# Patient Record
Sex: FEMALE | Race: BLACK OR AFRICAN AMERICAN | NOT HISPANIC OR LATINO | Employment: OTHER | URBAN - METROPOLITAN AREA
[De-identification: names, ages, dates, MRNs, and addresses within clinical notes are randomized per-mention and may not be internally consistent; named-entity substitution may affect disease eponyms.]

---

## 2017-02-27 ENCOUNTER — GENERIC CONVERSION - ENCOUNTER (OUTPATIENT)
Dept: OTHER | Facility: OTHER | Age: 77
End: 2017-02-27

## 2017-02-27 ENCOUNTER — LAB (OUTPATIENT)
Dept: LAB | Facility: HOSPITAL | Age: 77
End: 2017-02-27
Payer: COMMERCIAL

## 2017-02-27 ENCOUNTER — TRANSCRIBE ORDERS (OUTPATIENT)
Dept: LAB | Facility: HOSPITAL | Age: 77
End: 2017-02-27

## 2017-02-27 DIAGNOSIS — I10 ESSENTIAL (PRIMARY) HYPERTENSION: ICD-10-CM

## 2017-02-27 DIAGNOSIS — E89.0 POSTPROCEDURAL HYPOTHYROIDISM: ICD-10-CM

## 2017-02-27 LAB
ALBUMIN SERPL BCP-MCNC: 3.6 G/DL (ref 3.5–5)
ALP SERPL-CCNC: 75 U/L (ref 46–116)
ALT SERPL W P-5'-P-CCNC: 23 U/L (ref 12–78)
ANION GAP SERPL CALCULATED.3IONS-SCNC: 5 MMOL/L (ref 4–13)
AST SERPL W P-5'-P-CCNC: 20 U/L (ref 5–45)
BASOPHILS # BLD AUTO: 0.02 THOUSANDS/ΜL (ref 0–0.1)
BASOPHILS NFR BLD AUTO: 0 % (ref 0–1)
BILIRUB SERPL-MCNC: 0.59 MG/DL (ref 0.2–1)
BUN SERPL-MCNC: 12 MG/DL (ref 5–25)
CALCIUM SERPL-MCNC: 9 MG/DL (ref 8.3–10.1)
CHLORIDE SERPL-SCNC: 104 MMOL/L (ref 100–108)
CHOLEST SERPL-MCNC: 186 MG/DL (ref 50–200)
CO2 SERPL-SCNC: 31 MMOL/L (ref 21–32)
CREAT SERPL-MCNC: 1.01 MG/DL (ref 0.6–1.3)
EOSINOPHIL # BLD AUTO: 0.09 THOUSAND/ΜL (ref 0–0.61)
EOSINOPHIL NFR BLD AUTO: 2 % (ref 0–6)
ERYTHROCYTE [DISTWIDTH] IN BLOOD BY AUTOMATED COUNT: 14.4 % (ref 11.6–15.1)
GFR SERPL CREATININE-BSD FRML MDRD: >60 ML/MIN/1.73SQ M
GLUCOSE SERPL-MCNC: 99 MG/DL (ref 65–140)
HCT VFR BLD AUTO: 39.1 % (ref 34.8–46.1)
HDLC SERPL-MCNC: 91 MG/DL (ref 40–60)
HGB BLD-MCNC: 13.1 G/DL (ref 11.5–15.4)
LDLC SERPL CALC-MCNC: 81 MG/DL (ref 0–100)
LYMPHOCYTES # BLD AUTO: 2.31 THOUSANDS/ΜL (ref 0.6–4.47)
LYMPHOCYTES NFR BLD AUTO: 43 % (ref 14–44)
MCH RBC QN AUTO: 32.8 PG (ref 26.8–34.3)
MCHC RBC AUTO-ENTMCNC: 33.5 G/DL (ref 31.4–37.4)
MCV RBC AUTO: 98 FL (ref 82–98)
MONOCYTES # BLD AUTO: 0.4 THOUSAND/ΜL (ref 0.17–1.22)
MONOCYTES NFR BLD AUTO: 8 % (ref 4–12)
NEUTROPHILS # BLD AUTO: 2.53 THOUSANDS/ΜL (ref 1.85–7.62)
NEUTS SEG NFR BLD AUTO: 47 % (ref 43–75)
NRBC BLD AUTO-RTO: 0 /100 WBCS
PLATELET # BLD AUTO: 238 THOUSANDS/UL (ref 149–390)
PMV BLD AUTO: 9.7 FL (ref 8.9–12.7)
POTASSIUM SERPL-SCNC: 3.7 MMOL/L (ref 3.5–5.3)
PROT SERPL-MCNC: 7.4 G/DL (ref 6.4–8.2)
RBC # BLD AUTO: 3.99 MILLION/UL (ref 3.81–5.12)
SODIUM SERPL-SCNC: 140 MMOL/L (ref 136–145)
T4 FREE SERPL-MCNC: 1.28 NG/DL (ref 0.76–1.46)
TRIGL SERPL-MCNC: 70 MG/DL
TSH SERPL DL<=0.05 MIU/L-ACNC: 4.21 UIU/ML (ref 0.36–3.74)
WBC # BLD AUTO: 5.36 THOUSAND/UL (ref 4.31–10.16)

## 2017-02-27 PROCEDURE — 84439 ASSAY OF FREE THYROXINE: CPT

## 2017-02-27 PROCEDURE — 36415 COLL VENOUS BLD VENIPUNCTURE: CPT

## 2017-02-27 PROCEDURE — 84443 ASSAY THYROID STIM HORMONE: CPT

## 2017-02-27 PROCEDURE — 80061 LIPID PANEL: CPT

## 2017-02-27 PROCEDURE — 85025 COMPLETE CBC W/AUTO DIFF WBC: CPT

## 2017-02-27 PROCEDURE — 80053 COMPREHEN METABOLIC PANEL: CPT

## 2017-02-28 ENCOUNTER — TRANSCRIBE ORDERS (OUTPATIENT)
Dept: ADMINISTRATIVE | Facility: HOSPITAL | Age: 77
End: 2017-02-28

## 2017-02-28 DIAGNOSIS — Z12.31 SCREENING MAMMOGRAM FOR HIGH-RISK PATIENT: Primary | ICD-10-CM

## 2017-03-09 ENCOUNTER — ALLSCRIPTS OFFICE VISIT (OUTPATIENT)
Dept: OTHER | Facility: OTHER | Age: 77
End: 2017-03-09

## 2017-03-30 ENCOUNTER — HOSPITAL ENCOUNTER (OUTPATIENT)
Dept: MAMMOGRAPHY | Facility: MEDICAL CENTER | Age: 77
Discharge: HOME | End: 2017-03-30
Payer: COMMERCIAL

## 2017-03-30 DIAGNOSIS — Z12.31 SCREENING MAMMOGRAM FOR HIGH-RISK PATIENT: ICD-10-CM

## 2017-03-30 PROCEDURE — G0202 SCR MAMMO BI INCL CAD: HCPCS

## 2017-04-04 ENCOUNTER — TRANSCRIBE ORDERS (OUTPATIENT)
Dept: ADMINISTRATIVE | Facility: HOSPITAL | Age: 77
End: 2017-04-04

## 2017-04-04 DIAGNOSIS — R31.1 BENIGN ESSENTIAL MICROSCOPIC HEMATURIA: Primary | ICD-10-CM

## 2017-04-06 ENCOUNTER — HOSPITAL ENCOUNTER (OUTPATIENT)
Dept: ULTRASOUND IMAGING | Facility: CLINIC | Age: 77
Discharge: HOME | End: 2017-04-06
Payer: COMMERCIAL

## 2017-04-06 DIAGNOSIS — R92.8 ABNORMAL SCREENING MAMMOGRAM: ICD-10-CM

## 2017-04-06 PROCEDURE — 76642 ULTRASOUND BREAST LIMITED: CPT

## 2017-04-06 RX ORDER — OMEPRAZOLE 20 MG/1
20 CAPSULE, DELAYED RELEASE ORAL DAILY
COMMUNITY
End: 2018-07-20 | Stop reason: SDUPTHER

## 2017-04-10 ENCOUNTER — HOSPITAL ENCOUNTER (OUTPATIENT)
Dept: ULTRASOUND IMAGING | Facility: CLINIC | Age: 77
Discharge: HOME | End: 2017-04-10
Payer: COMMERCIAL

## 2017-04-10 ENCOUNTER — HOSPITAL ENCOUNTER (OUTPATIENT)
Dept: ULTRASOUND IMAGING | Facility: CLINIC | Age: 77
Discharge: HOME | End: 2017-04-10
Admitting: RADIOLOGY
Payer: COMMERCIAL

## 2017-04-10 ENCOUNTER — HOSPITAL ENCOUNTER (OUTPATIENT)
Dept: MAMMOGRAPHY | Facility: CLINIC | Age: 77
Discharge: HOME | End: 2017-04-10
Payer: COMMERCIAL

## 2017-04-10 VITALS — HEART RATE: 68 BPM | DIASTOLIC BLOOD PRESSURE: 68 MMHG | SYSTOLIC BLOOD PRESSURE: 132 MMHG

## 2017-04-10 DIAGNOSIS — R92.8 ABNORMAL ULTRASOUND OF BREAST: ICD-10-CM

## 2017-04-10 DIAGNOSIS — R92.8 ABNORMAL MAMMOGRAM, UNSPECIFIED: ICD-10-CM

## 2017-04-10 PROCEDURE — 88305 TISSUE EXAM BY PATHOLOGIST: CPT | Performed by: RADIOLOGY

## 2017-04-10 PROCEDURE — 88341 IMHCHEM/IMCYTCHM EA ADD ANTB: CPT | Performed by: RADIOLOGY

## 2017-04-10 PROCEDURE — 88342 IMHCHEM/IMCYTCHM 1ST ANTB: CPT | Performed by: RADIOLOGY

## 2017-04-10 PROCEDURE — 19083 BX BREAST 1ST LESION US IMAG: CPT

## 2017-04-10 RX ORDER — LIDOCAINE HYDROCHLORIDE 10 MG/ML
4 INJECTION, SOLUTION INFILTRATION; PERINEURAL ONCE
Status: COMPLETED | OUTPATIENT
Start: 2017-04-10 | End: 2017-04-10

## 2017-04-10 RX ADMIN — LIDOCAINE HYDROCHLORIDE 4 ML: 10 INJECTION, SOLUTION INFILTRATION; PERINEURAL at 14:12

## 2017-04-13 ENCOUNTER — HOSPITAL ENCOUNTER (OUTPATIENT)
Dept: ULTRASOUND IMAGING | Facility: HOSPITAL | Age: 77
Discharge: HOME | End: 2017-04-13
Attending: UROLOGY
Payer: COMMERCIAL

## 2017-04-13 DIAGNOSIS — R31.1 BENIGN ESSENTIAL MICROSCOPIC HEMATURIA: ICD-10-CM

## 2017-04-13 PROCEDURE — 76770 US EXAM ABDO BACK WALL COMP: CPT

## 2017-05-02 ENCOUNTER — TRANSCRIBE ORDERS (OUTPATIENT)
Dept: ADMINISTRATIVE | Facility: HOSPITAL | Age: 77
End: 2017-05-02

## 2017-05-02 DIAGNOSIS — D24.1 BENIGN NEOPLASM OF RIGHT BREAST: Primary | ICD-10-CM

## 2017-07-17 ENCOUNTER — LAB (OUTPATIENT)
Dept: LAB | Facility: HOSPITAL | Age: 77
End: 2017-07-17
Payer: COMMERCIAL

## 2017-07-17 ENCOUNTER — TRANSCRIBE ORDERS (OUTPATIENT)
Dept: LAB | Facility: HOSPITAL | Age: 77
End: 2017-07-17

## 2017-07-17 DIAGNOSIS — E78.00 PURE HYPERCHOLESTEROLEMIA: ICD-10-CM

## 2017-07-17 DIAGNOSIS — E66.01 MORBID OBESITY, UNSPECIFIED OBESITY TYPE (HCC): ICD-10-CM

## 2017-07-17 DIAGNOSIS — I48.0 PAROXYSMAL ATRIAL FIBRILLATION (HCC): ICD-10-CM

## 2017-07-17 DIAGNOSIS — I48.0 PAROXYSMAL ATRIAL FIBRILLATION (HCC): Primary | ICD-10-CM

## 2017-07-17 LAB
ALBUMIN SERPL BCP-MCNC: 3.6 G/DL (ref 3.5–5)
ALP SERPL-CCNC: 78 U/L (ref 46–116)
ALT SERPL W P-5'-P-CCNC: 29 U/L (ref 12–78)
ANION GAP SERPL CALCULATED.3IONS-SCNC: 7 MMOL/L (ref 4–13)
AST SERPL W P-5'-P-CCNC: 25 U/L (ref 5–45)
BASOPHILS # BLD AUTO: 0.03 THOUSANDS/ΜL (ref 0–0.1)
BASOPHILS NFR BLD AUTO: 1 % (ref 0–1)
BILIRUB SERPL-MCNC: 0.56 MG/DL (ref 0.2–1)
BUN SERPL-MCNC: 17 MG/DL (ref 5–25)
CALCIUM SERPL-MCNC: 9.2 MG/DL (ref 8.3–10.1)
CHLORIDE SERPL-SCNC: 105 MMOL/L (ref 100–108)
CHOLEST SERPL-MCNC: 182 MG/DL (ref 50–200)
CO2 SERPL-SCNC: 27 MMOL/L (ref 21–32)
CREAT SERPL-MCNC: 1.15 MG/DL (ref 0.6–1.3)
EOSINOPHIL # BLD AUTO: 0.06 THOUSAND/ΜL (ref 0–0.61)
EOSINOPHIL NFR BLD AUTO: 1 % (ref 0–6)
ERYTHROCYTE [DISTWIDTH] IN BLOOD BY AUTOMATED COUNT: 14.1 % (ref 11.6–15.1)
GFR SERPL CREATININE-BSD FRML MDRD: 55.3 ML/MIN/1.73SQ M
GLUCOSE P FAST SERPL-MCNC: 91 MG/DL (ref 65–99)
HCT VFR BLD AUTO: 40.2 % (ref 34.8–46.1)
HDLC SERPL-MCNC: 80 MG/DL (ref 40–60)
HGB BLD-MCNC: 13.7 G/DL (ref 11.5–15.4)
LDLC SERPL CALC-MCNC: 88 MG/DL (ref 0–100)
LYMPHOCYTES # BLD AUTO: 2.04 THOUSANDS/ΜL (ref 0.6–4.47)
LYMPHOCYTES NFR BLD AUTO: 43 % (ref 14–44)
MCH RBC QN AUTO: 32.9 PG (ref 26.8–34.3)
MCHC RBC AUTO-ENTMCNC: 34.1 G/DL (ref 31.4–37.4)
MCV RBC AUTO: 97 FL (ref 82–98)
MONOCYTES # BLD AUTO: 0.56 THOUSAND/ΜL (ref 0.17–1.22)
MONOCYTES NFR BLD AUTO: 12 % (ref 4–12)
NEUTROPHILS # BLD AUTO: 2 THOUSANDS/ΜL (ref 1.85–7.62)
NEUTS SEG NFR BLD AUTO: 43 % (ref 43–75)
NRBC BLD AUTO-RTO: 0 /100 WBCS
PLATELET # BLD AUTO: 243 THOUSANDS/UL (ref 149–390)
PMV BLD AUTO: 9.8 FL (ref 8.9–12.7)
POTASSIUM SERPL-SCNC: 3.7 MMOL/L (ref 3.5–5.3)
PROT SERPL-MCNC: 7.7 G/DL (ref 6.4–8.2)
RBC # BLD AUTO: 4.16 MILLION/UL (ref 3.81–5.12)
SODIUM SERPL-SCNC: 139 MMOL/L (ref 136–145)
TRIGL SERPL-MCNC: 70 MG/DL
WBC # BLD AUTO: 4.7 THOUSAND/UL (ref 4.31–10.16)

## 2017-07-17 PROCEDURE — 80061 LIPID PANEL: CPT

## 2017-07-17 PROCEDURE — 85025 COMPLETE CBC W/AUTO DIFF WBC: CPT

## 2017-07-17 PROCEDURE — 36415 COLL VENOUS BLD VENIPUNCTURE: CPT

## 2017-07-17 PROCEDURE — 80053 COMPREHEN METABOLIC PANEL: CPT

## 2017-07-24 ENCOUNTER — GENERIC CONVERSION - ENCOUNTER (OUTPATIENT)
Dept: OTHER | Facility: OTHER | Age: 77
End: 2017-07-24

## 2017-08-04 ENCOUNTER — GENERIC CONVERSION - ENCOUNTER (OUTPATIENT)
Dept: OTHER | Facility: OTHER | Age: 77
End: 2017-08-04

## 2017-08-28 ENCOUNTER — LAB (OUTPATIENT)
Dept: LAB | Facility: HOSPITAL | Age: 77
End: 2017-08-28
Payer: COMMERCIAL

## 2017-08-28 ENCOUNTER — TRANSCRIBE ORDERS (OUTPATIENT)
Dept: LAB | Facility: HOSPITAL | Age: 77
End: 2017-08-28

## 2017-08-28 ENCOUNTER — GENERIC CONVERSION - ENCOUNTER (OUTPATIENT)
Dept: OTHER | Facility: OTHER | Age: 77
End: 2017-08-28

## 2017-08-28 DIAGNOSIS — I10 ESSENTIAL (PRIMARY) HYPERTENSION: ICD-10-CM

## 2017-08-28 DIAGNOSIS — E89.0 POSTPROCEDURAL HYPOTHYROIDISM: ICD-10-CM

## 2017-08-28 DIAGNOSIS — E78.5 HYPERLIPIDEMIA: ICD-10-CM

## 2017-08-28 LAB
ALBUMIN SERPL BCP-MCNC: 3.4 G/DL (ref 3.5–5)
ALP SERPL-CCNC: 76 U/L (ref 46–116)
ALT SERPL W P-5'-P-CCNC: 22 U/L (ref 12–78)
ANION GAP SERPL CALCULATED.3IONS-SCNC: 5 MMOL/L (ref 4–13)
AST SERPL W P-5'-P-CCNC: 21 U/L (ref 5–45)
BASOPHILS # BLD AUTO: 0.03 THOUSANDS/ΜL (ref 0–0.1)
BASOPHILS NFR BLD AUTO: 1 % (ref 0–1)
BILIRUB SERPL-MCNC: 0.51 MG/DL (ref 0.2–1)
BUN SERPL-MCNC: 17 MG/DL (ref 5–25)
CALCIUM SERPL-MCNC: 9.1 MG/DL (ref 8.3–10.1)
CHLORIDE SERPL-SCNC: 105 MMOL/L (ref 100–108)
CHOLEST SERPL-MCNC: 172 MG/DL (ref 50–200)
CO2 SERPL-SCNC: 29 MMOL/L (ref 21–32)
CREAT SERPL-MCNC: 1.01 MG/DL (ref 0.6–1.3)
EOSINOPHIL # BLD AUTO: 0.11 THOUSAND/ΜL (ref 0–0.61)
EOSINOPHIL NFR BLD AUTO: 2 % (ref 0–6)
ERYTHROCYTE [DISTWIDTH] IN BLOOD BY AUTOMATED COUNT: 14 % (ref 11.6–15.1)
GFR SERPL CREATININE-BSD FRML MDRD: 61 ML/MIN/1.73SQ M
GLUCOSE P FAST SERPL-MCNC: 94 MG/DL (ref 65–99)
HCT VFR BLD AUTO: 39.8 % (ref 34.8–46.1)
HDLC SERPL-MCNC: 80 MG/DL (ref 40–60)
HGB BLD-MCNC: 13.4 G/DL (ref 11.5–15.4)
LDLC SERPL CALC-MCNC: 81 MG/DL (ref 0–100)
LYMPHOCYTES # BLD AUTO: 2.22 THOUSANDS/ΜL (ref 0.6–4.47)
LYMPHOCYTES NFR BLD AUTO: 44 % (ref 14–44)
MCH RBC QN AUTO: 32.7 PG (ref 26.8–34.3)
MCHC RBC AUTO-ENTMCNC: 33.7 G/DL (ref 31.4–37.4)
MCV RBC AUTO: 97 FL (ref 82–98)
MONOCYTES # BLD AUTO: 0.32 THOUSAND/ΜL (ref 0.17–1.22)
MONOCYTES NFR BLD AUTO: 6 % (ref 4–12)
NEUTROPHILS # BLD AUTO: 2.35 THOUSANDS/ΜL (ref 1.85–7.62)
NEUTS SEG NFR BLD AUTO: 47 % (ref 43–75)
NRBC BLD AUTO-RTO: 0 /100 WBCS
PLATELET # BLD AUTO: 231 THOUSANDS/UL (ref 149–390)
PMV BLD AUTO: 9.1 FL (ref 8.9–12.7)
POTASSIUM SERPL-SCNC: 3.6 MMOL/L (ref 3.5–5.3)
PROT SERPL-MCNC: 7.5 G/DL (ref 6.4–8.2)
RBC # BLD AUTO: 4.1 MILLION/UL (ref 3.81–5.12)
SODIUM SERPL-SCNC: 139 MMOL/L (ref 136–145)
T4 SERPL-MCNC: 10.8 UG/DL (ref 4.7–13.3)
TRIGL SERPL-MCNC: 57 MG/DL
TSH SERPL DL<=0.05 MIU/L-ACNC: 3.94 UIU/ML (ref 0.36–3.74)
WBC # BLD AUTO: 5.04 THOUSAND/UL (ref 4.31–10.16)

## 2017-08-28 PROCEDURE — 84443 ASSAY THYROID STIM HORMONE: CPT

## 2017-08-28 PROCEDURE — 80061 LIPID PANEL: CPT

## 2017-08-28 PROCEDURE — 84436 ASSAY OF TOTAL THYROXINE: CPT

## 2017-08-28 PROCEDURE — 36415 COLL VENOUS BLD VENIPUNCTURE: CPT

## 2017-08-28 PROCEDURE — 85025 COMPLETE CBC W/AUTO DIFF WBC: CPT

## 2017-08-28 PROCEDURE — 80053 COMPREHEN METABOLIC PANEL: CPT

## 2017-09-07 ENCOUNTER — ALLSCRIPTS OFFICE VISIT (OUTPATIENT)
Dept: OTHER | Facility: OTHER | Age: 77
End: 2017-09-07

## 2017-09-07 ENCOUNTER — GENERIC CONVERSION - ENCOUNTER (OUTPATIENT)
Dept: OTHER | Facility: OTHER | Age: 77
End: 2017-09-07

## 2017-09-08 ENCOUNTER — GENERIC CONVERSION - ENCOUNTER (OUTPATIENT)
Dept: OTHER | Facility: OTHER | Age: 77
End: 2017-09-08

## 2017-10-26 ENCOUNTER — TRANSCRIBE ORDERS (OUTPATIENT)
Dept: MAMMOGRAPHY | Facility: CLINIC | Age: 77
End: 2017-10-26

## 2017-10-26 ENCOUNTER — HOSPITAL ENCOUNTER (OUTPATIENT)
Dept: MAMMOGRAPHY | Facility: CLINIC | Age: 77
Discharge: HOME | End: 2017-10-26
Payer: COMMERCIAL

## 2017-10-26 DIAGNOSIS — D24.1 BENIGN NEOPLASM OF RIGHT BREAST: ICD-10-CM

## 2017-10-26 DIAGNOSIS — R92.8 ABNORMAL MAMMOGRAM: Primary | ICD-10-CM

## 2017-10-26 PROCEDURE — G0206 DX MAMMO INCL CAD UNI: HCPCS

## 2017-10-26 PROCEDURE — G0279 TOMOSYNTHESIS, MAMMO: HCPCS

## 2018-01-13 VITALS
SYSTOLIC BLOOD PRESSURE: 110 MMHG | WEIGHT: 166 LBS | BODY MASS INDEX: 28.34 KG/M2 | DIASTOLIC BLOOD PRESSURE: 72 MMHG | HEIGHT: 64 IN

## 2018-01-16 NOTE — RESULT NOTES
Verified Results  (1) CBC/PLT/DIFF [id number] 07:12AM Renetta Fitzpatrick Order Number: [id number]     Test Name Result Flag Reference   WBC COUNT 5 36 Thousand/uL  4 31-10 16   RBC COUNT 3 99 Million/uL  3 81-5 12   HEMOGLOBIN 13 1 g/dL  11 5-15 4   HEMATOCRIT 39 1 %  34 8-46  1   MCV 98 fL  82-98   MCH 32 8 pg  26 8-34 3   MCHC 33 5 g/dL  31 4-37 4   RDW 14 4 %  11 6-15 1   MPV 9 7 fL  8 9-12 7   PLATELET COUNT 057 Thousands/uL  149-390   nRBC AUTOMATED 0 /100 WBCs     NEUTROPHILS RELATIVE PERCENT 47 %  43-75   LYMPHOCYTES RELATIVE PERCENT 43 %  14-44   MONOCYTES RELATIVE PERCENT 8 %  4-12   EOSINOPHILS RELATIVE PERCENT 2 %  0-6   BASOPHILS RELATIVE PERCENT 0 %  0-1   NEUTROPHILS ABSOLUTE COUNT 2 53 Thousands/? ??L  1 85-7 62   LYMPHOCYTES ABSOLUTE COUNT 2 31 Thousands/? ??L  0 60-4 47   MONOCYTES ABSOLUTE COUNT 0 40 Thousand/? ??L  0 17-1 22   EOSINOPHILS ABSOLUTE COUNT 0 09 Thousand/? ??L  0 00-0 61   BASOPHILS ABSOLUTE COUNT 0 02 Thousands/? ??L  0 00-0 10   - Patient Instructions: This bloodwork is non-fasting  Please drink two glasses of water morning of bloodwork  - Patient Instructions: This bloodwork is non-fasting  Please drink two glasses of water morning of bloodwork  (1) COMPREHENSIVE METABOLIC PANEL [id number] 76:89BM Renetta Fitzpatrick Order Number: [id number]     Test Name Result Flag Reference   GLUCOSE,RANDM 99 mg/dL     If the patient is fasting, the ADA then defines impaired fasting glucose as > 100 mg/dL and diabetes as > or equal to 123 mg/dL     SODIUM 140 mmol/L  136-145   POTASSIUM 3 7 mmol/L  3 5-5 3   CHLORIDE 104 mmol/L  100-108   CARBON DIOXIDE 31 mmol/L  21-32   ANION GAP (CALC) 5 mmol/L  4-13   BLOOD UREA NITROGEN 12 mg/dL  5-25   CREATININE 1 01 mg/dL  0 60-1 30   Standardized to IDMS reference method   CALCIUM 9 0 mg/dL  8 3-10 1   BILI, TOTAL 0 59 mg/dL  0 20-1 00   ALK PHOSPHATAS 75 U/L     ALT (SGPT) 23 U/L  12-78   AST(SGOT) 20 U/L  5-45 ALBUMIN 3 6 g/dL  3 5-5 0   TOTAL PROTEIN 7 4 g/dL  6 4-8 2   eGFR Non-African American      >60 0 ml/min/1 73sq m   - Patient Instructions: This is a fasting blood test  Water,black tea or black  coffee only after 9:00pm the night before test Drink 2 glasses of water the morning of test - Patient Instructions: This bloodwork is non-fasting  Please drink two glasses of   water morning of bloodwork  National Kidney Disease Education Program recommendations are as follows:  GFR calculation is accurate only with a steady state creatinine  Chronic Kidney disease less than 60 ml/min/1 73 sq  meters  Kidney failure less than 15 ml/min/1 73 sq  meters  (1) LIPID PANEL, FASTING [id number] 07:12AM Khushbu Mendez Order Number: [id number]     Test Name Result Flag Reference   CHOLESTEROL 186 mg/dL     HDL,DIRECT 91 mg/dL H 40-60   Specimen collection should occur prior to Metamizole administration due to the potential for falsely depressed results  LDL CHOLESTEROL CALCULATED 81 mg/dL  0-100   - Patient Instructions: This is a fasting blood test  Water,black tea or black  coffee only after 9:00pm the night before test   Drink 2 glasses of water the morning of test     - Patient Instructions: This is a fasting blood test  Water,black tea or black  coffee only after 9:00pm the night before test Drink 2 glasses of water the morning of test - Patient Instructions: This bloodwork is non-fasting  Please drink two glasses of   water morning of bloodwork  Triglyceride:         Normal              <150 mg/dl       Borderline High    150-199 mg/dl       High               200-499 mg/dl       Very High          >499 mg/dl  Cholesterol:         Desirable        <200 mg/dl      Borderline High  200-239 mg/dl      High             >239 mg/dl  HDL Cholesterol:        High    >59 mg/dL      Low     <41 mg/dL  LDL CALCULATED:    This screening LDL is a calculated result    It does not have the accuracy of the Direct Measured LDL in the monitoring of patients with hyperlipidemia and/or statin therapy  Direct Measure LDL (FSR770) must be ordered separately in these patients  TRIGLYCERIDES 70 mg/dL  <=150   Specimen collection should occur prior to N-Acetylcysteine or Metamizole administration due to the potential for falsely depressed results  (1) TSH [id number] 07:12AM Desiree Ayoubino Order Number: [id number]     Test Name Result Flag Reference   TSH 4 210 uIU/mL H 0 358-3 740   - Patient Instructions: This bloodwork is non-fasting  Please drink two glasses of water morning of bloodwork  - Patient Instructions: This is a fasting blood test  Water,black tea or black  coffee only after 9:00pm the night before test Drink 2 glasses of water the morning of test - Patient Instructions: This bloodwork is non-fasting  Please drink two glasses of   water morning of bloodwork  Patients undergoing fluorescein dye angiography may retain small amounts of fluorescein in the body for 48-72 hours post procedure  Samples containing fluorescein can produce falsely depressed TSH values  If the patient had this procedure,a specimen should be resubmitted post fluorescein clearance  The recommended reference ranges for TSH during pregnancy are as follows:  First trimester 0 1 to 2 5 uIU/mL  Second trimester  0 2 to 3 0 uIU/mL  Third trimester 0 3 to 3 0 uIU/m     (1) T4, FREE [id number] 07:12AM Desireehugh Ayoubino Order Number: [id number]     Test Name Result Flag Reference   T4,FREE 1 28 ng/dL  0 76-1 46   - Patient Instructions: This is a fasting blood test  Water,black tea or black  coffee only after 9:00pm the night before test Drink 2 glasses of water the morning of test - Patient Instructions: This bloodwork is non-fasting  Please drink two glasses of   water morning of bloodwork

## 2018-01-17 DIAGNOSIS — C18.9 MALIGNANT NEOPLASM OF COLON (HCC): ICD-10-CM

## 2018-01-17 NOTE — RESULT NOTES
Verified Results  (1) COMPREHENSIVE METABOLIC PANEL [id number] 31:77MO Ignacio Kennedy Order Number: [id number]     Test Name Result Flag Reference   SODIUM 139 mmol/L  136-145   POTASSIUM 3 6 mmol/L  3 5-5 3   CHLORIDE 105 mmol/L  100-108   CARBON DIOXIDE 29 mmol/L  21-32   ANION GAP (CALC) 5 mmol/L  4-13   BLOOD UREA NITROGEN 17 mg/dL  5-25   CREATININE 1 01 mg/dL  0 60-1 30   Standardized to IDMS reference method   CALCIUM 9 1 mg/dL  8 3-10 1   BILI, TOTAL 0 51 mg/dL  0 20-1 00   ALK PHOSPHATAS 76 U/L     ALT (SGPT) 22 U/L  12-78   Specimen collection should occur prior to Sulfasalazine and/or Sulfapyridine administration due to the potential for falsely depressed results  AST(SGOT) 21 U/L  5-45   Specimen collection should occur prior to Sulfasalazine administration due to the potential for falsely depressed results  ALBUMIN 3 4 g/dL L 3 5-5 0   TOTAL PROTEIN 7 5 g/dL  6 4-8 2   eGFR 61 ml/min/1 73sq m     National Kidney Disease Education Program recommendations are as follows:  GFR calculation is accurate only with a steady state creatinine  Chronic Kidney disease less than 60 ml/min/1 73 sq  meters  Kidney failure less than 15 ml/min/1 73 sq  meters  GLUCOSE FASTING 94 mg/dL  65-99   Specimen collection should occur prior to Sulfasalazine administration due to the potential for falsely depressed results  Specimen collection should occur prior to Sulfapyridine administration due to the potential for falsely elevated results  (1) CBC/PLT/DIFF [id number] 07:04AM Ignacio Kennedy Order Number: [id number]     Test Name Result Flag Reference   WBC COUNT 5 04 Thousand/uL  4 31-10 16   RBC COUNT 4 10 Million/uL  3 81-5 12   HEMOGLOBIN 13 4 g/dL  11 5-15 4   HEMATOCRIT 39 8 %  34 8-46  1   MCV 97 fL  82-98   MCH 32 7 pg  26 8-34 3   MCHC 33 7 g/dL  31 4-37 4   RDW 14 0 %  11 6-15 1   MPV 9 1 fL  8 9-12 7   PLATELET COUNT 512 Thousands/uL  149-390   nRBC AUTOMATED 0 /100 WBCs     NEUTROPHILS RELATIVE PERCENT 47 %  43-75   LYMPHOCYTES RELATIVE PERCENT 44 %  14-44   MONOCYTES RELATIVE PERCENT 6 %  4-12   EOSINOPHILS RELATIVE PERCENT 2 %  0-6   BASOPHILS RELATIVE PERCENT 1 %  0-1   NEUTROPHILS ABSOLUTE COUNT 2 35 Thousands/? ??L  1 85-7 62   LYMPHOCYTES ABSOLUTE COUNT 2 22 Thousands/? ??L  0 60-4 47   MONOCYTES ABSOLUTE COUNT 0 32 Thousand/? ??L  0 17-1 22   EOSINOPHILS ABSOLUTE COUNT 0 11 Thousand/? ??L  0 00-0 61   BASOPHILS ABSOLUTE COUNT 0 03 Thousands/? ??L  0 00-0 10   - Patient Instructions: This bloodwork is non-fasting  Please drink two glasses of water morning of bloodwork  (1) LIPID PANEL, FASTING [id number] 07:04AM Fco Nava    Order Number: [id number]     Test Name Result Flag Reference   CHOLESTEROL 172 mg/dL     HDL,DIRECT 80 mg/dL H 40-60   Specimen collection should occur prior to Metamizole administration due to the potential for falsley depressed results  LDL CHOLESTEROL CALCULATED 81 mg/dL  0-100   - Patient Instructions: This is a fasting blood test  Water,black tea or black  coffee only after 9:00pm the night before test   Drink 2 glasses of water the morning of test       Triglyceride:        Normal ??? ??? ??? ??? ??? ??? ??? <150 mg/dl   ??? ??? ???Borderline High ??? ??? 150-199 mg/dl   ??? ??? ? ?? High ??? ??? ??? ??? ??? ??? ??? 200-499 mg/dl   ??? ??? ? ??Very High ??? ??? ??? ??? ??? >499 mg/dl      Cholesterol:       Desirable ??? ??? ??? ??? <200 mg/dl   ??? ??? Borderline High ??? 200-239 mg/dl   ??? ??? High ??? ??? ??? ??? ??? ??? >239 mg/dl      HDL Cholesterol:       High ??? ???>59 mg/dL   ??? ??? Low ??? ??? <41 mg/dL      This screening LDL is a calculated result  It does not have the accuracy of the Direct Measured LDL in the monitoring of patients with hyperlipidemia and/or statin therapy  Direct Measure LDL (IDL807) must be ordered separately in these patients     TRIGLYCERIDES 57 mg/dL  <=150   Specimen collection should occur prior to N-Acetylcysteine or Metamizole administration due to the potential for falsely depressed results  (1) TSH [id number] 07:04AM HealOr Fermín Order Number: [id number]     Test Name Result Flag Reference   TSH 3 940 uIU/mL H 0 358-3 740   - Patient Instructions: This bloodwork is non-fasting  Please drink two glasses of water morning of bloodwork  Patients undergoing fluorescein dye angiography may retain small amounts of fluorescein in the body for 48-72 hours post procedure  Samples containing fluorescein can produce falsely depressed TSH values  If the patient had this procedure,a specimen should be resubmitted post fluorescein clearance            The recommended reference ranges for TSH during pregnancy are as follows:  First trimester 0 1 to 2 5 uIU/mL  Second trimester  0 2 to 3 0 uIU/mL  Third trimester 0 3 to 3 0 uIU/m     (1) THYROXINE 28Aug2017 07:04AM HealOr Fermín Order Number: [id number]     Test Name Result Flag Reference   T4 TOTAL 10 8 ug/dL  4 7-13 3

## 2018-01-18 ENCOUNTER — APPOINTMENT (OUTPATIENT)
Dept: LAB | Facility: HOSPITAL | Age: 78
End: 2018-01-18
Payer: COMMERCIAL

## 2018-01-18 ENCOUNTER — TRANSCRIBE ORDERS (OUTPATIENT)
Dept: LAB | Facility: HOSPITAL | Age: 78
End: 2018-01-18

## 2018-01-18 DIAGNOSIS — K21.9 GASTROESOPHAGEAL REFLUX DISEASE WITHOUT ESOPHAGITIS: ICD-10-CM

## 2018-01-18 DIAGNOSIS — I10 ESSENTIAL HYPERTENSION, BENIGN: Primary | ICD-10-CM

## 2018-01-18 DIAGNOSIS — I10 ESSENTIAL HYPERTENSION, BENIGN: ICD-10-CM

## 2018-01-18 PROCEDURE — 80053 COMPREHEN METABOLIC PANEL: CPT

## 2018-01-18 PROCEDURE — 36415 COLL VENOUS BLD VENIPUNCTURE: CPT

## 2018-01-18 PROCEDURE — 80061 LIPID PANEL: CPT

## 2018-01-18 PROCEDURE — 85025 COMPLETE CBC W/AUTO DIFF WBC: CPT

## 2018-01-18 NOTE — PROGRESS NOTES
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) (E89 0)   3  Hyperlipidemia (272 4) (E78 5)   4  Pulmonary nodules (793 19) (R91 8)   5  Thyroid cancer (193) (C73)   6  Onychomycosis of toenail (110 1) (B35 1)   7  Esophagitis (530 10) (K20 9)   8  PAF (paroxysmal atrial fibrillation) (427 31) (I48 0)    Plan  Benign essential hypertension    · (1) CBC/PLT/DIFF; Status:Active; Requested for:08Lbw6599;    · (1) COMPREHENSIVE METABOLIC PANEL; Status:Active; Requested for:61Igp0587;    · Restrict the salt in your diet by avoiding highly salted foods ; Status:Complete;   Done:  54BLO0823 12:55PM   · Call [phone] if: You become dizzy or lightheaded, especially when you stand up  after sitting for a while ; Status:Complete;   Done: 72NBE9404 12:55PM   · Call [phone] if: You develop double vision (see two of everything) ;  Status:Complete;   Done: 36EZA8569 12:55PM   · Call [phone] if: Your blood pressure is frequently higher than 140/90 ;  Status:Complete;   Done: 31XDL0697 12:55PM   · Seek Immediate Medical Attention if: You have a severe headache that will not go away ;  Status:Complete;   Done: 86IFE3273 12:55PM   · Seek Immediate Medical Attention if: Your blood pressure is greater than 250/120 for 2  consecutive readings ; Status:Complete;   Done: 28IWG7441 12:55PM  Benign essential hypertension, Esophagitis, Hyperlipidemia, Postsurgical  hypothyroidism, Pulmonary nodules    · Continue with our present treatment plan ; Status:Complete;   Done: 11NDK1797  12:54PM  Benign essential hypertension, Hyperlipidemia    · (1) LIPID PANEL, FASTING; Status:Active; Requested for:12Dda3407;    · Eat a low fat and low cholesterol diet ; Status:Complete;   Done: 86HZB4003 12:54PM   · Call 911 if:  You experience a new kind of chest pain (angina) or pressure ;  Status:Complete;   Done: 47GAD9474 12:55PM  Benign essential hypertension, Hyperlipidemia, Postsurgical hypothyroidism    · Begin or continue regular aerobic exercise  Gradually work up to at least 3 sessions of  30 minutes of exercise a week ; Status:Complete;   Done: 97UXH6797 12:54PM  Benign essential hypertension, Malignant neoplasm of colon, unspecified    · Call 911 if: You have any symptoms of a stroke ; Status:Complete;   Done: 62ZJV1747  12:55PM  Benign essential hypertension, Postsurgical hypothyroidism    · (1) THYROXINE; Status:Active; Requested for:11Kui9387;    · (1) TSH; Status:Active; Requested for:41Hml5197;   Esophagitis, Hyperlipidemia    · Call [phone] if: You have pain in the stomach area ; Status:Complete;   Done:  51ZHR2353 12:57PM   · Call [phone] if: You start vomiting ; Status:Complete;   Done: 56UEF5898 12:57PM  Hyperlipidemia    · Call [phone] if: You have muscle cramps ; Status:Complete;   Done: 10WNG5479  12:57PM  Onychomycosis of toenail    · Jublia 10 % External Solution; apply small amout to nails daily  Osteoarthritis    · DiazePAM 2 MG Oral Tablet; TAKE 1 TABLET TWICE DAILY AS NEEDED  PAF (paroxysmal atrial fibrillation)    · Call [phone] if: You are falling often ; Status:Complete;   Done: 94DXD5821  12:56PM   · Call [phone] if: You become dizzy or lightheaded, especially when you stand up  after sitting for a while ; Status:Complete;   Done: 02VCL5778 12:56PM   · Call [phone] if: You have difficulty breathing, or you are short of breath more  often ; Status:Complete;   Done: 45LVA7014 12:56PM   · Call [phone] if: You lose your vision for a short period of time ; Status:Complete;    Done: 54AON8316 12:56PM   · Call [phone] if: You see any blood in the stool ; Status:Complete;   Done:  41FPO3055 12:56PM   · Call [phone] if:  Your eyesight becomes blurry or you have difficulty seeing ;  Status:Complete;   Done: 14GGB3894 12:56PM   · Call 911 if: You are too short of breath to talk, you can speak only one or two words  between breaths, or your lips or nails look blue ; Status:Complete;   Done: 91RGG7640  12:56PM   · Call 911 if: You experience a new kind of chest pain (angina) or pressure ;  Status:Complete;   Done: 80VYT1988 12:56PM   · Call 911 if: You feel your heart is beating too fast ; Status:Complete;   Done: 92UJF0830  12:56PM   · Call 911 if: You have any symptoms of a stroke ; Status:Complete;   Done: 36KHD8042  12:56PM   · Call 911 if: You have fainted or passed out ; Status:Complete;   Done: 36IOX6719  12:56PM   · Seek Immediate Medical Attention if: The symptoms are suddenly worse ;  Status:Complete;   Done: 95VAR1634 12:56PM   · Seek Immediate Medical Attention if: You are having chest pain with exercise ;  Status:Complete;   Done: 81MWM2235 12:56PM   · Seek Immediate Medical Attention if: You feel your heart is beating very fast or skipping  beats ; Status:Complete;   Done: 37DAP9358 12:56PM   · Seek Immediate Medical Attention if: You have a severe headache that will not go away ;  Status:Complete;   Done: 83FOP0781 12:56PM   · Avoid foods and beverages that contain caffeine ; Status:Complete;   Done: 96KDY9597  12:56PM   · Limit your use of alcohol to 2 drinks or cans of beer a day ; Status:Complete;   Done:  23YKE1886 12:56PM  Postsurgical hypothyroidism    · Call [phone] if: The symptoms are not better in 7 days ; Status:Complete;   Done:  88DSP5048 12:57PM   · Call [phone] if: You begin to have tremors or your hands become shaky ;  Status:Complete;   Done: 44EZM9570 12:57PM   · Call [phone] if: You gain or lose over 10 pounds without a change in your diet ;  Status:Complete;   Done: 29DTI7826 12:57PM   · Call [phone] if: You have symptoms of anxiety ; Status:Complete;   Done:  80WLB5159 12:57PM   · Call [phone] if: You lose weight without trying to ; Status:Complete;   Done:  39HBR6067 12:57PM   · Call [phone] if:  Your loss of memory seems to be worse ; Status:Complete;    Done: 57IUC5115 12:57PM   · Call 911 if: You are having trouble staying awake ; Status:Complete;   Done: 00TZH6618  12:57PM   · Call 911 if: You experience a seizure ; Status:Complete;   Done: 32NSL9613 12:57PM   · Call 911 if: You have fainted or passed out ; Status:Complete;   Done: 33OVB0456  12:57PM   · Seek Immediate Medical Attention if: You are feeling short of breath ; Status:Complete;    Done: 02DZK0493 12:57PM   · Seek Immediate Medical Attention if: You feel your heart is beating very fast or skipping  beats ; Status:Complete;   Done: 89VOW3257 12:57PM    Discussion/Summary    Patient will see her specialists as directed Patient to continue current care and followu in 6 months with medicare wellness  Chief Complaint  FOLLOW UP LIPID,HTN  MED REFILL  REVIEW BLOOD WORK      History of Present Illness  Patient is here for checkup of her hypothryoidism, hypertension, parosymal atrial fibrillation, pulmonary nodules and her malignancy history Patient is following with Dr Vahe Eric and had PET scan in 9/2016 She will be having her mammogram shortly and will also be seeing them and discussing repeat Ct of chest as she has had a stable 11 mm nodule for a few years In the past she had a biopsy of it and it too did not reveal malignancy Her last labs were reviewed [facility] had [address] in 2016 and colonsocopy in 2014 She will have repeat of both in 2019 Patient is overall doing OK She is asking for refill of diazepam that she uses prn for her muscle spasms of neck and the jublia the dermatologist was giving her Patient is going to the gym 3 times per week Patient is due for cardiology followup and labs from them shortly   The patient is being seen for follow-up of surgically induced hypothyroidism  The patient reports no change in the condition     Interval symptoms:  denies weight gain, denies cold intolerance, denies fatigue, denies weakness, denies constipation, denies dyspnea on exertion, denies trouble concentrating, stable hair loss and stable dry skin  Associated symptoms: no myalgias, no arthralgias, no paresthesias, no depression, no leg swelling, no weight loss and no palpitations  Medications:  the patient is adherent to her medication regimen, but she denies medication side effects  Disease management:  the patient is doing well with her goals  The patient is here today for a follow-up visit  Her hyperlipidemia has been stable  Her LDL goal is <100 mg/dL and last LDL was 81 mg/dL  the patient is adherent with her medication regimen  She denies medication side effects  Her hypertension is primary, but stable  the patient is adherent with her medication regimen  She denies medication side effects  Symptoms: The patient denies any symptoms currently  no vomiting Associated symptoms include no orthopnea, no polyuria, no focal neurologic deficits, no palpitations, no syncope, no headache, no PND, no memory loss, no polydipsia and no heartburn  Lifestyle and Disease Management: Diet: She consumes a diverse and healthy diet  Weight Issues: She does not have any weight concerns  Exercise: She exercises regularly  Smoking: She does not use tobacco  Alcohol: She denies alcohol use  Drug Use: She denies drug use  Her atrial fibrillation is stable  the patient is adherent with her medication regimen  She denies medication side effects  Review of Systems    Constitutional: No fever, no chills, feels well, no tiredness, no recent weight gain or weight loss  Eyes: no eye pain and no eyesight problems  ENT: no complaints of earache, no loss of hearing, no nose bleeds, no nasal discharge, no sore throat, no hoarseness  Cardiovascular: as noted in HPI  Respiratory: as noted in HPI  Gastrointestinal: as noted in HPI  Genitourinary: no dysuria and no incontinence  Musculoskeletal: no arthralgias and no myalgias  Integumentary: no rashes and no skin lesions     Neurological: No complaints of headache, no confusion, no convulsions, no numbness, no dizziness or fainting, no tingling, no limb weakness, no difficulty walking  Psychiatric: no anxiety, no sleep disturbances and no depression  Active Problems    1  Adenoma, 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)   2  Benign essential hypertension (401 1) (I10)   3  Esophagitis (530 10) (K20 9)   4  Hyperlipidemia (272 4) (E78 5)   5  Malignant neoplasm of colon, unspecified (153 9) (C18 9)   6  Osteoarthritis (715 90) (M19 90)   7  Postsurgical hypothyroidism (244 0) (E89 0)   8  Pulmonary nodules (793 19) (R91 8)   9  Thyroid cancer (193) (C73)    Past Medical History    1  History of Allergic Reaction (995 3)   2  History of Bee Sting (E905 3)   3  History of Benign Polyps Of The Large Intestine (V12 72)   4  History of Colon Cancer (V10 05)   5  History of Glaucoma screening (V80 1) (Z13 5)   6  History of Helicobacter pylori (H  pylori) infection (041 86) (A04 8)   7  History of fatigue (V13 89) (Z87 898)   8  History of low back pain (V13 59) (Z87 39)   9  History of Medicare annual wellness visit, initial (V70 0) (Z00 00)   10  History of Medicare annual wellness visit, subsequent (V70 0) (Z00 00)   11  History of Nonspecific abnormal results of function study (794 9) (R94 8)   12  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)   13  History of Thyroid Cancer (V10 87)   14  History of Transient Tic Disorder - Recurrent (307 21)    The active problems and past medical history were reviewed and updated today  Surgical History    1  History of Arthroscopy Knee   2  History of Cardiovascular Stress Test   3  History of Complete Colonoscopy   4  History of Esophagogastroduodenoscopy With Biopsy   5  History of Knee Replacement   6  History of Knee Replacement   7  History of Parathyroid Surgery   8  History of Thyroid Surgery Total Thyroidectomy   9   History of Total Abdominal Hysterectomy    The surgical history was reviewed and updated today  Family History  Mother    1  Family history of Esophageal Cancer (V16 0)  Family History    2  Family history of Carcinoma Of The Stomach (V16 0)   3  Family history of Transient Ischemic Attack    The family history was reviewed and updated today  Social History    · Being A Social Drinker   · Caffeine Use   · Former smoker (V15 82) (V84 837)   · Denied: History of Housing Without Smoke Detectors   · [address] [place]  The social history was reviewed and is unchanged  Current Meds   1  DiazePAM 2 MG Oral Tablet; TAKE 1 TABLET TWICE DAILY AS NEEDED; Therapy: (Recorded:09Mar2017) to Recorded   2  DiltiaZEM HCl - 120 MG Oral Tablet; 1 CAPSULE DAILY; Therapy: 93IPN4492 to (Last Rx:42Oef4371) Ordered   3  Eliquis 5 MG Oral Tablet; Take 1 tablet twice daily; Therapy: 71AYL8656 to (Evaluate:19May2015); Last Rx:20Nov2014 Ordered   4  EPINEPHrine 0 3 MG/0 3ML OLIVER; USE AS DIRECTED  Requested for: 33HYC4617; Last   Rx:08Ify9813 Ordered   5  Levothyroxine Sodium 88 MCG Oral Tablet; Take 1 tablet daily; Therapy: 51VMP6553 to (Evonne Claire)  Requested for: 47QRC8347; Last   Rx:05Nov2016 Ordered   6  Omeprazole 20 MG Oral Capsule Delayed Release; TAKE 1 CAPSULE Daily; Therapy: 01DAS0192 to (Last Rx:17Jan2017)  Requested for: 95EWJ8640 Ordered   7  Simvastatin 80 MG Oral Tablet; Take 1 tablet daily; Therapy: 68GSG7469 to (Evaluate:14Jun2017)  Requested for: 85MBS4792; Last   Rx:91Tea4562 Ordered    The medication list was reviewed and updated today  Allergies    1  Ansaid TABS   2  Ibuprofen IB TABS   3  Levaquin TABS   4  Tequin TABS    5  Bee sting    Vitals  Vital Signs    Recorded: 03RXW8561 88:33CK   Systolic 095   Diastolic 62   Height 5 ft 4 in   Weight 169 lb    BMI Calculated 29 01   BSA Calculated 1 82     Physical Exam    Constitutional   General appearance: No acute distress, well appearing and well nourished      Eyes   Conjunctiva and lids: No swelling, erythema or discharge  Pupils and irises: Equal, round and reactive to light  Ears, Nose, Mouth, and Throat   External inspection of ears and nose: Normal     Otoscopic examination: Tympanic membranes translucent with normal light reflex  Canals patent without erythema  Nasal mucosa, septum, and turbinates: Normal without edema or erythema  Oropharynx: Normal with no erythema, edema, exudate or lesions  Pulmonary   Respiratory effort: No increased work of breathing or signs of respiratory distress  Auscultation of lungs: Clear to auscultation  Cardiovascular   Auscultation of heart: Normal rate and rhythm, normal S1 and S2, without murmurs  Examination of extremities for edema and/or varicosities: Normal     Carotid pulses: Normal     Abdomen   Abdomen: Non-tender, no masses  Liver and spleen: No hepatomegaly or splenomegaly  Lymphatic   Palpation of lymph nodes in neck: No lymphadenopathy  Musculoskeletal   Gait and station: Normal     Neurologic   Cranial nerves: Cranial nerves 2-12 intact      Psychiatric   Orientation to person, place, and time: Normal     Mood and affect: Normal          Future Appointments    Date/Time Provider Specialty Site   09/07/2017 08:00 AM Sofya Aparicio DO Family Medicine [facility]     Signatures   Electronically signed by : Gloria Hill DO; Mar  9 2017 12:58PM EST                       (Author)

## 2018-01-19 ENCOUNTER — GENERIC CONVERSION - ENCOUNTER (OUTPATIENT)
Dept: OTHER | Facility: OTHER | Age: 78
End: 2018-01-19

## 2018-01-22 VITALS
DIASTOLIC BLOOD PRESSURE: 62 MMHG | WEIGHT: 169 LBS | SYSTOLIC BLOOD PRESSURE: 110 MMHG | BODY MASS INDEX: 28.85 KG/M2 | HEIGHT: 64 IN

## 2018-01-23 NOTE — RESULT NOTES
Verified Results  (1) CBC/PLT/DIFF 90VZP7022 08:21AM Jasmina Left Order Number: [id number]     Test Name Result Flag Reference   WBC COUNT 4 83 Thousand/uL  4 31-10 16   RBC COUNT 4 03 Million/uL  3 81-5 12   HEMOGLOBIN 13 2 g/dL  11 5-15 4   HEMATOCRIT 38 7 %  34 8-46  1   MCV 96 fL  82-98   MCH 32 8 pg  26 8-34 3   MCHC 34 1 g/dL  31 4-37 4   RDW 13 7 %  11 6-15 1   MPV 9 1 fL  8 9-12 7   PLATELET COUNT 314 Thousands/uL  149-390   nRBC AUTOMATED 0 /100 WBCs     NEUTROPHILS RELATIVE PERCENT 46 %  43-75   LYMPHOCYTES RELATIVE PERCENT 44 %  14-44   MONOCYTES RELATIVE PERCENT 8 %  4-12   EOSINOPHILS RELATIVE PERCENT 1 %  0-6   BASOPHILS RELATIVE PERCENT 1 %  0-1   NEUTROPHILS ABSOLUTE COUNT 2 23 Thousands/? ??L  1 85-7 62   LYMPHOCYTES ABSOLUTE COUNT 2 10 Thousands/? ??L  0 60-4 47   MONOCYTES ABSOLUTE COUNT 0 39 Thousand/? ??L  0 17-1 22   EOSINOPHILS ABSOLUTE COUNT 0 07 Thousand/? ??L  0 00-0 61   BASOPHILS ABSOLUTE COUNT 0 03 Thousands/? ??L  0 00-0 10     (1) COMPREHENSIVE METABOLIC PANEL 71NPR3721 38:73SI Jasmina Left Order Number: [id number]     Test Name Result Flag Reference   SODIUM 140 mmol/L  136-145   POTASSIUM 3 3 mmol/L L 3 5-5 3   CHLORIDE 103 mmol/L  100-108   CARBON DIOXIDE 30 mmol/L  21-32   ANION GAP (CALC) 7 mmol/L  4-13   BLOOD UREA NITROGEN 13 mg/dL  5-25   CREATININE 0 97 mg/dL  0 60-1 30   Standardized to IDMS reference method   CALCIUM 9 2 mg/dL  8 3-10 1   BILI, TOTAL 0 57 mg/dL  0 20-1 00   ALK PHOSPHATAS 72 U/L     ALT (SGPT) 17 U/L  12-78   Specimen collection should occur prior to Sulfasalazine and/or Sulfapyridine administration due to the potential for falsely depressed results  AST(SGOT) 16 U/L  5-45   Specimen collection should occur prior to Sulfasalazine administration due to the potential for falsely depressed results     ALBUMIN 3 2 g/dL L 3 5-5 0   TOTAL PROTEIN 7 7 g/dL  6 4-8 2   eGFR 64 ml/min/1 [employer] Disease Education Program recommendations are as follows:  GFR calculation is accurate only with a steady state creatinine  Chronic Kidney disease less than 60 ml/min/1 73 sq  meters  Kidney failure less than 15 ml/min/1 73 sq  meters  GLUCOSE FASTING 95 mg/dL  65-99   Specimen collection should occur prior to Sulfasalazine administration due to the potential for falsely depressed results  Specimen collection should occur prior to Sulfapyridine administration due to the potential for falsely elevated results  (1) LIPID PANEL, FASTING 99XHW1595 08:21AM Taylor Burch Order Number: [id number]     Test Name Result Flag Reference   CHOLESTEROL 215 mg/dL H    Cholesterol:    Desirable <200 mg/dl    Borderline 200-239 mg/dl    High>239   HDL,DIRECT 87 mg/dL H 40-60   HDL Cholesterol:    High>59 mg/dL    Low <41 mg/dL   LDL CHOLESTEROL CALCULATED 113 mg/dL H 0-100   This screening LDL is a calculated result  It does not have the accuracy of the Direct Measured LDL in the monitoring of patients with hyperlipidemia and/or statin therapy  Direct Measure LDL (LLG735) must be ordered separately in these patients  Triglyceride:        Normal <150 mg/dl   Borderline High 150-199 mg/dl   High 200-499 mg/dl   Very High >499 mg/dl   TRIGLYCERIDES 73 mg/dL  <=150   Specimen collection should occur prior to N-Acetylcysteine or Metamizole administration due to the potential for falsely depressed results

## 2018-01-25 ENCOUNTER — LAB (OUTPATIENT)
Dept: LAB | Facility: HOSPITAL | Age: 78
End: 2018-01-25
Payer: COMMERCIAL

## 2018-01-25 ENCOUNTER — TRANSCRIBE ORDERS (OUTPATIENT)
Dept: LAB | Facility: HOSPITAL | Age: 78
End: 2018-01-25

## 2018-01-25 DIAGNOSIS — C18.9 MALIGNANT NEOPLASM OF COLON (HCC): ICD-10-CM

## 2018-01-25 LAB — HEMOCCULT STL QL IA: NEGATIVE

## 2018-01-25 PROCEDURE — G0328 FECAL BLOOD SCRN IMMUNOASSAY: HCPCS

## 2018-02-28 ENCOUNTER — TRANSCRIBE ORDERS (OUTPATIENT)
Dept: LAB | Facility: HOSPITAL | Age: 78
End: 2018-02-28

## 2018-02-28 ENCOUNTER — LAB (OUTPATIENT)
Dept: LAB | Facility: HOSPITAL | Age: 78
End: 2018-02-28
Payer: COMMERCIAL

## 2018-02-28 DIAGNOSIS — E78.00 PURE HYPERCHOLESTEROLEMIA: ICD-10-CM

## 2018-02-28 DIAGNOSIS — E78.00 PURE HYPERCHOLESTEROLEMIA: Primary | ICD-10-CM

## 2018-02-28 LAB
ALBUMIN SERPL BCP-MCNC: 3.6 G/DL (ref 3.5–5)
ALP SERPL-CCNC: 71 U/L (ref 46–116)
ALT SERPL W P-5'-P-CCNC: 20 U/L (ref 12–78)
ANION GAP SERPL CALCULATED.3IONS-SCNC: 5 MMOL/L (ref 4–13)
AST SERPL W P-5'-P-CCNC: 19 U/L (ref 5–45)
BILIRUB SERPL-MCNC: 0.45 MG/DL (ref 0.2–1)
BUN SERPL-MCNC: 14 MG/DL (ref 5–25)
CALCIUM SERPL-MCNC: 8.9 MG/DL (ref 8.3–10.1)
CHLORIDE SERPL-SCNC: 107 MMOL/L (ref 100–108)
CHOLEST SERPL-MCNC: 178 MG/DL (ref 50–200)
CO2 SERPL-SCNC: 29 MMOL/L (ref 21–32)
CREAT SERPL-MCNC: 0.9 MG/DL (ref 0.6–1.3)
GFR SERPL CREATININE-BSD FRML MDRD: 70 ML/MIN/1.73SQ M
GLUCOSE P FAST SERPL-MCNC: 96 MG/DL (ref 65–99)
HDLC SERPL-MCNC: 86 MG/DL (ref 40–60)
LDLC SERPL CALC-MCNC: 77 MG/DL (ref 0–100)
POTASSIUM SERPL-SCNC: 3.4 MMOL/L (ref 3.5–5.3)
PROT SERPL-MCNC: 7.5 G/DL (ref 6.4–8.2)
SODIUM SERPL-SCNC: 141 MMOL/L (ref 136–145)
T4 SERPL-MCNC: 13 UG/DL (ref 4.7–13.3)
TRIGL SERPL-MCNC: 73 MG/DL
TSH SERPL DL<=0.05 MIU/L-ACNC: 4.14 UIU/ML (ref 0.36–3.74)

## 2018-02-28 PROCEDURE — 36415 COLL VENOUS BLD VENIPUNCTURE: CPT

## 2018-02-28 PROCEDURE — 80061 LIPID PANEL: CPT

## 2018-02-28 PROCEDURE — 80053 COMPREHEN METABOLIC PANEL: CPT

## 2018-02-28 PROCEDURE — 84443 ASSAY THYROID STIM HORMONE: CPT

## 2018-02-28 PROCEDURE — 84436 ASSAY OF TOTAL THYROXINE: CPT

## 2018-03-13 ENCOUNTER — OFFICE VISIT (OUTPATIENT)
Dept: FAMILY MEDICINE CLINIC | Facility: CLINIC | Age: 78
End: 2018-03-13
Payer: COMMERCIAL

## 2018-03-13 VITALS
DIASTOLIC BLOOD PRESSURE: 78 MMHG | HEIGHT: 65 IN | SYSTOLIC BLOOD PRESSURE: 120 MMHG | BODY MASS INDEX: 28.32 KG/M2 | WEIGHT: 170 LBS

## 2018-03-13 DIAGNOSIS — E78.2 MIXED HYPERLIPIDEMIA: ICD-10-CM

## 2018-03-13 DIAGNOSIS — K21.9 GASTROESOPHAGEAL REFLUX DISEASE WITHOUT ESOPHAGITIS: ICD-10-CM

## 2018-03-13 DIAGNOSIS — I48.20 CHRONIC ATRIAL FIBRILLATION (HCC): ICD-10-CM

## 2018-03-13 DIAGNOSIS — I10 BENIGN ESSENTIAL HYPERTENSION: Primary | ICD-10-CM

## 2018-03-13 DIAGNOSIS — E89.0 POSTOPERATIVE HYPOTHYROIDISM: ICD-10-CM

## 2018-03-13 DIAGNOSIS — E66.09 CLASS 1 OBESITY DUE TO EXCESS CALORIES WITHOUT SERIOUS COMORBIDITY WITH BODY MASS INDEX (BMI) OF 30.0 TO 30.9 IN ADULT: ICD-10-CM

## 2018-03-13 PROBLEM — Z85.038 HISTORY OF COLON CANCER: Status: ACTIVE | Noted: 2018-02-14

## 2018-03-13 PROCEDURE — 99214 OFFICE O/P EST MOD 30 MIN: CPT | Performed by: FAMILY MEDICINE

## 2018-03-13 RX ORDER — DILTIAZEM HYDROCHLORIDE 120 MG/1
120 CAPSULE, COATED, EXTENDED RELEASE ORAL EVERY EVENING
COMMUNITY
Start: 2017-08-29 | End: 2022-07-12

## 2018-03-13 RX ORDER — ATORVASTATIN CALCIUM 80 MG/1
1 TABLET, FILM COATED ORAL DAILY
COMMUNITY
Start: 2017-09-07 | End: 2018-07-23 | Stop reason: SDUPTHER

## 2018-03-13 RX ORDER — LEVOTHYROXINE SODIUM 0.1 MG/1
100 TABLET ORAL DAILY
Qty: 90 TABLET | Refills: 0 | Status: SHIPPED | OUTPATIENT
Start: 2018-03-13 | End: 2018-06-08 | Stop reason: SDUPTHER

## 2018-03-13 NOTE — PROGRESS NOTES
Assessment/Plan:    Gastroesophageal reflux disease  Stable on current medicien EGD shows hiatal hernia other wise stable    Postoperative hypothyroidism  TSH was a bit high She is having fatigue and hair loss will increase the levothyroxine to 100 mcg and repeat albs in 3 motnhs    A-fib ([address] )  Rate is controlled Patient stress ECHO from 9/2017 was stable patient to continue with current meds and the eliquis for stroke prevention patient to followup with the cardiologist as scheduled    Benign essential hypertension  Blood pressure stable continue same meds    Obesity  Weight is stable continue with diet and exercise    Mixed hyperlipidemia  LDL is at 77 with Hdl at 86 Patient to continue current meds       Diagnoses and all orders for this visit:    Benign essential hypertension    Chronic atrial fibrillation (HCC)    Mixed hyperlipidemia    Postoperative hypothyroidism  -     levothyroxine 100 mcg tablet; Take 1 tablet (100 mcg total) by mouth daily  -     TSH, 3rd generation; Future  -     T4, free; Future    Gastroesophageal reflux disease without esophagitis    Class 1 obesity due to excess calories without serious comorbidity with body mass index (BMI) of 30 0 to 30 9 in adult    Other orders  -     atorvastatin (LIPITOR) 80 mg tablet; Take 1 tablet by mouth daily  -     diltiazem (CARDIZEM CD) 120 mg 24 hr capsule; Take 120 mg by mouth  -     Efinaconazole (JUBLIA) 10 % SOLN; Apply topically          Subjective:   Chief Complaint   Patient presents with    GERD    Hyperlipidemia          Patient ID: Krystenfior Head is a 76 y o  female      Patient is here for follow up of her atrial fibrillation, hypertension, hyperlipidemia, GERD  and her post surgical hypothyroidism patient is complaining of some fatigue and hair loss Patient fatigue seems to be about 7 AM Patient wakes at 4 takes her medications at 5 and eats breakfast about 7 During the eating breakfast she gets some fatigue Patient then will lie down for about 30 minutes Patient blood pressure is stable patient last labs were reviewed Patient TSH was a bit high Patient had her EGD and colonoscopy done Patient saw cardiology and is now is due again         The following portions of the patient's history were reviewed and updated as appropriate: allergies, current medications, past social history and problem list     Review of Systems   Constitutional: Negative for fatigue, fever and unexpected weight change  HENT: Negative for congestion, sinus pain and trouble swallowing  Eyes: Negative for discharge and visual disturbance  Respiratory: Negative for cough, chest tightness, shortness of breath and wheezing  Cardiovascular: Negative for chest pain, palpitations and leg swelling  Gastrointestinal: Negative for abdominal pain, blood in stool, constipation, diarrhea, nausea and vomiting  Genitourinary: Negative for difficulty urinating, dysuria, frequency and hematuria  Musculoskeletal: Negative for arthralgias, gait problem and joint swelling  Skin: Negative for rash and wound  Allergic/Immunologic: Negative for environmental allergies and food allergies  Neurological: Negative for dizziness, syncope, weakness, numbness and headaches  Hematological: Negative for adenopathy  Does not bruise/bleed easily  Psychiatric/Behavioral: Negative for confusion, decreased concentration and sleep disturbance  The patient is not nervous/anxious  Objective:      /78   Ht 5' 4 5" (1 638 m)   Wt 77 1 kg (170 lb)   BMI 28 73 kg/m²          Physical Exam   Constitutional: She is oriented to person, place, and time  She appears well-developed and well-nourished  HENT:   Head: Normocephalic and atraumatic  Right Ear: Hearing, tympanic membrane and external ear normal    Left Ear: Hearing, tympanic membrane and external ear normal    Eyes: Conjunctivae and EOM are normal  Pupils are equal, round, and reactive to light  Neck: Neck supple   No thyromegaly present  Cardiovascular: Normal rate and normal heart sounds  An irregularly irregular rhythm present  Pulmonary/Chest: Effort normal and breath sounds normal  She has no wheezes  She has no rales  Abdominal: Soft  Bowel sounds are normal  She exhibits no distension  There is no tenderness  Musculoskeletal: She exhibits no edema or tenderness  Lymphadenopathy:     She has no cervical adenopathy  Neurological: She is alert and oriented to person, place, and time  No cranial nerve deficit  Coordination normal    Skin: Skin is warm and dry  No rash noted  Psychiatric: She has a normal mood and affect   Her behavior is normal  Judgment and thought content normal

## 2018-03-13 NOTE — ASSESSMENT & PLAN NOTE
Rate is controlled Patient stress ECHO from 9/2017 was stable patient to continue with current meds and the eliquis for stroke prevention patient to followup with the cardiologist as scheduled

## 2018-03-13 NOTE — ASSESSMENT & PLAN NOTE
TSH was a bit high She is having fatigue and hair loss will increase the levothyroxine to 100 mcg and repeat albs in 3 motnhs

## 2018-03-13 NOTE — PATIENT INSTRUCTIONS
Hypothyroidism   WHAT YOU NEED TO KNOW:   What is hypothyroidism? Hypothyroidism is a condition that develops when the thyroid gland does not make enough thyroid hormone  Thyroid hormones help control body temperature, heart rate, growth, and weight  What causes hypothyroidism? If you have a family member with hypothyroidism, your risk is increased  Any of the following can cause hypothyroidism:  · Autoimmune disease, such as inflammation of your thyroid, or Hashimoto disease    · Surgery, radiation therapy, or medicines such as lithium, sedatives, or narcotics    · Thyroid cancer or viral infection    · Low iodine levels  What are the signs and symptoms of hypothyroidism? The signs and symptoms may develop slowly, sometimes over several years  · Exhaustion    · Sensitivity to cold    · Headaches or decreased concentration    · Muscle aches or weakness    · Constipation     · Dry, flaky skin or brittle nails    · Thinning hair    · Heavy or irregular monthly periods    · Depression or irritability  How is hypothyroidism diagnosed? Your healthcare provider will ask about your symptoms and what medicines you take  He will ask about your medical history and if anyone in your family has hypothyroidism  A blood test will show your thyroid hormone level  How is hypothyroidism treated? Thyroid hormone replacement medicine may bring your thyroid hormone level back to normal  Ask your healthcare provider for more information on other medicines you may need  Call 911 for any of the following:   · You have sudden chest pain or shortness of breath  · You have a seizure  · You feel like you are going to faint  When should I seek immediate care? · You have diarrhea, tremors, or trouble sleeping  · Your legs, ankles, or feet are swollen  When should I contact my healthcare provider? · You have a fever  · You have chills, a cough, or feel weak and achy      · You have pain and swelling in your muscles and joints  · Your skin is itchy, swollen, or you have a rash  · Your signs and symptoms return or get worse, even after treatment  · You have questions or concerns about your condition or care  CARE AGREEMENT:   You have the right to help plan your care  Learn about your health condition and how it may be treated  Discuss treatment options with your caregivers to decide what care you want to receive  You always have the right to refuse treatment  The above information is an  only  It is not intended as medical advice for individual conditions or treatments  Talk to your doctor, nurse or pharmacist before following any medical regimen to see if it is safe and effective for you  © 2017 [facility] Information is for End User's use only and may not be sold, redistributed or otherwise used for commercial purposes  All illustrations and images included in CareNotes® are the copyrighted property of A D A M , Inc  or [employer]

## 2018-03-29 ENCOUNTER — HOSPITAL ENCOUNTER (OUTPATIENT)
Dept: MAMMOGRAPHY | Facility: CLINIC | Age: 78
Discharge: HOME | End: 2018-03-29
Payer: COMMERCIAL

## 2018-03-29 DIAGNOSIS — R92.8 ABNORMAL MAMMOGRAM: ICD-10-CM

## 2018-03-29 PROCEDURE — 77066 DX MAMMO INCL CAD BI: CPT

## 2018-04-16 ENCOUNTER — TRANSCRIBE ORDERS (OUTPATIENT)
Dept: ADMINISTRATIVE | Facility: HOSPITAL | Age: 78
End: 2018-04-16

## 2018-04-16 DIAGNOSIS — IMO0002 PROPHYLACTIC ANTIBIOTIC FOR DENTAL PROCEDURE INDICATED DUE TO PRIOR JOINT REPLACEMENT: Primary | ICD-10-CM

## 2018-04-16 DIAGNOSIS — R31.1 BENIGN ESSENTIAL MICROSCOPIC HEMATURIA: Primary | ICD-10-CM

## 2018-04-16 RX ORDER — AMOXICILLIN 500 MG/1
CAPSULE ORAL
Qty: 4 CAPSULE | Refills: 2 | Status: SHIPPED | OUTPATIENT
Start: 2018-04-16 | End: 2018-04-17

## 2018-04-19 ENCOUNTER — HOSPITAL ENCOUNTER (OUTPATIENT)
Dept: ULTRASOUND IMAGING | Facility: HOSPITAL | Age: 78
Discharge: HOME | End: 2018-04-19
Attending: UROLOGY
Payer: COMMERCIAL

## 2018-04-19 DIAGNOSIS — R31.1 BENIGN ESSENTIAL MICROSCOPIC HEMATURIA: ICD-10-CM

## 2018-04-19 PROCEDURE — 76770 US EXAM ABDO BACK WALL COMP: CPT

## 2018-04-28 DIAGNOSIS — E03.9 ACQUIRED HYPOTHYROIDISM: Primary | ICD-10-CM

## 2018-04-30 RX ORDER — LEVOTHYROXINE SODIUM 88 MCG
TABLET ORAL
Qty: 90 TABLET | Refills: 1 | Status: SHIPPED | OUTPATIENT
Start: 2018-04-30 | End: 2018-09-20 | Stop reason: ALTCHOICE

## 2018-06-08 DIAGNOSIS — E89.0 POSTOPERATIVE HYPOTHYROIDISM: ICD-10-CM

## 2018-06-08 RX ORDER — LEVOTHYROXINE SODIUM 100 MCG
100 TABLET ORAL DAILY
Qty: 90 TABLET | Refills: 0 | Status: SHIPPED | OUTPATIENT
Start: 2018-06-08 | End: 2018-08-29 | Stop reason: SDUPTHER

## 2018-06-12 ENCOUNTER — APPOINTMENT (OUTPATIENT)
Dept: LAB | Facility: HOSPITAL | Age: 78
End: 2018-06-12
Payer: COMMERCIAL

## 2018-06-12 DIAGNOSIS — E89.0 POSTOPERATIVE HYPOTHYROIDISM: ICD-10-CM

## 2018-06-12 LAB
T4 FREE SERPL-MCNC: 1.34 NG/DL (ref 0.76–1.46)
TSH SERPL DL<=0.05 MIU/L-ACNC: 1.53 UIU/ML (ref 0.36–3.74)

## 2018-06-12 PROCEDURE — 84443 ASSAY THYROID STIM HORMONE: CPT

## 2018-06-12 PROCEDURE — 36415 COLL VENOUS BLD VENIPUNCTURE: CPT

## 2018-06-12 PROCEDURE — 84439 ASSAY OF FREE THYROXINE: CPT

## 2018-07-12 ENCOUNTER — ANNUAL EXAM (OUTPATIENT)
Dept: GYNECOLOGY | Facility: CLINIC | Age: 78
End: 2018-07-12
Payer: COMMERCIAL

## 2018-07-12 VITALS
DIASTOLIC BLOOD PRESSURE: 64 MMHG | SYSTOLIC BLOOD PRESSURE: 110 MMHG | BODY MASS INDEX: 28.32 KG/M2 | HEIGHT: 65 IN | RESPIRATION RATE: 16 BRPM | HEART RATE: 72 BPM | WEIGHT: 170 LBS

## 2018-07-12 DIAGNOSIS — Z12.31 ENCOUNTER FOR SCREENING MAMMOGRAM FOR MALIGNANT NEOPLASM OF BREAST: ICD-10-CM

## 2018-07-12 DIAGNOSIS — Z11.51 SCREENING FOR HUMAN PAPILLOMAVIRUS: ICD-10-CM

## 2018-07-12 DIAGNOSIS — Z01.419 ENCOUNTER FOR GYNECOLOGICAL EXAMINATION: Primary | ICD-10-CM

## 2018-07-12 DIAGNOSIS — Z87.311 HISTORY OF PATHOLOGIC FRACTURE: ICD-10-CM

## 2018-07-12 DIAGNOSIS — Z85.9 HISTORY OF CANCER: ICD-10-CM

## 2018-07-12 PROCEDURE — 87624 HPV HI-RISK TYP POOLED RSLT: CPT | Performed by: OBSTETRICS & GYNECOLOGY

## 2018-07-12 PROCEDURE — G0101 CA SCREEN;PELVIC/BREAST EXAM: HCPCS | Performed by: OBSTETRICS & GYNECOLOGY

## 2018-07-12 PROCEDURE — G0145 SCR C/V CYTO,THINLAYER,RESCR: HCPCS | Performed by: OBSTETRICS & GYNECOLOGY

## 2018-07-16 LAB — HPV RRNA GENITAL QL NAA+PROBE: NORMAL

## 2018-07-19 LAB
LAB AP GYN PRIMARY INTERPRETATION: NORMAL
Lab: NORMAL

## 2018-07-20 DIAGNOSIS — K21.9 GASTROESOPHAGEAL REFLUX DISEASE WITHOUT ESOPHAGITIS: Primary | ICD-10-CM

## 2018-07-20 RX ORDER — OMEPRAZOLE 20 MG/1
CAPSULE, DELAYED RELEASE ORAL
Qty: 90 CAPSULE | Refills: 1 | Status: SHIPPED | OUTPATIENT
Start: 2018-07-20 | End: 2018-09-20 | Stop reason: ALTCHOICE

## 2018-07-21 ENCOUNTER — TRANSCRIBE ORDERS (OUTPATIENT)
Dept: LAB | Facility: HOSPITAL | Age: 78
End: 2018-07-21

## 2018-07-21 ENCOUNTER — APPOINTMENT (OUTPATIENT)
Dept: LAB | Facility: HOSPITAL | Age: 78
End: 2018-07-21
Payer: COMMERCIAL

## 2018-07-21 DIAGNOSIS — K21.9 GASTROESOPHAGEAL REFLUX DISEASE WITHOUT ESOPHAGITIS: ICD-10-CM

## 2018-07-21 DIAGNOSIS — I10 ESSENTIAL HYPERTENSION, MALIGNANT: ICD-10-CM

## 2018-07-21 DIAGNOSIS — I10 ESSENTIAL HYPERTENSION, MALIGNANT: Primary | ICD-10-CM

## 2018-07-21 LAB
ALBUMIN SERPL BCP-MCNC: 3.5 G/DL (ref 3.5–5)
ALP SERPL-CCNC: 94 U/L (ref 46–116)
ALT SERPL W P-5'-P-CCNC: 37 U/L (ref 12–78)
ANION GAP SERPL CALCULATED.3IONS-SCNC: 6 MMOL/L (ref 4–13)
AST SERPL W P-5'-P-CCNC: 31 U/L (ref 5–45)
BILIRUB SERPL-MCNC: 0.77 MG/DL (ref 0.2–1)
BUN SERPL-MCNC: 18 MG/DL (ref 5–25)
CALCIUM SERPL-MCNC: 9.1 MG/DL (ref 8.3–10.1)
CHLORIDE SERPL-SCNC: 105 MMOL/L (ref 100–108)
CHOLEST SERPL-MCNC: 170 MG/DL (ref 50–200)
CO2 SERPL-SCNC: 27 MMOL/L (ref 21–32)
CREAT SERPL-MCNC: 1.14 MG/DL (ref 0.6–1.3)
ERYTHROCYTE [DISTWIDTH] IN BLOOD BY AUTOMATED COUNT: 13.8 % (ref 11.6–15.1)
GFR SERPL CREATININE-BSD FRML MDRD: 53 ML/MIN/1.73SQ M
GLUCOSE P FAST SERPL-MCNC: 93 MG/DL (ref 65–99)
HCT VFR BLD AUTO: 40.3 % (ref 34.8–46.1)
HDLC SERPL-MCNC: 81 MG/DL (ref 40–60)
HGB BLD-MCNC: 13 G/DL (ref 11.5–15.4)
LDLC SERPL CALC-MCNC: 75 MG/DL (ref 0–100)
MCH RBC QN AUTO: 32.1 PG (ref 26.8–34.3)
MCHC RBC AUTO-ENTMCNC: 32.3 G/DL (ref 31.4–37.4)
MCV RBC AUTO: 100 FL (ref 82–98)
NONHDLC SERPL-MCNC: 89 MG/DL
PLATELET # BLD AUTO: 226 THOUSANDS/UL (ref 149–390)
PMV BLD AUTO: 9.6 FL (ref 8.9–12.7)
POTASSIUM SERPL-SCNC: 3.9 MMOL/L (ref 3.5–5.3)
PROT SERPL-MCNC: 7.8 G/DL (ref 6.4–8.2)
RBC # BLD AUTO: 4.05 MILLION/UL (ref 3.81–5.12)
SODIUM SERPL-SCNC: 138 MMOL/L (ref 136–145)
TRIGL SERPL-MCNC: 70 MG/DL
WBC # BLD AUTO: 5.42 THOUSAND/UL (ref 4.31–10.16)

## 2018-07-21 PROCEDURE — 80061 LIPID PANEL: CPT

## 2018-07-21 PROCEDURE — 80053 COMPREHEN METABOLIC PANEL: CPT

## 2018-07-21 PROCEDURE — 85027 COMPLETE CBC AUTOMATED: CPT

## 2018-07-21 PROCEDURE — 36415 COLL VENOUS BLD VENIPUNCTURE: CPT

## 2018-07-23 DIAGNOSIS — E78.2 MIXED HYPERLIPIDEMIA: Primary | ICD-10-CM

## 2018-07-23 RX ORDER — ATORVASTATIN CALCIUM 80 MG/1
TABLET, FILM COATED ORAL
Qty: 90 TABLET | Refills: 1 | Status: SHIPPED | OUTPATIENT
Start: 2018-07-23 | End: 2019-09-08 | Stop reason: SDUPTHER

## 2018-08-03 ENCOUNTER — TELEPHONE (OUTPATIENT)
Dept: FAMILY MEDICINE CLINIC | Facility: CLINIC | Age: 78
End: 2018-08-03

## 2018-08-03 NOTE — TELEPHONE ENCOUNTER
Spoke with patient , I offered her a sooner appointment ,she said she will let me know she is going to call the obgyn first to see if she would give her the script

## 2018-08-13 ENCOUNTER — TELEPHONE (OUTPATIENT)
Dept: FAMILY MEDICINE CLINIC | Facility: CLINIC | Age: 78
End: 2018-08-13

## 2018-08-13 DIAGNOSIS — R76.8 POSITIVE ANA (ANTINUCLEAR ANTIBODY): Primary | ICD-10-CM

## 2018-08-29 DIAGNOSIS — E89.0 POSTOPERATIVE HYPOTHYROIDISM: ICD-10-CM

## 2018-08-30 RX ORDER — LEVOTHYROXINE SODIUM 100 MCG
100 TABLET ORAL DAILY
Qty: 90 TABLET | Refills: 0 | Status: SHIPPED | OUTPATIENT
Start: 2018-08-30 | End: 2018-09-20 | Stop reason: SDUPTHER

## 2018-08-31 ENCOUNTER — HOSPITAL ENCOUNTER (OUTPATIENT)
Dept: BONE DENSITY | Facility: MEDICAL CENTER | Age: 78
Discharge: HOME | End: 2018-08-31
Payer: COMMERCIAL

## 2018-08-31 DIAGNOSIS — Z87.311 HISTORY OF PATHOLOGIC FRACTURE: ICD-10-CM

## 2018-08-31 PROCEDURE — 77080 DXA BONE DENSITY AXIAL: CPT

## 2018-09-06 ENCOUNTER — TELEPHONE (OUTPATIENT)
Dept: GYNECOLOGY | Facility: CLINIC | Age: 78
End: 2018-09-06

## 2018-09-06 DIAGNOSIS — E55.9 VITAMIN D DEFICIENCY: Primary | ICD-10-CM

## 2018-09-06 NOTE — TELEPHONE ENCOUNTER
Pt called to request a copy of her dexa scan be sent to her home  Sent via [facility]

## 2018-09-19 DIAGNOSIS — E55.9 VITAMIN D DEFICIENCY: Primary | ICD-10-CM

## 2018-09-20 ENCOUNTER — OFFICE VISIT (OUTPATIENT)
Dept: FAMILY MEDICINE CLINIC | Facility: CLINIC | Age: 78
End: 2018-09-20
Payer: COMMERCIAL

## 2018-09-20 VITALS
DIASTOLIC BLOOD PRESSURE: 78 MMHG | WEIGHT: 163 LBS | BODY MASS INDEX: 27.16 KG/M2 | SYSTOLIC BLOOD PRESSURE: 120 MMHG | HEIGHT: 65 IN

## 2018-09-20 DIAGNOSIS — E89.0 POSTOPERATIVE HYPOTHYROIDISM: ICD-10-CM

## 2018-09-20 DIAGNOSIS — Z00.00 MEDICARE ANNUAL WELLNESS VISIT, SUBSEQUENT: Primary | ICD-10-CM

## 2018-09-20 DIAGNOSIS — I48.20 CHRONIC ATRIAL FIBRILLATION (HCC): ICD-10-CM

## 2018-09-20 DIAGNOSIS — I10 BENIGN ESSENTIAL HYPERTENSION: ICD-10-CM

## 2018-09-20 DIAGNOSIS — M85.89 OSTEOPENIA OF MULTIPLE SITES: ICD-10-CM

## 2018-09-20 DIAGNOSIS — E66.09 CLASS 1 OBESITY DUE TO EXCESS CALORIES WITHOUT SERIOUS COMORBIDITY WITH BODY MASS INDEX (BMI) OF 30.0 TO 30.9 IN ADULT: ICD-10-CM

## 2018-09-20 DIAGNOSIS — E78.2 MIXED HYPERLIPIDEMIA: ICD-10-CM

## 2018-09-20 DIAGNOSIS — R21 MALAR RASH: ICD-10-CM

## 2018-09-20 PROBLEM — K21.9 GASTROESOPHAGEAL REFLUX DISEASE: Status: RESOLVED | Noted: 2018-02-14 | Resolved: 2018-09-20

## 2018-09-20 PROCEDURE — 1170F FXNL STATUS ASSESSED: CPT | Performed by: FAMILY MEDICINE

## 2018-09-20 PROCEDURE — G0439 PPPS, SUBSEQ VISIT: HCPCS | Performed by: FAMILY MEDICINE

## 2018-09-20 PROCEDURE — 99214 OFFICE O/P EST MOD 30 MIN: CPT | Performed by: FAMILY MEDICINE

## 2018-09-20 PROCEDURE — 3008F BODY MASS INDEX DOCD: CPT | Performed by: FAMILY MEDICINE

## 2018-09-20 PROCEDURE — 3078F DIAST BP <80 MM HG: CPT | Performed by: FAMILY MEDICINE

## 2018-09-20 PROCEDURE — 1125F AMNT PAIN NOTED PAIN PRSNT: CPT | Performed by: FAMILY MEDICINE

## 2018-09-20 PROCEDURE — 3074F SYST BP LT 130 MM HG: CPT | Performed by: FAMILY MEDICINE

## 2018-09-20 RX ORDER — LEVOTHYROXINE SODIUM 0.1 MG/1
100 TABLET ORAL DAILY
Qty: 90 TABLET | Refills: 1 | Status: SHIPPED | OUTPATIENT
Start: 2018-09-20 | End: 2020-03-05 | Stop reason: SDUPTHER

## 2018-09-20 NOTE — ASSESSMENT & PLAN NOTE
Malar rash with + LEVY Patient rheumatology note reviewed She tried the plaquenil and did not like the side effects Patient will have repeat labs Patient other than the rash has no signs of lupus However due to the rash she is refusing any immunizations as she is concerned that "the chemicals" she has gotten from the cancer treatment and the PET scans, Ct scans are causing this rash She does not want any shots "until the rash clears up" Patient has also tried numerous creams from dermatology without help

## 2018-09-20 NOTE — ASSESSMENT & PLAN NOTE
Lipids panel was stable in July LDL 75 and HDL in 80's Patient will continue saem meds and have repeat labs in January 2019

## 2018-09-20 NOTE — ASSESSMENT & PLAN NOTE
Patient will sign for records from Dr Phyliss Severs regarding her thryoid Patient was told by another doctor that they were concerned there was a mass in the thyroid so she saw Dr Princess Callejas were stable patient to continue same thryoid medication dose and have repeat labs in January

## 2018-09-20 NOTE — PROGRESS NOTES
Assessment and Plan:    Problem List Items Addressed This Visit     Medicare annual wellness visit, subsequent - Primary     Patient was given 5 wishes paperwork Patient refuses all shots Discussed with patient risk of that Patient refuses to have them due to her facial rash Patient will continue to have follwoup with specialists             Health Maintenance Due   Topic Date Due    Depression Screening PHQ  1940    DTaP,Tdap,and Td Vaccines (1 - Tdap) 07/22/1959    Fall Risk  07/22/2003    Urinary Incontinence Screening  07/22/2003    Pneumococcal PPSV23/PCV13 65+ Years / High and Highest Risk (1 of 2 - PCV13) 07/22/2003    INFLUENZA VACCINE  09/01/2018         HPI:  Yulissa Gonzalez is a [de-identified] y o  female here for her Subsequent Wellness Visit      Patient Active Problem List   Diagnosis    Adenoma, adrenocortical    A-fib ([facility] [address] [facility]    Benign essential hypertension    Coagulopathy (HCC)    Gastroesophageal reflux disease    History of colon cancer    Obesity    Osteoarthritis    Postoperative hypothyroidism    Thyroid cancer ([facility] [address] [facility]    Pulmonary nodules    Mixed hyperlipidemia    Medicare annual wellness visit, subsequent     Past Medical History:   Diagnosis Date    Allergic reaction     Atrial fibrillation ([address] )     Benign polyp of large intestine     Cancer ([address] )     Colon, Thyroid    Colon cancer ([facility] [address] )     H/O fracture of ankle 2217    Helicobacter pylori (H  pylori) infection     Nonspecific abnormal results of function study     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[address] )     Thyroid cancer ([facility] [address] )     Tic disorder, transient, recurrent     Vaginal Pap smear 07/03/2017    WNL     Past Surgical History:   Procedure Laterality Date    ARTHROSCOPY KNEE      CARDIOVASCULAR STRESS TEST      COLONOSCOPY      done 2010 due 2015 Dr Kuo    ESOPHAGOGASTRODUODENOSCOPY      inflammation aonly    HYSTERECTOMY      JOINT REPLACEMENT      L knee    PARATHYROID GLAND SURGERY      exploration     REPLACEMENT TOTAL KNEE      TOTAL ABDOMINAL HYSTERECTOMY      TOTAL THYROIDECTOMY       Family History   Problem Relation Age of Onset    Esophageal cancer Mother     Stomach cancer Family     Stroke Family         TIA     History   Smoking Status    Former Smoker   Smokeless Tobacco    Never Used     History   Alcohol Use    Yes     Comment: social      History   Drug Use No       Current Outpatient Prescriptions   Medication Sig Dispense Refill    apixaban (ELIQUIS) 5 mg Take 5 mg by mouth 2 (two) times a day   atorvastatin (LIPITOR) 80 mg tablet TAKE 1 TABLET BY MOUTH EVERY DAY 90 tablet 1    ibuprofen (MOTRIN) 600 mg tablet Take 1 tablet (600 mg total) by mouth every 6 (six) hours as needed for mild pain  30 tablet 0    omeprazole (PriLOSEC) 20 mg delayed release capsule TAKE 1 CAPSULE DAILY 90 capsule 1    SYNTHROID 100 MCG tablet TAKE 1 TABLET (100 MCG TOTAL) BY MOUTH DAILY 90 tablet 0    diltiazem (CARDIZEM CD) 120 mg 24 hr capsule Take 120 mg by mouth      Efinaconazole (JUBLIA) 10 % SOLN Apply topically      SYNTHROID 88 MCG tablet TAKE 1 TABLET DAILY (Patient not taking: Reported on 9/20/2018) 90 tablet 1     No current facility-administered medications for this visit  Allergies   Allergen Reactions    Bee Venom     Levaquin [Levofloxacin In D5w]     Tequin [Gatifloxacin] Rash     Immunization History   Administered Date(s) Administered    Influenza 12/03/2004, 10/15/2008, 10/14/2010, 10/27/2011, 11/23/2012, 12/14/2012    Influenza Split High Dose Preservative Free IM 11/23/2012, 11/05/2013, 10/20/2014       Patient Care Team:  Nilesh Carey DO as PCP - General  Charity Latham MD as PCP - OBGYN (Gynecology)    Medicare Screening Tests and Risk Assessments:  Hong is here for her Subsequent Wellness visit  Last Medicare Wellness visit information reviewed, patient interviewed and updates made to the record today       Health Risk Assessment:  Patient rates overall health as good  Patient feels that their physical health rating is Slightly better  Eyesight was rated as Same  Hearing was rated as Same  Patient feels that their emotional and mental health rating is Same  Pain experienced by patient in the last 7 days has been None  Patient states that she has experienced no weight loss or gain in last 6 months  Emotional/Mental Health:  Patient has been feeling nervous/anxious  PHQ-9 Depression Screening:    Frequency of the following problems over the past two weeks:      1  Little interest or pleasure in doing things: 0 - not at all      2  Feeling down, depressed, or hopeless: 0 - not at all  PHQ-2 Score: 0          Broken Bones/Falls: Fall Risk Assessment:    In the past year, patient has experienced: No history of falling in past year          Bladder/Bowel:  Patient has not leaked urine accidently in the last six months  Patient reports no loss of bowel control  Immunizations:  Patient has not had a flu vaccination within the last year  Patient has not received a pneumonia shot  Patient has not received a shingles shot  Patient has not received tetanus/diphtheria shot  (Additional Comments: Patient refuses all shots)    Home Safety:  Patient does not have trouble with stairs inside or outside of their home  Patient currently reports that there are no safety hazards present in home, working smoke alarms, working carbon monoxide detectors  Preventative Screenings:   Breast cancer screening performed, 10/26/2017  colon cancer screen completed, 10/9/2014  cholesterol screen completed, glaucoma eye exam completed, 9/7/2018      Nutrition:  Current diet: Regular with servings of the following:    Medications:  Patient is currently taking over-the-counter supplements  List of OTC medications includes: vitamins   Patient is able to manage medications  Lifestyle Choices:  Patient reports no tobacco use    Patient has smoked or used tobacco in the past  Patient has stopped her tobacco use  Patient reports alcohol use  Patient drives a vehicle  Patient wears seat belt  Activities of Daily Living:  Can get out of bed by his or her self, able to dress self, able to make own meals, able to do own shopping, able to bathe self, can do own laundry/housekeeping, can manage own money, pay bills and track expenses    Previous Hospitalizations:  No hospitalization or ED visit in past 12 months        Advanced Directives:  Patient has decided on a power of   Patient has spoken to designated power of   Patient has not completed advanced directive  Preventative Screening/Counseling:      Cardiovascular:      General: Risks and Benefits Discussed and Screening Current          Diabetes:      General: Risks and Benefits Discussed and Screening Current          Colorectal Cancer:      General: Risks and Benefits Discussed and Screening Current          Breast Cancer:      General: Risks and Benefits Discussed and Screening Current          Cervical Cancer:      General: Risks and Benefits Discussed and Screening Current          Osteoporosis:      General: Risks and Benefits Discussed and Screening Current          AAA:      General: Patient Declines          Glaucoma:      General: Risks and Benefits Discussed and Screening Current          HIV:      General: Risks and Benefits Discussed and Screening Not Indicated          Hepatitis C:      General: Risks and Benefits Discussed and Patient Declines        Advanced Directives:   Patient has living will for healthcare, has durable POA for healthcare, Information on ACP and/or AD provided  5 wishes given  No end of life assessment reviewed with patient  Provider agrees with end of life decisions        Immunizations:      Influenza: Risks & Benefits Discussed, Influenza Recommended Annually and Patient Declines      Pneumococcal: Risks & Benefits Discussed and Patient Declines      Shingrix: Risks & Benefits Discussed and Patient Declines      Hepatitis B (Medium to high risk patients): Risks & Benefits Discussed and Patient Declines      Zostavax: Risks & Benefits Discussed and Patient Declines      TD: Risks & Benefits Discussed and Patient Declines      TDAP: Risks & Benefits Discussed and Patient Declines

## 2018-09-20 NOTE — ASSESSMENT & PLAN NOTE
Patient atrial fib is stable and chronic Patient to continue on eliquis and follwoup with the cardiologist as directed

## 2018-09-20 NOTE — PROGRESS NOTES
Assessment/Plan:    Medicare annual wellness visit, subsequent  Patient was given 5 wishes paperwork Patient refuses all shots Discussed with patient risk of that Patient refuses to have them due to her facial rash Patient will continue to have follwoup with specialists    Postoperative hypothyroidism  Patient will sign for records from Dr Garo Zaldivar regarding her thryoid Patient was told by another doctor that they were concerned there was a mass in the thyroid so she saw Dr Rosa Perdomo were stable patient to continue same thryoid medication dose and have repeat labs in January    A-fib [facility])  Patient atrial fib is stable and chronic Patient to continue on eliquis and follwoup with the cardiologist as directed    Mixed hyperlipidemia  Lipids panel was stable in July LDL 75 and HDL in 80's Patient will continue saem meds and have repeat labs in January 2019    Obesity  Weight is stable Patient to walk 30 minutes per day patient will continue with same diet    Osteopenia of multiple sites  Patient to continue with vitamin D as directed by Dr Sharpe Cleaves was reivewed with patient her 10 yr risk of fracture is 7% Patient will have the 1000 IU vitamin D and will have repeat labs were Dr Alen Gottlieb in 12/2018    Malar rash  Malar rash with + LEVY Patient rheumatology note reviewed She tried the plaquenil and did not like the side effects Patient will have repeat labs Patient other than the rash has no signs of lupus However due to the rash she is refusing any immunizations as she is concerned that "the chemicals" she has gotten from the cancer treatment and the PET scans, Ct scans are causing this rash She does not want any shots "until the rash clears up" Patient has also tried numerous creams from dermatology without help       Diagnoses and all orders for this visit:    Medicare annual wellness visit, subsequent    Postoperative hypothyroidism  -     levothyroxine (SYNTHROID) 100 mcg tablet;  Take 1 tablet (100 mcg total) by mouth daily  -     TSH, 3rd generation with Free T4 reflex; Future    Benign essential hypertension  -     Comprehensive metabolic panel; Future  -     Lipid panel; Future  -     CBC and differential; Future    Mixed hyperlipidemia  -     Comprehensive metabolic panel; Future  -     Lipid panel; Future    Chronic atrial fibrillation (HCC)  -     CBC and differential; Future    Class 1 obesity due to excess calories without serious comorbidity with body mass index (BMI) of 30 0 to 30 9 in adult    Osteopenia of multiple sites    Malar rash          Subjective:      Patient ID: Daniel Hoffman is a [de-identified] y o  female      Patient is here for checkup of her post surgical hypothroidism ( due to cancer) atrial fibrillation, hypertension, hyperlipidemia, obestity and also her malar rash and her osteopenia Patient was seen by Dr Lin Kussmaul as another doctor thought they felt something int he thyroid area Patient was told by Dr Radha Hreron that everything was OK Patient atrial fibrillation is stable Her visit with dr Jcalyn Lindsay and her labs from July were reviewed Her blood pressure is stable She continues on same meds Her LDL was 75 in July and she continues on the cholesterol medication Patient is tolerating her thryoid medication without isseus Patient weight is stable She is trying to walk Patient dexa is showing osteopenia and she is to take the vitmain D Patien tdexa report was reviewed with her also Patient malar rash is still an isuses She has a + LEVY and saw rheumatology They wanted repeat labs and did not think this was lupus as other the LEVY and rash no other lupus symtpomt They tried her on plaquenil and she did not like the side effects She stopped omeprazole as she thought that would help the rash It did not however with diet modification she did not need the  Medication         The following portions of the patient's history were reviewed and updated as appropriate: allergies, current medications, past social history and problem list     Review of Systems   Constitutional: Negative for activity change, appetite change, chills, fatigue and fever  HENT: Negative for congestion, ear discharge, ear pain, hearing loss, postnasal drip, sinus pressure, sore throat and trouble swallowing  Eyes: Negative for pain, discharge, redness, itching and visual disturbance  Respiratory: Negative for cough, chest tightness and shortness of breath  Cardiovascular: Negative for chest pain, palpitations and leg swelling  Gastrointestinal: Negative for abdominal pain, blood in stool, constipation, diarrhea, nausea and vomiting  Rare heartburn so she stopped the omeprazole and she was concerned that was the cuase of her rash No cahnge in rash off the med However her GERD is controlled with her diet   Endocrine: Negative for cold intolerance, heat intolerance, polydipsia, polyphagia and polyuria  Genitourinary: Negative for difficulty urinating, dysuria, menstrual problem, urgency, vaginal bleeding and vaginal discharge  Musculoskeletal: Negative for arthralgias, back pain, gait problem, myalgias, neck pain and neck stiffness  Skin: Negative for rash and wound  Allergic/Immunologic: Negative for environmental allergies and food allergies  Neurological: Negative for dizziness, tremors, seizures, weakness and headaches  Hematological: Negative for adenopathy  Does not bruise/bleed easily  Psychiatric/Behavioral: Negative for confusion and sleep disturbance  The patient is not nervous/anxious  Objective:      /78   Ht 5' 4 5" (1 638 m)   Wt 73 9 kg (163 lb)   BMI 27 55 kg/m²          Physical Exam   Constitutional: She is oriented to person, place, and time  She appears well-developed and well-nourished  HENT:   Head: Normocephalic and atraumatic     Right Ear: External ear normal    Left Ear: External ear normal    Nose: Nose normal    Mouth/Throat: Oropharynx is clear and moist    Eyes: Conjunctivae and EOM are normal  Pupils are equal, round, and reactive to light  Right eye exhibits no discharge  Left eye exhibits no discharge  Neck: Normal range of motion  Neck supple  No JVD present  No tracheal deviation present  No thyromegaly present  Cardiovascular: Normal heart sounds and intact distal pulses  Irregularly irregular heart beat   Pulmonary/Chest: Effort normal and breath sounds normal  She has no wheezes  She has no rales  Abdominal: Soft  Bowel sounds are normal  She exhibits no distension and no mass  There is no tenderness  There is no rebound  Musculoskeletal: Normal range of motion  Lymphadenopathy:     She has no cervical adenopathy  Neurological: She is alert and oriented to person, place, and time  She has normal reflexes  No cranial nerve deficit  Coordination normal    Skin: Skin is warm and dry  Rash noted  Hyperpigmented rash on face on the forehead, cheeks and chin    Psychiatric: She has a normal mood and affect  Her behavior is normal  Judgment and thought content normal    Nursing note and vitals reviewed

## 2018-09-20 NOTE — PATIENT INSTRUCTIONS
Obesity   AMBULATORY CARE:   Obesity  is when your body mass index (BMI) is greater than 30  Your healthcare provider will use your height and weight to measure your BMI  The risks of obesity include  many health problems, such as injuries or physical disability  You may need tests to check for the following:  · Diabetes     · High blood pressure or high cholesterol     · Heart disease     · Gallbladder or liver disease     · Cancer of the colon, breast, prostate, liver, or kidney     · Sleep apnea     · Arthritis or gout  Seek care immediately if:   · You have a severe headache, confusion, or difficulty speaking  · You have weakness on one side of your body  · You have chest pain, sweating, or shortness of breath  Contact your healthcare provider if:   · You have symptoms of gallbladder or liver disease, such as pain in your upper abdomen  · You have knee or hip pain and discomfort while walking  · You have symptoms of diabetes, such as intense hunger and thirst, and frequent urination  · You have symptoms of sleep apnea, such as snoring or daytime sleepiness  · You have questions or concerns about your condition or care  Treatment for obesity  focuses on helping you lose weight to improve your health  Even a small decrease in BMI can reduce the risk for many health problems  Your healthcare provider will help you set a weight-loss goal   · Lifestyle changes  are the first step in treating obesity  These include making healthy food choices and getting regular physical activity  Your healthcare provider may suggest a weight-loss program that involves coaching, education, and therapy  · Medicine  may help you lose weight when it is used with a healthy diet and physical activity  · Surgery  can help you lose weight if you are very obese and have other health problems  There are several types of weight-loss surgery  Ask your healthcare provider for more information    Be successful losing weight:   · Set small, realistic goals  An example of a small goal is to walk for 20 minutes 5 days a week  Anther goal is to lose 5% of your body weight  · Tell friends, family members, and coworkers about your goals  and ask for their support  Ask a friend to lose weight with you, or join a weight-loss support group  · Identify foods or triggers that may cause you to overeat , and find ways to avoid them  Remove tempting high-calorie foods from your home and workplace  Place a bowl of fresh fruit on your kitchen counter  If stress causes you to eat, then find other ways to cope with stress  · Keep a diary to track what you eat and drink  Also write down how many minutes of physical activity you do each day  Weigh yourself once a week and record it in your diary  Eating changes: You will need to eat 500 to 1,000 fewer calories each day than you currently eat to lose 1 to 2 pounds a week  The following changes will help you cut calories:  · Eat smaller portions  Use small plates, no larger than 9 inches in diameter  Fill your plate half full of fruits and vegetables  Measure your food using measuring cups until you know what a serving size looks like  · Eat 3 meals and 1 or 2 snacks each day  Plan your meals in advance  [place] and eat at home most of the time  Eat slowly  · Eat fruits and vegetables at every meal   They are low in calories and high in fiber, which makes you feel full  Do not add butter, margarine, or cream sauce to vegetables  Use herbs to season steamed vegetables  · Eat less fat and fewer fried foods  Eat more baked or grilled chicken and fish  These protein sources are lower in calories and fat than red meat  Limit fast food  Dress your salads with olive oil and vinegar instead of bottled dressing  · Limit the amount of sugar you eat  Do not drink sugary beverages  Limit alcohol  Activity changes:  Physical activity is good for your body in many ways   It helps you burn calories and build strong muscles  It decreases stress and depression, and improves your mood  It can also help you sleep better  Talk to your healthcare provider before you begin an exercise program   · Exercise for at least 30 minutes 5 days a week  Start slowly  Set aside time each day for physical activity that you enjoy and that is convenient for you  It is best to do both weight training and an activity that increases your heart rate, such as walking, bicycling, or swimming  · Find ways to be more active  Do yard work and housecleaning  Walk up the stairs instead of using elevators  Spend your leisure time going to events that require walking, such as outdoor festivals or fairs  This extra physical activity can help you lose weight and keep it off  Follow up with your healthcare provider as directed: You may need to meet with a dietitian  Write down your questions so you remember to ask them during your visits  © 2017 [address] [employer] Information is for End User's use only and may not be sold, redistributed or otherwise used for commercial purposes  All illustrations and images included in CareNotes® are the copyrighted property of [employer]  or Alonzo Langford  The above information is an  only  It is not intended as medical advice for individual conditions or treatments  Talk to your doctor, nurse or pharmacist before following any medical regimen to see if it is safe and effective for you  Urinary Incontinence   WHAT YOU NEED TO KNOW:   What is urinary incontinence? Urinary incontinence (UI) is when you lose control of your bladder  What causes UI? UI occurs because your bladder cannot store or empty urine properly  The following are the most common types of UI:  · Stress incontinence  is when you leak urine due to increased bladder pressure  This may happen when you cough, sneeze, or exercise       · Urge incontinence  is when you feel the need to urinate right away and leak urine accidentally  · Mixed incontinence  is when you have both stress and urge UI  What are the signs and symptoms of UI?   · You feel like your bladder does not empty completely when you urinate  · You urinate often and need to urinate immediately  · You leak urine when you sleep, or you wake up with the urge to urinate  · You leak urine when you cough, sneeze, exercise, or laugh  How is UI diagnosed? Your healthcare provider will ask how often you leak urine and whether you have stress or urge symptoms  Tell him which medicines you take, how often you urinate, and how much liquid you drink each day  You may need any of the following tests:  · Urine tests  may show infection or kidney function  · A pelvic exam  may be done to check for blockages  A pelvic exam will also show if your bladder, uterus, or other organs have moved out of place  · An x-ray, ultrasound, or CT  may show problems with parts of your urinary system  You may be given contrast liquid to help your organs show up better in the pictures  Tell the healthcare provider if you have ever had an allergic reaction to contrast liquid  Do not enter the MRI room with anything metal  Metal can cause serious injury  Tell the healthcare provider if you have any metal in or on your body  · A bladder scan  will show how much urine is left in your bladder after you urinate  You will be asked to urinate and then healthcare providers will use a small ultrasound machine to check the urine left in your bladder  · Cystometry  is used to check the function of your urinary system  Your healthcare provider checks the pressure in your bladder while filling it with fluid  Your bladder pressure may also be tested when your bladder is full and while you urinate  How is UI treated? · Medicines  can help strengthen your bladder control      · Electrical stimulation  is used to send a small amount of electrical energy to your pelvic floor muscles  This helps control your bladder function  Electrodes may be placed outside your body or in your rectum  For women, the electrodes may be placed in the vagina  · A bulking agent  may be injected into the wall of your urethra to make it thicker  This helps keep your urethra closed and decreases urine leakage  · Devices  such as a clamp, pessary, or tampon may help stop urine leaks  Ask your healthcare provider for more information about these and other devices  · Surgery  may be needed if other treatments do not work  Several types of surgery can help improve your bladder control  Ask your healthcare provider for more information about the surgery you may need  How can I manage my symptoms? · Do pelvic muscle exercises often  Your pelvic muscles help you stop urinating  Squeeze these muscles tight for 5 seconds, then relax for 5 seconds  Gradually work up to squeezing for 10 seconds  Do 3 sets of 15 repetitions a day, or as directed  This will help strengthen your pelvic muscles and improve bladder control  · A catheter  may be used to help empty your bladder  A catheter is a tiny, plastic tube that is put into your bladder to drain your urine  Your healthcare provider may tell you to use a catheter to prevent your bladder from getting too full and leaking urine  · Keep a UI record  Write down how often you leak urine and how much you leak  Make a note of what you were doing when you leaked urine  · Train your bladder  Go to the bathroom at set times, such as every 2 hours, even if you do not feel the urge to go  You can also try to hold your urine when you feel the urge to go  For example, hold your urine for 5 minutes when you feel the urge to go  As that becomes easier, hold your urine for 10 minutes  · Drink liquids as directed  Ask your healthcare provider how much liquid to drink each day and which liquids are best for you   You may need to limit the amount of liquid you drink to help control your urine leakage  Limit or do not have drinks that contain caffeine or alcohol  Do not drink any liquid right before you go to bed  · Prevent constipation  Eat a variety of high-fiber foods  Good examples are high-fiber cereals, beans, vegetables, and whole-grain breads  Prune juice may help make your bowel movement softer  Walking is the best way to trigger your intestines to have a bowel movement  · Exercise regularly and maintain a healthy weight  Ask your healthcare provider how much you should weigh and about the best exercise plan for you  Weight loss and exercise will decrease pressure on your bladder and help you control your leakage  Ask him to help you create a weight loss plan if you are overweight  When should I seek immediate care? · You have severe pain  · You are confused or cannot think clearly  When should I contact my healthcare provider? · You have a fever  · You see blood in your urine  · You have pain when you urinate  · You have new or worse pain, even after treatment  · Your mouth feels dry or you have vision changes  · Your urine is cloudy or smells bad  · You have questions or concerns about your condition or care  CARE AGREEMENT:   You have the right to help plan your care  Learn about your health condition and how it may be treated  Discuss treatment options with your caregivers to decide what care you want to receive  You always have the right to refuse treatment  The above information is an  only  It is not intended as medical advice for individual conditions or treatments  Talk to your doctor, nurse or pharmacist before following any medical regimen to see if it is safe and effective for you  © 2017 [address] [employer] Information is for End User's use only and may not be sold, redistributed or otherwise used for commercial purposes   All illustrations and images included in CareNotes® are the copyrighted property of A [employer] M , Inc  or Alonzo Langford  Cigarette Smoking and Your Health   AMBULATORY CARE:   Risks to your health if you smoke:  Nicotine and other chemicals found in tobacco damage every cell in your body  Even if you are a light smoker, you have an increased risk for cancer, heart disease, and lung disease  If you are pregnant or have diabetes, smoking increases your risk for complications  Benefits to your health if you stop smoking:   · You decrease respiratory symptoms such as coughing, wheezing, and shortness of breath  · You reduce your risk for cancers of the lung, mouth, throat, kidney, bladder, pancreas, stomach, and cervix  If you already have cancer, you increase the benefits of chemotherapy  You also reduce your risk for cancer returning or a second cancer from developing  · You reduce your risk for heart disease, blood clots, heart attack, and stroke  · You reduce your risk for lung infections, and diseases such as pneumonia, asthma, chronic bronchitis, and emphysema  · Your circulation improves  More oxygen can be delivered to your body  If you have diabetes, you lower your risk for complications, such as kidney, artery, and eye diseases  You also lower your risk for nerve damage  Nerve damage can lead to amputations, poor vision, and blindness  · You improve your body's ability to heal and to fight infections  Benefits to the health of others if you stop smoking:  Tobacco is harmful to nonsmokers who breathe in your secondhand smoke  The following are ways the health of others around you may improve when you stop smoking:  · You lower the risks for lung cancer and heart disease in nonsmoking adults  · If you are pregnant, you lower the risk for miscarriage, early delivery, low birth weight, and stillbirth  You also lower your baby's risk for SIDS, obesity, developmental delay, and neurobehavioral problems, such as ADHD  · If you have children, you lower their risk for ear infections, colds, pneumonia, bronchitis, and asthma  For more information and support to stop smoking:   · [employer]  gov  Phone: 6- 836 - 253-7476  Web Address: www smokefree  gov  Follow up with your healthcare provider as directed:  Write down your questions so you remember to ask them during your visits  © 2017 [address] [employer] Information is for End User's use only and may not be sold, redistributed or otherwise used for commercial purposes  All illustrations and images included in CareNotes® are the copyrighted property of A [employer] M , Inc  or Alonzo Langford  The above information is an  only  It is not intended as medical advice for individual conditions or treatments  Talk to your doctor, nurse or pharmacist before following any medical regimen to see if it is safe and effective for you  Fall Prevention   AMBULATORY CARE:   Fall prevention  includes ways to make your home and other areas safer  It also includes ways you can move more carefully to prevent a fall  Health conditions that cause changes in your blood pressure, vision, or muscle strength and coordination may increase your risk for falls  Medicines may also increase your risk for falls if they make you dizzy, weak, or sleepy  Call 911 or have someone else call if:   · You have fallen and are unconscious  · You have fallen and cannot move part of your body  Contact your healthcare provider if:   · You have fallen and have pain or a headache  · You have questions or concerns about your condition or care  Fall prevention tips:   · Stand or sit up slowly  This may help you keep your balance and prevent falls  · Use assistive devices as directed  Your healthcare provider may suggest that you use a cane or walker to help you keep your balance  You may need to have grab bars put in your bathroom near the toilet or in the shower      · Wear shoes that fit well and have soles that   Wear shoes both inside and outside  Use slippers with good   Do not wear shoes with high heels  · Wear a personal alarm  This is a device that allows you to call 911 if you fall and need help  Ask your healthcare provider for more information  · Stay active  Exercise can help strengthen your muscles and improve your balance  Your healthcare provider may recommend water aerobics or walking  He or she may also recommend physical therapy to improve your coordination  Never start an exercise program without talking to your healthcare provider first      · Manage your medical conditions  Keep all appointments with your healthcare providers  Visit your eye doctor as directed  Home safety tips:   · Add items to prevent falls in the bathroom  Put nonslip strips on your bath or shower floor to prevent you from slipping  Use a bath mat if you do not have carpet in the bathroom  This will prevent you from falling when you step out of the bath or shower  Use a shower seat so you do not need to stand while you shower  Sit on the toilet or a chair in your bathroom to dry yourself and put on clothing  This will prevent you from losing your balance from drying or dressing yourself while you are standing  · Keep paths clear  Remove books, shoes, and other objects from walkways and stairs  Place cords for telephones and lamps out of the way so that you do not need to walk over them  Tape them down if you cannot move them  Remove small rugs  If you cannot remove a rug, secure it with double-sided tape  This will prevent you from tripping  · Install bright lights in your home  Use night lights to help light paths to the bathroom or kitchen  Always turn on the light before you start walking  · Keep items you use often on shelves within reach  Do not use a step stool to help you reach an item  · Paint or place reflective tape on the edges of your stairs    This will help you see the stairs better  Follow up with your healthcare provider as directed:  Write down your questions so you remember to ask them during your visits  © 2017 [address] [employer] Information is for End User's use only and may not be sold, redistributed or otherwise used for commercial purposes  All illustrations and images included in CareNotes® are the copyrighted property of [employer] M , [employer]  or Alonzo Langford  The above information is an  only  It is not intended as medical advice for individual conditions or treatments  Talk to your doctor, nurse or pharmacist before following any medical regimen to see if it is safe and effective for you  Advance Directives   WHAT YOU NEED TO KNOW:   What are advance directives? Advance directives are legal documents that state your wishes and plans for medical care  These plans are made ahead of time in case you lose your ability to make decisions for yourself  Advance directives can apply to any medical decision, such as the treatments you want, and if you want to donate organs  What are the types of advance directives? There are many types of advance directives, and each state has rules about how to use them  You may choose a combination of any of the following:  · Living will: This is a written record of the treatment you want  You can also choose which treatments you do not want, which to limit, and which to stop at a certain time  This includes surgery, medicine, IV fluid, and tube feedings  · Durable power of  for healthcare [facility]): This is a written record that states who you want to make healthcare choices for you when you are unable to make them for yourself  This person, called a proxy, is usually a family member or a friend  You may choose more than 1 proxy  · Do not resuscitate (DNR) order:  A DNR order is used in case your heart stops beating or you stop breathing   It is a request not to have certain forms of treatment, such as CPR  A DNR order may be included in other types of advance directives  · Medical directive: This covers the care that you want if you are in a coma, near death, or unable to make decisions for yourself  You can list the treatments you want for each condition  Treatment may include pain medicine, surgery, blood transfusions, dialysis, IV or tube feedings, and a ventilator (breathing machine)  · Values history: This document has questions about your views, beliefs, and how you feel and think about life  This information can help others choose the care that you would choose  Why are advance directives important? An advance directive helps you control your care  Although spoken wishes may be used, it is better to have your wishes written down  Spoken wishes can be misunderstood, or not followed  Treatments may be given even if you do not want them  An advance directive may make it easier for your family to make difficult choices about your care  How do I decide what to put in my advance directives? · Make informed decisions:  Make sure you fully understand treatments or care you may receive  Think about the benefits and problems your decisions could cause for you or your family  Talk to healthcare providers if you have concerns or questions before you write down your wishes  You may also want to talk with your Jain or , or a   Check your state laws to make sure that what you put in your advance directive is legal      · Sign all forms:  Sign and date your advance directive when you have finished  You may also need 2 witnesses to sign the forms  Witnesses cannot be your doctor or his staff, your spouse, heirs or beneficiaries, people you owe money to, or your chosen proxy  Talk to your family, proxy, and healthcare providers about your advance directive  Give each person a copy, and keep one for yourself in a place you can get to easily   Do not keep it hidden or locked away  · Review and revise your plans: You can revise your advance directive at any time, as long as you are able to make decisions  Review your plan every year, and when there are changes in your life, or your health  When you make changes, let your family, proxy, and healthcare providers know  Give each a new copy  Where can I find more information? · [employer]  [address] , [address]  Phone: 1- 429 - [phone]  Phone: 8- 365 - [phone]  Web Address: http://www  aafp org  · [address] [facility])  [address] [address] [facility], [address]  Phone: 5- [phone] - [phone]  Phone: [phone]  Web Address: Kate gallego  CARE AGREEMENT:   You have the right to help plan your care  To help with this plan, you must learn about your health condition and treatment options  You must also learn about advance directives and how they are used  Work with your healthcare providers to decide what care will be used to treat you  You always have the right to refuse treatment  The above information is an  only  It is not intended as medical advice for individual conditions or treatments  Talk to your doctor, nurse or pharmacist before following any medical regimen to see if it is safe and effective for you  © 2017 [address] [employer] Information is for End User's use only and may not be sold, redistributed or otherwise used for commercial purposes  All illustrations and images included in CareNotes® are the copyrighted property of A D A M , Inc  or Alonzo Langford

## 2018-09-20 NOTE — ASSESSMENT & PLAN NOTE
Patient was given 5 wishes paperwork Patient refuses all shots Discussed with patient risk of that Patient refuses to have them due to her facial rash Patient will continue to have follwoup with specialists

## 2018-09-20 NOTE — ASSESSMENT & PLAN NOTE
Patient to continue with vitamin D as directed by Dr Will Akins was reivewed with patient her 10 yr risk of fracture is 7% Patient will have the 1000 IU vitamin D and will have repeat labs were Dr Yulia Monterroso in 12/2018

## 2018-11-28 DIAGNOSIS — E89.0 POSTOPERATIVE HYPOTHYROIDISM: ICD-10-CM

## 2018-11-28 RX ORDER — LEVOTHYROXINE SODIUM 100 MCG
100 TABLET ORAL DAILY
Qty: 90 TABLET | Refills: 0 | Status: SHIPPED | OUTPATIENT
Start: 2018-11-28 | End: 2019-03-21

## 2018-12-20 ENCOUNTER — APPOINTMENT (OUTPATIENT)
Dept: LAB | Facility: HOSPITAL | Age: 78
End: 2018-12-20
Payer: COMMERCIAL

## 2018-12-20 DIAGNOSIS — I10 BENIGN ESSENTIAL HYPERTENSION: ICD-10-CM

## 2018-12-20 DIAGNOSIS — E89.0 POSTOPERATIVE HYPOTHYROIDISM: ICD-10-CM

## 2018-12-20 DIAGNOSIS — I48.20 CHRONIC ATRIAL FIBRILLATION (HCC): ICD-10-CM

## 2018-12-20 DIAGNOSIS — E78.2 MIXED HYPERLIPIDEMIA: ICD-10-CM

## 2018-12-20 DIAGNOSIS — E55.9 VITAMIN D DEFICIENCY: ICD-10-CM

## 2018-12-20 LAB
25(OH)D3 SERPL-MCNC: 45.4 NG/ML (ref 30–100)
ALBUMIN SERPL BCP-MCNC: 3.2 G/DL (ref 3.5–5)
ALP SERPL-CCNC: 94 U/L (ref 46–116)
ALT SERPL W P-5'-P-CCNC: 29 U/L (ref 12–78)
ANION GAP SERPL CALCULATED.3IONS-SCNC: 5 MMOL/L (ref 4–13)
AST SERPL W P-5'-P-CCNC: 29 U/L (ref 5–45)
BASOPHILS # BLD AUTO: 0.04 THOUSANDS/ΜL (ref 0–0.1)
BASOPHILS NFR BLD AUTO: 1 % (ref 0–1)
BILIRUB SERPL-MCNC: 0.46 MG/DL (ref 0.2–1)
BUN SERPL-MCNC: 14 MG/DL (ref 5–25)
CALCIUM SERPL-MCNC: 8.8 MG/DL (ref 8.3–10.1)
CHLORIDE SERPL-SCNC: 103 MMOL/L (ref 100–108)
CHOLEST SERPL-MCNC: 182 MG/DL (ref 50–200)
CO2 SERPL-SCNC: 29 MMOL/L (ref 21–32)
CREAT SERPL-MCNC: 1.19 MG/DL (ref 0.6–1.3)
EOSINOPHIL # BLD AUTO: 0.07 THOUSAND/ΜL (ref 0–0.61)
EOSINOPHIL NFR BLD AUTO: 2 % (ref 0–6)
ERYTHROCYTE [DISTWIDTH] IN BLOOD BY AUTOMATED COUNT: 13.9 % (ref 11.6–15.1)
GFR SERPL CREATININE-BSD FRML MDRD: 50 ML/MIN/1.73SQ M
GLUCOSE P FAST SERPL-MCNC: 90 MG/DL (ref 65–99)
HCT VFR BLD AUTO: 40.2 % (ref 34.8–46.1)
HDLC SERPL-MCNC: 93 MG/DL (ref 40–60)
HGB BLD-MCNC: 13.2 G/DL (ref 11.5–15.4)
IMM GRANULOCYTES # BLD AUTO: 0.01 THOUSAND/UL (ref 0–0.2)
IMM GRANULOCYTES NFR BLD AUTO: 0 % (ref 0–2)
LDLC SERPL CALC-MCNC: 78 MG/DL (ref 0–100)
LYMPHOCYTES # BLD AUTO: 2.05 THOUSANDS/ΜL (ref 0.6–4.47)
LYMPHOCYTES NFR BLD AUTO: 43 % (ref 14–44)
MCH RBC QN AUTO: 33.1 PG (ref 26.8–34.3)
MCHC RBC AUTO-ENTMCNC: 32.8 G/DL (ref 31.4–37.4)
MCV RBC AUTO: 101 FL (ref 82–98)
MONOCYTES # BLD AUTO: 0.41 THOUSAND/ΜL (ref 0.17–1.22)
MONOCYTES NFR BLD AUTO: 9 % (ref 4–12)
NEUTROPHILS # BLD AUTO: 2.2 THOUSANDS/ΜL (ref 1.85–7.62)
NEUTS SEG NFR BLD AUTO: 45 % (ref 43–75)
NONHDLC SERPL-MCNC: 89 MG/DL
NRBC BLD AUTO-RTO: 0 /100 WBCS
PLATELET # BLD AUTO: 223 THOUSANDS/UL (ref 149–390)
PMV BLD AUTO: 10 FL (ref 8.9–12.7)
POTASSIUM SERPL-SCNC: 4.1 MMOL/L (ref 3.5–5.3)
PROT SERPL-MCNC: 7.6 G/DL (ref 6.4–8.2)
RBC # BLD AUTO: 3.99 MILLION/UL (ref 3.81–5.12)
SODIUM SERPL-SCNC: 137 MMOL/L (ref 136–145)
TRIGL SERPL-MCNC: 56 MG/DL
TSH SERPL DL<=0.05 MIU/L-ACNC: 3.27 UIU/ML (ref 0.36–3.74)
WBC # BLD AUTO: 4.78 THOUSAND/UL (ref 4.31–10.16)

## 2018-12-20 PROCEDURE — 80061 LIPID PANEL: CPT

## 2018-12-20 PROCEDURE — 84443 ASSAY THYROID STIM HORMONE: CPT

## 2018-12-20 PROCEDURE — 80053 COMPREHEN METABOLIC PANEL: CPT

## 2018-12-20 PROCEDURE — 36415 COLL VENOUS BLD VENIPUNCTURE: CPT

## 2018-12-20 PROCEDURE — 82306 VITAMIN D 25 HYDROXY: CPT

## 2018-12-20 PROCEDURE — 85025 COMPLETE CBC W/AUTO DIFF WBC: CPT

## 2019-01-07 ENCOUNTER — TRANSCRIBE ORDERS (OUTPATIENT)
Dept: ADMINISTRATIVE | Facility: HOSPITAL | Age: 79
End: 2019-01-07

## 2019-01-07 DIAGNOSIS — S82.892A CLOSED FRACTURE OF LEFT ANKLE, INITIAL ENCOUNTER: Primary | ICD-10-CM

## 2019-01-23 ENCOUNTER — TRANSCRIBE ORDERS (OUTPATIENT)
Dept: LAB | Facility: HOSPITAL | Age: 79
End: 2019-01-23

## 2019-01-23 ENCOUNTER — APPOINTMENT (OUTPATIENT)
Dept: LAB | Facility: HOSPITAL | Age: 79
End: 2019-01-23
Payer: COMMERCIAL

## 2019-01-23 DIAGNOSIS — E78.00 PURE HYPERCHOLESTEROLEMIA: ICD-10-CM

## 2019-01-23 DIAGNOSIS — I48.91 ATRIAL FIBRILLATION, UNSPECIFIED TYPE (HCC): ICD-10-CM

## 2019-01-23 DIAGNOSIS — K21.9 GASTROESOPHAGEAL REFLUX DISEASE WITHOUT ESOPHAGITIS: ICD-10-CM

## 2019-01-23 DIAGNOSIS — I48.91 ATRIAL FIBRILLATION, UNSPECIFIED TYPE (HCC): Primary | ICD-10-CM

## 2019-01-23 LAB
CHOLEST SERPL-MCNC: 199 MG/DL (ref 50–200)
ERYTHROCYTE [DISTWIDTH] IN BLOOD BY AUTOMATED COUNT: 13.6 % (ref 11.6–15.1)
HCT VFR BLD AUTO: 41 % (ref 34.8–46.1)
HDLC SERPL-MCNC: 102 MG/DL (ref 40–60)
HGB BLD-MCNC: 13.5 G/DL (ref 11.5–15.4)
LDLC SERPL CALC-MCNC: 87 MG/DL (ref 0–100)
MCH RBC QN AUTO: 32.7 PG (ref 26.8–34.3)
MCHC RBC AUTO-ENTMCNC: 32.9 G/DL (ref 31.4–37.4)
MCV RBC AUTO: 99 FL (ref 82–98)
NONHDLC SERPL-MCNC: 97 MG/DL
PLATELET # BLD AUTO: 214 THOUSANDS/UL (ref 149–390)
PMV BLD AUTO: 9.4 FL (ref 8.9–12.7)
RBC # BLD AUTO: 4.13 MILLION/UL (ref 3.81–5.12)
TRIGL SERPL-MCNC: 48 MG/DL
WBC # BLD AUTO: 4.25 THOUSAND/UL (ref 4.31–10.16)

## 2019-01-23 PROCEDURE — 80061 LIPID PANEL: CPT

## 2019-01-23 PROCEDURE — 85027 COMPLETE CBC AUTOMATED: CPT

## 2019-01-23 PROCEDURE — 36415 COLL VENOUS BLD VENIPUNCTURE: CPT

## 2019-01-23 RX ORDER — OMEPRAZOLE 20 MG/1
CAPSULE, DELAYED RELEASE ORAL
Qty: 90 CAPSULE | Refills: 1 | Status: SHIPPED | OUTPATIENT
Start: 2019-01-23 | End: 2020-09-03 | Stop reason: SDUPTHER

## 2019-03-21 ENCOUNTER — OFFICE VISIT (OUTPATIENT)
Dept: FAMILY MEDICINE CLINIC | Facility: CLINIC | Age: 79
End: 2019-03-21
Payer: COMMERCIAL

## 2019-03-21 VITALS
BODY MASS INDEX: 27.49 KG/M2 | HEIGHT: 65 IN | SYSTOLIC BLOOD PRESSURE: 118 MMHG | WEIGHT: 165 LBS | DIASTOLIC BLOOD PRESSURE: 68 MMHG

## 2019-03-21 DIAGNOSIS — M85.89 OSTEOPENIA OF MULTIPLE SITES: ICD-10-CM

## 2019-03-21 DIAGNOSIS — E66.3 OVERWEIGHT (BMI 25.0-29.9): ICD-10-CM

## 2019-03-21 DIAGNOSIS — E89.0 POSTOPERATIVE HYPOTHYROIDISM: ICD-10-CM

## 2019-03-21 DIAGNOSIS — I48.20 CHRONIC ATRIAL FIBRILLATION (HCC): ICD-10-CM

## 2019-03-21 DIAGNOSIS — Z12.11 SCREEN FOR COLON CANCER: ICD-10-CM

## 2019-03-21 DIAGNOSIS — I10 BENIGN ESSENTIAL HYPERTENSION: Primary | ICD-10-CM

## 2019-03-21 DIAGNOSIS — E78.2 MIXED HYPERLIPIDEMIA: ICD-10-CM

## 2019-03-21 PROCEDURE — 3074F SYST BP LT 130 MM HG: CPT | Performed by: FAMILY MEDICINE

## 2019-03-21 PROCEDURE — 1160F RVW MEDS BY RX/DR IN RCRD: CPT | Performed by: FAMILY MEDICINE

## 2019-03-21 PROCEDURE — 3078F DIAST BP <80 MM HG: CPT | Performed by: FAMILY MEDICINE

## 2019-03-21 PROCEDURE — 99214 OFFICE O/P EST MOD 30 MIN: CPT | Performed by: FAMILY MEDICINE

## 2019-03-21 RX ORDER — TRIAMCINOLONE ACETONIDE 1 MG/G
1 CREAM TOPICAL
COMMUNITY
Start: 2015-06-25 | End: 2019-07-12 | Stop reason: SDUPTHER

## 2019-03-21 NOTE — PATIENT INSTRUCTIONS

## 2019-03-21 NOTE — ASSESSMENT & PLAN NOTE
Patient will continue with same thyroid medications Patient will see me in 6 months Patient to have repeat labs end of June

## 2019-03-21 NOTE — ASSESSMENT & PLAN NOTE
dexa from 8/2018 was rievewed Patient to syed ith same vitamin D and calcium Patient to kiera walking Patient will see me in 6 months and will also have repeat dexa in 2019

## 2019-03-21 NOTE — ASSESSMENT & PLAN NOTE
LDL is stable Patient has LDL 78 and HDL of 102 Patient to continue same meds and see me in 6 months Labs due end of June 2019

## 2019-03-21 NOTE — PROGRESS NOTES
Assessment/Plan:    Postoperative hypothyroidism  Patient will continue with same thyroid medications Patient will see me in 6 months Patient to have repeat labs end of June    A-fib [facility])  Patient will continue with eliquis Patient will continue with same meds Patent to see cardiology as scheduled Patient has no complaints at this time    Benign essential hypertension  Patient blood pressures are stablePatient will continue with same medications    Osteopenia of multiple sites  dexa from 8/2018 was rievewed Patient to kier[place] ith same vitamin D and calcium Patient to kiera walking Patient will see me in 6 months and will also have repeat dexa in 2019    Mixed hyperlipidemia  LDL is stable Patient has LDL 78 and HDL of 102 Patient to continue same meds and see me in 6 months Labs due end of June 2019    Overweight (BMI 25 0-29 9)  disucssed diet and exercise Patient to kiera with current care       Diagnoses and all orders for this visit:    Benign essential hypertension  -     Comprehensive metabolic panel; Future  -     CBC and differential; Future    Chronic atrial fibrillation (HCC)    Postoperative hypothyroidism  -     TSH, 3rd generation with Free T4 reflex; Future    Overweight (BMI 25 0-29 9)  -     Comprehensive metabolic panel; Future  -     CBC and differential; Future    Osteopenia of multiple sites  -     Vitamin D 25 hydroxy; Future    Mixed hyperlipidemia  -     Comprehensive metabolic panel; Future  -     Lipid panel; Future  -     CBC and differential; Future    Screen for colon cancer  -     Occult Blood, Fecal Immunochemical; Future    Other orders  -     triamcinolone (KENALOG) 0 1 % cream; Apply 1 application topically  -     Omega-3 Fatty Acids (FISH OIL PO);  Take 2 g by mouth  -     econazole nitrate 1 % cream; APPLY TO RASH ON FEET TWICE DAILY AS NEEDED          Subjective:   Chief Complaint   Patient presents with    Hypothyroidism    Hypertension    Hyperlipidemia     no refills needed           Patient ID: Krysten Head is a [de-identified] y o  female  Patient is here for checkup of her hypertension hypothyroidism atrial fibrillation hyperlipidemia, osteopenia  and also her weight Patient is taking the eliquis She is exercising daily Patient has been taking her meds Patient dexa was reivewd and was stable Her Vitamin D also was normal Patient has no new concerns at this time Patinet blood presusre s stable Her labs from 2018 December were reviewed and stable Patient weihgt is unchanged      The following portions of the patient's history were reviewed and updated as appropriate: allergies, current medications, past medical history, past social history and problem list     Review of Systems   Constitutional: Negative for fatigue, fever and unexpected weight change  HENT: Negative for congestion, sinus pain and trouble swallowing  Eyes: Negative for discharge and visual disturbance  Respiratory: Negative for cough, chest tightness, shortness of breath and wheezing  Cardiovascular: Negative for chest pain, palpitations and leg swelling  Gastrointestinal: Negative for abdominal pain, blood in stool, constipation, diarrhea, nausea and vomiting  Genitourinary: Negative for difficulty urinating, dysuria, frequency and hematuria  Musculoskeletal: Negative for arthralgias, gait problem and joint swelling  Skin: Negative for rash and wound  Allergic/Immunologic: Negative for environmental allergies and food allergies  Neurological: Negative for dizziness, syncope, weakness, numbness and headaches  Hematological: Negative for adenopathy  Does not bruise/bleed easily  Psychiatric/Behavioral: Negative for confusion, decreased concentration and sleep disturbance  The patient is not nervous/anxious  Objective:      /68   Ht 5' 4 5" (1 638 m)   Wt 74 8 kg (165 lb)   BMI 27 88 kg/m²          Physical Exam   Constitutional: She is oriented to person, place, and time   She appears well-developed and well-nourished  HENT:   Head: Normocephalic and atraumatic  Right Ear: Hearing, tympanic membrane and external ear normal    Left Ear: Hearing, tympanic membrane and external ear normal    Eyes: Pupils are equal, round, and reactive to light  Conjunctivae and EOM are normal    Neck: Neck supple  No thyromegaly present  Cardiovascular: Normal rate and normal heart sounds  Pulmonary/Chest: Effort normal and breath sounds normal  She has no wheezes  She has no rales  Abdominal: Soft  Bowel sounds are normal  She exhibits no distension  There is no tenderness  Musculoskeletal: She exhibits no edema or tenderness  Lymphadenopathy:     She has no cervical adenopathy  Neurological: She is alert and oriented to person, place, and time  No cranial nerve deficit  Coordination normal    Skin: Skin is warm and dry  Rash noted  Malar rash is stable on face   Psychiatric: She has a normal mood and affect  Her behavior is normal  Judgment and thought content normal      BMI Counseling: Body mass index is 27 88 kg/m²  Discussed the patient's BMI with her  The BMI is above average  No BMI follow-up plan is appropriate  Patient is 70 years old and weight reduction/weight gain would further complicate their underlying illness

## 2019-03-21 NOTE — ASSESSMENT & PLAN NOTE
Patient will continue with eliquis Patient will continue with same meds Patent to see cardiology as scheduled Patient has no complaints at this time

## 2019-04-04 ENCOUNTER — HOSPITAL ENCOUNTER (OUTPATIENT)
Dept: MAMMOGRAPHY | Facility: MEDICAL CENTER | Age: 79
Discharge: HOME | End: 2019-04-04
Payer: COMMERCIAL

## 2019-04-04 ENCOUNTER — TRANSCRIBE ORDERS (OUTPATIENT)
Dept: ADMINISTRATIVE | Facility: HOSPITAL | Age: 79
End: 2019-04-04

## 2019-04-04 VITALS — HEIGHT: 65 IN | BODY MASS INDEX: 27.49 KG/M2 | WEIGHT: 165 LBS

## 2019-04-04 DIAGNOSIS — Z12.31 VISIT FOR SCREENING MAMMOGRAM: Primary | ICD-10-CM

## 2019-04-04 DIAGNOSIS — Z12.31 ENCOUNTER FOR SCREENING MAMMOGRAM FOR MALIGNANT NEOPLASM OF BREAST: ICD-10-CM

## 2019-04-04 PROCEDURE — 77063 BREAST TOMOSYNTHESIS BI: CPT

## 2019-04-04 PROCEDURE — 77067 SCR MAMMO BI INCL CAD: CPT

## 2019-04-05 ENCOUNTER — TELEPHONE (OUTPATIENT)
Dept: GYNECOLOGY | Facility: CLINIC | Age: 79
End: 2019-04-05

## 2019-04-11 ENCOUNTER — TRANSCRIBE ORDERS (OUTPATIENT)
Dept: ADMINISTRATIVE | Facility: HOSPITAL | Age: 79
End: 2019-04-11

## 2019-04-11 ENCOUNTER — HOSPITAL ENCOUNTER (OUTPATIENT)
Dept: MAMMOGRAPHY | Facility: CLINIC | Age: 79
Discharge: HOME | End: 2019-04-11
Payer: COMMERCIAL

## 2019-04-11 VITALS — HEIGHT: 64 IN | WEIGHT: 165 LBS | BODY MASS INDEX: 28.17 KG/M2

## 2019-04-11 DIAGNOSIS — R92.8 ABNORMAL MAMMOGRAM: ICD-10-CM

## 2019-04-11 DIAGNOSIS — Z09 FOLLOW-UP EXAM, 3-6 MONTHS SINCE PREVIOUS EXAM: Primary | ICD-10-CM

## 2019-04-11 PROCEDURE — 77065 DX MAMMO INCL CAD UNI: CPT

## 2019-04-12 DIAGNOSIS — R92.1 BREAST CALCIFICATIONS ON MAMMOGRAM: Primary | ICD-10-CM

## 2019-04-15 ENCOUNTER — TELEPHONE (OUTPATIENT)
Dept: GYNECOLOGY | Facility: CLINIC | Age: 79
End: 2019-04-15

## 2019-04-18 ENCOUNTER — TRANSCRIBE ORDERS (OUTPATIENT)
Dept: ADMINISTRATIVE | Facility: HOSPITAL | Age: 79
End: 2019-04-18

## 2019-04-18 DIAGNOSIS — R92.8 ABNORMAL MAMMOGRAPHY: Primary | ICD-10-CM

## 2019-04-23 ENCOUNTER — TRANSCRIBE ORDERS (OUTPATIENT)
Dept: ADMINISTRATIVE | Facility: HOSPITAL | Age: 79
End: 2019-04-23

## 2019-04-23 DIAGNOSIS — R31.1 BENIGN ESSENTIAL MICROSCOPIC HEMATURIA: Primary | ICD-10-CM

## 2019-04-29 ENCOUNTER — HOSPITAL ENCOUNTER (OUTPATIENT)
Dept: ULTRASOUND IMAGING | Facility: HOSPITAL | Age: 79
Discharge: HOME | End: 2019-04-29
Attending: UROLOGY
Payer: COMMERCIAL

## 2019-04-29 DIAGNOSIS — R31.1 BENIGN ESSENTIAL MICROSCOPIC HEMATURIA: ICD-10-CM

## 2019-04-29 PROCEDURE — 76770 US EXAM ABDO BACK WALL COMP: CPT

## 2019-05-13 ENCOUNTER — TRANSCRIBE ORDERS (OUTPATIENT)
Dept: ADMINISTRATIVE | Facility: HOSPITAL | Age: 79
End: 2019-05-13

## 2019-05-13 DIAGNOSIS — Z12.39 BREAST SCREENING, UNSPECIFIED: Primary | ICD-10-CM

## 2019-06-13 DIAGNOSIS — E89.0 POST-SURGICAL HYPOTHYROIDISM: Primary | ICD-10-CM

## 2019-06-13 DIAGNOSIS — Z96.659 STATUS POST KNEE REPLACEMENT, UNSPECIFIED LATERALITY: ICD-10-CM

## 2019-06-13 RX ORDER — LEVOTHYROXINE SODIUM 0.05 MG/1
TABLET ORAL
Qty: 180 TABLET | Refills: 1 | Status: SHIPPED | OUTPATIENT
Start: 2019-06-13 | End: 2019-08-01

## 2019-06-13 RX ORDER — AMOXICILLIN 500 MG/1
CAPSULE ORAL
Qty: 4 CAPSULE | Refills: 2 | Status: SHIPPED | OUTPATIENT
Start: 2019-06-13 | End: 2019-06-14

## 2019-06-20 ENCOUNTER — APPOINTMENT (OUTPATIENT)
Dept: LAB | Facility: HOSPITAL | Age: 79
End: 2019-06-20
Payer: COMMERCIAL

## 2019-06-20 ENCOUNTER — TRANSCRIBE ORDERS (OUTPATIENT)
Dept: LAB | Facility: HOSPITAL | Age: 79
End: 2019-06-20

## 2019-06-20 DIAGNOSIS — I10 BENIGN ESSENTIAL HYPERTENSION: ICD-10-CM

## 2019-06-20 DIAGNOSIS — E66.3 OVERWEIGHT (BMI 25.0-29.9): ICD-10-CM

## 2019-06-20 DIAGNOSIS — I10 BENIGN ESSENTIAL HYPERTENSION: Primary | ICD-10-CM

## 2019-06-20 DIAGNOSIS — M85.89 OSTEOPENIA OF MULTIPLE SITES: ICD-10-CM

## 2019-06-20 DIAGNOSIS — E78.2 MIXED HYPERLIPIDEMIA: ICD-10-CM

## 2019-06-20 DIAGNOSIS — E89.0 POSTOPERATIVE HYPOTHYROIDISM: ICD-10-CM

## 2019-06-20 LAB
25(OH)D3 SERPL-MCNC: 36.3 NG/ML (ref 30–100)
ALBUMIN SERPL BCP-MCNC: 3.4 G/DL (ref 3.5–5)
ALP SERPL-CCNC: 93 U/L (ref 46–116)
ALT SERPL W P-5'-P-CCNC: 23 U/L (ref 12–78)
ANION GAP SERPL CALCULATED.3IONS-SCNC: 3 MMOL/L (ref 4–13)
AST SERPL W P-5'-P-CCNC: 22 U/L (ref 5–45)
BASOPHILS # BLD AUTO: 0.04 THOUSANDS/ΜL (ref 0–0.1)
BASOPHILS NFR BLD AUTO: 1 % (ref 0–1)
BILIRUB SERPL-MCNC: 0.44 MG/DL (ref 0.2–1)
BUN SERPL-MCNC: 17 MG/DL (ref 5–25)
CALCIUM SERPL-MCNC: 9.2 MG/DL (ref 8.3–10.1)
CHLORIDE SERPL-SCNC: 105 MMOL/L (ref 100–108)
CHOLEST SERPL-MCNC: 190 MG/DL (ref 50–200)
CO2 SERPL-SCNC: 28 MMOL/L (ref 21–32)
CREAT SERPL-MCNC: 1.01 MG/DL (ref 0.6–1.3)
EOSINOPHIL # BLD AUTO: 0.05 THOUSAND/ΜL (ref 0–0.61)
EOSINOPHIL NFR BLD AUTO: 1 % (ref 0–6)
ERYTHROCYTE [DISTWIDTH] IN BLOOD BY AUTOMATED COUNT: 13.4 % (ref 11.6–15.1)
GFR SERPL CREATININE-BSD FRML MDRD: 61 ML/MIN/1.73SQ M
GLUCOSE P FAST SERPL-MCNC: 95 MG/DL (ref 65–99)
HCT VFR BLD AUTO: 40.4 % (ref 34.8–46.1)
HDLC SERPL-MCNC: 84 MG/DL (ref 40–60)
HGB BLD-MCNC: 13 G/DL (ref 11.5–15.4)
IMM GRANULOCYTES # BLD AUTO: 0.01 THOUSAND/UL (ref 0–0.2)
IMM GRANULOCYTES NFR BLD AUTO: 0 % (ref 0–2)
LDLC SERPL CALC-MCNC: 93 MG/DL (ref 0–100)
LYMPHOCYTES # BLD AUTO: 1.64 THOUSANDS/ΜL (ref 0.6–4.47)
LYMPHOCYTES NFR BLD AUTO: 40 % (ref 14–44)
MCH RBC QN AUTO: 31.6 PG (ref 26.8–34.3)
MCHC RBC AUTO-ENTMCNC: 32.2 G/DL (ref 31.4–37.4)
MCV RBC AUTO: 98 FL (ref 82–98)
MONOCYTES # BLD AUTO: 0.45 THOUSAND/ΜL (ref 0.17–1.22)
MONOCYTES NFR BLD AUTO: 11 % (ref 4–12)
NEUTROPHILS # BLD AUTO: 1.87 THOUSANDS/ΜL (ref 1.85–7.62)
NEUTS SEG NFR BLD AUTO: 47 % (ref 43–75)
NONHDLC SERPL-MCNC: 106 MG/DL
NRBC BLD AUTO-RTO: 0 /100 WBCS
PLATELET # BLD AUTO: 229 THOUSANDS/UL (ref 149–390)
PMV BLD AUTO: 9.7 FL (ref 8.9–12.7)
POTASSIUM SERPL-SCNC: 3.8 MMOL/L (ref 3.5–5.3)
PROT SERPL-MCNC: 7.7 G/DL (ref 6.4–8.2)
RBC # BLD AUTO: 4.12 MILLION/UL (ref 3.81–5.12)
SODIUM SERPL-SCNC: 136 MMOL/L (ref 136–145)
TRIGL SERPL-MCNC: 63 MG/DL
TSH SERPL DL<=0.05 MIU/L-ACNC: 1.38 UIU/ML (ref 0.36–3.74)
WBC # BLD AUTO: 4.06 THOUSAND/UL (ref 4.31–10.16)

## 2019-06-20 PROCEDURE — 80061 LIPID PANEL: CPT

## 2019-06-20 PROCEDURE — 85025 COMPLETE CBC W/AUTO DIFF WBC: CPT

## 2019-06-20 PROCEDURE — 84443 ASSAY THYROID STIM HORMONE: CPT

## 2019-06-20 PROCEDURE — 82306 VITAMIN D 25 HYDROXY: CPT

## 2019-06-20 PROCEDURE — 36415 COLL VENOUS BLD VENIPUNCTURE: CPT

## 2019-06-20 PROCEDURE — 80053 COMPREHEN METABOLIC PANEL: CPT

## 2019-07-12 ENCOUNTER — ANNUAL EXAM (OUTPATIENT)
Dept: GYNECOLOGY | Facility: CLINIC | Age: 79
End: 2019-07-12
Payer: COMMERCIAL

## 2019-07-12 VITALS
WEIGHT: 167 LBS | RESPIRATION RATE: 17 BRPM | TEMPERATURE: 97 F | HEART RATE: 57 BPM | BODY MASS INDEX: 27.82 KG/M2 | HEIGHT: 65 IN | SYSTOLIC BLOOD PRESSURE: 110 MMHG | DIASTOLIC BLOOD PRESSURE: 76 MMHG

## 2019-07-12 DIAGNOSIS — M85.852 OSTEOPENIA OF NECK OF LEFT FEMUR: ICD-10-CM

## 2019-07-12 DIAGNOSIS — R21 MALAR RASH: ICD-10-CM

## 2019-07-12 DIAGNOSIS — R92.8 ABNORMAL FINDING ON RADIOLOGICAL EXAMINATION OF BREAST: Primary | ICD-10-CM

## 2019-07-12 DIAGNOSIS — Z85.038 HISTORY OF COLON CANCER: ICD-10-CM

## 2019-07-12 PROCEDURE — 99213 OFFICE O/P EST LOW 20 MIN: CPT | Performed by: OBSTETRICS & GYNECOLOGY

## 2019-07-12 RX ORDER — TRIAMCINOLONE ACETONIDE 1 MG/G
CREAM TOPICAL
Qty: 28 G | Refills: 0 | Status: SHIPPED | OUTPATIENT
Start: 2019-07-12 | End: 2020-05-19

## 2019-07-12 NOTE — PROGRESS NOTES
Assessment/Plan:       Diagnoses and all orders for this visit:    Malar rash  -     triamcinolone (KENALOG) 0 1 % cream; Apply to affected area bid for up to 14 days as needed for rash          Subjective:      Patient ID: Efrain Gunter is a [de-identified] y o  female  The patient is an 23-year-old who presents to the office for a follow-up visit  She has had a hysterectomy and BSO in the past for benign disease  She recently had a mammography which showed some calcifications  She had a a diagnostic mammogram on the right side in April  Since then she has had a biopsy which was benign  She is scheduled for a follow-up right mammogram in 6 months  The patient had a DEXA scan in August 2018  She is aware that she has mild osteopenia  Her lowest T-score in the femoral neck was -1 1  She gets at least 1000 mg of calcium in her diet daily  Her most recent vitamin-D level was normal   She is aware that she does not need any therapy for this at this time  She could should continue to be active, take her calcium and vitamin D  The patient states that she continues to have some urinary frequency, although she states she is drinking a lot of water  She has nocturia x2, which is unchanged from last year  She does not feel that this is enough of a problem to treat  This patient has seen several different people for evaluation of hyper pigmentation of the malar area  She has had blood work to check for lupus  She states that according to the blood work she does not fit the criteria for SLE  She has changed her skin care routine and feels that the hyperpigmentation is decreasing  She had not been using a sunscreen in the past, but was advised to do so by her dermatologist   She occasionally uses some triamcinolone cream and would like a refill  The patient denies any vaginal bleeding or breast problems  She is not sexually active    The patient's Pap smear and HPV last year were normal       The following portions of the patient's history were reviewed and updated as appropriate: allergies, current medications, past family history, past medical history, past social history, past surgical history and problem list     Review of Systems   Constitutional: Negative  Respiratory: Negative for shortness of breath  Cardiovascular: Negative for chest pain  Gastrointestinal: Negative  Genitourinary: Negative  Musculoskeletal: Positive for gait problem  Skin: Negative  Psychiatric/Behavioral: Negative for agitation, behavioral problems and confusion  Objective:      /76 (BP Location: Right arm, Patient Position: Sitting, Cuff Size: Standard)   Pulse 57   Temp (!) 97 °F (36 1 °C) (Tympanic)   Resp 17   Ht 5' 5" (1 651 m)   Wt 75 8 kg (167 lb)   BMI 27 79 kg/m²          Physical Exam   Constitutional: She appears well-developed and well-nourished  No distress  HENT:   Head: Normocephalic  Pulmonary/Chest: Effort normal  No respiratory distress  Right breast exhibits no inverted nipple, no mass, no nipple discharge, no skin change and no tenderness  Left breast exhibits no inverted nipple, no mass, no nipple discharge, no skin change and no tenderness  Breasts are symmetrical    Abdominal: She exhibits no distension and no mass  There is no tenderness  There is no rebound and no guarding  Genitourinary: Rectum normal  Pelvic exam was performed with patient supine  No labial fusion  There is no rash, tenderness, lesion or injury on the right labia  There is no rash, tenderness, lesion or injury on the left labia  No erythema, tenderness or bleeding in the vagina  No foreign body in the vagina  No signs of injury around the vagina  No vaginal discharge found  Genitourinary Comments: Atrophic vaginal changes are present  The cervix, uterus and adnexa are surgically absent  Lymphadenopathy:        Right axillary: No pectoral and no lateral adenopathy present          Left axillary: No pectoral and no lateral adenopathy present  Skin: Skin is warm, dry and intact  She is not diaphoretic  No cyanosis  Nails show no clubbing  Hyperpigmentation of the malar area  Psychiatric: She has a normal mood and affect   Her speech is normal and behavior is normal

## 2019-07-25 ENCOUNTER — APPOINTMENT (OUTPATIENT)
Dept: LAB | Facility: HOSPITAL | Age: 79
End: 2019-07-25
Payer: COMMERCIAL

## 2019-07-25 ENCOUNTER — TRANSCRIBE ORDERS (OUTPATIENT)
Dept: LAB | Facility: HOSPITAL | Age: 79
End: 2019-07-25

## 2019-07-25 DIAGNOSIS — I48.0 PAROXYSMAL ATRIAL FIBRILLATION (HCC): ICD-10-CM

## 2019-07-25 DIAGNOSIS — E78.49 OTHER HYPERLIPIDEMIA: Primary | ICD-10-CM

## 2019-07-25 DIAGNOSIS — E78.49 OTHER HYPERLIPIDEMIA: ICD-10-CM

## 2019-07-25 LAB
ALBUMIN SERPL BCP-MCNC: 3.4 G/DL (ref 3.5–5)
ALP SERPL-CCNC: 106 U/L (ref 46–116)
ALT SERPL W P-5'-P-CCNC: 24 U/L (ref 12–78)
ANION GAP SERPL CALCULATED.3IONS-SCNC: 5 MMOL/L (ref 4–13)
AST SERPL W P-5'-P-CCNC: 25 U/L (ref 5–45)
BASOPHILS # BLD AUTO: 0.04 THOUSANDS/ΜL (ref 0–0.1)
BASOPHILS NFR BLD AUTO: 1 % (ref 0–1)
BILIRUB SERPL-MCNC: 0.53 MG/DL (ref 0.2–1)
BUN SERPL-MCNC: 15 MG/DL (ref 5–25)
CALCIUM SERPL-MCNC: 8.9 MG/DL (ref 8.3–10.1)
CHLORIDE SERPL-SCNC: 106 MMOL/L (ref 100–108)
CHOLEST SERPL-MCNC: 199 MG/DL (ref 50–200)
CO2 SERPL-SCNC: 28 MMOL/L (ref 21–32)
CREAT SERPL-MCNC: 1.09 MG/DL (ref 0.6–1.3)
EOSINOPHIL # BLD AUTO: 0.07 THOUSAND/ΜL (ref 0–0.61)
EOSINOPHIL NFR BLD AUTO: 2 % (ref 0–6)
ERYTHROCYTE [DISTWIDTH] IN BLOOD BY AUTOMATED COUNT: 14.5 % (ref 11.6–15.1)
GFR SERPL CREATININE-BSD FRML MDRD: 55 ML/MIN/1.73SQ M
GLUCOSE P FAST SERPL-MCNC: 97 MG/DL (ref 65–99)
HCT VFR BLD AUTO: 41.4 % (ref 34.8–46.1)
HDLC SERPL-MCNC: 97 MG/DL (ref 40–60)
HGB BLD-MCNC: 13.4 G/DL (ref 11.5–15.4)
IMM GRANULOCYTES # BLD AUTO: 0.01 THOUSAND/UL (ref 0–0.2)
IMM GRANULOCYTES NFR BLD AUTO: 0 % (ref 0–2)
LDLC SERPL CALC-MCNC: 87 MG/DL (ref 0–100)
LYMPHOCYTES # BLD AUTO: 2.06 THOUSANDS/ΜL (ref 0.6–4.47)
LYMPHOCYTES NFR BLD AUTO: 43 % (ref 14–44)
MCH RBC QN AUTO: 31.3 PG (ref 26.8–34.3)
MCHC RBC AUTO-ENTMCNC: 32.4 G/DL (ref 31.4–37.4)
MCV RBC AUTO: 97 FL (ref 82–98)
MONOCYTES # BLD AUTO: 0.47 THOUSAND/ΜL (ref 0.17–1.22)
MONOCYTES NFR BLD AUTO: 10 % (ref 4–12)
NEUTROPHILS # BLD AUTO: 2.16 THOUSANDS/ΜL (ref 1.85–7.62)
NEUTS SEG NFR BLD AUTO: 44 % (ref 43–75)
NONHDLC SERPL-MCNC: 102 MG/DL
NRBC BLD AUTO-RTO: 0 /100 WBCS
PLATELET # BLD AUTO: 214 THOUSANDS/UL (ref 149–390)
PMV BLD AUTO: 9.5 FL (ref 8.9–12.7)
POTASSIUM SERPL-SCNC: 3.9 MMOL/L (ref 3.5–5.3)
PROT SERPL-MCNC: 7.7 G/DL (ref 6.4–8.2)
RBC # BLD AUTO: 4.28 MILLION/UL (ref 3.81–5.12)
SODIUM SERPL-SCNC: 139 MMOL/L (ref 136–145)
TRIGL SERPL-MCNC: 76 MG/DL
WBC # BLD AUTO: 4.81 THOUSAND/UL (ref 4.31–10.16)

## 2019-07-25 PROCEDURE — 85025 COMPLETE CBC W/AUTO DIFF WBC: CPT

## 2019-07-25 PROCEDURE — 80053 COMPREHEN METABOLIC PANEL: CPT

## 2019-07-25 PROCEDURE — 80061 LIPID PANEL: CPT

## 2019-07-25 PROCEDURE — 36415 COLL VENOUS BLD VENIPUNCTURE: CPT

## 2019-08-01 ENCOUNTER — OFFICE VISIT (OUTPATIENT)
Dept: FAMILY MEDICINE CLINIC | Facility: CLINIC | Age: 79
End: 2019-08-01
Payer: COMMERCIAL

## 2019-08-01 VITALS
BODY MASS INDEX: 27.66 KG/M2 | TEMPERATURE: 97 F | SYSTOLIC BLOOD PRESSURE: 110 MMHG | HEART RATE: 72 BPM | HEIGHT: 65 IN | WEIGHT: 166 LBS | RESPIRATION RATE: 10 BRPM | DIASTOLIC BLOOD PRESSURE: 72 MMHG

## 2019-08-01 DIAGNOSIS — Z01.818 PREOP EXAMINATION: ICD-10-CM

## 2019-08-01 DIAGNOSIS — E78.2 MIXED HYPERLIPIDEMIA: ICD-10-CM

## 2019-08-01 DIAGNOSIS — H25.12 AGE-RELATED NUCLEAR CATARACT OF LEFT EYE: Primary | ICD-10-CM

## 2019-08-01 DIAGNOSIS — E89.0 POSTOPERATIVE HYPOTHYROIDISM: ICD-10-CM

## 2019-08-01 DIAGNOSIS — I10 BENIGN ESSENTIAL HYPERTENSION: ICD-10-CM

## 2019-08-01 DIAGNOSIS — E66.3 OVERWEIGHT (BMI 25.0-29.9): ICD-10-CM

## 2019-08-01 DIAGNOSIS — I48.20 CHRONIC ATRIAL FIBRILLATION (HCC): ICD-10-CM

## 2019-08-01 PROCEDURE — 1036F TOBACCO NON-USER: CPT | Performed by: FAMILY MEDICINE

## 2019-08-01 PROCEDURE — 99214 OFFICE O/P EST MOD 30 MIN: CPT | Performed by: FAMILY MEDICINE

## 2019-08-01 PROCEDURE — 1160F RVW MEDS BY RX/DR IN RCRD: CPT | Performed by: FAMILY MEDICINE

## 2019-08-01 PROCEDURE — 3074F SYST BP LT 130 MM HG: CPT | Performed by: FAMILY MEDICINE

## 2019-08-01 RX ORDER — UBIDECARENONE 75 MG
1000 CAPSULE ORAL DAILY
COMMUNITY
End: 2020-08-06 | Stop reason: ALTCHOICE

## 2019-08-01 NOTE — ASSESSMENT & PLAN NOTE
Patient to hold the eliquis 1 days prior to the procedule patient to continue current meds and follwoup with the cardiologist as schedued

## 2019-08-01 NOTE — PROGRESS NOTES
Assessment/Plan:    Postoperative hypothyroidism  Thyroid numbers are stable continue current meds repeat labs in 12/2019    Benign essential hypertension  BP is well controlled continue current meds and follwoup as scheduled    A-fib [facility])  Patient to hold the eliquis 1 days prior to the procedule patient to continue current meds and follwoup with the cardiologist as schedued    Preop examination  patietn cleared for surgery     Overweight (BMI 25 0-29  9)  Weight is stable continue current meds and followup as scheduled    Mixed hyperlipidemia  Lipids stable on meds    Age-related nuclear cataract of left eye  Dr Loretta Dodd to remove cataract as scheduled       Diagnoses and all orders for this visit:    Age-related nuclear cataract of left eye    Preop examination    Benign essential hypertension    Chronic atrial fibrillation (HCC)    Postoperative hypothyroidism    Mixed hyperlipidemia    Overweight (BMI 25 0-29  9)    Other orders  -     cyanocobalamin (VITAMIN B-12) 100 mcg tablet; Take 1,000 mcg by mouth daily          Subjective:   Chief Complaint   Patient presents with    Pre-op Exam     cataract left eye 08/26/2019 Dr Loretta Dodd           Patient ID: Marybeth Patterson is a 78 y o  female      Patient is here for preop clearance for left cataract being removed by Dr Gloria Christopher on 8/26/2019 Patint is overall doing well Patient has seen cardiologist in the last one month She had labs done that were all stable Patient is tolerating all meds with out any issues Patient has no complaints other then the decrease vision of the left eye patient is going to the gym regularly Patient has not had any anesthesia issues with past surgeries       The following portions of the patient's history were reviewed and updated as appropriate: allergies, current medications, past family history, past medical history, past social history, past surgical history and problem list     Review of Systems   Constitutional: Negative for fatigue, fever and unexpected weight change  HENT: Negative for congestion, sinus pain and trouble swallowing  Eyes: Positive for visual disturbance  Negative for discharge  Decrease vision of hte left eye   Respiratory: Negative for cough, chest tightness, shortness of breath and wheezing  Cardiovascular: Negative for chest pain, palpitations and leg swelling  Gastrointestinal: Negative for abdominal pain, blood in stool, constipation, diarrhea, nausea and vomiting  Genitourinary: Negative for difficulty urinating, dysuria, frequency and hematuria  Musculoskeletal: Negative for arthralgias, gait problem and joint swelling  Skin: Negative for rash and wound  Allergic/Immunologic: Negative for environmental allergies and food allergies  Neurological: Negative for dizziness, syncope, weakness, numbness and headaches  Hematological: Negative for adenopathy  Does not bruise/bleed easily  Psychiatric/Behavioral: Negative for confusion, decreased concentration and sleep disturbance  The patient is not nervous/anxious  Objective:      /72   Pulse 72   Temp (!) 97 °F (36 1 °C)   Resp (!) 10   Ht 5' 5" (1 651 m)   Wt 75 3 kg (166 lb)   BMI 27 62 kg/m²          Physical Exam   Constitutional: She is oriented to person, place, and time  She appears well-developed and well-nourished  HENT:   Head: Normocephalic and atraumatic  Right Ear: Hearing, tympanic membrane and external ear normal    Left Ear: Hearing, tympanic membrane and external ear normal    Eyes: Pupils are equal, round, and reactive to light  Conjunctivae and EOM are normal    Neck: Neck supple  No thyromegaly present  Cardiovascular: Normal rate and normal heart sounds  Irregularly irregular   Pulmonary/Chest: Effort normal and breath sounds normal  She has no wheezes  She has no rales  Abdominal: Soft  Bowel sounds are normal  She exhibits no distension  There is no tenderness     Musculoskeletal: She exhibits no edema or tenderness  Lymphadenopathy:     She has no cervical adenopathy  Neurological: She is alert and oriented to person, place, and time  No cranial nerve deficit  Coordination normal    Skin: Skin is warm and dry  No rash noted  Psychiatric: She has a normal mood and affect  Her behavior is normal  Judgment and thought content normal    Nursing note and vitals reviewed

## 2019-09-06 DIAGNOSIS — E89.0 POSTOPERATIVE HYPOTHYROIDISM: ICD-10-CM

## 2019-09-06 RX ORDER — LEVOTHYROXINE SODIUM 100 MCG
100 TABLET ORAL DAILY
Qty: 90 TABLET | Refills: 0 | Status: SHIPPED | OUTPATIENT
Start: 2019-09-06 | End: 2019-11-24 | Stop reason: SDUPTHER

## 2019-09-08 DIAGNOSIS — E78.2 MIXED HYPERLIPIDEMIA: ICD-10-CM

## 2019-09-09 RX ORDER — ATORVASTATIN CALCIUM 80 MG/1
TABLET, FILM COATED ORAL
Qty: 90 TABLET | Refills: 1 | Status: SHIPPED | OUTPATIENT
Start: 2019-09-09 | End: 2020-03-05 | Stop reason: SDUPTHER

## 2019-09-13 ENCOUNTER — TELEPHONE (OUTPATIENT)
Dept: FAMILY MEDICINE CLINIC | Facility: CLINIC | Age: 79
End: 2019-09-13

## 2019-10-17 ENCOUNTER — HOSPITAL ENCOUNTER (OUTPATIENT)
Dept: MAMMOGRAPHY | Facility: CLINIC | Age: 79
Discharge: HOME | End: 2019-10-17
Payer: COMMERCIAL

## 2019-10-17 VITALS — WEIGHT: 166 LBS | HEIGHT: 65 IN | BODY MASS INDEX: 27.66 KG/M2

## 2019-10-17 DIAGNOSIS — Z09 FOLLOW-UP EXAM, 3-6 MONTHS SINCE PREVIOUS EXAM: ICD-10-CM

## 2019-10-17 PROCEDURE — 77065 DX MAMMO INCL CAD UNI: CPT

## 2019-10-17 NOTE — PROGRESS NOTES
Met with patient and Dr Yeni De La Vega regarding recommendation for;      __x___ RIGHT ______LEFT      _____Ultrasound guided  ___x___Stereotactic  Breast biopsy  __x___Verbalized understanding        Blood thinners:  __x___yes _____no  (Eliquis 5 mg)    Date stopped: ____n/a_______    Biopsy teaching sheet given:  ___x____yes ______no

## 2019-10-18 DIAGNOSIS — R92.8 ABNORMAL FINDING ON RADIOLOGICAL EXAMINATION OF BREAST: Primary | ICD-10-CM

## 2019-10-24 ENCOUNTER — HOSPITAL ENCOUNTER (OUTPATIENT)
Dept: MAMMOGRAPHY | Facility: CLINIC | Age: 79
Discharge: HOME | End: 2019-10-24
Payer: COMMERCIAL

## 2019-10-24 VITALS — SYSTOLIC BLOOD PRESSURE: 130 MMHG | DIASTOLIC BLOOD PRESSURE: 76 MMHG | HEART RATE: 80 BPM

## 2019-10-24 DIAGNOSIS — R92.8 ABNORMAL MAMMOGRAM: ICD-10-CM

## 2019-10-24 PROCEDURE — 88305 TISSUE EXAM BY PATHOLOGIST: CPT | Performed by: PATHOLOGY

## 2019-10-24 PROCEDURE — 19081 BX BREAST 1ST LESION STRTCTC: CPT

## 2019-10-24 RX ORDER — LIDOCAINE HYDROCHLORIDE AND EPINEPHRINE BITARTRATE 20; .01 MG/ML; MG/ML
10 INJECTION, SOLUTION SUBCUTANEOUS ONCE
Status: COMPLETED | OUTPATIENT
Start: 2019-10-24 | End: 2019-10-24

## 2019-10-24 RX ORDER — LIDOCAINE HYDROCHLORIDE 10 MG/ML
4 INJECTION, SOLUTION INFILTRATION; PERINEURAL ONCE
Status: COMPLETED | OUTPATIENT
Start: 2019-10-24 | End: 2019-10-24

## 2019-10-24 RX ADMIN — LIDOCAINE HYDROCHLORIDE 4 ML: 10 INJECTION, SOLUTION INFILTRATION; PERINEURAL at 09:03

## 2019-10-24 RX ADMIN — LIDOCAINE HYDROCHLORIDE AND EPINEPHRINE 10 ML: 20; 10 INJECTION, SOLUTION INFILTRATION; PERINEURAL at 09:03

## 2019-10-24 NOTE — DISCHARGE INSTR - OTHER ORDERS
POST LARGE CORE BREAST BIOPSY PATIENT INFORMATION      1  Place an ice pack inside your bra over the top of the dressing every hour for 20 minutes (20 minutes on, 60 minutes off)  Do this until bedtime  2  Do not shower or bathe until the following morning  3  You may bathe your breast carefully with the steri-strips in place  Be careful    Not to loosen them  The steri-strips will fall off in 3-5 days  4  You may have mild discomfort, and you may have some bruising where the   Needle entered the skin  This should clear within 5-7 days  5  If you need medicine for discomfort, take acetaminophen products such as   Tylenol  You may also take Advil or Motrin products  6  Do not participate in strenuous activities such as-tennis, aerobics, skiing,  Weight lifting, etc  for 24 hours  Refrain from swimming/soaking for 72 hours  7  Wearing a bra for sleeping may be more comfortable for the first 24-48 hours  8  Watch for continued bleeding, pain or fever over 101; please call with any questions or concerns  [facility] call:  Vivi Lino RN at [phone]  April Beckman RN at [phone]                    *After 4 PM call the Interventional Radiology Department                    [phone] and ask to speak with the nurse on call  For procedures done at the [address] [place] [address] call:         Remy Villasenor RN at   *After 4 PM call the Interventional Radiology Department   [phone] and ask to speak with the nurse on call  For procedures done at [address] [place] [address] call: The Radiology Nurse at [phone]  *After 4 PM call your physician, or go to the Emergency Department  9          The final results of your biopsy are usually available within one week

## 2019-10-24 NOTE — PROGRESS NOTES
Procedure type:    _____ultrasound guided ___x__stereotactic    Breast:    _____Left __x___Right    Location:     Needle: 8g Mammotome Revolve    # of passes: 4 total (3 cores with calcs/specimen A & 1 core without calcs/specimen B)    Clip: Mammomark-triple twist    Performed by: Dr Raina Camacho held for 5 minutes by: Cinthia Enamorado    Steri Strips:    __x___yes _____no    Band aid:    __x___yes_____no    Tape and guaze:    __x___yes _____no    Tolerated procedure:    __x___yes _____no

## 2019-10-24 NOTE — PROGRESS NOTES
Patient arrived via:    __x___ambulatory    _____wheelchair    _____stretcher      Domestic violence screen    __x____negative______positive    Breast Implants:    _______yes __x______no

## 2019-10-25 NOTE — PROGRESS NOTES
Post procedure call completed on 10/25/19 @ 1112    Bleeding: _____yes __x___no    Pain: _____yes ___x___no    Redness/Swelling: ______yes ___x___no    Band aid removed: _x____yes _____no    Steri-Strips intact: __x___yes _____no    Pt reports "just mildly tender"

## 2019-10-29 ENCOUNTER — TELEPHONE (OUTPATIENT)
Dept: OTHER | Facility: OTHER | Age: 79
End: 2019-10-29

## 2019-10-30 ENCOUNTER — TELEPHONE (OUTPATIENT)
Dept: SURGICAL ONCOLOGY | Facility: CLINIC | Age: 79
End: 2019-10-30

## 2019-10-30 ENCOUNTER — TELEPHONE (OUTPATIENT)
Dept: MAMMOGRAPHY | Facility: CLINIC | Age: 79
End: 2019-10-30

## 2019-10-30 NOTE — TELEPHONE ENCOUNTER
New Patient Encounter    New Patient Intake Form   Patient Details: ilab  1940  [id number]    Background Information:  [address] starts by opening a telephone encounter and gathering the following information   Who is calling to schedule? If not self, relationship to patient? 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 10/2019   Is patient aware of diagnosis? Yes   Reason for visit? NP DX   Have you had any testing done? If so: when, where? Yes   Are records in [employer]? yes   Was the patient told to bring a disk? no   Scheduling Information:   Preferred [place]:  [place]     Requesting Specific Provider? ROSLYN   Are there any dates/time the patient cannot be seen? NO   Counseling Pre-Screen:  If the patient answers YES to any of the below questions, please route to the appropriate location specific counselor    Have you felt anxious or worried about cancer and the treatment you are receiving? Did Not Speak to Patient   Has your diagnosis caused physical, emotional, or financial hardship for you? Did Not Speak to Patient   Note: Do not ask the patient about transportation issues/needs  Please notate if the patient brings it up and the counselor will schedule accordingly  Miscellaneous: NA    After completing the above information, please route to Financial Counselor and the appropriate Nurse Navigator for review

## 2019-10-30 NOTE — PROGRESS NOTES
Patient Past Medical History: AFIB; Hypothyroid; HTN; High Cholesterol; GERD; Hx of benign right breast biopsy 2018; Left TKR 2009; Hx of thyroid cancer w/thyroidectomy 2007; Hx of colon cancer w/resection 1997; Hysterectomy          Allergies:  Levaquin - Skin reaction/blotches;   Tequin - Skin reaction        Past Breast History:     Previous Biopsy:   Yes__x____ No________      Breast: Right_x___ Left______       Malignant______ Benign___x____      Treatments if applicable        Present Breast History:     Right____x______    Left_____________     Density_________    Calcifications_____x_______   Palpable______________      Patient notified of results on:  10/30/19    Date of Appointment with Surgeon  Mary Haynes on 11/20/19 @ 10 am

## 2019-11-01 ENCOUNTER — TELEPHONE (OUTPATIENT)
Dept: GYNECOLOGY | Facility: CLINIC | Age: 79
End: 2019-11-01

## 2019-11-01 NOTE — TELEPHONE ENCOUNTER
----- Message from Ashley Mendoza MD sent at 10/30/2019  2:35 PM EDT -----  Patient should f/u with a breast surgeon because of a "precancerous" finding on biopsy

## 2019-11-01 NOTE — TELEPHONE ENCOUNTER
----- Message from Greg Mcghee MD sent at 10/30/2019  2:35 PM EDT -----  Patient should f/u with a breast surgeon because of a "precancerous" finding on biopsy      Pt has been notified that she needs to see a breast surgeon her appt has already been scheduled with maranda kan

## 2019-11-20 ENCOUNTER — CONSULT (OUTPATIENT)
Dept: SURGICAL ONCOLOGY | Facility: CLINIC | Age: 79
End: 2019-11-20
Payer: COMMERCIAL

## 2019-11-20 VITALS
WEIGHT: 167 LBS | HEIGHT: 65 IN | HEART RATE: 75 BPM | SYSTOLIC BLOOD PRESSURE: 110 MMHG | RESPIRATION RATE: 16 BRPM | DIASTOLIC BLOOD PRESSURE: 80 MMHG | BODY MASS INDEX: 27.82 KG/M2 | TEMPERATURE: 97.3 F

## 2019-11-20 DIAGNOSIS — N60.91 ATYPICAL DUCTAL HYPERPLASIA OF RIGHT BREAST: Primary | ICD-10-CM

## 2019-11-20 DIAGNOSIS — R92.2 DENSE BREAST TISSUE: ICD-10-CM

## 2019-11-20 PROBLEM — R92.30 DENSE BREAST TISSUE: Status: ACTIVE | Noted: 2019-11-20

## 2019-11-20 PROCEDURE — 99204 OFFICE O/P NEW MOD 45 MIN: CPT | Performed by: SURGERY

## 2019-11-20 RX ORDER — CEFAZOLIN SODIUM 1 G/50ML
1000 SOLUTION INTRAVENOUS ONCE
Status: CANCELLED | OUTPATIENT
Start: 2020-05-22 | End: 2019-11-20

## 2019-11-20 RX ORDER — TRAMADOL HYDROCHLORIDE 50 MG/1
50 TABLET ORAL EVERY 8 HOURS PRN
Qty: 9 TABLET | Refills: 0 | Status: SHIPPED | OUTPATIENT
Start: 2019-11-20 | End: 2020-06-16 | Stop reason: ALTCHOICE

## 2019-11-20 NOTE — PROGRESS NOTES
Breast Consultation-Surgical Oncology     [address] [facility] [address]  [phone] [id number]    Name:  Franchesca Hayward  YOB: 1940  MRN:  [id number]    Assessment/Plan   Diagnoses and all orders for this visit:    Atypical ductal hyperplasia of right breast  -     traMADol (ULTRAM) 50 mg tablet; Take 1 tablet (50 mg total) by mouth every 8 (eight) hours as needed for moderate pain    Other orders  -     ceFAZolin (ANCEF) IVPB (premix) 1,000 mg          HPI: Franchesca Hayward is a 78y o  year old female who presents with right breast atypical ductal hyperplasia  Pt had abnormal breast imaging leading to a right breast biopy which revealed ADH  Pt shares biopsy site is intact  Surgical treatment to date consisted of not applicable  Oncology History:     No history exists  Pertinent reproductive history:  Age at menarche:  13  OB History        2    Para   2    Term   2            AB        Living   2       SAB        TAB        Ectopic        Multiple        Live Births               Obstetric Comments   Menarche : 13  Age at first child birth : 14               Age at first live birth:  14  Age at menopause:  Unknown  Hysterectomy/Oophrectomy:  Yes  Hormone replacement therapy:  Yes  Birth control pills:  Yes    Problem List:   Patient Active Problem List   Diagnosis    Adenoma, adrenocortical    A-fib ([address] )    Benign essential hypertension    Coagulopathy ([address] )    History of colon cancer    Overweight (BMI 25 0-29  9)    Osteoarthritis    Postoperative hypothyroidism    [facility]    Pulmonary nodules    Mixed hyperlipidemia    Medicare annual wellness visit, subsequent    Osteopenia of multiple sites    Malar rash    Age-related nuclear cataract of left eye    Preop examination     Past Medical History:   Diagnosis Date    Allergic reaction     Atrial fibrillation (HCC)     Benign polyp of large intestine     Cancer ([facility] )     Colon, Thyroid    Colon cancer [facility]) 44    36 yo    H/O fracture of ankle 4555    Helicobacter pylori (H  pylori) infection     Indigestion     Nonspecific abnormal results of function study     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[facility] )     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     Thyroid cancer ([facility] ) 2009    Thyroid cancer ([facility] )     Tic disorder, transient, recurrent     Vaginal Pap smear 07/03/2017    WNL    Wears glasses      Past Surgical History:   Procedure Laterality Date    ARTHROSCOPY KNEE      BREAST BIOPSY Right 04/10/2017    US guided  benign    BREAST BIOPSY Right 10/24/2019    ADH    CARDIOVASCULAR STRESS TEST      COLONOSCOPY      done 2010 due 2015 Dr Kuo    ESOPHAGOGASTRODUODENOSCOPY      inflammation aonly    HEMICOLECTOMY Right 1977    HYSTERECTOMY      JOINT REPLACEMENT      L knee    MAMMO STEREOTACTIC BREAST BIOPSY RIGHT (ALL INC) Right 10/24/2019    PARATHYROID GLAND SURGERY      exploration     REPLACEMENT TOTAL KNEE      TOTAL ABDOMINAL HYSTERECTOMY  1983    TOTAL THYROIDECTOMY      US GUIDED BREAST BIOPSY RIGHT COMPLETE Right 4/10/2017     Family History   Problem Relation Age of Onset    Esophageal cancer Mother         [de-identified]    Hypertension Mother     Stroke Family         TIA    Breast cancer Paternal Aunt 48    No Known Problems Father     COPD Sister     COPD Sister     No Known Problems Maternal Grandmother     No Known Problems Maternal Grandfather     No Known Problems Paternal Grandmother     No Known Problems Paternal Grandfather     No Known Problems Maternal Aunt     No Known Problems Maternal Aunt     No Known Problems Maternal Aunt     No Known Problems Maternal Aunt     No Known Problems Maternal Aunt      Social History     Socioeconomic History    Marital status: Single     Spouse name: Not on file    Number of children: Not on file    Years of education: Not on file    Highest education level: Not on file Occupational History    Not on file   Social Needs    Financial resource strain: Not on file    Food insecurity:     Worry: Not on file     Inability: Not on file    Transportation needs:     Medical: Not on file     Non-medical: Not on file   Tobacco Use    Smoking status: Former Smoker    Smokeless tobacco: Never Used   Substance and Sexual Activity    Alcohol use: Yes     Frequency: Monthly or less     Comment: social    Drug use: No    Sexual activity: Not Currently   Lifestyle    Physical activity:     Days per week: Not on file     Minutes per session: Not on file    Stress: Not on file   Relationships    Social connections:     Talks on phone: Not on file     Gets together: Not on file     Attends [employer] service: Not on file     Active member of club or organization: Not on file     Attends meetings of clubs or organizations: Not on file     Relationship status: Not on file    Intimate partner violence:     Fear of current or ex partner: Not on file     Emotionally abused: Not on file     Physically abused: Not on file     Forced sexual activity: Not on file   Other Topics Concern    Not on file   Social History Narrative    Caffeine use- 1-3, 8oz coffees per day    Denied history of housing without smoke detectors    Always uses a seatbelt     Current Outpatient Medications   Medication Sig Dispense Refill    apixaban (ELIQUIS) 5 mg Take 5 mg by mouth 2 (two) times a day   atorvastatin (LIPITOR) 80 mg tablet TAKE 1 TABLET BY MOUTH EVERY DAY 90 tablet 1    cyanocobalamin (VITAMIN B-12) 100 mcg tablet Take 1,000 mcg by mouth daily      diltiazem (CARDIZEM CD) 120 mg 24 hr capsule Take 120 mg by mouth      econazole nitrate 1 % cream APPLY TO RASH ON FEET TWICE DAILY AS NEEDED  2    ibuprofen (MOTRIN) 600 mg tablet Take 1 tablet (600 mg total) by mouth every 6 (six) hours as needed for mild pain   30 tablet 0    levothyroxine (SYNTHROID) 100 mcg tablet Take 1 tablet (100 mcg total) by mouth daily 90 tablet 1    Omega-3 Fatty Acids (FISH OIL PO) Take 2 g by mouth      omeprazole (PriLOSEC) 20 mg delayed release capsule TAKE 1 CAPSULE DAILY 90 capsule 1    SYNTHROID 100 MCG tablet TAKE 1 TABLET (100 MCG TOTAL) BY MOUTH DAILY 90 tablet 0    triamcinolone (KENALOG) 0 1 % cream Apply to affected area bid for up to 14 days as needed for rash 28 g 0     No current facility-administered medications for this visit  Allergies   Allergen Reactions    Bee Venom      Tongue swelling    Levaquin [Levofloxacin In D5w]     Tequin [Gatifloxacin] Rash         The following portions of the patient's history were reviewed and updated as appropriate: allergies, current medications, past family history, past medical history, past social history, past surgical history and problem list     Review of Systems:  Review of Systems   Constitutional: Negative  Negative for appetite change and fever  Eyes: Negative  Respiratory: Negative for shortness of breath  Cardiovascular: Negative  Gastrointestinal: Negative  Endocrine: Negative  Genitourinary: Positive for frequency  Musculoskeletal: Negative  Negative for arthralgias and myalgias  Skin: Negative  Allergic/Immunologic: Negative  Neurological: Negative  Hematological: Negative  Negative for adenopathy  Does not bruise/bleed easily  Psychiatric/Behavioral: Negative  Physical Exam:  Physical Exam   Constitutional: She is oriented to person, place, and time  She appears well-developed and well-nourished  HENT:   Head: Normocephalic and atraumatic  Cardiovascular: Normal rate  Pulmonary/Chest: Breath sounds normal  Right breast exhibits skin change (Well healing biopsy site 1200 hours axis of the right breast)  Right breast exhibits no inverted nipple, no mass, no nipple discharge and no tenderness  Left breast exhibits no inverted nipple, no mass, no nipple discharge, no skin change and no tenderness  Abdominal: Soft  Lymphadenopathy:        Right axillary: No pectoral and no lateral adenopathy present  Left axillary: No pectoral and no lateral adenopathy present  Right: No supraclavicular adenopathy present  Left: No supraclavicular adenopathy present  Neurological: She is alert and oriented to person, place, and time  Psychiatric: She has a normal mood and affect  Laboratory:  10/24/2019 stereotactic biopsy of the right breast    Pathology revealed: atypical ductal hyperplasia      Imagin19  3D bilateral screening mammogram B0 (3) right breast calcifications  19  Right dx mammogram  B3 (3) likely benign calcifications for which a six month follow-up was recommended  10/17/19  Right dx mammogram  B4 (3) indeterminate calcifications in the right breast  10/24/19  Right breast biopsy        Discussion/Summary:  57-year-old female here today secondary to atypical duct hyperplasia of the right breast   She had an abnormal screening mammogram last April  A six month follow-up was recommended secondary to calcifications  These were sampled in revealed atypical duct hyperplasia  The calcifications and 3 5 cm in the breast   I am recommending bracketed localization to remove all of the calcifications  I reviewed these images patient  She does understand and agrees to proceed in this fashion  Consent was signed today in the office  She will be scheduled for surgery in the near term

## 2019-11-21 ENCOUNTER — TELEPHONE (OUTPATIENT)
Dept: SURGICAL ONCOLOGY | Facility: HOSPITAL | Age: 79
End: 2019-11-21

## 2019-11-21 ENCOUNTER — HOSPITAL ENCOUNTER (OUTPATIENT)
Facility: HOSPITAL | Age: 79
Setting detail: OUTPATIENT SURGERY
End: 2019-11-21
Attending: SURGERY | Admitting: SURGERY
Payer: COMMERCIAL

## 2019-11-21 NOTE — TELEPHONE ENCOUNTER
CALLED AND SPOKE WITH PATIENT IN REGARDS TO UPCOMING SURGERY DATE 12/13/19  PATIENT AWARE OF LOCATION AND HOSPITAL WILL CALL DAY BEFORE WITH TIME TO ARRIVE  PATIENT GIVEN POST OP APPOINTMENT DATE/TIME WITH DR Gamal Lyman IN [place] OFFICE ON 12/30/19 2:45PM  SHE IS AWARE SHE WILL BE GETTING A CALL FROM [facility] PAT DEPT  TO SCHEDULE HER PAT APPOINTMENT  SHE STATED SHE UNDERSTOOD AND IS AWARE SHE MAY CALL IF SHE HAS ANY QUESTIONS

## 2019-11-24 DIAGNOSIS — E89.0 POSTOPERATIVE HYPOTHYROIDISM: ICD-10-CM

## 2019-11-25 RX ORDER — LEVOTHYROXINE SODIUM 0.1 MG/1
TABLET ORAL
Qty: 90 TABLET | Refills: 0 | Status: SHIPPED | OUTPATIENT
Start: 2019-11-25 | End: 2020-01-15

## 2019-11-26 ENCOUNTER — TELEPHONE (OUTPATIENT)
Dept: SURGICAL ONCOLOGY | Facility: CLINIC | Age: 79
End: 2019-11-26

## 2019-11-26 NOTE — TELEPHONE ENCOUNTER
Received clearance for Ronnell from Dr Soledad Garibay  Pt is to stop 48 hours prior to surgery  Attempted calling pt , no answer, left a detailed message for pt and requested she call office to confirm she received information

## 2019-12-02 ENCOUNTER — HOSPITAL ENCOUNTER (OUTPATIENT)
Dept: RADIOLOGY | Facility: HOSPITAL | Age: 79
Discharge: HOME | End: 2019-12-02
Attending: SURGERY
Payer: COMMERCIAL

## 2019-12-02 ENCOUNTER — APPOINTMENT (OUTPATIENT)
Dept: PREADMISSION TESTING | Facility: HOSPITAL | Age: 79
End: 2019-12-02
Payer: COMMERCIAL

## 2019-12-02 ENCOUNTER — APPOINTMENT (OUTPATIENT)
Dept: LAB | Facility: HOSPITAL | Age: 79
End: 2019-12-02
Attending: SURGERY
Payer: COMMERCIAL

## 2019-12-02 ENCOUNTER — HOSPITAL ENCOUNTER (OUTPATIENT)
Dept: NON INVASIVE DIAGNOSTICS | Facility: HOSPITAL | Age: 79
Discharge: HOME | End: 2019-12-02
Attending: SURGERY
Payer: COMMERCIAL

## 2019-12-02 VITALS
HEIGHT: 65 IN | SYSTOLIC BLOOD PRESSURE: 138 MMHG | TEMPERATURE: 96.5 F | RESPIRATION RATE: 16 BRPM | HEART RATE: 121 BPM | WEIGHT: 167 LBS | OXYGEN SATURATION: 96 % | BODY MASS INDEX: 27.82 KG/M2 | DIASTOLIC BLOOD PRESSURE: 80 MMHG

## 2019-12-02 DIAGNOSIS — N60.91 ATYPICAL DUCTAL HYPERPLASIA OF RIGHT BREAST: ICD-10-CM

## 2019-12-02 DIAGNOSIS — R91.8 LUNG MASS: Primary | ICD-10-CM

## 2019-12-02 LAB
ALBUMIN SERPL BCP-MCNC: 3.6 G/DL (ref 3.5–5)
ALP SERPL-CCNC: 117 U/L (ref 46–116)
ALT SERPL W P-5'-P-CCNC: 30 U/L (ref 12–78)
ANION GAP SERPL CALCULATED.3IONS-SCNC: 7 MMOL/L (ref 4–13)
APTT PPP: 34 SECONDS (ref 23–37)
AST SERPL W P-5'-P-CCNC: 33 U/L (ref 5–45)
ATRIAL RATE: 249 BPM
BACTERIA UR QL AUTO: ABNORMAL /HPF
BASOPHILS # BLD AUTO: 0.03 THOUSANDS/ΜL (ref 0–0.1)
BASOPHILS NFR BLD AUTO: 1 % (ref 0–1)
BILIRUB SERPL-MCNC: 0.62 MG/DL (ref 0.2–1)
BILIRUB UR QL STRIP: NEGATIVE
BUN SERPL-MCNC: 18 MG/DL (ref 5–25)
CALCIUM SERPL-MCNC: 9.4 MG/DL (ref 8.3–10.1)
CHLORIDE SERPL-SCNC: 104 MMOL/L (ref 100–108)
CLARITY UR: CLEAR
CO2 SERPL-SCNC: 30 MMOL/L (ref 21–32)
COLOR UR: ABNORMAL
CREAT SERPL-MCNC: 1.19 MG/DL (ref 0.6–1.3)
EOSINOPHIL # BLD AUTO: 0.02 THOUSAND/ΜL (ref 0–0.61)
EOSINOPHIL NFR BLD AUTO: 0 % (ref 0–6)
ERYTHROCYTE [DISTWIDTH] IN BLOOD BY AUTOMATED COUNT: 13.7 % (ref 11.6–15.1)
GFR SERPL CREATININE-BSD FRML MDRD: 49 ML/MIN/1.73SQ M
GLUCOSE P FAST SERPL-MCNC: 96 MG/DL (ref 65–99)
GLUCOSE UR STRIP-MCNC: NEGATIVE MG/DL
HCT VFR BLD AUTO: 43.8 % (ref 34.8–46.1)
HGB BLD-MCNC: 14.3 G/DL (ref 11.5–15.4)
HGB UR QL STRIP.AUTO: ABNORMAL
IMM GRANULOCYTES # BLD AUTO: 0.02 THOUSAND/UL (ref 0–0.2)
IMM GRANULOCYTES NFR BLD AUTO: 0 % (ref 0–2)
INR PPP: 1.12 (ref 0.84–1.19)
KETONES UR STRIP-MCNC: NEGATIVE MG/DL
LEUKOCYTE ESTERASE UR QL STRIP: NEGATIVE
LYMPHOCYTES # BLD AUTO: 1.79 THOUSANDS/ΜL (ref 0.6–4.47)
LYMPHOCYTES NFR BLD AUTO: 35 % (ref 14–44)
MCH RBC QN AUTO: 33.3 PG (ref 26.8–34.3)
MCHC RBC AUTO-ENTMCNC: 32.6 G/DL (ref 31.4–37.4)
MCV RBC AUTO: 102 FL (ref 82–98)
MONOCYTES # BLD AUTO: 0.43 THOUSAND/ΜL (ref 0.17–1.22)
MONOCYTES NFR BLD AUTO: 8 % (ref 4–12)
NEUTROPHILS # BLD AUTO: 2.89 THOUSANDS/ΜL (ref 1.85–7.62)
NEUTS SEG NFR BLD AUTO: 56 % (ref 43–75)
NITRITE UR QL STRIP: NEGATIVE
NON-SQ EPI CELLS URNS QL MICRO: ABNORMAL /HPF
NRBC BLD AUTO-RTO: 0 /100 WBCS
P AXIS: 231 DEGREES
PH UR STRIP.AUTO: 5.5 [PH]
PLATELET # BLD AUTO: 234 THOUSANDS/UL (ref 149–390)
PMV BLD AUTO: 9.2 FL (ref 8.9–12.7)
POTASSIUM SERPL-SCNC: 3.9 MMOL/L (ref 3.5–5.3)
PROT SERPL-MCNC: 8.2 G/DL (ref 6.4–8.2)
PROT UR STRIP-MCNC: NEGATIVE MG/DL
PROTHROMBIN TIME: 14.6 SECONDS (ref 11.6–14.5)
QRS AXIS: -22 DEGREES
QRSD INTERVAL: 116 MS
QT INTERVAL: 300 MS
QTC INTERVAL: 367 MS
RBC # BLD AUTO: 4.3 MILLION/UL (ref 3.81–5.12)
RBC #/AREA URNS AUTO: ABNORMAL /HPF
SODIUM SERPL-SCNC: 141 MMOL/L (ref 136–145)
SP GR UR STRIP.AUTO: 1.02 (ref 1–1.03)
T WAVE AXIS: -59 DEGREES
UROBILINOGEN UR QL STRIP.AUTO: 0.2 E.U./DL
VENTRICULAR RATE: 90 BPM
WBC # BLD AUTO: 5.18 THOUSAND/UL (ref 4.31–10.16)
WBC #/AREA URNS AUTO: ABNORMAL /HPF

## 2019-12-02 PROCEDURE — 93005 ELECTROCARDIOGRAM TRACING: CPT

## 2019-12-02 PROCEDURE — 80053 COMPREHEN METABOLIC PANEL: CPT

## 2019-12-02 PROCEDURE — 36415 COLL VENOUS BLD VENIPUNCTURE: CPT

## 2019-12-02 PROCEDURE — 93010 ELECTROCARDIOGRAM REPORT: CPT | Performed by: INTERNAL MEDICINE

## 2019-12-02 PROCEDURE — 85610 PROTHROMBIN TIME: CPT

## 2019-12-02 PROCEDURE — 85025 COMPLETE CBC W/AUTO DIFF WBC: CPT

## 2019-12-02 PROCEDURE — 71046 X-RAY EXAM CHEST 2 VIEWS: CPT

## 2019-12-02 PROCEDURE — 85730 THROMBOPLASTIN TIME PARTIAL: CPT

## 2019-12-02 PROCEDURE — 81001 URINALYSIS AUTO W/SCOPE: CPT | Performed by: SURGERY

## 2019-12-02 RX ORDER — SODIUM CHLORIDE 9 MG/ML
125 INJECTION, SOLUTION INTRAVENOUS CONTINUOUS
Status: CANCELLED | OUTPATIENT
Start: 2019-12-02

## 2019-12-02 NOTE — PRE-PROCEDURE INSTRUCTIONS
Pre-Surgery Instructions:   Medication Instructions    apixaban (ELIQUIS) 5 mg Patient was instructed by Physician and understands   atorvastatin (LIPITOR) 80 mg tablet Patient was instructed by Physician and understands   diltiazem (CARDIZEM CD) 120 mg 24 hr capsule Patient was instructed by Physician and understands   econazole nitrate 1 % cream Patient was instructed by Physician and understands   ibuprofen (MOTRIN) 600 mg tablet Patient was instructed by Physician and understands   levothyroxine 100 mcg tablet Patient was instructed by Physician and understands   omeprazole (PriLOSEC) 20 mg delayed release capsule Patient was instructed by Physician and understands  Please call cell [phone]  Patient seen by Dr Ashley Sparrow  instructed to take*levothyroxine,omeprazole and diltiazem*with a sip of water the morning of surgery  Patient given/ instructed on use of chlorhexidine soap per hospital protocol    Patient instructed to stop all ASA, NSAIDS, vitamins and herbal supplements one week prior to surgery or per Dr Sheila York   Pt reports instructed to hold eliquis 48hrs before surgery

## 2019-12-09 ENCOUNTER — HOSPITAL ENCOUNTER (OUTPATIENT)
Dept: CT IMAGING | Facility: HOSPITAL | Age: 79
Discharge: HOME | End: 2019-12-09
Payer: COMMERCIAL

## 2019-12-09 DIAGNOSIS — R91.8 LUNG MASS: ICD-10-CM

## 2019-12-09 PROCEDURE — 71250 CT THORAX DX C-: CPT

## 2019-12-12 DIAGNOSIS — R91.8 LUNG MASS: Primary | ICD-10-CM

## 2019-12-12 RX ORDER — MEPERIDINE HYDROCHLORIDE 50 MG/ML
12.5 INJECTION INTRAMUSCULAR; INTRAVENOUS; SUBCUTANEOUS ONCE AS NEEDED
Status: CANCELLED | OUTPATIENT
Start: 2019-12-12

## 2019-12-12 RX ORDER — ONDANSETRON 2 MG/ML
4 INJECTION INTRAMUSCULAR; INTRAVENOUS ONCE AS NEEDED
Status: CANCELLED | OUTPATIENT
Start: 2019-12-12

## 2019-12-12 RX ORDER — FENTANYL CITRATE/PF 50 MCG/ML
50 SYRINGE (ML) INJECTION
Status: CANCELLED | OUTPATIENT
Start: 2019-12-12

## 2019-12-12 RX ORDER — HYDROMORPHONE HCL/PF 1 MG/ML
0.5 SYRINGE (ML) INJECTION
Status: CANCELLED | OUTPATIENT
Start: 2019-12-12

## 2019-12-18 ENCOUNTER — OFFICE VISIT (OUTPATIENT)
Dept: CARDIAC SURGERY | Facility: CLINIC | Age: 79
End: 2019-12-18
Payer: COMMERCIAL

## 2019-12-18 ENCOUNTER — DOCUMENTATION (OUTPATIENT)
Dept: CARDIAC SURGERY | Facility: CLINIC | Age: 79
End: 2019-12-18

## 2019-12-18 VITALS
TEMPERATURE: 97.1 F | SYSTOLIC BLOOD PRESSURE: 136 MMHG | HEART RATE: 110 BPM | BODY MASS INDEX: 27.66 KG/M2 | DIASTOLIC BLOOD PRESSURE: 63 MMHG | WEIGHT: 166 LBS | HEIGHT: 65 IN | OXYGEN SATURATION: 97 %

## 2019-12-18 DIAGNOSIS — R91.8 LUNG MASS: ICD-10-CM

## 2019-12-18 PROCEDURE — 99205 OFFICE O/P NEW HI 60 MIN: CPT | Performed by: THORACIC SURGERY (CARDIOTHORACIC VASCULAR SURGERY)

## 2019-12-18 NOTE — LETTER
December 18, 2019     Grady Zheng MD  [address] [facility]  [address] [facility],[address]    Patient: Lexis Sanders   YOB: 1940   Date of Visit: 12/18/2019       Dear Dr Xiang Lewis: Thank you for referring Lexis Sanders to me for evaluation  Below are my notes for this consultation  If you have questions, please do not hesitate to call me  I look forward to following your patient along with you  Sincerely,        Catarina Amezcua MD        CC: DO Catarina Jiménez MD  12/18/2019  9:39 AM  Sign at close encounter  Thoracic Consult  Assessment/Plan:   23-year-old female [address] with history of thyroid cancer, colon cancer, appendiceal cancer, right breast mass, atrial fibrillation on Eliquis, hypertension, hyperlipidemia and a distant 20 pack year smoking history who we are seeing for a 2 1 x 2 0 cm right upper lobe lung mass concerning for primary lung cancer  I am highly concerned that this represents a lung cancer given its imaging characteristics  Moreover this is changed significantly over the past 4-5 years  She has a distant minor smoking history but a significant personal history of cancer as well as a family history of esophageal and breast cancer  Based on all the above I think we have to assume this is lung cancer until proven otherwise  I would like to work this up with a PET-CT to look for distant disease and more accurately determine staging  I would also like her to obtain pulmonary function testing  Will see her back in my office on January 8 following these 2 tests  If this is concerning then will discuss biopsy verses direct to surgery as early as Friday January 17th  Diagnoses and all orders for this visit:    Lung mass  -     Ambulatory referral to Thoracic Surgery  -     Complete PFT with post bronchodilator; Future  -     NM PET CT skull base to mid thigh;  Future            Thoracic History     Problem: 2 1 x 2 0 cm spiculated RUL mass    Procedure:     Pathology:      Subjective:    Patient ID: Kavitha Kim is a 78 y o  female  CIARA Pitts is an 55-year-old female with history of thyroid cancer, colon cancer, appendiceal cancer, right breast atypical ductal hyperplasia, atrial fibrillation on Eliquis, hypertension, malar rash but negative for lupus, distant 20 pack year smoking history, and hyperlipidemia who we are seeing in consultation from Dr Corrina Reed for a 2 1 cm spiculated right upper lobe mass concerning for primary lung cancer  The patient had been receiving routine CT and PET-CT imaging for the past few years  She had a CT in 2014 that showed an increasing right upper lobe ground-glass opacity  PET-CT in 2015 showed no significant uptake  She had a biopsy of that ground-glass area that was nondiagnostic at that time  This continued to be occasionally followed  She has since been found to have a right breast mass prompting further imaging  Recent CT of the chest showed an increased size and density to this right upper lobe nodule prompting thoracic surgical consultation  Overall Hong denies any major complaints at this time  She does note some mild shortness of breath with extensive yard work and carrying heavy bricks  She has no problems with stairs  No shortness of breath at rest   She denies any cough or hemoptysis  No fevers or chills  No significant weight changes    Otherwise no complaints at this time    The following portions of the patient's history were reviewed and updated as appropriate: allergies, current medications, past family history, past medical history, past social history, past surgical history and problem list     Past Medical History:   Diagnosis Date    Allergic reaction     Anesthesia     "cheap date"    Arthritis     Atrial fibrillation ([facility] [address]     Benign polyp of large intestine     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    Colon cancer [facility]) 1977    41 yo    Colon polyp     Full dentures     partial lower    GERD (gastroesophageal reflux disease)     H/O fracture of ankle 8107    Helicobacter pylori (H  pylori) infection     History of left knee replacement     History of neck problems     "C3-C7 and had epidural injections years ago"    History of vertigo     one time years ago    Hyperlipidemia     Hypertension     Indigestion     Lung mass     Nonspecific abnormal results of function study     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[facility] [address] [facility]     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[facility]     Prediabetes     Refusal of blood transfusions as patient is Islam     Thyroid cancer ([facility] ) 2009    Tic disorder, transient, recurrent     not recent    Vaginal Pap smear 07/03/2017    WNL    Walks frequently     Walks frequently       Past Surgical History:   Procedure Laterality Date    APPENDECTOMY      ARTHROSCOPY KNEE      BREAST BIOPSY Right 04/10/2017    US guided  benign    BREAST BIOPSY Right 10/24/2019    ADH    CARDIOVASCULAR STRESS TEST      CATARACT EXTRACTION Bilateral     COLON SURGERY      COLONOSCOPY      done 2010 due 2015 Dr Kuo    DILATION AND CURETTAGE OF UTERUS      ESOPHAGOGASTRODUODENOSCOPY      inflammation aonly    HEMICOLECTOMY Right 1977    HYSTERECTOMY      JOINT REPLACEMENT      L knee    MAMMO STEREOTACTIC BREAST BIOPSY RIGHT (ALL INC) Right 10/24/2019    PARATHYROID GLAND SURGERY      exploration     REPLACEMENT TOTAL KNEE      TOTAL ABDOMINAL HYSTERECTOMY  1983    TOTAL THYROIDECTOMY      US GUIDED BREAST BIOPSY RIGHT COMPLETE Right 4/10/2017      Family History   Problem Relation Age of Onset    Esophageal cancer Mother         [de-identified]    Hypertension Mother     Stroke Family         TIA    Breast cancer Paternal Aunt 80    No Known Problems Father     COPD Sister     COPD Sister     No Known Problems Maternal Grandmother     No Known Problems Maternal Grandfather     No Known Problems Paternal Grandmother     No Known Problems Paternal Grandfather     No Known Problems Maternal Aunt     No Known Problems Maternal Aunt     No Known Problems Maternal Aunt     No Known Problems Maternal Aunt     No Known Problems Maternal Aunt       Social History     Socioeconomic History    Marital status: Single     Spouse name: Not on file    Number of children: Not on file    Years of education: Not on file    Highest education level: Not on file   Occupational History    Not on file   Social Needs    Financial resource strain: Not on file    Food insecurity:     Worry: Not on file     Inability: Not on file    Transportation needs:     Medical: Not on file     Non-medical: Not on file   Tobacco Use    Smoking status: Former Smoker     Packs/day: 1 50     Years: 20 00     Pack years: 30 00     Types: Cigarettes     Last attempt to quit: 1967     Years since quittin 9    Smokeless tobacco: Never Used   Substance and Sexual Activity    Alcohol use:  Yes     Alcohol/week: 1 0 standard drinks     Types: 1 Cans of beer per week     Frequency: 4 or more times a week     Drinks per session: 1 or 2     Binge frequency: Never     Comment: 1 can at meal time    Drug use: No    Sexual activity: Not on file   Lifestyle    Physical activity:     Days per week: Not on file     Minutes per session: Not on file    Stress: Not on file   Relationships    Social connections:     Talks on phone: Not on file     Gets together: Not on file     Attends Taoism service: Not on file     Active member of club or organization: Not on file     Attends meetings of clubs or organizations: Not on file     Relationship status: Not on file    Intimate partner violence:     Fear of current or ex partner: Not on file     Emotionally abused: Not on file     Physically abused: Not on file     Forced sexual activity: Not on file   Other Topics Concern    Not on file   Social History Narrative    Caffeine use- 1-3, 8oz coffees per day    Denied history of housing without smoke detectors    Always uses a seatbelt      Review of Systems   Constitutional: Negative for activity change, appetite change, chills, diaphoresis, fatigue, fever and unexpected weight change  HENT: Negative  Eyes: Negative  Respiratory: Negative for apnea, cough, chest tightness, shortness of breath, wheezing and stridor  Cardiovascular: Negative for chest pain, palpitations and leg swelling  Gastrointestinal: Negative for abdominal distention, abdominal pain, blood in stool, constipation, diarrhea, nausea and vomiting  Endocrine: Negative  Genitourinary: Negative  Musculoskeletal: Negative  Skin: Positive for rash  Allergic/Immunologic: Negative  Neurological: Negative  Hematological: Negative  Psychiatric/Behavioral: Negative  Objective:   Physical Exam   Constitutional: She is oriented to person, place, and time  She appears well-developed and well-nourished  No distress  Appears well  Appears younger than stated age in no acute distress  HENT:   Head: Normocephalic and atraumatic  Mouth/Throat: Oropharynx is clear and moist  No oropharyngeal exudate  Malar rash in place  Eyes: Pupils are equal, round, and reactive to light  Conjunctivae and EOM are normal  No scleral icterus  Neck: Normal range of motion  Neck supple  No JVD present  No tracheal deviation present  No thyromegaly present  No appreciable cervical adenopathy  Well-healed transverse cervical scar from thyroidectomy  Cardiovascular: Normal rate, normal heart sounds and intact distal pulses  Exam reveals no gallop and no friction rub  No murmur heard  Underlying AFib  Pulmonary/Chest: Effort normal and breath sounds normal  No respiratory distress  She has no wheezes  She has no rales  She exhibits no tenderness  Normal work of breathing on room air  Abdominal: Soft  Bowel sounds are normal  She exhibits no distension and no mass  There is no tenderness   There is no rebound and no guarding  Musculoskeletal: Normal range of motion  She exhibits no edema, tenderness or deformity  Neurological: She is alert and oriented to person, place, and time  Skin: Skin is warm and dry  No rash noted  She is not diaphoretic  No erythema  No pallor  Psychiatric: She has a normal mood and affect  Her behavior is normal  Judgment and thought content normal    Nursing note and vitals reviewed  /63 (BP Location: Left arm, Patient Position: Sitting, Cuff Size: Large)   Pulse (!) 110   Temp (!) 97 1 °F (36 2 °C)   Ht 5' 4 5" (1 638 m)   Wt 75 3 kg (166 lb)   SpO2 97%   BMI 28 05 kg/m²      Xr Chest Pa & Lateral    Result Date: 12/2/2019  Impression 1 9 x 1 1 cm right upper lobe nodule  Follow-up with a chest CT is recommended to evaluate for lung cancer  I personally discussed this study with Alyx Daley on 12/2/2019 at 1:22 PM  This study was marked in Epic for follow-up  Workstation performed: [id number]      Ct Chest Wo Contrast    Addendum Date: 12/12/2019 Addendum   ADDENDUM: I personally discussed this study with Alyx Daley on 12/12/2019 at 1:52 PM      Result Date: 12/12/2019  Narrative CT CHEST WITHOUT IV CONTRAST INDICATION:   R91 8: Other nonspecific abnormal finding of lung field  Follow-up lung nodule on chest radiograph  History of colon cancer and thyroid cancer  COMPARISON:  Chest radiograph from 12/2/2019  Chest CT from 3/1/2016  TECHNIQUE: CT examination of the chest was performed without intravenous contrast   Axial, sagittal, and coronal 2D reformatted images were created from the source data and submitted for interpretation  Radiation dose length product (DLP) for this visit:  180 mGy-cm   This examination, like all CT scans performed in the [facility], was performed utilizing techniques to minimize radiation dose exposure, including the use of iterative reconstruction and automated exposure control   FINDINGS: LUNGS:  Abnormality on chest radiograph corresponds with a 2 1 x 2 0 cm spiculated nodule in the posterior segment of the right upper lobe with pleural retraction  Mild emphysema  No acute pulmonary disease  No significant filling defects in the trachea and bronchi  PLEURA:  Unremarkable  HEART/GREAT VESSELS:  Unremarkable for patient's age  MEDIASTINUM AND SELENA:  Unremarkable  CHEST WALL AND LOWER NECK:   Resection of the right lobe of the thyroid gland  VISUALIZED STRUCTURES IN THE UPPER ABDOMEN:  Fat-containing right Bochdalek hernia  Hepatic cyst in the right lobe  OSSEOUS STRUCTURES:  Extensive osteopenia in the spine  No change in lucency in the inferior tip of the left scapula  Moderate degenerative disease in the spine  Impression 2 1 x 2 0 cm spiculated right upper lobe nodule compatible with lung cancer  Workstation performed: [id number]     I personally reviewed CT imaging and went over these in the room with the patient  We looked at her previous CTs of the chest back to 2015  4-5 years ago she had a small right upper lobe ground-glass opacity  Her most recent CT of the chest from 12/9/2019 shows a 2 1 x 2 0 cm spiculated right upper lobe mass with pleural retractions  This is grown in size and density over the past 4 years  This is highly concerning for primary lung cancer  No significant mediastinal or hilar adenopathy      Jo Ann Zhang MD  Thoracic Surgeon    (Available by [facility] Text)

## 2019-12-18 NOTE — PROGRESS NOTES
Met with Hong during her visit with Dr Eulalia Terrazas  I explained my role to her  Questions answered  Support provided along with my business card  She understands the need for further testing

## 2019-12-18 NOTE — PROGRESS NOTES
Thoracic Consult  Assessment/Plan:   78-year-old female Jehovah's witness with history of thyroid cancer, colon cancer, appendiceal cancer, right breast mass, atrial fibrillation on Eliquis, hypertension, hyperlipidemia and a distant 20 pack year smoking history who we are seeing for a 2 1 x 2 0 cm right upper lobe lung mass concerning for primary lung cancer  I am highly concerned that this represents a lung cancer given its imaging characteristics  Moreover this is changed significantly over the past 4-5 years  She has a distant minor smoking history but a significant personal history of cancer as well as a family history of esophageal and breast cancer  Based on all the above I think we have to assume this is lung cancer until proven otherwise  I would like to work this up with a PET-CT to look for distant disease and more accurately determine staging  I would also like her to obtain pulmonary function testing  Will see her back in my office on January 8 following these 2 tests  If this is concerning then will discuss biopsy verses direct to surgery as early as Friday January 17th  Diagnoses and all orders for this visit:    Lung mass  -     Ambulatory referral to Thoracic Surgery  -     Complete PFT with post bronchodilator; Future  -     NM PET CT skull base to mid thigh; Future            Thoracic History     Problem: 2 1 x 2 0 cm spiculated RUL mass    Procedure:     Pathology:      Subjective:    Patient ID: Kal Calabrese is a 78 y o  female  CIARA Pitts is an 78-year-old female with history of thyroid cancer, colon cancer, appendiceal cancer, right breast atypical ductal hyperplasia, atrial fibrillation on Eliquis, hypertension, malar rash but negative for lupus, distant 20 pack year smoking history, and hyperlipidemia who we are seeing in consultation from Dr Lola Quinonez for a 2 1 cm spiculated right upper lobe mass concerning for primary lung cancer    The patient had been receiving routine CT and PET-CT imaging for the past few years  She had a CT in 2014 that showed an increasing right upper lobe ground-glass opacity  PET-CT in 2015 showed no significant uptake  She had a biopsy of that ground-glass area that was nondiagnostic at that time  This continued to be occasionally followed  She has since been found to have a right breast mass prompting further imaging  Recent CT of the chest showed an increased size and density to this right upper lobe nodule prompting thoracic surgical consultation  Overall [place] denies any major complaints at this time  She does note some mild shortness of breath with extensive yard work and carrying heavy bricks  She has no problems with stairs  No shortness of breath at rest   She denies any cough or hemoptysis  No fevers or chills  No significant weight changes    Otherwise no complaints at this time    The following portions of the patient's history were reviewed and updated as appropriate: allergies, current medications, past family history, past medical history, past social history, past surgical history and problem list     Past Medical History:   Diagnosis Date    Allergic reaction     Anesthesia     "cheap date"    Arthritis     Atrial fibrillation ([facility] )     Benign polyp of large intestine     Cancer of appendix ([facility] ) 1977    Colon cancer ([facility] ) 44    36 yo    Colon polyp     Full dentures     partial lower    GERD (gastroesophageal reflux disease)     H/O fracture of ankle 7939    Helicobacter pylori (H  pylori) infection     History of left knee replacement     History of neck problems     "C3-C7 and had epidural injections years ago"    History of vertigo     one time years ago    Hyperlipidemia     Hypertension     Indigestion     Lung mass     Nonspecific abnormal results of function study     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[facility] )     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[place] [address] )     Prediabetes     Refusal of blood transfusions as patient is Bahai     Thyroid cancer ([facility] ) 2009    Tic disorder, transient, recurrent     not recent    Vaginal Pap smear 07/03/2017    WNL    Walks frequently     Walks frequently       Past Surgical History:   Procedure Laterality Date    APPENDECTOMY      ARTHROSCOPY KNEE      BREAST BIOPSY Right 04/10/2017    US guided  benign    BREAST BIOPSY Right 10/24/2019    ADH    CARDIOVASCULAR STRESS TEST      CATARACT EXTRACTION Bilateral     COLON SURGERY      COLONOSCOPY      done 2010 due 2015 Dr Kuo    DILATION AND CURETTAGE OF UTERUS      ESOPHAGOGASTRODUODENOSCOPY      inflammation aonly    HEMICOLECTOMY Right 1977    HYSTERECTOMY      JOINT REPLACEMENT      L knee    MAMMO STEREOTACTIC BREAST BIOPSY RIGHT (ALL INC) Right 10/24/2019    PARATHYROID GLAND SURGERY      exploration     REPLACEMENT TOTAL KNEE      TOTAL ABDOMINAL HYSTERECTOMY  1983    TOTAL THYROIDECTOMY      US GUIDED BREAST BIOPSY RIGHT COMPLETE Right 4/10/2017      Family History   Problem Relation Age of Onset    Esophageal cancer Mother         [de-identified]    Hypertension Mother     Stroke Family         TIA    Breast cancer Paternal Aunt 80    No Known Problems Father     COPD Sister     COPD Sister     No Known Problems Maternal Grandmother     No Known Problems Maternal Grandfather     No Known Problems Paternal Grandmother     No Known Problems Paternal Grandfather     No Known Problems Maternal Aunt     No Known Problems Maternal Aunt     No Known Problems Maternal Aunt     No Known Problems Maternal Aunt     No Known Problems Maternal Aunt       Social History     Socioeconomic History    Marital status: Single     Spouse name: Not on file    Number of children: Not on file    Years of education: Not on file    Highest education level: Not on file   Occupational History    Not on file   Social Needs    Financial resource strain: Not on file    Food insecurity:     Worry: Not on file     Inability: Not on file    Transportation needs:     Medical: Not on file     Non-medical: Not on file   Tobacco Use    Smoking status: Former Smoker     Packs/day: 1 50     Years: 20 00     Pack years: 30 00     Types: Cigarettes     Last attempt to quit: 1967     Years since quittin 9    Smokeless tobacco: Never Used   Substance and Sexual Activity    Alcohol use: Yes     Alcohol/week: 1 0 standard drinks     Types: 1 Cans of beer per week     Frequency: 4 or more times a week     Drinks per session: 1 or 2     Binge frequency: Never     Comment: 1 can at meal time    Drug use: No    Sexual activity: Not on file   Lifestyle    Physical activity:     Days per week: Not on file     Minutes per session: Not on file    Stress: Not on file   Relationships    Social connections:     Talks on phone: Not on file     Gets together: Not on file     Attends [employer] service: Not on file     Active member of club or organization: Not on file     Attends meetings of clubs or organizations: Not on file     Relationship status: Not on file    Intimate partner violence:     Fear of current or ex partner: Not on file     Emotionally abused: Not on file     Physically abused: Not on file     Forced sexual activity: Not on file   Other Topics Concern    Not on file   Social History Narrative    Caffeine use- 1-3, 8oz coffees per day    Denied history of housing without smoke detectors    Always uses a seatbelt      Review of Systems   Constitutional: Negative for activity change, appetite change, chills, diaphoresis, fatigue, fever and unexpected weight change  HENT: Negative  Eyes: Negative  Respiratory: Negative for apnea, cough, chest tightness, shortness of breath, wheezing and stridor  Cardiovascular: Negative for chest pain, palpitations and leg swelling  Gastrointestinal: Negative for abdominal distention, abdominal pain, blood in stool, constipation, diarrhea, nausea and vomiting  Endocrine: Negative  Genitourinary: Negative  Musculoskeletal: Negative  Skin: Positive for rash  Allergic/Immunologic: Negative  Neurological: Negative  Hematological: Negative  Psychiatric/Behavioral: Negative  Objective:   Physical Exam   Constitutional: She is oriented to person, place, and time  She appears well-developed and well-nourished  No distress  Appears well  Appears younger than stated age in no acute distress  HENT:   Head: Normocephalic and atraumatic  Mouth/Throat: Oropharynx is clear and moist  No oropharyngeal exudate  Malar rash in place  Eyes: Pupils are equal, round, and reactive to light  Conjunctivae and EOM are normal  No scleral icterus  Neck: Normal range of motion  Neck supple  No JVD present  No tracheal deviation present  No thyromegaly present  No appreciable cervical adenopathy  Well-healed transverse cervical scar from thyroidectomy  Cardiovascular: Normal rate, normal heart sounds and intact distal pulses  Exam reveals no gallop and no friction rub  No murmur heard  Underlying AFib  Pulmonary/Chest: Effort normal and breath sounds normal  No respiratory distress  She has no wheezes  She has no rales  She exhibits no tenderness  Normal work of breathing on room air  Abdominal: Soft  Bowel sounds are normal  She exhibits no distension and no mass  There is no tenderness  There is no rebound and no guarding  Musculoskeletal: Normal range of motion  She exhibits no edema, tenderness or deformity  Neurological: She is alert and oriented to person, place, and time  Skin: Skin is warm and dry  No rash noted  She is not diaphoretic  No erythema  No pallor  Psychiatric: She has a normal mood and affect  Her behavior is normal  Judgment and thought content normal    Nursing note and vitals reviewed    /63 (BP Location: Left arm, Patient Position: Sitting, Cuff Size: Large)   Pulse (!) 110   Temp (!) 97 1 °F (36 2 °C)   Ht 5' 4 5" (1 638 m) Wt 75 3 kg (166 lb)   SpO2 97%   BMI 28 05 kg/m²     Xr Chest Pa & Lateral    Result Date: 12/2/2019  Impression 1 9 x 1 1 cm right upper lobe nodule  Follow-up with a chest CT is recommended to evaluate for lung cancer  I personally discussed this study with Sam Keys on 12/2/2019 at 1:22 PM  This study was marked in Epic for follow-up  Workstation performed: [id number]      Ct Chest Wo Contrast    Addendum Date: 12/12/2019 Addendum   ADDENDUM: I personally discussed this study with Sam Keys on 12/12/2019 at 1:52 PM      Result Date: 12/12/2019  Narrative CT CHEST WITHOUT IV CONTRAST INDICATION:   R91 8: Other nonspecific abnormal finding of lung field  Follow-up lung nodule on chest radiograph  History of colon cancer and thyroid cancer  COMPARISON:  Chest radiograph from 12/2/2019  Chest CT from 3/1/2016  TECHNIQUE: CT examination of the chest was performed without intravenous contrast   Axial, sagittal, and coronal 2D reformatted images were created from the source data and submitted for interpretation  Radiation dose length product (DLP) for this visit:  180 mGy-cm   This examination, like all CT scans performed in the [facility], was performed utilizing techniques to minimize radiation dose exposure, including the use of iterative reconstruction and automated exposure control  FINDINGS: LUNGS:  Abnormality on chest radiograph corresponds with a 2 1 x 2 0 cm spiculated nodule in the posterior segment of the right upper lobe with pleural retraction  Mild emphysema  No acute pulmonary disease  No significant filling defects in the trachea and bronchi  PLEURA:  Unremarkable  HEART/GREAT VESSELS:  Unremarkable for patient's age  MEDIASTINUM AND SELENA:  Unremarkable  CHEST WALL AND LOWER NECK:   Resection of the right lobe of the thyroid gland  VISUALIZED STRUCTURES IN THE UPPER ABDOMEN:  Fat-containing right Bochdalek hernia  Hepatic cyst in the right lobe   OSSEOUS STRUCTURES: Extensive osteopenia in the spine  No change in lucency in the inferior tip of the left scapula  Moderate degenerative disease in the spine  Impression 2 1 x 2 0 cm spiculated right upper lobe nodule compatible with lung cancer  Workstation performed: [id number]     I personally reviewed CT imaging and went over these in the room with the patient  We looked at her previous CTs of the chest back to 2015  4-5 years ago she had a small right upper lobe ground-glass opacity  Her most recent CT of the chest from 12/9/2019 shows a 2 1 x 2 0 cm spiculated right upper lobe mass with pleural retractions  This is grown in size and density over the past 4 years  This is highly concerning for primary lung cancer  No significant mediastinal or hilar adenopathy      Rene Abdi MD  Thoracic Surgeon    (Available by [facility] Text)

## 2019-12-20 ENCOUNTER — HOSPITAL ENCOUNTER (OUTPATIENT)
Dept: RADIOLOGY | Age: 79
Discharge: HOME | End: 2019-12-20
Payer: COMMERCIAL

## 2019-12-20 DIAGNOSIS — D38.1 NEOPLASM OF UNCERTAIN BEHAVIOR OF RIGHT UPPER LOBE OF LUNG: ICD-10-CM

## 2019-12-20 LAB — GLUCOSE SERPL-MCNC: 100 MG/DL (ref 65–140)

## 2019-12-20 PROCEDURE — 82948 REAGENT STRIP/BLOOD GLUCOSE: CPT

## 2019-12-20 PROCEDURE — A9552 F18 FDG: HCPCS

## 2019-12-20 PROCEDURE — 78815 PET IMAGE W/CT SKULL-THIGH: CPT

## 2019-12-26 ENCOUNTER — HOSPITAL ENCOUNTER (OUTPATIENT)
Dept: PULMONOLOGY | Facility: HOSPITAL | Age: 79
Discharge: HOME | End: 2019-12-26
Attending: THORACIC SURGERY (CARDIOTHORACIC VASCULAR SURGERY)
Payer: COMMERCIAL

## 2019-12-26 DIAGNOSIS — R91.8 LUNG MASS: ICD-10-CM

## 2019-12-26 PROCEDURE — 94729 DIFFUSING CAPACITY: CPT | Performed by: INTERNAL MEDICINE

## 2019-12-26 PROCEDURE — 94760 N-INVAS EAR/PLS OXIMETRY 1: CPT

## 2019-12-26 PROCEDURE — 94060 EVALUATION OF WHEEZING: CPT

## 2019-12-26 PROCEDURE — 94726 PLETHYSMOGRAPHY LUNG VOLUMES: CPT | Performed by: INTERNAL MEDICINE

## 2019-12-26 PROCEDURE — 94060 EVALUATION OF WHEEZING: CPT | Performed by: INTERNAL MEDICINE

## 2019-12-26 PROCEDURE — 94729 DIFFUSING CAPACITY: CPT

## 2019-12-26 PROCEDURE — 94726 PLETHYSMOGRAPHY LUNG VOLUMES: CPT

## 2019-12-26 RX ORDER — ALBUTEROL SULFATE 2.5 MG/3ML
2.5 SOLUTION RESPIRATORY (INHALATION) ONCE
Status: DISCONTINUED | OUTPATIENT
Start: 2019-12-26 | End: 2019-12-30 | Stop reason: HOSPADM

## 2020-01-06 ENCOUNTER — DOCUMENTATION (OUTPATIENT)
Dept: CARDIAC SURGERY | Facility: CLINIC | Age: 80
End: 2020-01-06

## 2020-01-06 NOTE — PROGRESS NOTES
Patient was discussed at today's Thoracic Oncology Multidisciplinary Tumor Conference  Her most recent imaging and PFT's were reviewed  FDG avid RUL nodule on PET/CT, no FDG avid adenopathy shown  PFT's are optimal for surgery  Plan right upper lobe wedge resection, possible completion lobectomy if frozen section positive

## 2020-01-08 ENCOUNTER — OFFICE VISIT (OUTPATIENT)
Dept: CARDIAC SURGERY | Facility: CLINIC | Age: 80
End: 2020-01-08
Payer: COMMERCIAL

## 2020-01-08 ENCOUNTER — DOCUMENTATION (OUTPATIENT)
Dept: CARDIAC SURGERY | Facility: CLINIC | Age: 80
End: 2020-01-08

## 2020-01-08 VITALS
SYSTOLIC BLOOD PRESSURE: 150 MMHG | OXYGEN SATURATION: 98 % | DIASTOLIC BLOOD PRESSURE: 68 MMHG | WEIGHT: 166 LBS | BODY MASS INDEX: 27.66 KG/M2 | HEIGHT: 65 IN | HEART RATE: 59 BPM | TEMPERATURE: 96.6 F

## 2020-01-08 DIAGNOSIS — R91.8 LUNG MASS: Primary | ICD-10-CM

## 2020-01-08 PROCEDURE — 99215 OFFICE O/P EST HI 40 MIN: CPT | Performed by: PHYSICIAN ASSISTANT

## 2020-01-08 RX ORDER — HEPARIN SODIUM 5000 [USP'U]/ML
5000 INJECTION, SOLUTION INTRAVENOUS; SUBCUTANEOUS
Status: CANCELLED | OUTPATIENT
Start: 2020-01-09 | End: 2020-01-10

## 2020-01-08 RX ORDER — GABAPENTIN 300 MG/1
600 CAPSULE ORAL ONCE
Status: CANCELLED | OUTPATIENT
Start: 2020-01-08 | End: 2020-01-08

## 2020-01-08 RX ORDER — ACETAMINOPHEN 325 MG/1
975 TABLET ORAL ONCE
Status: CANCELLED | OUTPATIENT
Start: 2020-01-08 | End: 2020-01-08

## 2020-01-08 RX ORDER — CEFAZOLIN SODIUM 1 G/50ML
1000 SOLUTION INTRAVENOUS ONCE
Status: CANCELLED | OUTPATIENT
Start: 2020-01-08 | End: 2020-01-08

## 2020-01-08 NOTE — H&P (VIEW-ONLY)
Thoracic Follow-Up  Assessment/Plan:    Lung mass  We had a discussion with Ms  Josias Holloway regarding her recent testing  She has a PET avid right upper lobe lung mass that is concerning for a lung primary with some low level hilar activity  There was no evidence of distant metastases  There is some left thyroid activity, which will need further testing, especially given her thyroid cancer history  She has a clinical stage II lung cancer and therefore Dr Chuck Vaz is recommending a robotic assisted right upper lobe wedge resection and possible completion lobectomy  There is a possibility she may need adjuvant chemotherapy, secondary to the hilar lymph node activity  The patient is in agreement with the plan and we will schedule this for 1/17/20  She will hold her Eliquis 2 days prior to the surgery, with last dose being Tuesday night, the 14th  We will also send her to our geriatric pre operative clinic  Diagnoses and all orders for this visit:    Lung mass  -     Type and screen; Future  -     Cancel: Basic metabolic panel; Future  -     Cancel: APTT; Future  -     Cancel: CBC and Platelet; Future  -     Cancel: Protime-INR; Future  -     Case request operating room: BRONCHOSCOPY FLEXIBLE, RESECTION,LUNG WEDGE WITH ROBOTICS, LOBECTOMY LUNG W/ ROBOTICS; Standing  -     Case request operating room: BRONCHOSCOPY FLEXIBLE, RESECTION,LUNG WEDGE WITH ROBOTICS, LOBECTOMY LUNG W/ ROBOTICS    Other orders  -     Coenzyme Q10 (CO Q 10 PO); Take 200 mg by mouth  -     Multiple Vitamins-Calcium (ESSENTIAL ONE DAILY MULTIVIT PO); Take by mouth  -     Diet NPO; Sips with meds; Standing  -     Nursing communcation Please give pre-op Carbohydrate drink to patient 2-4 hours prior to surgery; Standing  -     Void on call to OR; Standing  -     Insert peripheral IV;  Standing  -     Place sequential compression device; Standing  -     heparin (porcine) subcutaneous injection 5,000 Units  -     acetaminophen (TYLENOL) tablet 975 mg  -     gabapentin (NEURONTIN) capsule 600 mg  -     ceFAZolin (ANCEF) IVPB (premix) 1,000 mg          Thoracic History       Diagnosis: Spiculated right upper lobe lung mass     Patient ID: Zane Ibrahim is a 78 y o  female  HPI    Hong is an 33-year-old female with history of thyroid cancer, colon cancer, appendiceal cancer, right breast atypical ductal hyperplasia, atrial fibrillation on Eliquis, hypertension, malar rash but negative for lupus, distant 20 pack year smoking history, and hyperlipidemia who we are seeing in consultation from Dr Matthew Luna for a 2 1 cm spiculated right upper lobe mass concerning for primary lung cancer  Per Dr Osman Miles previous HPI  A CT scan from 2014 revealed a right upper lobe ggo, which did not have significant uptake and biopsy was negative  Dr Florentino Coelho recommended a PET scan and PFT's  She returns today with PET from 12/20/19, revealing a SUV of 3 with the 2 3 x 2 cm right upper lobe lesion and some right hilar activity, SUV of 3  There was also some left thyroid activity with a SUV of 10 6 and mid transverse colon uptake, maybe physiologic  PFT's from 12/26/19 revealed a FVC of 2 35L, 107%, FEV1 of 1 70L, 102%, and DLCO of 75%       Past Medical History:   Diagnosis Date    Allergic reaction     Anesthesia     "cheap date"    Arthritis     Atrial fibrillation (HCC)     Benign polyp of large intestine     Cancer of appendix ([facility] [address] ) 1977    Colon cancer ([facility] [address] ) 44    36 yo    Colon polyp     Full dentures     partial lower    GERD (gastroesophageal reflux disease)     H/O fracture of ankle 3061    Helicobacter pylori (H  pylori) infection     History of left knee replacement     History of neck problems     "C3-C7 and had epidural injections years ago"    History of vertigo     one time years ago    Hyperlipidemia     Hypertension     Indigestion     Lung mass     Nonspecific abnormal results of function study     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[facility] [address] [facility]     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[facility] [address] [facility]     Prediabetes     Refusal of blood transfusions as patient is Druze     Thyroid cancer ([facility] [address] ) 2009    Tic disorder, transient, recurrent     not recent    Vaginal Pap smear 07/03/2017    WNL    Walks frequently     Walks frequently      Past Surgical History:   Procedure Laterality Date    APPENDECTOMY      ARTHROSCOPY KNEE      BREAST BIOPSY Right 04/10/2017    US guided  benign    BREAST BIOPSY Right 10/24/2019    [facility]    CARDIOVASCULAR STRESS TEST      CATARACT EXTRACTION Bilateral     COLON SURGERY      COLONOSCOPY      done 2010 due 2015 Dr Kuo    DILATION AND CURETTAGE OF UTERUS      ESOPHAGOGASTRODUODENOSCOPY      inflammation aonly    HEMICOLECTOMY Right 1977    HYSTERECTOMY      JOINT REPLACEMENT      L knee    MAMMO STEREOTACTIC BREAST BIOPSY RIGHT (ALL INC) Right 10/24/2019    PARATHYROID GLAND SURGERY      exploration     REPLACEMENT TOTAL KNEE      TOTAL ABDOMINAL HYSTERECTOMY  1983    TOTAL THYROIDECTOMY      US GUIDED BREAST BIOPSY RIGHT COMPLETE Right 4/10/2017     Family History   Problem Relation Age of Onset    Esophageal cancer Mother         [de-identified]    Hypertension Mother     Stroke Family         TIA    Breast cancer Paternal Aunt 80    No Known Problems Father     COPD Sister     COPD Sister     No Known Problems Maternal Grandmother     No Known Problems Maternal Grandfather     No Known Problems Paternal Grandmother     No Known Problems Paternal Grandfather     No Known Problems Maternal Aunt     No Known Problems Maternal Aunt     No Known Problems Maternal Aunt     No Known Problems Maternal Aunt     No Known Problems Maternal Aunt      Social History     Allergies   Allergen Reactions    Bee Venom      Tongue swelling    Levaquin [Levofloxacin In D5w]     Tequin [Gatifloxacin] Rash     Current Outpatient Medications on File Prior to Visit   Medication Sig Dispense Refill    apixaban (ELIQUIS) 5 mg Take 5 mg by mouth 2 (two) times a day   atorvastatin (LIPITOR) 80 mg tablet TAKE 1 TABLET BY MOUTH EVERY DAY 90 tablet 1    Coenzyme Q10 (CO Q 10 PO) Take 200 mg by mouth      cyanocobalamin (VITAMIN B-12) 100 mcg tablet Take 1,000 mcg by mouth daily      diltiazem (CARDIZEM CD) 120 mg 24 hr capsule Take 120 mg by mouth      econazole nitrate 1 % cream APPLY TO RASH ON FEET TWICE DAILY AS NEEDED  2    ibuprofen (MOTRIN) 600 mg tablet Take 1 tablet (600 mg total) by mouth every 6 (six) hours as needed for mild pain  30 tablet 0    levothyroxine (SYNTHROID) 100 mcg tablet Take 1 tablet (100 mcg total) by mouth daily 90 tablet 1    Multiple Vitamins-Calcium (ESSENTIAL ONE DAILY MULTIVIT PO) Take by mouth      Omega-3 Fatty Acids (FISH OIL PO) Take 2 g by mouth      omeprazole (PriLOSEC) 20 mg delayed release capsule TAKE 1 CAPSULE DAILY 90 capsule 1    triamcinolone (KENALOG) 0 1 % cream Apply to affected area bid for up to 14 days as needed for rash 28 g 0    levothyroxine 100 mcg tablet TAKE 1 TABLET (100 MCG TOTAL) BY MOUTH DAILY (Patient not taking: Reported on 1/8/2020) 90 tablet 0    traMADol (ULTRAM) 50 mg tablet Take 1 tablet (50 mg total) by mouth every 8 (eight) hours as needed for moderate pain (Patient not taking: Reported on 1/8/2020) 9 tablet 0     No current facility-administered medications on file prior to visit  Review of Systems   Constitutional: Negative for chills, fever and unexpected weight change  HENT: Negative for congestion, trouble swallowing and voice change  Respiratory: Negative for cough, chest tightness and shortness of breath  Cardiovascular: Negative for chest pain and palpitations  Gastrointestinal: Negative for nausea and vomiting  Musculoskeletal: Negative for back pain and gait problem  Skin: Positive for rash  Neurological: Negative for dizziness, syncope and weakness     Psychiatric/Behavioral: Negative for agitation and behavioral problems  All other systems reviewed and are negative  Objective:   Physical Exam   Constitutional: She is oriented to person, place, and time  She appears well-developed and well-nourished  HENT:   Head: Normocephalic and atraumatic  Eyes: Pupils are equal, round, and reactive to light  EOM are normal    Neck: Normal range of motion  Neck supple  No tracheal deviation present  Cardiovascular: Normal rate and regular rhythm  No murmur heard  Irregularly irregular    Pulmonary/Chest: Effort normal and breath sounds normal  No respiratory distress  She has no wheezes  Abdominal: Soft  Bowel sounds are normal  She exhibits no distension  There is no tenderness  Musculoskeletal: Normal range of motion  She exhibits no edema  Lymphadenopathy:     She has no cervical adenopathy  Neurological: She is alert and oriented to person, place, and time  No cranial nerve deficit  Skin: Skin is warm and dry  She is not diaphoretic  No erythema  Malar rash    Psychiatric: She has a normal mood and affect  Thought content normal         /68 (BP Location: Left arm, Patient Position: Sitting, Cuff Size: Large)   Pulse 59   Temp (!) 96 6 °F (35 9 °C)   Ht 5' 4 5" (1 638 m)   Wt 75 3 kg (166 lb)   SpO2 98%   BMI 28 05 kg/m²     Ct Chest Wo Contrast    Addendum Date: 12/12/2019 Addendum   ADDENDUM: I personally discussed this study with Clarissa Michel on 12/12/2019 at 1:52 PM      Result Date: 12/12/2019  Narrative CT CHEST WITHOUT IV CONTRAST INDICATION:   R91 8: Other nonspecific abnormal finding of lung field  Follow-up lung nodule on chest radiograph  History of colon cancer and thyroid cancer  COMPARISON:  Chest radiograph from 12/2/2019  Chest CT from 3/1/2016  TECHNIQUE: CT examination of the chest was performed without intravenous contrast   Axial, sagittal, and coronal 2D reformatted images were created from the source data and submitted for interpretation   Radiation dose length product (DLP) for this visit:  180 mGy-cm   This examination, like all CT scans performed in the [facility], was performed utilizing techniques to minimize radiation dose exposure, including the use of iterative reconstruction and automated exposure control  FINDINGS: LUNGS:  Abnormality on chest radiograph corresponds with a 2 1 x 2 0 cm spiculated nodule in the posterior segment of the right upper lobe with pleural retraction  Mild emphysema  No acute pulmonary disease  No significant filling defects in the trachea and bronchi  PLEURA:  Unremarkable  HEART/GREAT VESSELS:  Unremarkable for patient's age  MEDIASTINUM AND SELENA:  Unremarkable  CHEST WALL AND LOWER NECK:   Resection of the right lobe of the thyroid gland  VISUALIZED STRUCTURES IN THE UPPER ABDOMEN:  Fat-containing right Bochdalek hernia  Hepatic cyst in the right lobe  OSSEOUS STRUCTURES:  Extensive osteopenia in the spine  No change in lucency in the inferior tip of the left scapula  Moderate degenerative disease in the spine  Impression 2 1 x 2 0 cm spiculated right upper lobe nodule compatible with lung cancer  Workstation performed: [id number]       Nm Pet Ct Skull Base To Mid Thigh    Result Date: 12/20/2019  Narrative PET/CT SCAN INDICATION:  D38 1: Neoplasm of uncertain behavior of trachea, bronchus and lung   , abnormal CT, right upper lung nodule, history of thyroid cancer, colon cancer, status post right thyroidectomy, hemicolectomy MODIFIER: PI COMPARISON: CT chest 12/9/2019 and priors, including PET CT 9/9/2016 CELL TYPE:  None TECHNIQUE:   8 2 mCi F-18-FDG administered IV  Multiplanar attenuation corrected and non attenuation corrected PET images were acquired 60 minutes post injection  Contiguous, low dose, axial CT sections were obtained from the skull base through the femurs   Intravenous contrast material was not utilized    This examination, like all CT scans performed in the [facility], was performed utilizing techniques to minimize radiation dose exposure, including the use of iterative reconstruction and automated exposure control  Fasting serum glucose: 100 mg/dl FINDINGS: VISUALIZED BRAIN:   No acute abnormalities are seen  HEAD/NECK:   Persistent intense left thyroid activity, SUV 10 6, prior study 9 3  No FDG avid cervical adenopathy is seen  CT images: Unremarkable  CHEST:   2 3 x 2 cm right upper lung lesion demonstrates increased FDG activity, SUV 3, most compatible with malignancy  On the prior PET/CT, this measured 1 5 x 1 4 cm, SUV 1 5  Right hilar activity appears similar in configuration, SUV 3, prior SUV 2 6  This may be physiologic  No hypermetabolic mediastinal or left hilar adenopathy  CT images: Stable  ABDOMEN: Somewhat focal FDG activity in the mid transverse colon, SUV 4 9, may be physiologic  Corresponding CT images are unremarkable  No additional FDG avid soft tissue lesions are seen  CT images: Colon diverticula  PELVIS: No FDG avid soft tissue lesions are seen  CT images: Unremarkable  OSSEOUS STRUCTURES: No FDG avid lesions are seen  CT images: No significant findings  Impression 1   2 3 x 2 cm right upper lung lesion demonstrates increased FDG activity, SUV 3, most compatible with malignancy  No hypermetabolic hilar or mediastinal adenopathy  2   No hypermetabolic metastases in the neck, abdomen, pelvis  3   Somewhat focal FDG activity in the mid transverse colon may be physiologic  Corresponding CT images are unremarkable  Attention on follow-up recommended to exclude the possibility of occult neoplasm  4   Persistent intense left thyroid activity may be correlated clinically for etiologies such as thyroiditis  The study was marked in EPIC for significant notification   Workstation performed: [id number]

## 2020-01-08 NOTE — PROGRESS NOTES
Thoracic Follow-Up  Assessment/Plan:    Lung mass  We had a discussion with Ms  Ramiro Barbour regarding her recent testing  She has a PET avid right upper lobe lung mass that is concerning for a lung primary with some low level hilar activity  There was no evidence of distant metastases  There is some left thyroid activity, which will need further testing, especially given her thyroid cancer history  She has a clinical stage II lung cancer and therefore Dr Minna Senior is recommending a robotic assisted right upper lobe wedge resection and possible completion lobectomy  There is a possibility she may need adjuvant chemotherapy, secondary to the hilar lymph node activity  The patient is in agreement with the plan and we will schedule this for 1/17/20  She will hold her Eliquis 2 days prior to the surgery, with last dose being Tuesday night, the 14th  We will also send her to our geriatric pre operative clinic  Diagnoses and all orders for this visit:    Lung mass  -     Type and screen; Future  -     Cancel: Basic metabolic panel; Future  -     Cancel: APTT; Future  -     Cancel: CBC and Platelet; Future  -     Cancel: Protime-INR; Future  -     Case request operating room: BRONCHOSCOPY FLEXIBLE, RESECTION,LUNG WEDGE WITH ROBOTICS, LOBECTOMY LUNG W/ ROBOTICS; Standing  -     Case request operating room: BRONCHOSCOPY FLEXIBLE, RESECTION,LUNG WEDGE WITH ROBOTICS, LOBECTOMY LUNG W/ ROBOTICS    Other orders  -     Coenzyme Q10 (CO Q 10 PO); Take 200 mg by mouth  -     Multiple Vitamins-Calcium (ESSENTIAL ONE DAILY MULTIVIT PO); Take by mouth  -     Diet NPO; Sips with meds; Standing  -     Nursing communcation Please give pre-op Carbohydrate drink to patient 2-4 hours prior to surgery; Standing  -     Void on call to OR; Standing  -     Insert peripheral IV;  Standing  -     Place sequential compression device; Standing  -     heparin (porcine) subcutaneous injection 5,000 Units  -     acetaminophen (TYLENOL) tablet 975 mg  -     gabapentin (NEURONTIN) capsule 600 mg  -     ceFAZolin (ANCEF) IVPB (premix) 1,000 mg          Thoracic History       Diagnosis: Spiculated right upper lobe lung mass     Patient ID: Eros Rausch is a 78 y o  female  HPI    Hong is an 49-year-old female with history of thyroid cancer, colon cancer, appendiceal cancer, right breast atypical ductal hyperplasia, atrial fibrillation on Eliquis, hypertension, malar rash but negative for lupus, distant 20 pack year smoking history, and hyperlipidemia who we are seeing in consultation from Dr Nena Reyes for a 2 1 cm spiculated right upper lobe mass concerning for primary lung cancer  Per Dr Fuentes Thomson previous HPI  A CT scan from 2014 revealed a right upper lobe ggo, which did not have significant uptake and biopsy was negative  Dr Eulalia Terrazas recommended a PET scan and PFT's  She returns today with PET from 12/20/19, revealing a SUV of 3 with the 2 3 x 2 cm right upper lobe lesion and some right hilar activity, SUV of 3  There was also some left thyroid activity with a SUV of 10 6 and mid transverse colon uptake, maybe physiologic  PFT's from 12/26/19 revealed a FVC of 2 35L, 107%, FEV1 of 1 70L, 102%, and DLCO of 75%       Past Medical History:   Diagnosis Date    Allergic reaction     Anesthesia     "cheap date"    Arthritis     Atrial fibrillation (HCC)     Benign polyp of large intestine     Cancer of appendix ([facility] [address] ) 1977    Colon cancer ([facility] [address] ) 46    34 yo    Colon polyp     Full dentures     partial lower    GERD (gastroesophageal reflux disease)     H/O fracture of ankle 4840    Helicobacter pylori (H  pylori) infection     History of left knee replacement     History of neck problems     "C3-C7 and had epidural injections years ago"    History of vertigo     one time years ago    Hyperlipidemia     Hypertension     Indigestion     Lung mass     Nonspecific abnormal results of function study     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[facility] [address] )     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[facility] [address] [facility]     Prediabetes     Refusal of blood transfusions as patient is [employer]     Thyroid cancer ([facility] [address] ) 2009    Tic disorder, transient, recurrent     not recent    Vaginal Pap smear 07/03/2017    WNL    Walks frequently     Walks frequently      Past Surgical History:   Procedure Laterality Date    APPENDECTOMY      ARTHROSCOPY KNEE      BREAST BIOPSY Right 04/10/2017    US guided  benign    BREAST BIOPSY Right 10/24/2019    [facility]    CARDIOVASCULAR STRESS TEST      CATARACT EXTRACTION Bilateral     COLON SURGERY      COLONOSCOPY      done 2010 due 2015 Dr Kuo    DILATION AND CURETTAGE OF UTERUS      ESOPHAGOGASTRODUODENOSCOPY      inflammation aonly    HEMICOLECTOMY Right 1977    HYSTERECTOMY      JOINT REPLACEMENT      L knee    MAMMO STEREOTACTIC BREAST BIOPSY RIGHT (ALL INC) Right 10/24/2019    PARATHYROID GLAND SURGERY      exploration     REPLACEMENT TOTAL KNEE      TOTAL ABDOMINAL HYSTERECTOMY  1983    TOTAL THYROIDECTOMY      US GUIDED BREAST BIOPSY RIGHT COMPLETE Right 4/10/2017     Family History   Problem Relation Age of Onset    Esophageal cancer Mother         [de-identified]    Hypertension Mother     Stroke Family         TIA    Breast cancer Paternal Aunt 80    No Known Problems Father     COPD Sister     COPD Sister     No Known Problems Maternal Grandmother     No Known Problems Maternal Grandfather     No Known Problems Paternal Grandmother     No Known Problems Paternal Grandfather     No Known Problems Maternal Aunt     No Known Problems Maternal Aunt     No Known Problems Maternal Aunt     No Known Problems Maternal Aunt     No Known Problems Maternal Aunt      Social History     Allergies   Allergen Reactions    Bee Venom      Tongue swelling    Levaquin [Levofloxacin In D5w]     Tequin [Gatifloxacin] Rash     Current Outpatient Medications on File Prior to Visit   Medication Sig Dispense Refill    apixaban (ELIQUIS) 5 mg Take 5 mg by mouth 2 (two) times a day   atorvastatin (LIPITOR) 80 mg tablet TAKE 1 TABLET BY MOUTH EVERY DAY 90 tablet 1    Coenzyme Q10 (CO Q 10 PO) Take 200 mg by mouth      cyanocobalamin (VITAMIN B-12) 100 mcg tablet Take 1,000 mcg by mouth daily      diltiazem (CARDIZEM CD) 120 mg 24 hr capsule Take 120 mg by mouth      econazole nitrate 1 % cream APPLY TO RASH ON FEET TWICE DAILY AS NEEDED  2    ibuprofen (MOTRIN) 600 mg tablet Take 1 tablet (600 mg total) by mouth every 6 (six) hours as needed for mild pain  30 tablet 0    levothyroxine (SYNTHROID) 100 mcg tablet Take 1 tablet (100 mcg total) by mouth daily 90 tablet 1    Multiple Vitamins-Calcium (ESSENTIAL ONE DAILY MULTIVIT PO) Take by mouth      Omega-3 Fatty Acids (FISH OIL PO) Take 2 g by mouth      omeprazole (PriLOSEC) 20 mg delayed release capsule TAKE 1 CAPSULE DAILY 90 capsule 1    triamcinolone (KENALOG) 0 1 % cream Apply to affected area bid for up to 14 days as needed for rash 28 g 0    levothyroxine 100 mcg tablet TAKE 1 TABLET (100 MCG TOTAL) BY MOUTH DAILY (Patient not taking: Reported on 1/8/2020) 90 tablet 0    traMADol (ULTRAM) 50 mg tablet Take 1 tablet (50 mg total) by mouth every 8 (eight) hours as needed for moderate pain (Patient not taking: Reported on 1/8/2020) 9 tablet 0     No current facility-administered medications on file prior to visit  Review of Systems   Constitutional: Negative for chills, fever and unexpected weight change  HENT: Negative for congestion, trouble swallowing and voice change  Respiratory: Negative for cough, chest tightness and shortness of breath  Cardiovascular: Negative for chest pain and palpitations  Gastrointestinal: Negative for nausea and vomiting  Musculoskeletal: Negative for back pain and gait problem  Skin: Positive for rash  Neurological: Negative for dizziness, syncope and weakness     Psychiatric/Behavioral: Negative for agitation and behavioral problems  All other systems reviewed and are negative  Objective:   Physical Exam   Constitutional: She is oriented to person, place, and time  She appears well-developed and well-nourished  HENT:   Head: Normocephalic and atraumatic  Eyes: Pupils are equal, round, and reactive to light  EOM are normal    Neck: Normal range of motion  Neck supple  No tracheal deviation present  Cardiovascular: Normal rate and regular rhythm  No murmur heard  Irregularly irregular    Pulmonary/Chest: Effort normal and breath sounds normal  No respiratory distress  She has no wheezes  Abdominal: Soft  Bowel sounds are normal  She exhibits no distension  There is no tenderness  Musculoskeletal: Normal range of motion  She exhibits no edema  Lymphadenopathy:     She has no cervical adenopathy  Neurological: She is alert and oriented to person, place, and time  No cranial nerve deficit  Skin: Skin is warm and dry  She is not diaphoretic  No erythema  Malar rash    Psychiatric: She has a normal mood and affect  Thought content normal         /68 (BP Location: Left arm, Patient Position: Sitting, Cuff Size: Large)   Pulse 59   Temp (!) 96 6 °F (35 9 °C)   Ht 5' 4 5" (1 638 m)   Wt 75 3 kg (166 lb)   SpO2 98%   BMI 28 05 kg/m²     Ct Chest Wo Contrast    Addendum Date: 12/12/2019 Addendum   ADDENDUM: I personally discussed this study with Whit Minaya on 12/12/2019 at 1:52 PM      Result Date: 12/12/2019  Narrative CT CHEST WITHOUT IV CONTRAST INDICATION:   R91 8: Other nonspecific abnormal finding of lung field  Follow-up lung nodule on chest radiograph  History of colon cancer and thyroid cancer  COMPARISON:  Chest radiograph from 12/2/2019  Chest CT from 3/1/2016  TECHNIQUE: CT examination of the chest was performed without intravenous contrast   Axial, sagittal, and coronal 2D reformatted images were created from the source data and submitted for interpretation   Radiation dose length product (DLP) for this visit:  180 mGy-cm   This examination, like all CT scans performed in the [facility], was performed utilizing techniques to minimize radiation dose exposure, including the use of iterative reconstruction and automated exposure control  FINDINGS: LUNGS:  Abnormality on chest radiograph corresponds with a 2 1 x 2 0 cm spiculated nodule in the posterior segment of the right upper lobe with pleural retraction  Mild emphysema  No acute pulmonary disease  No significant filling defects in the trachea and bronchi  PLEURA:  Unremarkable  HEART/GREAT VESSELS:  Unremarkable for patient's age  MEDIASTINUM AND SELENA:  Unremarkable  CHEST WALL AND LOWER NECK:   Resection of the right lobe of the thyroid gland  VISUALIZED STRUCTURES IN THE UPPER ABDOMEN:  Fat-containing right Bochdalek hernia  Hepatic cyst in the right lobe  OSSEOUS STRUCTURES:  Extensive osteopenia in the spine  No change in lucency in the inferior tip of the left scapula  Moderate degenerative disease in the spine  Impression 2 1 x 2 0 cm spiculated right upper lobe nodule compatible with lung cancer  Workstation performed: [id number]       Nm Pet Ct Skull Base To Mid Thigh    Result Date: 12/20/2019  Narrative PET/CT SCAN INDICATION:  D38 1: Neoplasm of uncertain behavior of trachea, bronchus and lung   , abnormal CT, right upper lung nodule, history of thyroid cancer, colon cancer, status post right thyroidectomy, hemicolectomy MODIFIER: PI COMPARISON: CT chest 12/9/2019 and priors, including PET CT 9/9/2016 CELL TYPE:  None TECHNIQUE:   8 2 mCi F-18-FDG administered IV  Multiplanar attenuation corrected and non attenuation corrected PET images were acquired 60 minutes post injection  Contiguous, low dose, axial CT sections were obtained from the skull base through the femurs   Intravenous contrast material was not utilized    This examination, like all CT scans performed in the [facility], was performed utilizing techniques to minimize radiation dose exposure, including the use of iterative reconstruction and automated exposure control  Fasting serum glucose: 100 mg/dl FINDINGS: VISUALIZED BRAIN:   No acute abnormalities are seen  HEAD/NECK:   Persistent intense left thyroid activity, SUV 10 6, prior study 9 3  No FDG avid cervical adenopathy is seen  CT images: Unremarkable  CHEST:   2 3 x 2 cm right upper lung lesion demonstrates increased FDG activity, SUV 3, most compatible with malignancy  On the prior PET/CT, this measured 1 5 x 1 4 cm, SUV 1 5  Right hilar activity appears similar in configuration, SUV 3, prior SUV 2 6  This may be physiologic  No hypermetabolic mediastinal or left hilar adenopathy  CT images: Stable  ABDOMEN: Somewhat focal FDG activity in the mid transverse colon, SUV 4 9, may be physiologic  Corresponding CT images are unremarkable  No additional FDG avid soft tissue lesions are seen  CT images: Colon diverticula  PELVIS: No FDG avid soft tissue lesions are seen  CT images: Unremarkable  OSSEOUS STRUCTURES: No FDG avid lesions are seen  CT images: No significant findings  Impression 1   2 3 x 2 cm right upper lung lesion demonstrates increased FDG activity, SUV 3, most compatible with malignancy  No hypermetabolic hilar or mediastinal adenopathy  2   No hypermetabolic metastases in the neck, abdomen, pelvis  3   Somewhat focal FDG activity in the mid transverse colon may be physiologic  Corresponding CT images are unremarkable  Attention on follow-up recommended to exclude the possibility of occult neoplasm  4   Persistent intense left thyroid activity may be correlated clinically for etiologies such as thyroiditis  The study was marked in EPIC for significant notification   Workstation performed: [id number]

## 2020-01-08 NOTE — LETTER
January 8, 2020     Flora Canales DO  [address] [facility]  [address] [place] [address]    Patient: Cindy Leyva   YOB: 1940   Date of Visit: 1/8/2020       Dear Dr Lew Silverio:    Thank you for referring Cindy Leyva to me for evaluation  Below are my notes for this consultation  If you have questions, please do not hesitate to call me  I look forward to following your patient along with you  Sincerely,        Rene Abdi MD        CC: No Recipients  Yandel [place], [place]  1/8/2020  3:55 PM  Attested  Thoracic Follow-Up  Assessment/Plan:    Lung mass  We had a discussion with Ms Phil Arenas regarding her recent testing  She has a PET avid right upper lobe lung mass that is concerning for a lung primary with some low level hilar activity  There was no evidence of distant metastases  There is some left thyroid activity, which will need further testing, especially given her thyroid cancer history  She has a clinical stage II lung cancer and therefore Dr Noe Bruno is recommending a robotic assisted right upper lobe wedge resection and possible completion lobectomy  There is a possibility she may need adjuvant chemotherapy, secondary to the hilar lymph node activity  The patient is in agreement with the plan and we will schedule this for 1/17/20  She will hold her Eliquis 2 days prior to the surgery, with last dose being Tuesday night, the 14th  We will also send her to our geriatric pre operative clinic  Diagnoses and all orders for this visit:    Lung mass  -     Type and screen; Future  -     Cancel: Basic metabolic panel; Future  -     Cancel: APTT; Future  -     Cancel: CBC and Platelet; Future  -     Cancel: Protime-INR;  Future  -     Case request operating room: BRONCHOSCOPY FLEXIBLE, [address], LOBECTOMY LUNG W/ ROBOTICS; Standing  -     Case request operating room: BRONCHOSCOPY FLEXIBLE, RESECTION,LUNG WEDGE WITH ROBOTICS, LOBECTOMY LUNG W/ ROBOTICS    Other orders  -     Coenzyme Q10 (CO Q 10 PO); Take 200 mg by mouth  -     Multiple Vitamins-Calcium (ESSENTIAL ONE DAILY MULTIVIT PO); Take by mouth  -     Diet NPO; Sips with meds; Standing  -     Nursing communcation Please give pre-op Carbohydrate drink to patient 2-4 hours prior to surgery; Standing  -     Void on call to OR; Standing  -     Insert peripheral IV; Standing  -     Place sequential compression device; Standing  -     heparin (porcine) subcutaneous injection 5,000 Units  -     acetaminophen (TYLENOL) tablet 975 mg  -     gabapentin (NEURONTIN) capsule 600 mg  -     ceFAZolin (ANCEF) IVPB (premix) 1,000 mg          Thoracic History       Diagnosis: Spiculated right upper lobe lung mass     Patient ID: Sadie Reaves is a 78 y o  female  HPI    Hong is an 78-year-old female with history of thyroid cancer, colon cancer, appendiceal cancer, right breast atypical ductal hyperplasia, atrial fibrillation on Eliquis, hypertension, malar rash but negative for lupus, distant 20 pack year smoking history, and hyperlipidemia who we are seeing in consultation from Dr Robin Barrios for a 2 1 cm spiculated right upper lobe mass concerning for primary lung cancer  Per Dr Samm Hill previous HPI  A CT scan from 2014 revealed a right upper lobe ggo, which did not have significant uptake and biopsy was negative  Dr Francesca Mendes recommended a PET scan and PFT's  She returns today with PET from 12/20/19, revealing a SUV of 3 with the 2 3 x 2 cm right upper lobe lesion and some right hilar activity, SUV of 3  There was also some left thyroid activity with a SUV of 10 6 and mid transverse colon uptake, maybe physiologic  PFT's from 12/26/19 revealed a FVC of 2 35L, 107%, FEV1 of 1 70L, 102%, and DLCO of 75%       Past Medical History:   Diagnosis Date    Allergic reaction     Anesthesia     "cheap date"    Arthritis     Atrial fibrillation (HCC)     Benign polyp of large intestine     Cancer of appendix ([facility] [address] ) 1977    Colon cancer [facility]) 44    36 yo    Colon polyp     Full dentures     partial lower    GERD (gastroesophageal reflux disease)     H/O fracture of ankle 9812    Helicobacter pylori (H  pylori) infection     History of left knee replacement     History of neck problems     "C3-C7 and had epidural injections years ago"    History of vertigo     one time years ago    Hyperlipidemia     Hypertension     Indigestion     Lung mass     Nonspecific abnormal results of function study     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[facility] )     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[place] [address] )     Prediabetes     Refusal of blood transfusions as patient is Religious     Thyroid cancer ([place] [address] ) 2009    Tic disorder, transient, recurrent     not recent    Vaginal Pap smear 07/03/2017    WNL    Walks frequently     Walks frequently      Past Surgical History:   Procedure Laterality Date    APPENDECTOMY      ARTHROSCOPY KNEE      BREAST BIOPSY Right 04/10/2017    US guided  benign    BREAST BIOPSY Right 10/24/2019    ADH    CARDIOVASCULAR STRESS TEST      CATARACT EXTRACTION Bilateral     COLON SURGERY      COLONOSCOPY      done 2010 due 2015 Dr Kuo    DILATION AND CURETTAGE OF UTERUS      ESOPHAGOGASTRODUODENOSCOPY      inflammation aonly    HEMICOLECTOMY Right 1977    HYSTERECTOMY      JOINT REPLACEMENT      L knee    MAMMO STEREOTACTIC BREAST BIOPSY RIGHT (ALL INC) Right 10/24/2019    PARATHYROID GLAND SURGERY      exploration     REPLACEMENT TOTAL KNEE      TOTAL ABDOMINAL HYSTERECTOMY  1983    TOTAL THYROIDECTOMY      US GUIDED BREAST BIOPSY RIGHT COMPLETE Right 4/10/2017     Family History   Problem Relation Age of Onset    Esophageal cancer Mother         [de-identified]    Hypertension Mother     Stroke Family         TIA    Breast cancer Paternal Aunt 80    No Known Problems Father     COPD Sister     COPD Sister     No Known Problems Maternal Grandmother     No Known Problems Maternal Grandfather     No Known Problems Paternal Grandmother     No Known Problems Paternal Grandfather     No Known Problems Maternal Aunt     No Known Problems Maternal Aunt     No Known Problems Maternal Aunt     No Known Problems Maternal Aunt     No Known Problems Maternal Aunt      Social History     Allergies   Allergen Reactions    Bee Venom      Tongue swelling    Levaquin [Levofloxacin In D5w]     Tequin [Gatifloxacin] Rash     Current Outpatient Medications on File Prior to Visit   Medication Sig Dispense Refill    apixaban (ELIQUIS) 5 mg Take 5 mg by mouth 2 (two) times a day   atorvastatin (LIPITOR) 80 mg tablet TAKE 1 TABLET BY MOUTH EVERY DAY 90 tablet 1    Coenzyme Q10 (CO Q 10 PO) Take 200 mg by mouth      cyanocobalamin (VITAMIN B-12) 100 mcg tablet Take 1,000 mcg by mouth daily      diltiazem (CARDIZEM CD) 120 mg 24 hr capsule Take 120 mg by mouth      econazole nitrate 1 % cream APPLY TO RASH ON FEET TWICE DAILY AS NEEDED  2    ibuprofen (MOTRIN) 600 mg tablet Take 1 tablet (600 mg total) by mouth every 6 (six) hours as needed for mild pain  30 tablet 0    levothyroxine (SYNTHROID) 100 mcg tablet Take 1 tablet (100 mcg total) by mouth daily 90 tablet 1    Multiple Vitamins-Calcium (ESSENTIAL ONE DAILY MULTIVIT PO) Take by mouth      Omega-3 Fatty Acids (FISH OIL PO) Take 2 g by mouth      omeprazole (PriLOSEC) 20 mg delayed release capsule TAKE 1 CAPSULE DAILY 90 capsule 1    triamcinolone (KENALOG) 0 1 % cream Apply to affected area bid for up to 14 days as needed for rash 28 g 0    levothyroxine 100 mcg tablet TAKE 1 TABLET (100 MCG TOTAL) BY MOUTH DAILY (Patient not taking: Reported on 1/8/2020) 90 tablet 0    traMADol (ULTRAM) 50 mg tablet Take 1 tablet (50 mg total) by mouth every 8 (eight) hours as needed for moderate pain (Patient not taking: Reported on 1/8/2020) 9 tablet 0     No current facility-administered medications on file prior to visit            Review of Systems   Constitutional: Negative for chills, fever and unexpected weight change  HENT: Negative for congestion, trouble swallowing and voice change  Respiratory: Negative for cough, chest tightness and shortness of breath  Cardiovascular: Negative for chest pain and palpitations  Gastrointestinal: Negative for nausea and vomiting  Musculoskeletal: Negative for back pain and gait problem  Skin: Positive for rash  Neurological: Negative for dizziness, syncope and weakness  Psychiatric/Behavioral: Negative for agitation and behavioral problems  All other systems reviewed and are negative  Objective:   Physical Exam   Constitutional: She is oriented to person, place, and time  She appears well-developed and well-nourished  HENT:   Head: Normocephalic and atraumatic  Eyes: Pupils are equal, round, and reactive to light  EOM are normal    Neck: Normal range of motion  Neck supple  No tracheal deviation present  Cardiovascular: Normal rate and regular rhythm  No murmur heard  Irregularly irregular    Pulmonary/Chest: Effort normal and breath sounds normal  No respiratory distress  She has no wheezes  Abdominal: Soft  Bowel sounds are normal  She exhibits no distension  There is no tenderness  Musculoskeletal: Normal range of motion  She exhibits no edema  Lymphadenopathy:     She has no cervical adenopathy  Neurological: She is alert and oriented to person, place, and time  No cranial nerve deficit  Skin: Skin is warm and dry  She is not diaphoretic  No erythema  Malar rash    Psychiatric: She has a normal mood and affect   Thought content normal         /68 (BP Location: Left arm, Patient Position: Sitting, Cuff Size: Large)   Pulse 59   Temp (!) 96 6 °F (35 9 °C)   Ht 5' 4 5" (1 638 m)   Wt 75 3 kg (166 lb)   SpO2 98%   BMI 28 05 kg/m²      Ct Chest Wo Contrast    Addendum Date: 12/12/2019 Addendum   ADDENDUM: I personally discussed this study with Gracie Eduardo on 12/12/2019 at 1:52 PM      Result Date: 12/12/2019  Narrative CT CHEST WITHOUT IV CONTRAST INDICATION:   R91 8: Other nonspecific abnormal finding of lung field  Follow-up lung nodule on chest radiograph  History of colon cancer and thyroid cancer  COMPARISON:  Chest radiograph from 12/2/2019  Chest CT from 3/1/2016  TECHNIQUE: CT examination of the chest was performed without intravenous contrast   Axial, sagittal, and coronal 2D reformatted images were created from the source data and submitted for interpretation  Radiation dose length product (DLP) for this visit:  180 mGy-cm   This examination, like all CT scans performed in the [facility], was performed utilizing techniques to minimize radiation dose exposure, including the use of iterative reconstruction and automated exposure control  FINDINGS: LUNGS:  Abnormality on chest radiograph corresponds with a 2 1 x 2 0 cm spiculated nodule in the posterior segment of the right upper lobe with pleural retraction  Mild emphysema  No acute pulmonary disease  No significant filling defects in the trachea and bronchi  PLEURA:  Unremarkable  HEART/GREAT VESSELS:  Unremarkable for patient's age  MEDIASTINUM AND SELENA:  Unremarkable  CHEST WALL AND LOWER NECK:   Resection of the right lobe of the thyroid gland  VISUALIZED STRUCTURES IN THE UPPER ABDOMEN:  Fat-containing right Bochdalek hernia  Hepatic cyst in the right lobe  OSSEOUS STRUCTURES:  Extensive osteopenia in the spine  No change in lucency in the inferior tip of the left scapula  Moderate degenerative disease in the spine  Impression 2 1 x 2 0 cm spiculated right upper lobe nodule compatible with lung cancer    Workstation performed: [id number]       Nm Pet Ct Skull Base To Mid Thigh    Result Date: 12/20/2019  Narrative PET/CT SCAN INDICATION:  D38 1: Neoplasm of uncertain behavior of trachea, bronchus and lung   , abnormal CT, right upper lung nodule, history of thyroid cancer, colon cancer, status post right thyroidectomy, hemicolectomy MODIFIER: PI COMPARISON: CT chest 12/9/2019 and priors, including PET CT 9/9/2016 CELL TYPE:  None TECHNIQUE:   8 2 mCi F-18-FDG administered IV  Multiplanar attenuation corrected and non attenuation corrected PET images were acquired 60 minutes post injection  Contiguous, low dose, axial CT sections were obtained from the skull base through the femurs   Intravenous contrast material was not utilized  This examination, like all CT scans performed in the [facility], was performed utilizing techniques to minimize radiation dose exposure, including the use of iterative reconstruction and automated exposure control  Fasting serum glucose: 100 mg/dl FINDINGS: VISUALIZED BRAIN:   No acute abnormalities are seen  HEAD/NECK:   Persistent intense left thyroid activity, SUV 10 6, prior study 9 3  No FDG avid cervical adenopathy is seen  CT images: Unremarkable  CHEST:   2 3 x 2 cm right upper lung lesion demonstrates increased FDG activity, SUV 3, most compatible with malignancy  On the prior PET/CT, this measured 1 5 x 1 4 cm, SUV 1 5  Right hilar activity appears similar in configuration, SUV 3, prior SUV 2 6  This may be physiologic  No hypermetabolic mediastinal or left hilar adenopathy  CT images: Stable  ABDOMEN: Somewhat focal FDG activity in the mid transverse colon, SUV 4 9, may be physiologic  Corresponding CT images are unremarkable  No additional FDG avid soft tissue lesions are seen  CT images: Colon diverticula  PELVIS: No FDG avid soft tissue lesions are seen  CT images: Unremarkable  OSSEOUS STRUCTURES: No FDG avid lesions are seen  CT images: No significant findings  Impression 1   2 3 x 2 cm right upper lung lesion demonstrates increased FDG activity, SUV 3, most compatible with malignancy  No hypermetabolic hilar or mediastinal adenopathy  2   No hypermetabolic metastases in the neck, abdomen, pelvis   3   Somewhat focal FDG activity in the mid transverse colon may be physiologic  Corresponding CT images are unremarkable  Attention on follow-up recommended to exclude the possibility of occult neoplasm  4   Persistent intense left thyroid activity may be correlated clinically for etiologies such as thyroiditis  The study was marked in EPIC for significant notification  Workstation performed: [id number]        Attestation signed by Abby Warner MD at 1/8/2020  5:02 PM:  Thoracic surgery attending note    Patient seen and examined this afternoon with PA and lung cancer nurse navigator  She comes back to our office after PET-CT and PFTs  She has no major change his in her history since last being seen  I personally reviewed her PFTs with her  Her FEV1 is 1 67 L, 100% predicted  DLCO 75% predicted    We then reviewed her PET-CT imaging in PACs  This again shows the 2 3 x 2 cm right upper lobe lesion  There is increased FDG activity with an SUV of 3 consider concerning for malignancy  There is some mild right hilar activity with an SUV of 3  No corresponding adenopathy  Finally she had some left thyroid uptake with an SUV of 10 6  Of note she did have a previous PET-CT  in 2016  That PET-CT 3+ years ago showed similar left thyroid activity and right hilar activity  I suspect that her right hilar activity is physiologic  Assessment - 56-year-old female [employer] with history of thyroid cancer, colon cancer, appendiceal cancer, right breast mass, atrial fibrillation on Eliquis and a distant 20 pack year smoking history who has a 2 1 x 2 0 cm PET avid right upper lobe lung mass concerning for primary lung cancer    Plan  I had a lengthy discussion with Jagruti Atwood about her right upper lobe lung mass  This is PET avid with significant uptake at the mass  There is no enlarged mediastinal or hilar adenopathy    There is mild PET uptake at the right upper lobe 10 R region but this does not correspond with a mass and has not changed in 2-3 years time from previous PET  Likely physiologic  At worst she has possible stage II lung cancer  We discussed management options starting with IR biopsy  Given the concerning imaging characteristics and PET positivity I am more suspicious that this is a primary lung cancer and ultimately needs treated  We both agreed that the biopsy is not necessary at this time  In that regard I am recommending a diagnostic robotic right upper lobe wedge resection with possible completion lobectomy and mediastinal lymph node dissection  She understands that if the 10R lymph node is positive that would make her stage II disease and would require adjuvant chemotherapy  We discussed risks, benefits alternatives of the surgery  She understands surgical risks include bleeding, infection, prolonged air leak, atrial fibrillation need for prolonged chest tube  She understands that our transfusion rates are extremely low, less than 1%  With that being said and her history of a Restoration I think this is reasonable to proceed with surgical intervention without needing blood  The patient is in agreement  Will proceed with surgery scheduled for Friday January 17  She will hold her Eliquis 2 days prior to surgery    Consent was signed in our office today    Stacy Momin MD

## 2020-01-08 NOTE — PROGRESS NOTES
I met with Shayy Caballero at her appointment with Dr Billy Race  She's familiar with my role from her prior visit  Support provided, questions answered

## 2020-01-09 NOTE — PROGRESS NOTES
Please phone this patient She may be required to see me for pre op clearance She should ask the surgeon if that is needed and schedule if needed

## 2020-01-14 ENCOUNTER — LAB (OUTPATIENT)
Dept: LAB | Facility: HOSPITAL | Age: 80
DRG: 165 | End: 2020-01-14
Payer: COMMERCIAL

## 2020-01-14 DIAGNOSIS — R91.8 LUNG MASS: ICD-10-CM

## 2020-01-14 LAB
ABO GROUP BLD: NORMAL
BLD GP AB SCN SERPL QL: NEGATIVE
RH BLD: NEGATIVE
SPECIMEN EXPIRATION DATE: NORMAL

## 2020-01-14 PROCEDURE — 36415 COLL VENOUS BLD VENIPUNCTURE: CPT

## 2020-01-14 PROCEDURE — 86901 BLOOD TYPING SEROLOGIC RH(D): CPT

## 2020-01-14 PROCEDURE — 86900 BLOOD TYPING SEROLOGIC ABO: CPT

## 2020-01-14 PROCEDURE — 86850 RBC ANTIBODY SCREEN: CPT

## 2020-01-15 ENCOUNTER — ANESTHESIA EVENT (OUTPATIENT)
Dept: PERIOP | Facility: HOSPITAL | Age: 80
DRG: 165 | End: 2020-01-15
Payer: COMMERCIAL

## 2020-01-15 NOTE — PRE-PROCEDURE INSTRUCTIONS
Pre-Surgery Instructions:   Medication Instructions    apixaban (ELIQUIS) 5 mg Patient was instructed by Physician and understands   atorvastatin (LIPITOR) 80 mg tablet Instructed patient per Anesthesia Guidelines   Coenzyme Q10 (CO Q 10 PO) Instructed patient per Anesthesia Guidelines   cyanocobalamin (VITAMIN B-12) 100 mcg tablet Instructed patient per Anesthesia Guidelines   diltiazem (CARDIZEM CD) 120 mg 24 hr capsule Instructed patient per Anesthesia Guidelines   econazole nitrate 1 % cream Instructed patient per Anesthesia Guidelines   levothyroxine (SYNTHROID) 100 mcg tablet Instructed patient per Anesthesia Guidelines   Multiple Vitamins-Calcium (ESSENTIAL ONE DAILY MULTIVIT PO) Instructed patient per Anesthesia Guidelines   Omega-3 Fatty Acids (FISH OIL PO) Instructed patient per Anesthesia Guidelines   omeprazole (PriLOSEC) 20 mg delayed release capsule Instructed patient per Anesthesia Guidelines   triamcinolone (KENALOG) 0 1 % cream Instructed patient per Anesthesia Guidelines

## 2020-01-15 NOTE — PRE-PROCEDURE INSTRUCTIONS
Pre-Surgery Instructions:   Medication Instructions    apixaban (ELIQUIS) 5 mg Patient was instructed by Physician and understands   atorvastatin (LIPITOR) 80 mg tablet Instructed patient per Anesthesia Guidelines   Coenzyme Q10 (CO Q 10 PO) Instructed patient per Anesthesia Guidelines   cyanocobalamin (VITAMIN B-12) 100 mcg tablet Instructed patient per Anesthesia Guidelines   diltiazem (CARDIZEM CD) 120 mg 24 hr capsule Instructed patient per Anesthesia Guidelines   econazole nitrate 1 % cream Instructed patient per Anesthesia Guidelines   levothyroxine (SYNTHROID) 100 mcg tablet Instructed patient per Anesthesia Guidelines   Multiple Vitamins-Calcium (ESSENTIAL ONE DAILY MULTIVIT PO) Instructed patient per Anesthesia Guidelines   Omega-3 Fatty Acids (FISH OIL PO) Instructed patient per Anesthesia Guidelines   omeprazole (PriLOSEC) 20 mg delayed release capsule Instructed patient per Anesthesia Guidelines   triamcinolone (KENALOG) 0 1 % cream Instructed patient per Anesthesia Guidelines  Pt verbalized understanding of shower instructions  Pt instructed to stop supplements and NSAIDS prior to surgery  Pt to take RX meds on day of surgery with 1-2 sips of water   Per pt last dose of eliquis was 1/14

## 2020-01-15 NOTE — PRE-PROCEDURE INSTRUCTIONS
Pre-Surgery Instructions:   Medication Instructions    apixaban (ELIQUIS) 5 mg Patient was instructed by Physician and understands   atorvastatin (LIPITOR) 80 mg tablet Instructed patient per Anesthesia Guidelines   Coenzyme Q10 (CO Q 10 PO) Instructed patient per Anesthesia Guidelines   cyanocobalamin (VITAMIN B-12) 100 mcg tablet Instructed patient per Anesthesia Guidelines   diltiazem (CARDIZEM CD) 120 mg 24 hr capsule Instructed patient per Anesthesia Guidelines   econazole nitrate 1 % cream Instructed patient per Anesthesia Guidelines   levothyroxine (SYNTHROID) 100 mcg tablet Instructed patient per Anesthesia Guidelines   Multiple Vitamins-Calcium (ESSENTIAL ONE DAILY MULTIVIT PO) Instructed patient per Anesthesia Guidelines   Omega-3 Fatty Acids (FISH OIL PO) Instructed patient per Anesthesia Guidelines   omeprazole (PriLOSEC) 20 mg delayed release capsule Instructed patient per Anesthesia Guidelines   triamcinolone (KENALOG) 0 1 % cream Instructed patient per Anesthesia Guidelines  Pt verbalized understanding of shower instructions  Pt instructed to stop supplements and NSAIDS prior to surgery  Pt to take RX meds on day of surgery with 1-2 sips of water

## 2020-01-17 ENCOUNTER — APPOINTMENT (INPATIENT)
Dept: RADIOLOGY | Facility: HOSPITAL | Age: 80
DRG: 165 | End: 2020-01-17
Payer: COMMERCIAL

## 2020-01-17 ENCOUNTER — HOSPITAL ENCOUNTER (INPATIENT)
Facility: HOSPITAL | Age: 80
LOS: 3 days | Discharge: HOME | DRG: 165 | End: 2020-01-20
Attending: THORACIC SURGERY (CARDIOTHORACIC VASCULAR SURGERY) | Admitting: THORACIC SURGERY (CARDIOTHORACIC VASCULAR SURGERY)
Payer: COMMERCIAL

## 2020-01-17 ENCOUNTER — ANESTHESIA (OUTPATIENT)
Dept: PERIOP | Facility: HOSPITAL | Age: 80
DRG: 165 | End: 2020-01-17
Payer: COMMERCIAL

## 2020-01-17 DIAGNOSIS — N28.9 RENAL INSUFFICIENCY: Primary | ICD-10-CM

## 2020-01-17 DIAGNOSIS — R91.8 LUNG MASS: ICD-10-CM

## 2020-01-17 LAB
ABO GROUP BLD: NORMAL
RH BLD: NEGATIVE

## 2020-01-17 PROCEDURE — 0BTC4ZZ RESECTION OF RIGHT UPPER LUNG LOBE, PERCUTANEOUS ENDOSCOPIC APPROACH: ICD-10-PCS | Performed by: THORACIC SURGERY (CARDIOTHORACIC VASCULAR SURGERY)

## 2020-01-17 PROCEDURE — 88331 PATH CONSLTJ SURG 1 BLK 1SPC: CPT | Performed by: PATHOLOGY

## 2020-01-17 PROCEDURE — 30233N1 TRANSFUSION OF NONAUTOLOGOUS RED BLOOD CELLS INTO PERIPHERAL VEIN, PERCUTANEOUS APPROACH: ICD-10-PCS | Performed by: THORACIC SURGERY (CARDIOTHORACIC VASCULAR SURGERY)

## 2020-01-17 PROCEDURE — 88307 TISSUE EXAM BY PATHOLOGIST: CPT | Performed by: PATHOLOGY

## 2020-01-17 PROCEDURE — 86900 BLOOD TYPING SEROLOGIC ABO: CPT | Performed by: THORACIC SURGERY (CARDIOTHORACIC VASCULAR SURGERY)

## 2020-01-17 PROCEDURE — 88309 TISSUE EXAM BY PATHOLOGIST: CPT | Performed by: PATHOLOGY

## 2020-01-17 PROCEDURE — 32668 THORACOSCOPY W/W RESECT DIAG: CPT | Performed by: THORACIC SURGERY (CARDIOTHORACIC VASCULAR SURGERY)

## 2020-01-17 PROCEDURE — 88305 TISSUE EXAM BY PATHOLOGIST: CPT | Performed by: PATHOLOGY

## 2020-01-17 PROCEDURE — 8E0W4CZ ROBOTIC ASSISTED PROCEDURE OF TRUNK REGION, PERCUTANEOUS ENDOSCOPIC APPROACH: ICD-10-PCS | Performed by: THORACIC SURGERY (CARDIOTHORACIC VASCULAR SURGERY)

## 2020-01-17 PROCEDURE — 86901 BLOOD TYPING SEROLOGIC RH(D): CPT | Performed by: THORACIC SURGERY (CARDIOTHORACIC VASCULAR SURGERY)

## 2020-01-17 PROCEDURE — 88341 IMHCHEM/IMCYTCHM EA ADD ANTB: CPT | Performed by: PATHOLOGY

## 2020-01-17 PROCEDURE — 0BBC8ZX EXCISION OF RIGHT UPPER LUNG LOBE, VIA NATURAL OR ARTIFICIAL OPENING ENDOSCOPIC, DIAGNOSTIC: ICD-10-PCS | Performed by: THORACIC SURGERY (CARDIOTHORACIC VASCULAR SURGERY)

## 2020-01-17 PROCEDURE — 88342 IMHCHEM/IMCYTCHM 1ST ANTB: CPT | Performed by: PATHOLOGY

## 2020-01-17 PROCEDURE — 32674 THORACOSCOPY LYMPH NODE EXC: CPT | Performed by: THORACIC SURGERY (CARDIOTHORACIC VASCULAR SURGERY)

## 2020-01-17 PROCEDURE — 07B74ZX EXCISION OF THORAX LYMPHATIC, PERCUTANEOUS ENDOSCOPIC APPROACH, DIAGNOSTIC: ICD-10-PCS | Performed by: THORACIC SURGERY (CARDIOTHORACIC VASCULAR SURGERY)

## 2020-01-17 PROCEDURE — C9290 INJ, BUPIVACAINE LIPOSOME: HCPCS | Performed by: THORACIC SURGERY (CARDIOTHORACIC VASCULAR SURGERY)

## 2020-01-17 PROCEDURE — 32663 THORACOSCOPY W/LOBECTOMY: CPT | Performed by: THORACIC SURGERY (CARDIOTHORACIC VASCULAR SURGERY)

## 2020-01-17 PROCEDURE — 71045 X-RAY EXAM CHEST 1 VIEW: CPT

## 2020-01-17 RX ORDER — ONDANSETRON 2 MG/ML
INJECTION INTRAMUSCULAR; INTRAVENOUS AS NEEDED
Status: DISCONTINUED | OUTPATIENT
Start: 2020-01-17 | End: 2020-01-17 | Stop reason: SURG

## 2020-01-17 RX ORDER — METOPROLOL TARTRATE 5 MG/5ML
5 INJECTION INTRAVENOUS EVERY 6 HOURS PRN
Status: DISCONTINUED | OUTPATIENT
Start: 2020-01-17 | End: 2020-01-20 | Stop reason: HOSPADM

## 2020-01-17 RX ORDER — DEXAMETHASONE SODIUM PHOSPHATE 10 MG/ML
INJECTION, SOLUTION INTRAMUSCULAR; INTRAVENOUS AS NEEDED
Status: DISCONTINUED | OUTPATIENT
Start: 2020-01-17 | End: 2020-01-17 | Stop reason: SURG

## 2020-01-17 RX ORDER — NEOSTIGMINE METHYLSULFATE 1 MG/ML
INJECTION INTRAVENOUS AS NEEDED
Status: DISCONTINUED | OUTPATIENT
Start: 2020-01-17 | End: 2020-01-17 | Stop reason: SURG

## 2020-01-17 RX ORDER — POLYETHYLENE GLYCOL 3350 17 G/17G
17 POWDER, FOR SOLUTION ORAL DAILY PRN
Status: DISCONTINUED | OUTPATIENT
Start: 2020-01-17 | End: 2020-01-20 | Stop reason: HOSPADM

## 2020-01-17 RX ORDER — ONDANSETRON 2 MG/ML
4 INJECTION INTRAMUSCULAR; INTRAVENOUS ONCE AS NEEDED
Status: DISCONTINUED | OUTPATIENT
Start: 2020-01-17 | End: 2020-01-17 | Stop reason: HOSPADM

## 2020-01-17 RX ORDER — HEPARIN SODIUM 5000 [USP'U]/ML
5000 INJECTION, SOLUTION INTRAVENOUS; SUBCUTANEOUS EVERY 8 HOURS SCHEDULED
Status: DISCONTINUED | OUTPATIENT
Start: 2020-01-17 | End: 2020-01-20 | Stop reason: HOSPADM

## 2020-01-17 RX ORDER — OXYCODONE HYDROCHLORIDE 5 MG/1
5 TABLET ORAL EVERY 4 HOURS PRN
Status: DISCONTINUED | OUTPATIENT
Start: 2020-01-17 | End: 2020-01-20 | Stop reason: HOSPADM

## 2020-01-17 RX ORDER — HEPARIN SODIUM 5000 [USP'U]/ML
5000 INJECTION, SOLUTION INTRAVENOUS; SUBCUTANEOUS
Status: COMPLETED | OUTPATIENT
Start: 2020-01-17 | End: 2020-01-17

## 2020-01-17 RX ORDER — LEVOTHYROXINE SODIUM 0.05 MG/1
100 TABLET ORAL DAILY
Status: DISCONTINUED | OUTPATIENT
Start: 2020-01-18 | End: 2020-01-20 | Stop reason: HOSPADM

## 2020-01-17 RX ORDER — ATORVASTATIN CALCIUM 80 MG/1
80 TABLET, FILM COATED ORAL DAILY
Status: DISCONTINUED | OUTPATIENT
Start: 2020-01-18 | End: 2020-01-20 | Stop reason: HOSPADM

## 2020-01-17 RX ORDER — MAGNESIUM HYDROXIDE 1200 MG/15ML
LIQUID ORAL AS NEEDED
Status: DISCONTINUED | OUTPATIENT
Start: 2020-01-17 | End: 2020-01-17 | Stop reason: HOSPADM

## 2020-01-17 RX ORDER — DOCUSATE SODIUM 100 MG/1
100 CAPSULE, LIQUID FILLED ORAL 2 TIMES DAILY
Status: DISCONTINUED | OUTPATIENT
Start: 2020-01-17 | End: 2020-01-20 | Stop reason: HOSPADM

## 2020-01-17 RX ORDER — SODIUM CHLORIDE 9 MG/ML
INJECTION, SOLUTION INTRAVENOUS CONTINUOUS PRN
Status: DISCONTINUED | OUTPATIENT
Start: 2020-01-17 | End: 2020-01-17 | Stop reason: SURG

## 2020-01-17 RX ORDER — GABAPENTIN 300 MG/1
300 CAPSULE ORAL 3 TIMES DAILY
Status: DISCONTINUED | OUTPATIENT
Start: 2020-01-17 | End: 2020-01-20 | Stop reason: HOSPADM

## 2020-01-17 RX ORDER — GLYCOPYRROLATE 0.2 MG/ML
INJECTION INTRAMUSCULAR; INTRAVENOUS AS NEEDED
Status: DISCONTINUED | OUTPATIENT
Start: 2020-01-17 | End: 2020-01-17 | Stop reason: SURG

## 2020-01-17 RX ORDER — GABAPENTIN 300 MG/1
600 CAPSULE ORAL ONCE
Status: COMPLETED | OUTPATIENT
Start: 2020-01-17 | End: 2020-01-17

## 2020-01-17 RX ORDER — SENNOSIDES 8.6 MG
1 TABLET ORAL DAILY
Status: DISCONTINUED | OUTPATIENT
Start: 2020-01-18 | End: 2020-01-20 | Stop reason: HOSPADM

## 2020-01-17 RX ORDER — ACETAMINOPHEN 325 MG/1
975 TABLET ORAL ONCE
Status: COMPLETED | OUTPATIENT
Start: 2020-01-17 | End: 2020-01-17

## 2020-01-17 RX ORDER — SODIUM CHLORIDE, SODIUM LACTATE, POTASSIUM CHLORIDE, CALCIUM CHLORIDE 600; 310; 30; 20 MG/100ML; MG/100ML; MG/100ML; MG/100ML
INJECTION, SOLUTION INTRAVENOUS CONTINUOUS PRN
Status: DISCONTINUED | OUTPATIENT
Start: 2020-01-17 | End: 2020-01-17 | Stop reason: SURG

## 2020-01-17 RX ORDER — LIDOCAINE HYDROCHLORIDE 10 MG/ML
INJECTION, SOLUTION EPIDURAL; INFILTRATION; INTRACAUDAL; PERINEURAL AS NEEDED
Status: DISCONTINUED | OUTPATIENT
Start: 2020-01-17 | End: 2020-01-17 | Stop reason: SURG

## 2020-01-17 RX ORDER — ONDANSETRON 2 MG/ML
4 INJECTION INTRAMUSCULAR; INTRAVENOUS EVERY 6 HOURS PRN
Status: DISCONTINUED | OUTPATIENT
Start: 2020-01-17 | End: 2020-01-20 | Stop reason: HOSPADM

## 2020-01-17 RX ORDER — KETAMINE HCL IN NACL, ISO-OSM 100MG/10ML
SYRINGE (ML) INJECTION AS NEEDED
Status: DISCONTINUED | OUTPATIENT
Start: 2020-01-17 | End: 2020-01-17 | Stop reason: SURG

## 2020-01-17 RX ORDER — PANTOPRAZOLE SODIUM 20 MG/1
20 TABLET, DELAYED RELEASE ORAL
Status: DISCONTINUED | OUTPATIENT
Start: 2020-01-18 | End: 2020-01-20 | Stop reason: HOSPADM

## 2020-01-17 RX ORDER — HYDROMORPHONE HCL/PF 1 MG/ML
SYRINGE (ML) INJECTION AS NEEDED
Status: DISCONTINUED | OUTPATIENT
Start: 2020-01-17 | End: 2020-01-17 | Stop reason: SURG

## 2020-01-17 RX ORDER — SODIUM CHLORIDE, SODIUM LACTATE, POTASSIUM CHLORIDE, CALCIUM CHLORIDE 600; 310; 30; 20 MG/100ML; MG/100ML; MG/100ML; MG/100ML
50 INJECTION, SOLUTION INTRAVENOUS CONTINUOUS
Status: DISCONTINUED | OUTPATIENT
Start: 2020-01-17 | End: 2020-01-17

## 2020-01-17 RX ORDER — ROCURONIUM BROMIDE 10 MG/ML
INJECTION, SOLUTION INTRAVENOUS AS NEEDED
Status: DISCONTINUED | OUTPATIENT
Start: 2020-01-17 | End: 2020-01-17 | Stop reason: SURG

## 2020-01-17 RX ORDER — HYDROMORPHONE HCL/PF 1 MG/ML
0.2 SYRINGE (ML) INJECTION
Status: DISCONTINUED | OUTPATIENT
Start: 2020-01-17 | End: 2020-01-20 | Stop reason: HOSPADM

## 2020-01-17 RX ORDER — CEFAZOLIN SODIUM 2 G/50ML
SOLUTION INTRAVENOUS AS NEEDED
Status: DISCONTINUED | OUTPATIENT
Start: 2020-01-17 | End: 2020-01-17 | Stop reason: SURG

## 2020-01-17 RX ORDER — ACETAMINOPHEN 325 MG/1
975 TABLET ORAL EVERY 6 HOURS
Status: DISCONTINUED | OUTPATIENT
Start: 2020-01-17 | End: 2020-01-20 | Stop reason: HOSPADM

## 2020-01-17 RX ORDER — LIDOCAINE HYDROCHLORIDE 10 MG/ML
0.5 INJECTION, SOLUTION EPIDURAL; INFILTRATION; INTRACAUDAL; PERINEURAL ONCE AS NEEDED
Status: COMPLETED | OUTPATIENT
Start: 2020-01-17 | End: 2020-01-17

## 2020-01-17 RX ORDER — EPHEDRINE SULFATE 50 MG/ML
INJECTION INTRAVENOUS AS NEEDED
Status: DISCONTINUED | OUTPATIENT
Start: 2020-01-17 | End: 2020-01-17 | Stop reason: SURG

## 2020-01-17 RX ORDER — HYDROMORPHONE HCL/PF 1 MG/ML
0.2 SYRINGE (ML) INJECTION
Status: DISCONTINUED | OUTPATIENT
Start: 2020-01-17 | End: 2020-01-17 | Stop reason: HOSPADM

## 2020-01-17 RX ORDER — PROPOFOL 10 MG/ML
INJECTION, EMULSION INTRAVENOUS AS NEEDED
Status: DISCONTINUED | OUTPATIENT
Start: 2020-01-17 | End: 2020-01-17 | Stop reason: SURG

## 2020-01-17 RX ORDER — SODIUM CHLORIDE 9 MG/ML
INJECTION INTRAVENOUS AS NEEDED
Status: DISCONTINUED | OUTPATIENT
Start: 2020-01-17 | End: 2020-01-17 | Stop reason: HOSPADM

## 2020-01-17 RX ORDER — BUPIVACAINE HYDROCHLORIDE 2.5 MG/ML
INJECTION, SOLUTION EPIDURAL; INFILTRATION; INTRACAUDAL AS NEEDED
Status: DISCONTINUED | OUTPATIENT
Start: 2020-01-17 | End: 2020-01-17 | Stop reason: HOSPADM

## 2020-01-17 RX ORDER — SODIUM CHLORIDE 9 MG/ML
60 INJECTION, SOLUTION INTRAVENOUS CONTINUOUS
Status: DISCONTINUED | OUTPATIENT
Start: 2020-01-17 | End: 2020-01-18

## 2020-01-17 RX ORDER — DILTIAZEM HYDROCHLORIDE 120 MG/1
120 CAPSULE, COATED, EXTENDED RELEASE ORAL DAILY
Status: DISCONTINUED | OUTPATIENT
Start: 2020-01-18 | End: 2020-01-20 | Stop reason: HOSPADM

## 2020-01-17 RX ORDER — METOPROLOL TARTRATE 5 MG/5ML
INJECTION INTRAVENOUS AS NEEDED
Status: DISCONTINUED | OUTPATIENT
Start: 2020-01-17 | End: 2020-01-17 | Stop reason: SURG

## 2020-01-17 RX ORDER — CEFAZOLIN SODIUM 1 G/50ML
1000 SOLUTION INTRAVENOUS ONCE
Status: DISCONTINUED | OUTPATIENT
Start: 2020-01-17 | End: 2020-01-17 | Stop reason: HOSPADM

## 2020-01-17 RX ORDER — FENTANYL CITRATE 50 UG/ML
INJECTION, SOLUTION INTRAMUSCULAR; INTRAVENOUS AS NEEDED
Status: DISCONTINUED | OUTPATIENT
Start: 2020-01-17 | End: 2020-01-17 | Stop reason: SURG

## 2020-01-17 RX ORDER — FENTANYL CITRATE/PF 50 MCG/ML
25 SYRINGE (ML) INJECTION
Status: DISCONTINUED | OUTPATIENT
Start: 2020-01-17 | End: 2020-01-17 | Stop reason: HOSPADM

## 2020-01-17 RX ADMIN — CEFAZOLIN SODIUM 2000 MG: 2 SOLUTION INTRAVENOUS at 12:20

## 2020-01-17 RX ADMIN — GLYCOPYRROLATE 0.6 MG: 0.2 INJECTION, SOLUTION INTRAMUSCULAR; INTRAVENOUS at 15:33

## 2020-01-17 RX ADMIN — NOREPINEPHRINE BITARTRATE 1 MCG/MIN: 1 INJECTION INTRAVENOUS at 12:47

## 2020-01-17 RX ADMIN — HEPARIN SODIUM 5000 UNITS: 5000 INJECTION INTRAVENOUS; SUBCUTANEOUS at 21:58

## 2020-01-17 RX ADMIN — DEXAMETHASONE SODIUM PHOSPHATE 10 MG: 10 INJECTION, SOLUTION INTRAMUSCULAR; INTRAVENOUS at 12:04

## 2020-01-17 RX ADMIN — ACETAMINOPHEN 975 MG: 325 TABLET ORAL at 20:51

## 2020-01-17 RX ADMIN — LIDOCAINE HYDROCHLORIDE 50 MG: 10 INJECTION, SOLUTION EPIDURAL; INFILTRATION; INTRACAUDAL; PERINEURAL at 11:53

## 2020-01-17 RX ADMIN — EPHEDRINE SULFATE 10 MG: 50 INJECTION, SOLUTION INTRAVENOUS at 12:52

## 2020-01-17 RX ADMIN — HYDROMORPHONE HYDROCHLORIDE 0.25 MG: 1 INJECTION, SOLUTION INTRAMUSCULAR; INTRAVENOUS; SUBCUTANEOUS at 15:15

## 2020-01-17 RX ADMIN — ACETAMINOPHEN 975 MG: 325 TABLET ORAL at 10:34

## 2020-01-17 RX ADMIN — LIDOCAINE HYDROCHLORIDE 0.5 ML: 10 INJECTION, SOLUTION EPIDURAL; INFILTRATION; INTRACAUDAL; PERINEURAL at 11:28

## 2020-01-17 RX ADMIN — HYDROMORPHONE HYDROCHLORIDE 0.5 MG: 1 INJECTION, SOLUTION INTRAMUSCULAR; INTRAVENOUS; SUBCUTANEOUS at 13:23

## 2020-01-17 RX ADMIN — CEFAZOLIN SODIUM 2000 MG: 2 SOLUTION INTRAVENOUS at 16:10

## 2020-01-17 RX ADMIN — FENTANYL CITRATE 100 MCG: 50 INJECTION, SOLUTION INTRAMUSCULAR; INTRAVENOUS at 11:53

## 2020-01-17 RX ADMIN — HEPARIN SODIUM 5000 UNITS: 5000 INJECTION INTRAVENOUS; SUBCUTANEOUS at 11:12

## 2020-01-17 RX ADMIN — ROCURONIUM BROMIDE 50 MG: 50 INJECTION, SOLUTION INTRAVENOUS at 11:54

## 2020-01-17 RX ADMIN — PHENYLEPHRINE HYDROCHLORIDE 100 MCG: 10 INJECTION INTRAVENOUS at 11:55

## 2020-01-17 RX ADMIN — ACETAMINOPHEN 975 MG: 325 TABLET ORAL at 23:15

## 2020-01-17 RX ADMIN — NEOSTIGMINE METHYLSULFATE 3 MG: 1 INJECTION INTRAVENOUS at 15:33

## 2020-01-17 RX ADMIN — ROCURONIUM BROMIDE 20 MG: 50 INJECTION, SOLUTION INTRAVENOUS at 14:25

## 2020-01-17 RX ADMIN — SODIUM CHLORIDE, SODIUM LACTATE, POTASSIUM CHLORIDE, AND CALCIUM CHLORIDE: .6; .31; .03; .02 INJECTION, SOLUTION INTRAVENOUS at 14:03

## 2020-01-17 RX ADMIN — SODIUM CHLORIDE 60 ML/HR: 0.9 INJECTION, SOLUTION INTRAVENOUS at 17:40

## 2020-01-17 RX ADMIN — GABAPENTIN 300 MG: 300 CAPSULE ORAL at 20:51

## 2020-01-17 RX ADMIN — SODIUM CHLORIDE, SODIUM LACTATE, POTASSIUM CHLORIDE, AND CALCIUM CHLORIDE: .6; .31; .03; .02 INJECTION, SOLUTION INTRAVENOUS at 14:40

## 2020-01-17 RX ADMIN — ONDANSETRON 4 MG: 2 INJECTION INTRAMUSCULAR; INTRAVENOUS at 15:41

## 2020-01-17 RX ADMIN — Medication 10 MG: at 14:43

## 2020-01-17 RX ADMIN — SODIUM CHLORIDE, SODIUM LACTATE, POTASSIUM CHLORIDE, AND CALCIUM CHLORIDE 50 ML/HR: .6; .31; .03; .02 INJECTION, SOLUTION INTRAVENOUS at 11:26

## 2020-01-17 RX ADMIN — PROPOFOL 120 MG: 10 INJECTION, EMULSION INTRAVENOUS at 11:54

## 2020-01-17 RX ADMIN — FENTANYL CITRATE 100 MCG: 50 INJECTION, SOLUTION INTRAMUSCULAR; INTRAVENOUS at 12:30

## 2020-01-17 RX ADMIN — Medication 20 MG: at 12:47

## 2020-01-17 RX ADMIN — ROCURONIUM BROMIDE 20 MG: 50 INJECTION, SOLUTION INTRAVENOUS at 13:04

## 2020-01-17 RX ADMIN — METOPROLOL TARTRATE 2.5 MG: 5 INJECTION, SOLUTION INTRAVENOUS at 12:30

## 2020-01-17 RX ADMIN — Medication 10 MG: at 13:43

## 2020-01-17 RX ADMIN — GABAPENTIN 600 MG: 300 CAPSULE ORAL at 10:34

## 2020-01-17 RX ADMIN — SODIUM CHLORIDE: 0.9 INJECTION, SOLUTION INTRAVENOUS at 12:02

## 2020-01-17 RX ADMIN — ROCURONIUM BROMIDE 20 MG: 50 INJECTION, SOLUTION INTRAVENOUS at 13:44

## 2020-01-17 NOTE — OP NOTE
OPERATIVE REPORT  PATIENT NAME: Shirin Witt    :  1940  MRN: [id number]  Pt Location: [facility] OR ROOM 14    SURGERY DATE: 2020    Surgeon(s) and Role:     * Bolivar Lombard, MD - Primary     * Tish Michel MD - Assisting    Preop Diagnosis:  Lung mass [R91 8]    Post-Op Diagnosis Codes:     * Lung mass [R91 8]    Procedure(s) (LRB):  FLEXIBLE BRONCHOSCOPY (N/A)  ROBOTIC ASSISTED WEDGE RESECTION (Right)  ROBOTIC ASSISTED LUNG LOBECTOMY (Right)   1  Robotic right upper lobe diagnostic wedge resection followed by completion lobectomy  2  mediastinal lymph node dissection  3  Bronchoscopy    Specimen(s):  ID Type Source Tests Collected by Time Destination   1 : WEDGE RESECTION Tissue Lung, Right Upper Lobe TISSUE EXAM Bolivar Lombard, MD 2020 1252    2 : LEVEL 8 Tissue Lymph Node TISSUE EXAM Bolivar Lombard, MD 2020 1308    3 : LEVEL 7 Tissue Lymph Node TISSUE EXAM Bolivar Lombard, MD 2020 1314    4 : LEVEL 2R Tissue Lymph Node TISSUE EXAM Bolivar Lombard, MD 2020 1320    5 : LEVEL 4R Tissue Lymph Node TISSUE EXAM Bolivar Lombard, MD 2020 1322    6 : LEVEL 4R #2 Tissue Lymph Node TISSUE EXAM Bolivar Lombard, MD 2020 1324    7 : LEVEL 10 ON PULMONARY ARTERY Tissue Lymph Node TISSUE EXAM Bolivar Lombard, MD 2020 1340    8 : Level 11 - On Bronchus Tissue Lymph Node TISSUE EXAM Bolivar Lombard, MD 2020 1504    9 : Level 11 - On Bronchus #2 Tissue Lymph Node TISSUE Niki Severin, MD 2020 1506    10 : Right Upper Lobe - Completion Lobectomy Tissue Lung, Right Upper Lobe TISSUE EXAM Bolivar Lombard, MD 2020 1514        Estimated Blood Loss:   75 mL    Drains:  Chest Tube Right Pleural 28 Fr  (Active)   Number of days: 0       Urethral Catheter Non-latex 16 Fr   (Active)   Number of days: 0       Anesthesia Type:   General    Operative Indications:  Lung mass [R808]  78-year-old female Faith with history of thyroid cancer, colon cancer, appendiceal cancer, right breast mass, atrial fibrillation on Eliquis and a distant 20 pack year smoking history who has a 2 1 x 2 0 cm PET avid right upper lobe lung mass concerning for primary lung cancer    Operative Findings:  Right upper lobe wedge resection sent for frozen pathology  Showed conclusive evidence of malignancy  Performed completion lobectomy  Complications:   None    Procedure and Technique:  After obtaining informed consent from the patient, they were transferred to the operating room and placed supine on the operating table  Bilateral SCDs were placed and turned on prior to induction of general anesthesia  General anesthesia was then induced and a double-lumen endotracheal tube was placed without incident  Positioning of the double-lumen tube was verified with a pediatric bronchoscope  The patient was then positioned in a left lateral decubitus position with the table fully flexed  The left arm was secured to an armboard and the right arm was positioned safely in a arm benites  Rolled blankets were used for support anterior and posterior and pillows were placed between her legs  All bony prominences were padded and checked  Positioning of double-lumen endotracheal tube was verified at this time      Next a formal time-out was called verifying patient , date of birth, procedure, consent, antibiotics, beta-blocker as indicated, anticipated specimens with handling, SCD use and other special considerations       A bronchoscopy was performed through the double-lumen endotracheal tube  There were no endobronchial masses or obstructions identified  All secretions were suctioned out and positioning of the double lumen tube was again verified  There was no suspicion or identified risk for TB or other air board infectious disease  This bronchoscopy was being performed for diagnostic purposes only       The right chest was then prepped and draped in the standard sterile fashion  Anesthesia clamped and placed suction to the right lung at this time to help desufflate  Bony landmarks were identified and planned incisions were mapped out  An 8 mm incision was made in the posterior axillary line 7th interspace for the camera port  The camera was inserted verifying that we are in the thoracic cavity and insufflation was initiated to 8 mm of mercury  A thorough thoracoscopy was carried out at this time  There were no significant adhesions  Next an intercostal nerve block was performed using 60 mL of a mixture 1/2 percent Marcaine, normal saline and liposomal bupivacaine (Exparel)  Rib spaces 3 through 10 were identified and using a percutaneous approach a block was placed into each of these ribs spaces with approximately 5mL of this mixture  The remainder of the robotic ports were placed at this time  An 8 mm incision was made as far posterior as possible in the 8th interspace in the posterior port was placed under direct visualization  An 12 mm incision was made have was made prison in between the camera and arm 1 port and this port was placed under direct visualization  Anteriorly a 12 mm incision was made as anterior and inferior as possible along the costal margin with the insertion point just above the diaphragm  The robotic stapling port was placed through this under direct visualization  Finally a 15 mm incision was made and a 15 mm assistant port was made prison between the camera and arm 1 and as inferior as possible staying above the diaphragm  The robot was docked at this time  A caudier forceps was inserted into arm 2, [place] bipolar grasper into arm 4 and tip up fenestrated grasper into arm 1  I un-scrubbed at this time and went to the console      We began by examining the right upper lobe  We could clearly see an abnormal section of the posterior segment in the fissure  This was non adherent to the right lower lobe    We performed a generous wedge resection of the right upper lobe with multiple robotic staple firings of blue and green load stapler  This was removed in a thoracoscopic bag and sent for frozen pathology  While awaiting frozen pathology we continued her mediastinal lymph node dissection  Eventually frozen pathology came back with conclusive evidence of non-small cell lung cancer  For our lymph node dissection, we began by taking down the inferior pulmonary ligament with bipolar cautery  This dissection was continued towards the hilum to the level of the inferior pulmonary vein  We looked for any possible level 8 or level 9 lymph nodes  There was 1 small possible level 8 lymph node that was removed and sent for pathology  There are no other visible lymph nodes in this region  We continued the posterior pleural dissection up towards the level of the azygos vein  We dissected the right upper and lower lobes off of the right mainstem bronchus, bronchus intermedius and right upper lobe bronchus reflecting the lung anteriorly  Next we dissected out the subcarinal level 7 lymph node packet  This was removed completely and sent for permanent pathology  We continued to mobilize as much of the upper and lower lobe is possible from the posterior aspect  We cleared off the apical hilar attachments      The lung was then reflected downward exposing the level 4R and 2R genevieve space  We entered into this space with bipolar cautery  Two large genevieve packets were identified, removed and sent for permanent pathology  We then went to the fissure  The fissure was incomplete and needed to be carefully and meticulously  with bipolar cautery  I eventually dissected down onto a large level 10 lymph node on the PA and followed this back posteriorly  This eventually met up with our posterior dissection    A vessel loop was passed through this space and the major fissure between the upper and lower lobes was stapled with a 45 mm blue load stapler  Division of the right upper lobe and lower lobe revealed the underlying pulmonary artery  A large level 10 lymph node on the PA was removed from this area and sent for permanent pathology  We then dissected out the right upper lobe posterior ascending arterial branch  This was stapled with a 30 mm white load stapler  We continued our dissection on the pulmonary artery moving anteriorly  The minor fissure between the right upper lobe and right middle lobe was incomplete  Due to this we turned to the anterior hilum     The superior pulmonary vein was identified and dissected free  I dissected out individual branches and identified the right middle lobe and right upper lobe branches of the superior pulmonary vein  The right upper lobe pulmonary vein was encircled with a vessel loop and stapled with a 45 mm white load stapler  Division of these veins revealed the truncus arteriosus behind this  A large apical right level 10 lymph node was dissected off of the truncus arteriosus  This was removed completely and sent for permanent pathology  I then dissected circumferentially around the truncus arteriosus  A vessel loop was passed around here and this was stapled with a 45 mm white load stapler      We then completed the minor fissure with multiple robotic 45 mm blue load staple firings  The right upper lobe bronchus was all that remained at this time  We dissected out a large level 11 lymph node on the bronchus and sent this for permanent pathology  We then clamped the right upper lobe bronchus with a robotic 45 mm green load stapler  Anesthesia and inflated the lung with good aeration of the right middle lobe and right lower lobe  We then stapled the upper lobe bronchus dividing our specimen      The right upper lobe completion lobectomy specimen was placed in a 15 mm thoracoscopic anchor bag and removed through the assistant port  This was sent for permanent pathology      The right chest was thoroughly explored and aspirated dry  There was no appreciable bleeding seen  All robotic ports were removed under direct visualization and inspected for bleeding  Once satisfied with hemostasis, a 29 [place] straight chest tube was placed through the camera port and the left upper lobe was then insufflated by anesthesia  This came up without incident and the camera was removed at this time      The 28 Malagasy chest tube was secured to the chest wall with an 0 Prolene  An 0 Prolene U-stitch was also placed for help closing this incision following chest tube removal  The assistant port and specimen removal site was closed with running 0 Vicryl in the deep layers, 3-0 Vicryl in the soft tissue and 4 0 Monocryl for skin  The remainder of the ports were closed with interrupted 3-0 Vicryl and 4 Monocryl  Surgical glue was applied to all incisions  The chest tube was placed to a [place] Pleural drainage device to water seal   The initial airleak output was 20 mL per minute       The patient extubated in the operating room and was [facility]    There were transferred to the PACU in stable condition       I was present for the entire procedure    Patient Disposition:  PACU     SIGNATURE: Narayan Márquez MD  DATE: January 17, 2020  TIME: 3:51 PM

## 2020-01-17 NOTE — ANESTHESIA POSTPROCEDURE EVALUATION
Post-Op Assessment Note    CV Status:  Stable  Pain Score: 0    Pain management: adequate     Mental Status:  Awake and lethargic   Hydration Status:  Euvolemic   PONV Controlled:  Controlled   Airway Patency:  Patent   Post Op Vitals Reviewed: Yes      Staff: CRNA           BP   123/84   Temp  97 3   Pulse  102   Resp   18   SpO2   100% on 5L simple mask

## 2020-01-17 NOTE — INTERVAL H&P NOTE
H&P reviewed  After examining the patient I find no changes in the patients condition since the H&P had been written  We did go over her wishes again for blood products  She is a [employer] and does not want to receive any whole blood products, even in a life-threatening situation  She is OK with receiving albumin  We discussed that blood transfusion is extremely rare in these cases but is theoretically possible  S he reiterated that if it were a life or death situation she still would not want to receive blood products

## 2020-01-17 NOTE — ANESTHESIA PREPROCEDURE EVALUATION
Review of Systems/Medical History  Patient summary reviewed  Chart reviewed  No history of anesthetic complications     Cardiovascular  EKG reviewed, Exercise tolerance (METS): >4,  Hyperlipidemia, Hypertension controlled, Dysrhythmias , atrial fibrillation and atrial flutter,    Pulmonary  Smoker ex-smoker  ,   Comment: Lung mass     GI/Hepatic  Negative GI/hepatic ROS   GERD well controlled,        Negative  ROS        Endo/Other  History of thyroid disease , hypothyroidism,      GYN  Negative gynecology ROS          Hematology    Coagulation disorder (Last eliquis 1/14/20) currently taking oral anticoagulants,   Comment: [employer]- will not rec' blood products Musculoskeletal    Arthritis     Neurology  Negative neurology ROS      Psychology   Negative psychology ROS              Physical Exam    Airway    Mallampati score: II  TM Distance: <3 FB  Neck ROM: full     Dental       Cardiovascular  Rhythm: regular, Rate: normal,     Pulmonary  Breath sounds clear to auscultation,     Other Findings  12/2/2019 EKG  Atrial flutter with variable A-V block  Low voltage QRS  Incomplete right bundle branch block    TTE 8/2017  LVEF 60%, normal RV    Stress echo 9/2017- negative for ischemia, METs 7      Anesthesia Plan  ASA Score- 3     Anesthesia Type- general with ASA Monitors  Additional Monitors: arterial line  Airway Plan: ETT  Comment: LIV  Plan Factors-Patient not instructed to abstain from smoking on day of procedure  Patient did not smoke on day of surgery  Induction- intravenous  Postoperative Plan- Plan for postoperative opioid use  Planned trial extubation    Informed Consent- Anesthetic plan and risks discussed with patient  I personally reviewed this patient with the CRNA  Discussed and agreed on the Anesthesia Plan with the CRNA  Demetrius Delgado

## 2020-01-18 ENCOUNTER — APPOINTMENT (INPATIENT)
Dept: RADIOLOGY | Facility: HOSPITAL | Age: 80
DRG: 165 | End: 2020-01-18
Payer: COMMERCIAL

## 2020-01-18 LAB
ANION GAP SERPL CALCULATED.3IONS-SCNC: 5 MMOL/L (ref 4–13)
BUN SERPL-MCNC: 11 MG/DL (ref 5–25)
CALCIUM SERPL-MCNC: 7.2 MG/DL (ref 8.3–10.1)
CHLORIDE SERPL-SCNC: 115 MMOL/L (ref 100–108)
CO2 SERPL-SCNC: 24 MMOL/L (ref 21–32)
CREAT SERPL-MCNC: 0.86 MG/DL (ref 0.6–1.3)
ERYTHROCYTE [DISTWIDTH] IN BLOOD BY AUTOMATED COUNT: 14.1 % (ref 11.6–15.1)
GFR SERPL CREATININE-BSD FRML MDRD: 73 ML/MIN/1.73SQ M
GLUCOSE SERPL-MCNC: 112 MG/DL (ref 65–140)
HCT VFR BLD AUTO: 31.2 % (ref 34.8–46.1)
HGB BLD-MCNC: 10.2 G/DL (ref 11.5–15.4)
MCH RBC QN AUTO: 33.3 PG (ref 26.8–34.3)
MCHC RBC AUTO-ENTMCNC: 32.7 G/DL (ref 31.4–37.4)
MCV RBC AUTO: 102 FL (ref 82–98)
PLATELET # BLD AUTO: 158 THOUSANDS/UL (ref 149–390)
PMV BLD AUTO: 9.5 FL (ref 8.9–12.7)
POTASSIUM SERPL-SCNC: 3.7 MMOL/L (ref 3.5–5.3)
RBC # BLD AUTO: 3.06 MILLION/UL (ref 3.81–5.12)
SODIUM SERPL-SCNC: 144 MMOL/L (ref 136–145)
WBC # BLD AUTO: 8.4 THOUSAND/UL (ref 4.31–10.16)

## 2020-01-18 PROCEDURE — 99222 1ST HOSP IP/OBS MODERATE 55: CPT | Performed by: INTERNAL MEDICINE

## 2020-01-18 PROCEDURE — 85027 COMPLETE CBC AUTOMATED: CPT | Performed by: STUDENT IN AN ORGANIZED HEALTH CARE EDUCATION/TRAINING PROGRAM

## 2020-01-18 PROCEDURE — 80048 BASIC METABOLIC PNL TOTAL CA: CPT | Performed by: STUDENT IN AN ORGANIZED HEALTH CARE EDUCATION/TRAINING PROGRAM

## 2020-01-18 PROCEDURE — 71046 X-RAY EXAM CHEST 2 VIEWS: CPT

## 2020-01-18 PROCEDURE — 99024 POSTOP FOLLOW-UP VISIT: CPT | Performed by: THORACIC SURGERY (CARDIOTHORACIC VASCULAR SURGERY)

## 2020-01-18 PROCEDURE — 97163 PT EVAL HIGH COMPLEX 45 MIN: CPT

## 2020-01-18 PROCEDURE — 99024 POSTOP FOLLOW-UP VISIT: CPT | Performed by: PHYSICIAN ASSISTANT

## 2020-01-18 RX ORDER — POTASSIUM CHLORIDE 20 MEQ/1
40 TABLET, EXTENDED RELEASE ORAL ONCE
Status: COMPLETED | OUTPATIENT
Start: 2020-01-18 | End: 2020-01-18

## 2020-01-18 RX ORDER — POTASSIUM CHLORIDE 14.9 MG/ML
20 INJECTION INTRAVENOUS ONCE
Status: DISCONTINUED | OUTPATIENT
Start: 2020-01-18 | End: 2020-01-18

## 2020-01-18 RX ADMIN — HEPARIN SODIUM 5000 UNITS: 5000 INJECTION INTRAVENOUS; SUBCUTANEOUS at 06:11

## 2020-01-18 RX ADMIN — ATORVASTATIN CALCIUM 80 MG: 80 TABLET, FILM COATED ORAL at 08:34

## 2020-01-18 RX ADMIN — DOCUSATE SODIUM 100 MG: 100 CAPSULE, LIQUID FILLED ORAL at 08:34

## 2020-01-18 RX ADMIN — GABAPENTIN 300 MG: 300 CAPSULE ORAL at 08:34

## 2020-01-18 RX ADMIN — OXYCODONE HYDROCHLORIDE 5 MG: 5 TABLET ORAL at 19:59

## 2020-01-18 RX ADMIN — DOCUSATE SODIUM 100 MG: 100 CAPSULE, LIQUID FILLED ORAL at 18:07

## 2020-01-18 RX ADMIN — DILTIAZEM HYDROCHLORIDE 120 MG: 120 CAPSULE, COATED, EXTENDED RELEASE ORAL at 08:34

## 2020-01-18 RX ADMIN — LEVOTHYROXINE SODIUM 100 MCG: 100 TABLET ORAL at 06:12

## 2020-01-18 RX ADMIN — ACETAMINOPHEN 975 MG: 325 TABLET ORAL at 11:56

## 2020-01-18 RX ADMIN — SENNOSIDES 8.6 MG: 8.6 TABLET, FILM COATED ORAL at 08:34

## 2020-01-18 RX ADMIN — GABAPENTIN 300 MG: 300 CAPSULE ORAL at 18:07

## 2020-01-18 RX ADMIN — ACETAMINOPHEN 975 MG: 325 TABLET ORAL at 06:11

## 2020-01-18 RX ADMIN — POTASSIUM CHLORIDE 40 MEQ: 1500 TABLET, EXTENDED RELEASE ORAL at 11:55

## 2020-01-18 RX ADMIN — PANTOPRAZOLE SODIUM 20 MG: 20 TABLET, DELAYED RELEASE ORAL at 06:12

## 2020-01-18 RX ADMIN — HEPARIN SODIUM 5000 UNITS: 5000 INJECTION INTRAVENOUS; SUBCUTANEOUS at 22:05

## 2020-01-18 RX ADMIN — HEPARIN SODIUM 5000 UNITS: 5000 INJECTION INTRAVENOUS; SUBCUTANEOUS at 14:00

## 2020-01-18 RX ADMIN — GABAPENTIN 300 MG: 300 CAPSULE ORAL at 20:00

## 2020-01-18 RX ADMIN — ACETAMINOPHEN 975 MG: 325 TABLET ORAL at 18:07

## 2020-01-18 NOTE — RESTORATIVE TECHNICIAN NOTE
Restorative Specialist Mobility Note       Activity: Ambulate in room, Ambulate in cuellar, [address] privileges, Chair, Dangle, Stand at bedside(Educated/encouraged pt to ambulate with assistance 3-4 x's/day   Pt callbell, phone/tray within reach )     Assistive Device: Other (Comment)(HHA x1-2, Assisted PT [place] )       Patricia Puckett BS, Restorative Technician, [employer]

## 2020-01-18 NOTE — ASSESSMENT & PLAN NOTE
1/17 robotic-assisted thoracoscopic RUL wedge resection and completion lobectomy, mediastinal LN dissection    Plan:  Regular  D/c IVF  D/c [place]  D/c smith  Labs this am only  R CT -8  Prn pain control  IS/OOB/ambualte  DVT pptx

## 2020-01-18 NOTE — PLAN OF CARE
Problem: Prexisting or High Potential for Compromised Skin Integrity  Goal: Skin integrity is maintained or improved  Description  INTERVENTIONS:  - Identify patients at risk for skin breakdown  - Assess and monitor skin integrity  - Assess and monitor nutrition and hydration status  - Monitor labs   - Assess for incontinence   - Turn and reposition patient  - Assist with mobility/ambulation  - Relieve pressure over bony prominences  - Avoid friction and shearing  - Provide appropriate hygiene as needed including keeping skin clean and dry  - Evaluate need for skin moisturizer/barrier cream  - Collaborate with interdisciplinary team   - Patient/family teaching  - Consider wound care consult   Outcome: Progressing     Problem: PAIN - ADULT  Goal: Verbalizes/displays adequate comfort level or baseline comfort level  Description  Interventions:  - Encourage patient to monitor pain and request assistance  - Assess pain using appropriate pain scale  - Administer analgesics based on type and severity of pain and evaluate response  - Implement non-pharmacological measures as appropriate and evaluate response  - Consider cultural and social influences on pain and pain management  - Notify physician/advanced practitioner if interventions unsuccessful or patient reports new pain  Outcome: Progressing     Problem: INFECTION - ADULT  Goal: Absence or prevention of progression during hospitalization  Description  INTERVENTIONS:  - Assess and monitor for signs and symptoms of infection  - Monitor lab/diagnostic results  - Monitor all insertion sites, i e  indwelling lines, tubes, and drains  - Monitor endotracheal if appropriate and nasal secretions for changes in amount and color  - [place] appropriate cooling/warming therapies per order  - Administer medications as ordered  - Instruct and encourage patient and family to use good hand hygiene technique  - Identify and instruct in appropriate isolation precautions for identified infection/condition  Outcome: Progressing  Goal: Absence of fever/infection during neutropenic period  Description  INTERVENTIONS:  - Monitor WBC    Outcome: Progressing     Problem: SAFETY ADULT  Goal: Patient will remain free of falls  Description  INTERVENTIONS:  - Assess patient frequently for physical needs  -  Identify cognitive and physical deficits and behaviors that affect risk of falls    -  [place] fall precautions as indicated by assessment   - Educate patient/family on patient safety including physical limitations  - Instruct patient to call for assistance with activity based on assessment  - Modify environment to reduce risk of injury  - Consider OT/PT consult to assist with strengthening/mobility  Outcome: Progressing  Goal: Maintain or return to baseline ADL function  Description  INTERVENTIONS:  -  Assess patient's ability to carry out ADLs; assess patient's baseline for ADL function and identify physical deficits which impact ability to perform ADLs (bathing, care of mouth/teeth, toileting, grooming, dressing, etc )  - Assess/evaluate cause of self-care deficits   - Assess range of motion  - Assess patient's mobility; develop plan if impaired  - Assess patient's need for assistive devices and provide as appropriate  - Encourage maximum independence but intervene and supervise when necessary  - Involve family in performance of ADLs  - Assess for home care needs following discharge   - Consider OT consult to assist with ADL evaluation and planning for discharge  - Provide patient education as appropriate  Outcome: Progressing  Goal: Maintain or return mobility status to optimal level  Description  INTERVENTIONS:  - Assess patient's baseline mobility status (ambulation, transfers, stairs, etc )    - Identify cognitive and physical deficits and behaviors that affect mobility  - Identify mobility aids required to assist with transfers and/or ambulation (gait belt, sit-to-stand, lift, walker, cane, etc )  - [place] fall precautions as indicated by assessment  - Record patient progress and toleration of activity level on Mobility SBAR; progress patient to next Phase/Stage  - Instruct patient to call for assistance with activity based on assessment  - Consider rehabilitation consult to assist with strengthening/weightbearing, etc   Outcome: Progressing     Problem: DISCHARGE PLANNING  Goal: Discharge to home or other facility with appropriate resources  Description  INTERVENTIONS:  - Identify barriers to discharge w/patient and caregiver  - Arrange for needed discharge resources and transportation as appropriate  - Identify discharge learning needs (meds, wound care, etc )  - Arrange for interpretive services to assist at discharge as needed  - Refer to Case Management Department for coordinating discharge planning if the patient needs post-hospital services based on physician/advanced practitioner order or complex needs related to functional status, cognitive ability, or social support system  Outcome: Progressing     Problem: Knowledge Deficit  Goal: Patient/family/caregiver demonstrates understanding of disease process, treatment plan, medications, and discharge instructions  Description  Complete learning assessment and assess knowledge base    Interventions:  - Provide teaching at level of understanding  - Provide teaching via preferred learning methods  Outcome: Progressing     Problem: NEUROSENSORY - ADULT  Goal: Achieves stable or improved neurological status  Description  INTERVENTIONS  - Monitor and report changes in neurological status  - Monitor vital signs such as temperature, blood pressure, glucose, and any other labs ordered   - Initiate measures to prevent increased intracranial pressure  - Monitor for seizure activity and implement precautions if appropriate      Outcome: Progressing  Goal: Achieves maximal functionality and self care  Description  INTERVENTIONS  - Monitor swallowing and airway patency with patient fatigue and changes in neurological status  - Encourage and assist patient to increase activity and self care     - Encourage visually impaired, hearing impaired and aphasic patients to use assistive/communication devices  Outcome: Progressing     Problem: CARDIOVASCULAR - ADULT  Goal: Maintains optimal cardiac output and hemodynamic stability  Description  INTERVENTIONS:  - Monitor I/O, vital signs and rhythm  - Monitor for S/S and trends of decreased cardiac output  - Administer and titrate ordered vasoactive medications to optimize hemodynamic stability  - Assess quality of pulses, skin color and temperature  - Assess for signs of decreased coronary artery perfusion  - Instruct patient to report change in severity of symptoms  Outcome: Progressing  Goal: Absence of cardiac dysrhythmias or at baseline rhythm  Description  INTERVENTIONS:  - Continuous cardiac monitoring, vital signs, obtain 12 lead EKG if ordered  - Administer antiarrhythmic and heart rate control medications as ordered  - Monitor electrolytes and administer replacement therapy as ordered  Outcome: Progressing     Problem: RESPIRATORY - ADULT  Goal: Achieves optimal ventilation and oxygenation  Description  INTERVENTIONS:  - Assess for changes in respiratory status  - Assess for changes in mentation and behavior  - Position to facilitate oxygenation and minimize respiratory effort  - Oxygen administered by appropriate delivery if ordered  - Initiate smoking cessation education as indicated  - Encourage broncho-pulmonary hygiene including cough, deep breathe, Incentive Spirometry  - Assess the need for suctioning and aspirate as needed  - Assess and instruct to report SOB or any respiratory difficulty  - Respiratory Therapy support as indicated  Outcome: Progressing     Problem: GASTROINTESTINAL - ADULT  Goal: Minimal or absence of nausea and/or vomiting  Description  INTERVENTIONS:  - Administer IV fluids if ordered to ensure adequate hydration  - Maintain NPO status until nausea and vomiting are resolved  - Nasogastric tube if ordered  - Administer ordered antiemetic medications as needed  - Provide nonpharmacologic comfort measures as appropriate  - Advance diet as tolerated, if ordered  - Consider nutrition services referral to assist patient with adequate nutrition and appropriate food choices  Outcome: Progressing  Goal: Maintains or returns to baseline bowel function  Description  INTERVENTIONS:  - Assess bowel function  - Encourage oral fluids to ensure adequate hydration  - Administer IV fluids if ordered to ensure adequate hydration  - Administer ordered medications as needed  - Encourage mobilization and activity  - Consider nutritional services referral to assist patient with adequate nutrition and appropriate food choices  Outcome: Progressing  Goal: Maintains adequate nutritional intake  Description  INTERVENTIONS:  - Monitor percentage of each meal consumed  - Identify factors contributing to decreased intake, treat as appropriate  - Assist with meals as needed  - Monitor I&O, weight, and lab values if indicated  - Obtain nutrition services referral as needed  Outcome: Progressing     Problem: GENITOURINARY - ADULT  Goal: Maintains or returns to baseline urinary function  Description  INTERVENTIONS:  - Assess urinary function  - Encourage oral fluids to ensure adequate hydration if ordered  - Administer IV fluids as ordered to ensure adequate hydration  - Administer ordered medications as needed  - Offer frequent toileting  - Follow urinary retention protocol if ordered  Outcome: Progressing  Goal: Absence of urinary retention  Description  INTERVENTIONS:  - Assess patients ability to void and empty bladder  - Monitor I/O  - Bladder scan as needed  - Discuss with physician/AP medications to alleviate retention as needed  - Discuss catheterization for long term situations as appropriate  Outcome: Progressing  Goal: Urinary catheter remains patent  Description  INTERVENTIONS:  - Assess patency of urinary catheter  - If patient has a chronic smith, consider changing catheter if non-functioning  - Follow guidelines for intermittent irrigation of non-functioning urinary catheter  Outcome: Progressing     Problem: METABOLIC, FLUID AND ELECTROLYTES - ADULT  Goal: Electrolytes maintained within normal limits  Description  INTERVENTIONS:  - Monitor labs and assess patient for signs and symptoms of electrolyte imbalances  - Administer electrolyte replacement as ordered  - Monitor response to electrolyte replacements, including repeat lab results as appropriate  - Instruct patient on fluid and nutrition as appropriate  Outcome: Progressing  Goal: Fluid balance maintained  Description  INTERVENTIONS:  - Monitor labs   - Monitor I/O and WT  - Instruct patient on fluid and nutrition as appropriate  - Assess for signs & symptoms of volume excess or deficit  Outcome: Progressing  Goal: Glucose maintained within target range  Description  INTERVENTIONS:  - Monitor Blood Glucose as ordered  - Assess for signs and symptoms of hyperglycemia and hypoglycemia  - Administer ordered medications to maintain glucose within target range  - Assess nutritional intake and initiate nutrition service referral as needed  Outcome: Progressing     Problem: SKIN/TISSUE INTEGRITY - ADULT  Goal: Skin integrity remains intact  Description  INTERVENTIONS  - Identify patients at risk for skin breakdown  - Assess and monitor skin integrity  - Assess and monitor nutrition and hydration status  - Monitor labs (i e  albumin)  - Assess for incontinence   - Turn and reposition patient  - Assist with mobility/ambulation  - Relieve pressure over bony prominences  - Avoid friction and shearing  - Provide appropriate hygiene as needed including keeping skin clean and dry  - Evaluate need for skin moisturizer/barrier cream  - Collaborate with interdisciplinary team (i e  Nutrition, Rehabilitation, etc )   - Patient/family teaching  Outcome: Progressing  Goal: Incision(s), wounds(s) or drain site(s) healing without S/S of infection  Description  INTERVENTIONS  - Assess and document risk factors for skin impairment   - Assess and document dressing, incision, wound bed, drain sites and surrounding tissue  - Consider nutrition services referral as needed  - Oral mucous membranes remain intact  - Provide patient/ family education  Outcome: Progressing  Goal: Oral mucous membranes remain intact  Description  INTERVENTIONS  - Assess oral mucosa and hygiene practices  - Implement preventative oral hygiene regimen  - Implement oral medicated treatments as ordered  - Initiate Nutrition services referral as needed  Outcome: Progressing     Problem: HEMATOLOGIC - ADULT  Goal: Maintains hematologic stability  Description  INTERVENTIONS  - Assess for signs and symptoms of bleeding or hemorrhage  - Monitor labs  - Administer supportive blood products/factors as ordered and appropriate  Outcome: Progressing     Problem: MUSCULOSKELETAL - ADULT  Goal: Maintain or return mobility to safest level of function  Description  INTERVENTIONS:  - Assess patient's ability to carry out ADLs; assess patient's baseline for ADL function and identify physical deficits which impact ability to perform ADLs (bathing, care of mouth/teeth, toileting, grooming, dressing, etc )  - Assess/evaluate cause of self-care deficits   - Assess range of motion  - Assess patient's mobility  - Assess patient's need for assistive devices and provide as appropriate  - Encourage maximum independence but intervene and supervise when necessary  - Involve family in performance of ADLs  - Assess for home care needs following discharge   - Consider OT consult to assist with ADL evaluation and planning for discharge  - Provide patient education as appropriate  Outcome: Progressing

## 2020-01-18 NOTE — PROGRESS NOTES
01/18/20    Procedure: Chest tube removal    Right chest tube removed in routine fashion without incident  The patient tolerated the procedure well  A dry, sterile dressing was placed  Will check a pa/lat chest x-ray        Elva Gonzales PA-C

## 2020-01-18 NOTE — CONSULTS
Consultation - Nephrology   Lexis Sanders 78 y o  female MRN: [id number]  Unit/Bed#: [facility] 407-01 Encounter: [id number]      Assessment/Plan:  1  Chronic kidney disease, stage III, baseline creatinine appears to be between 0 9 and 1 2 with a GFR of approximately 49-60, current creatinine stable at 0 86  2  Lung mass, status post robotic assisted right upper lobe wedge resection/mediastinal lymph node dissection  3  History of thyroid, colon as well as appendiceal cancer  4  History of atrial fibrillation previously on Eliquis  5  Hypertension, blood pressure appears stable    Plan:  · Overall renal function remains quite stable  · Patient tolerating diet, IV fluids discontinue  · Blood pressure and volume status stable  · No changes in current regimen    History of Present Illness   Physician Requesting Consult: Catarina Amezcua MD  Reason for Consult / Principal Problem:  CKD  HPI: Lexis Sanders is a 78y o  year old female who presents with elective admission for right upper lobe wedge resection  Patient is a 78-year-old female with a past medical history significant for lung mass, electively admitted for right upper lobe wedge resection  Operative course blood pressure stable, urine output 275 cc, estimated blood loss 75 cc    Currently patient is awake alert  Pain seems well controlled  Denies any worsening shortness of breath  Appetite seems to be stable without episodes of nausea vomiting diarrhea  Urine output adequate at 1 2 L    History obtained from chart review and the patient    Review of Systems    Pertinent findings of a 10 point review of systems noted above otherwise all others negative    Historical Information   Patient Active Problem List   Diagnosis    Adenoma, adrenocortical    [facility]-fib [facility]    Benign essential hypertension    Coagulopathy ([facility] )    History of colon cancer    Overweight (BMI 25 0-29  9)    Osteoarthritis    Postoperative hypothyroidism    Thyroid cancer ([facility] )    Pulmonary nodules    Mixed hyperlipidemia    Medicare annual wellness visit, subsequent    Osteopenia of multiple sites    Malar rash    Age-related nuclear cataract of left eye    Preop examination    Atypical ductal hyperplasia of right breast    Dense breast tissue    Lung mass     Past Medical History:   Diagnosis Date    Allergic reaction     Anesthesia     "cheap date"    Arthritis     Atrial fibrillation (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[facility] )     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     Prediabetes     Refusal of blood transfusions as patient is Yarsani     Thyroid cancer ([place] [address] ) 2009    Tic disorder, transient, recurrent     not recent    Vaginal Pap smear 07/03/2017    WNL    Walks frequently     Walks frequently      Past Surgical History:   Procedure Laterality Date    APPENDECTOMY      ARTHROSCOPY KNEE      BREAST BIOPSY Right 04/10/2017    US guided  benign    BREAST BIOPSY Right 10/24/2019    ADH    CARDIOVASCULAR STRESS TEST      CATARACT EXTRACTION Bilateral     COLON SURGERY      COLONOSCOPY      done 2010 due 2015 Dr Kuo    DILATION AND CURETTAGE OF UTERUS      ESOPHAGOGASTRODUODENOSCOPY      inflammation aonly    HEMICOLECTOMY Right 1977    HYSTERECTOMY      JOINT REPLACEMENT      L knee    MAMMO STEREOTACTIC BREAST BIOPSY RIGHT (ALL INC) Right 10/24/2019    PARATHYROID GLAND SURGERY      exploration  REPLACEMENT TOTAL KNEE      TOTAL ABDOMINAL HYSTERECTOMY  1983    TOTAL THYROIDECTOMY      US GUIDED BREAST BIOPSY RIGHT COMPLETE Right 4/10/2017     Social History   Social History     Substance and Sexual Activity   Alcohol Use Yes    Alcohol/week: 5 0 standard drinks    Types: 5 Cans of beer per week    Frequency: 4 or more times a week    Drinks per session: 1 or 2    Binge frequency: Never     Social History     Substance and Sexual Activity   Drug Use No     Social History     Tobacco Use   Smoking Status Former Smoker    Packs/day: 1 50    Years: 20 00    Pack years: 30 00    Types: Cigarettes    Last attempt to quit: 1967    Years since quittin 0   Smokeless Tobacco Never Used     Family History   Problem Relation Age of Onset    Esophageal cancer Mother         [de-identified]    Hypertension Mother     Stroke Family         TIA    Breast cancer Paternal Aunt 80    No Known Problems Father     COPD Sister     COPD Sister     No Known Problems Maternal Grandmother     No Known Problems Maternal Grandfather     No Known Problems Paternal Grandmother     No Known Problems Paternal Grandfather     No Known Problems Maternal Aunt     No Known Problems Maternal Aunt     No Known Problems Maternal Aunt     No Known Problems Maternal Aunt     No Known Problems Maternal Aunt        Meds/Allergies   current meds:   Current Facility-Administered Medications   Medication Dose Route Frequency    acetaminophen (TYLENOL) tablet 975 mg  975 mg Oral Q6H    atorvastatin (LIPITOR) tablet 80 mg  80 mg Oral Daily    diltiazem (CARDIZEM CD) 24 hr capsule 120 mg  120 mg Oral Daily    docusate sodium (COLACE) capsule 100 mg  100 mg Oral BID    gabapentin (NEURONTIN) capsule 300 mg  300 mg Oral TID    heparin (porcine) subcutaneous injection 5,000 Units  5,000 Units Subcutaneous Q8H [address]    HYDROmorphone (DILAUDID) injection 0 2 mg  0 2 mg Intravenous Q3H PRN    levothyroxine tablet 100 mcg  100 mcg Oral Daily    metoprolol (LOPRESSOR) injection 5 mg  5 mg Intravenous Q6H PRN    ondansetron (ZOFRAN) injection 4 mg  4 mg Intravenous Q6H PRN    oxyCODONE (ROXICODONE) IR tablet 5 mg  5 mg Oral Q4H PRN    pantoprazole (PROTONIX) EC tablet 20 mg  20 mg Oral Early Morning    polyethylene glycol (MIRALAX) packet 17 g  17 g Oral Daily PRN    potassium chloride 20 mEq IVPB (premix)  20 mEq Intravenous Once    senna (SENOKOT) tablet 8 6 mg  1 tablet Oral Daily       Allergies   Allergen Reactions    Bee Venom      Tongue swelling    Levaquin [Levofloxacin In D5w]     Tequin [Gatifloxacin] Rash         Objective   /72 (BP Location: Right arm)   Pulse 94   Temp 98 2 °F (36 8 °C) (Oral)   Resp 18   Ht 5' 4 5" (1 638 m)   Wt 78 9 kg (173 lb 15 1 oz)   SpO2 95%   BMI 29 40 kg/m²     Intake/Output Summary (Last 24 hours) at 1/18/2020 0835  Last data filed at 1/18/2020 9901  Gross per 24 hour   Intake 3345 ml   Output 1425 ml   Net 1920 ml       Current Weight: Weight - Scale: 78 9 kg (173 lb 15 1 oz)    Physical Exam   Constitutional: She is oriented to person, place, and time  No distress  HENT:   Head: Normocephalic  Eyes: No scleral icterus  Neck: Neck supple  Cardiovascular: Normal rate and regular rhythm  Pulmonary/Chest: Effort normal  She has decreased breath sounds in the right lower field  Abdominal: Soft  She exhibits no distension  Musculoskeletal: She exhibits no edema  Neurological: She is alert and oriented to person, place, and time  Skin: Skin is warm and dry  No rash noted  Psychiatric: She has a normal mood and affect           Lab Results:    Results from last 7 days   Lab Units 01/18/20  0457   WBC Thousand/uL 8 40   HEMOGLOBIN g/dL 10 2*   HEMATOCRIT % 31 2*   PLATELETS Thousands/uL 158     Results from last 7 days   Lab Units 01/18/20  0457   POTASSIUM mmol/L 3 7   CHLORIDE mmol/L 115*   CO2 mmol/L 24   BUN mg/dL 11   CREATININE mg/dL 0 86   CALCIUM mg/dL 7 2*

## 2020-01-18 NOTE — PLAN OF CARE
Problem: PHYSICAL THERAPY ADULT  Goal: Performs mobility at highest level of function for planned discharge setting  See evaluation for individualized goals  Description  Treatment/Interventions: Functional transfer training, LE strengthening/ROM, Elevations, Endurance training, Therapeutic exercise, Patient/family training, Equipment eval/education, Bed mobility, Gait training          See flowsheet documentation for full assessment, interventions and recommendations  Note:   Prognosis: Good  Problem List: Decreased strength, Decreased endurance, Impaired balance, Decreased mobility, Pain  Assessment: Pt seen for high complexity physical therapy evaluation  Pt is an 79 y/o female w/ history/comorbidites of thyroid CA, colon CA, a-fib, HTN, HLD who is now admitted w/ known lung mass for elective flex bronch, robotic assisted R wedge resection and R lobectomy, MLND  Due to acute medical issues, need for acute surgery, need for stepdown monitoring, pain, note unstable clinical picture  PT consulted to assess post op mobility, d/c needs  Pt presents w/ decreased functional mob, standing balance, endurancem B LE strength, barriers at home  will benefit from skilled PT to correct for the above problems  Anticipate d/c directly home w/ no therapy needs, but will follow while here for needs  Recommendation: Home independently     PT - OK to Discharge: No    See flowsheet documentation for full assessment

## 2020-01-18 NOTE — PHYSICAL THERAPY NOTE
Physical Therapy Evaluation    Patient's Name: Merlyn Romo    Admitting Diagnosis  Lung mass [R91 8]    Problem List  Patient Active Problem List   Diagnosis    Adenoma, adrenocortical    [facility]    Benign essential hypertension    Coagulopathy ([facility]    History of colon cancer    Overweight (BMI 25 0-29  9)    Osteoarthritis    Postoperative hypothyroidism    Thyroid cancer ([facility]    Pulmonary nodules    Mixed hyperlipidemia    Medicare annual wellness visit, subsequent    Osteopenia of multiple sites    Malar rash    Age-related nuclear cataract of left eye    Preop examination    Atypical ductal hyperplasia of right breast    Dense breast tissue    Lung mass       Past Medical History  Past Medical History:   Diagnosis Date    Allergic reaction     Anesthesia     "cheap date"    Arthritis     [facility]     Benign polyp of large intestine     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    Colon cancer ([facility] ) 44    36 yo    Colon polyp     Full dentures     partial lower    GERD (gastroesophageal reflux disease)     H/O fracture of ankle 5177    Helicobacter pylori (H  pylori) infection     History of left knee replacement     History of neck problems     "C3-C7 and had epidural injections years ago"    History of vertigo     one time years ago    Hyperlipidemia     Hypertension     Indigestion     Lung mass     Nonspecific abnormal results of function study     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[facility]     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[facility]     Prediabetes     Refusal of blood transfusions as patient is Restorationism     Thyroid cancer ([facility] ) 2009    Tic disorder, transient, recurrent     not recent    Vaginal Pap smear 07/03/2017    WNL    Walks frequently     Walks frequently        Past Surgical History  Past Surgical History:   Procedure Laterality Date    APPENDECTOMY      ARTHROSCOPY KNEE      BREAST BIOPSY Right 04/10/2017    US guided  benign    BREAST BIOPSY Right 10/24/2019    ADH    CARDIOVASCULAR STRESS TEST      CATARACT EXTRACTION Bilateral     COLON SURGERY      COLONOSCOPY      done 2010 due 2015 Dr Kuo    DILATION AND CURETTAGE OF UTERUS      ESOPHAGOGASTRODUODENOSCOPY      inflammation aonly    HEMICOLECTOMY Right 1977    HYSTERECTOMY      JOINT REPLACEMENT      L knee    MAMMO STEREOTACTIC BREAST BIOPSY RIGHT (ALL INC) Right 10/24/2019    PARATHYROID GLAND SURGERY      exploration     REPLACEMENT TOTAL KNEE      TOTAL ABDOMINAL HYSTERECTOMY  1983    TOTAL THYROIDECTOMY      US GUIDED BREAST BIOPSY RIGHT COMPLETE Right 4/10/2017        01/18/20 0825   Note Type   Note type Eval only   Pain Assessment   Pain Assessment 0-10   Pain Score 3   Pain Type Acute pain;Surgical pain   Pain Location Back;Rib cage   Patient's Stated Pain Goal No pain   Hospital Pain Intervention(s) Ambulation/increased activity   Response to Interventions unchanged   Home Living   Type of Home House   Additional Comments Resides alone in 2 story home  Has local family and friends that can assist if needed    ambulates w/ out device, drives   Prior Function   Level of [place] Independent with ADLs and functional mobility   Falls in the last 6 months 0   Restrictions/Precautions   Weight Bearing Precautions Per Order No   Other Precautions Fall Risk;Pain;Multiple lines;Telemetry   General   Family/Caregiver Present No   Cognition   Overall Cognitive Status WFL   Arousal/Participation Responsive   Orientation Level Oriented X4   Memory Unable to assess   Following Commands Follows one step commands without difficulty   RLE Assessment   RLE Assessment   (strength grossly 4-/5)   LLE Assessment   LLE Assessment   (strength grossly 4-/5)   Coordination   Movements are Fluid and Coordinated 0   Transfers   Sit to Stand 4  Minimal assistance   Additional items Assist x 1   Stand to Sit 4  Minimal assistance   Additional items Assist x 1   Ambulation/Elevation   Gait pattern   (slow, mild ataxia, antalgic, wide ESDRAS)   Gait Assistance   (CGA)   Additional items Assist x 1  (w/ 2nd for line assist)   Assistive Device None  (doubt she will need device)   Distance 180'   Balance   Static Sitting Normal   Dynamic Sitting Good   Static Standing Fair   Dynamic Standing Fair -   Ambulatory Fair -   Endurance Deficit   Endurance Deficit No   Activity Tolerance   Activity Tolerance Patient limited by pain; Patient limited by fatigue   Nurse Made Aware yes   Assessment   Prognosis Good   Problem List Decreased strength;Decreased endurance; Impaired balance;Decreased mobility;Pain   Assessment Pt seen for high complexity physical therapy evaluation  Pt is an 79 y/o female w/ history/comorbidites of thyroid CA, colon CA, a-fib, HTN, HLD who is now admitted w/ known lung mass for elective flex bronch, robotic assisted R wedge resection and R lobectomy, MLND  Due to acute medical issues, need for acute surgery, need for stepdown monitoring, pain, note unstable clinical picture  PT consulted to assess post op mobility, d/c needs  Pt presents w/ decreased functional mob, standing balance, endurancem B LE strength, barriers at home  will benefit from skilled PT to correct for the above problems  Anticipate d/c directly home w/ no therapy needs, but will follow while here for needs  Goals   Patient Goals to go home   STG Expiration Date 02/01/20   Short Term Goal #1 1-2 wks: bed mob and transfers w/ indep, standing balance to good/normal dynamically, ambulate 200-300 ft w/ indep, increase B LE strength by 1/2 -1 grade, ambulate 1 flight of stairs w/indep   PT Treatment Day 0   Plan   Treatment/Interventions Functional transfer training;LE strengthening/ROM; Elevations; Endurance training; Therapeutic exercise;Patient/family training;Equipment eval/education; Bed mobility;Gait training   PT Frequency   (3-5x/wk)   Recommendation   Recommendation Home independently   PT - OK to Discharge No Modified Zenaida Scale   Modified [place] Scale 4   Barthel Index   Feeding 10   Bathing 0   Grooming Score 5   Dressing Score 5   Bladder Score 0   Bowels Score 10   Toilet Use Score 5   Transfers (Bed/Chair) Score 10   Mobility (Level Surface) Score 10   Stairs Score 0   Barthel Index Score 55           Eusebio Baeza PT, DPT, CSRS

## 2020-01-18 NOTE — PROGRESS NOTES
Progress Note - Maribel Dupree 1940, 78 y o  female MRN: [id number]    Unit/Bed#: [facility] 407-01 Encounter: [id number]    Primary Care Provider: Filiberto Nieves DO   Date and time admitted to hospital: 1/17/2020  7:50 AM        * Lung mass  Assessment & Plan  1/17 robotic-assisted thoracoscopic RUL wedge resection and completion lobectomy, mediastinal LN dissection    Plan:  Regular  D/c IVF  D/c smith  Labs this am only  R CT -8  Prn pain control  Nephrology consult  Blood management consult as patient is a Taoist  IS/OOB/ambualte  DVT ppx  Med/surg      R CT (-8, -AL, 100ml SS)      Subjective/Objective   Chief Complaint:     Subjective: RISHABH  Pain well controlled, doing well  2L NC  Objective:     Blood pressure 115/67, pulse (!) 112, temperature (!) 97 4 °F (36 3 °C), temperature source Oral, resp  rate 18, height 5' 4 5" (1 638 m), weight 75 3 kg (166 lb), SpO2 99 %  ,Body mass index is 28 05 kg/m²  Intake/Output Summary (Last 24 hours) at 1/18/2020 0015  Last data filed at 1/17/2020 2201  Gross per 24 hour   Intake 2776 ml   Output 900 ml   Net 1876 ml       Invasive Devices     Peripheral Intravenous Line            Peripheral IV 01/17/20 Left Arm less than 1 day    Peripheral IV 01/17/20 Left Forearm less than 1 day    Peripheral IV 01/17/20 Right Forearm less than 1 day          Drain            Chest Tube Right Pleural 28 Fr  less than 1 day    Urethral Catheter Non-latex 16 Fr  less than 1 day                Physical Exam: NAD  Atraumatic/normocephalic  AAOX3  Normal mood and affect  Normal respiratory effort  Soft, non tender, ND  Skin warm/dry  Cap refil <2 sec  R CT in place    Lab, Imaging and other studies:I have personally reviewed pertinent lab results    , CBC: No results found for: WBC, HGB, HCT, MCV, PLT, ADJUSTEDWBC, MCH, MCHC, RDW, MPV, NRBC, CMP: No results found for: SODIUM, K, CL, CO2, ANIONGAP, BUN, CREATININE, GLUCOSE, CALCIUM, AST, ALT, ALKPHOS, PROT, BILITOT, EGFR  VTE Pharmacologic Prophylaxis: Heparin  VTE Mechanical Prophylaxis: sequential compression device

## 2020-01-18 NOTE — UTILIZATION REVIEW
Initial Clinical Review    Elective  surgical procedure  Age/Sex: 78 y o  female  Surgery Date: 1/17/2020  Procedure:   FLEXIBLE BRONCHOSCOPY (N/A)  ROBOTIC ASSISTED WEDGE RESECTION (Right)  ROBOTIC ASSISTED LUNG LOBECTOMY (Right)   1  Robotic right upper lobe diagnostic wedge resection followed by completion lobectomy  2  mediastinal lymph node dissection  3  Bronchoscopy    Anesthesia: General    Operative Findings:   Right upper lobe wedge resection sent for frozen pathology  Showed conclusive evidence of malignancy  Performed completion lobectomy  POD#1 Progress Note:  Pain controlled, On O2 @ 2 L nc  Reg diet, IVF, d/c smith, labs this am  Continue analgesics prn, IS/OOB/ambulate, b/l veonodynes  Chest tube removed, pt tolerated well  Dressing C/D/I  Check CXR   Nephrology consulted      Admission Orders: Date/Time/Statement: Inpatient Admission Orders (From admission, onward)     Ordered        01/17/20 1324  Inpatient Admission  Once                   Orders Placed This Encounter   Procedures    Inpatient Admission     Standing Status:   Standing     Number of Occurrences:   1     Order Specific Question:   Admitting Physician     Answer:   Juan Daniel Bush [[id number]]     Order Specific Question:   Level of Care     Answer:   Level 1 Stepdown [13]     Order Specific Question:   Estimated length of stay     Answer:   Inpatient Only Surgery     Vital Signs: /75 (BP Location: Right arm)   Pulse (!) 115   Temp 98 2 °F (36 8 °C) (Oral)   Resp 18   Ht 5' 4 5" (1 638 m)   Wt 78 9 kg (173 lb 15 1 oz)   SpO2 99%   BMI 29 40 kg/m²   Diet: regular  Mobility: ambulate 3x/day  DVT Prophylaxis: b/l venodynes     Scheduled Medications:  Medications:  acetaminophen 975 mg Oral Q6H   atorvastatin 80 mg Oral Daily   diltiazem 120 mg Oral Daily   docusate sodium 100 mg Oral BID   gabapentin 300 mg Oral TID   heparin (porcine) 5,000 Units Subcutaneous Q8H [facility]   levothyroxine 100 mcg Oral Daily   pantoprazole 20 mg Oral Early Morning   senna 1 tablet Oral Daily     Continuous IV Infusions: none     PRN Meds:  HYDROmorphone 0 2 mg Intravenous Q3H PRN   metoprolol 5 mg Intravenous Q6H PRN   ondansetron 4 mg Intravenous Q6H PRN   oxyCODONE 5 mg Oral Q4H PRN   polyethylene glycol 17 g Oral Daily PRN         Network Utilization Review Department  [email or web address]  ATTENTION: Please call with any questions or concerns to [phone] and carefully listen to the prompts so that you are directed to the right person  All voicemails are confidential   Marie Keane all requests for admission clinical reviews, approved or denied determinations and any other requests to dedicated fax number below belonging to the campus where the patient is receiving treatment   List of dedicated fax numbers for the Facilities:  [address] DENIALS (Administrative/Medical Necessity) [phone]   [address] (Maternity/NICU/Pediatrics) [phone]   Miri Núñez [phone]     [address]  [phone]   [address] [place] [address] [phone]   [address] [place] [address]  [phone]   1205 [facility] [address] [place] [address] [phone]   Toshia Pedersen [phone]   2201 [facility]  2401 [facility] 1000 [facility] [phone]

## 2020-01-19 LAB
ANION GAP SERPL CALCULATED.3IONS-SCNC: 4 MMOL/L (ref 4–13)
BUN SERPL-MCNC: 17 MG/DL (ref 5–25)
CALCIUM SERPL-MCNC: 8.6 MG/DL (ref 8.3–10.1)
CHLORIDE SERPL-SCNC: 110 MMOL/L (ref 100–108)
CO2 SERPL-SCNC: 26 MMOL/L (ref 21–32)
CREAT SERPL-MCNC: 0.96 MG/DL (ref 0.6–1.3)
GFR SERPL CREATININE-BSD FRML MDRD: 64 ML/MIN/1.73SQ M
GLUCOSE SERPL-MCNC: 113 MG/DL (ref 65–140)
POTASSIUM SERPL-SCNC: 4.1 MMOL/L (ref 3.5–5.3)
SODIUM SERPL-SCNC: 140 MMOL/L (ref 136–145)

## 2020-01-19 PROCEDURE — 99232 SBSQ HOSP IP/OBS MODERATE 35: CPT | Performed by: INTERNAL MEDICINE

## 2020-01-19 PROCEDURE — 80048 BASIC METABOLIC PNL TOTAL CA: CPT | Performed by: PHYSICIAN ASSISTANT

## 2020-01-19 PROCEDURE — 99024 POSTOP FOLLOW-UP VISIT: CPT | Performed by: THORACIC SURGERY (CARDIOTHORACIC VASCULAR SURGERY)

## 2020-01-19 RX ADMIN — ACETAMINOPHEN 975 MG: 325 TABLET ORAL at 06:20

## 2020-01-19 RX ADMIN — HEPARIN SODIUM 5000 UNITS: 5000 INJECTION INTRAVENOUS; SUBCUTANEOUS at 05:43

## 2020-01-19 RX ADMIN — DOCUSATE SODIUM 100 MG: 100 CAPSULE, LIQUID FILLED ORAL at 17:21

## 2020-01-19 RX ADMIN — ACETAMINOPHEN 975 MG: 325 TABLET ORAL at 17:21

## 2020-01-19 RX ADMIN — HEPARIN SODIUM 5000 UNITS: 5000 INJECTION INTRAVENOUS; SUBCUTANEOUS at 21:36

## 2020-01-19 RX ADMIN — GABAPENTIN 300 MG: 300 CAPSULE ORAL at 08:09

## 2020-01-19 RX ADMIN — LEVOTHYROXINE SODIUM 100 MCG: 100 TABLET ORAL at 05:43

## 2020-01-19 RX ADMIN — PANTOPRAZOLE SODIUM 20 MG: 20 TABLET, DELAYED RELEASE ORAL at 06:20

## 2020-01-19 RX ADMIN — HEPARIN SODIUM 5000 UNITS: 5000 INJECTION INTRAVENOUS; SUBCUTANEOUS at 13:50

## 2020-01-19 RX ADMIN — GABAPENTIN 300 MG: 300 CAPSULE ORAL at 17:21

## 2020-01-19 RX ADMIN — GABAPENTIN 300 MG: 300 CAPSULE ORAL at 21:36

## 2020-01-19 RX ADMIN — ATORVASTATIN CALCIUM 80 MG: 80 TABLET, FILM COATED ORAL at 08:09

## 2020-01-19 RX ADMIN — DOCUSATE SODIUM 100 MG: 100 CAPSULE, LIQUID FILLED ORAL at 08:09

## 2020-01-19 RX ADMIN — DILTIAZEM HYDROCHLORIDE 120 MG: 120 CAPSULE, COATED, EXTENDED RELEASE ORAL at 08:09

## 2020-01-19 RX ADMIN — ACETAMINOPHEN 975 MG: 325 TABLET ORAL at 00:08

## 2020-01-19 NOTE — RESPIRATORY THERAPY NOTE
01/19/20 1034   Respiratory Assessment   Assessment Type Assess only   General Appearance Awake; Alert   Resp Comments pt using IS well, out in chair, no distress, will d/c ACP at this time

## 2020-01-19 NOTE — ASSESSMENT & PLAN NOTE
Patient is a 78 y o  female who presented with RUL mass/adenocarcinoma, now POD 2 from robotic-assisted thoracoscopic RUL wedge resection and completion lobectomy, mediastinal LN dissection progressing well  CT removed yesterday w/o incident  Follow up official post pull cxr read, pt may be discharged today      Plan:  Regular  Prn pain control  IS/OOB/ambualte  DVT pptx  PT- rec home independently

## 2020-01-19 NOTE — PROGRESS NOTES
Progress Note - Lexis Lung 1940, 78 y o  female MRN: [id number]    Unit/Bed#: [facility] 407-01 Encounter: [id number]    Primary Care Provider: Charlie Garner DO   Date and time admitted to hospital: 1/17/2020  7:50 AM        * Lung mass  Assessment & Plan  Patient is a 78 y o  female who presented with RUL mass/adenocarcinoma, now POD 2 from robotic-assisted thoracoscopic RUL wedge resection and completion lobectomy, mediastinal LN dissection progressing well  CT removed yesterday w/o incident  Follow up official post pull cxr read, pt may be discharged today  On RA, pain at incisions controlled with pain meds, IS pulling about 1000  Plan:  Regular  Prn pain control  IS/OOB/ambualte  DVT pptx  PT- rec home independently                Subjective/Objective     Subjective: No acute events overnight  Pain around incisions  Otherwise doing okay  Objective:     Blood pressure 126/76, pulse 96, temperature 97 8 °F (36 6 °C), temperature source Oral, resp  rate 18, height 5' 4 5" (1 638 m), weight 79 4 kg (175 lb 0 7 oz), SpO2 97 %  ,Body mass index is 29 58 kg/m²  Intake/Output Summary (Last 24 hours) at 1/19/2020 0720  Last data filed at 1/19/2020 0549  Gross per 24 hour   Intake 810 ml   Output 2150 ml   Net -1340 ml       Invasive Devices     Peripheral Intravenous Line            Peripheral IV 01/17/20 Right Forearm 1 day                Physical Exam:  Gen:  Well-developed, well-nourished female in NAD  CV: RRR,   Lungs: Normal respiratory effort, Incisions clean dry and intact   Abd: soft, nontender, nondistended,   Neuro:  AxO x3          Lab, Imaging and other studies:  CBC:   No results found for: WBC, HGB, HCT, MCV, PLT, ADJUSTEDWBC, MCH, MCHC, RDW, MPV, NRBC, CMP:   No results found for: SODIUM, K, CL, CO2, ANIONGAP, BUN, CREATININE, GLUCOSE, CALCIUM, AST, ALT, ALKPHOS, PROT, BILITOT, EGFR  VTE Pharmacologic Prophylaxis: Heparin  VTE Mechanical Prophylaxis: sequential compression device

## 2020-01-19 NOTE — PROGRESS NOTES
NEPHROLOGY PROGRESS NOTE   Lacey Campuzano 78 y o  female MRN: [id number]  Unit/Bed#: [facility] 407-01 Encounter: [id number]  Reason for Consult: CKD    Assessment/Plan:  1  Chronic kidney disease, stage III, baseline creatinine appears to be between 0 9 and 1 2 with a GFR of approximately 49-60, current creatinine stable at 0 96  2  Lung mass, status post robotic assisted right upper lobe wedge resection/mediastinal lymph node dissection  3  History of thyroid, colon as well as appendiceal cancer  4  History of atrial fibrillation previously on Eliquis  5  Hypertension, blood pressure appears stable    PLAN:  · Overall renal function remains stable  · Blood pressure volume status appears stable  · Urine output adequate  · Will sign off at this time, please call for questions or concerns    SUBJECTIVE:  Seen examined  Patient comfortable  No reports of chest pain or pressure  Complains of some soreness of her throat  No shortness of breath at rest   Pain is well controlled  Appetite is stable  Review of Systems    OBJECTIVE:  Current Weight: Weight - Scale: 79 4 kg (175 lb 0 7 oz)  Vitals:    01/18/20 2300 01/19/20 0300 01/19/20 0600 01/19/20 0700   BP: 113/64 126/76  117/70   BP Location: Right arm Right arm  Right arm   Pulse: 98 96  105   Resp: 18 18  20   Temp: 98 2 °F (36 8 °C) 97 8 °F (36 6 °C)  98 4 °F (36 9 °C)   TempSrc: Oral Oral  Oral   SpO2: 92% 97%  95%   Weight:   79 4 kg (175 lb 0 7 oz)    Height:           Intake/Output Summary (Last 24 hours) at 1/19/2020 0837  Last data filed at 1/19/2020 0549  Gross per 24 hour   Intake 725 ml   Output 2150 ml   Net -1425 ml       Physical Exam   Constitutional: She is oriented to person, place, and time  No distress  HENT:   Head: Normocephalic  Neck: Neck supple  Cardiovascular: Normal rate and regular rhythm  Pulmonary/Chest: Effort normal  She has decreased breath sounds in the right lower field  Abdominal: Soft  She exhibits no distension  Musculoskeletal: She exhibits no edema  Neurological: She is alert and oriented to person, place, and time  Skin: Skin is warm and dry  No rash noted         Medications:    Current Facility-Administered Medications:     acetaminophen (TYLENOL) tablet 975 mg, 975 mg, Oral, Q6H, Jennifer De Leon MD, 975 mg at 01/19/20 0620    atorvastatin (LIPITOR) tablet 80 mg, 80 mg, Oral, Daily, Jennifer De Leon MD, 80 mg at 01/19/20 0809    diltiazem (CARDIZEM CD) 24 hr capsule 120 mg, 120 mg, Oral, Daily, Jennifer De Leon MD, 120 mg at 01/19/20 0809    docusate sodium (COLACE) capsule 100 mg, 100 mg, Oral, BID, Jennifer De Leon MD, 100 mg at 01/19/20 0809    gabapentin (NEURONTIN) capsule 300 mg, 300 mg, Oral, TID, Jennifer De Leon MD, 300 mg at 01/19/20 0809    heparin (porcine) subcutaneous injection 5,000 Units, 5,000 Units, Subcutaneous, Q8H [address], 5,000 Units at 01/19/20 0543 **AND** Platelet count, , , Once, Jennifer De Leon MD    HYDROmorphone (DILAUDID) injection 0 2 mg, 0 2 mg, Intravenous, Q3H PRN, Jennifer De Leon MD    levothyroxine tablet 100 mcg, 100 mcg, Oral, Daily, Jennifer De Leon MD, 100 mcg at 01/19/20 0543    metoprolol (LOPRESSOR) injection 5 mg, 5 mg, Intravenous, Q6H PRN, Jennifer De Leon MD    ondansetron [facility]) injection 4 mg, 4 mg, Intravenous, Q6H PRN, Jennifer De Leon MD    oxyCODONE (ROXICODONE) IR tablet 5 mg, 5 mg, Oral, Q4H PRN, Jennifer De Leon MD, 5 mg at 01/18/20 1959    pantoprazole (PROTONIX) EC tablet 20 mg, 20 mg, Oral, Early Morning, Jennifer De Leon MD, 20 mg at 01/19/20 0620    polyethylene glycol (MIRALAX) packet 17 g, 17 g, Oral, Daily PRN, Jennifer De Leon MD    [facility]) tablet 8 6 mg, 1 tablet, Oral, Daily, Jennifer De Leon MD, 8 6 mg at 01/18/20 0834    Laboratory Results:  Results from last 7 days   Lab Units 01/19/20  0458 01/18/20  0457   WBC Thousand/uL  --  8 40   HEMOGLOBIN g/dL  --  10 2*   HEMATOCRIT %  --  31 2*   PLATELETS Thousands/uL  --  158   POTASSIUM mmol/L 4 1 3 7   CHLORIDE mmol/L 110* 115*   CO2 mmol/L 26 24   BUN mg/dL 17 11 CREATININE mg/dL 0 96 0 86   CALCIUM mg/dL 8 6 7 2*

## 2020-01-19 NOTE — RESTORATIVE TECHNICIAN NOTE
Restorative Specialist Mobility Note       Activity: Ambulate in cuellar, Ambulate in room, Bathroom privileges, Chair, Dangle, Stand at bedside(Educated/encouraged pt to ambulate with assistance 3-4 x's/day   Pt callbell, phone/tray within reach )     Assistive Device: None          Mickeal Minium BS, Restorative Technician, [employer]

## 2020-01-20 VITALS
HEIGHT: 65 IN | RESPIRATION RATE: 20 BRPM | SYSTOLIC BLOOD PRESSURE: 135 MMHG | TEMPERATURE: 97.6 F | DIASTOLIC BLOOD PRESSURE: 71 MMHG | BODY MASS INDEX: 35.67 KG/M2 | OXYGEN SATURATION: 95 % | HEART RATE: 100 BPM | WEIGHT: 214.07 LBS

## 2020-01-20 PROCEDURE — 97116 GAIT TRAINING THERAPY: CPT

## 2020-01-20 PROCEDURE — 99024 POSTOP FOLLOW-UP VISIT: CPT | Performed by: THORACIC SURGERY (CARDIOTHORACIC VASCULAR SURGERY)

## 2020-01-20 PROCEDURE — 97166 OT EVAL MOD COMPLEX 45 MIN: CPT

## 2020-01-20 RX ORDER — ACETAMINOPHEN 325 MG/1
650 TABLET ORAL EVERY 6 HOURS
Qty: 56 TABLET | Refills: 0 | Status: SHIPPED | OUTPATIENT
Start: 2020-01-20 | End: 2020-01-27

## 2020-01-20 RX ORDER — GABAPENTIN 300 MG/1
300 CAPSULE ORAL 3 TIMES DAILY
Qty: 63 CAPSULE | Refills: 0 | Status: SHIPPED | OUTPATIENT
Start: 2020-01-20 | End: 2020-05-19

## 2020-01-20 RX ORDER — DOCUSATE SODIUM 100 MG/1
100 CAPSULE, LIQUID FILLED ORAL 2 TIMES DAILY
Qty: 10 CAPSULE | Refills: 0 | Status: SHIPPED | OUTPATIENT
Start: 2020-01-20 | End: 2020-05-19

## 2020-01-20 RX ADMIN — GABAPENTIN 300 MG: 300 CAPSULE ORAL at 08:22

## 2020-01-20 RX ADMIN — SENNOSIDES 8.6 MG: 8.6 TABLET, FILM COATED ORAL at 08:22

## 2020-01-20 RX ADMIN — DILTIAZEM HYDROCHLORIDE 120 MG: 120 CAPSULE, COATED, EXTENDED RELEASE ORAL at 08:22

## 2020-01-20 RX ADMIN — ACETAMINOPHEN 975 MG: 325 TABLET ORAL at 05:36

## 2020-01-20 RX ADMIN — DOCUSATE SODIUM 100 MG: 100 CAPSULE, LIQUID FILLED ORAL at 08:22

## 2020-01-20 RX ADMIN — HEPARIN SODIUM 5000 UNITS: 5000 INJECTION INTRAVENOUS; SUBCUTANEOUS at 05:36

## 2020-01-20 RX ADMIN — PANTOPRAZOLE SODIUM 20 MG: 20 TABLET, DELAYED RELEASE ORAL at 08:22

## 2020-01-20 RX ADMIN — ATORVASTATIN CALCIUM 80 MG: 80 TABLET, FILM COATED ORAL at 08:22

## 2020-01-20 RX ADMIN — LEVOTHYROXINE SODIUM 100 MCG: 100 TABLET ORAL at 05:36

## 2020-01-20 NOTE — ASSESSMENT & PLAN NOTE
Patient is a 78 y o  female who presented with RUL mass/adenocarcinoma, now s/p 1/17 from robotic-assisted thoracoscopic RUL wedge resection and completion lobectomy, mediastinal LN dissection progressing well  CT removed yesterday w/o incident      Plan:  Regular  Prn pain control  IS/OOB/ambualte  DVT pptx  PT- rec home independently  Discharge today

## 2020-01-20 NOTE — OCCUPATIONAL THERAPY NOTE
OccupationalTherapy Progress Note     Patient Name: Yimi Dobbs  Today's Date: 1/20/2020  Problem List  Principal Problem:    Lung mass          01/20/20 0910   Note Type   Note type Eval only   Restrictions/Precautions   Weight Bearing Precautions Per Order No   Other Precautions Fall Risk   Pain Assessment   Pain Assessment No/denies pain   Pain Score No Pain   Home Living   Type of Home House   Home Layout Two level   Bathroom Shower/Tub Tub/shower unit   Bathroom Toilet Raised   Bathroom Equipment Grab bars in shower; Shower chair   Prior Function   Level of [place] Independent with ADLs and functional mobility   Lives With Alone   Receives Help From Family   ADL Assistance Independent   IADLs Independent   Falls in the last 6 months 0   Vocational Retired   Lifestyle   Autonomy pta pt reports she is very independent for ADLs/IADLs, still drives   Reciprocal Relationships supportive friends   Service to Others retired   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 [employer]   Psychosocial   Psychosocial (WDL) WDL   Subjective   Subjective Oh I am very independent and active in my community"   ADL   Where Assessed Standing at sink   [address] 7  Independent    [address] 7  Independent   LB Dressing Assistance 7  [address]  6  Modified independent   [address] bar use   Transfers   Sit to Stand 6  Modified independent   Stand to Sit 6  Modified independent   Functional Mobility   Functional Mobility 6  Modified independent   Balance   Static Sitting Normal   Dynamic Sitting Good   Static Standing Good   Dynamic Standing Good   Ambulatory Good   Activity Tolerance   Activity Tolerance Patient tolerated treatment well   Nurse Made Aware okay to see per PAULY Martin   RUGINGER Assessment   RUE Assessment WFL   LUE Assessment LUE Assessment WFL   Hand Function   Gross Motor Coordination Functional   Fine Motor Coordination Functional   Cognition   Overall Cognitive Status WFL   Arousal/Participation Cooperative   Attention Within functional limits   Orientation Level Oriented X4   Memory Within functional limits   Following Commands Follows all commands and directions without difficulty   Assessment   Limitation Decreased high-level ADLs; Decreased endurance   Prognosis Good   Assessment Pt is a 78 y o  YO  female admitted to B on 1/17/2020 w/ lung mass s/p elective flex bronch, robotic assisted R wedge resection and R lobectomy and MLND on 1/17/20  Comorbidities include a h/o thryoid CA, colon CA, a-fib, HTN, and HLD   Pt with active OT orders and ambulate  orders   Pt resides in a house alone with local friends/family  Pt was I w/  ADLS and IADLS, (+) drove, & required no use of DME PTA  Currently pt is independent for ADls and Mod I for functional mobility  Pt is limited at this time 2*: endurance, activity tolerance and unsupportive home environment  The following Occupational Performance Areas to address include: functional mobility, community mobility, household maintenance and social participation  Based on the aforementioned OT evaluation, functional performance deficits, and assessments, pt has been identified as a moderate complexity evaluation  From OT standpoint, anticipate d/c pt return home independently   Recommend continued participation in ADLs and functional mobility w/ staff  No further acute OT needs, d/c OT      Goals   Patient Goals go home today   Recommendation   OT Discharge Recommendation Home independent   OT - OK to Discharge Yes   Modified [place] Scale   Modified Zenaida Scale 2     Jovanni Coelho MS, OTR/L

## 2020-01-20 NOTE — SOCIAL WORK
Pt  Seen briefly by case management  Independent, home today, no HHC needs  IMM signed  Ready to discharge home

## 2020-01-20 NOTE — PHYSICAL THERAPY NOTE
Physical Therapy Progress Note     01/20/20 0850   Pain Assessment   Pain Assessment No/denies pain   Pain Score No Pain   Subjective   Subjective The pt  states that she is doing well, and she is eager to return home  Transfers   Sit to Stand 7  Independent   Stand to Sit 7  Independent   Ambulation/Elevation   Gait pattern WNL   Gait Assistance 7  Independent   Assistive Device None   Distance 360 feet  Stair Management Assistance 7  Independent   Stair Management Technique One rail R;Foreward;Reciprocal   Number of Stairs 7   Balance   Static Sitting Normal   Dynamic Sitting Normal   Static Standing Normal   Dynamic Standing Normal   Ambulatory Normal   Activity Tolerance   Activity Tolerance Patient tolerated treatment well   Nurse Made Aware Yes  Assessment   Prognosis Excellent   Assessment The pt  has returned to full independence on level and stairs  She has no further skilled physical therapy needs  Barriers to Discharge None   Goals   Patient Goals To go home today  STG Expiration Date 02/01/20   PT Treatment Day 1   Plan   Treatment/Interventions Functional transfer training;LE strengthening/ROM; Elevations; Therapeutic exercise; Endurance training;Patient/family training;Bed mobility;Gait training   Progress Discontinue PT   Recommendation   Recommendation Home independently   PT - OK to Discharge Yes     Sarah Addison, PTA

## 2020-01-20 NOTE — PROGRESS NOTES
Progress Note - [place] 1940, 78 y o  female MRN: [id number]    Unit/Bed#: [facility] 407-01 Encounter: [id number]    Primary Care Provider: Maryann Esparza DO   Date and time admitted to hospital: 1/17/2020  7:50 AM        * Lung mass  Assessment & Plan  Patient is a 78 y o  female who presented with RUL mass/adenocarcinoma, now s/p 1/17 from robotic-assisted thoracoscopic RUL wedge resection and completion lobectomy, mediastinal LN dissection progressing well  CT removed yesterday w/o incident  Plan:  Regular  Prn pain control  IS/OOB/ambualte  Will discuss timing to restart eliquis   DVT pptx  PT- rec home independently  Discharge today          Subjective/Objective   Chief Complaint:     Subjective: RISHABH  Feels well  Ambulated 5x yesterday  Pain well controlled  Objective:     Blood pressure 147/68, pulse 91, temperature 99 4 °F (37 4 °C), temperature source Oral, resp  rate 19, height 5' 4 5" (1 638 m), weight 79 4 kg (175 lb 0 7 oz), SpO2 96 %  ,Body mass index is 29 58 kg/m²  Intake/Output Summary (Last 24 hours) at 1/20/2020 0446  Last data filed at 1/20/2020 0300  Gross per 24 hour   Intake 1350 ml   Output 2050 ml   Net -700 ml       Invasive Devices     Peripheral Intravenous Line            Peripheral IV 01/17/20 Right Forearm 2 days                Physical Exam: NAD  Atraumatic/normocephalic  AAOX3  Normal mood and affect  Normal respiratory effort, on room air  Skin warm/dry  Cap refil <2 sec      Lab, Imaging and other studies:  I have personally reviewed pertinent lab results    , CBC: No results found for: WBC, HGB, HCT, MCV, PLT, ADJUSTEDWBC, MCH, MCHC, RDW, MPV, NRBC, CMP:   Lab Results   Component Value Date    SODIUM 140 01/19/2020    K 4 1 01/19/2020     (H) 01/19/2020    CO2 26 01/19/2020    BUN 17 01/19/2020    CREATININE 0 96 01/19/2020    CALCIUM 8 6 01/19/2020    EGFR 64 01/19/2020     VTE Pharmacologic Prophylaxis: Heparin  VTE Mechanical Prophylaxis: sequential compression device

## 2020-01-20 NOTE — DISCHARGE SUMMARY
Discharge Summary - Thoracic Surgery   [facility] 78 y o  female MRN: [id number]  Unit/Bed#: [facility] 407-01 Encounter: [id number]    Admission Date: 1/17/2020     Discharge Date:  01/20/20     Admitting Diagnosis: Lung mass [R91 8]    Discharge Diagnosis: Principal Problem:    Lung mass  Resolved Problems:    * No resolved hospital problems  *      Attending and Service:    Vahe Milner MD; Thoracic Surgery    Consulting Physician(s):    Adam Hall DO; Nephrology    Procedures Performed:   · ROBOTIC RIGHT UPPER LOBE DIAGNOSTIC WEDGE RESECTION FOLLOWED BY COMPLETION LOBECTOMY  · MEDIASTINAL LYMPH NODE DISSECTION  · FLEXIBLE BRONCHOSCOPY    Imaging:  Procedure: X-ray Chest Pa & Lateral  Result Date: 1/19/2020  Impression: Right upper lobectomy  Right chest tube removal with no pneumothorax  Mild left base atelectasis  Procedure: X-ray Chest 1 View  Result Date: 1/18/2020  Impression: Partial right pneumonectomy changes  Right thoracostomy tube in place tip at the pulmonary apex  No discernible pneumothorax  No radiographic evidence of acute intrathoracic process on this examination which is somewhat limited by low lung volumes  Hospital Course:   HPI, per Vahe Milner MD: "We had a discussion with Ms Shelly Foster regarding her recent testing  She has a PET avid right upper lobe lung mass that is concerning for a lung primary with some low level hilar activity  There was no evidence of distant metastases  There is some left thyroid activity, which will need further testing, especially given her thyroid cancer history  She has a clinical stage II lung cancer and therefore Dr Josey Tinoco is recommending a robotic assisted right upper lobe wedge resection and possible completion lobectomy  There is a possibility she may need adjuvant chemotherapy, secondary to the hilar lymph node activity  The patient is in agreement with the plan and we will schedule this for 1/17/20   She will hold her Eliquis 2 days prior to the surgery, with last dose being Tuesday night, the 14th  We will also send her to our geriatric pre operative clinic "    The patient was taken to the operating room on 01/17/20 for performance of robotic RUL diagnostic wedge resection followed by completion lobectomy, mediastinal LN dissection, flexible bronchoscopy  Intraoperative findings included:   · Right upper lobe wedge resection sent for frozen pathology  Showed conclusive evidence of malignancy  Performed completion lobectomy  The remainder of the patient's hospital course was uncomplicated  Her chest tube was removed on POD#1  Post-pull CXR showed no pneumothorax  Nephrology was consulted to aid in the management of the patient's CKD stage 3; however, her overall renal function remained stable throughout her hospital stay  She was also evaluated by PT while inpatient, who recommended that the patient be discharged home independently  Patient was discharged on HD#3/POD#3  On the day of discharge, the patient was voiding spontaneously, ambulating at baseline, and pain was well controlled  She understood all instructions for discharge  She was also given the names and numbers of the providers as well as instructions for follow up appointments  She will follow up with Dr Genet Prince in 2 weeks  Condition at Discharge: stable     Discharge instructions/Information to patient and family:   See after visit summary for information provided to patient and family  Provisions for Follow-Up Care:  See after visit summary for information related to follow-up care and any pertinent home health orders  Disposition: See After Visit Summary for discharge disposition information  Planned Readmission: No    Discharge Statement   I spent 30 minutes discharging the patient  This time was spent on the day of discharge  I had direct contact with the patient on the day of discharge   Additional documentation is required if more than 30 minutes were spent on discharge  Discharge Medications:  See after visit summary for reconciled discharge medications provided to patient and family        Aliza Cohn MD  1/20/2020  9:14 AM

## 2020-01-20 NOTE — PLAN OF CARE
Problem: PHYSICAL THERAPY ADULT  Goal: Performs mobility at highest level of function for planned discharge setting  See evaluation for individualized goals  Description  Treatment/Interventions: Functional transfer training, LE strengthening/ROM, Elevations, Endurance training, Therapeutic exercise, Patient/family training, Equipment eval/education, Bed mobility, Gait training          See flowsheet documentation for full assessment, interventions and recommendations  Outcome: Completed  Note:   Prognosis: Excellent  Problem List: Decreased strength, Decreased endurance, Impaired balance, Decreased mobility, Pain  Assessment: The pt  has returned to full independence on level and stairs  She has no further skilled physical therapy needs  Barriers to Discharge: None     Recommendation: Home independently     PT - OK to Discharge: Yes    See flowsheet documentation for full assessment

## 2020-01-20 NOTE — DISCHARGE INSTRUCTIONS
LUNG LOBECTOMY     WHAT YOU NEED TO KNOW:   Lung lobectomy is surgery to remove one or more lobes from your lungs  DISCHARGE INSTRUCTIONS:   Medicines:   · Medicines  can help decrease pain or prevent or treat a bacterial infection  Ask your healthcare provider how to take prescription pain medicine safely  · Take your medicine as directed  Contact your healthcare provider if you think your medicine is not helping or if you have side effects  Tell him if you are allergic to any medicine  Keep a list of the medicines, vitamins, and herbs you take  Include the amounts, and when and why you take them  Bring the list or the pill bottles to follow-up visits  Carry your medicine list with you in case of an emergency  Follow up with your surgeon or specialist as directed:  Write down your questions so you remember to ask them during your visits  Bathing with stitches: Follow your healthcare provider's instructions on when you can bathe  Gently wash the part of your body that has the stitches  Do not rub on the stitches to dry your skin  Pat the area gently with a towel  When the area is dry, put on a clean, new bandage as directed  Oxygen: You may need extra oxygen to help you breathe easier  It may be given through a plastic mask over your mouth and nose  It may be given through a nasal cannula, or prongs, instead of a mask  A nasal cannula is a pair of short, thin tubes that rest just inside your nose  Tell your healthcare provider if your nose gets dry or if you get redness or sores on your skin  Never smoke or let anyone else smoke in the same room while your oxygen is on  Doing so may cause a fire  Self-care:   · Deep breathing and coughing  will decrease your risk for a lung infection  Take a deep breath and hold it for as long as you can  Let the air out and then cough strongly  Deep breaths help open your airway  You may be given an incentive spirometer to help you take deep breaths   Put the plastic piece in your mouth and take a slow, deep breath  Then let the air out and cough  Repeat these steps 10 times every hour  · A chest tube  may be left in place when you go home  You will be given instructions on how to care for it  Ask your healthcare provider for more information about chest tubes  · Do not smoke  If you smoke, it is never too late to quit  Smoking can slow your healing after surgery  Ask your healthcare provider for information if you need help quitting  Contact your surgeon or specialist if:   · You have nausea and are vomiting  · You cough up yellow, green, or bloody mucus  · You have a fever or chills  · You have trouble having a bowel movement, or you have diarrhea or blood in your bowel movement  · Your skin is itchy, swollen, or has a rash  · You have questions or concerns about your condition or care  Seek care immediately or call 911 if:   · You cough up more than a teaspoon of blood  · You feel dizzy or faint and pass out  · You feel your heart beating in an irregular pattern  · Blood soaks through your bandage  · Your incision is swollen, red, or has pus coming from it  · Your stitches or staples come apart  · You feel lightheaded, short of breath, and have chest pain  · Your arm or leg feels warm, tender, and painful  It may look swollen and red  © 2017 [address] Information is for End User's use only and may not be sold, redistributed or otherwise used for commercial purposes  All illustrations and images included in CareNotes® are the copyrighted property of [employer]  or [employer]  The above information is an  only  It is not intended as medical advice for individual conditions or treatments  Talk to your doctor, nurse or pharmacist before following any medical regimen to see if it is safe and effective for you

## 2020-01-21 ENCOUNTER — TRANSITIONAL CARE MANAGEMENT (OUTPATIENT)
Dept: FAMILY MEDICINE CLINIC | Facility: CLINIC | Age: 80
End: 2020-01-21

## 2020-01-22 ENCOUNTER — DOCUMENTATION (OUTPATIENT)
Dept: CARDIAC SURGERY | Facility: CLINIC | Age: 80
End: 2020-01-22

## 2020-01-22 ENCOUNTER — TELEPHONE (OUTPATIENT)
Dept: CARDIAC SURGERY | Facility: CLINIC | Age: 80
End: 2020-01-22

## 2020-01-22 NOTE — PROGRESS NOTES
1  Constipation: (Yes: Colace 100 mg BID, Senokot 2 tabs QHS or Senokot S 2 tabs qhs   No: Dulcolax/Miralax/suppository/enema)  Denies any  2    Pain Management: (Oxycodone 5-10 mg prn, Tylenol 650 mg q6 hrs and +/- Advil 600 mg TID for 7 days  Neurontin 300 mg TID for 21 days)  Taking Gabapentin as directed  3   Fever: (Chills? Call for temp >100 4 )  Denies any fever or chills  4   Cough: (Dry, productive?)  Occasional cough, productive at times for white mucus occasional blood tinged  5   Shortness of breath: (At rest, with activity?)  Denies      6  Appetite:   Good      7  Incisions: (Warmth, redness, drainage? May shower, no baths  No creams, lotions, or ointments to incisions)  Patient denies any redness, warmth or drainage  8   Ambulation/Incentive Spirometry: (Any activity? Use IS every 15 min) Yes  Reviewed post-op appointment date and time  Patient knows to get x-ray 1-3 days prior to visit  Advised patient to call for fever >100 4, chills, increased SOB or cough

## 2020-01-22 NOTE — TELEPHONE ENCOUNTER
Pt called in this morning stating that Dr Morgan Butler called her with concerns of how her voice sounds  Pt would like to speak to Dr Morgan Butler  I let pt know that Dr Morgan Butler is currently in a room with a pt and I will give him the message  Pt also confused about when to take the gabapentin  Message sent to Dr Morgan Butler

## 2020-01-22 NOTE — TELEPHONE ENCOUNTER
I spoke to Hong earlier today  Overall she is doing very well  No pain  No shortness of breath  Her voice is almost completely normal   No concerns from my standpoint  Pathology still pending    Follow-up in a few weeks

## 2020-01-28 ENCOUNTER — TELEPHONE (OUTPATIENT)
Dept: CARDIAC SURGERY | Facility: CLINIC | Age: 80
End: 2020-01-28

## 2020-01-28 NOTE — TELEPHONE ENCOUNTER
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

## 2020-02-03 ENCOUNTER — APPOINTMENT (OUTPATIENT)
Dept: LAB | Facility: HOSPITAL | Age: 80
End: 2020-02-03
Payer: COMMERCIAL

## 2020-02-03 ENCOUNTER — TRANSCRIBE ORDERS (OUTPATIENT)
Dept: LAB | Facility: HOSPITAL | Age: 80
End: 2020-02-03

## 2020-02-03 ENCOUNTER — HOSPITAL ENCOUNTER (OUTPATIENT)
Dept: RADIOLOGY | Facility: HOSPITAL | Age: 80
Discharge: HOME | End: 2020-02-03
Payer: COMMERCIAL

## 2020-02-03 DIAGNOSIS — E78.00 PURE HYPERCHOLESTEROLEMIA: Primary | ICD-10-CM

## 2020-02-03 DIAGNOSIS — E78.00 PURE HYPERCHOLESTEROLEMIA: ICD-10-CM

## 2020-02-03 DIAGNOSIS — R91.8 LUNG MASS: ICD-10-CM

## 2020-02-03 LAB
CHOLEST SERPL-MCNC: 196 MG/DL (ref 50–200)
HDLC SERPL-MCNC: 75 MG/DL
LDLC SERPL CALC-MCNC: 107 MG/DL (ref 0–100)
NONHDLC SERPL-MCNC: 121 MG/DL
TRIGL SERPL-MCNC: 68 MG/DL

## 2020-02-03 PROCEDURE — 80061 LIPID PANEL: CPT

## 2020-02-03 PROCEDURE — 36415 COLL VENOUS BLD VENIPUNCTURE: CPT

## 2020-02-03 PROCEDURE — 71046 X-RAY EXAM CHEST 2 VIEWS: CPT

## 2020-02-05 ENCOUNTER — OFFICE VISIT (OUTPATIENT)
Dept: CARDIAC SURGERY | Facility: CLINIC | Age: 80
End: 2020-02-05

## 2020-02-05 VITALS
TEMPERATURE: 95.6 F | HEIGHT: 65 IN | DIASTOLIC BLOOD PRESSURE: 60 MMHG | WEIGHT: 165 LBS | BODY MASS INDEX: 27.49 KG/M2 | OXYGEN SATURATION: 98 % | SYSTOLIC BLOOD PRESSURE: 129 MMHG | HEART RATE: 63 BPM

## 2020-02-05 DIAGNOSIS — C34.11 PRIMARY ADENOCARCINOMA OF UPPER LOBE OF RIGHT LUNG (HCC): ICD-10-CM

## 2020-02-05 DIAGNOSIS — C34.91 PRIMARY LUNG ADENOCARCINOMA, RIGHT (HCC): Primary | ICD-10-CM

## 2020-02-05 PROCEDURE — 1111F DSCHRG MED/CURRENT MED MERGE: CPT | Performed by: THORACIC SURGERY (CARDIOTHORACIC VASCULAR SURGERY)

## 2020-02-05 PROCEDURE — 3078F DIAST BP <80 MM HG: CPT | Performed by: THORACIC SURGERY (CARDIOTHORACIC VASCULAR SURGERY)

## 2020-02-05 PROCEDURE — 99024 POSTOP FOLLOW-UP VISIT: CPT | Performed by: THORACIC SURGERY (CARDIOTHORACIC VASCULAR SURGERY)

## 2020-02-05 PROCEDURE — 3008F BODY MASS INDEX DOCD: CPT | Performed by: THORACIC SURGERY (CARDIOTHORACIC VASCULAR SURGERY)

## 2020-02-05 PROCEDURE — 3074F SYST BP LT 130 MM HG: CPT | Performed by: THORACIC SURGERY (CARDIOTHORACIC VASCULAR SURGERY)

## 2020-02-05 PROCEDURE — 1160F RVW MEDS BY RX/DR IN RCRD: CPT | Performed by: THORACIC SURGERY (CARDIOTHORACIC VASCULAR SURGERY)

## 2020-02-05 NOTE — PROGRESS NOTES
Thoracic Follow-Up  Assessment/Plan:  68-year-old female with history of stage IB lung cancer status post 01/17/2020 robotic right upper lobectomy and mediastinal lymph node dissection doing very well  Overall Niger has done excellent from her surgery  She has no pain and no shortness of breath this time  Her postoperative x-ray looks excellent  We reviewed her pathology and she understands this is a stage IB lung cancer  She requires no further treatment with that  At this point will see her back in 1 month  If she is not having issues at that time will follow-up in 6 months from surgery with her for surveillance CT of the chest   She will call us with any questions or issues otherwise      No problem-specific Assessment & Plan notes found for this encounter  There are no diagnoses linked to this encounter  Thoracic History      Diagnosis: 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  Procedure: right robotic diagnostic upper lobe wedge resection, completion lobectomy, mediastinal lymph node dissection 1/17/20  Pathology: Right upper lobe tumor consistent with invasive moderately differentiated adenocarcinoma, measuring 1 6 cm, with visceral pleura invasion  Lymph node levels 2R, 4R, 8R, 10R, 11R and 7 all negative for malignancy  8th Edition AJCC tumor Stage IB (pT2a, N0, M0, G2)          Patient ID: Talisha Cha is a 78 y o  female  HPI   Niger comes back to our office after 01/17/2020 right upper lobe wedge resection, completion upper lobectomy for Stage IB lung adenocarcinoma  Her post-operative course was uncomplicated and her chest tube was removed on POD#1  Nephrology was consulted for CKD management; however, her renal function remained stable  She was discharged on 1/20/20 to home  She returns today for her 1st post operative visit, with a CXR from 2/3/20 that reveals stable surgical changes on the right, without evidence of pneumothorax or pleural effusion  Overall she has no pain  She is no longer taking any narcotic pain medications is just on Tylenol and gabapentin  She denies any shortness of breath  Denies any fevers or chills  No cough  She has some mild thin mucus  Mild irritation at her suture site but otherwise no complaints    The following portions of the patient's history were reviewed and updated as appropriate: allergies, current medications, past family history, past medical history, past social history, past surgical history and problem list     Review of Systems   Constitutional: Negative for activity change, appetite change, chills, diaphoresis, fatigue, fever and unexpected weight change  HENT: Negative  Eyes: Negative  Respiratory: Negative for apnea, cough, chest tightness, shortness of breath, wheezing and stridor  Cardiovascular: Negative for chest pain, palpitations and leg swelling  Gastrointestinal: Negative for abdominal distention, abdominal pain, blood in stool, constipation, diarrhea, nausea and vomiting  Endocrine: Negative  Genitourinary: Negative  Musculoskeletal: Negative  Skin: Negative  Allergic/Immunologic: Negative  Neurological: Negative  Hematological: Negative  Psychiatric/Behavioral: Negative  Objective:   Physical Exam   Constitutional: She is oriented to person, place, and time  She appears well-developed and well-nourished  No distress  HENT:   Head: Normocephalic and atraumatic  Mouth/Throat: Oropharynx is clear and moist  No oropharyngeal exudate  Eyes: Pupils are equal, round, and reactive to light  Conjunctivae and EOM are normal  No scleral icterus  Neck: Normal range of motion  Neck supple  No JVD present  No tracheal deviation present  No thyromegaly present  Cardiovascular: Normal rate, regular rhythm, normal heart sounds and intact distal pulses  Exam reveals no gallop and no friction rub  No murmur heard    Pulmonary/Chest: Effort normal and breath sounds normal  No respiratory distress  She has no wheezes  She has no rales  She exhibits no tenderness  Right-sided robotic incisions x5 are well healed  Suture removed in the office  No erythema or drainage   Abdominal: Soft  Bowel sounds are normal  She exhibits no distension and no mass  There is no tenderness  There is no rebound and no guarding  Musculoskeletal: Normal range of motion  She exhibits no edema, tenderness or deformity  Neurological: She is alert and oriented to person, place, and time  Skin: Skin is warm and dry  No rash noted  She is not diaphoretic  No erythema  No pallor  Psychiatric: She has a normal mood and affect  Her behavior is normal  Judgment and thought content normal    Nursing note and vitals reviewed  There were no vitals taken for this visit  Xr Chest Pa & Lateral    Result Date: 2/3/2020  Impression No acute cardiopulmonary disease  Surgical changes in the right hemithorax right upper lobectomy for adenocarcinoma  Workstation performed: [id number]     Xr Chest Pa & Lateral    Result Date: 1/19/2020  Impression Right upper lobectomy  Right chest tube removal with no pneumothorax  Mild left base atelectasis  Workstation performed: [id number]     Xr Chest Pa & Lateral    Result Date: 12/2/2019  Impression 1 9 x 1 1 cm right upper lobe nodule  Follow-up with a chest CT is recommended to evaluate for lung cancer  I personally discussed this study with Ramon Fernando on 12/2/2019 at 1:22 PM  This study was marked in Epic for follow-up  Workstation performed: [id number]      Ct Chest Wo Contrast    Addendum Date: 12/12/2019 Addendum   ADDENDUM: I personally discussed this study with Ramon Fernando on 12/12/2019 at 1:52 PM      Result Date: 12/12/2019  Narrative CT CHEST WITHOUT IV CONTRAST INDICATION:   R91 8: Other nonspecific abnormal finding of lung field  Follow-up lung nodule on chest radiograph  History of colon cancer and thyroid cancer   COMPARISON:  Chest radiograph from 12/2/2019  Chest CT from 3/1/2016  TECHNIQUE: CT examination of the chest was performed without intravenous contrast   Axial, sagittal, and coronal 2D reformatted images were created from the source data and submitted for interpretation  Radiation dose length product (DLP) for this visit:  180 mGy-cm   This examination, like all CT scans performed in the [facility], was performed utilizing techniques to minimize radiation dose exposure, including the use of iterative reconstruction and automated exposure control  FINDINGS: LUNGS:  Abnormality on chest radiograph corresponds with a 2 1 x 2 0 cm spiculated nodule in the posterior segment of the right upper lobe with pleural retraction  Mild emphysema  No acute pulmonary disease  No significant filling defects in the trachea and bronchi  PLEURA:  Unremarkable  HEART/GREAT VESSELS:  Unremarkable for patient's age  MEDIASTINUM AND SELENA:  Unremarkable  CHEST WALL AND LOWER NECK:   Resection of the right lobe of the thyroid gland  VISUALIZED STRUCTURES IN THE UPPER ABDOMEN:  Fat-containing right Bochdalek hernia  Hepatic cyst in the right lobe  OSSEOUS STRUCTURES:  Extensive osteopenia in the spine  No change in lucency in the inferior tip of the left scapula  Moderate degenerative disease in the spine  Impression 2 1 x 2 0 cm spiculated right upper lobe nodule compatible with lung cancer  Workstation performed: [id number]       Nm Pet Ct Skull Base To Mid Thigh    Result Date: 12/20/2019  Narrative PET/CT SCAN INDICATION:  D38 1: Neoplasm of uncertain behavior of trachea, bronchus and lung   , abnormal CT, right upper lung nodule, history of thyroid cancer, colon cancer, status post right thyroidectomy, hemicolectomy MODIFIER: PI COMPARISON: CT chest 12/9/2019 and priors, including PET CT 9/9/2016 CELL TYPE:  None TECHNIQUE:   8 2 mCi F-18-FDG administered IV   Multiplanar attenuation corrected and non attenuation corrected PET images were acquired 60 minutes post injection  Contiguous, low dose, axial CT sections were obtained from the skull base through the femurs   Intravenous contrast material was not utilized  This examination, like all CT scans performed in the [facility], was performed utilizing techniques to minimize radiation dose exposure, including the use of iterative reconstruction and automated exposure control  Fasting serum glucose: 100 mg/dl FINDINGS: VISUALIZED BRAIN:   No acute abnormalities are seen  HEAD/NECK:   Persistent intense left thyroid activity, SUV 10 6, prior study 9 3  No FDG avid cervical adenopathy is seen  CT images: Unremarkable  CHEST:   2 3 x 2 cm right upper lung lesion demonstrates increased FDG activity, SUV 3, most compatible with malignancy  On the prior PET/CT, this measured 1 5 x 1 4 cm, SUV 1 5  Right hilar activity appears similar in configuration, SUV 3, prior SUV 2 6  This may be physiologic  No hypermetabolic mediastinal or left hilar adenopathy  CT images: Stable  ABDOMEN: Somewhat focal FDG activity in the mid transverse colon, SUV 4 9, may be physiologic  Corresponding CT images are unremarkable  No additional FDG avid soft tissue lesions are seen  CT images: Colon diverticula  PELVIS: No FDG avid soft tissue lesions are seen  CT images: Unremarkable  OSSEOUS STRUCTURES: No FDG avid lesions are seen  CT images: No significant findings  Impression 1   2 3 x 2 cm right upper lung lesion demonstrates increased FDG activity, SUV 3, most compatible with malignancy  No hypermetabolic hilar or mediastinal adenopathy  2   No hypermetabolic metastases in the neck, abdomen, pelvis  3   Somewhat focal FDG activity in the mid transverse colon may be physiologic  Corresponding CT images are unremarkable  Attention on follow-up recommended to exclude the possibility of occult neoplasm   4   Persistent intense left thyroid activity may be correlated clinically for etiologies such as thyroiditis  The study was marked in EPIC for significant notification  Workstation performed: [id number]     I personally reviewed her chest x-ray from this week with her in PACs  No significant pneumothorax or effusion    Personally reviewed her pathology with her  This is a 1 6 cm right upper lobe adenocarcinoma  There was no evidence of lymph node involvement    Overall she understands this was a stage IB lung cancer

## 2020-02-05 NOTE — LETTER
February 5, 2020     [place] DO Pat  [address]    Patient: Harry Haywood   YOB: 1940   Date of Visit: 2/5/2020       Dear Dr Yee Headings:    Thank you for referring Harry Haywood to me for evaluation  Below are my notes for this consultation  If you have questions, please do not hesitate to call me  I look forward to following your patient along with you  Sincerely,        Heron Singer MD        CC: No Recipients  Heron Singer MD  2/5/2020  5:14 PM  Sign at close encounter  Thoracic Follow-Up  Assessment/Plan:  22-year-old female with history of stage IB lung cancer status post 01/17/2020 robotic right upper lobectomy and mediastinal lymph node dissection doing very well  Overall Niger has done excellent from her surgery  She has no pain and no shortness of breath this time  Her postoperative x-ray looks excellent  We reviewed her pathology and she understands this is a stage IB lung cancer  She requires no further treatment with that  At this point will see her back in 1 month  If she is not having issues at that time will follow-up in 6 months from surgery with her for surveillance CT of the chest   She will call us with any questions or issues otherwise      No problem-specific Assessment & Plan notes found for this encounter  There are no diagnoses linked to this encounter  Thoracic History      Diagnosis: 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  Procedure: right robotic diagnostic upper lobe wedge resection, completion lobectomy, mediastinal lymph node dissection 1/17/20  Pathology: Right upper lobe tumor consistent with invasive moderately differentiated adenocarcinoma, measuring 1 6 cm, with visceral pleura invasion  Lymph node levels 2R, 4R, 8R, 10R, 11R and 7 all negative for malignancy  8th Edition AJCC tumor Stage IB (pT2a, N0, M0, G2)          Patient ID: Harry Haywood is a 78 y o  female  CIARA Pitts comes back to our office after 01/17/2020 right upper lobe wedge resection, completion upper lobectomy for Stage IB lung adenocarcinoma  Her post-operative course was uncomplicated and her chest tube was removed on POD#1  Nephrology was consulted for CKD management; however, her renal function remained stable  She was discharged on 1/20/20 to home  She returns today for her 1st post operative visit, with a CXR from 2/3/20 that reveals stable surgical changes on the right, without evidence of pneumothorax or pleural effusion  Overall she has no pain  She is no longer taking any narcotic pain medications is just on Tylenol and gabapentin  She denies any shortness of breath  Denies any fevers or chills  No cough  She has some mild thin mucus  Mild irritation at her suture site but otherwise no complaints    The following portions of the patient's history were reviewed and updated as appropriate: allergies, current medications, past family history, past medical history, past social history, past surgical history and problem list     Review of Systems   Constitutional: Negative for activity change, appetite change, chills, diaphoresis, fatigue, fever and unexpected weight change  HENT: Negative  Eyes: Negative  Respiratory: Negative for apnea, cough, chest tightness, shortness of breath, wheezing and stridor  Cardiovascular: Negative for chest pain, palpitations and leg swelling  Gastrointestinal: Negative for abdominal distention, abdominal pain, blood in stool, constipation, diarrhea, nausea and vomiting  Endocrine: Negative  Genitourinary: Negative  Musculoskeletal: Negative  Skin: Negative  Allergic/Immunologic: Negative  Neurological: Negative  Hematological: Negative  Psychiatric/Behavioral: Negative  Objective:   Physical Exam   Constitutional: She is oriented to person, place, and time   She appears well-developed and well-nourished  No distress  HENT:   Head: Normocephalic and atraumatic  Mouth/Throat: Oropharynx is clear and moist  No oropharyngeal exudate  Eyes: Pupils are equal, round, and reactive to light  Conjunctivae and EOM are normal  No scleral icterus  Neck: Normal range of motion  Neck supple  No JVD present  No tracheal deviation present  No thyromegaly present  Cardiovascular: Normal rate, regular rhythm, normal heart sounds and intact distal pulses  Exam reveals no gallop and no friction rub  No murmur heard  Pulmonary/Chest: Effort normal and breath sounds normal  No respiratory distress  She has no wheezes  She has no rales  She exhibits no tenderness  Right-sided robotic incisions x5 are well healed  Suture removed in the office  No erythema or drainage   Abdominal: Soft  Bowel sounds are normal  She exhibits no distension and no mass  There is no tenderness  There is no rebound and no guarding  Musculoskeletal: Normal range of motion  She exhibits no edema, tenderness or deformity  Neurological: She is alert and oriented to person, place, and time  Skin: Skin is warm and dry  No rash noted  She is not diaphoretic  No erythema  No pallor  Psychiatric: She has a normal mood and affect  Her behavior is normal  Judgment and thought content normal    Nursing note and vitals reviewed  There were no vitals taken for this visit  Xr Chest Pa & Lateral    Result Date: 2/3/2020  Impression No acute cardiopulmonary disease  Surgical changes in the right hemithorax right upper lobectomy for adenocarcinoma  Workstation performed: [id number]     Xr Chest Pa & Lateral    Result Date: 1/19/2020  Impression Right upper lobectomy  Right chest tube removal with no pneumothorax  Mild left base atelectasis  Workstation performed: [id number]     Xr Chest Pa & Lateral    Result Date: 12/2/2019  Impression 1 9 x 1 1 cm right upper lobe nodule    Follow-up with a chest CT is recommended to evaluate for lung cancer  I personally discussed this study with Vidya Hinojosa on 12/2/2019 at 1:22 PM  This study was marked in Epic for follow-up  Workstation performed: [id number]      Ct Chest Wo Contrast    Addendum Date: 12/12/2019 Addendum   ADDENDUM: I personally discussed this study with Vidya Hinojosa on 12/12/2019 at 1:52 PM      Result Date: 12/12/2019  Narrative CT CHEST WITHOUT IV CONTRAST INDICATION:   R91 8: Other nonspecific abnormal finding of lung field  Follow-up lung nodule on chest radiograph  History of colon cancer and thyroid cancer  COMPARISON:  Chest radiograph from 12/2/2019  Chest CT from 3/1/2016  TECHNIQUE: CT examination of the chest was performed without intravenous contrast   Axial, sagittal, and coronal 2D reformatted images were created from the source data and submitted for interpretation  Radiation dose length product (DLP) for this visit:  180 mGy-cm   This examination, like all CT scans performed in the [facility], was performed utilizing techniques to minimize radiation dose exposure, including the use of iterative reconstruction and automated exposure control  FINDINGS: LUNGS:  Abnormality on chest radiograph corresponds with a 2 1 x 2 0 cm spiculated nodule in the posterior segment of the right upper lobe with pleural retraction  Mild emphysema  No acute pulmonary disease  No significant filling defects in the trachea and bronchi  PLEURA:  Unremarkable  HEART/GREAT VESSELS:  Unremarkable for patient's age  MEDIASTINUM AND SELENA:  Unremarkable  CHEST WALL AND LOWER NECK:   Resection of the right lobe of the thyroid gland  VISUALIZED STRUCTURES IN THE UPPER ABDOMEN:  Fat-containing right Bochdalek hernia  Hepatic cyst in the right lobe  OSSEOUS STRUCTURES:  Extensive osteopenia in the spine  No change in lucency in the inferior tip of the left scapula  Moderate degenerative disease in the spine       Impression 2 1 x 2 0 cm spiculated right upper lobe nodule compatible with lung cancer  Workstation performed: [id number]       Nm Pet Ct Skull Base To Mid Thigh    Result Date: 12/20/2019  Narrative PET/CT SCAN INDICATION:  D38 1: Neoplasm of uncertain behavior of trachea, bronchus and lung   , abnormal CT, right upper lung nodule, history of thyroid cancer, colon cancer, status post right thyroidectomy, hemicolectomy MODIFIER: PI COMPARISON: CT chest 12/9/2019 and priors, including PET CT 9/9/2016 CELL TYPE:  None TECHNIQUE:   8 2 mCi F-18-FDG administered IV  Multiplanar attenuation corrected and non attenuation corrected PET images were acquired 60 minutes post injection  Contiguous, low dose, axial CT sections were obtained from the skull base through the femurs   Intravenous contrast material was not utilized  This examination, like all CT scans performed in the [facility], was performed utilizing techniques to minimize radiation dose exposure, including the use of iterative reconstruction and automated exposure control  Fasting serum glucose: 100 mg/dl FINDINGS: VISUALIZED BRAIN:   No acute abnormalities are seen  HEAD/NECK:   Persistent intense left thyroid activity, SUV 10 6, prior study 9 3  No FDG avid cervical adenopathy is seen  CT images: Unremarkable  CHEST:   2 3 x 2 cm right upper lung lesion demonstrates increased FDG activity, SUV 3, most compatible with malignancy  On the prior PET/CT, this measured 1 5 x 1 4 cm, SUV 1 5  Right hilar activity appears similar in configuration, SUV 3, prior SUV 2 6  This may be physiologic  No hypermetabolic mediastinal or left hilar adenopathy  CT images: Stable  ABDOMEN: Somewhat focal FDG activity in the mid transverse colon, SUV 4 9, may be physiologic  Corresponding CT images are unremarkable  No additional FDG avid soft tissue lesions are seen  CT images: Colon diverticula  PELVIS: No FDG avid soft tissue lesions are seen  CT images: Unremarkable   OSSEOUS STRUCTURES: No FDG avid lesions are seen  CT images: No significant findings  Impression 1   2 3 x 2 cm right upper lung lesion demonstrates increased FDG activity, SUV 3, most compatible with malignancy  No hypermetabolic hilar or mediastinal adenopathy  2   No hypermetabolic metastases in the neck, abdomen, pelvis  3   Somewhat focal FDG activity in the mid transverse colon may be physiologic  Corresponding CT images are unremarkable  Attention on follow-up recommended to exclude the possibility of occult neoplasm  4   Persistent intense left thyroid activity may be correlated clinically for etiologies such as thyroiditis  The study was marked in EPIC for significant notification  Workstation performed: [id number]     I personally reviewed her chest x-ray from this week with her in PACs  No significant pneumothorax or effusion    Personally reviewed her pathology with her  This is a 1 6 cm right upper lobe adenocarcinoma  There was no evidence of lymph node involvement    Overall she understands this was a stage IB lung cancer

## 2020-02-06 ENCOUNTER — HOSPITAL ENCOUNTER (OUTPATIENT)
Dept: ULTRASOUND IMAGING | Facility: HOSPITAL | Age: 80
Discharge: HOME | End: 2020-02-06
Attending: THORACIC SURGERY (CARDIOTHORACIC VASCULAR SURGERY)
Payer: COMMERCIAL

## 2020-02-06 ENCOUNTER — TELEPHONE (OUTPATIENT)
Dept: HEMATOLOGY ONCOLOGY | Facility: CLINIC | Age: 80
End: 2020-02-06

## 2020-02-06 DIAGNOSIS — E07.89 THYROID MASS OF UNCLEAR ETIOLOGY: ICD-10-CM

## 2020-02-06 DIAGNOSIS — E07.89 THYROID MASS OF UNCLEAR ETIOLOGY: Primary | ICD-10-CM

## 2020-02-06 PROCEDURE — 76536 US EXAM OF HEAD AND NECK: CPT

## 2020-02-06 NOTE — TELEPHONE ENCOUNTER
New Patient Encounter    New Patient Intake Form   Patient Details: Cindy Leyva  1940  [id number]    Background Information:  [address] starts by opening a telephone encounter and gathering the following information   Who is calling to schedule? If not self, relationship to patient? self   Referring Provider Rene Abdi MD   What is the diagnosis? Thyroid mass of unclear etiology   Is this diagnosis confirmed Yes   Is there a confirmed diagnosis from a biopsy/tissue reviewed by pathology? Yes   Is there any prior history of Cancer? Yes   If yes, please explain Thyroid   When was the diagnosis? 2005   Is patient aware of diagnosis? Yes   Reason for visit? NP DX   Have you had any testing done? If so: when, where? Yes   Are records in [facility]? yes   Was the patient told to bring a disk? no   Scheduling Information:   Preferred [place]:  [facility]     Requesting Specific Provider? Any   Are there any dates/time the patient cannot be seen? Any      Miscellaneous:   After completing the above information, please route to Financial Counselor and the appropriate Nurse Navigator for review

## 2020-02-07 ENCOUNTER — DOCUMENTATION (OUTPATIENT)
Dept: INFUSION CENTER | Facility: CLINIC | Age: 80
End: 2020-02-07

## 2020-02-07 ENCOUNTER — CONSULT (OUTPATIENT)
Dept: SURGICAL ONCOLOGY | Facility: CLINIC | Age: 80
End: 2020-02-07
Payer: COMMERCIAL

## 2020-02-07 VITALS
RESPIRATION RATE: 16 BRPM | WEIGHT: 166 LBS | HEART RATE: 100 BPM | HEIGHT: 65 IN | TEMPERATURE: 96.9 F | DIASTOLIC BLOOD PRESSURE: 70 MMHG | SYSTOLIC BLOOD PRESSURE: 130 MMHG | BODY MASS INDEX: 27.66 KG/M2

## 2020-02-07 DIAGNOSIS — E07.89 THYROID MASS OF UNCLEAR ETIOLOGY: Primary | ICD-10-CM

## 2020-02-07 PROCEDURE — 99203 OFFICE O/P NEW LOW 30 MIN: CPT | Performed by: SURGERY

## 2020-02-07 NOTE — SOCIAL WORK
MSW reviewed pt's DT, completed in [facility], which she self scored as a 0 and answered "no" to all listed problem areas  Due to pt's low self assessment, no MSW interventions are warranted at this time  Should pt's needs change, a cancer counselor will remain available to her  No concerns at this time

## 2020-02-07 NOTE — PROGRESS NOTES
Surgical Oncology Follow Up       [address] [facility] SURGICAL ONCOLOGY QUIN Henriquezgeorgina Mendoza  [place] [address]    Juliette Mayorga  1940  [id number]  [facility] SURGICAL ONCOLOGY [facility] [address]  [address] [address]    Chief Complaint   Patient presents with    Consult     Thyroid mass  Assessment/Plan:    No problem-specific Assessment & Plan notes found for this encounter  Diagnoses and all orders for this visit:    Thyroid mass of unclear etiology  -     Ambulatory referral to Surgical Oncology  -     US guided thyroid biopsy with [facility]; Future        Advance Care Planning/Advance Directives:  Discussed disease status, cancer treatment plans and/or cancer treatment goals with the patient  No history exists  History of Present Illness:   61-year-old female with history of stage IB lung cancer status post 01/17/2020 robotic right upper lobectomy and mediastinal lymph node dissection, found on recent PET-CT to have a PET avid thyroid lesion on the left side  She does have a history of thyroid cancer on the right side status post right hemithyroidectomy in 2005  She did not receive radioactive iodine at that time  No personal history of radiation exposure to the head neck area  No family history of thyroid cancer  Review of Systems   Constitutional: Negative  HENT: Negative  Negative for trouble swallowing and voice change  Eyes: Negative  Respiratory: Negative  Cardiovascular: Negative  Gastrointestinal: Negative  Endocrine: Negative  Genitourinary: Negative  Musculoskeletal: Negative  Skin: Negative  Allergic/Immunologic: Negative  Neurological: Negative  Hematological: Negative  Psychiatric/Behavioral: Negative            Patient Active Problem List   Diagnosis    Adenoma, adrenocortical    A-fib ([address] )    Benign essential hypertension    Coagulopathy ([place] [address] )    History of colon cancer    Overweight (BMI 25 0-29  9)    Osteoarthritis    Postoperative hypothyroidism    Thyroid cancer ([place] [address] )    Pulmonary nodules    Mixed hyperlipidemia    Medicare annual wellness visit, subsequent    Osteopenia of multiple sites    Malar rash    Age-related nuclear cataract of left eye    Preop examination    Atypical ductal hyperplasia of right breast    Dense breast tissue    Primary adenocarcinoma of upper lobe of right lung (HCC)    Thyroid mass of unclear etiology     Past Medical History:   Diagnosis Date    Allergic reaction     Anesthesia     "cheap date"    Arthritis     Atrial fibrillation ([place] [address] )     Benign polyp of large intestine     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    Colon cancer ([place] [address] ) 46    34 yo    Colon polyp     Full dentures     partial lower    GERD (gastroesophageal reflux disease)     H/O fracture of ankle 7082    Helicobacter pylori (H  pylori) infection     History of left knee replacement     History of neck problems     "C3-C7 and had epidural injections years ago"    History of vertigo     one time years ago    Hyperlipidemia     Hypertension     Indigestion     Lung mass     Nonspecific abnormal results of function study     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[place] [address] )     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[place] [address] )     Prediabetes     Refusal of blood transfusions as patient is Taoism     Thyroid cancer ([place] [address] ) 2009    Tic disorder, transient, recurrent     not recent    Vaginal Pap smear 07/03/2017    WNL    Walks frequently     Walks frequently      Past Surgical History:   Procedure Laterality Date    APPENDECTOMY      ARTHROSCOPY KNEE      BREAST BIOPSY Right 04/10/2017    US guided  benign    BREAST BIOPSY Right 10/24/2019    ADH    CARDIOVASCULAR STRESS TEST      CATARACT EXTRACTION Bilateral     COLON SURGERY      COLONOSCOPY      done 2010 due 2015 Dr Kuo    DILATION AND CURETTAGE OF UTERUS      ESOPHAGOGASTRODUODENOSCOPY      inflammation aonly    HEMICOLECTOMY Right 1977    HYSTERECTOMY      JOINT REPLACEMENT      L knee    MAMMO STEREOTACTIC BREAST BIOPSY RIGHT (ALL INC) Right 10/24/2019    PARATHYROID GLAND SURGERY      exploration     AZ BRONCHOSCOPY,DIAGNOSTIC N/A 1/17/2020    Procedure: Jocy Ducking;  Surgeon: Bolivar Lombard, MD;  [facility]: [facility];  Service: Thoracic    AZ THORACOSCOPY SURG LOBECTOMY Right 1/17/2020    Procedure: ROBOTIC ASSISTED COMPLETE LUNG LOBECTOMY;  Surgeon: Bolivar Lombard, MD;  [facility];  Service: Thoracic    AZ THORACOSCOPY Willian Riggers WEDGE RESEXN INITIAL UNILAT Right 1/17/2020    Procedure: ROBOTIC ASSISTED UPPER-LOBE WEDGE RESECTION, MEDIASTINAL LYMPH NODE DISECTION;  Surgeon: Bolivar Lombard, MD;  [facility];  Service: Thoracic    REPLACEMENT TOTAL KNEE      TOTAL ABDOMINAL HYSTERECTOMY  1983    TOTAL THYROIDECTOMY      US GUIDED BREAST BIOPSY RIGHT COMPLETE Right 4/10/2017     Family History   Problem Relation Age of Onset    Esophageal cancer Mother         [de-identified]    Hypertension Mother     Stroke Family         TIA    Breast cancer Paternal Aunt 80    No Known Problems Father     COPD Sister     COPD Sister     No Known Problems Maternal Grandmother     No Known Problems Maternal Grandfather     No Known Problems Paternal Grandmother     No Known Problems Paternal Grandfather     No Known Problems Maternal Aunt     No Known Problems Maternal Aunt     No Known Problems Maternal Aunt     No Known Problems Maternal Aunt     No Known Problems Maternal Aunt      Social History     Socioeconomic History    Marital status: Single     Spouse name: Not on file    Number of children: Not on file    Years of education: Not on file    Highest education level: Not on file   Occupational History    Not on file   Social Needs    Financial resource strain: Not on file    Food insecurity:     Worry: Not on file Inability: Not on file    Transportation needs:     Medical: Not on file     Non-medical: Not on file   Tobacco Use    Smoking status: Former Smoker     Packs/day: 1 50     Years: 20 00     Pack years: 30 00     Types: Cigarettes     Last attempt to quit: 1967     Years since quittin 1    Smokeless tobacco: Never Used   Substance and Sexual Activity    Alcohol use:  Yes     Alcohol/week: 5 0 standard drinks     Types: 5 Cans of beer per week     Frequency: 4 or more times a week     Drinks per session: 1 or 2     Binge frequency: Never    Drug use: No    Sexual activity: Not on file   Lifestyle    Physical activity:     Days per week: Not on file     Minutes per session: Not on file    Stress: Not on file   Relationships    Social connections:     Talks on phone: Not on file     Gets together: Not on file     Attends Sikhism service: Not on file     Active member of club or organization: Not on file     Attends meetings of clubs or organizations: Not on file     Relationship status: Not on file    Intimate partner violence:     Fear of current or ex partner: Not on file     Emotionally abused: Not on file     Physically abused: Not on file     Forced sexual activity: Not on file   Other Topics Concern    Not on file   Social History Narrative    Caffeine use- 1-3, 8oz coffees per day    Denied history of housing without smoke detectors    Always uses a seatbelt       Current Outpatient Medications:     apixaban (ELIQUIS) 5 mg, Take 5 mg by mouth 2 (two) times a day , Disp: , Rfl:     atorvastatin (LIPITOR) 80 mg tablet, TAKE 1 TABLET BY MOUTH EVERY DAY, Disp: 90 tablet, Rfl: 1    Coenzyme Q10 (CO Q 10 PO), Take 200 mg by mouth, Disp: , Rfl:     cyanocobalamin (VITAMIN B-12) 100 mcg tablet, Take 1,000 mcg by mouth daily, Disp: , Rfl:     diltiazem (CARDIZEM CD) 120 mg 24 hr capsule, Take 120 mg by mouth daily , Disp: , Rfl:     docusate sodium (COLACE) 100 mg capsule, Take 1 capsule (100 mg total) by mouth 2 (two) times a day, Disp: 10 capsule, Rfl: 0    econazole nitrate 1 % cream, APPLY TO RASH ON FEET TWICE DAILY AS NEEDED, Disp: , Rfl: 2    gabapentin (NEURONTIN) 300 mg capsule, Take 1 capsule (300 mg total) by mouth 3 (three) times a day, Disp: 63 capsule, Rfl: 0    levothyroxine (SYNTHROID) 100 mcg tablet, Take 1 tablet (100 mcg total) by mouth daily, Disp: 90 tablet, Rfl: 1    Multiple Vitamins-Calcium (ESSENTIAL ONE DAILY MULTIVIT PO), Take by mouth daily , Disp: , Rfl:     Omega-3 Fatty Acids (FISH OIL PO), Take 2 g by mouth, Disp: , Rfl:     omeprazole (PriLOSEC) 20 mg delayed release capsule, TAKE 1 CAPSULE DAILY (Patient taking differently: as needed ), Disp: 90 capsule, Rfl: 1    traMADol (ULTRAM) 50 mg tablet, Take 1 tablet (50 mg total) by mouth every 8 (eight) hours as needed for moderate pain, Disp: 9 tablet, Rfl: 0    triamcinolone (KENALOG) 0 1 % cream, Apply to affected area bid for up to 14 days as needed for rash, Disp: 28 g, Rfl: 0  Allergies   Allergen Reactions    Bee Venom      Tongue swelling    Levaquin [Levofloxacin In D5w]     Tequin [Gatifloxacin] Rash     Vitals:    02/07/20 0851   BP: 130/70   Pulse: 100   Resp: 16   Temp: (!) 96 9 °F (36 1 °C)       Physical Exam   Constitutional: She is oriented to person, place, and time  She appears well-developed and well-nourished  HENT:   Head: Normocephalic and atraumatic  Right Ear: External ear normal    Left Ear: External ear normal    Eyes: Pupils are equal, round, and reactive to light  EOM are normal    Neck: Normal range of motion  Neck supple  No JVD present  No tracheal deviation present  No thyromegaly present  Cardiovascular: Normal rate, regular rhythm, normal heart sounds and intact distal pulses  Pulmonary/Chest: Effort normal and breath sounds normal    Abdominal: Soft  Bowel sounds are normal    Musculoskeletal: Normal range of motion  Lymphadenopathy:     She has no cervical adenopathy  Neurological: She is alert and oriented to person, place, and time  Skin: Skin is warm and dry  Psychiatric: She has a normal mood and affect  Her behavior is normal  Judgment and thought content normal        Pathology:  none    Labs:  CBC, Coags, BMP, Mg, Phos   Lab Results   Component Value Date    KCB6MMCNOEFP 1 380 06/20/2019    E5TLOHP 13 0 02/28/2018       Imaging  Xr Chest Pa & Lateral    Result Date: 2/3/2020  Narrative: CHEST INDICATION:   R91 8: Other nonspecific abnormal finding of lung field  Right upper lobectomy for adenocarcinoma on 1/17/2020  COMPARISON:  Chest radiograph from 1/20/2020  Preoperative PET CT from 12/20/2019  EXAM PERFORMED/VIEWS:  XR CHEST PA & LATERAL  DUAL ENERGY SUBTRACTION TECHNIQUE FINDINGS: Cardiomediastinal silhouette appears unremarkable  Surgical changes in the right hemithorax  No acute pulmonary disease  No pneumothorax or pleural effusion  Osseous structures appear within normal limits for patient age  Impression: No acute cardiopulmonary disease  Surgical changes in the right hemithorax right upper lobectomy for adenocarcinoma  Workstation performed: [id number]     Xr Chest Pa & Lateral    Result Date: 1/19/2020  Narrative: CHEST INDICATION:   chest tube removal   Right upper lobectomy  COMPARISON:  Chest radiograph from 1/17/2020 and PET/CT from 12/20/2019  EXAM PERFORMED/VIEWS:  XR CHEST PA & LATERAL  DUAL ENERGY SUBTRACTION TECHNIQUE FINDINGS: Cardiomediastinal silhouette appears unremarkable  Right chest tube removal with no pneumothorax  Mild atelectasis in the left base  No pleural effusion  Osseous structures appear within normal limits for patient age  Impression: Right upper lobectomy  Right chest tube removal with no pneumothorax  Mild left base atelectasis  Workstation performed: [id number]     X-ray Chest 1 View    Result Date: 1/18/2020  Narrative: CHEST INDICATION:   s/p right upper lobectomy   COMPARISON:  Two-view chest 12/2/2019 EXAM PERFORMED/VIEWS:  XR CHEST 1 VIEW FINDINGS:  Patient rotated on the frontal view  Normal cardiac silhouette  Aortic calcification is present  Right thoracostomy tube in place tip at the pulmonary apex  No discernible pneumothorax  Left costophrenic angle obscured due to rotation  No large pleural effusion  Evaluation is somewhat limited by low lung volumes and crowding of bronchovascular markings  No definitive airspace consolidation or pulmonary edema  Right hilar postsurgical changes  Mild thoracic spondylosis  Impression: Partial right pneumonectomy changes  Right thoracostomy tube in place tip at the pulmonary apex  No discernible pneumothorax  No radiographic evidence of acute intrathoracic process on this examination which is somewhat limited by low lung volumes  Workstation performed: [id number]     PET/CT SCAN     INDICATION:  D38 1: Neoplasm of uncertain behavior of trachea, bronchus and lung   , abnormal CT, right upper lung nodule, history of thyroid cancer, colon cancer, status post right thyroidectomy, hemicolectomy     MODIFIER: PI     COMPARISON: CT chest 12/9/2019 and priors, including PET CT 9/9/2016     CELL TYPE:  None     TECHNIQUE:   8 2 mCi F-18-FDG administered IV  Multiplanar attenuation corrected and non attenuation corrected PET images were acquired 60 minutes post injection  Contiguous, low dose, axial CT sections were obtained from the skull base through the   femurs   Intravenous contrast material was not utilized    This examination, like all CT scans performed in the [facility], was performed utilizing techniques to minimize radiation dose exposure, including the use of iterative   reconstruction and automated exposure control       Fasting serum glucose: 100 mg/dl     FINDINGS:      VISUALIZED BRAIN:     No acute abnormalities are seen      HEAD/NECK:     Persistent intense left thyroid activity, SUV 10 6, prior study 9 3      No FDG avid cervical adenopathy is seen  CT images: Unremarkable      CHEST:     2 3 x 2 cm right upper lung lesion demonstrates increased FDG activity, SUV 3, most compatible with malignancy  On the prior PET/CT, this measured 1 5 x 1 4 cm, SUV 1 5      Right hilar activity appears similar in configuration, SUV 3, prior SUV 2 6  This may be physiologic  No hypermetabolic mediastinal or left hilar adenopathy      CT images: Stable      ABDOMEN:  Somewhat focal FDG activity in the mid transverse colon, SUV 4 9, may be physiologic  Corresponding CT images are unremarkable      No additional FDG avid soft tissue lesions are seen  CT images: Colon diverticula      PELVIS:   No FDG avid soft tissue lesions are seen  CT images: Unremarkable      OSSEOUS STRUCTURES:  No FDG avid lesions are seen  CT images: No significant findings      IMPRESSION:  1   2 3 x 2 cm right upper lung lesion demonstrates increased FDG activity, SUV 3, most compatible with malignancy  No hypermetabolic hilar or mediastinal adenopathy  2   No hypermetabolic metastases in the neck, abdomen, pelvis  3   Somewhat focal FDG activity in the mid transverse colon may be physiologic  Corresponding CT images are unremarkable  Attention on follow-up recommended to exclude the possibility of occult neoplasm  4   Persistent intense left thyroid activity may be correlated clinically for etiologies such as thyroiditis      The study was marked in EPIC for significant notification         Workstation performed: [id number]     I reviewed the above laboratory and imaging data  Discussion/Summary:  78year-old woman, prior history of thyroid cancer on the right now with PET avid lesion on the left  Ultrasound done yesterday  Will refer for ultrasound-guided biopsy with Afirma testing of left-sided thyroid nodule  Follow-up here once results available for review  Further management will depend on results of biopsy

## 2020-02-07 NOTE — LETTER
February 7, 2020     Narayan Márquez MD  [address] [facility] [address]  [address] [place] [address]    Patient: Terra Winn   YOB: 1940   Date of Visit: 2/7/2020       Dear Dr Nick Ca: Thank you for referring Terra Winn to me for evaluation  Below are my notes for this consultation  If you have questions, please do not hesitate to call me  I look forward to following your patient along with you  Sincerely,        Ede Elizabeth MD        CC: DO Cady Gil MD Cordella Finders, MD  2/7/2020  9:21 AM  Sign at close encounter               Surgical Oncology Follow Up       [address] [address]    Terra Winn  1940  [id number]  [facility]  [facility]  [address] [id number]    Chief Complaint   Patient presents with    Consult     Thyroid mass  Assessment/Plan:    No problem-specific Assessment & Plan notes found for this encounter  Diagnoses and all orders for this visit:    Thyroid mass of unclear etiology  -     Ambulatory referral to Surgical Oncology  -     [place] [facility]        Advance Care Planning/Advance Directives:  Discussed disease status, cancer treatment plans and/or cancer treatment goals with the patient  No history exists  History of Present Illness:   78-year-old female with history of stage IB lung cancer status post 01/17/2020 robotic right upper lobectomy and mediastinal lymph node dissection, found on recent PET-CT to have a PET avid thyroid lesion on the left side  She does have a history of thyroid cancer on the right side status post right hemithyroidectomy in 2005  She did not receive radioactive iodine at that time  No personal history of radiation exposure to the head neck area  No family history of thyroid cancer        Review of Systems   Constitutional: Negative  HENT: Negative  Negative for trouble swallowing and voice change  Eyes: Negative  Respiratory: Negative  Cardiovascular: Negative  Gastrointestinal: Negative  Endocrine: Negative  Genitourinary: Negative  Musculoskeletal: Negative  Skin: Negative  Allergic/Immunologic: Negative  Neurological: Negative  Hematological: Negative  Psychiatric/Behavioral: Negative  Patient Active Problem List   Diagnosis    Adenoma, adrenocortical    A-fib ([place] [address] )    Benign essential hypertension    Coagulopathy (HCC)    History of colon cancer    Overweight (BMI 25 0-29  9)    Osteoarthritis    Postoperative hypothyroidism    Thyroid cancer ([place] [address] )    Pulmonary nodules    Mixed hyperlipidemia    Medicare annual wellness visit, subsequent    Osteopenia of multiple sites    Malar rash    Age-related nuclear cataract of left eye    Preop examination    Atypical ductal hyperplasia of right breast    Dense breast tissue    Primary adenocarcinoma of upper lobe of right lung ([facility] )    Thyroid mass of unclear etiology     Past Medical History:   Diagnosis Date    Allergic reaction     Anesthesia     "cheap date"    Arthritis     Atrial fibrillation ([place] [address] )     Benign polyp of large intestine     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    Colon cancer ([place] [address] ) 46    36 yo    Colon polyp     Full dentures     partial lower    GERD (gastroesophageal reflux disease)     H/O fracture of ankle 9433    Helicobacter pylori (H  pylori) infection     History of left knee replacement     History of neck problems     "C3-C7 and had epidural injections years ago"    History of vertigo     one time years ago    Hyperlipidemia     Hypertension     Indigestion     Lung mass     Nonspecific abnormal results of function study     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[place] [address] )     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[place] [address] )     Prediabetes     Refusal of blood transfusions as patient is Catholic     Thyroid cancer ([facility] ) 2009    Tic disorder, transient, recurrent     not recent    Vaginal Pap smear 07/03/2017    WNL    Walks frequently     Walks frequently      Past Surgical History:   Procedure Laterality Date    APPENDECTOMY      ARTHROSCOPY KNEE      BREAST BIOPSY Right 04/10/2017    US guided  benign    BREAST BIOPSY Right 10/24/2019    ADH    CARDIOVASCULAR STRESS TEST      CATARACT EXTRACTION Bilateral     COLON SURGERY      COLONOSCOPY      done 2010 due 2015 Dr Kuo    DILATION AND CURETTAGE OF UTERUS      ESOPHAGOGASTRODUODENOSCOPY      inflammation aonly    HEMICOLECTOMY Right 1977    HYSTERECTOMY      JOINT REPLACEMENT      L knee    MAMMO STEREOTACTIC BREAST BIOPSY RIGHT (ALL INC) Right 10/24/2019    PARATHYROID GLAND SURGERY      exploration     FL BRONCHOSCOPY,DIAGNOSTIC N/A 1/17/2020    Procedure: Jocy Vargas;  Surgeon: Bolivar Lombard, MD;  [facility]: [facility];  Service: Thoracic    FL THORACOSCOPY SURG LOBECTOMY Right 1/17/2020    Procedure: ROBOTIC ASSISTED COMPLETE LUNG LOBECTOMY;  Surgeon: Bolivar Lombard, MD;  [facility];  Service: Thoracic    FL THORACOSCOPY W/THERA WEDGE RESEXN INITIAL UNILAT Right 1/17/2020    Procedure: ROBOTIC ASSISTED UPPER-LOBE WEDGE RESECTION, MEDIASTINAL LYMPH NODE DISECTION;  Surgeon: Bolivar Lombard, MD;  [facility];  Service: Thoracic    REPLACEMENT TOTAL KNEE      TOTAL ABDOMINAL HYSTERECTOMY  1983    TOTAL THYROIDECTOMY      US GUIDED BREAST BIOPSY RIGHT COMPLETE Right 4/10/2017     Family History   Problem Relation Age of Onset    Esophageal cancer Mother         [de-identified]    Hypertension Mother     Stroke Family         TIA    Breast cancer Paternal Aunt 80    No Known Problems Father     COPD Sister    [place] COPD Sister     No Known Problems Maternal Grandmother     No Known Problems Maternal Grandfather     No Known Problems Paternal Grandmother     No Known Problems Paternal Grandfather     No Known Problems Maternal Aunt     No Known Problems Maternal Aunt     No Known Problems Maternal Aunt     No Known Problems Maternal Aunt     No Known Problems Maternal Aunt      Social History     Socioeconomic History    Marital status: Single     Spouse name: Not on file    Number of children: Not on file    Years of education: Not on file    Highest education level: Not on file   Occupational History    Not on file   Social Needs    Financial resource strain: Not on file    Food insecurity:     Worry: Not on file     Inability: Not on file    Transportation needs:     Medical: Not on file     Non-medical: Not on file   Tobacco Use    Smoking status: Former Smoker     Packs/day: 1 50     Years: 20 00     Pack years: 30 00     Types: Cigarettes     Last attempt to quit:      Years since quittin 1    Smokeless tobacco: Never Used   Substance and Sexual Activity    Alcohol use:  Yes     Alcohol/week: 5 0 standard drinks     Types: 5 Cans of beer per week     Frequency: 4 or more times a week     Drinks per session: 1 or 2     Binge frequency: Never    Drug use: No    Sexual activity: Not on file   Lifestyle    Physical activity:     Days per week: Not on file     Minutes per session: Not on file    Stress: Not on file   Relationships    Social connections:     Talks on phone: Not on file     Gets together: Not on file     Attends [employer] service: Not on file     Active member of club or organization: Not on file     Attends meetings of clubs or organizations: Not on file     Relationship status: Not on file    Intimate partner violence:     Fear of current or ex partner: Not on file     Emotionally abused: Not on file     Physically abused: Not on file     Forced sexual activity: Not on file   Other Topics Concern    Not on file   Social History Narrative    Caffeine use- 1-3, 8oz coffees per day    Denied history of housing without smoke detectors Always uses a seatbelt       Current Outpatient Medications:     apixaban (ELIQUIS) 5 mg, Take 5 mg by mouth 2 (two) times a day , Disp: , Rfl:     atorvastatin (LIPITOR) 80 mg tablet, TAKE 1 TABLET BY MOUTH EVERY DAY, Disp: 90 tablet, Rfl: 1    Coenzyme Q10 (CO Q 10 PO), Take 200 mg by mouth, Disp: , Rfl:     cyanocobalamin (VITAMIN B-12) 100 mcg tablet, Take 1,000 mcg by mouth daily, Disp: , Rfl:     diltiazem (CARDIZEM CD) 120 mg 24 hr capsule, Take 120 mg by mouth daily , Disp: , Rfl:     docusate sodium (COLACE) 100 mg capsule, Take 1 capsule (100 mg total) by mouth 2 (two) times a day, Disp: 10 capsule, Rfl: 0    econazole nitrate 1 % cream, APPLY TO RASH ON FEET TWICE DAILY AS NEEDED, Disp: , Rfl: 2    gabapentin (NEURONTIN) 300 mg capsule, Take 1 capsule (300 mg total) by mouth 3 (three) times a day, Disp: 63 capsule, Rfl: 0    levothyroxine (SYNTHROID) 100 mcg tablet, Take 1 tablet (100 mcg total) by mouth daily, Disp: 90 tablet, Rfl: 1    Multiple Vitamins-Calcium (ESSENTIAL ONE DAILY MULTIVIT PO), Take by mouth daily , Disp: , Rfl:     Omega-3 Fatty Acids (FISH OIL PO), Take 2 g by mouth, Disp: , Rfl:     omeprazole (PriLOSEC) 20 mg delayed release capsule, TAKE 1 CAPSULE DAILY (Patient taking differently: as needed ), Disp: 90 capsule, Rfl: 1    traMADol (ULTRAM) 50 mg tablet, Take 1 tablet (50 mg total) by mouth every 8 (eight) hours as needed for moderate pain, Disp: 9 tablet, Rfl: 0    triamcinolone (KENALOG) 0 1 % cream, Apply to affected area bid for up to 14 days as needed for rash, Disp: 28 g, Rfl: 0  Allergies   Allergen Reactions    Bee Venom      Tongue swelling    Levaquin [Levofloxacin In D5w]     Tequin [Gatifloxacin] Rash     Vitals:    02/07/20 0851   BP: 130/70   Pulse: 100   Resp: 16   Temp: (!) 96 9 °F (36 1 °C)       Physical Exam   Constitutional: She is oriented to person, place, and time  She appears well-developed and well-nourished     HENT:   Head: Normocephalic and atraumatic  Right Ear: External ear normal    Left Ear: External ear normal    Eyes: Pupils are equal, round, and reactive to light  EOM are normal    Neck: Normal range of motion  Neck supple  No JVD present  No tracheal deviation present  No thyromegaly present  Cardiovascular: Normal rate, regular rhythm, normal heart sounds and intact distal pulses  Pulmonary/Chest: Effort normal and breath sounds normal    Abdominal: Soft  Bowel sounds are normal    Musculoskeletal: Normal range of motion  Lymphadenopathy:     She has no cervical adenopathy  Neurological: She is alert and oriented to person, place, and time  Skin: Skin is warm and dry  Psychiatric: She has a normal mood and affect  Her behavior is normal  Judgment and thought content normal        Pathology:  none    Labs:  CBC, Coags, BMP, Mg, Phos   Lab Results   Component Value Date    MEX0YHIGRDFY 1 380 06/20/2019    C7MSCXW 13 0 02/28/2018       Imaging  Xr Chest Pa & Lateral    Result Date: 2/3/2020  Narrative: CHEST INDICATION:   R91 8: Other nonspecific abnormal finding of lung field  Right upper lobectomy for adenocarcinoma on 1/17/2020  COMPARISON:  Chest radiograph from 1/20/2020  Preoperative PET CT from 12/20/2019  EXAM PERFORMED/VIEWS:  XR CHEST PA & LATERAL  DUAL ENERGY SUBTRACTION TECHNIQUE FINDINGS: Cardiomediastinal silhouette appears unremarkable  Surgical changes in the right hemithorax  No acute pulmonary disease  No pneumothorax or pleural effusion  Osseous structures appear within normal limits for patient age  Impression: No acute cardiopulmonary disease  Surgical changes in the right hemithorax right upper lobectomy for adenocarcinoma  Workstation performed: [id number]     Xr Chest Pa & Lateral    Result Date: 1/19/2020  Narrative: CHEST INDICATION:   chest tube removal   Right upper lobectomy  COMPARISON:  Chest radiograph from 1/17/2020 and PET/CT from 12/20/2019   EXAM PERFORMED/VIEWS:  XR CHEST PA & LATERAL  DUAL ENERGY SUBTRACTION TECHNIQUE FINDINGS: Cardiomediastinal silhouette appears unremarkable  Right chest tube removal with no pneumothorax  Mild atelectasis in the left base  No pleural effusion  Osseous structures appear within normal limits for patient age  Impression: Right upper lobectomy  Right chest tube removal with no pneumothorax  Mild left base atelectasis  Workstation performed: [id number]     X-ray Chest 1 View    Result Date: 1/18/2020  Narrative: CHEST INDICATION:   s/p right upper lobectomy  COMPARISON:  Two-view chest 12/2/2019 EXAM PERFORMED/VIEWS:  XR CHEST 1 VIEW FINDINGS:  Patient rotated on the frontal view  Normal cardiac silhouette  Aortic calcification is present  Right thoracostomy tube in place tip at the pulmonary apex  No discernible pneumothorax  Left costophrenic angle obscured due to rotation  No large pleural effusion  Evaluation is somewhat limited by low lung volumes and crowding of bronchovascular markings  No definitive airspace consolidation or pulmonary edema  Right hilar postsurgical changes  Mild thoracic spondylosis  Impression: Partial right pneumonectomy changes  Right thoracostomy tube in place tip at the pulmonary apex  No discernible pneumothorax  No radiographic evidence of acute intrathoracic process on this examination which is somewhat limited by low lung volumes  Workstation performed: [id number]     PET/CT SCAN     INDICATION:  D38 1: Neoplasm of uncertain behavior of trachea, bronchus and lung   , abnormal CT, right upper lung nodule, history of thyroid cancer, colon cancer, status post right thyroidectomy, hemicolectomy     MODIFIER: PI     COMPARISON: CT chest 12/9/2019 and priors, including PET CT 9/9/2016     CELL TYPE:  None     TECHNIQUE:   8 2 mCi F-18-FDG administered IV  Multiplanar attenuation corrected and non attenuation corrected PET images were acquired 60 minutes post injection   Contiguous, low dose, axial CT sections were obtained from the skull base through the   femurs   Intravenous contrast material was not utilized  This examination, like all CT scans performed in the [facility], was performed utilizing techniques to minimize radiation dose exposure, including the use of iterative   reconstruction and automated exposure control       Fasting serum glucose: 100 mg/dl     FINDINGS:      VISUALIZED BRAIN:     No acute abnormalities are seen      HEAD/NECK:     Persistent intense left thyroid activity, SUV 10 6, prior study 9 3      No FDG avid cervical adenopathy is seen  CT images: Unremarkable      CHEST:     2 3 x 2 cm right upper lung lesion demonstrates increased FDG activity, SUV 3, most compatible with malignancy  On the prior PET/CT, this measured 1 5 x 1 4 cm, SUV 1 5      Right hilar activity appears similar in configuration, SUV 3, prior SUV 2 6  This may be physiologic  No hypermetabolic mediastinal or left hilar adenopathy      CT images: Stable      ABDOMEN:  Somewhat focal FDG activity in the mid transverse colon, SUV 4 9, may be physiologic  Corresponding CT images are unremarkable      No additional FDG avid soft tissue lesions are seen  CT images: Colon diverticula      PELVIS:   No FDG avid soft tissue lesions are seen  CT images: Unremarkable      OSSEOUS STRUCTURES:  No FDG avid lesions are seen  CT images: No significant findings      IMPRESSION:  1   2 3 x 2 cm right upper lung lesion demonstrates increased FDG activity, SUV 3, most compatible with malignancy  No hypermetabolic hilar or mediastinal adenopathy  2   No hypermetabolic metastases in the neck, abdomen, pelvis  3   Somewhat focal FDG activity in the mid transverse colon may be physiologic  Corresponding CT images are unremarkable  Attention on follow-up recommended to exclude the possibility of occult neoplasm    4   Persistent intense left thyroid activity may be correlated clinically for etiologies such as thyroiditis      The study was marked in EPIC for significant notification         Workstation performed: [id number]     I reviewed the above laboratory and imaging data  Discussion/Summary:  78year-old woman, prior history of thyroid cancer on the right now with PET avid lesion on the left  Ultrasound done yesterday  Will refer for ultrasound-guided biopsy with Afirma testing of left-sided thyroid nodule  Follow-up here once results available for review  Further management will depend on results of biopsy

## 2020-02-14 ENCOUNTER — HOSPITAL ENCOUNTER (OUTPATIENT)
Dept: ULTRASOUND IMAGING | Facility: HOSPITAL | Age: 80
Discharge: HOME | End: 2020-02-14
Attending: SURGERY
Payer: COMMERCIAL

## 2020-02-14 DIAGNOSIS — E07.89 THYROID MASS OF UNCLEAR ETIOLOGY: ICD-10-CM

## 2020-02-14 PROCEDURE — 88173 CYTOPATH EVAL FNA REPORT: CPT | Performed by: PATHOLOGY

## 2020-02-14 PROCEDURE — 10005 FNA BX W/US GDN 1ST LES: CPT

## 2020-02-14 RX ORDER — LIDOCAINE WITH 8.4% SOD BICARB 0.9%(10ML)
5 SYRINGE (ML) INJECTION ONCE
Status: COMPLETED | OUTPATIENT
Start: 2020-02-14 | End: 2020-02-14

## 2020-02-14 RX ADMIN — Medication 5 ML: at 09:45

## 2020-03-02 ENCOUNTER — TELEPHONE (OUTPATIENT)
Dept: CARDIAC SURGERY | Facility: CLINIC | Age: 80
End: 2020-03-02

## 2020-03-02 PROBLEM — E06.3 HASHIMOTO'S THYROIDITIS: Status: ACTIVE | Noted: 2020-03-02

## 2020-03-02 NOTE — TELEPHONE ENCOUNTER
Pt called to cx her post op appt on 3/4/20  Pt stated that Dr Laura Rivera told her she did not need to f/u if she felt better closer to her appt  Pt stated that she feels well at this point in time and does not feel the need to keep appt  Pt will f/u in 6 months w/ CTC

## 2020-03-03 ENCOUNTER — DOCUMENTATION (OUTPATIENT)
Dept: GYNECOLOGY | Facility: CLINIC | Age: 80
End: 2020-03-03

## 2020-03-04 ENCOUNTER — OFFICE VISIT (OUTPATIENT)
Dept: SURGICAL ONCOLOGY | Facility: CLINIC | Age: 80
End: 2020-03-04
Payer: COMMERCIAL

## 2020-03-04 VITALS
RESPIRATION RATE: 16 BRPM | DIASTOLIC BLOOD PRESSURE: 84 MMHG | BODY MASS INDEX: 27.16 KG/M2 | TEMPERATURE: 98.4 F | WEIGHT: 163 LBS | HEIGHT: 65 IN | HEART RATE: 50 BPM | SYSTOLIC BLOOD PRESSURE: 130 MMHG

## 2020-03-04 DIAGNOSIS — E06.3 HASHIMOTO'S THYROIDITIS: Primary | ICD-10-CM

## 2020-03-04 PROCEDURE — 3079F DIAST BP 80-89 MM HG: CPT | Performed by: SURGERY

## 2020-03-04 PROCEDURE — 1160F RVW MEDS BY RX/DR IN RCRD: CPT | Performed by: SURGERY

## 2020-03-04 PROCEDURE — 99213 OFFICE O/P EST LOW 20 MIN: CPT | Performed by: SURGERY

## 2020-03-04 PROCEDURE — 3008F BODY MASS INDEX DOCD: CPT | Performed by: SURGERY

## 2020-03-04 PROCEDURE — 3075F SYST BP GE 130 - 139MM HG: CPT | Performed by: SURGERY

## 2020-03-04 PROCEDURE — 1111F DSCHRG MED/CURRENT MED MERGE: CPT | Performed by: SURGERY

## 2020-03-04 PROCEDURE — 1036F TOBACCO NON-USER: CPT | Performed by: SURGERY

## 2020-03-04 NOTE — LETTER
March 4, 2020     Aurea Severance,   [address] [facility]  [address] [place] [address]    Patient: Ro Keith   YOB: 1940   Date of Visit: 3/4/2020       Dear Dr Steve Keith:    Thank you for referring Ro Keith to me for evaluation  Below are my notes for this consultation  If you have questions, please do not hesitate to call me  I look forward to following your patient along with you  Sincerely,        Alexsander Gee MD        CC: MD Sam Frias MD Arta Clara, MD Wilkins Dillon, MD  3/4/2020 11:07 AM  Sign at close encounter     Surgical Oncology Follow Up       [address] [address]    Ro Keith  1940  [id number]  [address] [facility] SURGICAL ONCOLOGY [facility]  [phone]    Chief Complaint   Patient presents with    Follow-up     F/U after thyroid biopsy  Assessment/Plan:    No problem-specific Assessment & Plan notes found for this encounter  Diagnoses and all orders for this visit:    Hashimoto's thyroiditis  -     US thyroid; Future        Advance Care Planning/Advance Directives:  Discussed disease status, cancer treatment plans and/or cancer treatment goals with the patient  No history exists  History of Present Illness:  Patient is here status post thyroid biopsy of new thyroid nodule  She has a history of thyroid cancer   -Interval History:  No problems since the biopsy  Review of Systems:  Review of Systems   HENT: Negative for sore throat, trouble swallowing and voice change  All other systems reviewed and are negative  Patient Active Problem List   Diagnosis    Adenoma, adrenocortical    A-fib ([address] )    Benign essential hypertension    Coagulopathy (HCC)    History of colon cancer    Overweight (BMI 25 0-29  9)    Osteoarthritis    Postoperative hypothyroidism    Thyroid cancer ([place] [address] )    Pulmonary nodules    Mixed hyperlipidemia    Medicare annual wellness visit, subsequent    Osteopenia of multiple sites    Malar rash    Age-related nuclear cataract of left eye    Preop examination    Atypical ductal hyperplasia of right breast    Dense breast tissue    Primary adenocarcinoma of upper lobe of right lung (HCC)    Thyroid mass of unclear etiology    Hashimoto's thyroiditis     Past Medical History:   Diagnosis Date    Allergic reaction     Anesthesia     "cheap date"    Arthritis     Atrial fibrillation ([place] [address] )     Benign polyp of large intestine     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    Colon cancer ([place] [address] ) 46    36 yo    Colon polyp     Full dentures     partial lower    GERD (gastroesophageal reflux disease)     H/O fracture of ankle 9920    Helicobacter pylori (H  pylori) infection     History of left knee replacement     History of neck problems     "C3-C7 and had epidural injections years ago"    History of vertigo     one time years ago    Hyperlipidemia     Hypertension     Indigestion     Lung mass     Nonspecific abnormal results of function study     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[place] [address] )     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[place] [address] )     Prediabetes     Refusal of blood transfusions as patient is Hoahaoism     Thyroid cancer ([place] [address] ) 2009    Tic disorder, transient, recurrent     not recent    Vaginal Pap smear 07/03/2017    WNL    Walks frequently     Walks frequently      Past Surgical History:   Procedure Laterality Date    APPENDECTOMY      ARTHROSCOPY KNEE      BREAST BIOPSY Right 04/10/2017    US guided  benign    BREAST BIOPSY Right 10/24/2019    ADH    CARDIOVASCULAR STRESS TEST      CATARACT EXTRACTION Bilateral     COLON SURGERY      COLONOSCOPY      done 2010 due 2015 Dr Kuo    DILATION AND CURETTAGE OF UTERUS      ESOPHAGOGASTRODUODENOSCOPY      inflammation aonly    HEMICOLECTOMY Right 1977    HYSTERECTOMY      JOINT REPLACEMENT      L knee    MAMMO STEREOTACTIC BREAST BIOPSY RIGHT (ALL INC) Right 10/24/2019    PARATHYROID GLAND SURGERY      exploration     DE BRONCHOSCOPY,DIAGNOSTIC N/A 1/17/2020    Procedure: FLEXIBLE BRONCHOSCOPY;  Surgeon: Becky Lewis MD;  [facility];  Service: Thoracic    DE THORACOSCOPY SURG LOBECTOMY Right 1/17/2020    Procedure: ROBOTIC ASSISTED COMPLETE LUNG LOBECTOMY;  Surgeon: Becky Lewis MD;  [facility];  Service: Thoracic    DE THORACOSCOPY Katt Molt WEDGE RESEXN INITIAL UNILAT Right 1/17/2020    Procedure: ROBOTIC ASSISTED UPPER-LOBE WEDGE RESECTION, MEDIASTINAL LYMPH NODE DISECTION;  Surgeon: Becky Lewis MD;  [facility];  Service: Thoracic    REPLACEMENT TOTAL KNEE      TOTAL ABDOMINAL HYSTERECTOMY  1983    TOTAL THYROIDECTOMY      US GUIDED BREAST BIOPSY RIGHT COMPLETE Right 4/10/2017    US GUIDED THYROID BIOPSY  2/14/2020     Family History   Problem Relation Age of Onset    Esophageal cancer Mother         [de-identified]    Hypertension Mother     Stroke Family         TIA    Breast cancer Paternal Aunt 80    No Known Problems Father     COPD Sister     COPD Sister     No Known Problems Maternal Grandmother     No Known Problems Maternal Grandfather     No Known Problems Paternal Grandmother     No Known Problems Paternal Grandfather     No Known Problems Maternal Aunt     No Known Problems Maternal Aunt     No Known Problems Maternal Aunt     No Known Problems Maternal Aunt     No Known Problems Maternal Aunt      Social History     Socioeconomic History    Marital status: Single     Spouse name: Not on file    Number of children: Not on file    Years of education: Not on file    Highest education level: Not on file   Occupational History    Not on file   Social Needs    Financial resource strain: Not on file    Food insecurity:     Worry: Not on file     Inability: Not on file   Connexient needs: Medical: Not on file     Non-medical: Not on file   Tobacco Use    Smoking status: Former Smoker     Packs/day: 1 50     Years: 20 00     Pack years: 30 00     Types: Cigarettes     Last attempt to quit: 1967     Years since quittin 2    Smokeless tobacco: Never Used   Substance and Sexual Activity    Alcohol use:  Yes     Alcohol/week: 5 0 standard drinks     Types: 5 Cans of beer per week     Frequency: 4 or more times a week     Drinks per session: 1 or 2     Binge frequency: Never    Drug use: No    Sexual activity: Not on file   Lifestyle    Physical activity:     Days per week: Not on file     Minutes per session: Not on file    Stress: Not on file   Relationships    Social connections:     Talks on phone: Not on file     Gets together: Not on file     Attends [employer] service: Not on file     Active member of club or organization: Not on file     Attends meetings of clubs or organizations: Not on file     Relationship status: Not on file    Intimate partner violence:     Fear of current or ex partner: Not on file     Emotionally abused: Not on file     Physically abused: Not on file     Forced sexual activity: Not on file   Other Topics Concern    Not on file   Social History Narrative    Caffeine use- 1-3, 8oz coffees per day    Denied history of housing without smoke detectors    Always uses a seatbelt       Current Outpatient Medications:     apixaban (ELIQUIS) 5 mg, Take 5 mg by mouth 2 (two) times a day , Disp: , Rfl:     atorvastatin (LIPITOR) 80 mg tablet, TAKE 1 TABLET BY MOUTH EVERY DAY, Disp: 90 tablet, Rfl: 1    Coenzyme Q10 (CO Q 10 PO), Take 200 mg by mouth, Disp: , Rfl:     cyanocobalamin (VITAMIN B-12) 100 mcg tablet, Take 1,000 mcg by mouth daily, Disp: , Rfl:     diltiazem (CARDIZEM CD) 120 mg 24 hr capsule, Take 120 mg by mouth daily , Disp: , Rfl:     econazole nitrate 1 % cream, APPLY TO RASH ON FEET TWICE DAILY AS NEEDED, Disp: , Rfl: 2    levothyroxine (SYNTHROID) 100 mcg tablet, Take 1 tablet (100 mcg total) by mouth daily, Disp: 90 tablet, Rfl: 1    Multiple Vitamins-Calcium (ESSENTIAL ONE DAILY MULTIVIT PO), Take by mouth daily , Disp: , Rfl:     Omega-3 Fatty Acids (FISH OIL PO), Take 2 g by mouth, Disp: , Rfl:     omeprazole (PriLOSEC) 20 mg delayed release capsule, TAKE 1 CAPSULE DAILY (Patient taking differently: as needed ), Disp: 90 capsule, Rfl: 1    triamcinolone (KENALOG) 0 1 % cream, Apply to affected area bid for up to 14 days as needed for rash, Disp: 28 g, Rfl: 0    docusate sodium (COLACE) 100 mg capsule, Take 1 capsule (100 mg total) by mouth 2 (two) times a day (Patient not taking: Reported on 3/4/2020), Disp: 10 capsule, Rfl: 0    gabapentin (NEURONTIN) 300 mg capsule, Take 1 capsule (300 mg total) by mouth 3 (three) times a day (Patient not taking: Reported on 3/4/2020), Disp: 63 capsule, Rfl: 0    traMADol (ULTRAM) 50 mg tablet, Take 1 tablet (50 mg total) by mouth every 8 (eight) hours as needed for moderate pain (Patient not taking: Reported on 3/4/2020), Disp: 9 tablet, Rfl: 0  Allergies   Allergen Reactions    Bee Venom      Tongue swelling    Levaquin [Levofloxacin In D5w]     Tequin [Gatifloxacin] Rash     Vitals:    03/04/20 1027   BP: 130/84   Pulse: (!) 50   Resp: 16   Temp: 98 4 °F (36 9 °C)       Physical Exam   Neck: No thyromegaly present  Lymphadenopathy:     She has no cervical adenopathy           Results:  Labs:  Case Report   Non-gynecologic Cytology                          Case: [id number]                                   Authorizing Provider: Renato Wilson MD        Collected:           02/14/2020 0930               Ordering Location:     [facility]        Received:            02/14/2020 [address] [place] [address] Ultrasound                                                          Pathologist:           Amarjit Richardson MD                                                                Specimens:   A) - Thyroid, Left, mid/lower pole                                                                   B) - Thyroid, Left, mid/lower pole                                                         Final Diagnosis   A - B  Thyroid, Left, mid/lower pole:(Thin prep and smear preparations)  Benign ([place] Category II) - See note  Findings consistent with Hashimoto's thyroiditis     Satisfactory for evaluation         Note:   - The patient's clinically reported history of Hashimoto's thyroiditis is noted  - As reported in the [facility] for Reporting Thyroid Cytopathology*, the diagnostic category of "benign/negative for malignancy" carries a 0-3% risk of malignancy being found in subsequent resections (in the few studies of patients with benign FNA results that were followed long-term, a false negative rate of 0-3% was reported), with the usual management being clinical follow-up supplemented by ultrasonography (US) as indicated  Repeat FNA may be indicated for nodules showing significant growth or developing US abnormalities  Ultimately, clinical/imaging correlation for this patient is needed in arriving at the actual management plan      *The [place] System for Reporting Thyroid Cytopathology, Lurdes Rivera Hair Dong Hercules ), 2018 (2nd ed )                  Electronically signed by Amarjit Richardson MD on 2/17/2020 at  8:20 AM         Imaging  Us Thyroid    Result Date: 2/8/2020  Narrative: THYROID ULTRASOUND INDICATION:    E07 89: Other specified disorders of thyroid  COMPARISON:  Ultrasound dated October 8, 2014 TECHNIQUE:   Ultrasound of the thyroid was performed with a high frequency linear transducer in transverse and sagittal planes including volumetric imaging sweeps as well as traditional still imaging technique  FINDINGS:  Thyroid parenchyma is diffusely heterogeneous in echotexture  Right lobe: Surgically absent  Left lobe:  3 9 x 2 3 x 2 1 cm    Thyroid volume measures 9 2 mL Isthmus: Surgically absent  Nodule #1  Image 21  LEFT midgland nodule measuring 0 5 x 0 4 x 0 5 cm  Not seen on prior COMPOSITION:  2 points, solid or almost completely solid   ECHOGENICITY:  1 point, hyperechoic or isoechoic  SHAPE:  0 points, wider-than-tall  MARGIN: 0 points, smooth  ECHOGENIC FOCI:  0 points, none or large comet-tail artifacts  TI-RADS Classification: TR 3 (3 points), Mildly suspicious  FNA if >2 5 cm  Follow if >1 5 cm  Impression: Status post right thyroidectomy  Heterogeneous echotexture of the thyroid No nodule meets current ACR criteria for requiring biopsy or followup ultrasounds  Reference: ACR Thyroid Imaging, Reporting and Data System (TI-RADS): White Paper of the [employer]  J AM Mary Radiol 3559;67:972-491  (additional recommendations based on American Thyroid Association 2015 guidelines ) Workstation performed: [id number]     Us Guided Thyroid Biopsy With Afirma    Result Date: 2/14/2020  Narrative: ULTRASOUND-GUIDED THYROID BIOPSY HISTORY: 78year-old female with history of prior thyroid carcinoma status post right hemithyroidectomy in 2005  Recent history of stage IB lung carcinoma status post right upper lobectomy and mediastinal lymph node dissection on 1/17/2020  Newly identified left thyroid nodule noted on exam  COMPARISON: Thyroid ultrasound on 2/6/2020  FINDINGS: Prior ultrasound images were reviewed  On prior thyroid ultrasound from 2/6/2020, there was a left mid gland thyroid nodule measuring 0 5 x 0 4 x 0 5 cm (TI-RADS 3) that has been requested to undergo FNA with [facility]  On today's limited thyroid ultrasound, the left mid gland thyroid nodule measured 0 7 x 0 5 x 0 7 cm  The procedure was explained to the patient, including risks of hemorrhage, infection and local injury  Informed consent was freely obtained  The patient verbalized expressed understanding of the above risks and wished to proceed with the procedure   Final standard "time-out" procedure performed  PROCEDURE: The neck was prepped and draped in normal sterile fashion  Under real-time ultrasound guidance and local anesthesia 4 passes with a 25 gauge needle were made through the left mid gland thyroid nodule  Cytopathology was present and deemed the specimens adequate for evaluation  2 specimens were sent to cytology and 2 specimens were held for aihuishou testing  The patient tolerated the procedure well  Postprocedure instructions were provided for the patient  I asked the patient to call us with any questions, concerns, or acute problems  The patient expressed understanding of the above  Impression: Status post successful ultrasound-guided thyroid biopsy with aihuishou testing of the left mid gland thyroid nodule  I reviewed the above findings and procedure with Dr Renate Frost  PERFORMED, DICTATED AND SIGNED BY: Yesy Urbina PA-C Workstation performed: [id number]     I reviewed the above laboratory and imaging data  Discussion/Summary:  Incidentally found thyroid nodule, history of thyroid cancer 15 years ago  Biopsy results are benign  We will therefore plan on thyroid ultrasound in 1 year for surveillance  Copy of biopsy report given to patient for her records  All questions answered

## 2020-03-04 NOTE — PROGRESS NOTES
Surgical Oncology Follow Up       [address] [facility] [address] [address]    Merlyn Romo  1940  [id number]  [address] [facility] [place]  [place] [address]    Chief Complaint   Patient presents with    Follow-up     F/U after thyroid biopsy  Assessment/Plan:    No problem-specific Assessment & Plan notes found for this encounter  Diagnoses and all orders for this visit:    Hashimoto's thyroiditis  -     US thyroid; Future        Advance Care Planning/Advance Directives:  Discussed disease status, cancer treatment plans and/or cancer treatment goals with the patient  No history exists  History of Present Illness:  Patient is here status post thyroid biopsy of new thyroid nodule  She has a history of thyroid cancer   -Interval History:  No problems since the biopsy  Review of Systems:  Review of Systems   HENT: Negative for sore throat, trouble swallowing and voice change  All other systems reviewed and are negative  Patient Active Problem List   Diagnosis    Adenoma, adrenocortical    A-fib ([address] )    Benign essential hypertension    Coagulopathy (HCC)    History of colon cancer    Overweight (BMI 25 0-29  9)    Osteoarthritis    Postoperative hypothyroidism    Thyroid cancer ([address] )    Pulmonary nodules    Mixed hyperlipidemia    Medicare annual wellness visit, subsequent    Osteopenia of multiple sites    Malar rash    Age-related nuclear cataract of left eye    Preop examination    Atypical ductal hyperplasia of right breast    Dense breast tissue    Primary adenocarcinoma of upper lobe of right lung ([address] )    Thyroid mass of unclear etiology    Hashimoto's thyroiditis     Past Medical History:   Diagnosis Date    Allergic reaction     Anesthesia     "cheap date"    Arthritis     Atrial fibrillation (HCC)     Benign polyp of large intestine     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 )     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[place] [address] )     Prediabetes     Refusal of blood transfusions as patient is [employer]od[employer]     Thyroid cancer ([place] [address] ) 2009    Tic disorder, transient, recurrent     not recent    Vaginal Pap smear 07/03/2017    WNL    Walks frequently     Walks frequently      Past Surgical History:   Procedure Laterality Date    APPENDECTOMY      ARTHROSCOPY KNEE      BREAST BIOPSY Right 04/10/2017    US guided  benign    BREAST BIOPSY Right 10/24/2019    ADH    CARDIOVASCULAR STRESS TEST      CATARACT EXTRACTION Bilateral     COLON SURGERY      COLONOSCOPY      done 2010 due 2015 Dr Kuo    DILATION AND CURETTAGE OF UTERUS      ESOPHAGOGASTRODUODENOSCOPY      inflammation aonly    HEMICOLECTOMY Right 1977    HYSTERECTOMY      JOINT REPLACEMENT      L knee    MAMMO STEREOTACTIC BREAST BIOPSY RIGHT (ALL INC) Right 10/24/2019    PARATHYROID GLAND SURGERY      exploration     IA BRONCHOSCOPY,DIAGNOSTIC N/A 1/17/2020    Procedure: FLEXIBLE BRONCHOSCOPY;  Surgeon: Heron Singer MD;  [facility];  Service: Thoracic    IA THORACOSCOPY SURG LOBECTOMY Right 1/17/2020    Procedure: ROBOTIC ASSISTED COMPLETE LUNG LOBECTOMY;  Surgeon: Heron Singer MD;  [facility];  Service: Thoracic    IA THORACOSCOPY W/THERA WEDGE RESEXN INITIAL UNILAT Right 1/17/2020    Procedure: ROBOTIC ASSISTED UPPER-LOBE WEDGE RESECTION, MEDIASTINAL LYMPH NODE DISECTION;  Surgeon: Ramandeep Granados MD;  [facility];  Service: Thoracic    REPLACEMENT TOTAL KNEE      TOTAL ABDOMINAL HYSTERECTOMY      TOTAL THYROIDECTOMY      US GUIDED BREAST BIOPSY RIGHT COMPLETE Right 4/10/2017    US GUIDED THYROID BIOPSY  2020     Family History   Problem Relation Age of Onset    Esophageal cancer Mother         [de-identified]    Hypertension Mother     Stroke Family         TIA    Breast cancer Paternal Aunt 80    No Known Problems Father     COPD Sister     COPD Sister     No Known Problems Maternal Grandmother     No Known Problems Maternal Grandfather     No Known Problems Paternal Grandmother     No Known Problems Paternal Grandfather     No Known Problems Maternal Aunt     No Known Problems Maternal Aunt     No Known Problems Maternal Aunt     No Known Problems Maternal Aunt     No Known Problems Maternal Aunt      Social History     Socioeconomic History    Marital status: Single     Spouse name: Not on file    Number of children: Not on file    Years of education: Not on file    Highest education level: Not on file   Occupational History    Not on file   Social Needs    Financial resource strain: Not on file    Food insecurity:     Worry: Not on file     Inability: Not on file    Transportation needs:     Medical: Not on file     Non-medical: Not on file   Tobacco Use    Smoking status: Former Smoker     Packs/day: 1 50     Years: 20 00     Pack years: 30 00     Types: Cigarettes     Last attempt to quit: 1967     Years since quittin 2    Smokeless tobacco: Never Used   Substance and Sexual Activity    Alcohol use:  Yes     Alcohol/week: 5 0 standard drinks     Types: 5 Cans of beer per week     Frequency: 4 or more times a week     Drinks per session: 1 or 2     Binge frequency: Never    Drug use: No    Sexual activity: Not on file   Lifestyle    Physical activity:     Days per week: Not on file     Minutes per session: Not on file    Stress: Not on file   Relationships    Social connections:     Talks on phone: Not on file     Gets together: Not on file     Attends [employer] service: Not on file     Active member of club or organization: Not on file     Attends meetings of clubs or organizations: Not on file     Relationship status: Not on file    Intimate partner violence:     Fear of current or ex partner: Not on file     Emotionally abused: Not on file     Physically abused: Not on file     Forced sexual activity: Not on file   Other Topics Concern    Not on file   Social History Narrative    Caffeine use- 1-3, 8oz coffees per day    Denied history of housing without smoke detectors    Always uses a seatbelt       Current Outpatient Medications:     apixaban (ELIQUIS) 5 mg, Take 5 mg by mouth 2 (two) times a day , Disp: , Rfl:     atorvastatin (LIPITOR) 80 mg tablet, TAKE 1 TABLET BY MOUTH EVERY DAY, Disp: 90 tablet, Rfl: 1    Coenzyme Q10 (CO Q 10 PO), Take 200 mg by mouth, Disp: , Rfl:     cyanocobalamin (VITAMIN B-12) 100 mcg tablet, Take 1,000 mcg by mouth daily, Disp: , Rfl:     diltiazem (CARDIZEM CD) 120 mg 24 hr capsule, Take 120 mg by mouth daily , Disp: , Rfl:     econazole nitrate 1 % cream, APPLY TO RASH ON FEET TWICE DAILY AS NEEDED, Disp: , Rfl: 2    levothyroxine (SYNTHROID) 100 mcg tablet, Take 1 tablet (100 mcg total) by mouth daily, Disp: 90 tablet, Rfl: 1    Multiple Vitamins-Calcium (ESSENTIAL ONE DAILY MULTIVIT PO), Take by mouth daily , Disp: , Rfl:     Omega-3 Fatty Acids (FISH OIL PO), Take 2 g by mouth, Disp: , Rfl:     omeprazole (PriLOSEC) 20 mg delayed release capsule, TAKE 1 CAPSULE DAILY (Patient taking differently: as needed ), Disp: 90 capsule, Rfl: 1    triamcinolone (KENALOG) 0 1 % cream, Apply to affected area bid for up to 14 days as needed for rash, Disp: 28 g, Rfl: 0    docusate sodium (COLACE) 100 mg capsule, Take 1 capsule (100 mg total) by mouth 2 (two) times a day (Patient not taking: Reported on 3/4/2020), Disp: 10 capsule, Rfl: 0    gabapentin (NEURONTIN) 300 mg capsule, Take 1 capsule (300 mg total) by mouth 3 (three) times a day (Patient not taking: Reported on 3/4/2020), Disp: 63 capsule, Rfl: 0    traMADol (ULTRAM) 50 mg tablet, Take 1 tablet (50 mg total) by mouth every 8 (eight) hours as needed for moderate pain (Patient not taking: Reported on 3/4/2020), Disp: 9 tablet, Rfl: 0  Allergies   Allergen Reactions    Bee Venom      Tongue swelling    Levaquin [Levofloxacin In D5w]     Tequin [Gatifloxacin] Rash     Vitals:    03/04/20 1027   BP: 130/84   Pulse: (!) 50   Resp: 16   Temp: 98 4 °F (36 9 °C)       Physical Exam   Neck: No thyromegaly present  Lymphadenopathy:     She has no cervical adenopathy  Results:  Labs:  Case Report   Non-gynecologic Cytology                          Case: [id number]                                   Authorizing Provider: Alexandre Montesinos MD        Collected:           02/14/2020 0930               Ordering Location:     [facility]        Received:            02/14/2020 [address] [place] [address]                                                          Pathologist:           Raina Hernandez MD                                                                Specimens:   A) - Thyroid, Left, mid/lower pole                                                                   B) - Thyroid, Left, mid/lower pole                                                         Final Diagnosis   A - B  Thyroid, Left, mid/lower pole:(Thin prep and smear preparations)  Benign ([place] Category II) - See note  Findings consistent with Hashimoto's thyroiditis     Satisfactory for evaluation         Note:   - The patient's clinically reported history of Hashimoto's thyroiditis is noted     - As reported in the [address] [place] [address] for Reporting Thyroid Cytopathology*, the diagnostic category of "benign/negative for malignancy" carries a 0-3% risk of malignancy being found in subsequent resections (in the few studies of patients with benign FNA results that were followed long-term, a false negative rate of 0-3% was reported), with the usual management being clinical follow-up supplemented by ultrasonography (US) as indicated  Repeat FNA may be indicated for nodules showing significant growth or developing US abnormalities  Ultimately, clinical/imaging correlation for this patient is needed in arriving at the actual management plan      *The [place] System for Reporting Thyroid Cytopathology, Lurdes Rivera ), 2018 (2nd ed )                  Electronically signed by Amarjit Richardson MD on 2/17/2020 at  8:20 AM         Imaging  Us Thyroid    Result Date: 2/8/2020  Narrative: THYROID ULTRASOUND INDICATION:    E07 89: Other specified disorders of thyroid  COMPARISON:  Ultrasound dated October 8, 2014 TECHNIQUE:   Ultrasound of the thyroid was performed with a high frequency linear transducer in transverse and sagittal planes including volumetric imaging sweeps as well as traditional still imaging technique  FINDINGS:  Thyroid parenchyma is diffusely heterogeneous in echotexture  Right lobe: Surgically absent  Left lobe:  3 9 x 2 3 x 2 1 cm  Thyroid volume measures 9 2 mL Isthmus: Surgically absent  Nodule #1  Image 21  LEFT midgland nodule measuring 0 5 x 0 4 x 0 5 cm  Not seen on prior COMPOSITION:  2 points, solid or almost completely solid   ECHOGENICITY:  1 point, hyperechoic or isoechoic  SHAPE:  0 points, wider-than-tall  MARGIN: 0 points, smooth  ECHOGENIC FOCI:  0 points, none or large comet-tail artifacts  TI-RADS Classification: TR 3 (3 points), Mildly suspicious  FNA if >2 5 cm  Follow if >1 5 cm  Impression: Status post right thyroidectomy    Heterogeneous echotexture of the thyroid No nodule meets current ACR criteria for requiring biopsy or followup ultrasounds  Reference: ACR Thyroid Imaging, Reporting and Data System (TI-RADS): White Paper of the [employer]  J AM Mary Radiol 0146;61:342-383  (additional recommendations based on American Thyroid Association 2015 guidelines ) Workstation performed: [id number]     Us Guided Thyroid Biopsy With Afirma    Result Date: 2/14/2020  Narrative: ULTRASOUND-GUIDED THYROID BIOPSY HISTORY: 78year-old female with history of prior thyroid carcinoma status post right hemithyroidectomy in 2005  Recent history of stage IB lung carcinoma status post right upper lobectomy and mediastinal lymph node dissection on 1/17/2020  Newly identified left thyroid nodule noted on exam  COMPARISON: Thyroid ultrasound on 2/6/2020  FINDINGS: Prior ultrasound images were reviewed  On prior thyroid ultrasound from 2/6/2020, there was a left mid gland thyroid nodule measuring 0 5 x 0 4 x 0 5 cm (TI-RADS 3) that has been requested to undergo FNA with [facility]est[facility] testing  On today's limited thyroid ultrasound, the left mid gland thyroid nodule measured 0 7 x 0 5 x 0 7 cm  The procedure was explained to the patient, including risks of hemorrhage, infection and local injury  Informed consent was freely obtained  The patient verbalized expressed understanding of the above risks and wished to proceed with the procedure  Final standard "time-out" procedure performed  PROCEDURE: The neck was prepped and draped in normal sterile fashion  Under real-time ultrasound guidance and local anesthesia 4 passes with a 25 gauge needle were made through the left mid gland thyroid nodule  Cytopathology was present and deemed the specimens adequate for evaluation  2 specimens were sent to cytology and 2 specimens were held for Winestyr testing  The patient tolerated the procedure well  Postprocedure instructions were provided for the patient  I asked the patient to call us with any questions, concerns, or acute problems   The patient expressed understanding of the above  Impression: Status post successful ultrasound-guided thyroid biopsy with [facility] testing of the left mid gland thyroid nodule  I reviewed the above findings and procedure with Dr Gina Dickson  PERFORMED, DICTATED AND SIGNED BY: Elmo Lovett PA-C Workstation performed: [id number]     I reviewed the above laboratory and imaging data  Discussion/Summary:  Incidentally found thyroid nodule, history of thyroid cancer 15 years ago  Biopsy results are benign  We will therefore plan on thyroid ultrasound in 1 year for surveillance  Copy of biopsy report given to patient for her records  All questions answered

## 2020-03-05 DIAGNOSIS — E89.0 POSTOPERATIVE HYPOTHYROIDISM: ICD-10-CM

## 2020-03-05 DIAGNOSIS — E78.2 MIXED HYPERLIPIDEMIA: ICD-10-CM

## 2020-03-05 RX ORDER — ATORVASTATIN CALCIUM 80 MG/1
80 TABLET, FILM COATED ORAL DAILY
Qty: 90 TABLET | Refills: 1 | Status: SHIPPED | OUTPATIENT
Start: 2020-03-05 | End: 2021-07-01

## 2020-03-05 RX ORDER — LEVOTHYROXINE SODIUM 0.1 MG/1
100 TABLET ORAL DAILY
Qty: 90 TABLET | Refills: 1 | Status: SHIPPED | OUTPATIENT
Start: 2020-03-05 | End: 2020-08-10

## 2020-03-10 ENCOUNTER — OFFICE VISIT (OUTPATIENT)
Dept: SURGICAL ONCOLOGY | Facility: CLINIC | Age: 80
End: 2020-03-10
Payer: COMMERCIAL

## 2020-03-10 VITALS
RESPIRATION RATE: 18 BRPM | SYSTOLIC BLOOD PRESSURE: 120 MMHG | HEART RATE: 121 BPM | TEMPERATURE: 98.1 F | WEIGHT: 163 LBS | DIASTOLIC BLOOD PRESSURE: 70 MMHG | HEIGHT: 65 IN | BODY MASS INDEX: 27.16 KG/M2

## 2020-03-10 DIAGNOSIS — R92.1 BREAST CALCIFICATION SEEN ON MAMMOGRAM: ICD-10-CM

## 2020-03-10 DIAGNOSIS — N60.91 ATYPICAL DUCTAL HYPERPLASIA OF RIGHT BREAST: Primary | ICD-10-CM

## 2020-03-10 PROCEDURE — 99215 OFFICE O/P EST HI 40 MIN: CPT | Performed by: SURGERY

## 2020-03-10 PROCEDURE — 3074F SYST BP LT 130 MM HG: CPT | Performed by: SURGERY

## 2020-03-10 PROCEDURE — 3008F BODY MASS INDEX DOCD: CPT | Performed by: SURGERY

## 2020-03-10 PROCEDURE — 1160F RVW MEDS BY RX/DR IN RCRD: CPT | Performed by: SURGERY

## 2020-03-10 PROCEDURE — 1036F TOBACCO NON-USER: CPT | Performed by: SURGERY

## 2020-03-10 PROCEDURE — 1111F DSCHRG MED/CURRENT MED MERGE: CPT | Performed by: SURGERY

## 2020-03-10 PROCEDURE — 3078F DIAST BP <80 MM HG: CPT | Performed by: SURGERY

## 2020-03-10 NOTE — PROGRESS NOTES
Surgical Oncology Follow Up       [address] [facility] SURGICAL ONCOLOGY [facility] [address]    Ro   1940  [id number]  [facility] SURGICAL ONCOLOGY [place]  [place] [address]    Chief Complaint   Patient presents with    Follow-up       Assessment/Plan   Diagnoses and all orders for this visit:    Atypical ductal hyperplasia of right breast    Breast calcification seen on mammogram  -     Mammo diagnostic bilateral w cad; Future        Advance Care Planning/Advance Directives:  Did not discuss  with the patient  Oncology History:     No history exists  History of Present Illness: Follow-up visit to discuss surgery for her atypical duct hyperplasia  -Interval History:  Had her lung carcinoma resected and also had a thyroid biopsy    Review of Systems:  Review of Systems   Constitutional: Negative  Negative for appetite change and fever  Eyes: Negative  Respiratory: Positive for cough  Negative for shortness of breath  Cardiovascular: Negative  Gastrointestinal: Negative  Endocrine: Negative  Genitourinary: Negative  Musculoskeletal: Negative  Negative for arthralgias and myalgias  Skin: Negative  Allergic/Immunologic: Negative  Neurological: Negative  Hematological: Negative  Negative for adenopathy  Does not bruise/bleed easily  Psychiatric/Behavioral: Negative  Patient Active Problem List   Diagnosis    Adenoma, adrenocortical    A-fib ([address] )    Benign essential hypertension    Coagulopathy (HCC)    History of colon cancer    Overweight (BMI 25 0-29  9)    Osteoarthritis    Postoperative hypothyroidism    Thyroid cancer ([address] )    Pulmonary nodules    Mixed hyperlipidemia    Medicare annual wellness visit, subsequent    Osteopenia of multiple sites    Malar rash    Age-related nuclear cataract of left eye    Preop examination  Atypical ductal hyperplasia of right breast    Dense breast tissue    Primary adenocarcinoma of upper lobe of right lung (HCC)    Thyroid mass of unclear etiology    Hashimoto's thyroiditis    Breast calcification seen on mammogram     Past Medical History:   Diagnosis Date    Allergic reaction     Anesthesia     "cheap date"    Arthritis     [facility]rial [facility]     Benign polyp of large intestine     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    Colon cancer ([facility] ) 44    34 yo    Colon polyp     Full dentures     partial lower    GERD (gastroesophageal reflux disease)     H/O fracture of ankle 6530    Helicobacter pylori (H  pylori) infection     History of left knee replacement     History of neck problems     "C3-C7 and had epidural injections years ago"    History of vertigo     one time years ago    Hyperlipidemia     Hypertension     Indigestion     Lung mass     Nonspecific abnormal results of function study     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[facility]     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[facility] )     Prediabetes     Refusal of blood transfusions as patient is Yarsani     Thyroid cancer ([facility] ) 2009    Tic disorder, transient, recurrent     not recent    Vaginal Pap smear 07/03/2017    WNL    Walks frequently     Walks frequently      Past Surgical History:   Procedure Laterality Date    APPENDECTOMY      ARTHROSCOPY KNEE      BREAST BIOPSY Right 04/10/2017    US guided  benign    BREAST BIOPSY Right 10/24/2019    ADH    CARDIOVASCULAR STRESS TEST      CATARACT EXTRACTION Bilateral     COLON SURGERY      COLONOSCOPY      done 2010 due 2015 Dr Kuo    DILATION AND CURETTAGE OF UTERUS      ESOPHAGOGASTRODUODENOSCOPY      inflammation aonly    HEMICOLECTOMY Right 1977    HYSTERECTOMY      JOINT REPLACEMENT      L knee    MAMMO STEREOTACTIC BREAST BIOPSY RIGHT (ALL INC) Right 10/24/2019    PARATHYROID GLAND SURGERY      exploration     ND BRONCHOSCOPY,DIAGNOSTIC N/A 1/17/2020 Procedure: FLEXIBLE BRONCHOSCOPY;  Surgeon: Lynette Gutierrez MD;  [facility];  Service: Thoracic    DC THORACOSCOPY SURG LOBECTOMY Right 1/17/2020    Procedure: ROBOTIC ASSISTED COMPLETE LUNG LOBECTOMY;  Surgeon: Lynette Gutierrez MD;  [facility];  Service: Thoracic    DC THORACOSCOPY W/THERA WEDGE RESEXN INITIAL UNILAT Right 1/17/2020    Procedure: ROBOTIC ASSISTED UPPER-LOBE WEDGE RESECTION, MEDIASTINAL LYMPH NODE DISECTION;  Surgeon: Lynette Gutierrez MD;  [facility];  Service: Thoracic    REPLACEMENT TOTAL KNEE      TOTAL ABDOMINAL HYSTERECTOMY  1983    TOTAL THYROIDECTOMY      US GUIDED BREAST BIOPSY RIGHT COMPLETE Right 4/10/2017    US GUIDED THYROID BIOPSY  2/14/2020     Family History   Problem Relation Age of Onset    Esophageal cancer Mother         [de-identified]    Hypertension Mother     Stroke Family         TIA    Breast cancer Paternal Aunt 80    No Known Problems Father     COPD Sister     COPD Sister     No Known Problems Maternal Grandmother     No Known Problems Maternal Grandfather     No Known Problems Paternal Grandmother     No Known Problems Paternal Grandfather     No Known Problems Maternal Aunt     No Known Problems Maternal Aunt     No Known Problems Maternal Aunt     No Known Problems Maternal Aunt     No Known Problems Maternal Aunt      Social History     Socioeconomic History    Marital status: Single     Spouse name: Not on file    Number of children: Not on file    Years of education: Not on file    Highest education level: Not on file   Occupational History    Not on file   Social Needs    Financial resource strain: Not on file    Food insecurity:     Worry: Not on file     Inability: Not on file    Transportation needs:     Medical: Not on file     Non-medical: Not on file   Tobacco Use    Smoking status: Former Smoker     Packs/day: 1 50     Years: 20 00     Pack years: 30 00     Types: Cigarettes     Last attempt to quit: 1967     Years since quittin 2    Smokeless tobacco: Never Used   Substance and Sexual Activity    Alcohol use:  Yes     Alcohol/week: 5 0 standard drinks     Types: 5 Cans of beer per week     Frequency: 4 or more times a week     Drinks per session: 1 or 2     Binge frequency: Never    Drug use: No    Sexual activity: Not on file   Lifestyle    Physical activity:     Days per week: Not on file     Minutes per session: Not on file    Stress: Not on file   Relationships    Social connections:     Talks on phone: Not on file     Gets together: Not on file     Attends Islam service: Not on file     Active member of club or organization: Not on file     Attends meetings of clubs or organizations: Not on file     Relationship status: Not on file    Intimate partner violence:     Fear of current or ex partner: Not on file     Emotionally abused: Not on file     Physically abused: Not on file     Forced sexual activity: Not on file   Other Topics Concern    Not on file   Social History Narrative    Caffeine use- 1-3, 8oz coffees per day    Denied history of housing without smoke detectors    Always uses a seatbelt       Current Outpatient Medications:     apixaban (ELIQUIS) 5 mg, Take 5 mg by mouth 2 (two) times a day , Disp: , Rfl:     atorvastatin (LIPITOR) 80 mg tablet, Take 1 tablet (80 mg total) by mouth daily, Disp: 90 tablet, Rfl: 1    Coenzyme Q10 (CO Q 10 PO), Take 200 mg by mouth, Disp: , Rfl:     cyanocobalamin (VITAMIN B-12) 100 mcg tablet, Take 1,000 mcg by mouth daily, Disp: , Rfl:     diltiazem (CARDIZEM CD) 120 mg 24 hr capsule, Take 120 mg by mouth daily , Disp: , Rfl:     econazole nitrate 1 % cream, , Disp: , Rfl: 2    levothyroxine (Synthroid) 100 mcg tablet, Take 1 tablet (100 mcg total) by mouth daily, Disp: 90 tablet, Rfl: 1    Multiple Vitamins-Calcium (ESSENTIAL ONE DAILY MULTIVIT PO), Take by mouth daily , Disp: , Rfl:     Omega-3 Fatty Acids (FISH OIL PO), Take 2 g by mouth, Disp: , Rfl:     omeprazole (PriLOSEC) 20 mg delayed release capsule, TAKE 1 CAPSULE DAILY (Patient taking differently: as needed ), Disp: 90 capsule, Rfl: 1    docusate sodium (COLACE) 100 mg capsule, Take 1 capsule (100 mg total) by mouth 2 (two) times a day (Patient not taking: Reported on 3/4/2020), Disp: 10 capsule, Rfl: 0    gabapentin (NEURONTIN) 300 mg capsule, Take 1 capsule (300 mg total) by mouth 3 (three) times a day (Patient not taking: Reported on 3/4/2020), Disp: 63 capsule, Rfl: 0    traMADol (ULTRAM) 50 mg tablet, Take 1 tablet (50 mg total) by mouth every 8 (eight) hours as needed for moderate pain (Patient not taking: Reported on 3/4/2020), Disp: 9 tablet, Rfl: 0    triamcinolone (KENALOG) 0 1 % cream, Apply to affected area bid for up to 14 days as needed for rash (Patient not taking: Reported on 3/10/2020), Disp: 28 g, Rfl: 0  Allergies   Allergen Reactions    Bee Venom      Tongue swelling    Levaquin [Levofloxacin In D5w]     Tequin [Gatifloxacin] Rash       The following portions of the patient's history were reviewed and updated as appropriate: allergies, current medications, past family history, past medical history, past social history, past surgical history and problem list         Vitals:    03/10/20 1107   BP: 120/70   Pulse: (!) 121   Resp: 18   Temp: 98 1 °F (36 7 °C)       Physical Exam   Constitutional: She is oriented to person, place, and time  She appears well-developed and well-nourished  HENT:   Head: Normocephalic and atraumatic  Cardiovascular: Normal heart sounds  Pulmonary/Chest: Breath sounds normal  Right breast exhibits no inverted nipple, no mass, no nipple discharge, no skin change and no tenderness  Left breast exhibits no inverted nipple, no mass, no nipple discharge, no skin change and no tenderness  Lymphadenopathy:        Right axillary: No pectoral and no lateral adenopathy present          Left axillary: No pectoral and no lateral adenopathy present  Right: No supraclavicular adenopathy present  Left: No supraclavicular adenopathy present  Neurological: She is alert and oriented to person, place, and time  Psychiatric: She has a normal mood and affect  Results:  Labs: Thyroid biopsy was consistent with Hashimoto's  620/19 TSH is normal    Imaging  12/02/2019 chest x-ray showed a right upper lobe nodule  12/09/2019 CT scan of the chest showed a spiculated mass and she was then referred to thoracic surgery at this point  12/20/2019 PET scan showed the right upper lobe lung nodule as well as activity in the left thyroid  02/06/2020 ultrasound of the thyroid showed no mass    I reviewed the above laboratory and imaging data  Discussion/Summary:  49-year-old female who initially saw me for right breast calcifications  Her needle biopsy did reveal atypical duct hyperplasia  She was counseled on surgical excision of this area  On her preoperative chest x-ray revealed a right upper lobe mass  This was confirmed by CT scan  She was sent to thoracic surgery and was diagnosed with a lung carcinoma which was resected  She had a stage IB cancer of the lung  On her PET scan there was an abnormal activity in the thyroid which led to a thyroid ultrasound as noted  A subsequent biopsy was performed which was benign  This revealed Hashimoto's  Her last thyroid level was normal   There are no concerns on examination today  I discussed the atypical duct hyperplasia with her  As it has been almost a year since her last bilateral mammogram, I am recommending a bilateral diagnostic mammogram   If there is any progression in the area of concern, she may need an additional biopsy or an additional approach  If calcifications are stable, then we could again plan for a bracketed lumpectomy  As stated the contralateral breast has not been evaluated for almost a year as well    I will make arrangements for a bilateral diagnostic mammogram for the beginning of April when she is due  Further recommendations will be made at that time

## 2020-03-26 ENCOUNTER — HOSPITAL ENCOUNTER (OUTPATIENT)
Dept: MAMMOGRAPHY | Facility: CLINIC | Age: 80
Discharge: HOME | End: 2020-03-26
Payer: COMMERCIAL

## 2020-03-26 DIAGNOSIS — R92.1 BREAST CALCIFICATION SEEN ON MAMMOGRAM: ICD-10-CM

## 2020-03-26 PROCEDURE — 77066 DX MAMMO INCL CAD BI: CPT

## 2020-05-05 ENCOUNTER — OFFICE VISIT (OUTPATIENT)
Dept: SURGICAL ONCOLOGY | Facility: CLINIC | Age: 80
End: 2020-05-05
Payer: COMMERCIAL

## 2020-05-05 VITALS
HEART RATE: 122 BPM | BODY MASS INDEX: 27.16 KG/M2 | WEIGHT: 163 LBS | TEMPERATURE: 98.2 F | SYSTOLIC BLOOD PRESSURE: 120 MMHG | RESPIRATION RATE: 18 BRPM | HEIGHT: 65 IN | DIASTOLIC BLOOD PRESSURE: 80 MMHG

## 2020-05-05 DIAGNOSIS — R92.1 BREAST CALCIFICATION SEEN ON MAMMOGRAM: ICD-10-CM

## 2020-05-05 DIAGNOSIS — N60.91 ATYPICAL DUCTAL HYPERPLASIA OF RIGHT BREAST: Primary | ICD-10-CM

## 2020-05-05 PROCEDURE — 3074F SYST BP LT 130 MM HG: CPT | Performed by: SURGERY

## 2020-05-05 PROCEDURE — 3008F BODY MASS INDEX DOCD: CPT | Performed by: SURGERY

## 2020-05-05 PROCEDURE — 99215 OFFICE O/P EST HI 40 MIN: CPT | Performed by: SURGERY

## 2020-05-05 PROCEDURE — 1160F RVW MEDS BY RX/DR IN RCRD: CPT | Performed by: SURGERY

## 2020-05-05 PROCEDURE — 1036F TOBACCO NON-USER: CPT | Performed by: SURGERY

## 2020-05-05 PROCEDURE — 3079F DIAST BP 80-89 MM HG: CPT | Performed by: SURGERY

## 2020-05-05 RX ORDER — CEFAZOLIN SODIUM 1 G/50ML
1000 SOLUTION INTRAVENOUS ONCE
Status: CANCELLED | OUTPATIENT
Start: 2020-05-22 | End: 2020-05-05

## 2020-05-06 ENCOUNTER — TELEPHONE (OUTPATIENT)
Dept: SURGICAL ONCOLOGY | Facility: CLINIC | Age: 80
End: 2020-05-06

## 2020-05-12 ENCOUNTER — HOSPITAL ENCOUNTER (OUTPATIENT)
Dept: NON INVASIVE DIAGNOSTICS | Facility: HOSPITAL | Age: 80
Discharge: HOME | End: 2020-05-12
Attending: SURGERY
Payer: COMMERCIAL

## 2020-05-12 ENCOUNTER — LAB (OUTPATIENT)
Dept: LAB | Facility: HOSPITAL | Age: 80
End: 2020-05-12
Attending: SURGERY
Payer: COMMERCIAL

## 2020-05-12 DIAGNOSIS — N60.91 ATYPICAL DUCTAL HYPERPLASIA OF RIGHT BREAST: ICD-10-CM

## 2020-05-12 LAB
ALBUMIN SERPL BCP-MCNC: 3.4 G/DL (ref 3.5–5)
ALP SERPL-CCNC: 95 U/L (ref 46–116)
ALT SERPL W P-5'-P-CCNC: 30 U/L (ref 12–78)
ANION GAP SERPL CALCULATED.3IONS-SCNC: 5 MMOL/L (ref 4–13)
APTT PPP: 31 SECONDS (ref 23–37)
AST SERPL W P-5'-P-CCNC: 25 U/L (ref 5–45)
ATRIAL RATE: 237 BPM
BACTERIA UR QL AUTO: ABNORMAL /HPF
BASOPHILS # BLD AUTO: 0.04 THOUSANDS/ΜL (ref 0–0.1)
BASOPHILS NFR BLD AUTO: 1 % (ref 0–1)
BILIRUB SERPL-MCNC: 0.61 MG/DL (ref 0.2–1)
BILIRUB UR QL STRIP: NEGATIVE
BUN SERPL-MCNC: 13 MG/DL (ref 5–25)
CALCIUM SERPL-MCNC: 9.2 MG/DL (ref 8.3–10.1)
CHLORIDE SERPL-SCNC: 104 MMOL/L (ref 100–108)
CLARITY UR: CLEAR
CO2 SERPL-SCNC: 31 MMOL/L (ref 21–32)
COLOR UR: YELLOW
CREAT SERPL-MCNC: 1.2 MG/DL (ref 0.6–1.3)
EOSINOPHIL # BLD AUTO: 0.06 THOUSAND/ΜL (ref 0–0.61)
EOSINOPHIL NFR BLD AUTO: 1 % (ref 0–6)
ERYTHROCYTE [DISTWIDTH] IN BLOOD BY AUTOMATED COUNT: 13.7 % (ref 11.6–15.1)
GFR SERPL CREATININE-BSD FRML MDRD: 49 ML/MIN/1.73SQ M
GLUCOSE P FAST SERPL-MCNC: 96 MG/DL (ref 65–99)
GLUCOSE UR STRIP-MCNC: NEGATIVE MG/DL
HCT VFR BLD AUTO: 41.3 % (ref 34.8–46.1)
HGB BLD-MCNC: 13.4 G/DL (ref 11.5–15.4)
HGB UR QL STRIP.AUTO: ABNORMAL
IMM GRANULOCYTES # BLD AUTO: 0.01 THOUSAND/UL (ref 0–0.2)
IMM GRANULOCYTES NFR BLD AUTO: 0 % (ref 0–2)
INR PPP: 1.14 (ref 0.84–1.19)
KETONES UR STRIP-MCNC: NEGATIVE MG/DL
LEUKOCYTE ESTERASE UR QL STRIP: NEGATIVE
LYMPHOCYTES # BLD AUTO: 2.03 THOUSANDS/ΜL (ref 0.6–4.47)
LYMPHOCYTES NFR BLD AUTO: 47 % (ref 14–44)
MCH RBC QN AUTO: 32.9 PG (ref 26.8–34.3)
MCHC RBC AUTO-ENTMCNC: 32.4 G/DL (ref 31.4–37.4)
MCV RBC AUTO: 102 FL (ref 82–98)
MONOCYTES # BLD AUTO: 0.45 THOUSAND/ΜL (ref 0.17–1.22)
MONOCYTES NFR BLD AUTO: 10 % (ref 4–12)
NEUTROPHILS # BLD AUTO: 1.83 THOUSANDS/ΜL (ref 1.85–7.62)
NEUTS SEG NFR BLD AUTO: 41 % (ref 43–75)
NITRITE UR QL STRIP: NEGATIVE
NON-SQ EPI CELLS URNS QL MICRO: ABNORMAL /HPF
NRBC BLD AUTO-RTO: 0 /100 WBCS
P AXIS: 247 DEGREES
PH UR STRIP.AUTO: 5.5 [PH]
PLATELET # BLD AUTO: 224 THOUSANDS/UL (ref 149–390)
PMV BLD AUTO: 9.1 FL (ref 8.9–12.7)
POTASSIUM SERPL-SCNC: 3.9 MMOL/L (ref 3.5–5.3)
PROT SERPL-MCNC: 7.8 G/DL (ref 6.4–8.2)
PROT UR STRIP-MCNC: NEGATIVE MG/DL
PROTHROMBIN TIME: 14.8 SECONDS (ref 11.6–14.5)
QRS AXIS: -23 DEGREES
QRSD INTERVAL: 98 MS
QT INTERVAL: 434 MS
QTC INTERVAL: 497 MS
RBC # BLD AUTO: 4.07 MILLION/UL (ref 3.81–5.12)
RBC #/AREA URNS AUTO: ABNORMAL /HPF
SODIUM SERPL-SCNC: 140 MMOL/L (ref 136–145)
SP GR UR STRIP.AUTO: <=1.005 (ref 1–1.03)
T WAVE AXIS: 24 DEGREES
UROBILINOGEN UR QL STRIP.AUTO: 0.2 E.U./DL
VENTRICULAR RATE: 79 BPM
WBC # BLD AUTO: 4.42 THOUSAND/UL (ref 4.31–10.16)
WBC #/AREA URNS AUTO: ABNORMAL /HPF

## 2020-05-12 PROCEDURE — 93010 ELECTROCARDIOGRAM REPORT: CPT | Performed by: INTERNAL MEDICINE

## 2020-05-12 PROCEDURE — 85025 COMPLETE CBC W/AUTO DIFF WBC: CPT

## 2020-05-12 PROCEDURE — 36415 COLL VENOUS BLD VENIPUNCTURE: CPT

## 2020-05-12 PROCEDURE — 85730 THROMBOPLASTIN TIME PARTIAL: CPT

## 2020-05-12 PROCEDURE — 81001 URINALYSIS AUTO W/SCOPE: CPT | Performed by: SURGERY

## 2020-05-12 PROCEDURE — 85610 PROTHROMBIN TIME: CPT

## 2020-05-12 PROCEDURE — 93005 ELECTROCARDIOGRAM TRACING: CPT

## 2020-05-12 PROCEDURE — 80053 COMPREHEN METABOLIC PANEL: CPT

## 2020-05-15 DIAGNOSIS — N60.91 ATYPICAL DUCTAL HYPERPLASIA OF RIGHT BREAST: ICD-10-CM

## 2020-05-15 PROCEDURE — U0003 INFECTIOUS AGENT DETECTION BY NUCLEIC ACID (DNA OR RNA); SEVERE ACUTE RESPIRATORY SYNDROME CORONAVIRUS 2 (SARS-COV-2) (CORONAVIRUS DISEASE [COVID-19]), AMPLIFIED PROBE TECHNIQUE, MAKING USE OF HIGH THROUGHPUT TECHNOLOGIES AS DESCRIBED BY CMS-2020-01-R: HCPCS

## 2020-05-17 LAB — SARS-COV-2 RNA RESP QL NAA+PROBE: NEGATIVE

## 2020-05-21 ENCOUNTER — ANESTHESIA EVENT (OUTPATIENT)
Dept: PERIOP | Facility: HOSPITAL | Age: 80
End: 2020-05-21
Payer: COMMERCIAL

## 2020-05-22 ENCOUNTER — HOSPITAL ENCOUNTER (OUTPATIENT)
Dept: MAMMOGRAPHY | Facility: HOSPITAL | Age: 80
Setting detail: OUTPATIENT SURGERY
Discharge: HOME | End: 2020-05-22
Payer: COMMERCIAL

## 2020-05-22 ENCOUNTER — HOSPITAL ENCOUNTER (OUTPATIENT)
Dept: MAMMOGRAPHY | Facility: HOSPITAL | Age: 80
Discharge: HOME | End: 2020-05-22
Attending: SURGERY
Payer: COMMERCIAL

## 2020-05-22 ENCOUNTER — ANESTHESIA (OUTPATIENT)
Dept: PERIOP | Facility: HOSPITAL | Age: 80
End: 2020-05-22
Payer: COMMERCIAL

## 2020-05-22 ENCOUNTER — HOSPITAL ENCOUNTER (OUTPATIENT)
Facility: HOSPITAL | Age: 80
Setting detail: OUTPATIENT SURGERY
Discharge: HOME | End: 2020-05-22
Attending: SURGERY | Admitting: SURGERY
Payer: COMMERCIAL

## 2020-05-22 VITALS
TEMPERATURE: 97.7 F | RESPIRATION RATE: 17 BRPM | BODY MASS INDEX: 27.83 KG/M2 | HEIGHT: 64 IN | HEART RATE: 91 BPM | DIASTOLIC BLOOD PRESSURE: 91 MMHG | SYSTOLIC BLOOD PRESSURE: 156 MMHG | OXYGEN SATURATION: 96 % | WEIGHT: 163 LBS

## 2020-05-22 DIAGNOSIS — N60.91 ATYPICAL DUCTAL HYPERPLASIA OF RIGHT BREAST: ICD-10-CM

## 2020-05-22 PROCEDURE — 88367 INSITU HYBRIDIZATION AUTO: CPT | Performed by: PATHOLOGY

## 2020-05-22 PROCEDURE — 88307 TISSUE EXAM BY PATHOLOGIST: CPT | Performed by: PATHOLOGY

## 2020-05-22 PROCEDURE — 88342 IMHCHEM/IMCYTCHM 1ST ANTB: CPT | Performed by: PATHOLOGY

## 2020-05-22 PROCEDURE — 88341 IMHCHEM/IMCYTCHM EA ADD ANTB: CPT | Performed by: PATHOLOGY

## 2020-05-22 PROCEDURE — 88361 TUMOR IMMUNOHISTOCHEM/COMPUT: CPT | Performed by: PATHOLOGY

## 2020-05-22 PROCEDURE — 19282 PERQ DEVICE BREAST EA IMAG: CPT

## 2020-05-22 PROCEDURE — 19281 PERQ DEVICE BREAST 1ST IMAG: CPT

## 2020-05-22 PROCEDURE — 19125 EXCISION BREAST LESION: CPT | Performed by: SURGERY

## 2020-05-22 RX ORDER — MAGNESIUM HYDROXIDE 1200 MG/15ML
LIQUID ORAL AS NEEDED
Status: DISCONTINUED | OUTPATIENT
Start: 2020-05-22 | End: 2020-05-22 | Stop reason: HOSPADM

## 2020-05-22 RX ORDER — FENTANYL CITRATE 50 UG/ML
INJECTION, SOLUTION INTRAMUSCULAR; INTRAVENOUS AS NEEDED
Status: DISCONTINUED | OUTPATIENT
Start: 2020-05-22 | End: 2020-05-22 | Stop reason: SURG

## 2020-05-22 RX ORDER — CEFAZOLIN SODIUM 1 G/50ML
1000 SOLUTION INTRAVENOUS ONCE
Status: DISCONTINUED | OUTPATIENT
Start: 2020-05-22 | End: 2020-05-22 | Stop reason: HOSPADM

## 2020-05-22 RX ORDER — SODIUM CHLORIDE 9 MG/ML
125 INJECTION, SOLUTION INTRAVENOUS CONTINUOUS
Status: DISCONTINUED | OUTPATIENT
Start: 2020-05-22 | End: 2020-05-22 | Stop reason: HOSPADM

## 2020-05-22 RX ORDER — DEXAMETHASONE SODIUM PHOSPHATE 4 MG/ML
INJECTION, SOLUTION INTRA-ARTICULAR; INTRALESIONAL; INTRAMUSCULAR; INTRAVENOUS; SOFT TISSUE AS NEEDED
Status: DISCONTINUED | OUTPATIENT
Start: 2020-05-22 | End: 2020-05-22 | Stop reason: SURG

## 2020-05-22 RX ORDER — CEFAZOLIN SODIUM 1 G/50ML
1000 SOLUTION INTRAVENOUS ONCE
Status: COMPLETED | OUTPATIENT
Start: 2020-05-22 | End: 2020-05-22

## 2020-05-22 RX ORDER — MIDAZOLAM HYDROCHLORIDE 2 MG/2ML
INJECTION, SOLUTION INTRAMUSCULAR; INTRAVENOUS AS NEEDED
Status: DISCONTINUED | OUTPATIENT
Start: 2020-05-22 | End: 2020-05-22

## 2020-05-22 RX ORDER — LIDOCAINE HYDROCHLORIDE 20 MG/ML
INJECTION, SOLUTION INFILTRATION; PERINEURAL AS NEEDED
Status: DISCONTINUED | OUTPATIENT
Start: 2020-05-22 | End: 2020-05-22 | Stop reason: SURG

## 2020-05-22 RX ORDER — BUPIVACAINE HYDROCHLORIDE 5 MG/ML
INJECTION, SOLUTION PERINEURAL AS NEEDED
Status: DISCONTINUED | OUTPATIENT
Start: 2020-05-22 | End: 2020-05-22 | Stop reason: HOSPADM

## 2020-05-22 RX ORDER — ACETAMINOPHEN 325 MG/1
650 TABLET ORAL EVERY 6 HOURS PRN
Status: DISCONTINUED | OUTPATIENT
Start: 2020-05-22 | End: 2020-05-22 | Stop reason: HOSPADM

## 2020-05-22 RX ORDER — ONDANSETRON 2 MG/ML
4 INJECTION INTRAMUSCULAR; INTRAVENOUS ONCE AS NEEDED
Status: DISCONTINUED | OUTPATIENT
Start: 2020-05-22 | End: 2020-05-22 | Stop reason: HOSPADM

## 2020-05-22 RX ORDER — TRAMADOL HYDROCHLORIDE 50 MG/1
50 TABLET ORAL EVERY 6 HOURS PRN
Status: DISCONTINUED | OUTPATIENT
Start: 2020-05-22 | End: 2020-05-22 | Stop reason: HOSPADM

## 2020-05-22 RX ORDER — FENTANYL CITRATE/PF 50 MCG/ML
25 SYRINGE (ML) INJECTION
Status: DISCONTINUED | OUTPATIENT
Start: 2020-05-22 | End: 2020-05-22 | Stop reason: HOSPADM

## 2020-05-22 RX ORDER — LIDOCAINE WITH 8.4% SOD BICARB 0.9%(10ML)
10 SYRINGE (ML) INJECTION ONCE
Status: COMPLETED | OUTPATIENT
Start: 2020-05-22 | End: 2020-05-22

## 2020-05-22 RX ORDER — ONDANSETRON 2 MG/ML
INJECTION INTRAMUSCULAR; INTRAVENOUS AS NEEDED
Status: DISCONTINUED | OUTPATIENT
Start: 2020-05-22 | End: 2020-05-22 | Stop reason: SURG

## 2020-05-22 RX ORDER — HYDROMORPHONE HCL/PF 1 MG/ML
0.5 SYRINGE (ML) INJECTION
Status: DISCONTINUED | OUTPATIENT
Start: 2020-05-22 | End: 2020-05-22 | Stop reason: HOSPADM

## 2020-05-22 RX ORDER — PROPOFOL 10 MG/ML
INJECTION, EMULSION INTRAVENOUS AS NEEDED
Status: DISCONTINUED | OUTPATIENT
Start: 2020-05-22 | End: 2020-05-22 | Stop reason: SURG

## 2020-05-22 RX ADMIN — FENTANYL CITRATE 25 MCG: 50 INJECTION, SOLUTION INTRAMUSCULAR; INTRAVENOUS at 13:21

## 2020-05-22 RX ADMIN — ONDANSETRON 4 MG: 2 INJECTION INTRAMUSCULAR; INTRAVENOUS at 13:35

## 2020-05-22 RX ADMIN — LIDOCAINE HYDROCHLORIDE 2 ML: 20 INJECTION, SOLUTION INFILTRATION; PERINEURAL at 12:57

## 2020-05-22 RX ADMIN — PROPOFOL 120 MG: 10 INJECTION, EMULSION INTRAVENOUS at 12:57

## 2020-05-22 RX ADMIN — FENTANYL CITRATE 25 MCG: 50 INJECTION, SOLUTION INTRAMUSCULAR; INTRAVENOUS at 13:14

## 2020-05-22 RX ADMIN — SODIUM CHLORIDE 125 ML/HR: 0.9 INJECTION, SOLUTION INTRAVENOUS at 08:59

## 2020-05-22 RX ADMIN — FENTANYL CITRATE 25 MCG: 50 INJECTION, SOLUTION INTRAMUSCULAR; INTRAVENOUS at 13:03

## 2020-05-22 RX ADMIN — FENTANYL CITRATE 25 MCG: 50 INJECTION, SOLUTION INTRAMUSCULAR; INTRAVENOUS at 13:00

## 2020-05-22 RX ADMIN — Medication 10 ML: at 10:35

## 2020-05-22 RX ADMIN — TRAMADOL HYDROCHLORIDE 50 MG: 50 TABLET, FILM COATED ORAL at 15:35

## 2020-05-22 RX ADMIN — CEFAZOLIN SODIUM 1000 MG: 1 SOLUTION INTRAVENOUS at 12:46

## 2020-05-22 RX ADMIN — DEXAMETHASONE SODIUM PHOSPHATE 4 MG: 4 INJECTION, SOLUTION INTRAMUSCULAR; INTRAVENOUS at 13:10

## 2020-06-01 ENCOUNTER — TELEPHONE (OUTPATIENT)
Dept: SURGICAL ONCOLOGY | Facility: CLINIC | Age: 80
End: 2020-06-01

## 2020-06-09 ENCOUNTER — OFFICE VISIT (OUTPATIENT)
Dept: SURGICAL ONCOLOGY | Facility: CLINIC | Age: 80
End: 2020-06-09

## 2020-06-09 VITALS
HEIGHT: 64 IN | RESPIRATION RATE: 18 BRPM | HEART RATE: 86 BPM | TEMPERATURE: 97.5 F | BODY MASS INDEX: 27.83 KG/M2 | WEIGHT: 163 LBS | DIASTOLIC BLOOD PRESSURE: 80 MMHG | SYSTOLIC BLOOD PRESSURE: 120 MMHG

## 2020-06-09 DIAGNOSIS — D05.11 DUCTAL CARCINOMA IN SITU (DCIS) OF RIGHT BREAST: Primary | ICD-10-CM

## 2020-06-09 PROBLEM — R92.1 BREAST CALCIFICATION SEEN ON MAMMOGRAM: Status: RESOLVED | Noted: 2020-03-10 | Resolved: 2020-06-09

## 2020-06-09 PROCEDURE — 3079F DIAST BP 80-89 MM HG: CPT | Performed by: SURGERY

## 2020-06-09 PROCEDURE — 1160F RVW MEDS BY RX/DR IN RCRD: CPT | Performed by: SURGERY

## 2020-06-09 PROCEDURE — 3008F BODY MASS INDEX DOCD: CPT | Performed by: SURGERY

## 2020-06-09 PROCEDURE — 99024 POSTOP FOLLOW-UP VISIT: CPT | Performed by: SURGERY

## 2020-06-09 PROCEDURE — 3074F SYST BP LT 130 MM HG: CPT | Performed by: SURGERY

## 2020-06-16 ENCOUNTER — OFFICE VISIT (OUTPATIENT)
Dept: SURGICAL ONCOLOGY | Facility: CLINIC | Age: 80
End: 2020-06-16

## 2020-06-16 ENCOUNTER — RADIATION ONCOLOGY CONSULT (OUTPATIENT)
Dept: RADIATION ONCOLOGY | Facility: CLINIC | Age: 80
End: 2020-06-16
Attending: RADIOLOGY
Payer: COMMERCIAL

## 2020-06-16 VITALS
TEMPERATURE: 98 F | WEIGHT: 163 LBS | BODY MASS INDEX: 27.83 KG/M2 | DIASTOLIC BLOOD PRESSURE: 80 MMHG | HEART RATE: 102 BPM | HEIGHT: 64 IN | RESPIRATION RATE: 18 BRPM | SYSTOLIC BLOOD PRESSURE: 120 MMHG

## 2020-06-16 VITALS
RESPIRATION RATE: 18 BRPM | DIASTOLIC BLOOD PRESSURE: 60 MMHG | SYSTOLIC BLOOD PRESSURE: 116 MMHG | WEIGHT: 163 LBS | HEIGHT: 64 IN | HEART RATE: 75 BPM | OXYGEN SATURATION: 95 % | TEMPERATURE: 98.7 F | BODY MASS INDEX: 27.83 KG/M2

## 2020-06-16 DIAGNOSIS — N64.89 HEMATOMA OF RIGHT BREAST: ICD-10-CM

## 2020-06-16 DIAGNOSIS — D05.11 DUCTAL CARCINOMA IN SITU (DCIS) OF RIGHT BREAST: Primary | ICD-10-CM

## 2020-06-16 DIAGNOSIS — D05.11 DUCTAL CARCINOMA IN SITU (DCIS) OF RIGHT BREAST: ICD-10-CM

## 2020-06-16 PROCEDURE — 99211 OFF/OP EST MAY X REQ PHY/QHP: CPT | Performed by: RADIOLOGY

## 2020-06-16 PROCEDURE — 3074F SYST BP LT 130 MM HG: CPT | Performed by: SURGERY

## 2020-06-16 PROCEDURE — 3008F BODY MASS INDEX DOCD: CPT | Performed by: SURGERY

## 2020-06-16 PROCEDURE — 1160F RVW MEDS BY RX/DR IN RCRD: CPT | Performed by: SURGERY

## 2020-06-16 PROCEDURE — 99024 POSTOP FOLLOW-UP VISIT: CPT | Performed by: SURGERY

## 2020-06-16 PROCEDURE — 3079F DIAST BP 80-89 MM HG: CPT | Performed by: SURGERY

## 2020-06-23 ENCOUNTER — TELEPHONE (OUTPATIENT)
Dept: SURGICAL ONCOLOGY | Facility: CLINIC | Age: 80
End: 2020-06-23

## 2020-06-24 ENCOUNTER — OFFICE VISIT (OUTPATIENT)
Dept: SURGICAL ONCOLOGY | Facility: CLINIC | Age: 80
End: 2020-06-24

## 2020-06-24 ENCOUNTER — PATIENT OUTREACH (OUTPATIENT)
Dept: SURGICAL ONCOLOGY | Facility: CLINIC | Age: 80
End: 2020-06-24

## 2020-06-24 VITALS
DIASTOLIC BLOOD PRESSURE: 70 MMHG | SYSTOLIC BLOOD PRESSURE: 118 MMHG | TEMPERATURE: 98.8 F | BODY MASS INDEX: 27.83 KG/M2 | HEIGHT: 64 IN | HEART RATE: 126 BPM | RESPIRATION RATE: 18 BRPM | WEIGHT: 163 LBS

## 2020-06-24 DIAGNOSIS — D05.11 DUCTAL CARCINOMA IN SITU (DCIS) OF RIGHT BREAST: Primary | ICD-10-CM

## 2020-06-24 DIAGNOSIS — N64.89 HEMATOMA OF RIGHT BREAST: ICD-10-CM

## 2020-06-24 PROCEDURE — 3008F BODY MASS INDEX DOCD: CPT | Performed by: SURGERY

## 2020-06-24 PROCEDURE — 3074F SYST BP LT 130 MM HG: CPT | Performed by: SURGERY

## 2020-06-24 PROCEDURE — 3078F DIAST BP <80 MM HG: CPT | Performed by: SURGERY

## 2020-06-24 PROCEDURE — 1160F RVW MEDS BY RX/DR IN RCRD: CPT | Performed by: SURGERY

## 2020-06-24 PROCEDURE — 99024 POSTOP FOLLOW-UP VISIT: CPT | Performed by: SURGERY

## 2020-07-06 ENCOUNTER — TELEPHONE (OUTPATIENT)
Dept: SURGICAL ONCOLOGY | Facility: CLINIC | Age: 80
End: 2020-07-06

## 2020-07-06 NOTE — TELEPHONE ENCOUNTER
Called patient and she does not have any symptoms of fever, cough, or shortness of breath, chills, repeated shaking with chills, muscle pain,headache, sore throat, or new loss of taste/smell  Patient has not been tested for Covid-19  Patient has not been in contact with someone that has been confirmed having Covid-19  Patient was reminded to wear required  face mask when entering the facility  Patient was informed only 1 visitor allowed at time of appt who will also be asked the same screening questions and to wear a mask without a vent or filter

## 2020-07-07 ENCOUNTER — OFFICE VISIT (OUTPATIENT)
Dept: GYNECOLOGY | Facility: CLINIC | Age: 80
End: 2020-07-07
Payer: COMMERCIAL

## 2020-07-07 VITALS
SYSTOLIC BLOOD PRESSURE: 118 MMHG | BODY MASS INDEX: 27.83 KG/M2 | DIASTOLIC BLOOD PRESSURE: 70 MMHG | WEIGHT: 163 LBS | HEIGHT: 64 IN | HEART RATE: 111 BPM

## 2020-07-07 DIAGNOSIS — N95.2 ATROPHIC VAGINITIS: ICD-10-CM

## 2020-07-07 DIAGNOSIS — D05.11 DUCTAL CARCINOMA IN SITU (DCIS) OF RIGHT BREAST: ICD-10-CM

## 2020-07-07 DIAGNOSIS — F52.22 FEMALE SEXUAL AROUSAL DISORDER: ICD-10-CM

## 2020-07-07 DIAGNOSIS — Z13.820 SCREENING FOR OSTEOPOROSIS: ICD-10-CM

## 2020-07-07 DIAGNOSIS — Z78.0 MENOPAUSE: ICD-10-CM

## 2020-07-07 DIAGNOSIS — M85.852 OSTEOPENIA OF NECK OF LEFT FEMUR: Primary | ICD-10-CM

## 2020-07-07 PROCEDURE — 3078F DIAST BP <80 MM HG: CPT | Performed by: OBSTETRICS & GYNECOLOGY

## 2020-07-07 PROCEDURE — 1036F TOBACCO NON-USER: CPT | Performed by: OBSTETRICS & GYNECOLOGY

## 2020-07-07 PROCEDURE — 3074F SYST BP LT 130 MM HG: CPT | Performed by: OBSTETRICS & GYNECOLOGY

## 2020-07-07 PROCEDURE — 99212 OFFICE O/P EST SF 10 MIN: CPT | Performed by: OBSTETRICS & GYNECOLOGY

## 2020-07-07 PROCEDURE — 3008F BODY MASS INDEX DOCD: CPT | Performed by: OBSTETRICS & GYNECOLOGY

## 2020-07-07 NOTE — PROGRESS NOTES
Assessment/Plan:         Diagnoses and all orders for this visit:    Osteopenia of neck of left femur  -     DXA bone density spine hip and pelvis; Future    Screening for osteoporosis  -     DXA bone density spine hip and pelvis; Future    Menopause  -     DXA bone density spine hip and pelvis; Future    Atrophic vaginitis; vagisol    Sexual arousal:  On exam today see no attributable cause for the sexual arousal she experienced  Ductal carcinoma in situ (DCIS) of right breast        Subjective:      Patient ID: Alicia Balderas is a 78 y o  female  HPI  patient presents to the office discussed hormonal issues  She states that 2 evenings ago she experienced an intense sexual arousal   She states she has had these on and off over the past several years however this was very intense  She denies any vaginal irritation, burning, discharge  She is not sexually active  Patient has a fairly extensive oncologic history  She has right lung cancer diagnosed in January of 2020  She also has DCIS of the right breast diagnosed in May of 2020 she has a history of thyroid cancer diagnosed in 2005 and is status post partial thyroidectomy  She also has a history of colon cancer and appendiceal cancer diagnosed in 1977  She is status post right hemicolectomy with appendectomy  She recently had a lumpectomy by Dr Renetta Alex for the DCIS of the right breast   She is also status post thoracotomy the robotic assistance for the right lung cancer  Patient had a prior total abdominal hysterectomy in 1987 for fibroid uterus      The following portions of the patient's history were reviewed and updated as appropriate:   She  has a past medical history of Anesthesia, Arthritis, Atrial fibrillation ([address] ), Benign polyp of large intestine, 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), Colon cancer ([address] ) (1977), Colon polyp, Full dentures, GERD (gastroesophageal reflux disease), Glaucoma suspect, History of neck problems, Hyperlipidemia, Hypertension, Lung [facility] ([facility] ), 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[facility] ), Prediabetes, Refusal of blood transfusions as patient is Caodaism, Thyroid cancer ([facility] ) (2009), Tic disorder, transient, recurrent, and Walks frequently  She   Patient Active Problem List    Diagnosis Date Noted    Hematoma of right breast 06/16/2020    Hashimoto's thyroiditis 03/02/2020    Thyroid mass of unclear etiology 02/07/2020    Primary adenocarcinoma of upper lobe of right lung ([facility] ) 12/02/2019    Ductal carcinoma in situ (DCIS) of right breast 11/20/2019    Dense breast tissue 11/20/2019    Age-related nuclear cataract of left eye 08/01/2019    Preop examination 08/01/2019    Medicare annual wellness visit, subsequent 09/20/2018    Osteopenia of multiple sites 09/20/2018    Malar rash 09/20/2018    Mixed hyperlipidemia 03/13/2018    History of colon cancer 02/14/2018    Postoperative hypothyroidism 09/01/2016    Benign essential hypertension 02/25/2016    A-fib ([place] [address] ) 03/19/2015    Coagulopathy ([facility] ) 03/19/2015    Overweight (BMI 25 0-29 9) 03/19/2015    Pulmonary nodules 02/19/2015    Adenoma, adrenocortical 06/05/2014    Thyroid cancer ([facility] ) 04/26/2012    Osteoarthritis 04/20/2012     She  has a past surgical history that includes Joint replacement; ARTHROSCOPY KNEE; Cardiovascular stress test; Colonoscopy; Esophagogastroduodenoscopy; Replacement total knee; Parathyroid gland surgery; Total thyroidectomy; US guided breast biopsy right complete (Right, 4/10/2017); Hysterectomy; Total abdominal hysterectomy (1983); Mammo stereotactic breast biopsy right (all inc) (Right, 10/24/2019); Breast biopsy (Right, 04/10/2017); Breast biopsy (Right, 10/24/2019); Hemicolectomy (Right, 1977); Colon surgery;  Appendectomy; Dilation and curettage of uterus; pr bronchoscopy,diagnostic (N/A, 1/17/2020); pr thoracoscopy w/thera wedge resexn initial unilat (Right, 1/17/2020); pr thoracoscopy surg lobectomy (Right, 1/17/2020); US guided thyroid biopsy (2/14/2020); Cataract extraction (Bilateral); Mammo needle localization right (all inc) (Right, 5/22/2020); Breast lumpectomy w/ needle localization (Right, 5/22/2020); Mammo needle localization right (all inc) each add (Right, 5/22/2020); Lung surgery; and Thyroid surgery  Her family history includes Breast cancer (age of onset: 80) in her paternal aunt; COPD in her sister and sister; Esophageal cancer in her mother; Hypertension in her mother; No Known Problems in her father, maternal aunt, maternal aunt, maternal aunt, maternal aunt, maternal aunt, maternal grandfather, maternal grandmother, paternal grandfather, and paternal grandmother; Stroke in her family  She  reports that she quit smoking about 53 years ago  Her smoking use included cigarettes  She has a 30 00 pack-year smoking history  She has never used smokeless tobacco  She reports that she drinks about 5 0 standard drinks of alcohol per week  She reports that she does not use drugs    Current Outpatient Medications   Medication Sig Dispense Refill    Acetaminophen (TYLENOL PO) Take 1 tablet by mouth as needed      apixaban (ELIQUIS) 5 mg Take 5 mg by mouth 2 (two) times a day To stop 2 days prior to surgery per md      atorvastatin (LIPITOR) 80 mg tablet Take 1 tablet (80 mg total) by mouth daily 90 tablet 1    Coenzyme Q10 (CO Q 10 PO) Take 200 mg by mouth      cyanocobalamin (VITAMIN B-12) 100 mcg tablet Take 1,000 mcg by mouth daily      diltiazem (CARDIZEM CD) 120 mg 24 hr capsule Take 120 mg by mouth every evening       FIBER PO Take 1 capsule by mouth daily as needed      levothyroxine (Synthroid) 100 mcg tablet Take 1 tablet (100 mcg total) by mouth daily 90 tablet 1    Multiple Vitamins-Calcium (ESSENTIAL ONE DAILY MULTIVIT PO) Take by mouth daily       Omega-3 Fatty Acids (FISH OIL PO) Take 2 g by mouth daily       omeprazole (PriLOSEC) 20 mg delayed release capsule TAKE 1 CAPSULE DAILY (Patient taking differently: as needed ) 90 capsule 1     No current facility-administered medications for this visit  Current Outpatient Medications on File Prior to Visit   Medication Sig    Acetaminophen (TYLENOL PO) Take 1 tablet by mouth as needed    apixaban (ELIQUIS) 5 mg Take 5 mg by mouth 2 (two) times a day To stop 2 days prior to surgery per md    atorvastatin (LIPITOR) 80 mg tablet Take 1 tablet (80 mg total) by mouth daily    Coenzyme Q10 (CO Q 10 PO) Take 200 mg by mouth    cyanocobalamin (VITAMIN B-12) 100 mcg tablet Take 1,000 mcg by mouth daily    diltiazem (CARDIZEM CD) 120 mg 24 hr capsule Take 120 mg by mouth every evening     FIBER PO Take 1 capsule by mouth daily as needed    levothyroxine (Synthroid) 100 mcg tablet Take 1 tablet (100 mcg total) by mouth daily    Multiple Vitamins-Calcium (ESSENTIAL ONE DAILY MULTIVIT PO) Take by mouth daily     Omega-3 Fatty Acids (FISH OIL PO) Take 2 g by mouth daily     omeprazole (PriLOSEC) 20 mg delayed release capsule TAKE 1 CAPSULE DAILY (Patient taking differently: as needed )     No current facility-administered medications on file prior to visit  She is allergic to bee venom; levaquin [levofloxacin in d5w]; and tequin [gatifloxacin]       Review of Systems   Gastrointestinal: Negative  Genitourinary:        See HPI         Objective:      /70   Pulse (!) 111   Ht 5' 4" (1 626 m)   Wt 73 9 kg (163 lb)   BMI 27 98 kg/m²          Physical Exam   Abdominal: Soft  Bowel sounds are normal  She exhibits no distension and no mass  There is no tenderness  There is no rebound and no guarding  No hernia  Genitourinary:   Genitourinary Comments: Uterus and cervix surgically absent  No palpable adnexal masses or tenderness  Vagina with atrophic changes  Urethral orifice normal   Bartholin's and Octa's glands normal   No cystocele or rectocele present    External genitalia normal

## 2020-07-08 ENCOUNTER — TELEPHONE (OUTPATIENT)
Dept: HEMATOLOGY ONCOLOGY | Facility: CLINIC | Age: 80
End: 2020-07-08

## 2020-07-08 ENCOUNTER — OFFICE VISIT (OUTPATIENT)
Dept: SURGICAL ONCOLOGY | Facility: CLINIC | Age: 80
End: 2020-07-08

## 2020-07-08 VITALS
HEIGHT: 64 IN | BODY MASS INDEX: 27.83 KG/M2 | HEART RATE: 80 BPM | RESPIRATION RATE: 18 BRPM | DIASTOLIC BLOOD PRESSURE: 80 MMHG | SYSTOLIC BLOOD PRESSURE: 118 MMHG | TEMPERATURE: 98.3 F | WEIGHT: 163 LBS

## 2020-07-08 DIAGNOSIS — D05.11 DUCTAL CARCINOMA IN SITU (DCIS) OF RIGHT BREAST: Primary | ICD-10-CM

## 2020-07-08 DIAGNOSIS — N64.89 HEMATOMA OF RIGHT BREAST: ICD-10-CM

## 2020-07-08 PROCEDURE — 3074F SYST BP LT 130 MM HG: CPT | Performed by: SURGERY

## 2020-07-08 PROCEDURE — 1160F RVW MEDS BY RX/DR IN RCRD: CPT | Performed by: SURGERY

## 2020-07-08 PROCEDURE — 99024 POSTOP FOLLOW-UP VISIT: CPT | Performed by: SURGERY

## 2020-07-08 PROCEDURE — 3008F BODY MASS INDEX DOCD: CPT | Performed by: SURGERY

## 2020-07-08 PROCEDURE — 3079F DIAST BP 80-89 MM HG: CPT | Performed by: SURGERY

## 2020-07-08 RX ORDER — HYDROCORTISONE CREAM 1% 10 MG/G
CREAM TOPICAL
COMMUNITY
End: 2022-06-15

## 2020-07-08 NOTE — TELEPHONE ENCOUNTER
Dr Slim Arrington referring pt to Dr Chikis Perez re: breast ca    Pt scheduled for 8/6/2020 at 10am

## 2020-07-08 NOTE — PROGRESS NOTES
78 y o  female is here today s/p right lumpectomy for DCIS  She reports decreased size of the hematoma  She states that she has not needed to use any Tylenol for discomfort         Physical Exam   Constitutional: She appears well-developed and well-nourished  Pulmonary/Chest: Right breast exhibits skin change (Well-healed incision with no signs of infection, there is resolving ecchymosis with palpable hematoma)  Attempted to aspirate her hematoma again today in the office with very little fluid obtained, 6 cc  Neurological: She is alert  Psychiatric: She has a normal mood and affect  Diagnoses and all orders for this visit:    Ductal carcinoma in situ (DCIS) of right breast    Hematoma of right breast        Assessment/Plan:  57-year-old female status post right breast lumpectomy for ductal carcinoma in situ  She developed a postop hematoma  This is slowly resolving  I am not able to do much of an aspiration at all given the thick nature of it  There are no signs of infection  She has her follow-up appointment with Radiation Oncology in two weeks  I advised her to keep that appointment  I will plan to see her again in three months or sooner should the need as arise

## 2020-07-21 ENCOUNTER — APPOINTMENT (OUTPATIENT)
Dept: RADIATION ONCOLOGY | Facility: CLINIC | Age: 80
End: 2020-07-21
Attending: RADIOLOGY
Payer: COMMERCIAL

## 2020-07-21 ENCOUNTER — DOCUMENTATION (OUTPATIENT)
Dept: INFUSION CENTER | Facility: CLINIC | Age: 80
End: 2020-07-21

## 2020-07-21 PROCEDURE — 77290 THER RAD SIMULAJ FIELD CPLX: CPT | Performed by: RADIOLOGY

## 2020-07-21 PROCEDURE — 77332 RADIATION TREATMENT AID(S): CPT | Performed by: RADIOLOGY

## 2020-07-21 NOTE — SOCIAL WORK
LSW  Received DT and Problem list via email  PT self scored 0/10 an did not list any concerns  Due to low self score LSW intervention is not warranted at this time

## 2020-07-22 ENCOUNTER — TELEPHONE (OUTPATIENT)
Dept: CARDIAC SURGERY | Facility: CLINIC | Age: 80
End: 2020-07-22

## 2020-07-23 PROCEDURE — 77295 3-D RADIOTHERAPY PLAN: CPT | Performed by: RADIOLOGY

## 2020-07-23 PROCEDURE — 77334 RADIATION TREATMENT AID(S): CPT | Performed by: RADIOLOGY

## 2020-07-23 PROCEDURE — 77300 RADIATION THERAPY DOSE PLAN: CPT | Performed by: RADIOLOGY

## 2020-07-27 ENCOUNTER — HOSPITAL ENCOUNTER (OUTPATIENT)
Dept: RADIOLOGY | Facility: HOSPITAL | Age: 80
Discharge: HOME | End: 2020-07-27
Attending: THORACIC SURGERY (CARDIOTHORACIC VASCULAR SURGERY)
Payer: COMMERCIAL

## 2020-07-27 ENCOUNTER — TRANSCRIBE ORDERS (OUTPATIENT)
Dept: RADIOLOGY | Facility: HOSPITAL | Age: 80
End: 2020-07-27

## 2020-07-27 DIAGNOSIS — C34.91 PRIMARY LUNG ADENOCARCINOMA, RIGHT (HCC): ICD-10-CM

## 2020-07-27 PROCEDURE — 71250 CT THORAX DX C-: CPT

## 2020-07-28 ENCOUNTER — APPOINTMENT (OUTPATIENT)
Dept: LAB | Facility: HOSPITAL | Age: 80
End: 2020-07-28
Payer: COMMERCIAL

## 2020-07-28 ENCOUNTER — TRANSCRIBE ORDERS (OUTPATIENT)
Dept: LAB | Facility: HOSPITAL | Age: 80
End: 2020-07-28

## 2020-07-28 DIAGNOSIS — E78.00 PURE HYPERCHOLESTEROLEMIA: Primary | ICD-10-CM

## 2020-07-28 DIAGNOSIS — E78.00 PURE HYPERCHOLESTEROLEMIA: ICD-10-CM

## 2020-07-28 DIAGNOSIS — I10 ESSENTIAL HYPERTENSION, MALIGNANT: ICD-10-CM

## 2020-07-28 LAB
ANION GAP SERPL CALCULATED.3IONS-SCNC: 6 MMOL/L (ref 4–13)
BUN SERPL-MCNC: 14 MG/DL (ref 5–25)
CALCIUM SERPL-MCNC: 8.9 MG/DL (ref 8.3–10.1)
CHLORIDE SERPL-SCNC: 106 MMOL/L (ref 100–108)
CHOLEST SERPL-MCNC: 210 MG/DL (ref 50–200)
CO2 SERPL-SCNC: 27 MMOL/L (ref 21–32)
CREAT SERPL-MCNC: 1.02 MG/DL (ref 0.6–1.3)
ERYTHROCYTE [DISTWIDTH] IN BLOOD BY AUTOMATED COUNT: 14 % (ref 11.6–15.1)
GFR SERPL CREATININE-BSD FRML MDRD: 59 ML/MIN/1.73SQ M
GLUCOSE P FAST SERPL-MCNC: 97 MG/DL (ref 65–99)
HCT VFR BLD AUTO: 39.5 % (ref 34.8–46.1)
HDLC SERPL-MCNC: 91 MG/DL
HGB BLD-MCNC: 13.1 G/DL (ref 11.5–15.4)
LDLC SERPL CALC-MCNC: 104 MG/DL (ref 0–100)
MCH RBC QN AUTO: 32.8 PG (ref 26.8–34.3)
MCHC RBC AUTO-ENTMCNC: 33.2 G/DL (ref 31.4–37.4)
MCV RBC AUTO: 99 FL (ref 82–98)
NONHDLC SERPL-MCNC: 119 MG/DL
PLATELET # BLD AUTO: 228 THOUSANDS/UL (ref 149–390)
PMV BLD AUTO: 9.4 FL (ref 8.9–12.7)
POTASSIUM SERPL-SCNC: 3.7 MMOL/L (ref 3.5–5.3)
RBC # BLD AUTO: 3.99 MILLION/UL (ref 3.81–5.12)
SODIUM SERPL-SCNC: 139 MMOL/L (ref 136–145)
TRIGL SERPL-MCNC: 73 MG/DL
WBC # BLD AUTO: 4.75 THOUSAND/UL (ref 4.31–10.16)

## 2020-07-28 PROCEDURE — 36415 COLL VENOUS BLD VENIPUNCTURE: CPT

## 2020-07-28 PROCEDURE — 80061 LIPID PANEL: CPT

## 2020-07-28 PROCEDURE — 85027 COMPLETE CBC AUTOMATED: CPT

## 2020-07-28 PROCEDURE — 80048 BASIC METABOLIC PNL TOTAL CA: CPT

## 2020-07-29 ENCOUNTER — APPOINTMENT (OUTPATIENT)
Dept: RADIATION ONCOLOGY | Facility: CLINIC | Age: 80
End: 2020-07-29
Attending: RADIOLOGY
Payer: COMMERCIAL

## 2020-07-29 ENCOUNTER — OFFICE VISIT (OUTPATIENT)
Dept: CARDIAC SURGERY | Facility: CLINIC | Age: 80
End: 2020-07-29
Payer: COMMERCIAL

## 2020-07-29 VITALS
BODY MASS INDEX: 28 KG/M2 | DIASTOLIC BLOOD PRESSURE: 60 MMHG | SYSTOLIC BLOOD PRESSURE: 117 MMHG | WEIGHT: 164 LBS | OXYGEN SATURATION: 95 % | TEMPERATURE: 97.3 F | HEART RATE: 57 BPM | HEIGHT: 64 IN

## 2020-07-29 DIAGNOSIS — Z85.118 HISTORY OF LUNG CANCER: Primary | ICD-10-CM

## 2020-07-29 PROBLEM — C34.11 PRIMARY ADENOCARCINOMA OF UPPER LOBE OF RIGHT LUNG (HCC): Status: RESOLVED | Noted: 2019-12-02 | Resolved: 2020-07-29

## 2020-07-29 PROCEDURE — 3074F SYST BP LT 130 MM HG: CPT | Performed by: PHYSICIAN ASSISTANT

## 2020-07-29 PROCEDURE — 3078F DIAST BP <80 MM HG: CPT | Performed by: PHYSICIAN ASSISTANT

## 2020-07-29 PROCEDURE — 99213 OFFICE O/P EST LOW 20 MIN: CPT | Performed by: PHYSICIAN ASSISTANT

## 2020-07-29 PROCEDURE — 1160F RVW MEDS BY RX/DR IN RCRD: CPT | Performed by: PHYSICIAN ASSISTANT

## 2020-07-29 PROCEDURE — 77280 THER RAD SIMULAJ FIELD SMPL: CPT | Performed by: RADIOLOGY

## 2020-07-29 PROCEDURE — 1036F TOBACCO NON-USER: CPT | Performed by: PHYSICIAN ASSISTANT

## 2020-07-29 PROCEDURE — 3008F BODY MASS INDEX DOCD: CPT | Performed by: PHYSICIAN ASSISTANT

## 2020-07-29 NOTE — PROGRESS NOTES
Thoracic Follow-Up  Assessment/Plan:    History of lung cancer  Ms Annemarie Eric presents six months out from right upper lobectomy for Stage IB adenocarcinoma of the lung  She has some shortness of breath on exertion which dissipates quickly with rest  This doesn't seem to be limiting her activities  Her recent chest CT demonstrates no evidence of recurrent or metastatic lung cancer  She will continue with surveillance in our office and return in 6 months with a new chest CT  She is in agreement  Diagnoses and all orders for this visit:    History of lung cancer  -     CT chest wo contrast; Future          Thoracic History    Diagnosis: 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  Procedure: right robotic diagnostic upper lobe wedge resection, completion lobectomy, mediastinal lymph node dissection 1/17/20  Pathology: Right upper lobe tumor consistent with invasive moderately differentiated adenocarcinoma, measuring 1 6 cm, with visceral pleura invasion  Lymph node levels 2R, 4R, 8R, 10R, 11R and 7 all negative for malignancy  8th Edition AJCC tumor Stage IB (pT2a, N0, M0, G2)      Subjective:    Patient ID: Andrea Kelly is a 78 y o  female  HPI   Ms Annemarie Eric is an 78year old female with a history of Stage IB adenocarcinoma of the right upper lobe status post robotic lobectomy 1/17/20  She presents today for her first lung cancer surveillance visit  She underwent a chest CT 7/27/20 and this reveals no new pulmonary nodules, enlarged mediastinal or hilar lymph nodes  There is a 4 6x7 1cm hypodense right breast mass-like collection consistent with post operative seroma/hematoma  She was seen by Dr Matthieu Denson 7/8/20 who performed a right breast lumpectomy and has been monitoring this hematoma  She is undergoing radiation     On discussion, she reports shortness of breath on exertion which recovers quickly with rest  She did not notice shortness of breath after surgery however she is more active now so she notices it more, especially when she is gardening  She does not have a cough, fevers, chills, chest pain or weight loss  She still has some numbness around her incisions  The following portions of the patient's history were reviewed and updated as appropriate: allergies, current medications, past family history, past medical history, past social history, past surgical history and problem list     Review of Systems   Constitutional: Negative for activity change, appetite change, chills, diaphoresis, fatigue, fever and unexpected weight change  Respiratory: Positive for shortness of breath  Negative for cough, chest tightness and wheezing  Cardiovascular: Negative for chest pain, palpitations and leg swelling  Skin: Negative for color change, rash and wound  Allergic/Immunologic: Positive for environmental allergies  Neurological: Positive for numbness (incisional)  Hematological: Negative for adenopathy  Does not bruise/bleed easily  All other systems reviewed and are negative  Objective:   Physical Exam   Constitutional: She is oriented to person, place, and time  She appears well-developed and well-nourished  No distress  HENT:   Head: Normocephalic and atraumatic  Eyes: Conjunctivae are normal  No scleral icterus  Neck: Neck supple  No tracheal deviation present  Cardiovascular: Normal rate, regular rhythm and normal heart sounds  Pulmonary/Chest: Effort normal and breath sounds normal  No respiratory distress  Well healed right robotic incisions, right breast lumpectomy scar with ecchymosis  Abdominal: Soft  She exhibits no distension  Lymphadenopathy:     She has no cervical adenopathy  Neurological: She is alert and oriented to person, place, and time  Skin: Skin is warm and dry  Psychiatric: She has a normal mood and affect   Her behavior is normal  Judgment and thought content normal    /60 (BP Location: Left arm, Patient Position: Sitting, Cuff Size: Large)   Pulse 57   Temp (!) 97 3 °F (36 3 °C)   Ht 5' 4" (1 626 m)   Wt 74 4 kg (164 lb)   SpO2 95%   BMI 28 15 kg/m²     Xr Chest Pa & Lateral    Result Date: 2/3/2020  Impression No acute cardiopulmonary disease  Surgical changes in the right hemithorax right upper lobectomy for adenocarcinoma  Workstation performed: [id number]     Xr Chest Pa & Lateral    Result Date: 1/19/2020  Impression Right upper lobectomy  Right chest tube removal with no pneumothorax  Mild left base atelectasis  Workstation performed: [id number]     Xr Chest Pa & Lateral    Result Date: 12/2/2019  Impression 1 9 x 1 1 cm right upper lobe nodule  Follow-up with a chest CT is recommended to evaluate for lung cancer  I personally discussed this study with Jennifer Moore on 12/2/2019 at 1:22 PM  This study was marked in Epic for follow-up  Workstation performed: [id number]      Ct Chest Wo Contrast    Result Date: 7/28/2020  Narrative CT CHEST WITHOUT IV CONTRAST INDICATION:   C34 91: Malignant neoplasm of unspecified part of right bronchus or lung  Postop follow-up  Patient had a right breast lumpectomy on May 22, 2024 [facility]  COMPARISON:  PET scan from 12/20/2019 and chest CT from 12/9/2019 TECHNIQUE: CT examination of the chest was performed without intravenous contrast   Axial, sagittal, and coronal 2D reformatted images were created from the source data and submitted for interpretation  Radiation dose length product (DLP) for this visit:  185 7 mGy-cm   This examination, like all CT scans performed in the [facility], was performed utilizing techniques to minimize radiation dose exposure, including the use of iterative reconstruction and automated exposure control  FINDINGS: LUNGS:  There has been right upper lobectomy  There are no lung nodules or suspicious lung findings at this time  Airways are clear  PLEURA:  Unremarkable  HEART/GREAT VESSELS:  Unremarkable for patient's age   MEDIASTINUM AND SELENA: Unremarkable  CHEST WALL AND LOWER NECK:   There is a large somewhat hyperdense masslike collection within the medial aspect of the right breast which is new compared with the prior study  It measures 4 6 x 7 1 cm and is most likely a very large postoperative hematoma/seroma  Internal attenuation is 50 Hounsfield units  The margins are relatively smooth and circumscribed  Included portion of the left breast is grossly unremarkable  VISUALIZED STRUCTURES IN THE UPPER ABDOMEN:  There is a low-density round benign-appearing lesion in the right hepatic lobe on image 54 series 2 which measures 8-9 mm and is most compatible with a cyst   It was present on prior CT and is unchanged  OSSEOUS STRUCTURES:  No acute fracture or destructive osseous lesion  Impression Interval right upper lobectomy without evidence of any significant lung disease at this time  Large masslike collection within the right breast presumably representing postoperative hematoma/seroma  This could be assessed with ultrasound if deemed clinically appropriate  Workstation performed: [id number]     Ct Chest Wo Contrast    Addendum Date: 12/12/2019 Addendum   ADDENDUM: I personally discussed this study with Dee Fermin on 12/12/2019 at 1:52 PM      Result Date: 12/12/2019  Narrative CT CHEST WITHOUT IV CONTRAST INDICATION:   R91 8: Other nonspecific abnormal finding of lung field  Follow-up lung nodule on chest radiograph  History of colon cancer and thyroid cancer  COMPARISON:  Chest radiograph from 12/2/2019  Chest CT from 3/1/2016  TECHNIQUE: CT examination of the chest was performed without intravenous contrast   Axial, sagittal, and coronal 2D reformatted images were created from the source data and submitted for interpretation  Radiation dose length product (DLP) for this visit:  180 mGy-cm     This examination, like all CT scans performed in the [facility], was performed utilizing techniques to minimize radiation dose exposure, including the use of iterative reconstruction and automated exposure control  FINDINGS: LUNGS:  Abnormality on chest radiograph corresponds with a 2 1 x 2 0 cm spiculated nodule in the posterior segment of the right upper lobe with pleural retraction  Mild emphysema  No acute pulmonary disease  No significant filling defects in the trachea and bronchi  PLEURA:  Unremarkable  HEART/GREAT VESSELS:  Unremarkable for patient's age  MEDIASTINUM AND SELENA:  Unremarkable  CHEST WALL AND LOWER NECK:   Resection of the right lobe of the thyroid gland  VISUALIZED STRUCTURES IN THE UPPER ABDOMEN:  Fat-containing right Bochdalek hernia  Hepatic cyst in the right lobe  OSSEOUS STRUCTURES:  Extensive osteopenia in the spine  No change in lucency in the inferior tip of the left scapula  Moderate degenerative disease in the spine  Impression 2 1 x 2 0 cm spiculated right upper lobe nodule compatible with lung cancer  Workstation performed: [id number]     Nm Pet Ct Skull Base To Mid Thigh    Result Date: 12/20/2019  Narrative PET/CT SCAN INDICATION:  D38 1: Neoplasm of uncertain behavior of trachea, bronchus and lung   , abnormal CT, right upper lung nodule, history of thyroid cancer, colon cancer, status post right thyroidectomy, hemicolectomy MODIFIER: PI COMPARISON: CT chest 12/9/2019 and priors, including PET CT 9/9/2016 CELL TYPE:  None TECHNIQUE:   8 2 mCi F-18-FDG administered IV  Multiplanar attenuation corrected and non attenuation corrected PET images were acquired 60 minutes post injection  Contiguous, low dose, axial CT sections were obtained from the skull base through the femurs   Intravenous contrast material was not utilized  This examination, like all CT scans performed in the [facility], was performed utilizing techniques to minimize radiation dose exposure, including the use of iterative reconstruction and automated exposure control   Fasting serum glucose: 100 mg/dl FINDINGS: VISUALIZED BRAIN:   No acute abnormalities are seen  HEAD/NECK:   Persistent intense left thyroid activity, SUV 10 6, prior study 9 3  No FDG avid cervical adenopathy is seen  CT images: Unremarkable  CHEST:   2 3 x 2 cm right upper lung lesion demonstrates increased FDG activity, SUV 3, most compatible with malignancy  On the prior PET/CT, this measured 1 5 x 1 4 cm, SUV 1 5  Right hilar activity appears similar in configuration, SUV 3, prior SUV 2 6  This may be physiologic  No hypermetabolic mediastinal or left hilar adenopathy  CT images: Stable  ABDOMEN: Somewhat focal FDG activity in the mid transverse colon, SUV 4 9, may be physiologic  Corresponding CT images are unremarkable  No additional FDG avid soft tissue lesions are seen  CT images: Colon diverticula  PELVIS: No FDG avid soft tissue lesions are seen  CT images: Unremarkable  OSSEOUS STRUCTURES: No FDG avid lesions are seen  CT images: No significant findings  Impression 1   2 3 x 2 cm right upper lung lesion demonstrates increased FDG activity, SUV 3, most compatible with malignancy  No hypermetabolic hilar or mediastinal adenopathy  2   No hypermetabolic metastases in the neck, abdomen, pelvis  3   Somewhat focal FDG activity in the mid transverse colon may be physiologic  Corresponding CT images are unremarkable  Attention on follow-up recommended to exclude the possibility of occult neoplasm  4   Persistent intense left thyroid activity may be correlated clinically for etiologies such as thyroiditis  The study was marked in EPIC for significant notification   Workstation performed: [id number]

## 2020-07-29 NOTE — ASSESSMENT & PLAN NOTE
Ms Nisreen Mujica presents six months out from right upper lobectomy for Stage IB adenocarcinoma of the lung  She has some shortness of breath on exertion which dissipates quickly with rest  This doesn't seem to be limiting her activities  Her recent chest CT demonstrates no evidence of recurrent or metastatic lung cancer  She will continue with surveillance in our office and return in 6 months with a new chest CT  She is in agreement

## 2020-07-30 ENCOUNTER — APPOINTMENT (OUTPATIENT)
Dept: RADIATION ONCOLOGY | Facility: CLINIC | Age: 80
End: 2020-07-30
Attending: RADIOLOGY
Payer: COMMERCIAL

## 2020-07-30 PROCEDURE — 77412 RADIATION TX DELIVERY LVL 3: CPT | Performed by: RADIOLOGY

## 2020-07-30 PROCEDURE — 77331 SPECIAL RADIATION DOSIMETRY: CPT | Performed by: RADIOLOGY

## 2020-07-31 ENCOUNTER — APPOINTMENT (OUTPATIENT)
Dept: RADIATION ONCOLOGY | Facility: CLINIC | Age: 80
End: 2020-07-31
Attending: RADIOLOGY
Payer: COMMERCIAL

## 2020-07-31 DIAGNOSIS — D05.11 DUCTAL CARCINOMA IN SITU (DCIS) OF RIGHT BREAST: Primary | ICD-10-CM

## 2020-07-31 PROCEDURE — 77412 RADIATION TX DELIVERY LVL 3: CPT | Performed by: RADIOLOGY

## 2020-08-03 ENCOUNTER — APPOINTMENT (OUTPATIENT)
Dept: RADIATION ONCOLOGY | Facility: CLINIC | Age: 80
End: 2020-08-03
Attending: RADIOLOGY
Payer: COMMERCIAL

## 2020-08-03 ENCOUNTER — TELEPHONE (OUTPATIENT)
Dept: HEMATOLOGY ONCOLOGY | Facility: CLINIC | Age: 80
End: 2020-08-03

## 2020-08-03 PROCEDURE — 77412 RADIATION TX DELIVERY LVL 3: CPT | Performed by: RADIOLOGY

## 2020-08-04 ENCOUNTER — APPOINTMENT (OUTPATIENT)
Dept: RADIATION ONCOLOGY | Facility: CLINIC | Age: 80
End: 2020-08-04
Attending: RADIOLOGY
Payer: COMMERCIAL

## 2020-08-04 PROCEDURE — 77412 RADIATION TX DELIVERY LVL 3: CPT | Performed by: RADIOLOGY

## 2020-08-04 NOTE — TELEPHONE ENCOUNTER
Patient called to answer GlideIID 19 screening questions which were "Negative"   Patient is scheduled 10:00 am for her consult and 10:30 for her radiation, and fears she will be late to her radiation appt downstairs

## 2020-08-05 ENCOUNTER — APPOINTMENT (OUTPATIENT)
Dept: RADIATION ONCOLOGY | Facility: CLINIC | Age: 80
End: 2020-08-05
Attending: RADIOLOGY
Payer: COMMERCIAL

## 2020-08-05 ENCOUNTER — APPOINTMENT (OUTPATIENT)
Dept: RADIATION ONCOLOGY | Facility: CLINIC | Age: 80
End: 2020-08-05
Payer: COMMERCIAL

## 2020-08-05 PROCEDURE — 77412 RADIATION TX DELIVERY LVL 3: CPT | Performed by: RADIOLOGY

## 2020-08-05 PROCEDURE — 77336 RADIATION PHYSICS CONSULT: CPT | Performed by: RADIOLOGY

## 2020-08-06 ENCOUNTER — APPOINTMENT (OUTPATIENT)
Dept: RADIATION ONCOLOGY | Facility: CLINIC | Age: 80
End: 2020-08-06
Attending: RADIOLOGY
Payer: COMMERCIAL

## 2020-08-06 ENCOUNTER — APPOINTMENT (OUTPATIENT)
Dept: RADIATION ONCOLOGY | Facility: CLINIC | Age: 80
End: 2020-08-06
Payer: COMMERCIAL

## 2020-08-06 ENCOUNTER — CONSULT (OUTPATIENT)
Dept: HEMATOLOGY ONCOLOGY | Facility: CLINIC | Age: 80
End: 2020-08-06
Payer: COMMERCIAL

## 2020-08-06 VITALS
OXYGEN SATURATION: 95 % | TEMPERATURE: 97.4 F | DIASTOLIC BLOOD PRESSURE: 78 MMHG | SYSTOLIC BLOOD PRESSURE: 118 MMHG | HEIGHT: 64 IN | HEART RATE: 49 BPM | WEIGHT: 162 LBS | RESPIRATION RATE: 18 BRPM | BODY MASS INDEX: 27.66 KG/M2

## 2020-08-06 DIAGNOSIS — D05.11 DUCTAL CARCINOMA IN SITU (DCIS) OF RIGHT BREAST: ICD-10-CM

## 2020-08-06 DIAGNOSIS — Z85.118 HISTORY OF LUNG CANCER: Primary | ICD-10-CM

## 2020-08-06 PROCEDURE — 1160F RVW MEDS BY RX/DR IN RCRD: CPT | Performed by: INTERNAL MEDICINE

## 2020-08-06 PROCEDURE — 3074F SYST BP LT 130 MM HG: CPT | Performed by: INTERNAL MEDICINE

## 2020-08-06 PROCEDURE — 3078F DIAST BP <80 MM HG: CPT | Performed by: INTERNAL MEDICINE

## 2020-08-06 PROCEDURE — 77412 RADIATION TX DELIVERY LVL 3: CPT | Performed by: RADIOLOGY

## 2020-08-06 PROCEDURE — 77417 THER RADIOLOGY PORT IMAGE(S): CPT | Performed by: RADIOLOGY

## 2020-08-06 PROCEDURE — 99204 OFFICE O/P NEW MOD 45 MIN: CPT | Performed by: INTERNAL MEDICINE

## 2020-08-06 NOTE — LETTER
August 6, 2020     Henrique Feliz DO  [address]    Patient: Catalina Miller   YOB: 1940   Date of Visit: 8/6/2020       Dear Dr Jeremy Chambers:    Thank you for referring Catalina Miller to me for evaluation  Below are my notes for this consultation  If you have questions, please do not hesitate to call me  I look forward to following your patient along with you  Sincerely,        Gricelda Huston MD        CC: MD Gricelda Stone MD  8/6/2020 10:32 AM  Sign when Signing Visit  Hematology / Oncology Outpatient Consult Note    Catalina Miller 78 y o  female [id number]         Date:  8/6/2020    Assessment / Plan:  A 68-year-old postmenopausal woman with 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, grade 1, % positive, CA 60% positive, HER2 negative disease  She underwent lumpectomy resulting in HOA  She is currently on radiation therapy  She has multiple history of cancer including colon cancer, thyroid cancer as well as recent stage IB adenocarcinoma of the lung  She presents today to discuss adjuvant treatment options  We had extensive discussion regarding the diagnosis, non invasive nature of disease, non life-threatening disease, good prognosis and treatment options  Based on her age, non invasive disease, due to the lack of survival advantage with hormonal therapy, I recommended her not to take adjuvant hormonal therapy  She is in agreement with my recommendation  She is going to be followed by Dr Ayesha Allen  Subjective:     HPI:  A 68-year-old postmenopausal woman who has mammography abnormality in the last 2 years for which she was on intensive screening  She recently had radiographic change further  Therefore, she underwent biopsy which showed DCIS  She subsequently underwent right lumpectomy in May 22, 2020 by Dr Ayesha Allen which showed 4 7 cm of ductal carcinoma in situ, grade 1  This was % positive, SD 60% positive, HER2 negative disease  She has history of stage 1B adenocarcinoma of the lung diagnosed in January 2020 for which she underwent which resection  Tumor measured 1 6 cm   11 lymph nodes were all negative for metastatic disease  She also has history of colon cancer back in 1977 which was resected followed by no adjuvant therapy  She has thyroid cancer in 2004 for which she underwent thyroidectomy with no adjuvant radioactive iodine treatment  She feels well  She has no breast related symptomatology  She is currently on adjuvant radiation therapy  She has no significant skin toxicity  She has very limited smoking history  She quit smoking neck in 1967  She has no respiratory symptoms  She denied any pain  Her weight is stable  She has normal performance status  Interval History:          Objective:     Primary Diagnosis:    1  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, grade 1, % positive, SD 60% positive, HER2 negative disease  Diagnosed in May 2020     2  Adenocarcinoma of the lung, stage IB (pT2 a, pN0, M0) grade 2  Diagnosed in January 2020     3  History of thyroid cancer in 2004  4  History of colon cancer, diagnosed in 1977  Cancer Staging:  Cancer Staging  Ductal carcinoma in situ (DCIS) of right breast  Staging form: Breast, AJCC 8th Edition  - Clinical: cT0, cN0, cM0 - Signed by Eleni Almanza MD on 6/9/2020  Laterality: Right  - Pathologic: Stage 0 (pTis (DCIS), pN0, cM0, ER+, SD+, HER2-) - Signed by Eleni Almanza MD on 6/16/2020  Laterality: Right  Method of lymph node assessment: Clinical  Nuclear grade: G1        Previous Hematologic/ Oncologic Treatment:         Current Hematologic/ Oncologic Treatment:      However they shin  Disease Status:     HOA status post lumpectomy      Test Results:    Pathology:    4 7 cm of ductal carcinoma in situ, grade 1  % positive, SD 60% positive, HER2 negative disease  Radiology:    CT scan of chest in July 2020 showed no evidence of metastatic disease  Laboratory:    See below  Physical Exam:      General Appearance:    Alert, oriented        Eyes:    PERRL   Ears:    Normal external ear canals, both ears   Nose:   Nares normal, septum midline   Throat:   Mucosa moist  Pharynx without injection  Neck:   Supple       Lungs:     Clear to auscultation bilaterally   Chest Wall:    No tenderness or deformity    Heart:    Regular rate and rhythm       Abdomen:     Soft, non-tender, bowel sounds +, no organomegaly           Extremities:   Extremities no cyanosis or edema       Skin:   no rash or icterus  Lymph nodes:   Cervical, supraclavicular, and axillary nodes normal   Neurologic:   CNII-XII intact, normal strength, sensation and reflexes     Throughout          Breast exam:   Lumpectomy scar at 12:00 p m  Position in her right breast with left breast exam is negative for mass  ROS: Review of Systems   All other systems reviewed and are negative  Imaging: Ct Chest Wo Contrast    Result Date: 7/28/2020  Narrative: CT CHEST WITHOUT IV CONTRAST INDICATION:   C34 91: Malignant neoplasm of unspecified part of right bronchus or lung  Postop follow-up  Patient had a right breast lumpectomy on May 22, 2024 DCIS  COMPARISON:  PET scan from 12/20/2019 and chest CT from 12/9/2019 TECHNIQUE: CT examination of the chest was performed without intravenous contrast   Axial, sagittal, and coronal 2D reformatted images were created from the source data and submitted for interpretation  Radiation dose length product (DLP) for this visit:  185 7 mGy-cm   This examination, like all CT scans performed in the [facility], was performed utilizing techniques to minimize radiation dose exposure, including the use of iterative reconstruction and automated exposure control  FINDINGS: LUNGS:  There has been right upper lobectomy    There are no lung nodules or suspicious lung findings at this time  Airways are clear  PLEURA:  Unremarkable  HEART/GREAT VESSELS:  Unremarkable for patient's age  MEDIASTINUM AND SELENA:  Unremarkable  CHEST WALL AND LOWER NECK:   There is a large somewhat hyperdense masslike collection within the medial aspect of the right breast which is new compared with the prior study  It measures 4 6 x 7 1 cm and is most likely a very large postoperative hematoma/seroma  Internal attenuation is 50 Hounsfield units  The margins are relatively smooth and circumscribed  Included portion of the left breast is grossly unremarkable  VISUALIZED STRUCTURES IN THE UPPER ABDOMEN:  There is a low-density round benign-appearing lesion in the right hepatic lobe on image 54 series 2 which measures 8-9 mm and is most compatible with a cyst   It was present on prior CT and is unchanged  OSSEOUS STRUCTURES:  No acute fracture or destructive osseous lesion  Impression: Interval right upper lobectomy without evidence of any significant lung disease at this time  Large masslike collection within the right breast presumably representing postoperative hematoma/seroma  This could be assessed with ultrasound if deemed clinically appropriate   Workstation performed: [id number]         Labs:   Lab Results   Component Value Date    WBC 4 75 07/28/2020    HGB 13 1 07/28/2020    HCT 39 5 07/28/2020    MCV 99 (H) 07/28/2020     07/28/2020     Lab Results   Component Value Date     08/11/2015    K 3 7 07/28/2020     07/28/2020    CO2 27 07/28/2020    ANIONGAP 5 08/11/2015    BUN 14 07/28/2020    CREATININE 1 02 07/28/2020    GLUCOSE 113 07/06/2016    GLUF 97 07/28/2020    CALCIUM 8 9 07/28/2020    AST 25 05/12/2020    ALT 30 05/12/2020    ALKPHOS 95 05/12/2020    PROT 7 2 08/11/2015    BILITOT 0 55 08/11/2015    EGFR 59 07/28/2020         Lab Results   Component Value Date    CEA 1 4 09/08/2014       Vital Sign:    Body surface area is 1 79 meters squared  Wt Readings from Last 3 Encounters:   08/06/20 73 5 kg (162 lb)   07/29/20 74 4 kg (164 lb)   07/08/20 73 9 kg (163 lb)        Temp Readings from Last 3 Encounters:   08/06/20 (!) 97 4 °F (36 3 °C) (Tympanic Core)   07/29/20 (!) 97 3 °F (36 3 °C)   07/08/20 98 3 °F (36 8 °C) (Tympanic)        BP Readings from Last 3 Encounters:   08/06/20 118/78   07/29/20 117/60   07/08/20 118/80         Pulse Readings from Last 3 Encounters:   08/06/20 (!) 49   07/29/20 57   07/08/20 80     @LASTSAO2(3)@    Active Problems:   Patient Active Problem List   Diagnosis    Adenoma, adrenocortical    A-fib ([address] )    Benign essential hypertension    Coagulopathy (HCC)    History of colon cancer    Overweight (BMI 25 0-29  9)    Osteoarthritis    Postoperative hypothyroidism    Thyroid cancer ([address] )    Mixed hyperlipidemia    Medicare annual wellness visit, subsequent    Osteopenia of multiple sites    Malar rash    Age-related nuclear cataract of left eye    Preop examination    Ductal carcinoma in situ (DCIS) of right breast    Dense breast tissue    Thyroid mass of unclear etiology    Hashimoto's thyroiditis    Hematoma of right breast    History of lung cancer       Past Medical History:   Past Medical History:   Diagnosis Date    Anesthesia     "cheap date"    Arthritis     Atrial fibrillation ([address] )     Benign polyp of large intestine     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     Thyroid cancer ([facility] [facility] ) 2009    Tic disorder, transient, recurrent     not recent    Walks frequently        Surgical History:   Past Surgical History:   Procedure Laterality Date    APPENDECTOMY      ARTHROSCOPY KNEE      BREAST BIOPSY Right 04/10/2017    US guided  benign    BREAST BIOPSY Right 10/24/2019    ADH    BREAST LUMPECTOMY W/ NEEDLE LOCALIZATION Right 5/22/2020    Procedure: EXCISIONAL BIOPSY;  1000 NEEDLE LOC;  Surgeon: Vesna Barry MD;  Location: [facility];  Service: Surgical Oncology    CARDIOVASCULAR STRESS TEST      CATARACT EXTRACTION Bilateral     COLON SURGERY      COLONOSCOPY      done 2010 due 2015 Dr Kuo    DILATION AND CURETTAGE OF UTERUS      ESOPHAGOGASTRODUODENOSCOPY      inflammation aonly    HEMICOLECTOMY Right 1977    HYSTERECTOMY      JOINT REPLACEMENT      L knee    LUNG SURGERY      MAMMO NEEDLE LOCALIZATION RIGHT (ALL INC) Right 5/22/2020    MAMMO NEEDLE LOCALIZATION RIGHT (ALL INC) EACH ADD Right 5/22/2020    MAMMO STEREOTACTIC BREAST BIOPSY RIGHT (ALL INC) Right 10/24/2019    PARATHYROID GLAND SURGERY      exploration     DE BRONCHOSCOPY,DIAGNOSTIC N/A 1/17/2020    Procedure: FLEXIBLE BRONCHOSCOPY;  Surgeon: Claire Myers MD;  [facility];  Service: Thoracic    DE THORACOSCOPY SURG LOBECTOMY Right 1/17/2020    Procedure: ROBOTIC ASSISTED COMPLETE LUNG LOBECTOMY;  Surgeon: Claire Myers MD;  [facility];  Service: Thoracic    DE THORACOSCOPY W/THERA WEDGE RESEXN INITIAL UNILAT Right 1/17/2020    Procedure: ROBOTIC ASSISTED UPPER-LOBE WEDGE RESECTION, MEDIASTINAL LYMPH NODE DISECTION;  Surgeon: Claire Myers MD;  [facility];  Service: Thoracic    REPLACEMENT TOTAL KNEE      THYROID SURGERY      TOTAL ABDOMINAL HYSTERECTOMY  1983    TOTAL THYROIDECTOMY      US GUIDED BREAST BIOPSY RIGHT COMPLETE Right 4/10/2017    US GUIDED THYROID BIOPSY  2/14/2020       Family History:    Family History Problem Relation Age of Onset    Esophageal cancer Mother         [de-identified]    Hypertension Mother     Stroke Family         TIA    Breast cancer Paternal Aunt 80    No Known Problems Father     COPD Sister     COPD Sister     No Known Problems Maternal Grandmother     No Known Problems Maternal Grandfather     No Known Problems Paternal Grandmother     No Known Problems Paternal Grandfather     No Known Problems Maternal Aunt     No Known Problems Maternal Aunt     No Known Problems Maternal Aunt     No Known Problems Maternal Aunt     No Known Problems Maternal Aunt        Cancer-related family history includes Breast cancer (age of onset: 80) in her paternal aunt; Esophageal cancer in her mother  Social History:   Social History     Socioeconomic History    Marital status: Single     Spouse name: Not on file    Number of children: Not on file    Years of education: Not on file    Highest education level: Not on file   Occupational History    Not on file   Social Needs    Financial resource strain: Not on file    Food insecurity     Worry: Not on file     Inability: Not on file    Transportation needs     Medical: Not on file     Non-medical: Not on file   Tobacco Use    Smoking status: Former Smoker     Packs/day: 1 50     Years: 20 00     Pack years: 30 00     Types: Cigarettes     Last attempt to quit: 1967     Years since quittin 6    Smokeless tobacco: Never Used   Substance and Sexual Activity    Alcohol use:  Yes     Alcohol/week: 5 0 standard drinks     Types: 5 Cans of beer per week     Frequency: 4 or more times a week     Drinks per session: 1 or 2     Binge frequency: Never     Comment: occasional beer with food    Drug use: No    Sexual activity: Not on file   Lifestyle    Physical activity     Days per week: Not on file     Minutes per session: Not on file    Stress: Not on file   Relationships    Social connections     Talks on phone: Not on file     Gets together: Not on file     Attends Rastafarian service: Not on file     Active member of club or organization: Not on file     Attends meetings of clubs or organizations: Not on file     Relationship status: Not on file    Intimate partner violence     Fear of current or ex partner: Not on file     Emotionally abused: Not on file     Physically abused: Not on file     Forced sexual activity: Not on file   Other Topics Concern    Not on file   Social History Narrative    Caffeine use- 1-3, 8oz coffees per day    Denied history of housing without smoke detectors    Always uses a seatbelt       Current Medications:   Current Outpatient Medications   Medication Sig Dispense Refill    apixaban (ELIQUIS) 5 mg Take 5 mg by mouth 2 (two) times a day To stop 2 days prior to surgery per md      atorvastatin (LIPITOR) 80 mg tablet Take 1 tablet (80 mg total) by mouth daily 90 tablet 1    Coenzyme Q10 (CO Q 10 PO) Take 200 mg by mouth      FIBER PO Take 1 capsule by mouth daily as needed      Hydrocortisone Acetate (Vagisil) 1 % CREA Apply topically      levothyroxine (Synthroid) 100 mcg tablet Take 1 tablet (100 mcg total) by mouth daily 90 tablet 1    Multiple Vitamins-Calcium (ESSENTIAL ONE DAILY MULTIVIT PO) Take by mouth daily       Omega-3 Fatty Acids (FISH OIL PO) Take 2 g by mouth daily       omeprazole (PriLOSEC) 20 mg delayed release capsule TAKE 1 CAPSULE DAILY (Patient taking differently: as needed ) 90 capsule 1    diltiazem (CARDIZEM CD) 120 mg 24 hr capsule Take 120 mg by mouth every evening        No current facility-administered medications for this visit  Allergies:    Allergies   Allergen Reactions    Bee Venom      Tongue swelling    Levaquin [Levofloxacin In D5w] Other (See Comments)     Dark spots under eyes and wrists    Tequin [Gatifloxacin] Rash

## 2020-08-06 NOTE — PROGRESS NOTES
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  She underwent lumpectomy resulting in HOA  She is currently on radiation therapy  She has multiple history of cancer including colon cancer, thyroid cancer as well as recent stage IB adenocarcinoma of the lung  She presents today to discuss adjuvant treatment options  We had extensive discussion regarding the diagnosis, non invasive nature of disease, non life-threatening disease, good prognosis and treatment options  Based on her age, non invasive disease, due to the lack of survival advantage with hormonal therapy, I recommended her not to take adjuvant hormonal therapy  She is in agreement with my recommendation  She is going to be followed by Dr Yokasta Lynch  Subjective:     HPI:  A 78-year-old postmenopausal woman who has mammography abnormality in the last 2 years for which she was on intensive screening  She recently had radiographic change further  Therefore, she underwent biopsy which showed DCIS  She subsequently underwent right lumpectomy in May 22, 2020 by Dr Yokasta Lynch which showed 4 7 cm of ductal carcinoma in situ, grade 1  This was % positive, HI 60% positive, HER2 negative disease  She has history of stage 1B adenocarcinoma of the lung diagnosed in January 2020 for which she underwent which resection  Tumor measured 1 6 cm   11 lymph nodes were all negative for metastatic disease  She also has history of colon cancer back in 1977 which was resected followed by no adjuvant therapy  She has thyroid cancer in 2004 for which she underwent thyroidectomy with no adjuvant radioactive iodine treatment  She feels well  She has no breast related symptomatology    She is currently on adjuvant radiation therapy  She has no significant skin toxicity  She has very limited smoking history  She quit smoking neck in 1967  She has no respiratory symptoms  She denied any pain  Her weight is stable  She has normal performance status  Interval History:          Objective:     Primary Diagnosis:    1  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, grade 1, % positive, ID 60% positive, HER2 negative disease  Diagnosed in May 2020     2  Adenocarcinoma of the lung, stage IB (pT2 a, pN0, M0) grade 2  Diagnosed in January 2020     3  History of thyroid cancer in 2004  4  History of colon cancer, diagnosed in 1977  Cancer Staging:  Cancer Staging  Ductal carcinoma in situ (DCIS) of right breast  Staging form: Breast, AJCC 8th Edition  - Clinical: cT0, cN0, cM0 - Signed by Vesna Barry MD on 6/9/2020  Laterality: Right  - Pathologic: Stage 0 (pTis (DCIS), pN0, cM0, ER+, ID+, HER2-) - Signed by Vesna Barry MD on 6/16/2020  Laterality: Right  Method of lymph node assessment: Clinical  Nuclear grade: G1        Previous Hematologic/ Oncologic Treatment:         Current Hematologic/ Oncologic Treatment:      However they shin  Disease Status:     HOA status post lumpectomy  Test Results:    Pathology:    4 7 cm of ductal carcinoma in situ, grade 1  % positive, ID 60% positive, HER2 negative disease  Radiology:    CT scan of chest in July 2020 showed no evidence of metastatic disease  Laboratory:    See below  Physical Exam:      General Appearance:    Alert, oriented        Eyes:    PERRL   Ears:    Normal external ear canals, both ears   Nose:   Nares normal, septum midline   Throat:   Mucosa moist  Pharynx without injection      Neck:   Supple       Lungs:     Clear to auscultation bilaterally   Chest Wall:    No tenderness or deformity    Heart:    Regular rate and rhythm       Abdomen:     Soft, non-tender, bowel sounds +, no organomegaly Extremities:   Extremities no cyanosis or edema       Skin:   no rash or icterus  Lymph nodes:   Cervical, supraclavicular, and axillary nodes normal   Neurologic:   CNII-XII intact, normal strength, sensation and reflexes     Throughout          Breast exam:   Lumpectomy scar at 12:00 p m  Position in her right breast with left breast exam is negative for mass  ROS: Review of Systems   All other systems reviewed and are negative  Imaging: Ct Chest Wo Contrast    Result Date: 7/28/2020  Narrative: CT CHEST WITHOUT IV CONTRAST INDICATION:   C34 91: Malignant neoplasm of unspecified part of right bronchus or lung  Postop follow-up  Patient had a right breast lumpectomy on May 22, 2024 DCIS  COMPARISON:  PET scan from 12/20/2019 and chest CT from 12/9/2019 TECHNIQUE: CT examination of the chest was performed without intravenous contrast   Axial, sagittal, and coronal 2D reformatted images were created from the source data and submitted for interpretation  Radiation dose length product (DLP) for this visit:  185 7 mGy-cm   This examination, like all CT scans performed in the [facility], was performed utilizing techniques to minimize radiation dose exposure, including the use of iterative reconstruction and automated exposure control  FINDINGS: LUNGS:  There has been right upper lobectomy  There are no lung nodules or suspicious lung findings at this time  Airways are clear  PLEURA:  Unremarkable  HEART/GREAT VESSELS:  Unremarkable for patient's age  MEDIASTINUM AND SELENA:  Unremarkable  CHEST WALL AND LOWER NECK:   There is a large somewhat hyperdense masslike collection within the medial aspect of the right breast which is new compared with the prior study  It measures 4 6 x 7 1 cm and is most likely a very large postoperative hematoma/seroma  Internal attenuation is 50 Hounsfield units  The margins are relatively smooth and circumscribed    Included portion of the left breast is grossly unremarkable  VISUALIZED STRUCTURES IN THE UPPER ABDOMEN:  There is a low-density round benign-appearing lesion in the right hepatic lobe on image 54 series 2 which measures 8-9 mm and is most compatible with a cyst   It was present on prior CT and is unchanged  OSSEOUS STRUCTURES:  No acute fracture or destructive osseous lesion  Impression: Interval right upper lobectomy without evidence of any significant lung disease at this time  Large masslike collection within the right breast presumably representing postoperative hematoma/seroma  This could be assessed with ultrasound if deemed clinically appropriate  Workstation performed: [id number]         Labs:   Lab Results   Component Value Date    WBC 4 75 07/28/2020    HGB 13 1 07/28/2020    HCT 39 5 07/28/2020    MCV 99 (H) 07/28/2020     07/28/2020     Lab Results   Component Value Date     08/11/2015    K 3 7 07/28/2020     07/28/2020    CO2 27 07/28/2020    ANIONGAP 5 08/11/2015    BUN 14 07/28/2020    CREATININE 1 02 07/28/2020    GLUCOSE 113 07/06/2016    GLUF 97 07/28/2020    CALCIUM 8 9 07/28/2020    AST 25 05/12/2020    ALT 30 05/12/2020    ALKPHOS 95 05/12/2020    PROT 7 2 08/11/2015    BILITOT 0 55 08/11/2015    EGFR 59 07/28/2020         Lab Results   Component Value Date    CEA 1 4 09/08/2014       Vital Sign:    Body surface area is 1 79 meters squared      Wt Readings from Last 3 Encounters:   08/06/20 73 5 kg (162 lb)   07/29/20 74 4 kg (164 lb)   07/08/20 73 9 kg (163 lb)        Temp Readings from Last 3 Encounters:   08/06/20 (!) 97 4 °F (36 3 °C) (Tympanic Core)   07/29/20 (!) 97 3 °F (36 3 °C)   07/08/20 98 3 °F (36 8 °C) (Tympanic)        BP Readings from Last 3 Encounters:   08/06/20 118/78   07/29/20 117/60   07/08/20 118/80         Pulse Readings from Last 3 Encounters:   08/06/20 (!) 49   07/29/20 57   07/08/20 80     @LASTSAO2(3)@    Active Problems:   Patient Active Problem List   Diagnosis    Adenoma, adrenocortical    A-fib (HCC)    Benign essential hypertension    Coagulopathy (HCC)    History of colon cancer    Overweight (BMI 25 0-29  9)    Osteoarthritis    Postoperative hypothyroidism    Thyroid [facility] ([facility] [address]    Mixed hyperlipidemia    Medicare annual wellness visit, subsequent    Osteopenia of multiple sites    Malar rash    Age-related nuclear cataract of left eye    Preop examination    Ductal carcinoma in situ (DCIS) of right breast    Dense breast tissue    Thyroid mass of unclear etiology    Hashimoto's thyroiditis    Hematoma of right breast    History of lung cancer       Past Medical History:   Past Medical History:   Diagnosis Date    Anesthesia     "cheap date"    Arthritis     Atrial fibrillation ([facility] [address] [facility]     Benign polyp of large intestine     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    Colon cancer ([facility] [address] [facility] 46    36 yo    Colon polyp     Full dentures     full upper/ partial lower    GERD (gastroesophageal reflux disease)     Glaucoma suspect     History of neck problems     "C3-C7 and had epidural injections years ago"    Hyperlipidemia     Hypertension     Lung cancer ([facility] [address] [facility]     jan 2020- had surgery    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[facility] [address] [facility]     Prediabetes     Primary adenocarcinoma of upper lobe of right lung ([facility] [address] ) 12/2/2019    Pulmonary nodules 2/19/2015    Refusal of blood transfusions as patient is Anglican     Thyroid cancer ([facility] [address] [facility] 2009    Tic disorder, transient, recurrent     not recent    Walks frequently        Surgical History:   Past Surgical History:   Procedure Laterality Date    APPENDECTOMY      ARTHROSCOPY KNEE      BREAST BIOPSY Right 04/10/2017    US guided  benign    BREAST BIOPSY Right 10/24/2019    ADH    BREAST LUMPECTOMY W/ NEEDLE LOCALIZATION Right 5/22/2020    Procedure: EXCISIONAL BIOPSY;  1000 NEEDLE LOC;  Surgeon: Concha Goldberg MD;  Location: [facility];  Service: Surgical Oncology    CARDIOVASCULAR STRESS TEST      CATARACT EXTRACTION Bilateral     COLON SURGERY      COLONOSCOPY      done 2010 due 2015 Dr Kuo    DILATION AND CURETTAGE OF UTERUS      ESOPHAGOGASTRODUODENOSCOPY      inflammation aonly    HEMICOLECTOMY Right 1977    HYSTERECTOMY      JOINT REPLACEMENT      L knee    LUNG SURGERY      MAMMO NEEDLE LOCALIZATION RIGHT (ALL INC) Right 5/22/2020    MAMMO NEEDLE LOCALIZATION RIGHT (ALL INC) EACH ADD Right 5/22/2020    MAMMO STEREOTACTIC BREAST BIOPSY RIGHT (ALL INC) Right 10/24/2019    PARATHYROID GLAND SURGERY      exploration     MI BRONCHOSCOPY,DIAGNOSTIC N/A 1/17/2020    Procedure: FLEXIBLE BRONCHOSCOPY;  Surgeon: Endy Champagne MD;  [facility];  Service: Thoracic    MI THORACOSCOPY SURG LOBECTOMY Right 1/17/2020    Procedure: ROBOTIC ASSISTED COMPLETE LUNG LOBECTOMY;  Surgeon: Endy Champagne MD;  [facility];  Service: Thoracic    MI THORACOSCOPY W/THERA WEDGE RESEXN INITIAL UNILAT Right 1/17/2020    Procedure: ROBOTIC ASSISTED UPPER-LOBE WEDGE RESECTION, MEDIASTINAL LYMPH NODE DISECTION;  Surgeon: Endy Champagne MD;  [facility];  Service: Thoracic    REPLACEMENT TOTAL KNEE      THYROID SURGERY      TOTAL ABDOMINAL HYSTERECTOMY  1983    TOTAL THYROIDECTOMY      US GUIDED BREAST BIOPSY RIGHT COMPLETE Right 4/10/2017    US GUIDED THYROID BIOPSY  2/14/2020       Family History:    Family History   Problem Relation Age of Onset    Esophageal cancer Mother         [de-identified]    Hypertension Mother     Stroke Family         TIA    Breast cancer Paternal Aunt 80    No Known Problems Father     COPD Sister     COPD Sister     No Known Problems Maternal Grandmother     No Known Problems Maternal Grandfather     No Known Problems Paternal Grandmother     No Known Problems Paternal Grandfather     No Known Problems Maternal Aunt     No Known Problems Maternal Aunt     No Known Problems Maternal Aunt     No Known Problems Maternal Aunt     No Known Problems Maternal Aunt        Cancer-related family history includes Breast cancer (age of onset: 80) in her paternal aunt; Esophageal cancer in her mother  Social History:   Social History     Socioeconomic History    Marital status: Single     Spouse name: Not on file    Number of children: Not on file    Years of education: Not on file    Highest education level: Not on file   Occupational History    Not on file   Social Needs    Financial resource strain: Not on file    Food insecurity     Worry: Not on file     Inability: Not on file    Transportation needs     Medical: Not on file     Non-medical: Not on file   Tobacco Use    Smoking status: Former Smoker     Packs/day: 1 50     Years: 20 00     Pack years: 30 00     Types: Cigarettes     Last attempt to quit:      Years since quittin 6    Smokeless tobacco: Never Used   Substance and Sexual Activity    Alcohol use:  Yes     Alcohol/week: 5 0 standard drinks     Types: 5 Cans of beer per week     Frequency: 4 or more times a week     Drinks per session: 1 or 2     Binge frequency: Never     Comment: occasional beer with food    Drug use: No    Sexual activity: Not on file   Lifestyle    Physical activity     Days per week: Not on file     Minutes per session: Not on file    Stress: Not on file   Relationships    Social connections     Talks on phone: Not on file     Gets together: Not on file     Attends Zoroastrianism service: Not on file     Active member of club or organization: Not on file     Attends meetings of clubs or organizations: Not on file     Relationship status: Not on file    Intimate partner violence     Fear of current or ex partner: Not on file     Emotionally abused: Not on file     Physically abused: Not on file     Forced sexual activity: Not on file   Other Topics Concern    Not on file   Social History Narrative    Caffeine use- 1-3, 8oz coffees per day    Denied history of housing without smoke detectors    Always uses a seatbelt       Current Medications:   Current Outpatient Medications   Medication Sig Dispense Refill    apixaban (ELIQUIS) 5 mg Take 5 mg by mouth 2 (two) times a day To stop 2 days prior to surgery per md      atorvastatin (LIPITOR) 80 mg tablet Take 1 tablet (80 mg total) by mouth daily 90 tablet 1    Coenzyme Q10 (CO Q 10 PO) Take 200 mg by mouth      FIBER PO Take 1 capsule by mouth daily as needed      Hydrocortisone Acetate (Vagisil) 1 % CREA Apply topically      levothyroxine (Synthroid) 100 mcg tablet Take 1 tablet (100 mcg total) by mouth daily 90 tablet 1    Multiple Vitamins-Calcium (ESSENTIAL ONE DAILY MULTIVIT PO) Take by mouth daily       Omega-3 Fatty Acids (FISH OIL PO) Take 2 g by mouth daily       omeprazole (PriLOSEC) 20 mg delayed release capsule TAKE 1 CAPSULE DAILY (Patient taking differently: as needed ) 90 capsule 1    diltiazem (CARDIZEM CD) 120 mg 24 hr capsule Take 120 mg by mouth every evening        No current facility-administered medications for this visit  Allergies:    Allergies   Allergen Reactions    Bee Venom      Tongue swelling    Levaquin [Levofloxacin In D5w] Other (See Comments)     Dark spots under eyes and wrists    Tequin [Gatifloxacin] Rash

## 2020-08-07 ENCOUNTER — APPOINTMENT (OUTPATIENT)
Dept: RADIATION ONCOLOGY | Facility: CLINIC | Age: 80
End: 2020-08-07
Attending: RADIOLOGY
Payer: COMMERCIAL

## 2020-08-07 PROCEDURE — 77412 RADIATION TX DELIVERY LVL 3: CPT | Performed by: RADIOLOGY

## 2020-08-10 ENCOUNTER — APPOINTMENT (OUTPATIENT)
Dept: RADIATION ONCOLOGY | Facility: CLINIC | Age: 80
End: 2020-08-10
Attending: RADIOLOGY
Payer: COMMERCIAL

## 2020-08-10 DIAGNOSIS — E89.0 POSTOPERATIVE HYPOTHYROIDISM: ICD-10-CM

## 2020-08-10 PROCEDURE — 77412 RADIATION TX DELIVERY LVL 3: CPT | Performed by: RADIOLOGY

## 2020-08-10 RX ORDER — LEVOTHYROXINE SODIUM 0.1 MG/1
TABLET ORAL
Qty: 90 TABLET | Refills: 0 | Status: SHIPPED | OUTPATIENT
Start: 2020-08-10 | End: 2020-12-02

## 2020-08-11 ENCOUNTER — APPOINTMENT (OUTPATIENT)
Dept: RADIATION ONCOLOGY | Facility: CLINIC | Age: 80
End: 2020-08-11
Attending: RADIOLOGY
Payer: COMMERCIAL

## 2020-08-11 PROCEDURE — 77412 RADIATION TX DELIVERY LVL 3: CPT | Performed by: RADIOLOGY

## 2020-08-12 ENCOUNTER — APPOINTMENT (OUTPATIENT)
Dept: RADIATION ONCOLOGY | Facility: CLINIC | Age: 80
End: 2020-08-12
Attending: RADIOLOGY
Payer: COMMERCIAL

## 2020-08-12 PROCEDURE — 77412 RADIATION TX DELIVERY LVL 3: CPT | Performed by: RADIOLOGY

## 2020-08-12 PROCEDURE — 77336 RADIATION PHYSICS CONSULT: CPT | Performed by: RADIOLOGY

## 2020-08-13 ENCOUNTER — APPOINTMENT (OUTPATIENT)
Dept: RADIATION ONCOLOGY | Facility: CLINIC | Age: 80
End: 2020-08-13
Attending: RADIOLOGY
Payer: COMMERCIAL

## 2020-08-13 PROCEDURE — 77412 RADIATION TX DELIVERY LVL 3: CPT | Performed by: RADIOLOGY

## 2020-08-13 PROCEDURE — 77417 THER RADIOLOGY PORT IMAGE(S): CPT | Performed by: RADIOLOGY

## 2020-08-14 ENCOUNTER — APPOINTMENT (OUTPATIENT)
Dept: RADIATION ONCOLOGY | Facility: CLINIC | Age: 80
End: 2020-08-14
Attending: RADIOLOGY
Payer: COMMERCIAL

## 2020-08-14 PROCEDURE — 77412 RADIATION TX DELIVERY LVL 3: CPT | Performed by: RADIOLOGY

## 2020-08-14 PROCEDURE — 77290 THER RAD SIMULAJ FIELD CPLX: CPT | Performed by: RADIOLOGY

## 2020-08-14 PROCEDURE — 77332 RADIATION TREATMENT AID(S): CPT | Performed by: RADIOLOGY

## 2020-08-17 ENCOUNTER — APPOINTMENT (OUTPATIENT)
Dept: RADIATION ONCOLOGY | Facility: CLINIC | Age: 80
End: 2020-08-17
Attending: RADIOLOGY
Payer: COMMERCIAL

## 2020-08-17 PROCEDURE — 77412 RADIATION TX DELIVERY LVL 3: CPT | Performed by: RADIOLOGY

## 2020-08-18 ENCOUNTER — APPOINTMENT (OUTPATIENT)
Dept: RADIATION ONCOLOGY | Facility: CLINIC | Age: 80
End: 2020-08-18
Attending: RADIOLOGY
Payer: COMMERCIAL

## 2020-08-18 PROCEDURE — 77300 RADIATION THERAPY DOSE PLAN: CPT | Performed by: RADIOLOGY

## 2020-08-18 PROCEDURE — 77295 3-D RADIOTHERAPY PLAN: CPT | Performed by: RADIOLOGY

## 2020-08-18 PROCEDURE — 77334 RADIATION TREATMENT AID(S): CPT | Performed by: RADIOLOGY

## 2020-08-18 PROCEDURE — 77412 RADIATION TX DELIVERY LVL 3: CPT | Performed by: RADIOLOGY

## 2020-08-19 ENCOUNTER — APPOINTMENT (OUTPATIENT)
Dept: RADIATION ONCOLOGY | Facility: CLINIC | Age: 80
End: 2020-08-19
Attending: RADIOLOGY
Payer: COMMERCIAL

## 2020-08-19 PROCEDURE — 77336 RADIATION PHYSICS CONSULT: CPT | Performed by: RADIOLOGY

## 2020-08-19 PROCEDURE — 77412 RADIATION TX DELIVERY LVL 3: CPT | Performed by: RADIOLOGY

## 2020-08-20 ENCOUNTER — APPOINTMENT (OUTPATIENT)
Dept: RADIATION ONCOLOGY | Facility: CLINIC | Age: 80
End: 2020-08-20
Attending: RADIOLOGY
Payer: COMMERCIAL

## 2020-08-20 PROCEDURE — 77412 RADIATION TX DELIVERY LVL 3: CPT | Performed by: RADIOLOGY

## 2020-08-21 ENCOUNTER — APPOINTMENT (OUTPATIENT)
Dept: RADIATION ONCOLOGY | Facility: CLINIC | Age: 80
End: 2020-08-21
Payer: COMMERCIAL

## 2020-08-21 ENCOUNTER — APPOINTMENT (OUTPATIENT)
Dept: RADIATION ONCOLOGY | Facility: CLINIC | Age: 80
End: 2020-08-21
Attending: RADIOLOGY
Payer: COMMERCIAL

## 2020-08-24 ENCOUNTER — APPOINTMENT (OUTPATIENT)
Dept: RADIATION ONCOLOGY | Facility: CLINIC | Age: 80
End: 2020-08-24
Attending: RADIOLOGY
Payer: COMMERCIAL

## 2020-08-24 PROCEDURE — 77331 SPECIAL RADIATION DOSIMETRY: CPT | Performed by: RADIOLOGY

## 2020-08-24 PROCEDURE — 77412 RADIATION TX DELIVERY LVL 3: CPT | Performed by: RADIOLOGY

## 2020-08-24 PROCEDURE — 77280 THER RAD SIMULAJ FIELD SMPL: CPT | Performed by: RADIOLOGY

## 2020-08-25 ENCOUNTER — APPOINTMENT (OUTPATIENT)
Dept: RADIATION ONCOLOGY | Facility: CLINIC | Age: 80
End: 2020-08-25
Attending: RADIOLOGY
Payer: COMMERCIAL

## 2020-08-25 PROCEDURE — 77387 GUIDANCE FOR RADJ TX DLVR: CPT | Performed by: RADIOLOGY

## 2020-08-25 PROCEDURE — 77412 RADIATION TX DELIVERY LVL 3: CPT | Performed by: RADIOLOGY

## 2020-08-26 ENCOUNTER — APPOINTMENT (OUTPATIENT)
Dept: RADIATION ONCOLOGY | Facility: CLINIC | Age: 80
End: 2020-08-26
Attending: RADIOLOGY
Payer: COMMERCIAL

## 2020-08-26 PROCEDURE — 77387 GUIDANCE FOR RADJ TX DLVR: CPT | Performed by: RADIOLOGY

## 2020-08-26 PROCEDURE — 77412 RADIATION TX DELIVERY LVL 3: CPT | Performed by: RADIOLOGY

## 2020-08-27 ENCOUNTER — APPOINTMENT (OUTPATIENT)
Dept: RADIATION ONCOLOGY | Facility: CLINIC | Age: 80
End: 2020-08-27
Attending: RADIOLOGY
Payer: COMMERCIAL

## 2020-08-27 PROCEDURE — 77387 GUIDANCE FOR RADJ TX DLVR: CPT | Performed by: RADIOLOGY

## 2020-08-27 PROCEDURE — 77412 RADIATION TX DELIVERY LVL 3: CPT | Performed by: RADIOLOGY

## 2020-08-27 PROCEDURE — 77336 RADIATION PHYSICS CONSULT: CPT | Performed by: RADIOLOGY

## 2020-08-28 ENCOUNTER — APPOINTMENT (OUTPATIENT)
Dept: RADIATION ONCOLOGY | Facility: CLINIC | Age: 80
End: 2020-08-28
Attending: RADIOLOGY
Payer: COMMERCIAL

## 2020-08-28 PROCEDURE — 77412 RADIATION TX DELIVERY LVL 3: CPT | Performed by: RADIOLOGY

## 2020-08-28 PROCEDURE — 77387 GUIDANCE FOR RADJ TX DLVR: CPT | Performed by: RADIOLOGY

## 2020-09-03 ENCOUNTER — OFFICE VISIT (OUTPATIENT)
Dept: FAMILY MEDICINE CLINIC | Facility: CLINIC | Age: 80
End: 2020-09-03
Payer: COMMERCIAL

## 2020-09-03 ENCOUNTER — HOSPITAL ENCOUNTER (OUTPATIENT)
Dept: BONE DENSITY | Facility: MEDICAL CENTER | Age: 80
Discharge: HOME | End: 2020-09-03
Payer: COMMERCIAL

## 2020-09-03 VITALS
WEIGHT: 164 LBS | DIASTOLIC BLOOD PRESSURE: 80 MMHG | HEIGHT: 64 IN | SYSTOLIC BLOOD PRESSURE: 124 MMHG | TEMPERATURE: 97.2 F | BODY MASS INDEX: 28 KG/M2

## 2020-09-03 DIAGNOSIS — D68.9 COAGULOPATHY (HCC): ICD-10-CM

## 2020-09-03 DIAGNOSIS — I48.21 PERMANENT ATRIAL FIBRILLATION (HCC): Primary | ICD-10-CM

## 2020-09-03 DIAGNOSIS — K21.9 GASTROESOPHAGEAL REFLUX DISEASE WITHOUT ESOPHAGITIS: ICD-10-CM

## 2020-09-03 DIAGNOSIS — M85.852 OSTEOPENIA OF NECK OF LEFT FEMUR: ICD-10-CM

## 2020-09-03 DIAGNOSIS — Z13.820 SCREENING FOR OSTEOPOROSIS: ICD-10-CM

## 2020-09-03 DIAGNOSIS — E78.2 MIXED HYPERLIPIDEMIA: ICD-10-CM

## 2020-09-03 DIAGNOSIS — E66.3 OVERWEIGHT: ICD-10-CM

## 2020-09-03 DIAGNOSIS — E89.0 POSTOPERATIVE HYPOTHYROIDISM: ICD-10-CM

## 2020-09-03 DIAGNOSIS — Z78.0 MENOPAUSE: ICD-10-CM

## 2020-09-03 DIAGNOSIS — I10 BENIGN ESSENTIAL HYPERTENSION: ICD-10-CM

## 2020-09-03 DIAGNOSIS — D05.11 DUCTAL CARCINOMA IN SITU (DCIS) OF RIGHT BREAST: ICD-10-CM

## 2020-09-03 PROBLEM — Z01.818 PREOP EXAMINATION: Status: RESOLVED | Noted: 2019-08-01 | Resolved: 2020-09-03

## 2020-09-03 PROBLEM — R92.2 DENSE BREAST TISSUE: Status: RESOLVED | Noted: 2019-11-20 | Resolved: 2020-09-03

## 2020-09-03 PROBLEM — R21 MALAR RASH: Status: RESOLVED | Noted: 2018-09-20 | Resolved: 2020-09-03

## 2020-09-03 PROBLEM — N64.89 HEMATOMA OF RIGHT BREAST: Status: RESOLVED | Noted: 2020-06-16 | Resolved: 2020-09-03

## 2020-09-03 PROBLEM — R92.30 DENSE BREAST TISSUE: Status: RESOLVED | Noted: 2019-11-20 | Resolved: 2020-09-03

## 2020-09-03 PROBLEM — H25.12 AGE-RELATED NUCLEAR CATARACT OF LEFT EYE: Status: RESOLVED | Noted: 2019-08-01 | Resolved: 2020-09-03

## 2020-09-03 PROBLEM — E07.89 THYROID MASS OF UNCLEAR ETIOLOGY: Status: RESOLVED | Noted: 2020-02-07 | Resolved: 2020-09-03

## 2020-09-03 PROCEDURE — 1101F PT FALLS ASSESS-DOCD LE1/YR: CPT | Performed by: FAMILY MEDICINE

## 2020-09-03 PROCEDURE — 77080 DXA BONE DENSITY AXIAL: CPT

## 2020-09-03 PROCEDURE — 3079F DIAST BP 80-89 MM HG: CPT | Performed by: FAMILY MEDICINE

## 2020-09-03 PROCEDURE — 3288F FALL RISK ASSESSMENT DOCD: CPT | Performed by: FAMILY MEDICINE

## 2020-09-03 PROCEDURE — 99214 OFFICE O/P EST MOD 30 MIN: CPT | Performed by: FAMILY MEDICINE

## 2020-09-03 PROCEDURE — 3074F SYST BP LT 130 MM HG: CPT | Performed by: FAMILY MEDICINE

## 2020-09-03 RX ORDER — OMEPRAZOLE 20 MG/1
20 CAPSULE, DELAYED RELEASE ORAL AS NEEDED
Qty: 90 CAPSULE | Refills: 0 | Status: SHIPPED | OUTPATIENT
Start: 2020-09-03 | End: 2020-12-02

## 2020-09-03 NOTE — PROGRESS NOTES
Assessment/Plan:    A-fib ([facility] [address] )  Rate is controlled Patent is on diltiazem  and is anticoagulated Patient last visit with Dr Rae Chaudhary was reveiwed today also    Benign essential hypertension  BP is stable Patient is taking her medication as directed patient BP is stable Patient has no concerns at his time Labs from July 2020 were reveiwed    Coagulopathy ([facility] [address] )  Continue eliquis    Overweight  Weight is stable Patient to continue curretn care    Postoperative hypothyroidism  Continue current thyroid dose Patient to followup in 6 months    Mixed hyperlipidemia  LDL of 104 with HDL in 90's Patient will continue current meds and followup in 6 months    Ductal carcinoma in situ (DCIS) of right breast  Lumpectomy with radiation completed  Patient to followup with Dr Alberto Paul and Dr Yulissa Servin as scheduled       Diagnoses and all orders for this visit:    Permanent atrial fibrillation ([facility] [address] )    Benign essential hypertension    Overweight    Mixed hyperlipidemia    Postoperative hypothyroidism    Ductal carcinoma in situ (DCIS) of right breast    Gastroesophageal reflux disease without esophagitis  -     omeprazole (PriLOSEC) 20 mg delayed release capsule; Take 1 capsule (20 mg total) by mouth as needed (gerd)    Coagulopathy (HCC)          Subjective:   Chief Complaint   Patient presents with    Hypothyroidism    Hypertension    Hyperlipidemia          Patient ID: Dav Cool is a 78 y o  female      Patient is here for followup of atrial fib hypertension hyperlipidemia post op hypothyroidism due to thyroid cancer, right breast ductal cancer and GERD patietmayela  no new issues at this time Patient is exercising at home She had lumpectomy due to ductal breast cancer Patient has completed radiation patient does not requre chemotherapy Patient is tolerating all medications as directed Patient last labs and note from cardiology were reveiwed Patient has no changes in her day to day activity Patient has occasional GERD and needs the omeprazole       The following portions of the patient's history were reviewed and updated as appropriate: allergies, current medications, past family history, past medical history, past social history, past surgical history and problem list     Review of Systems   Constitutional: Negative for fatigue, fever and unexpected weight change  HENT: Negative for congestion, sinus pain and trouble swallowing  Eyes: Negative for discharge and visual disturbance  Respiratory: Negative for cough, chest tightness, shortness of breath and wheezing  Cardiovascular: Negative for chest pain, palpitations and leg swelling  Gastrointestinal: Negative for abdominal pain, blood in stool, constipation, diarrhea, nausea and vomiting  Genitourinary: Negative for difficulty urinating, dysuria, frequency and hematuria  Musculoskeletal: Negative for arthralgias, gait problem and joint swelling  Skin: Negative for rash and wound  Allergic/Immunologic: Negative for environmental allergies and food allergies  Neurological: Negative for dizziness, syncope, weakness, numbness and headaches  Hematological: Negative for adenopathy  Does not bruise/bleed easily  Psychiatric/Behavioral: Negative for confusion, decreased concentration and sleep disturbance  The patient is not nervous/anxious  Objective:      /80   Temp (!) 97 2 °F (36 2 °C)   Ht 5' 4" (1 626 m)   Wt 74 4 kg (164 lb)   BMI 28 15 kg/m²          Physical Exam  Vitals signs and nursing note reviewed  Constitutional:       Appearance: Normal appearance  She is well-developed  HENT:      Head: Normocephalic and atraumatic  Right Ear: Hearing, tympanic membrane and external ear normal       Left Ear: Hearing, tympanic membrane and external ear normal       Nose: Nose normal    Eyes:      General: No scleral icterus  Right eye: No discharge  Left eye: No discharge  Extraocular Movements: Extraocular movements intact  Conjunctiva/sclera: Conjunctivae normal       Pupils: Pupils are equal, round, and reactive to light  Neck:      Musculoskeletal: Neck supple  Thyroid: No thyromegaly  Cardiovascular:      Rate and Rhythm: Normal rate and regular rhythm  Pulses: Normal pulses  Heart sounds: Normal heart sounds  Pulmonary:      Effort: Pulmonary effort is normal       Breath sounds: Normal breath sounds  No wheezing or rales  Abdominal:      General: Bowel sounds are normal  There is no distension  Palpations: Abdomen is soft  Tenderness: There is no abdominal tenderness  Musculoskeletal:         General: No tenderness  Lymphadenopathy:      Cervical: No cervical adenopathy  Skin:     General: Skin is warm and dry  Findings: No rash  Neurological:      General: No focal deficit present  Mental Status: She is alert and oriented to person, place, and time  Cranial Nerves: No cranial nerve deficit  Coordination: Coordination normal    Psychiatric:         Behavior: Behavior normal          Thought Content: Thought content normal          Judgment: Judgment normal        BMI Counseling: Body mass index is 28 15 kg/m²  The BMI is above normal  Nutrition recommendations include reducing portion sizes, decreasing overall calorie intake, 3-5 servings of fruits/vegetables daily, moderation in carbohydrate intake and increasing intake of lean protein  Exercise recommendations include exercising 3-5 times per week

## 2020-09-03 NOTE — ASSESSMENT & PLAN NOTE
BP is stable Patient is taking her medication as directed patient BP is stable Patient has no concerns at his time Labs from July 2020 were reveiwed

## 2020-09-03 NOTE — ASSESSMENT & PLAN NOTE
Rate is controlled Patent is on diltiazem  and is anticoagulated Patient last visit with Dr Saskia Mckeon was reveiwed today also

## 2020-09-03 NOTE — ASSESSMENT & PLAN NOTE
Lumpectomy with radiation completed  Patient to followup with Dr Altagracia Zuniga and Dr Benji Pack as scheduled

## 2020-09-03 NOTE — PATIENT INSTRUCTIONS

## 2020-09-09 ENCOUNTER — TELEPHONE (OUTPATIENT)
Dept: GYNECOLOGY | Facility: CLINIC | Age: 80
End: 2020-09-09

## 2020-09-09 NOTE — TELEPHONE ENCOUNTER
Dr Mackenzie Larios would like to refer patient to Dr Reina Arroyo to address her hormonal issues  LM with patient to contact their office

## 2020-09-10 ENCOUNTER — TELEPHONE (OUTPATIENT)
Dept: FAMILY MEDICINE CLINIC | Facility: CLINIC | Age: 80
End: 2020-09-10

## 2020-09-10 DIAGNOSIS — Z96.659 STATUS POST KNEE REPLACEMENT, UNSPECIFIED LATERALITY: Primary | ICD-10-CM

## 2020-09-10 RX ORDER — AMOXICILLIN 500 MG/1
CAPSULE ORAL
Qty: 4 CAPSULE | Refills: 1 | Status: SHIPPED | OUTPATIENT
Start: 2020-09-10 | End: 2020-09-10

## 2020-10-13 ENCOUNTER — TELEPHONE (OUTPATIENT)
Dept: SURGICAL ONCOLOGY | Facility: CLINIC | Age: 80
End: 2020-10-13

## 2020-10-13 ENCOUNTER — TELEMEDICINE (OUTPATIENT)
Dept: RADIATION ONCOLOGY | Facility: CLINIC | Age: 80
End: 2020-10-13
Attending: RADIOLOGY

## 2020-10-13 DIAGNOSIS — D05.11 DUCTAL CARCINOMA IN SITU (DCIS) OF RIGHT BREAST: Primary | ICD-10-CM

## 2020-10-15 ENCOUNTER — OFFICE VISIT (OUTPATIENT)
Dept: SURGICAL ONCOLOGY | Facility: CLINIC | Age: 80
End: 2020-10-15
Payer: COMMERCIAL

## 2020-10-15 VITALS
DIASTOLIC BLOOD PRESSURE: 78 MMHG | HEART RATE: 70 BPM | HEIGHT: 64 IN | SYSTOLIC BLOOD PRESSURE: 122 MMHG | BODY MASS INDEX: 27.66 KG/M2 | WEIGHT: 162 LBS | TEMPERATURE: 97.2 F

## 2020-10-15 DIAGNOSIS — Z92.3 S/P RADIOTHERAPY: ICD-10-CM

## 2020-10-15 DIAGNOSIS — D05.11 DUCTAL CARCINOMA IN SITU (DCIS) OF RIGHT BREAST: Primary | ICD-10-CM

## 2020-10-15 PROCEDURE — 1036F TOBACCO NON-USER: CPT | Performed by: SURGERY

## 2020-10-15 PROCEDURE — 99213 OFFICE O/P EST LOW 20 MIN: CPT | Performed by: SURGERY

## 2020-10-15 PROCEDURE — 1160F RVW MEDS BY RX/DR IN RCRD: CPT | Performed by: SURGERY

## 2020-11-06 ENCOUNTER — TELEPHONE (OUTPATIENT)
Dept: CARDIAC SURGERY | Facility: CLINIC | Age: 80
End: 2020-11-06

## 2020-11-09 ENCOUNTER — HOSPITAL ENCOUNTER (OUTPATIENT)
Dept: CT IMAGING | Facility: HOSPITAL | Age: 80
Discharge: HOME | End: 2020-11-09
Payer: COMMERCIAL

## 2020-11-09 ENCOUNTER — TELEPHONE (OUTPATIENT)
Dept: CARDIAC SURGERY | Facility: CLINIC | Age: 80
End: 2020-11-09

## 2020-11-09 DIAGNOSIS — Z85.118 HISTORY OF LUNG CANCER: ICD-10-CM

## 2020-11-09 PROCEDURE — 71250 CT THORAX DX C-: CPT

## 2020-11-11 ENCOUNTER — OFFICE VISIT (OUTPATIENT)
Dept: CARDIAC SURGERY | Facility: CLINIC | Age: 80
End: 2020-11-11
Payer: COMMERCIAL

## 2020-11-11 VITALS
SYSTOLIC BLOOD PRESSURE: 122 MMHG | WEIGHT: 162 LBS | HEART RATE: 48 BPM | DIASTOLIC BLOOD PRESSURE: 78 MMHG | TEMPERATURE: 97.6 F | HEIGHT: 64 IN | BODY MASS INDEX: 27.66 KG/M2 | OXYGEN SATURATION: 95 %

## 2020-11-11 DIAGNOSIS — Z85.118 HISTORY OF LUNG CANCER: Primary | ICD-10-CM

## 2020-11-11 PROCEDURE — 3074F SYST BP LT 130 MM HG: CPT | Performed by: PHYSICIAN ASSISTANT

## 2020-11-11 PROCEDURE — 99213 OFFICE O/P EST LOW 20 MIN: CPT | Performed by: PHYSICIAN ASSISTANT

## 2020-11-11 PROCEDURE — 3078F DIAST BP <80 MM HG: CPT | Performed by: PHYSICIAN ASSISTANT

## 2020-11-11 PROCEDURE — 1036F TOBACCO NON-USER: CPT | Performed by: PHYSICIAN ASSISTANT

## 2020-11-11 PROCEDURE — 1160F RVW MEDS BY RX/DR IN RCRD: CPT | Performed by: PHYSICIAN ASSISTANT

## 2020-12-02 DIAGNOSIS — K21.9 GASTROESOPHAGEAL REFLUX DISEASE WITHOUT ESOPHAGITIS: ICD-10-CM

## 2020-12-02 DIAGNOSIS — E89.0 POSTOPERATIVE HYPOTHYROIDISM: ICD-10-CM

## 2020-12-02 RX ORDER — LEVOTHYROXINE SODIUM 0.1 MG/1
TABLET ORAL
Qty: 90 TABLET | Refills: 0 | Status: SHIPPED | OUTPATIENT
Start: 2020-12-02 | End: 2021-03-10 | Stop reason: SDUPTHER

## 2020-12-02 RX ORDER — OMEPRAZOLE 20 MG/1
20 CAPSULE, DELAYED RELEASE ORAL AS NEEDED
Qty: 90 CAPSULE | Refills: 0 | Status: SHIPPED | OUTPATIENT
Start: 2020-12-02 | End: 2021-03-15

## 2020-12-28 ENCOUNTER — OFFICE VISIT (OUTPATIENT)
Dept: SURGICAL ONCOLOGY | Facility: CLINIC | Age: 80
End: 2020-12-28
Payer: COMMERCIAL

## 2020-12-28 VITALS
BODY MASS INDEX: 27.83 KG/M2 | SYSTOLIC BLOOD PRESSURE: 118 MMHG | WEIGHT: 163 LBS | HEART RATE: 78 BPM | HEIGHT: 64 IN | DIASTOLIC BLOOD PRESSURE: 80 MMHG | TEMPERATURE: 97.5 F

## 2020-12-28 DIAGNOSIS — D05.11 DUCTAL CARCINOMA IN SITU (DCIS) OF RIGHT BREAST: Primary | ICD-10-CM

## 2020-12-28 PROCEDURE — 1036F TOBACCO NON-USER: CPT | Performed by: SURGERY

## 2020-12-28 PROCEDURE — 3079F DIAST BP 80-89 MM HG: CPT | Performed by: SURGERY

## 2020-12-28 PROCEDURE — 3074F SYST BP LT 130 MM HG: CPT | Performed by: SURGERY

## 2020-12-28 PROCEDURE — 99213 OFFICE O/P EST LOW 20 MIN: CPT | Performed by: SURGERY

## 2020-12-28 PROCEDURE — 1160F RVW MEDS BY RX/DR IN RCRD: CPT | Performed by: SURGERY

## 2021-01-23 ENCOUNTER — TELEPHONE (OUTPATIENT)
Dept: OTHER | Facility: OTHER | Age: 81
End: 2021-01-23

## 2021-02-06 ENCOUNTER — IMMUNIZATIONS (OUTPATIENT)
Dept: FAMILY MEDICINE CLINIC | Facility: HOSPITAL | Age: 81
End: 2021-02-06

## 2021-02-06 DIAGNOSIS — Z23 ENCOUNTER FOR IMMUNIZATION: Primary | ICD-10-CM

## 2021-02-06 PROCEDURE — 0001A SARS-COV-2 / COVID-19 MRNA VACCINE (PFIZER-BIONTECH) 30 MCG: CPT

## 2021-02-06 PROCEDURE — 91300 SARS-COV-2 / COVID-19 MRNA VACCINE (PFIZER-BIONTECH) 30 MCG: CPT

## 2021-02-08 ENCOUNTER — TRANSCRIBE ORDERS (OUTPATIENT)
Dept: LAB | Facility: HOSPITAL | Age: 81
End: 2021-02-08

## 2021-02-08 ENCOUNTER — LAB (OUTPATIENT)
Dept: LAB | Facility: HOSPITAL | Age: 81
End: 2021-02-08
Payer: COMMERCIAL

## 2021-02-08 DIAGNOSIS — E78.00 PURE HYPERCHOLESTEROLEMIA: ICD-10-CM

## 2021-02-08 DIAGNOSIS — E78.00 PURE HYPERCHOLESTEROLEMIA: Primary | ICD-10-CM

## 2021-02-08 DIAGNOSIS — D05.11 DUCTAL CARCINOMA IN SITU (DCIS) OF RIGHT BREAST: ICD-10-CM

## 2021-02-08 LAB
BASOPHILS # BLD AUTO: 0.02 THOUSANDS/ΜL (ref 0–0.1)
BASOPHILS NFR BLD AUTO: 0 % (ref 0–1)
CHOLEST SERPL-MCNC: 207 MG/DL (ref 50–200)
EOSINOPHIL # BLD AUTO: 0.08 THOUSAND/ΜL (ref 0–0.61)
EOSINOPHIL NFR BLD AUTO: 2 % (ref 0–6)
ERYTHROCYTE [DISTWIDTH] IN BLOOD BY AUTOMATED COUNT: 13.5 % (ref 11.6–15.1)
HCT VFR BLD AUTO: 41.2 % (ref 34.8–46.1)
HDLC SERPL-MCNC: 102 MG/DL
HGB BLD-MCNC: 13.6 G/DL (ref 11.5–15.4)
IMM GRANULOCYTES # BLD AUTO: 0.01 THOUSAND/UL (ref 0–0.2)
IMM GRANULOCYTES NFR BLD AUTO: 0 % (ref 0–2)
LDLC SERPL CALC-MCNC: 93 MG/DL (ref 0–100)
LYMPHOCYTES # BLD AUTO: 1.44 THOUSANDS/ΜL (ref 0.6–4.47)
LYMPHOCYTES NFR BLD AUTO: 32 % (ref 14–44)
MCH RBC QN AUTO: 32.9 PG (ref 26.8–34.3)
MCHC RBC AUTO-ENTMCNC: 33 G/DL (ref 31.4–37.4)
MCV RBC AUTO: 100 FL (ref 82–98)
MONOCYTES # BLD AUTO: 0.54 THOUSAND/ΜL (ref 0.17–1.22)
MONOCYTES NFR BLD AUTO: 12 % (ref 4–12)
NEUTROPHILS # BLD AUTO: 2.4 THOUSANDS/ΜL (ref 1.85–7.62)
NEUTS SEG NFR BLD AUTO: 54 % (ref 43–75)
NONHDLC SERPL-MCNC: 105 MG/DL
NRBC BLD AUTO-RTO: 0 /100 WBCS
PLATELET # BLD AUTO: 236 THOUSANDS/UL (ref 149–390)
PMV BLD AUTO: 10.2 FL (ref 8.9–12.7)
RBC # BLD AUTO: 4.13 MILLION/UL (ref 3.81–5.12)
TRIGL SERPL-MCNC: 62 MG/DL
WBC # BLD AUTO: 4.49 THOUSAND/UL (ref 4.31–10.16)

## 2021-02-08 PROCEDURE — 36415 COLL VENOUS BLD VENIPUNCTURE: CPT

## 2021-02-08 PROCEDURE — 80061 LIPID PANEL: CPT

## 2021-02-08 PROCEDURE — 85025 COMPLETE CBC W/AUTO DIFF WBC: CPT

## 2021-02-25 ENCOUNTER — IMMUNIZATIONS (OUTPATIENT)
Dept: FAMILY MEDICINE CLINIC | Facility: HOSPITAL | Age: 81
End: 2021-02-25

## 2021-02-25 DIAGNOSIS — Z23 ENCOUNTER FOR IMMUNIZATION: Primary | ICD-10-CM

## 2021-02-25 PROCEDURE — 0002A SARS-COV-2 / COVID-19 MRNA VACCINE (PFIZER-BIONTECH) 30 MCG: CPT

## 2021-02-25 PROCEDURE — 91300 SARS-COV-2 / COVID-19 MRNA VACCINE (PFIZER-BIONTECH) 30 MCG: CPT

## 2021-03-01 ENCOUNTER — TRANSCRIBE ORDERS (OUTPATIENT)
Dept: ADMINISTRATIVE | Facility: HOSPITAL | Age: 81
End: 2021-03-01

## 2021-03-01 DIAGNOSIS — R31.1 BENIGN ESSENTIAL MICROSCOPIC HEMATURIA: Primary | ICD-10-CM

## 2021-03-04 ENCOUNTER — HOSPITAL ENCOUNTER (OUTPATIENT)
Dept: ULTRASOUND IMAGING | Facility: HOSPITAL | Age: 81
Discharge: HOME | End: 2021-03-04
Attending: UROLOGY
Payer: COMMERCIAL

## 2021-03-04 ENCOUNTER — HOSPITAL ENCOUNTER (OUTPATIENT)
Dept: ULTRASOUND IMAGING | Facility: HOSPITAL | Age: 81
Discharge: HOME | End: 2021-03-04
Attending: SURGERY
Payer: COMMERCIAL

## 2021-03-04 DIAGNOSIS — R31.1 BENIGN ESSENTIAL MICROSCOPIC HEMATURIA: ICD-10-CM

## 2021-03-04 DIAGNOSIS — E06.3 HASHIMOTO'S THYROIDITIS: ICD-10-CM

## 2021-03-04 PROCEDURE — 76536 US EXAM OF HEAD AND NECK: CPT

## 2021-03-04 PROCEDURE — 76770 US EXAM ABDO BACK WALL COMP: CPT

## 2021-03-10 DIAGNOSIS — E89.0 POSTOPERATIVE HYPOTHYROIDISM: ICD-10-CM

## 2021-03-10 RX ORDER — LEVOTHYROXINE SODIUM 0.1 MG/1
100 TABLET ORAL DAILY
Qty: 90 TABLET | Refills: 1 | Status: SHIPPED | OUTPATIENT
Start: 2021-03-10 | End: 2021-07-01

## 2021-03-12 ENCOUNTER — OFFICE VISIT (OUTPATIENT)
Dept: SURGICAL ONCOLOGY | Facility: CLINIC | Age: 81
End: 2021-03-12
Payer: COMMERCIAL

## 2021-03-12 VITALS
TEMPERATURE: 97 F | DIASTOLIC BLOOD PRESSURE: 70 MMHG | HEART RATE: 103 BPM | RESPIRATION RATE: 16 BRPM | HEIGHT: 64 IN | BODY MASS INDEX: 28.51 KG/M2 | SYSTOLIC BLOOD PRESSURE: 120 MMHG | WEIGHT: 167 LBS

## 2021-03-12 DIAGNOSIS — E04.1 THYROID NODULE: Primary | ICD-10-CM

## 2021-03-12 PROCEDURE — 99213 OFFICE O/P EST LOW 20 MIN: CPT | Performed by: NURSE PRACTITIONER

## 2021-03-12 NOTE — PROGRESS NOTES
Surgical Oncology Follow Up       [address] [facility] [place] [facility] GEORGETTEDalzellBRIAN Henriquezgeorgina  [facility] [address]    Gabriella Beaulieu  1940  [id number]      Chief Complaint   Patient presents with    Follow-up     Pt is here for thyroid check        Assessment/Plan:  1  Thyroid nodule  - 1 yr f/u visit with Dr Giulia Stacy  - US thyroid; Future      Discussion/Summary:   Patient is an 78-year-old female with a history of thyroid cancer approximately 16 years ago  She was incidentally found to have a left-sided thyroid nodule  She underwent an FNA of the mid to lower pole nodule which was [place] 2  She had an ultrasound of her thyroid on March 4, 2021 which again revealed a left mid gland nodule measuring 0 5 x 0 5 x 0 4 cm  There were no new nodules  She offers no new complaints today and there are no concerns on today's exam   She prefers to continue annual ultrasounds  We will therefore see her back in 1 year following an ultrasound  She was instructed to call with any new concerns prior to that time  All of her questions were answered today  History of Present Illness:     -Interval History:  Patient presents today for follow-up visit for a left-sided thyroid nodule  She had ultrasound of her thyroid which revealed a stable left mid gland nodule measuring 0 5 x 0 5 x 0 4 cm  There were no new nodules  She denies difficulty swallowing or voice changes  Reports no family history of thyroid cancer  She had not had any radiation therapy until last year when she received radiation for breast cancer  Review of Systems:  Review of Systems   Constitutional: Negative for activity change, appetite change, chills, fatigue, fever and unexpected weight change  HENT: Negative for trouble swallowing and voice change  Respiratory: Negative for cough and shortness of breath  Cardiovascular: Negative for chest pain  Skin: Negative for color change and rash  Hematological: Negative for adenopathy  Psychiatric/Behavioral: Negative for agitation  All other systems reviewed and are negative        Patient Active Problem List   Diagnosis    [facility]    Benign essential hypertension    Coagulopathy (HCC)    History of colon cancer    Overweight    Osteoarthritis    Postoperative hypothyroidism    Mixed hyperlipidemia    Medicare annual wellness visit, subsequent    Osteopenia of multiple sites    Ductal carcinoma in situ (DCIS) of right breast    Hashimoto's thyroiditis    History of lung cancer    S/P radiotherapy    Thyroid nodule     Past Medical History:   Diagnosis Date    Anesthesia     "cheap date"    Arthritis     [facility]rial [facility]     Benign polyp of large intestine     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    Colon cancer ([facility] ) 44    36 yo    Colon polyp     Full dentures     full upper/ partial lower    GERD (gastroesophageal reflux disease)     Glaucoma suspect     History of neck problems     "C3-C7 and had epidural injections years ago"    History of right breast cancer     Hyperlipidemia     Hypertension     Lung cancer ([facility] )     jan 2020- had surgery    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[facility]     Prediabetes     Primary adenocarcinoma of upper lobe of right lung ([facility] ) 12/2/2019    Pulmonary nodules 2/19/2015    Refusal of blood transfusions as patient is [employer]     Thyroid cancer ([facility] ) 2009    Tic disorder, transient, recurrent     not recent    Walks frequently      Past Surgical History:   Procedure Laterality Date    APPENDECTOMY      ARTHROSCOPY KNEE      BREAST BIOPSY Right 04/10/2017    US guided  benign    BREAST BIOPSY Right 10/24/2019    ADH    BREAST LUMPECTOMY W/ NEEDLE LOCALIZATION Right 5/22/2020    Procedure: EXCISIONAL BIOPSY;  1000 NEEDLE LOC;  Surgeon: Patti Morris MD;  Location: [facility];  Service: Surgical Oncology    CARDIOVASCULAR STRESS TEST      CATARACT EXTRACTION Bilateral     COLON SURGERY      COLONOSCOPY      done 2010 due 2015 Dr Kuo    DILATION AND CURETTAGE OF UTERUS      ESOPHAGOGASTRODUODENOSCOPY      inflammation aonly    HEMICOLECTOMY Right 1977    HYSTERECTOMY      JOINT REPLACEMENT      L knee    LUNG SURGERY      MAMMO NEEDLE LOCALIZATION RIGHT (ALL INC) Right 5/22/2020    MAMMO NEEDLE LOCALIZATION RIGHT (ALL INC) EACH ADD Right 5/22/2020    MAMMO STEREOTACTIC BREAST BIOPSY RIGHT (ALL INC) Right 10/24/2019    PARATHYROID GLAND SURGERY      exploration     MS BRONCHOSCOPY,DIAGNOSTIC N/A 1/17/2020    Procedure: FLEXIBLE BRONCHOSCOPY;  Surgeon: Dav Ross MD;  [facility];  Service: Thoracic    MS THORACOSCOPY SURG LOBECTOMY Right 1/17/2020    Procedure: ROBOTIC ASSISTED COMPLETE LUNG LOBECTOMY;  Surgeon: Dav Ross MD;  [facility];  Service: Thoracic    MS THORACOSCOPY W/THERA WEDGE RESEXN INITIAL UNILAT Right 1/17/2020    Procedure: ROBOTIC ASSISTED UPPER-LOBE WEDGE RESECTION, MEDIASTINAL LYMPH NODE DISECTION;  Surgeon: Dav Ross MD;  [facility];  Service: Thoracic    REPLACEMENT TOTAL KNEE      THYROID SURGERY      TOTAL ABDOMINAL HYSTERECTOMY  1983    TOTAL THYROIDECTOMY      US GUIDED BREAST BIOPSY RIGHT COMPLETE Right 4/10/2017    US GUIDED THYROID BIOPSY  2/14/2020     Family History   Problem Relation Age of Onset    Esophageal cancer Mother         [de-identified]    Hypertension Mother     Stroke Family         TIA    Breast cancer Paternal Aunt 80    No Known Problems Father     COPD Sister     COPD Sister     No Known Problems Maternal Grandmother     No Known Problems Maternal Grandfather     No Known Problems Paternal Grandmother     No Known Problems Paternal Grandfather     No Known Problems Maternal Aunt     No Known Problems Maternal Aunt     No Known Problems Maternal Aunt     No Known Problems Maternal Aunt     No Known Problems Maternal Aunt      Social History Socioeconomic History    Marital status: Single     Spouse name: Not on file    Number of children: Not on file    Years of education: Not on file    Highest education level: Not on file   Occupational History    Not on file   Social Needs    Financial resource strain: Not on file    Food insecurity     Worry: Not on file     Inability: Not on file    Transportation needs     Medical: Not on file     Non-medical: Not on file   Tobacco Use    Smoking status: Former Smoker     Packs/day: 1 50     Years: 20 00     Pack years: 30 00     Types: Cigarettes     Quit date: 5     Years since quittin 2    Smokeless tobacco: Never Used   Substance and Sexual Activity    Alcohol use:  Yes     Alcohol/week: 5 0 standard drinks     Types: 5 Cans of beer per week     Frequency: 4 or more times a week     Drinks per session: 1 or 2     Binge frequency: Never     Comment: occasional beer with food    Drug use: No    Sexual activity: Not on file   Lifestyle    Physical activity     Days per week: Not on file     Minutes per session: Not on file    Stress: Not on file   Relationships    Social connections     Talks on phone: Not on file     Gets together: Not on file     Attends Church service: Not on file     Active member of club or organization: Not on file     Attends meetings of clubs or organizations: Not on file     Relationship status: Not on file    Intimate partner violence     Fear of current or ex partner: Not on file     Emotionally abused: Not on file     Physically abused: Not on file     Forced sexual activity: Not on file   Other Topics Concern    Not on file   Social History Narrative    Caffeine use- 1-3, 8oz coffees per day    Denied history of housing without smoke detectors    Always uses a seatbelt       Current Outpatient Medications:     apixaban (ELIQUIS) 5 mg, Take 5 mg by mouth 2 (two) times a day To stop 2 days prior to surgery per md, Disp: , Rfl:     atorvastatin (LIPITOR) 80 mg tablet, Take 1 tablet (80 mg total) by mouth daily, Disp: 90 tablet, Rfl: 1    Coenzyme Q10 (CO Q 10 PO), Take 200 mg by mouth, Disp: , Rfl:     FIBER PO, Take 1 capsule by mouth daily as needed, Disp: , Rfl:     Hydrocortisone Acetate (Vagisil) 1 % CREA, Apply topically, Disp: , Rfl:     levothyroxine 100 mcg tablet, Take 1 tablet (100 mcg total) by mouth daily, Disp: 90 tablet, Rfl: 1    Multiple Vitamins-Calcium (ESSENTIAL ONE DAILY MULTIVIT PO), Take by mouth daily , Disp: , Rfl:     mupirocin (BACTROBAN) 2 % ointment, APPLY TO SORE AREA ON BOTTOM TWICE DAILY UNTIL HEALED, Disp: , Rfl:     Omega-3 Fatty Acids (FISH OIL PO), Take 2 g by mouth daily , Disp: , Rfl:     omeprazole (PriLOSEC) 20 mg delayed release capsule, TAKE 1 CAPSULE (20 MG TOTAL) BY MOUTH AS NEEDED (GERD), Disp: 90 capsule, Rfl: 0    diltiazem (CARDIZEM CD) 120 mg 24 hr capsule, Take 120 mg by mouth every evening , Disp: , Rfl:   Allergies   Allergen Reactions    Bee Venom      Tongue swelling    Flurbiprofen Hives    Ibuprofen Shortness Of Breath    Levaquin [Levofloxacin In D5w] Other (See Comments)     Dark spots under eyes and wrists    Tequin [Gatifloxacin] Rash     Vitals:    03/12/21 1343   BP: 120/70   Pulse: 103   Resp: 16   Temp: (!) 97 °F (36 1 °C)       Physical Exam  Constitutional:       Appearance: Normal appearance  HENT:      Head: Normocephalic and atraumatic  Neck:      Thyroid: No thyroid mass  Comments: No palpable thyroid nodules  Anterior neck surgical scar  Pulmonary:      Effort: Pulmonary effort is normal    Lymphadenopathy:      Cervical: No cervical adenopathy  Upper Body:      Right upper body: No supraclavicular or axillary adenopathy  Left upper body: No supraclavicular or axillary adenopathy  Neurological:      General: No focal deficit present  Mental Status: She is alert and oriented to person, place, and time     Psychiatric:         Mood and Affect: Mood normal            Results:    Imaging  Us Thyroid    Result Date: 3/9/2021  Narrative: THYROID ULTRASOUND INDICATION:    E06 3: Autoimmune thyroiditis  ,  Right thyroidectomy, benign left thyroid nodule biopsy 2/14/2020 COMPARISON:  2/14/2020 TECHNIQUE:   Ultrasound of the thyroid was performed with a high frequency linear transducer in transverse and sagittal planes including volumetric imaging sweeps as well as traditional still imaging technique  FINDINGS:  Left thyroid parenchyma is diffusely heterogeneous in echotexture  Increased vascularity  Right lobe:  Status post right thyroidectomy  Right thyroidectomy bed appears unremarkable  Left lobe:  4 4 x 2 1 x 2 1 cm  Nodule #1  Image 26  LEFT midgland nodule measuring 0 5 x 0 5 x 0 4 cm  Given differences in measuring technique, no significant change from prior  COMPOSITION:  2 points, solid or almost completely solid   ECHOGENICITY:  1 point, hyperechoic or isoechoic  SHAPE:  0 points, wider-than-tall  MARGIN: 0 points, ill-defined  ECHOGENIC FOCI:  0 points, none or large comet-tail artifacts  TI-RADS Classification: TR 3 (3 points), FNA if >2 5 cm  Follow if >1 5 cm  This nodule has had a previous benign biopsy  As it is stable, no further sampling recommended at this time  Further surveillance at 2 year intervals could be considered to confirm continued stability for a period of greater than 5 years  No new nodules  Impression: 1  Heterogeneous left thyroid with unchanged subcentimeter hyperechoic nodule  No new nodules  No nodule meets current ACR criteria for requiring biopsy or followup ultrasounds  Reference: ACR Thyroid Imaging, Reporting and Data System (TI-RADS): White Paper of the [employer]   J AM Mary Radiol 2623;43:245-093  (additional recommendations based on American Thyroid Association 2015 guidelines ) Workstation performed: [id number]     Us Kidney And Bladder    Result Date: 3/9/2021  Narrative: RENAL ULTRASOUND INDICATION:   R31 1: Benign essential microscopic hematuria  COMPARISON: 4/29/2019 TECHNIQUE:   Ultrasound of the retroperitoneum was performed with a curvilinear transducer utilizing volumetric sweeps and still imaging techniques  FINDINGS: KIDNEYS: Symmetric and normal size  Right kidney:  9 5 x 4 7 x 4 2 cm  Left kidney:  9 2 x 5 x 4 9 cm  Right kidney Normal echogenicity and contour  No suspicious masses detected  No hydronephrosis  No shadowing calculi  No perinephric fluid collections  Left kidney Normal echogenicity and contour  No suspicious masses detected  No hydronephrosis  No shadowing calculi  No perinephric fluid collections  URETERS: Nonvisualized  BLADDER: Normally distended  No focal thickening or mass lesions  Bilateral ureteral jets detected  Impression: 1  Unremarkable exam  Workstation performed: [id number]       I reviewed the above imaging data  Advance Care Planning/Advance Directives:  Discussed disease status and treatment goals with the patient

## 2021-03-12 NOTE — PATIENT INSTRUCTIONS
Thyroid Nodules   WHAT YOU NEED TO KNOW:   What are thyroid nodules? Thyroid nodules are growths on your thyroid gland  Your thyroid makes hormones that help control your body temperature, heart rate, and growth  The hormones also control how fast your body uses food for energy  Some nodules are lumps of tissue, and others are filled with fluid  What increases my risk for thyroid nodules? A lack of iodine in the foods you eat is the most common cause of thyroid nodules  The following may increase your risk:  · Autoimmune thyroid disorders, such as Hashimoto disease    · Medical conditions, such as cancer, a thyroid infection, thyroid goiter, or a thyroid cyst    · A family history  of thyroid nodules or thyroid cancer    · Pregnancy  that causes your body to create more hormones    · Past radiation  treatment to your head or neck    What are the signs and symptoms of thyroid nodules? A small nodule may have no signs or symptoms  As your nodule grows, you may be able to see a lump on your neck  A large nodule may press on your airway or neck veins and cause the following:  · A cough or choking and hoarse voice    · Flushed face and swollen neck or neck veins    · Noisy, high-pitched breathing    · Pain when you swallow or trouble swallowing    · Trouble breathing when you lie down    How are thyroid nodules diagnosed? · Blood tests  are done to check the level of thyroid hormones in your body  A blood test may also show if you have an autoimmune disease that caused your nodules  · An ultrasound  uses sound waves to show pictures of your thyroid on a screen  · A fine-needle biopsy  is done to get a tissue sample from your thyroid gland to be tested  How are thyroid nodules treated? · Thyroid medicine  is given to bring your thyroid hormone levels back to a normal range  · Radioactive iodine  is given to damage cells in your thyroid gland and decrease the size of your nodules       · Laser ablation is done to make your nodules smaller  Ask for more information about laser ablation  · Surgery  may be done to remove all or part of your thyroid gland  Surgery is done if your nodules are cancerous  Ask for more information about thyroid surgery  What can I do to manage my thyroid nodules? · Eat iodine-rich foods  Examples include fish, seaweed, dairy products, eggs, beans, and lean meat  Iodized salt also contains iodine  You may need to use iodized table salt when you cook and season your food  Iodine may be added to bread or to your drinking water  Ask for a list of foods that contain iodine, and ask how much iodine you need each day  · Go to follow-up appointments  Write down your questions so you remember to ask them during your visits  When should I contact my healthcare provider? · You have a new cough that does not improve  · You begin choking or have new or increased trouble swallowing  · Your voice becomes hoarse  · You are losing weight without trying  · You have questions or concerns about your condition or care  When should I seek immediate care or call 911? · You have sudden chest pain or trouble breathing  · Your symptoms worsen, even after you take your medicines  CARE AGREEMENT:   You have the right to help plan your care  Learn about your health condition and how it may be treated  Discuss treatment options with your healthcare providers to decide what care you want to receive  You always have the right to refuse treatment  The above information is an  only  It is not intended as medical advice for individual conditions or treatments  Talk to your doctor, nurse or pharmacist before following any medical regimen to see if it is safe and effective for you  © Copyright [address] Information is for End User's use only and may not be sold, redistributed or otherwise used for commercial purposes   All illustrations and images included in [facility] are the copyrighted property of A D A M , Inc  or [facility] Leonela Huddleston

## 2021-03-14 DIAGNOSIS — K21.9 GASTROESOPHAGEAL REFLUX DISEASE WITHOUT ESOPHAGITIS: ICD-10-CM

## 2021-03-15 RX ORDER — OMEPRAZOLE 20 MG/1
20 CAPSULE, DELAYED RELEASE ORAL AS NEEDED
Qty: 90 CAPSULE | Refills: 0 | Status: SHIPPED | OUTPATIENT
Start: 2021-03-15

## 2021-03-19 ENCOUNTER — OFFICE VISIT (OUTPATIENT)
Dept: FAMILY MEDICINE CLINIC | Facility: CLINIC | Age: 81
End: 2021-03-19
Payer: COMMERCIAL

## 2021-03-19 VITALS
SYSTOLIC BLOOD PRESSURE: 120 MMHG | WEIGHT: 167 LBS | BODY MASS INDEX: 27.82 KG/M2 | DIASTOLIC BLOOD PRESSURE: 82 MMHG | HEIGHT: 65 IN | TEMPERATURE: 95.6 F

## 2021-03-19 DIAGNOSIS — Z00.00 MEDICARE ANNUAL WELLNESS VISIT, SUBSEQUENT: Primary | ICD-10-CM

## 2021-03-19 DIAGNOSIS — E66.3 OVERWEIGHT: ICD-10-CM

## 2021-03-19 DIAGNOSIS — E78.2 MIXED HYPERLIPIDEMIA: ICD-10-CM

## 2021-03-19 DIAGNOSIS — D05.11 DUCTAL CARCINOMA IN SITU (DCIS) OF RIGHT BREAST: ICD-10-CM

## 2021-03-19 DIAGNOSIS — E89.0 POSTOPERATIVE HYPOTHYROIDISM: ICD-10-CM

## 2021-03-19 DIAGNOSIS — M85.89 OSTEOPENIA OF MULTIPLE SITES: ICD-10-CM

## 2021-03-19 DIAGNOSIS — I48.21 PERMANENT ATRIAL FIBRILLATION (HCC): ICD-10-CM

## 2021-03-19 DIAGNOSIS — E06.3 HASHIMOTO'S THYROIDITIS: ICD-10-CM

## 2021-03-19 DIAGNOSIS — D68.9 COAGULOPATHY (HCC): ICD-10-CM

## 2021-03-19 DIAGNOSIS — I10 BENIGN ESSENTIAL HYPERTENSION: ICD-10-CM

## 2021-03-19 DIAGNOSIS — Z85.118 HISTORY OF LUNG CANCER: ICD-10-CM

## 2021-03-19 DIAGNOSIS — Z85.038 HISTORY OF COLON CANCER: ICD-10-CM

## 2021-03-19 PROBLEM — E66.01 MORBID OBESITY, UNSPECIFIED OBESITY TYPE (HCC): Status: ACTIVE | Noted: 2021-03-19

## 2021-03-19 PROBLEM — E66.01 MORBID OBESITY, UNSPECIFIED OBESITY TYPE (HCC): Status: RESOLVED | Noted: 2021-03-19 | Resolved: 2021-03-19

## 2021-03-19 PROCEDURE — 3725F SCREEN DEPRESSION PERFORMED: CPT | Performed by: FAMILY MEDICINE

## 2021-03-19 PROCEDURE — 1125F AMNT PAIN NOTED PAIN PRSNT: CPT | Performed by: FAMILY MEDICINE

## 2021-03-19 PROCEDURE — 3288F FALL RISK ASSESSMENT DOCD: CPT | Performed by: FAMILY MEDICINE

## 2021-03-19 PROCEDURE — 3074F SYST BP LT 130 MM HG: CPT | Performed by: FAMILY MEDICINE

## 2021-03-19 PROCEDURE — 99214 OFFICE O/P EST MOD 30 MIN: CPT | Performed by: FAMILY MEDICINE

## 2021-03-19 PROCEDURE — 1036F TOBACCO NON-USER: CPT | Performed by: FAMILY MEDICINE

## 2021-03-19 PROCEDURE — 1160F RVW MEDS BY RX/DR IN RCRD: CPT | Performed by: FAMILY MEDICINE

## 2021-03-19 PROCEDURE — G0439 PPPS, SUBSEQ VISIT: HCPCS | Performed by: FAMILY MEDICINE

## 2021-03-19 PROCEDURE — 1170F FXNL STATUS ASSESSED: CPT | Performed by: FAMILY MEDICINE

## 2021-03-19 PROCEDURE — 3079F DIAST BP 80-89 MM HG: CPT | Performed by: FAMILY MEDICINE

## 2021-03-19 NOTE — PATIENT INSTRUCTIONS
Medicare Preventive Visit Patient Instructions  Thank you for completing your Welcome to Medicare Visit or Medicare Annual Wellness Visit today  Your next wellness visit will be due in one year (3/20/2022)  The screening/preventive services that you may require over the next 5-10 years are detailed below  Some tests may not apply to you based off risk factors and/or age  Screening tests ordered at today's visit but not completed yet may show as past due  Also, please note that scanned in results may not display below  Preventive Screenings:  Service Recommendations Previous Testing/Comments   Colorectal Cancer Screening  * Colonoscopy    * Fecal Occult Blood Test (FOBT)/Fecal Immunochemical Test (FIT)  * Fecal DNA/Cologuard Test  * Flexible Sigmoidoscopy Age: 54-65 years old   Colonoscopy: every 10 years (may be performed more frequently if at higher risk)  OR  FOBT/FIT: every 1 year  OR  Cologuard: every 3 years  OR  Sigmoidoscopy: every 5 years  Screening may be recommended earlier than age 48 if at higher risk for colorectal cancer  Also, an individualized decision between you and your healthcare provider will decide whether screening between the ages of 74-80 would be appropriate  Colonoscopy: Not on file  FOBT/FIT: Not on file  Cologuard: Not on file  Sigmoidoscopy: Not on file    History Colorectal Cancer     Breast Cancer Screening Age: 34 years old  Frequency: every 1-2 years  Not required if history of left and right mastectomy Mammogram: 03/26/2020    Screening Current   Cervical Cancer Screening Between the ages of 21-29, pap smear recommended once every 3 years  Between the ages of 33-67, can perform pap smear with HPV co-testing every 5 years     Recommendations may differ for women with a history of total hysterectomy, cervical cancer, or abnormal pap smears in past  Pap Smear: 07/12/2018    Screening Not Indicated   Hepatitis C Screening Once for adults born between 1945 and 1965  More frequently in patients at high risk for Hepatitis C Hep C Antibody: Not on file        Diabetes Screening 1-2 times per year if you're at risk for diabetes or have pre-diabetes Fasting glucose: 97 mg/dL   A1C: No results in last 5 years    Screening Current   Cholesterol Screening Once every 5 years if you don't have a lipid disorder  May order more often based on risk factors  Lipid panel: 02/08/2021    Screening Not Indicated  History Lipid Disorder     Other Preventive Screenings Covered by Medicare:  1  Abdominal Aortic Aneurysm (AAA) Screening: covered once if your at risk  You're considered to be at risk if you have a family history of AAA  2  Lung Cancer Screening: covers low dose CT scan once per year if you meet all of the following conditions: (1) Age 50-69; (2) No signs or symptoms of lung cancer; (3) Current smoker or have quit smoking within the last 15 years; (4) You have a tobacco smoking history of at least 30 pack years (packs per day multiplied by number of years you smoked); (5) You get a written order from a healthcare provider  3  Glaucoma Screening: covered annually if you're considered high risk: (1) You have diabetes OR (2) Family history of glaucoma OR (3)  aged 48 and older OR (3)  American aged 72 and older  3  Osteoporosis Screening: covered every 2 years if you meet one of the following conditions: (1) You're estrogen deficient and at risk for osteoporosis based off medical history and other findings; (2) Have a vertebral abnormality; (3) On glucocorticoid therapy for more than 3 months; (4) Have primary hyperparathyroidism; (5) On osteoporosis medications and need to assess response to drug therapy  · Last bone density test (DXA Scan): 09/16/2020   5  HIV Screening: covered annually if you're between the age of 15-65  Also covered annually if you are younger than 13 and older than 72 with risk factors for HIV infection   For pregnant patients, it is covered up to 3 times per pregnancy  Immunizations:  Immunization Recommendations   Influenza Vaccine Annual influenza vaccination during flu season is recommended for all persons aged >= 6 months who do not have contraindications   Pneumococcal Vaccine (Prevnar and Pneumovax)  * Prevnar = PCV13  * Pneumovax = PPSV23   Adults 25-60 years old: 1-3 doses may be recommended based on certain risk factors  Adults 72 years old: Prevnar (PCV13) vaccine recommended followed by Pneumovax (PPSV23) vaccine  If already received PPSV23 since turning 65, then PCV13 recommended at least one year after PPSV23 dose  Hepatitis B Vaccine 3 dose series if at intermediate or high risk (ex: diabetes, end stage renal disease, liver disease)   Tetanus (Td) Vaccine - COST NOT COVERED BY MEDICARE PART B Following completion of primary series, a booster dose should be given every 10 years to maintain immunity against tetanus  Td may also be given as tetanus wound prophylaxis  Tdap Vaccine - COST NOT COVERED BY MEDICARE PART B Recommended at least once for all adults  For pregnant patients, recommended with each pregnancy  Shingles Vaccine (Shingrix) - COST NOT COVERED BY MEDICARE PART B  2 shot series recommended in those aged 48 and above     Health Maintenance Due:  There are no preventive care reminders to display for this patient  Immunizations Due:      Topic Date Due    Influenza Vaccine (1) 09/01/2020     Advance Directives   What are advance directives? Advance directives are legal documents that state your wishes and plans for medical care  These plans are made ahead of time in case you lose your ability to make decisions for yourself  Advance directives can apply to any medical decision, such as the treatments you want, and if you want to donate organs  What are the types of advance directives? There are many types of advance directives, and each state has rules about how to use them   You may choose a combination of any of the following:  · Living will: This is a written record of the treatment you want  You can also choose which treatments you do not want, which to limit, and which to stop at a certain time  This includes surgery, medicine, IV fluid, and tube feedings  · Durable power of  for healthcare [facility]): This is a written record that states who you want to make healthcare choices for you when you are unable to make them for yourself  This person, called a proxy, is usually a family member or a friend  You may choose more than 1 proxy  · Do not resuscitate (DNR) order:  A DNR order is used in case your heart stops beating or you stop breathing  It is a request not to have certain forms of treatment, such as CPR  A DNR order may be included in other types of advance directives  · Medical directive: This covers the care that you want if you are in a coma, near death, or unable to make decisions for yourself  You can list the treatments you want for each condition  Treatment may include pain medicine, surgery, blood transfusions, dialysis, IV or tube feedings, and a ventilator (breathing machine)  · Values history: This document has questions about your views, beliefs, and how you feel and think about life  This information can help others choose the care that you would choose  Why are advance directives important? An advance directive helps you control your care  Although spoken wishes may be used, it is better to have your wishes written down  Spoken wishes can be misunderstood, or not followed  Treatments may be given even if you do not want them  An advance directive may make it easier for your family to make difficult choices about your care  Weight Management   Why it is important to manage your weight:  Being overweight increases your risk of health conditions such as heart disease, high blood pressure, type 2 diabetes, and certain types of cancer   It can also increase your risk for osteoarthritis, sleep apnea, and other respiratory problems  Aim for a slow, steady weight loss  Even a small amount of weight loss can lower your risk of health problems  How to lose weight safely:  A safe and healthy way to lose weight is to eat fewer calories and get regular exercise  You can lose up about 1 pound a week by decreasing the number of calories you eat by 500 calories each day  Healthy meal plan for weight management:  A healthy meal plan includes a variety of foods, contains fewer calories, and helps you stay healthy  A healthy meal plan includes the following:  · Eat whole-grain foods more often  A healthy meal plan should contain fiber  Fiber is the part of grains, fruits, and vegetables that is not broken down by your body  Whole-grain foods are healthy and provide extra fiber in your diet  Some examples of whole-grain foods are whole-wheat breads and pastas, oatmeal, brown rice, and bulgur  · Eat a variety of vegetables every day  Include dark, leafy greens such as spinach, kale, judy greens, and mustard greens  Eat yellow and orange vegetables such as carrots, sweet potatoes, and winter squash  · Eat a variety of fruits every day  Choose fresh or canned fruit (canned in its own juice or light syrup) instead of juice  Fruit juice has very little or no fiber  · Eat low-fat dairy foods  Drink fat-free (skim) milk or 1% milk  Eat fat-free yogurt and low-fat cottage cheese  Try low-fat cheeses such as mozzarella and other reduced-fat cheeses  · Choose meat and other protein foods that are low in fat  Choose beans or other legumes such as split peas or lentils  Choose fish, skinless poultry (chicken or turkey), or lean cuts of red meat (beef or pork)  Before you cook meat or poultry, cut off any visible fat  · Use less fat and oil  Try baking foods instead of frying them  Add less fat, such as margarine, sour cream, regular salad dressing and mayonnaise to foods  Eat fewer high-fat foods   Some examples of high-fat foods include french fries, doughnuts, ice cream, and cakes  · Eat fewer sweets  Limit foods and drinks that are high in sugar  This includes candy, cookies, regular soda, and sweetened drinks  Exercise:  Exercise at least 30 minutes per day on most days of the week  Some examples of exercise include walking, biking, dancing, and swimming  You can also fit in more physical activity by taking the stairs instead of the elevator or parking farther away from stores  Ask your healthcare provider about the best exercise plan for you  © Copyright [employer] 2018 Information is for End User's use only and may not be sold, redistributed or otherwise used for commercial purposes  All illustrations and images included in CareNotes® are the copyrighted property of [employer]  or [facility] Preventive Visit Patient Instructions  Thank you for completing your Welcome to Medicare Visit or Medicare Annual Wellness Visit today  Your next wellness visit will be due in one year (3/20/2022)  The screening/preventive services that you may require over the next 5-10 years are detailed below  Some tests may not apply to you based off risk factors and/or age  Screening tests ordered at today's visit but not completed yet may show as past due  Also, please note that scanned in results may not display below  Preventive Screenings:  Service Recommendations Previous Testing/Comments   Colorectal Cancer Screening  * Colonoscopy    * Fecal Occult Blood Test (FOBT)/Fecal Immunochemical Test (FIT)  * Fecal DNA/Cologuard Test  * Flexible Sigmoidoscopy Age: 54-65 years old   Colonoscopy: every 10 years (may be performed more frequently if at higher risk)  OR  FOBT/FIT: every 1 year  OR  Cologuard: every 3 years  OR  Sigmoidoscopy: every 5 years  Screening may be recommended earlier than age 48 if at higher risk for colorectal cancer   Also, an individualized decision between you and your healthcare provider will decide whether screening between the ages of 74-80 would be appropriate  Colonoscopy: Not on file  FOBT/FIT: Not on file  Cologuard: Not on file  Sigmoidoscopy: Not on file    History Colorectal Cancer     Breast Cancer Screening Age: 34 years old  Frequency: every 1-2 years  Not required if history of left and right mastectomy Mammogram: 03/26/2020    Screening Current   Cervical Cancer Screening Between the ages of 21-29, pap smear recommended once every 3 years  Between the ages of 33-67, can perform pap smear with HPV co-testing every 5 years  Recommendations may differ for women with a history of total hysterectomy, cervical cancer, or abnormal pap smears in past  Pap Smear: 07/12/2018    Screening Not Indicated   Hepatitis C Screening Once for adults born between 1945 and 1965  More frequently in patients at high risk for Hepatitis C Hep C Antibody: Not on file        Diabetes Screening 1-2 times per year if you're at risk for diabetes or have pre-diabetes Fasting glucose: 97 mg/dL   A1C: No results in last 5 years    Screening Current   Cholesterol Screening Once every 5 years if you don't have a lipid disorder  May order more often based on risk factors  Lipid panel: 02/08/2021    Screening Not Indicated  History Lipid Disorder     Other Preventive Screenings Covered by Medicare:  6  Abdominal Aortic Aneurysm (AAA) Screening: covered once if your at risk  You're considered to be at risk if you have a family history of AAA  7  Lung Cancer Screening: covers low dose CT scan once per year if you meet all of the following conditions: (1) Age 50-69; (2) No signs or symptoms of lung cancer; (3) Current smoker or have quit smoking within the last 15 years; (4) You have a tobacco smoking history of at least 30 pack years (packs per day multiplied by number of years you smoked); (5) You get a written order from a healthcare provider    8  Glaucoma Screening: covered annually if you're considered high risk: (1) You have diabetes OR (2) Family history of glaucoma OR (3)  aged 48 and older OR (4)  American aged 72 and older  5  Osteoporosis Screening: covered every 2 years if you meet one of the following conditions: (1) You're estrogen deficient and at risk for osteoporosis based off medical history and other findings; (2) Have a vertebral abnormality; (3) On glucocorticoid therapy for more than 3 months; (4) Have primary hyperparathyroidism; (5) On osteoporosis medications and need to assess response to drug therapy  · Last bone density test (DXA Scan): 09/16/2020  10  HIV Screening: covered annually if you're between the age of 12-76  Also covered annually if you are younger than 13 and older than 72 with risk factors for HIV infection  For pregnant patients, it is covered up to 3 times per pregnancy  Immunizations:  Immunization Recommendations   Influenza Vaccine Annual influenza vaccination during flu season is recommended for all persons aged >= 6 months who do not have contraindications   Pneumococcal Vaccine (Prevnar and Pneumovax)  * Prevnar = PCV13  * Pneumovax = PPSV23   Adults 25-60 years old: 1-3 doses may be recommended based on certain risk factors  Adults 72 years old: Prevnar (PCV13) vaccine recommended followed by Pneumovax (PPSV23) vaccine  If already received PPSV23 since turning 65, then PCV13 recommended at least one year after PPSV23 dose  Hepatitis B Vaccine 3 dose series if at intermediate or high risk (ex: diabetes, end stage renal disease, liver disease)   Tetanus (Td) Vaccine - COST NOT COVERED BY MEDICARE PART B Following completion of primary series, a booster dose should be given every 10 years to maintain immunity against tetanus  Td may also be given as tetanus wound prophylaxis  Tdap Vaccine - COST NOT COVERED BY MEDICARE PART B Recommended at least once for all adults  For pregnant patients, recommended with each pregnancy     Shingles Vaccine (Shingrix) - COST NOT COVERED BY MEDICARE PART B  2 shot series recommended in those aged 48 and above     Health Maintenance Due:  There are no preventive care reminders to display for this patient  Immunizations Due:  There are no preventive care reminders to display for this patient  Advance Directives   What are advance directives? Advance directives are legal documents that state your wishes and plans for medical care  These plans are made ahead of time in case you lose your ability to make decisions for yourself  Advance directives can apply to any medical decision, such as the treatments you want, and if you want to donate organs  What are the types of advance directives? There are many types of advance directives, and each state has rules about how to use them  You may choose a combination of any of the following:  · Living will: This is a written record of the treatment you want  You can also choose which treatments you do not want, which to limit, and which to stop at a certain time  This includes surgery, medicine, IV fluid, and tube feedings  · Durable power of  for healthcare [facility]): This is a written record that states who you want to make healthcare choices for you when you are unable to make them for yourself  This person, called a proxy, is usually a family member or a friend  You may choose more than 1 proxy  · Do not resuscitate (DNR) order:  A DNR order is used in case your heart stops beating or you stop breathing  It is a request not to have certain forms of treatment, such as CPR  A DNR order may be included in other types of advance directives  · Medical directive: This covers the care that you want if you are in a coma, near death, or unable to make decisions for yourself  You can list the treatments you want for each condition   Treatment may include pain medicine, surgery, blood transfusions, dialysis, IV or tube feedings, and a ventilator (breathing machine)  · Values history: This document has questions about your views, beliefs, and how you feel and think about life  This information can help others choose the care that you would choose  Why are advance directives important? An advance directive helps you control your care  Although spoken wishes may be used, it is better to have your wishes written down  Spoken wishes can be misunderstood, or not followed  Treatments may be given even if you do not want them  An advance directive may make it easier for your family to make difficult choices about your care  Weight Management   Why it is important to manage your weight:  Being overweight increases your risk of health conditions such as heart disease, high blood pressure, type 2 diabetes, and certain types of cancer  It can also increase your risk for osteoarthritis, sleep apnea, and other respiratory problems  Aim for a slow, steady weight loss  Even a small amount of weight loss can lower your risk of health problems  How to lose weight safely:  A safe and healthy way to lose weight is to eat fewer calories and get regular exercise  You can lose up about 1 pound a week by decreasing the number of calories you eat by 500 calories each day  Healthy meal plan for weight management:  A healthy meal plan includes a variety of foods, contains fewer calories, and helps you stay healthy  A healthy meal plan includes the following:  · Eat whole-grain foods more often  A healthy meal plan should contain fiber  Fiber is the part of grains, fruits, and vegetables that is not broken down by your body  Whole-grain foods are healthy and provide extra fiber in your diet  Some examples of whole-grain foods are whole-wheat breads and pastas, oatmeal, brown rice, and bulgur  · Eat a variety of vegetables every day  Include dark, leafy greens such as spinach, kale, judy greens, and mustard greens   Eat yellow and orange vegetables such as carrots, sweet potatoes, and winter squash  · Eat a variety of fruits every day  Choose fresh or canned fruit (canned in its own juice or light syrup) instead of juice  Fruit juice has very little or no fiber  · Eat low-fat dairy foods  Drink fat-free (skim) milk or 1% milk  Eat fat-free yogurt and low-fat cottage cheese  Try low-fat cheeses such as mozzarella and other reduced-fat cheeses  · Choose meat and other protein foods that are low in fat  Choose beans or other legumes such as split peas or lentils  Choose fish, skinless poultry (chicken or turkey), or lean cuts of red meat (beef or pork)  Before you cook meat or poultry, cut off any visible fat  · Use less fat and oil  Try baking foods instead of frying them  Add less fat, such as margarine, sour cream, regular salad dressing and mayonnaise to foods  Eat fewer high-fat foods  Some examples of high-fat foods include french fries, doughnuts, ice cream, and cakes  · Eat fewer sweets  Limit foods and drinks that are high in sugar  This includes candy, cookies, regular soda, and sweetened drinks  Exercise:  Exercise at least 30 minutes per day on most days of the week  Some examples of exercise include walking, biking, dancing, and swimming  You can also fit in more physical activity by taking the stairs instead of the elevator or parking farther away from stores  Ask your healthcare provider about the best exercise plan for you  © Copyright [employer] 2018 Information is for End User's use only and may not be sold, redistributed or otherwise used for commercial purposes   All illustrations and images included in CareNotes® are the copyrighted property of A [employer] A M [employer]  or [address] [place] [address] [employer]

## 2021-03-19 NOTE — PROGRESS NOTES
Assessment/Plan:    Medicare annual wellness visit, subsequent  Patient refuses pneumonia shots Patient is up to date with screenings Patient has lviign will done and in chart    A-fib [facility])  Continue current care and followup with Dr Jackson Caal patient to conitnue eliquis    Benign essential hypertension  Blood rpessure is controlled on medication Patient to kiera current meds    Coagulopathy ([address] )  Continue eliquis for stroke prophylaxis    Ductal carcinoma in situ (DCIS) of right breast  Patient to see Dr Madalyn Franco and have follouwp mammogram I reviewed medical oncology note and patient was not recommended for adjuvant hormone therapy for the breast cancer    Hashimoto's thyroiditis  Thyroid US was reviewed Patient to have labs done in August I will see her in 6 months    History of colon cancer  Continue surveillance    History of lung cancer  Continue current care and followup per cardiothoracic     Mixed hyperlipidemia  Lipids from 2/21/2021 were at goal continue current meds and followup in 6 months    Overweight  Dicussed diet and exercise with patietn  Followup in 6 months    Postoperative hypothyroidism  Check labs continue medication    Osteopenia of multiple sites  Continue calcium and vitamin D Last dexa was last year repeat next year        Diagnoses and all orders for this visit:    Medicare annual wellness visit, subsequent    Benign essential hypertension  -     Comprehensive metabolic panel; Future  -     Lipid panel; Future    Permanent atrial fibrillation (HCC)    Hashimoto's thyroiditis  -     TSH, 3rd generation with Free T4 reflex; Future    Osteopenia of multiple sites    Overweight    Postoperative hypothyroidism  -     TSH, 3rd generation with Free T4 reflex; Future    Mixed hyperlipidemia  -     Comprehensive metabolic panel; Future  -     Lipid panel;  Future    History of colon cancer    History of lung cancer    Coagulopathy ([facility] [place] [address] )    Ductal carcinoma in situ (DCIS) of right breast Subjective:      Patient ID: Ro Keith is a 78 y o  female  Patient is here for follow up of her atrial fibrillation hypertension hyperlipidemia post operative hypothyroidism with thyroid nodule history of lung breast and colon cancer as well as her weight Patient had lung biopsy done proven for cancer and had surgery Patient had breast cancer surgery Dr Jose Tovar and has follow up mammogram scheduled She was not recommended for neoadjuvant hormone therapy just the radiation Patient blood pressure is stable Patient last lipids and thyroid numbers were also good Patient weight is stable      The following portions of the patient's history were reviewed and updated as appropriate: allergies, current medications, past family history, past medical history, past social history, past surgical history and problem list     Review of Systems   Constitutional: Negative for fatigue, fever and unexpected weight change  HENT: Negative for congestion, sinus pain and trouble swallowing  Eyes: Negative for discharge and visual disturbance  Respiratory: Negative for cough, chest tightness, shortness of breath and wheezing  Cardiovascular: Negative for chest pain, palpitations and leg swelling  Gastrointestinal: Negative for abdominal pain, blood in stool, constipation, diarrhea, nausea and vomiting  Genitourinary: Negative for difficulty urinating, dysuria, frequency and hematuria  Musculoskeletal: Negative for arthralgias, gait problem and joint swelling  Skin: Negative for rash and wound  Allergic/Immunologic: Negative for environmental allergies and food allergies  Neurological: Negative for dizziness, syncope, weakness, numbness and headaches  Hematological: Negative for adenopathy  Does not bruise/bleed easily  Psychiatric/Behavioral: Negative for confusion, decreased concentration and sleep disturbance  The patient is not nervous/anxious            Objective:      /82   Temp (!) 95 6 °F (35 3 °C)   Ht 5' 4 75" (1 645 m)   Wt 75 8 kg (167 lb)   BMI 28 01 kg/m²          Physical Exam  Vitals signs and nursing note reviewed  Constitutional:       Appearance: Normal appearance  She is well-developed  HENT:      Head: Normocephalic and atraumatic  Right Ear: Hearing, tympanic membrane and external ear normal       Left Ear: Hearing, tympanic membrane and external ear normal    Eyes:      Extraocular Movements: Extraocular movements intact  Conjunctiva/sclera: Conjunctivae normal       Pupils: Pupils are equal, round, and reactive to light  Neck:      Musculoskeletal: Neck supple  Thyroid: No thyromegaly  Cardiovascular:      Rate and Rhythm: Normal rate and regular rhythm  Pulses: Normal pulses  Heart sounds: Normal heart sounds  Pulmonary:      Effort: Pulmonary effort is normal       Breath sounds: Normal breath sounds  No wheezing or rales  Abdominal:      General: Abdomen is flat  Bowel sounds are normal  There is no distension  Palpations: Abdomen is soft  Tenderness: There is no abdominal tenderness  Musculoskeletal:         General: No tenderness  Lymphadenopathy:      Cervical: No cervical adenopathy  Skin:     General: Skin is warm and dry  Findings: No rash  Neurological:      General: No focal deficit present  Mental Status: She is alert and oriented to person, place, and time  Cranial Nerves: No cranial nerve deficit  Coordination: Coordination normal    Psychiatric:         Mood and Affect: Mood normal          Behavior: Behavior normal          Thought Content:  Thought content normal          Judgment: Judgment normal

## 2021-03-19 NOTE — ASSESSMENT & PLAN NOTE
Patient to see Dr Allison Mcknight and have follouwp mammogram I reviewed medical oncology note and patient was not recommended for adjuvant hormone therapy for the breast cancer

## 2021-03-19 NOTE — ASSESSMENT & PLAN NOTE
Patient refuses pneumonia shots Patient is up to date with screenings Patient has lviign will done and in chart

## 2021-03-19 NOTE — PROGRESS NOTES
Assessment and Plan:     Problem List Items Addressed This Visit        Other    Medicare annual wellness visit, subsequent - Primary     Patient refuses pneumonia shots Patient is up to date with screenings Patient has lviign will done and in chart                Preventive health issues were discussed with patient, and age appropriate screening tests were ordered as noted in patient's After Visit Summary  Personalized health advice and appropriate referrals for health education or preventive services given if needed, as noted in patient's After Visit Summary       History of Present Illness:     Patient presents for Medicare Annual Wellness visit    Patient Care Team:  Martine Ferguson DO as PCP - General  Edilson Pedroza MD (Inactive) as PCP - OBGYN (Gynecology)  Martine Ferguson DO as PCP - [address] [place] [address] (RTE)  Zan Griffith MD as Surgeon (Surgical Oncology)  Dav Ross MD as Surgeon (Thoracic Surgery)  [place] Medina MD (General Surgery)  Teo Stephens MD (Radiation Oncology)  Saskia Stark RN as Registered Nurse (Oncology)  Valentino Dupont, MD as Surgeon (Thoracic Surgery)     Problem List:     Patient Active Problem List   Diagnosis    A-fib [facility])    Benign essential hypertension    Coagulopathy [facility] [address] )    History of colon cancer    Overweight    Osteoarthritis    Postoperative hypothyroidism    Mixed hyperlipidemia    Medicare annual wellness visit, subsequent    Osteopenia of multiple sites    Ductal carcinoma in situ (DCIS) of right breast    Hashimoto's thyroiditis    History of lung cancer    S/P radiotherapy    Thyroid nodule      Past Medical and Surgical History:     Past Medical History:   Diagnosis Date    Anesthesia     "cheap date"    Arthritis     Atrial fibrillation ([facility] [address] )     Benign polyp of large intestine     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    Colon cancer [facility]) 1977    43 yo    Colon polyp     Full dentures     full upper/ partial lower    GERD (gastroesophageal reflux disease)     Glaucoma suspect     History of neck problems     "C3-C7 and had epidural injections years ago"    History of right breast cancer     Hyperlipidemia     Hypertension     Lung cancer ([facility] [address] )     jan 2020- had surgery    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[facility] [address] )     Prediabetes     Primary adenocarcinoma of upper lobe of right lung ([facility] [address] ) 12/2/2019    Pulmonary nodules 2/19/2015    Refusal of blood transfusions as patient is Sikhism     Thyroid cancer ([facility] [address] ) 2009    Tic disorder, transient, recurrent     not recent    Walks frequently      Past Surgical History:   Procedure Laterality Date    APPENDECTOMY      ARTHROSCOPY KNEE      BREAST BIOPSY Right 04/10/2017    US guided  benign    BREAST BIOPSY Right 10/24/2019    ADH    BREAST LUMPECTOMY W/ NEEDLE LOCALIZATION Right 5/22/2020    Procedure: EXCISIONAL BIOPSY;  1000 NEEDLE LOC;  Surgeon: Arleth Toney MD;  Location: [facility];  Service: Surgical Oncology    CARDIOVASCULAR STRESS TEST      CATARACT EXTRACTION Bilateral     COLON SURGERY      COLONOSCOPY      done 2010 due 2015 Dr Kuo    DILATION AND CURETTAGE OF UTERUS      ESOPHAGOGASTRODUODENOSCOPY      inflammation aonly    HEMICOLECTOMY Right 1977    HYSTERECTOMY      JOINT REPLACEMENT      L knee    LUNG SURGERY      MAMMO NEEDLE LOCALIZATION RIGHT (ALL INC) Right 5/22/2020    MAMMO NEEDLE LOCALIZATION RIGHT (ALL INC) EACH ADD Right 5/22/2020    MAMMO STEREOTACTIC BREAST BIOPSY RIGHT (ALL INC) Right 10/24/2019    PARATHYROID GLAND SURGERY      exploration     AK BRONCHOSCOPY,DIAGNOSTIC N/A 1/17/2020    Procedure: Jovany Malloy;  Surgeon: Isabela Addison MD;  [facility];  Service: Thoracic    AK THORACOSCOPY SURG LOBECTOMY Right 1/17/2020    Procedure: ROBOTIC ASSISTED COMPLETE LUNG LOBECTOMY;  Surgeon: Isabela Addison MD;  [facility];  Service: Thoracic    AK THORACOSCOPY W/THERA WEDGE RESEXN INITIAL UNILAT Right 2020    Procedure: ROBOTIC ASSISTED UPPER-LOBE WEDGE RESECTION, MEDIASTINAL LYMPH NODE DISECTION;  Surgeon: Jaziel Byrd MD;  [facility];  Service: Thoracic    REPLACEMENT TOTAL KNEE      THYROID SURGERY      TOTAL ABDOMINAL HYSTERECTOMY  1983    TOTAL THYROIDECTOMY      US GUIDED BREAST BIOPSY RIGHT COMPLETE Right 4/10/2017    US GUIDED THYROID BIOPSY  2020      Family History:     Family History   Problem Relation Age of Onset    Esophageal cancer Mother         [de-identified]    Hypertension Mother     Stroke Family         TIA    Breast cancer Paternal Aunt 80    No Known Problems Father     COPD Sister     COPD Sister     No Known Problems Maternal Grandmother     No Known Problems Maternal Grandfather     No Known Problems Paternal Grandmother     No Known Problems Paternal Grandfather     No Known Problems Maternal Aunt     No Known Problems Maternal Aunt     No Known Problems Maternal Aunt     No Known Problems Maternal Aunt     No Known Problems Maternal Aunt       Social History:     E-Cigarette/Vaping    E-Cigarette Use Never User      E-Cigarette/Vaping Substances    Nicotine No     THC No     CBD No     Flavoring No     Other No     Unknown No      Social History     Socioeconomic History    Marital status: Single     Spouse name: None    Number of children: None    Years of education: None    Highest education level: None   Occupational History    None   Social Needs    Financial resource strain: None    Food insecurity     Worry: None     Inability: None    Transportation needs     Medical: None     Non-medical: None   Tobacco Use    Smoking status: Former Smoker     Packs/day: 1 50     Years: 20 00     Pack years: 30 00     Types: Cigarettes     Quit date: 1967     Years since quittin 2    Smokeless tobacco: Never Used   Substance and Sexual Activity    Alcohol use:  Yes     Alcohol/week: 5 0 standard drinks     Types: 5 Cans of beer per week     Frequency: 4 or more times a week     Drinks per session: 1 or 2     Binge frequency: Never     Comment: occasional beer with food    Drug use: No    Sexual activity: None   Lifestyle    Physical activity     Days per week: None     Minutes per session: None    Stress: None   Relationships    Social connections     Talks on phone: None     Gets together: None     Attends Orthodoxy service: None     Active member of club or organization: None     Attends meetings of clubs or organizations: None     Relationship status: None    Intimate partner violence     Fear of current or ex partner: None     Emotionally abused: None     Physically abused: None     Forced sexual activity: None   Other Topics Concern    None   Social History Narrative    Caffeine use- 1-3, 8oz coffees per day    Denied history of housing without smoke detectors    Always uses a seatbelt      Medications and Allergies:     Current Outpatient Medications   Medication Sig Dispense Refill    apixaban (ELIQUIS) 5 mg Take 5 mg by mouth 2 (two) times a day To stop 2 days prior to surgery per md      atorvastatin (LIPITOR) 80 mg tablet Take 1 tablet (80 mg total) by mouth daily 90 tablet 1    Coenzyme Q10 (CO Q 10 PO) Take 200 mg by mouth      FIBER PO Take 1 capsule by mouth daily as needed      Hydrocortisone Acetate (Vagisil) 1 % CREA Apply topically      levothyroxine 100 mcg tablet Take 1 tablet (100 mcg total) by mouth daily 90 tablet 1    Multiple Vitamins-Calcium (ESSENTIAL ONE DAILY MULTIVIT PO) Take by mouth daily       mupirocin (BACTROBAN) 2 % ointment APPLY TO SORE AREA ON BOTTOM TWICE DAILY UNTIL HEALED      Omega-3 Fatty Acids (FISH OIL PO) Take 2 g by mouth daily       omeprazole (PriLOSEC) 20 mg delayed release capsule TAKE 1 CAPSULE (20 MG TOTAL) BY MOUTH AS NEEDED (GERD) 90 capsule 0    diltiazem (CARDIZEM CD) 120 mg 24 hr capsule Take 120 mg by mouth every evening        No current facility-administered medications for this visit  Allergies   Allergen Reactions    Bee Venom      Tongue swelling    Flurbiprofen Hives    Ibuprofen Shortness Of Breath    Levaquin [Levofloxacin In D5w] Other (See Comments)     Dark spots under eyes and wrists    Tequin [Gatifloxacin] Rash      Immunizations:     Immunization History   Administered Date(s) Administered    INFLUENZA 12/03/2004, 10/15/2008, 10/14/2010, 10/27/2011, 11/23/2012, 12/14/2012    Influenza Split High Dose Preservative Free IM 11/23/2012, 11/05/2013, 10/20/2014    SARS-CoV-2 / COVID-19 mRNA IM ([employer]) 02/06/2021, 02/25/2021      Health Maintenance: There are no preventive care reminders to display for this patient  There are no preventive care reminders to display for this patient  Medicare Health Risk Assessment:     /82   Temp (!) 95 6 °F (35 3 °C)   Ht 5' 4 75" (1 645 m)   Wt 75 8 kg (167 lb)   BMI 28 01 kg/m²      [place] is here for her Subsequent Wellness visit  Health Risk Assessment:   Patient rates overall health as good  Patient feels that their physical health rating is same  Patient is satisfied with their life  Eyesight was rated as same  Hearing was rated as same  Patient feels that their emotional and mental health rating is same  Patients states they are never, rarely angry  Patient states they are often unusually tired/fatigued  Pain experienced in the last 7 days has been none  Patient states that she has experienced no weight loss or gain in last 6 months  Depression Screening:   PHQ-2 Score: 0      Fall Risk Screening: In the past year, patient has experienced: no history of falling in past year      Urinary Incontinence Screening:   Patient has not leaked urine accidently in the last six months  Home Safety:  Patient has trouble with stairs inside or outside of their home  Patient has working smoke alarms and has working carbon monoxide detector  Home safety hazards include: none  Nutrition:   Current diet is Regular and Limited junk food  Medications:   Patient is not currently taking any over-the-counter supplements  Patient is able to manage medications  Activities of Daily Living (ADLs)/Instrumental Activities of Daily Living (IADLs):   Walk and transfer into and out of bed and chair?: Yes  Dress and groom yourself?: Yes    Bathe or shower yourself?: Yes    Feed yourself? Yes  Do your laundry/housekeeping?: Yes  Manage your money, pay your bills and track your expenses?: Yes  Make your own meals?: Yes    Do your own shopping?: Yes    Previous Hospitalizations:   Any hospitalizations or ED visits within the last 12 months?: Yes    How many hospitalizations have you had in the last year?: 1-2    Hospitalization Comments: Cancer surgtery    Advance Care Planning:   Living will: No    Durable POA for healthcare: Yes    Advanced directive: Yes    Advanced directive counseling given: Yes    Five wishes given: Yes    End of Life Decisions reviewed with patient: Yes      Cognitive Screening:   Provider or family/friend/caregiver concerned regarding cognition?: No    PREVENTIVE SCREENINGS      Cardiovascular Screening:    General: Screening Not Indicated and History Lipid Disorder      Diabetes Screening:     General: Screening Current      Colorectal Cancer Screening:     General: History Colorectal Cancer      Breast Cancer Screening:     General: Screening Current      Cervical Cancer Screening:    General: Screening Not Indicated      Abdominal Aortic Aneurysm (AAA) Screening:        General: Screening Not Indicated      Lung Cancer Screening:     General: Screening Not Indicated and History Lung Cancer      Hepatitis C Screening:    General: Screening Not Indicated    Screening, Brief Intervention, and Referral to Treatment (SBIRT)    Screening  Typical number of drinks in a day: 1  Typical number of drinks in a week: 5  Interpretation: Low risk drinking behavior      Single Item Drug Screening:  How often have you used an illegal drug (including marijuana) or a prescription medication for non-medical reasons in the past year? never    Single Item Drug Screen Score: 0  Interpretation: Negative screen for possible drug use disorder      Екатерина Paez, DO

## 2021-03-29 ENCOUNTER — HOSPITAL ENCOUNTER (OUTPATIENT)
Dept: MAMMOGRAPHY | Facility: CLINIC | Age: 81
Discharge: HOME | End: 2021-03-29
Payer: COMMERCIAL

## 2021-03-29 VITALS — BODY MASS INDEX: 27.82 KG/M2 | HEIGHT: 65 IN | WEIGHT: 167 LBS

## 2021-03-29 DIAGNOSIS — D05.11 DUCTAL CARCINOMA IN SITU (DCIS) OF RIGHT BREAST: ICD-10-CM

## 2021-03-29 PROCEDURE — 77066 DX MAMMO INCL CAD BI: CPT

## 2021-03-29 PROCEDURE — G0279 TOMOSYNTHESIS, MAMMO: HCPCS

## 2021-04-06 ENCOUNTER — RADIATION ONCOLOGY FOLLOW-UP (OUTPATIENT)
Dept: RADIATION ONCOLOGY | Facility: CLINIC | Age: 81
End: 2021-04-06
Attending: RADIOLOGY
Payer: COMMERCIAL

## 2021-04-06 VITALS
DIASTOLIC BLOOD PRESSURE: 64 MMHG | HEIGHT: 64 IN | SYSTOLIC BLOOD PRESSURE: 98 MMHG | BODY MASS INDEX: 28.51 KG/M2 | RESPIRATION RATE: 16 BRPM | OXYGEN SATURATION: 97 % | HEART RATE: 94 BPM | TEMPERATURE: 97.4 F | WEIGHT: 167 LBS

## 2021-04-06 DIAGNOSIS — D05.11 DUCTAL CARCINOMA IN SITU (DCIS) OF RIGHT BREAST: Primary | ICD-10-CM

## 2021-04-06 NOTE — PROGRESS NOTES
Olena Gutierrez 1940 is a 78 y o  female       Follow up visit   78year old female returns for follow-up for right breast DCIS  She completed adjuvant radiation to the right breast on 8/28/20  Last telemedicine visit on 10/13/20  She also has a history of Stage IB RUL adenocarcinoma lung cancer, s/p wedge resection in Jan 2020        10/15/20 Surg Onc, Dr Marcelino Schultz  Pt has healing skin changes from RT  She has some discomfort along the chest wall  Advised to use warm compresses to the area  HOA based on exam   Schedule mammogram for March 2021 and return for f/u in 6 months  11/9/20 CT chest wo contrast (ordered by thoracic surgery)  Nothing to indicate recurrent or metastatic disease  Improving right breast seroma  11/11/20 Thoracic surgery follow-up  CT scan on 11/9/20 showed normal post-op changes in right chest  No new lung masses or lesions  No lymphadenopathy  Resolving right breast seroma  Discussed potentially starting gabapentin for right-sided chest wall pain secondary to nerve issues  She had s/e to this medication previously and does not want to go back on it  Recommend tylenol or icing right chest after activities  Continue surveillance with CT chest in June 2021 12/28/20 Dr Marcelino Schultz follow-up for pain in right sided lateral chest wall region  Discussed role of neuropathic pain medications  She is not interested in this  Advised her to do massage to scar and warm compresses as needed  3/12/21 Surg [facility]/Dr Yasmeen Medrano  Thyroid nodule follow-up  (thyroid cancer 16 yrs ago)  Ultrasound of her thyroid on March 4, 2021 again revealed a left mid gland nodule measuring 0 5 x 0 5 x 0 4 cm  No new nodules  No concerns on exam  Pt prefers to continue annual ultrasounds  3/29/21 Mammo diagnostic bilateral w 3d & cad  Right breast:  New post treatment changes  There is an oval mass in the lumpectomy site which is consistent with the clinically suspected hematoma/seroma    Patient states this has been decreasing in size since her surgery  No ultrasound performed today  If clinically indicated, the patient can return for an ultrasound  Left breast:  No mammographic evidence of malignancy  ASSESSMENT/BI-RADS CATEGORY:  Left: 1 - Negative  Right: 2 - Benign  Overall: 2 - Benign      4/14/21 Dr Josefa Hickman  6/7/21 CT chest  6/9/21 Thoracic surgery  7/8/21 GYN        Oncology History   Ductal carcinoma in situ (DCIS) of right breast   11/20/2019 Initial Diagnosis    Ductal carcinoma in situ (DCIS) of right breast     5/22/2020 Surgery    A  Breast, right, lumpectomy:  -  Ductal carcinoma in situ, low nuclear grade       B  Breast, right new inferior margin, excision:  -  Ductal carcinoma in situ, low nuclear grade involving intraductal papilloma      C  Breast, right new lateral margin, excision:  -  Ductal carcinoma in situ, low nuclear grade      D  Breast, right new posterior margin, excision:  -  Benign breast parenchyma, negative for atypia or malignancy  6/9/2020 -  Cancer Staged    Staging form: Breast, AJCC 8th Edition  - Clinical: cT0, cN0, cM0 - Signed by Kobe Hernandez MD on 6/9/2020  Laterality: Right       6/9/2020 -  Cancer Staged    Staging form: Breast, AJCC 8th Edition  - Pathologic: Stage 0 (pTis (DCIS), pN0, cM0, ER+, AR+, HER2-) - Signed by Kobe Hernandez MD on 6/16/2020  Laterality: Right  Method of lymph node assessment: Clinical  Nuclear grade: G1       7/30/2020 - 8/28/2020 Radiation    Plan ID Energy Fractions Dose per Fraction (cGy) Dose Correction (cGy) Total Dose Delivered (cGy) Elapsed Days   R Breast 6X 16 / 16 266 0 4,256 21   R Brst Boost 6X 5 / 5 200 0 1,000 4        Primary adenocarcinoma of upper lobe of right lung ([address] ) (Resolved)   12/2/2019 Initial Diagnosis    Primary adenocarcinoma of upper lobe of right lung ([address] )     1/17/2020 Surgery    Lung, right upper lobe, wedge resection:  -  Invasive moderately differentiated adenocarcinoma    Stage IB Clinical Trial: no    Health Maintenance   Topic Date Due    BMI: Followup Plan  09/03/2021    Influenza Vaccine (1) 06/30/2021 (Originally 9/1/2020)    Pneumococcal Vaccine: 65+ Years (1 of 1 - PPSV23) 09/03/2021 (Originally 7/22/2003)    DTaP,Tdap,and Td Vaccines (1 - Tdap) 09/03/2021 (Originally 7/22/1961)    Fall Risk  03/19/2022    Depression Screening PHQ  03/19/2022    Medicare Annual Wellness Visit (AWV)  03/19/2022    BMI: Adult  03/29/2022    COVID-19 Vaccine  Completed    HIB Vaccine  Aged Out    Hepatitis B Vaccine  Aged Out    IPV Vaccine  Aged Out    Hepatitis A Vaccine  Aged Out    Meningococcal ACWY Vaccine  Aged Out    HPV Vaccine  Aged Out       Patient Active Problem List   Diagnosis    A-fib ([address] )    Benign essential hypertension    Coagulopathy [facility] [address] [facility]    History of colon cancer    Overweight    Osteoarthritis    Postoperative hypothyroidism    Mixed hyperlipidemia    Medicare annual wellness visit, subsequent    Osteopenia of multiple sites    Ductal carcinoma in situ (DCIS) of right breast    Hashimoto's thyroiditis    History of lung cancer    S/P radiotherapy    Thyroid nodule     Past Medical History:   Diagnosis Date    Anesthesia     "cheap date"    Arthritis     Atrial fibrillation ([address] )     Benign polyp of large intestine     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    Colon cancer ([address] ) 46    36 yo    Colon polyp     Full dentures     full upper/ partial lower    GERD (gastroesophageal reflux disease)     Glaucoma suspect     History of neck problems     "C3-C7 and had epidural injections years ago"    History of right breast cancer     Hyperlipidemia     Hypertension     Lung cancer ([address] )     jan 2020- had surgery    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[address] )     Prediabetes     Primary adenocarcinoma of upper lobe of right lung ([address] ) 12/2/2019    Pulmonary nodules 2/19/2015    Refusal of blood transfusions as patient is [place]     Thyroid cancer [facility] 2009    Tic disorder, transient, recurrent     not recent    Walks frequently      Past Surgical History:   Procedure Laterality Date    APPENDECTOMY      ARTHROSCOPY KNEE      BREAST BIOPSY Right 04/10/2017    US guided - intraductal papillomas    BREAST BIOPSY Right 10/24/2019    ADH    BREAST LUMPECTOMY W/ NEEDLE LOCALIZATION Right 5/22/2020    Procedure: EXCISIONAL BIOPSY;  1000 NEEDLE LOC;  Surgeon: Linsey Mariee MD;  Location: [facility];  Service: Surgical Oncology    CARDIOVASCULAR STRESS TEST      CATARACT EXTRACTION Bilateral     COLON SURGERY      COLONOSCOPY      done 2010 due 2015 Dr Kuo    DILATION AND CURETTAGE OF UTERUS      ESOPHAGOGASTRODUODENOSCOPY      inflammation aonly    HEMICOLECTOMY Right 1977    HYSTERECTOMY      JOINT REPLACEMENT      L knee    LUNG SURGERY      MAMMO NEEDLE LOCALIZATION RIGHT (ALL INC) Right 5/22/2020    MAMMO NEEDLE LOCALIZATION RIGHT (ALL INC) EACH ADD Right 5/22/2020    MAMMO STEREOTACTIC BREAST BIOPSY RIGHT (ALL INC) Right 10/24/2019    PARATHYROID GLAND SURGERY      exploration     OR BRONCHOSCOPY,DIAGNOSTIC N/A 1/17/2020    Procedure: FLEXIBLE BRONCHOSCOPY;  Surgeon: Lydia Moore MD;  [facility];  Service: Thoracic    OR THORACOSCOPY SURG LOBECTOMY Right 1/17/2020    Procedure: ROBOTIC ASSISTED COMPLETE LUNG LOBECTOMY;  Surgeon: Lydia Moore MD;  [facility];  Service: Thoracic    OR THORACOSCOPY W/THERA WEDGE RESEXN INITIAL UNILAT Right 1/17/2020    Procedure: ROBOTIC ASSISTED UPPER-LOBE WEDGE RESECTION, MEDIASTINAL LYMPH NODE DISECTION;  Surgeon: Lydia Moore MD;  [facility];  Service: Thoracic    REPLACEMENT TOTAL KNEE      THYROID SURGERY      TOTAL ABDOMINAL HYSTERECTOMY  1983    TOTAL THYROIDECTOMY      US GUIDED BREAST BIOPSY RIGHT COMPLETE Right 4/10/2017    US GUIDED THYROID BIOPSY  2/14/2020     Family History   Problem Relation Age of Onset    Esophageal cancer Mother [de-identified]    Hypertension Mother     Stroke Family         TIA    Breast cancer Paternal Aunt 80    No Known Problems Father     COPD Sister     COPD Sister     No Known Problems Maternal Grandmother     No Known Problems Maternal Grandfather     No Known Problems Paternal Grandmother     No Known Problems Paternal Grandfather     No Known Problems Maternal Aunt     No Known Problems Maternal Aunt     No Known Problems Maternal Aunt     No Known Problems Maternal Aunt     No Known Problems Maternal Aunt      Social History     Socioeconomic History    Marital status: Single     Spouse name: Not on file    Number of children: Not on file    Years of education: Not on file    Highest education level: Not on file   Occupational History    Not on file   Social Needs    Financial resource strain: Not on file    Food insecurity     Worry: Not on file     Inability: Not on file    Transportation needs     Medical: Not on file     Non-medical: Not on file   Tobacco Use    Smoking status: Former Smoker     Packs/day: 1 50     Years: 20 00     Pack years: 30 00     Types: Cigarettes     Quit date: 5     Years since quittin 2    Smokeless tobacco: Never Used   Substance and Sexual Activity    Alcohol use:  Yes     Alcohol/week: 5 0 standard drinks     Types: 5 Cans of beer per week     Frequency: 4 or more times a week     Drinks per session: 1 or 2     Binge frequency: Never     Comment: occasional beer with food    Drug use: No    Sexual activity: Not on file   Lifestyle    Physical activity     Days per week: Not on file     Minutes per session: Not on file    Stress: Not on file   Relationships    Social connections     Talks on phone: Not on file     Gets together: Not on file     Attends Rastafarian service: Not on file     Active member of club or organization: Not on file     Attends meetings of clubs or organizations: Not on file     Relationship status: Not on file    Intimate partner violence     Fear of current or ex partner: Not on file     Emotionally abused: Not on file     Physically abused: Not on file     Forced sexual activity: Not on file   Other Topics Concern    Not on file   Social History Narrative    Caffeine use- 1-3, 8oz coffees per day    Denied history of housing without smoke detectors    Always uses a seatbelt       Current Outpatient Medications:     apixaban (ELIQUIS) 5 mg, Take 5 mg by mouth 2 (two) times a day To stop 2 days prior to surgery per md, Disp: , Rfl:     atorvastatin (LIPITOR) 80 mg tablet, Take 1 tablet (80 mg total) by mouth daily, Disp: 90 tablet, Rfl: 1    Coenzyme Q10 (CO Q 10 PO), Take 200 mg by mouth, Disp: , Rfl:     FIBER PO, Take 1 capsule by mouth daily as needed, Disp: , Rfl:     Hydrocortisone Acetate (Vagisil) 1 % CREA, Apply topically, Disp: , Rfl:     levothyroxine 100 mcg tablet, Take 1 tablet (100 mcg total) by mouth daily, Disp: 90 tablet, Rfl: 1    Multiple Vitamins-Calcium (ESSENTIAL ONE DAILY MULTIVIT PO), Take by mouth daily , Disp: , Rfl:     mupirocin (BACTROBAN) 2 % ointment, APPLY TO SORE AREA ON BOTTOM TWICE DAILY UNTIL HEALED, Disp: , Rfl:     Omega-3 Fatty Acids (FISH OIL PO), Take 2 g by mouth daily , Disp: , Rfl:     omeprazole (PriLOSEC) 20 mg delayed release capsule, TAKE 1 CAPSULE (20 MG TOTAL) BY MOUTH AS NEEDED (GERD), Disp: 90 capsule, Rfl: 0    diltiazem (CARDIZEM CD) 120 mg 24 hr capsule, Take 120 mg by mouth every evening , Disp: , Rfl:   Allergies   Allergen Reactions    Bee Venom      Tongue swelling    Flurbiprofen Hives    Ibuprofen Shortness Of Breath    Levaquin [Levofloxacin In D5w] Other (See Comments)     Dark spots under eyes and wrists    Tequin [Gatifloxacin] Rash       Review of Systems:  Review of Systems   Constitutional: Positive for fatigue (improving)  HENT: Negative  Eyes: Negative  Respiratory: Negative  Cardiovascular: Negative      Gastrointestinal: Positive for abdominal pain (RUQ tender and feels tender, since surgery on lung)  Endocrine: Negative  Genitourinary: Negative  Musculoskeletal: Negative  Skin:        Skin looks normal  And seroma is getting smaller  She does massage the area of the seroma daily  Allergic/Immunologic: Negative  Neurological: Negative  Hematological: Negative  Psychiatric/Behavioral: Negative  Vitals:    04/06/21 0759   BP: 98/64   Pulse: 94   Resp: 16   Temp: (!) 97 4 °F (36 3 °C)   SpO2: 97%   Weight: 75 8 kg (167 lb)   Height: 5' 4" (1 626 m)               Imaging:Mammo Diagnostic Bilateral W 3d & Cad    Result Date: 3/29/2021  Narrative: DIAGNOSIS: Ductal carcinoma in situ (DCIS) of right breast TECHNIQUE: Digital diagnostic mammography was performed  Computer Aided Detection (CAD) analyzed all applicable images  Ultrasound of the bilateral breast(s) was performed  COMPARISONS: Prior breast imaging dated: 05/22/2020, 03/26/2020, 10/17/2019, 04/11/2019, 04/04/2019, 03/29/2018, 10/26/2017, 04/10/2017, 03/30/2017, 03/17/2016, 03/11/2015, and 02/27/2014, 02/21/2013, 02/14/2014, 02/10/2011, 02/04/2010  RELEVANT HISTORY: Family Breast Cancer History: History of breast cancer in Paternal [de-identified]  Family Medical History: Family medical history includes breast cancer in paternal aunt  Personal History: No known relevant hormone history  Surgical history includes breast biopsy, lumpectomy, and hysterectomy  Medical history includes colon cancer  RISK ASSESSMENT: Tyrer-Cuzick risk assessment reporting was suppressed due to the patient's history and/or demographic factors  TISSUE DENSITY: The breasts are heterogeneously dense, which may obscure small masses  INDICATION: Alejandro Rea is a 78 y o  female presenting for yr  Patient had high risk lesion in the right breast and then was diagnosed with lung cancer  When she had lumpectomy for the high risk lesion in the right breast the pathology was DCIS    She has also had right breast radiation therapy  FINDINGS: RIGHT 3) POST-SURGICAL FINDING:  There are new surgical changes consistent with lumpectomy at 12 o'clock  There is an oval mass in the lumpectomy site, consistent with a hematoma/seroma  This is marked with a triangle marker  The patient and Dr Geo Hill note both state this is decreasing in size  There is skin thickening consistent with radiation therapy  There are no suspicious grouped microcalcifications, areas of unexplained architectural distortion or suspicious masses  LEFT There are no suspicious masses, grouped microcalcifications or areas of architectural distortion  The skin and nipple areolar complex are unremarkable  Two small oval masses demonstrate long-term stability, consistent with benign findings  Impression: Right breast:  New post treatment changes  There is an oval mass in the lumpectomy site which is consistent with the clinically suspected hematoma/seroma  Patient states this has been decreasing in size since her surgery  No ultrasound performed today  If clinically indicated, the patient can return for an ultrasound  Left breast:  No mammographic evidence of malignancy  ASSESSMENT/BI-RADS CATEGORY: Left: 1 - Negative Right: 2 - Benign Overall: 2 - Benign RECOMMENDATION:      - Diagnostic mammogram in 1 year for both breasts   Workstation ID: [id number]

## 2021-04-06 NOTE — PROGRESS NOTES
Follow-up - Radiation Oncology   [facility] 1940 78 y o  female [id number]      History of Present Illness   Cancer Staging  Ductal carcinoma in situ (DCIS) of right breast  Staging form: Breast, AJCC 8th Edition  - Clinical: cT0, cN0, cM0 - Signed by Abad Sigala MD on 6/9/2020  Laterality: Right  - Pathologic: Stage 0 (pTis (DCIS), pN0, cM0, ER+, [place]+, HER2-) - Signed by Abad Sigala MD on 6/16/2020  Method of lymph node assessment: Clinical  Nuclear grade: G1  Laterality: Right          Interval History:    78year old female returns for follow-up for right breast DCIS  She completed adjuvant radiation to the right breast on 8/28/20  Last telemedicine visit on 10/13/20  She also has a history of Stage IB RUL adenocarcinoma lung cancer, s/p wedge resection in Jan 2020          10/15/20 Surg Onc, Dr Yokasta Lynch  Pt has healing skin changes from RT  She has some discomfort along the chest wall  Advised to use warm compresses to the area  HOA based on exam   Schedule mammogram for March 2021 and return for f/u in 6 months      11/9/20 CT chest wo contrast (ordered by thoracic surgery)  Nothing to indicate recurrent or metastatic disease    Improving right breast seroma      11/11/20 Thoracic surgery follow-up  CT scan on 11/9/20 showed normal post-op changes in right chest  No new lung masses or lesions  No lymphadenopathy  Resolving right breast seroma  Discussed potentially starting gabapentin for right-sided chest wall pain secondary to nerve issues  She had s/e to this medication previously and does not want to go back on it  Recommend tylenol or icing right chest after activities  Continue surveillance with CT chest in June 2021 12/28/20 Dr Yokasta Lynch follow-up for pain in right sided lateral chest wall region  Discussed role of neuropathic pain medications  She is not interested in this   Advised her to do massage to scar and warm compresses as needed      3/12/21 Surg NEYDA Levy/  [facility]  Thyroid nodule follow-up  (thyroid cancer 16 yrs ago)  Ultrasound of her thyroid on March 4, 2021 again revealed a left mid gland nodule measuring 0 5 x 0 5 x 0 4 cm  No new nodules  No concerns on exam  Pt prefers to continue annual ultrasounds      3/29/21 Mammo diagnostic bilateral w 3d & cad  Right breast:  New post treatment changes   There is an oval mass in the lumpectomy site which is consistent with the clinically suspected hematoma/seroma   Patient states this has been decreasing in size since her surgery   No ultrasound performed today   If clinically indicated, the patient can return for an ultrasound      Left breast:  No mammographic evidence of malignancy       ASSESSMENT/BI-RADS CATEGORY:  Left: 1 - Negative  Right: 2 - Benign  Overall: 2 - Benign        4/14/21 Dr Kusum Koo  6/7/21 CT chest  6/9/21 Thoracic surgery  7/8/21 GYN       Historical Information   Oncology History   Ductal carcinoma in situ (DCIS) of right breast   11/20/2019 Initial Diagnosis    Ductal carcinoma in situ (DCIS) of right breast     5/22/2020 Surgery    A  Breast, right, lumpectomy:  -  Ductal carcinoma in situ, low nuclear grade       B  Breast, right new inferior margin, excision:  -  Ductal carcinoma in situ, low nuclear grade involving intraductal papilloma      C  Breast, right new lateral margin, excision:  -  Ductal carcinoma in situ, low nuclear grade      D  Breast, right new posterior margin, excision:  -  Benign breast parenchyma, negative for atypia or malignancy       6/9/2020 -  Cancer Staged    Staging form: Breast, AJCC 8th Edition  - Clinical: cT0, cN0, cM0 - Signed by Leonie Ramirez MD on 6/9/2020  Laterality: Right       6/9/2020 -  Cancer Staged    Staging form: Breast, AJCC 8th Edition  - Pathologic: Stage 0 (pTis (DCIS), pN0, cM0, ER+, [place]+, HER2-) - Signed by Leonie Ramirez MD on 6/16/2020  Laterality: Right  Method of lymph node assessment: Clinical  Nuclear grade: G1       7/30/2020 - 8/28/2020 Radiation    Plan ID Energy Fractions Dose per Fraction (cGy) Dose Correction (cGy) Total Dose Delivered (cGy) Elapsed Days   R Breast 6X 16 / 16 266 0 4,256 21   R Brst Boost 6X 5 / 5 200 0 1,000 4        Primary adenocarcinoma of upper lobe of right lung ([facility] [address] ) (Resolved)   12/2/2019 Initial Diagnosis    Primary adenocarcinoma of upper lobe of right lung ([facility] [address] )     1/17/2020 Surgery    Lung, right upper lobe, wedge resection:  -  Invasive moderately differentiated adenocarcinoma    Stage IB         Past Medical History:   Diagnosis Date    Anesthesia     "cheap date"    Arthritis     Atrial fibrillation ([facility] [address] )     Benign polyp of large intestine     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    Colon cancer [facility]) 44    34 yo    Colon polyp     Full dentures     full upper/ partial lower    GERD (gastroesophageal reflux disease)     Glaucoma suspect     History of neck problems     "C3-C7 and had epidural injections years ago"    History of right breast cancer     Hyperlipidemia     Hypertension     Lung cancer ([facility] [address] )     jan 2020- had surgery    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[facility] [address] )     Prediabetes     Primary adenocarcinoma of upper lobe of right lung ([facility] [address] ) 12/2/2019    Pulmonary nodules 2/19/2015    Refusal of blood transfusions as patient is Mosque     Thyroid cancer ([facility] [address] ) 2009    Tic disorder, transient, recurrent     not recent    Walks frequently      Past Surgical History:   Procedure Laterality Date    APPENDECTOMY      ARTHROSCOPY KNEE      BREAST BIOPSY Right 04/10/2017    US guided - intraductal papillomas    BREAST BIOPSY Right 10/24/2019    ADH    BREAST LUMPECTOMY W/ NEEDLE LOCALIZATION Right 5/22/2020    Procedure: EXCISIONAL BIOPSY;  1000 NEEDLE LOC;  Surgeon: Iveth Soriano MD;  Location: [facility];  Service: Surgical Oncology    CARDIOVASCULAR STRESS TEST      CATARACT EXTRACTION Bilateral     COLON SURGERY      COLONOSCOPY      done 2010 due 2015 [address] AND CURETTAGE OF UTERUS      ESOPHAGOGASTRODUODENOSCOPY      inflammation aonly    HEMICOLECTOMY Right     HYSTERECTOMY      JOINT REPLACEMENT      L knee    LUNG SURGERY      MAMMO NEEDLE LOCALIZATION RIGHT (ALL INC) Right 2020    MAMMO NEEDLE LOCALIZATION RIGHT (ALL INC) EACH ADD Right 2020    MAMMO STEREOTACTIC BREAST BIOPSY RIGHT (ALL INC) Right 10/24/2019    PARATHYROID GLAND SURGERY      exploration     ND BRONCHOSCOPY,DIAGNOSTIC N/A 2020    Procedure: FLEXIBLE BRONCHOSCOPY;  Surgeon: Sergio Torres MD;  [facility]: [facility];  Service: Thoracic    ND THORACOSCOPY SURG LOBECTOMY Right 2020    Procedure: ROBOTIC ASSISTED COMPLETE LUNG LOBECTOMY;  Surgeon: Sergio Torres MD;  [facility];  Service: Thoracic    ND THORACOSCOPY W/THERA WEDGE RESEXN INITIAL UNILAT Right 2020    Procedure: ROBOTIC ASSISTED UPPER-LOBE WEDGE RESECTION, MEDIASTINAL LYMPH NODE DISECTION;  Surgeon: Sergio Torres MD;  [facility];  Service: Thoracic    REPLACEMENT TOTAL KNEE      THYROID SURGERY      TOTAL ABDOMINAL HYSTERECTOMY  1983    TOTAL THYROIDECTOMY      US GUIDED BREAST BIOPSY RIGHT COMPLETE Right 4/10/2017    US GUIDED THYROID BIOPSY  2020       Social History   Social History     Substance and Sexual Activity   Alcohol Use Yes    Alcohol/week: 5 0 standard drinks    Types: 5 Cans of beer per week    Frequency: 4 or more times a week    Drinks per session: 1 or 2    Binge frequency: Never    Comment: occasional beer with food     Social History     Substance and Sexual Activity   Drug Use No     Social History     Tobacco Use   Smoking Status Former Smoker    Packs/day: 1 50    Years: 20 00    Pack years: 30 00    Types: Cigarettes    Quit date: 5    Years since quittin 2   Smokeless Tobacco Never Used         Meds/Allergies     Current Outpatient Medications:     apixaban (ELIQUIS) 5 mg, Take 5 mg by mouth 2 (two) times a day To stop 2 days prior to surgery per md, Disp: , Rfl:     atorvastatin (LIPITOR) 80 mg tablet, Take 1 tablet (80 mg total) by mouth daily, Disp: 90 tablet, Rfl: 1    Coenzyme Q10 (CO Q 10 PO), Take 200 mg by mouth, Disp: , Rfl:     FIBER PO, Take 1 capsule by mouth daily as needed, Disp: , Rfl:     Hydrocortisone Acetate (Vagisil) 1 % CREA, Apply topically, Disp: , Rfl:     levothyroxine 100 mcg tablet, Take 1 tablet (100 mcg total) by mouth daily, Disp: 90 tablet, Rfl: 1    Multiple Vitamins-Calcium (ESSENTIAL ONE DAILY MULTIVIT PO), Take by mouth daily , Disp: , Rfl:     mupirocin (BACTROBAN) 2 % ointment, APPLY TO SORE AREA ON BOTTOM TWICE DAILY UNTIL HEALED, Disp: , Rfl:     Omega-3 Fatty Acids (FISH OIL PO), Take 2 g by mouth daily , Disp: , Rfl:     omeprazole (PriLOSEC) 20 mg delayed release capsule, TAKE 1 CAPSULE (20 MG TOTAL) BY MOUTH AS NEEDED (GERD), Disp: 90 capsule, Rfl: 0    diltiazem (CARDIZEM CD) 120 mg 24 hr capsule, Take 120 mg by mouth every evening , Disp: , Rfl:   Allergies   Allergen Reactions    Bee Venom      Tongue swelling    Flurbiprofen Hives    Ibuprofen Shortness Of Breath    Levaquin [Levofloxacin In D5w] Other (See Comments)     Dark spots under eyes and wrists    Tequin [Gatifloxacin] Rash         Review of Systems   Constitutional: Positive for fatigue (improving)  HENT: Negative  Eyes: Negative  Respiratory: Negative  Cardiovascular: Negative  Gastrointestinal: Positive for abdominal pain (RUQ tender and feels tender, since surgery on lung)  Endocrine: Negative  Genitourinary: Negative  Musculoskeletal: Negative  Skin:        Skin looks normal  And seroma is getting smaller  She does massage the area of the seroma daily  Allergic/Immunologic: Negative  Neurological: Negative  Hematological: Negative  Psychiatric/Behavioral: Negative           OBJECTIVE:   BP 98/64   Pulse 94   Temp (!) 97 4 °F (36 3 °C)   Resp 16   Ht 5' 4" (1 626 m)   Wt 75 8 kg (167 lb)   SpO2 97%   BMI 28 67 kg/m²   Pain Assessment:  0  ECOG/Zubrod/WHO: 1 - Symptomatic but completely ambulatory    Physical Exam     She is conversing appropriately  Her breathing is unlabored  There is no supraclavicular axillary adenopathy palpable  The skin in the radiated field is in good condition  She continues to have a induration at the primary site however this is smaller in size  No breast or arm edema  Abdomen soft nontender  She is ambulating independently  RESULTS    Lab Results: No results found for this or any previous visit (from the past 672 hour(s))  Imaging Studies:Mammo Diagnostic Bilateral W 3d & Cad    Result Date: 3/29/2021  Narrative: DIAGNOSIS: Ductal carcinoma in situ (DCIS) of right breast TECHNIQUE: Digital diagnostic mammography was performed  Computer Aided Detection (CAD) analyzed all applicable images  Ultrasound of the bilateral breast(s) was performed  COMPARISONS: Prior breast imaging dated: 05/22/2020, 03/26/2020, 10/17/2019, 04/11/2019, 04/04/2019, 03/29/2018, 10/26/2017, 04/10/2017, 03/30/2017, 03/17/2016, 03/11/2015, and 02/27/2014, 02/21/2013, 02/14/2014, 02/10/2011, 02/04/2010  RELEVANT HISTORY: Family Breast Cancer History: History of breast cancer in Paternal [de-identified]  Family Medical History: Family medical history includes breast cancer in paternal aunt  Personal History: No known relevant hormone history  Surgical history includes breast biopsy, lumpectomy, and hysterectomy  Medical history includes colon cancer  RISK ASSESSMENT: Tyrer-Cuzick risk assessment reporting was suppressed due to the patient's history and/or demographic factors  TISSUE DENSITY: The breasts are heterogeneously dense, which may obscure small masses  INDICATION: Shruthi Bryant is a 78 y o  female presenting for yr  Patient had high risk lesion in the right breast and then was diagnosed with lung cancer    When she had lumpectomy for the high risk lesion in the right breast the pathology was DCIS  She has also had right breast radiation therapy  FINDINGS: RIGHT 3) POST-SURGICAL FINDING:  There are new surgical changes consistent with lumpectomy at 12 o'clock  There is an oval mass in the lumpectomy site, consistent with a hematoma/seroma  This is marked with a triangle marker  The patient and Dr Neha Navarro note both state this is decreasing in size  There is skin thickening consistent with radiation therapy  There are no suspicious grouped microcalcifications, areas of unexplained architectural distortion or suspicious masses  LEFT There are no suspicious masses, grouped microcalcifications or areas of architectural distortion  The skin and nipple areolar complex are unremarkable  Two small oval masses demonstrate long-term stability, consistent with benign findings  Impression: Right breast:  New post treatment changes  There is an oval mass in the lumpectomy site which is consistent with the clinically suspected hematoma/seroma  Patient states this has been decreasing in size since her surgery  No ultrasound performed today  If clinically indicated, the patient can return for an ultrasound  Left breast:  No mammographic evidence of malignancy  ASSESSMENT/BI-RADS CATEGORY: Left: 1 - Negative Right: 2 - Benign Overall: 2 - Benign RECOMMENDATION:      - Diagnostic mammogram in 1 year for both breasts  Workstation ID: [id number]           Assessment/Plan:  No orders of the defined types were placed in this encounter  Rajendra Aiken is a 78y o  year old female   Who is 8 months status post adjuvant right breast irradiation for DCIS  She has no clinical evidence of recurrence  She underwent mammogram last week which was stable  She does have a seroma / hematoma which continues to decrease in size  She will follow-up in 1 year        Lupe Amaro MD  4/6/2021,8:26 AM    Portions of the record may have been created with voice recognition software   Occasional wrong word or "sound a like" substitutions may have occurred due to the inherent limitations of voice recognition software   Read the chart carefully and recognize, using context, where substitutions have occurred

## 2021-04-14 ENCOUNTER — OFFICE VISIT (OUTPATIENT)
Dept: SURGICAL ONCOLOGY | Facility: CLINIC | Age: 81
End: 2021-04-14
Payer: COMMERCIAL

## 2021-04-14 VITALS
HEIGHT: 65 IN | HEART RATE: 62 BPM | TEMPERATURE: 97.1 F | SYSTOLIC BLOOD PRESSURE: 132 MMHG | DIASTOLIC BLOOD PRESSURE: 82 MMHG | WEIGHT: 167 LBS | BODY MASS INDEX: 27.82 KG/M2

## 2021-04-14 DIAGNOSIS — Z86.000 PERSONAL HISTORY OF IN-SITU NEOPLASM OF BREAST: ICD-10-CM

## 2021-04-14 DIAGNOSIS — Z85.3 ENCOUNTER FOR FOLLOW-UP SURVEILLANCE OF BREAST CANCER: Primary | ICD-10-CM

## 2021-04-14 DIAGNOSIS — R92.2 DENSE BREAST TISSUE: ICD-10-CM

## 2021-04-14 DIAGNOSIS — Z08 ENCOUNTER FOR FOLLOW-UP SURVEILLANCE OF BREAST CANCER: Primary | ICD-10-CM

## 2021-04-14 PROBLEM — Z92.3 S/P RADIOTHERAPY: Status: RESOLVED | Noted: 2020-10-15 | Resolved: 2021-04-14

## 2021-04-14 PROCEDURE — 99213 OFFICE O/P EST LOW 20 MIN: CPT | Performed by: SURGERY

## 2021-04-14 PROCEDURE — 3079F DIAST BP 80-89 MM HG: CPT | Performed by: SURGERY

## 2021-04-14 PROCEDURE — 3075F SYST BP GE 130 - 139MM HG: CPT | Performed by: SURGERY

## 2021-04-14 PROCEDURE — 1036F TOBACCO NON-USER: CPT | Performed by: SURGERY

## 2021-04-14 PROCEDURE — 1160F RVW MEDS BY RX/DR IN RCRD: CPT | Performed by: SURGERY

## 2021-04-14 NOTE — PROGRESS NOTES
Surgical Oncology Follow Up       [address] [facility] SURGICAL ONCOLOGY [facility] [address]    Alicia Balderas  1940  [id number]  [facility] SURGICAL ONCOLOGY [place]  [place] [id number]    Chief Complaint   Patient presents with    Follow-up       Assessment/Plan   Diagnoses and all orders for this visit:    Encounter for follow-up surveillance of breast cancer    Dense breast tissue    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        Advance Care Planning/Advance Directives:  Discussed disease status, cancer treatment plans and/or cancer treatment goals with the patient  Oncology History:    Oncology History   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   11/20/2019 Initial Diagnosis    Ductal carcinoma in situ (DCIS) of right breast     5/22/2020 Surgery    A  Breast, right, lumpectomy:  -  Ductal carcinoma in situ, low nuclear grade       B  Breast, right new inferior margin, excision:  -  Ductal carcinoma in situ, low nuclear grade involving intraductal papilloma      C  Breast, right new lateral margin, excision:  -  Ductal carcinoma in situ, low nuclear grade      D  Breast, right new posterior margin, excision:  -  Benign breast parenchyma, negative for atypia or malignancy       6/9/2020 -  Cancer Staged    Staging form: Breast, AJCC 8th Edition  - Clinical: cT0, cN0, cM0 - Signed by Yoli Gillis MD on 6/9/2020  Laterality: Right       6/9/2020 -  Cancer Staged    Staging form: Breast, AJCC 8th Edition  - Pathologic: Stage 0 (pTis (DCIS), pN0, cM0, ER+, FL+, HER2-) - Signed by Yoli Gillis MD on 6/16/2020  Laterality: Right  Method of lymph node assessment: Clinical  Nuclear grade: G1       7/30/2020 - 8/28/2020 Radiation    Plan ID Energy Fractions Dose per Fraction (cGy) Dose Correction (cGy) Total Dose Delivered (cGy) Elapsed Days   R Breast 6X 16 / 16 266 0 4,256 21   R Brst Boost 6X 5 / 5 200 0 1,000 4        Primary adenocarcinoma of upper lobe of right lung ([facility] ) (Resolved)   12/2/2019 Initial Diagnosis    Primary adenocarcinoma of upper lobe of right lung ([facility] )     1/17/2020 Surgery    Lung, right upper lobe, wedge resection:  -  Invasive moderately differentiated adenocarcinoma  Stage IB         History of Present Illness:   Breast cancer follow-up, continues to have discomfort along the lateral right breast and upper abdominal wall on the right side  -Interval History: recent mammogram    Review of Systems:  Review of Systems   Constitutional: Negative  Negative for appetite change and fever  Eyes: Negative  Respiratory: Negative for shortness of breath  Cardiovascular: Negative  Gastrointestinal: Negative  Endocrine: Negative  Genitourinary: Negative  Musculoskeletal: Negative  Negative for arthralgias and myalgias  Skin: Negative  Allergic/Immunologic: Negative  Neurological: Negative  Hematological: Negative  Negative for adenopathy  Does not bruise/bleed easily  Psychiatric/Behavioral: Negative          Patient Active Problem List   Diagnosis    A-fib ([place] [address] )    Benign essential hypertension    Coagulopathy (HCC)    History of colon cancer    Overweight    Osteoarthritis    Postoperative hypothyroidism    Mixed hyperlipidemia    Medicare annual wellness visit, subsequent    Osteopenia of multiple sites    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    Dense breast tissue    Hashimoto's thyroiditis    History of lung cancer    Thyroid nodule    Encounter for follow-up surveillance of breast cancer     Past Medical History:   Diagnosis Date    Anesthesia     "cheap date"    Arthritis     Atrial fibrillation ([facility] )     Benign polyp of large intestine     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 )     jan 2020- had surgery    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[facility] )     Prediabetes     Primary adenocarcinoma of upper lobe of right lung ([facility] ) 12/2/2019    Pulmonary nodules 2/19/2015    Refusal of blood transfusions as patient is Cong[employer]     Tic disorder, transient, recurrent     not recent    Walks frequently      Past Surgical History:   Procedure Laterality Date    APPENDECTOMY      ARTHROSCOPY KNEE      BREAST BIOPSY Right 04/10/2017    US guided - intraductal papillomas    BREAST BIOPSY Right 10/24/2019    ADH    BREAST LUMPECTOMY W/ NEEDLE LOCALIZATION Right 5/22/2020    Procedure: EXCISIONAL BIOPSY;  1000 NEEDLE LOC;  Surgeon: Ildefonso Machuca MD;  Location: [facility];  Service: Surgical Oncology    CARDIOVASCULAR STRESS TEST      CATARACT EXTRACTION Bilateral     COLON SURGERY      COLONOSCOPY      done 2010 due 2015 Dr Kuo    DILATION AND CURETTAGE OF UTERUS      ESOPHAGOGASTRODUODENOSCOPY      inflammation aonly    HEMICOLECTOMY Right 1977    HYSTERECTOMY      JOINT REPLACEMENT      L knee    LUNG SURGERY      MAMMO NEEDLE LOCALIZATION RIGHT (ALL INC) Right 5/22/2020    MAMMO NEEDLE LOCALIZATION RIGHT (ALL INC) EACH ADD Right 5/22/2020    MAMMO STEREOTACTIC BREAST BIOPSY RIGHT (ALL INC) Right 10/24/2019    PARATHYROID GLAND SURGERY      exploration     CA BRONCHOSCOPY,DIAGNOSTIC N/A 1/17/2020    Procedure: Latonya Jenkins;  Surgeon: Kerry Nascimento MD;  [facility];  Service: Thoracic    CA THORACOSCOPY SURG LOBECTOMY Right 1/17/2020    Procedure: ROBOTIC ASSISTED COMPLETE LUNG LOBECTOMY;  Surgeon: Kerry Nascimento MD;  [facility];  Service: Thoracic    CA THORACOSCOPY W/THERA WEDGE RESEXN INITIAL UNILAT Right 1/17/2020    Procedure: ROBOTIC ASSISTED UPPER-LOBE WEDGE RESECTION, MEDIASTINAL LYMPH NODE DISECTION;  Surgeon: Lydia Moore MD;  [facility];  Service: Thoracic    REPLACEMENT TOTAL KNEE      THYROID SURGERY      TOTAL ABDOMINAL HYSTERECTOMY      TOTAL THYROIDECTOMY      US GUIDED BREAST BIOPSY RIGHT COMPLETE Right 4/10/2017    US GUIDED THYROID BIOPSY  2020     Family History   Problem Relation Age of Onset    Esophageal cancer Mother         [de-identified]    Hypertension Mother     Stroke Family         TIA    Breast cancer Paternal Aunt 80    No Known Problems Father     COPD Sister     COPD Sister     No Known Problems Maternal Grandmother     No Known Problems Maternal Grandfather     No Known Problems Paternal Grandmother     No Known Problems Paternal Grandfather     No Known Problems Maternal Aunt     No Known Problems Maternal Aunt     No Known Problems Maternal Aunt     No Known Problems Maternal Aunt     No Known Problems Maternal Aunt      Social History     Socioeconomic History    Marital status: Single     Spouse name: Not on file    Number of children: Not on file    Years of education: Not on file    Highest education level: Not on file   Occupational History    Not on file   Social Needs    Financial resource strain: Not on file    Food insecurity     Worry: Not on file     Inability: Not on file   [employer] needs     Medical: Not on file     Non-medical: Not on file   Tobacco Use    Smoking status: Former Smoker     Packs/day: 1 50     Years: 20 00     Pack years: 30 00     Types: Cigarettes     Quit date: 5     Years since quittin 3    Smokeless tobacco: Never Used   Substance and Sexual Activity    Alcohol use:  Yes     Alcohol/week: 5 0 standard drinks     Types: 5 Cans of beer per week     Frequency: 4 or more times a week     Drinks per session: 1 or 2     Binge frequency: Never     Comment: occasional beer with food    Drug use: No    Sexual activity: Not on file   Lifestyle    Physical activity Days per week: Not on file     Minutes per session: Not on file    Stress: Not on file   Relationships    Social connections     Talks on phone: Not on file     Gets together: Not on file     Attends Oriental orthodox service: Not on file     Active member of club or organization: Not on file     Attends meetings of clubs or organizations: Not on file     Relationship status: Not on file    Intimate partner violence     Fear of current or ex partner: Not on file     Emotionally abused: Not on file     Physically abused: Not on file     Forced sexual activity: Not on file   Other Topics Concern    Not on file   Social History Narrative    Caffeine use- 1-3, 8oz coffees per day    Denied history of housing without smoke detectors    Always uses a seatbelt       Current Outpatient Medications:     apixaban (ELIQUIS) 5 mg, Take 5 mg by mouth 2 (two) times a day To stop 2 days prior to surgery per md, Disp: , Rfl:     atorvastatin (LIPITOR) 80 mg tablet, Take 1 tablet (80 mg total) by mouth daily, Disp: 90 tablet, Rfl: 1    Coenzyme Q10 (CO Q 10 PO), Take 200 mg by mouth, Disp: , Rfl:     FIBER PO, Take 1 capsule by mouth daily as needed, Disp: , Rfl:     Hydrocortisone Acetate (Vagisil) 1 % CREA, Apply topically, Disp: , Rfl:     levothyroxine 100 mcg tablet, Take 1 tablet (100 mcg total) by mouth daily, Disp: 90 tablet, Rfl: 1    Multiple Vitamins-Calcium (ESSENTIAL ONE DAILY MULTIVIT PO), Take by mouth daily , Disp: , Rfl:     mupirocin (BACTROBAN) 2 % ointment, APPLY TO SORE AREA ON BOTTOM TWICE DAILY UNTIL HEALED, Disp: , Rfl:     Omega-3 Fatty Acids (FISH OIL PO), Take 2 g by mouth daily , Disp: , Rfl:     omeprazole (PriLOSEC) 20 mg delayed release capsule, TAKE 1 CAPSULE (20 MG TOTAL) BY MOUTH AS NEEDED (GERD), Disp: 90 capsule, Rfl: 0    diltiazem (CARDIZEM CD) 120 mg 24 hr capsule, Take 120 mg by mouth every evening , Disp: , Rfl:   Allergies   Allergen Reactions    Bee Venom      Tongue swelling  Flurbiprofen Hives    Ibuprofen Shortness Of Breath    Levaquin [Levofloxacin In D5w] Other (See Comments)     Dark spots under eyes and wrists    Tequin [Gatifloxacin] Rash       The following portions of the patient's history were reviewed and updated as appropriate: allergies, current medications, past family history, past medical history, past social history, past surgical history and problem list         Vitals:    04/14/21 0952   BP: 132/82   Pulse: 62   Temp: (!) 97 1 °F (36 2 °C)       Physical Exam  Constitutional:       General: She is not in acute distress  Appearance: She is well-developed  HENT:      Head: Normocephalic and atraumatic  Cardiovascular:      Heart sounds: Normal heart sounds  Pulmonary:      Breath sounds: Normal breath sounds  Chest:      Breasts:         Right: Skin change (  LumpectomyScar with residual hematoma /seroma) and tenderness ( farLateral and upper abdominal wall from her thoracotomy) present  No inverted nipple, mass or nipple discharge  Left: No inverted nipple, mass, nipple discharge, skin change or tenderness  Abdominal:      Palpations: Abdomen is soft  Lymphadenopathy:      Upper Body:      Right upper body: No supraclavicular, axillary or pectoral adenopathy  Left upper body: No supraclavicular, axillary or pectoral adenopathy  Neurological:      Mental Status: She is alert and oriented to person, place, and time  Psychiatric:         Mood and Affect: Mood normal            Results:  Labs:      Imaging   03/29/2021 bilateral 3D diagnostic mammogram was benign BI-RADS two with a density of three, there are postsurgical changes with the seroma present    I reviewed the above imaging data  Discussion/Summary: 78-year-old female status post right breast conservation for ductal carcinoma in situ  She did have radiation therapy  Her postoperative course was complicated by a hematoma    She continues to have residual hematoma in the lumpectomy bed  The pain she is having is likely secondary to her thoracic surgery for the lung cancer  I advised her to do massage to the breast as well as the upper chest wall scar tissue in use warm compresses as needed  There is however no evidence of disease based on examination today or recent mammogram   I will therefore see her again in six months or sooner should the need arise

## 2021-05-13 ENCOUNTER — TRANSCRIBE ORDERS (OUTPATIENT)
Dept: ADMINISTRATIVE | Facility: HOSPITAL | Age: 81
End: 2021-05-13

## 2021-05-13 DIAGNOSIS — Z79.01 CHRONIC ANTICOAGULATION: ICD-10-CM

## 2021-05-13 DIAGNOSIS — K21.9 GASTROESOPHAGEAL REFLUX DISEASE WITHOUT ESOPHAGITIS: ICD-10-CM

## 2021-05-13 DIAGNOSIS — E66.01 MORBID OBESITY (HCC): ICD-10-CM

## 2021-05-13 DIAGNOSIS — I10 ESSENTIAL HYPERTENSION: Primary | ICD-10-CM

## 2021-05-13 DIAGNOSIS — E78.00 PURE HYPERCHOLESTEROLEMIA: ICD-10-CM

## 2021-05-13 DIAGNOSIS — I48.91 ATRIAL FIBRILLATION, UNSPECIFIED TYPE (HCC): ICD-10-CM

## 2021-06-07 ENCOUNTER — HOSPITAL ENCOUNTER (OUTPATIENT)
Dept: RADIOLOGY | Facility: HOSPITAL | Age: 81
Discharge: HOME | End: 2021-06-07
Payer: COMMERCIAL

## 2021-06-07 DIAGNOSIS — Z85.118 HISTORY OF LUNG CANCER: ICD-10-CM

## 2021-06-07 PROCEDURE — G1004 CDSM NDSC: HCPCS

## 2021-06-07 PROCEDURE — 71250 CT THORAX DX C-: CPT

## 2021-06-09 ENCOUNTER — OFFICE VISIT (OUTPATIENT)
Dept: CARDIAC SURGERY | Facility: CLINIC | Age: 81
End: 2021-06-09
Payer: COMMERCIAL

## 2021-06-09 VITALS
BODY MASS INDEX: 29.77 KG/M2 | HEART RATE: 71 BPM | OXYGEN SATURATION: 97 % | SYSTOLIC BLOOD PRESSURE: 126 MMHG | DIASTOLIC BLOOD PRESSURE: 66 MMHG | RESPIRATION RATE: 16 BRPM | WEIGHT: 174.38 LBS | TEMPERATURE: 96.8 F | HEIGHT: 64 IN

## 2021-06-09 DIAGNOSIS — Z85.118 HISTORY OF LUNG CANCER: Primary | ICD-10-CM

## 2021-06-09 PROCEDURE — 1036F TOBACCO NON-USER: CPT | Performed by: THORACIC SURGERY (CARDIOTHORACIC VASCULAR SURGERY)

## 2021-06-09 PROCEDURE — 1160F RVW MEDS BY RX/DR IN RCRD: CPT | Performed by: THORACIC SURGERY (CARDIOTHORACIC VASCULAR SURGERY)

## 2021-06-09 PROCEDURE — 3078F DIAST BP <80 MM HG: CPT | Performed by: THORACIC SURGERY (CARDIOTHORACIC VASCULAR SURGERY)

## 2021-06-09 PROCEDURE — 3074F SYST BP LT 130 MM HG: CPT | Performed by: THORACIC SURGERY (CARDIOTHORACIC VASCULAR SURGERY)

## 2021-06-09 PROCEDURE — 99214 OFFICE O/P EST MOD 30 MIN: CPT | Performed by: THORACIC SURGERY (CARDIOTHORACIC VASCULAR SURGERY)

## 2021-06-09 NOTE — LETTER
June 9, 2021     Megan Ahuja, [address]    Patient: Elodia Greco   YOB: 1940   Date of Visit: 6/9/2021       Dear Dr Ponce Re:      I saw Hong in the office today  She is doing well 1 5 years out from robotic right upper lobectomy for stage I B right upper lobe adenocarcinoma  She has no evidence of recurrent or metastatic disease  We will continue to follow this with serial CT imaging  I will see her back in December after repeat CT scan  Please reach out with any questions or concerns  Thank you       Sincerely,        Leandro Sheikh MD        CC: No Recipients  Leandro Sheikh MD  6/9/2021  9:01 AM  Incomplete  Thoracic Follow-Up  Assessment/Plan:     24-year-old female status post 01/17/2020 robotic right upper lobectomy for a stage I B right upper lobe adenocarcinoma doing well with no evidence of recurrent disease  She is doing well 1 5 years out from her robotic right upper lobectomy  Her surveillance CT shows no evidence of recurrent or metastatic disease  She has no major complaints and minor numbness at her incisions  This is not unexpected given her surgery  This should slightly get better with time but may persist   Overall this does not bother her limit her daily life  Would recommend continued surveillance imaging with a repeat noncontrast CT in 6 months  We will order this now and see her back afterwards  She will call us with any other questions or concerns      Leandro Sheikh MD      Diagnoses and all orders for this visit:    History of lung cancer  -     CT chest wo contrast; Future          Thoracic History   Problem: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  Procedure: right robotic diagnostic upper lobe wedge resection, completion lobectomy, mediastinal lymph node dissection 1/17/20  Pathology: Right upper lobe tumor consistent with invasive moderately differentiated adenocarcinoma, measuring 1 6 cm, with visceral pleura invasion  Lymph node levels 2R, 4R, 8R, 10R, 11R and 7 all negative for malignancy  8th Edition AJCC tumor Stage IB (pT2a, N0, M0, G2)        Subjective:    Patient ID: Jose Gunter is a 78 y o  female  HPI   Hong comes back to our office 1 5 years out from her robotic right upper lobectomy  Overall she is doing well  She denies any major complaints  No pain  No significant shortness of breath  She does have some minor numbness and occasional tingling near her incisions  She is not taking any medications for pain and her breathing does not significantly limit her activities  The following portions of the patient's history were reviewed and updated as appropriate: allergies, current medications, past family history, past medical history, past social history, past surgical history and problem list     Review of Systems   Constitutional: Negative for activity change, appetite change, chills, diaphoresis, fatigue, fever and unexpected weight change  HENT: Negative  Eyes: Negative  Respiratory: Negative for apnea, cough, chest tightness, shortness of breath, wheezing and stridor  Cardiovascular: Negative for chest pain, palpitations and leg swelling  Gastrointestinal: Negative for abdominal distention, abdominal pain, blood in stool, constipation, diarrhea, nausea and vomiting  Endocrine: Negative  Genitourinary: Negative  Musculoskeletal: Negative  Skin: Negative  Allergic/Immunologic: Negative  Neurological: Positive for numbness  Hematological: Negative  Psychiatric/Behavioral: Negative  Objective:   Physical Exam  Vitals signs and nursing note reviewed  Constitutional:       General: She is not in acute distress  Appearance: Normal appearance  She is well-developed and normal weight  She is not diaphoretic  Comments: Appears well in no acute distress     HENT:      Head: Normocephalic and atraumatic  Mouth/Throat:      Pharynx: No oropharyngeal exudate  Eyes:      General: No scleral icterus  Conjunctiva/sclera: Conjunctivae normal       Pupils: Pupils are equal, round, and reactive to light  Neck:      Musculoskeletal: Normal range of motion and neck supple  Thyroid: No thyromegaly  Vascular: No JVD  Trachea: No tracheal deviation  Cardiovascular:      Rate and Rhythm: Normal rate and regular rhythm  Heart sounds: Normal heart sounds  No murmur  No friction rub  No gallop  Pulmonary:      Effort: Pulmonary effort is normal  No respiratory distress  Breath sounds: Normal breath sounds  No wheezing or rales  Comments: Lungs clear to auscultation bilaterally  Robotic incisions well healed  No erythema  Chest:      Chest wall: No tenderness  Abdominal:      General: Bowel sounds are normal  There is no distension  Palpations: Abdomen is soft  There is no mass  Tenderness: There is no abdominal tenderness  There is no guarding or rebound  Musculoskeletal: Normal range of motion  General: No tenderness or deformity  Skin:     General: Skin is warm and dry  Coloration: Skin is not pale  Findings: No erythema or rash  Neurological:      General: No focal deficit present  Mental Status: She is alert and oriented to person, place, and time  Psychiatric:         Mood and Affect: Mood normal          Behavior: Behavior normal          Thought Content: Thought content normal          Judgment: Judgment normal      /66   Pulse 71   Temp (!) 96 8 °F (36 °C) (Temporal)   Resp 16   Ht 5' 4" (1 626 m)   Wt 79 1 kg (174 lb 6 1 oz)   SpO2 97%   BMI 29 93 kg/m²     No Chest XR results available for this patient  Ct Chest Wo Contrast    Result Date: 6/8/2021  Narrative CT CHEST WITHOUT IV CONTRAST INDICATION:   Z85 118: Personal history of other malignant neoplasm of bronchus and lung    Right upper lobectomy for adenocarcinoma on 1/17/2020  Completed radiation for right breast cancer on 8/28/2020  COMPARISON:  Chest CT from 11/9/2020, 7/27/2020, 12/9/2019, and 2/12/2015  TECHNIQUE: Chest CT without intravenous contrast   Axial, sagittal, coronal 2D reformats and coronal MIPS from source data  Radiation dose length product (DLP):  208 87 mGy-cm   Radiation dose exposure minimized using iterative reconstruction and automated exposure control  FINDINGS: LUNGS: 5 mm juxtapleural nodule in the lateral right lower lobe (3/79), stable since November 2020, slightly increased from July 2020, adjacent to a scar  Right upper lobectomy  No acute pulmonary disease  AIRWAYS: No significant filling defects  PLEURA:  Unremarkable  HEART/GREAT VESSELS:  Mild cardiomegaly  Mild coronary artery calcification indicating atherosclerotic heart disease  MEDIASTINUM AND SELENA:  Unremarkable  CHEST WALL AND LOWER NECK: Resection of the right lobe of the thyroid gland  Improving right breast seroma from 5 0 x 3 4 cm to 3 7 x 2 6 cm  UPPER ABDOMEN:  Benign fat-containing right Bochdalek hernia  OSSEOUS STRUCTURES: Mild degenerative disease in the spine  Impression No definite tumor recurrence  5 mm right lower lobe nodule, stable since November 2020, slightly increased from July 2020  This may be benign and part of an adjacent scar, but attention will be directed to this lesion on follow-up  Workstation performed: [id number]     Ct Chest Wo Contrast    Result Date: 11/10/2020  Narrative CT CHEST WITHOUT IV CONTRAST INDICATION:   Z85 118: Personal history of other malignant neoplasm of bronchus and lung  Right breast cancer, lumpectomy in May 2020, completed radiation on 8/20/2020  Right upper lobectomy for adenocarcinoma on 1/17/2020  COMPARISON:  Chest CT from 1/27/2020 and 12/9/2019  PET CT from 12/20/2019   TECHNIQUE: CT examination of the chest was performed without intravenous contrast   Axial, sagittal, and coronal 2D reformatted images were created from the source data and submitted for interpretation  Radiation dose length product (DLP) for this visit:  166 mGy-cm   This examination, like all CT scans performed in the [facility], was performed utilizing techniques to minimize radiation dose exposure, including the use of iterative reconstruction and automated exposure control  FINDINGS: LUNGS:  Nothing to indicate recurrent tumor or metastatic disease  No acute disease  Mild bilateral atelectasis/scar  Right upper lobectomy  No significant filling defects in the trachea and bronchi  PLEURA:  Unremarkable  HEART/GREAT VESSELS:  Mild coronary artery calcification indicating atherosclerotic heart disease  Cyst in the right hepatic lobe (2/54), unchanged since 2015  MEDIASTINUM AND SELENA:  Unremarkable  CHEST WALL AND LOWER NECK:   Decrease in postoperative right breast seroma from 7 1 x 4 6 cm to 5 0 x 3 4 cm  Resection of the right lobe of the thyroid gland  THE UPPER ABDOMEN:  Unremarkable  OSSEOUS STRUCTURES:  Mild degenerative disease in the spine  No change in benign sclerosis in the lateral right 5th rib since 2015  Impression Nothing to indicate recurrent or metastatic disease  Improving right breast seroma  Workstation performed: [id number]     Ct Chest Wo Contrast    Result Date: 7/28/2020  Narrative CT CHEST WITHOUT IV CONTRAST INDICATION:   C34 91: Malignant neoplasm of unspecified part of right bronchus or lung  Postop follow-up  Patient had a right breast lumpectomy on May 22, 2024 DCIS  COMPARISON:  PET scan from 12/20/2019 and chest CT from 12/9/2019 TECHNIQUE: CT examination of the chest was performed without intravenous contrast   Axial, sagittal, and coronal 2D reformatted images were created from the source data and submitted for interpretation  Radiation dose length product (DLP) for this visit:  185 7 mGy-cm     This examination, like all CT scans performed in the [facility], was performed utilizing techniques to minimize radiation dose exposure, including the use of iterative reconstruction and automated exposure control  FINDINGS: LUNGS:  There has been right upper lobectomy  There are no lung nodules or suspicious lung findings at this time  Airways are clear  PLEURA:  Unremarkable  HEART/GREAT VESSELS:  Unremarkable for patient's age  MEDIASTINUM AND SELENA:  Unremarkable  CHEST WALL AND LOWER NECK:   There is a large somewhat hyperdense masslike collection within the medial aspect of the right breast which is new compared with the prior study  It measures 4 6 x 7 1 cm and is most likely a very large postoperative hematoma/seroma  Internal attenuation is 50 Hounsfield units  The margins are relatively smooth and circumscribed  Included portion of the left breast is grossly unremarkable  VISUALIZED STRUCTURES IN THE UPPER ABDOMEN:  There is a low-density round benign-appearing lesion in the right hepatic lobe on image 54 series 2 which measures 8-9 mm and is most compatible with a cyst   It was present on prior CT and is unchanged  OSSEOUS STRUCTURES:  No acute fracture or destructive osseous lesion  Impression Interval right upper lobectomy without evidence of any significant lung disease at this time  Large masslike collection within the right breast presumably representing postoperative hematoma/seroma  This could be assessed with ultrasound if deemed clinically appropriate  Workstation performed: [id number]       I personally reviewed her CT scan in PACs  There is no suspicious masses  No concerning adenopathy  There is small 5 mm nodule that is been stable  No other concerning findings      Maribel Munguia MD  Thoracic Surgeon    (Available by [facility] Text)

## 2021-06-09 NOTE — PROGRESS NOTES
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 doing well with no evidence of recurrent disease  She is doing well 1 5 years out from her robotic right upper lobectomy  Her surveillance CT shows no evidence of recurrent or metastatic disease  She has no major complaints and minor numbness at her incisions  This is not unexpected given her surgery  This should slightly get better with time but may persist   Overall this does not bother her limit her daily life  Would recommend continued surveillance imaging with a repeat noncontrast CT in 6 months  We will order this now and see her back afterwards  She will call us with any other questions or concerns  John Naik MD      Diagnoses and all orders for this visit:    History of lung cancer  -     CT chest wo contrast; Future          Thoracic History   Problem: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  Procedure: right robotic diagnostic upper lobe wedge resection, completion lobectomy, mediastinal lymph node dissection 1/17/20  Pathology: Right upper lobe tumor consistent with invasive moderately differentiated adenocarcinoma, measuring 1 6 cm, with visceral pleura invasion  Lymph node levels 2R, 4R, 8R, 10R, 11R and 7 all negative for malignancy  8th Edition AJCC tumor Stage IB (pT2a, N0, M0, G2)        Subjective:    Patient ID: Genoveva Taylor is a 78 y o  female  HPI   [place] comes back to our office 1 5 years out from her robotic right upper lobectomy  Overall she is doing well  She denies any major complaints  No pain  No significant shortness of breath  She does have some minor numbness and occasional tingling near her incisions  She is not taking any medications for pain and her breathing does not significantly limit her activities        The following portions of the patient's history were reviewed and updated as appropriate: allergies, current medications, past family history, past medical history, past social history, past surgical history and problem list     Review of Systems   Constitutional: Negative for activity change, appetite change, chills, diaphoresis, fatigue, fever and unexpected weight change  HENT: Negative  Eyes: Negative  Respiratory: Negative for apnea, cough, chest tightness, shortness of breath, wheezing and stridor  Cardiovascular: Negative for chest pain, palpitations and leg swelling  Gastrointestinal: Negative for abdominal distention, abdominal pain, blood in stool, constipation, diarrhea, nausea and vomiting  Endocrine: Negative  Genitourinary: Negative  Musculoskeletal: Negative  Skin: Negative  Allergic/Immunologic: Negative  Neurological: Positive for numbness  Hematological: Negative  Psychiatric/Behavioral: Negative  Objective:   Physical Exam  Vitals signs and nursing note reviewed  Constitutional:       General: She is not in acute distress  Appearance: Normal appearance  She is well-developed and normal weight  She is not diaphoretic  Comments: Appears well in no acute distress  HENT:      Head: Normocephalic and atraumatic  Mouth/Throat:      Pharynx: No oropharyngeal exudate  Eyes:      General: No scleral icterus  Conjunctiva/sclera: Conjunctivae normal       Pupils: Pupils are equal, round, and reactive to light  Neck:      Musculoskeletal: Normal range of motion and neck supple  Thyroid: No thyromegaly  Vascular: No JVD  Trachea: No tracheal deviation  Cardiovascular:      Rate and Rhythm: Normal rate and regular rhythm  Heart sounds: Normal heart sounds  No murmur  No friction rub  No gallop  Pulmonary:      Effort: Pulmonary effort is normal  No respiratory distress  Breath sounds: Normal breath sounds  No wheezing or rales        Comments: Lungs clear to auscultation bilaterally  Robotic incisions well healed  No erythema  Chest:      Chest wall: No tenderness  Abdominal:      General: Bowel sounds are normal  There is no distension  Palpations: Abdomen is soft  There is no mass  Tenderness: There is no abdominal tenderness  There is no guarding or rebound  Musculoskeletal: Normal range of motion  General: No tenderness or deformity  Skin:     General: Skin is warm and dry  Coloration: Skin is not pale  Findings: No erythema or rash  Neurological:      General: No focal deficit present  Mental Status: She is alert and oriented to person, place, and time  Psychiatric:         Mood and Affect: Mood normal          Behavior: Behavior normal          Thought Content: Thought content normal          Judgment: Judgment normal      /66   Pulse 71   Temp (!) 96 8 °F (36 °C) (Temporal)   Resp 16   Ht 5' 4" (1 626 m)   Wt 79 1 kg (174 lb 6 1 oz)   SpO2 97%   BMI 29 93 kg/m²     No Chest XR results available for this patient  Ct Chest Wo Contrast    Result Date: 6/8/2021  Narrative CT CHEST WITHOUT IV CONTRAST INDICATION:   Z85 118: Personal history of other malignant neoplasm of bronchus and lung  Right upper lobectomy for adenocarcinoma on 1/17/2020  Completed radiation for right breast cancer on 8/28/2020  COMPARISON:  Chest CT from 11/9/2020, 7/27/2020, 12/9/2019, and 2/12/2015  TECHNIQUE: Chest CT without intravenous contrast   Axial, sagittal, coronal 2D reformats and coronal MIPS from source data  Radiation dose length product (DLP):  208 87 mGy-cm   Radiation dose exposure minimized using iterative reconstruction and automated exposure control  FINDINGS: LUNGS: 5 mm juxtapleural nodule in the lateral right lower lobe (3/79), stable since November 2020, slightly increased from July 2020, adjacent to a scar  Right upper lobectomy  No acute pulmonary disease  AIRWAYS: No significant filling defects  PLEURA:  Unremarkable  HEART/GREAT VESSELS:  Mild cardiomegaly  Mild coronary artery calcification indicating atherosclerotic heart disease  MEDIASTINUM AND SELENA:  Unremarkable  CHEST WALL AND LOWER NECK: Resection of the right lobe of the thyroid gland  Improving right breast seroma from 5 0 x 3 4 cm to 3 7 x 2 6 cm  UPPER ABDOMEN:  Benign fat-containing right Bochdalek hernia  OSSEOUS STRUCTURES: Mild degenerative disease in the spine  Impression No definite tumor recurrence  5 mm right lower lobe nodule, stable since November 2020, slightly increased from July 2020  This may be benign and part of an adjacent scar, but attention will be directed to this lesion on follow-up  Workstation performed: [id number]     Ct Chest Wo Contrast    Result Date: 11/10/2020  Narrative CT CHEST WITHOUT IV CONTRAST INDICATION:   Z85 118: Personal history of other malignant neoplasm of bronchus and lung  Right breast cancer, lumpectomy in May 2020, completed radiation on 8/20/2020  Right upper lobectomy for adenocarcinoma on 1/17/2020  COMPARISON:  Chest CT from 1/27/2020 and 12/9/2019  PET CT from 12/20/2019  TECHNIQUE: CT examination of the chest was performed without intravenous contrast   Axial, sagittal, and coronal 2D reformatted images were created from the source data and submitted for interpretation  Radiation dose length product (DLP) for this visit:  166 mGy-cm   This examination, like all CT scans performed in the [facility], was performed utilizing techniques to minimize radiation dose exposure, including the use of iterative reconstruction and automated exposure control  FINDINGS: LUNGS:  Nothing to indicate recurrent tumor or metastatic disease  No acute disease  Mild bilateral atelectasis/scar  Right upper lobectomy  No significant filling defects in the trachea and bronchi  PLEURA:  Unremarkable  HEART/GREAT VESSELS:  Mild coronary artery calcification indicating atherosclerotic heart disease    Cyst in the right hepatic lobe (2/54), unchanged since 2015  MEDIASTINUM AND SELENA:  Unremarkable  CHEST WALL AND LOWER NECK:   Decrease in postoperative right breast seroma from 7 1 x 4 6 cm to 5 0 x 3 4 cm  Resection of the right lobe of the thyroid gland  THE UPPER ABDOMEN:  Unremarkable  OSSEOUS STRUCTURES:  Mild degenerative disease in the spine  No change in benign sclerosis in the lateral right 5th rib since 2015  Impression Nothing to indicate recurrent or metastatic disease  Improving right breast seroma  Workstation performed: [id number]     Ct Chest Wo Contrast    Result Date: 7/28/2020  Narrative CT CHEST WITHOUT IV CONTRAST INDICATION:   C34 91: Malignant neoplasm of unspecified part of right bronchus or lung  Postop follow-up  Patient had a right breast lumpectomy on May 22, 2024 DCIS  COMPARISON:  PET scan from 12/20/2019 and chest CT from 12/9/2019 TECHNIQUE: CT examination of the chest was performed without intravenous contrast   Axial, sagittal, and coronal 2D reformatted images were created from the source data and submitted for interpretation  Radiation dose length product (DLP) for this visit:  185 7 mGy-cm   This examination, like all CT scans performed in the [facility], was performed utilizing techniques to minimize radiation dose exposure, including the use of iterative reconstruction and automated exposure control  FINDINGS: LUNGS:  There has been right upper lobectomy  There are no lung nodules or suspicious lung findings at this time  Airways are clear  PLEURA:  Unremarkable  HEART/GREAT VESSELS:  Unremarkable for patient's age  MEDIASTINUM AND SELENA:  Unremarkable  CHEST WALL AND LOWER NECK:   There is a large somewhat hyperdense masslike collection within the medial aspect of the right breast which is new compared with the prior study  It measures 4 6 x 7 1 cm and is most likely a very large postoperative hematoma/seroma  Internal attenuation is 50 Hounsfield units    The margins are relatively smooth and circumscribed  Included portion of the left breast is grossly unremarkable  VISUALIZED STRUCTURES IN THE UPPER ABDOMEN:  There is a low-density round benign-appearing lesion in the right hepatic lobe on image 54 series 2 which measures 8-9 mm and is most compatible with a cyst   It was present on prior CT and is unchanged  OSSEOUS STRUCTURES:  No acute fracture or destructive osseous lesion  Impression Interval right upper lobectomy without evidence of any significant lung disease at this time  Large masslike collection within the right breast presumably representing postoperative hematoma/seroma  This could be assessed with ultrasound if deemed clinically appropriate  Workstation performed: [id number]       I personally reviewed her CT scan in PACs  There is no suspicious masses  No concerning adenopathy  There is small 5 mm nodule that is been stable  No other concerning findings      Minerva Gaines MD  Thoracic Surgeon    (Available by [facility] Text)

## 2021-06-30 DIAGNOSIS — E78.2 MIXED HYPERLIPIDEMIA: ICD-10-CM

## 2021-06-30 DIAGNOSIS — E89.0 POSTOPERATIVE HYPOTHYROIDISM: ICD-10-CM

## 2021-07-01 RX ORDER — ATORVASTATIN CALCIUM 80 MG/1
TABLET, FILM COATED ORAL
Qty: 90 TABLET | Refills: 1 | Status: SHIPPED | OUTPATIENT
Start: 2021-07-01 | End: 2022-04-15

## 2021-07-01 RX ORDER — LEVOTHYROXINE SODIUM 0.1 MG/1
TABLET ORAL
Qty: 90 TABLET | Refills: 1 | Status: SHIPPED | OUTPATIENT
Start: 2021-07-01 | End: 2022-02-10

## 2021-07-08 ENCOUNTER — OFFICE VISIT (OUTPATIENT)
Dept: GYNECOLOGY | Facility: CLINIC | Age: 81
End: 2021-07-08
Payer: COMMERCIAL

## 2021-07-08 VITALS — DIASTOLIC BLOOD PRESSURE: 70 MMHG | HEART RATE: 91 BPM | SYSTOLIC BLOOD PRESSURE: 120 MMHG

## 2021-07-08 DIAGNOSIS — M85.80 OSTEOPENIA, UNSPECIFIED LOCATION: ICD-10-CM

## 2021-07-08 DIAGNOSIS — N95.2 ATROPHIC VAGINITIS: ICD-10-CM

## 2021-07-08 DIAGNOSIS — Z86.000 PERSONAL HISTORY OF IN-SITU NEOPLASM OF BREAST: Primary | ICD-10-CM

## 2021-07-08 PROCEDURE — 3078F DIAST BP <80 MM HG: CPT | Performed by: OBSTETRICS & GYNECOLOGY

## 2021-07-08 PROCEDURE — 1160F RVW MEDS BY RX/DR IN RCRD: CPT | Performed by: OBSTETRICS & GYNECOLOGY

## 2021-07-08 PROCEDURE — 3074F SYST BP LT 130 MM HG: CPT | Performed by: OBSTETRICS & GYNECOLOGY

## 2021-07-08 PROCEDURE — 1036F TOBACCO NON-USER: CPT | Performed by: OBSTETRICS & GYNECOLOGY

## 2021-07-08 PROCEDURE — 99213 OFFICE O/P EST LOW 20 MIN: CPT | Performed by: OBSTETRICS & GYNECOLOGY

## 2021-07-08 NOTE — PROGRESS NOTES
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 2019  She is status post right lumpectomy and radiation  This is followed by Dr Isa Michelle  Patient is status post total abdominal hysterectomy in 1987 secondary to fibroids  Of note is that she also has a history of lung cancer and colon cancer and parathyroid cancer  Patient denies any vaginal irritation, burning, discharge or bleeding  Denies any dysuria, hematuria, urgency or stress urinary incontinence  Osteoporosis screening via DEXA scan September 2020: Osteopenia     Colon cancer screening  Patient is due this year  She will contact her GI    The following portions of the patient's history were reviewed and updated as appropriate:   She  has a past medical history of Anesthesia, Arthritis, Atrial fibrillation ([address] ), Benign polyp of large intestine, 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), Colon cancer ([address] ) (1977), Colon polyp, Full dentures, GERD (gastroesophageal reflux disease), Glaucoma suspect, History of neck problems, Hyperlipidemia, Hypertension, Lung cancer ([address] ), 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[address] ), Prediabetes, Primary adenocarcinoma of upper lobe of right lung ([address] ) (12/2/2019), Pulmonary nodules (2/19/2015), Refusal of blood transfusions as patient is [employer], Tic disorder, transient, recurrent, and Walks frequently    She   Patient Active Problem List    Diagnosis Date Noted    Encounter for follow-up surveillance of breast cancer 04/14/2021    Thyroid nodule 03/12/2021    History of lung cancer 07/29/2020    Hashimoto's thyroiditis 03/02/2020    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 11/20/2019    Dense breast tissue 11/20/2019    Medicare annual wellness visit, subsequent 09/20/2018    Osteopenia of multiple sites 09/20/2018    Mixed hyperlipidemia 03/13/2018    History of colon cancer 02/14/2018    Postoperative hypothyroidism 09/01/2016    Benign essential hypertension 02/25/2016    A-fib [facility] ) 03/19/2015    Coagulopathy ([facility] ) 03/19/2015    Overweight 03/19/2015    Osteoarthritis 04/20/2012     She  has a past surgical history that includes Joint replacement; ARTHROSCOPY KNEE; Cardiovascular stress test; Colonoscopy; Esophagogastroduodenoscopy; Replacement total knee; Parathyroid gland surgery; Total thyroidectomy; US guided breast biopsy right complete (Right, 4/10/2017); Hysterectomy; Total abdominal hysterectomy (1983); Mammo stereotactic breast biopsy right (all inc) (Right, 10/24/2019); Hemicolectomy (Right, 1977); Colon surgery; Appendectomy; Dilation and curettage of uterus; pr bronchoscopy,diagnostic (N/A, 1/17/2020); pr thoracoscopy w/thera wedge resexn initial unilat (Right, 1/17/2020); pr thoracoscopy surg lobectomy (Right, 1/17/2020); US guided thyroid biopsy (2/14/2020); Cataract extraction (Bilateral); Mammo needle localization right (all inc) (Right, 5/22/2020); Mammo needle localization right (all inc) each add (Right, 5/22/2020); Lung surgery; Thyroid surgery; Breast biopsy (Right, 04/10/2017); Breast biopsy (Right, 10/24/2019); and Breast lumpectomy w/ needle localization (Right, 5/22/2020)  Her family history includes Breast cancer (age of onset: 80) in her paternal aunt; COPD in her sister and sister; Esophageal cancer in her mother; Hypertension in her mother; No Known Problems in her father, maternal aunt, maternal aunt, maternal aunt, maternal aunt, maternal aunt, maternal grandfather, maternal grandmother, paternal grandfather, and paternal grandmother; Stroke in her family  She  reports that she quit smoking about 54 years ago  Her smoking use included cigarettes   She has a 30 00 pack-year smoking history  She has never used smokeless tobacco  She reports current alcohol use of about 5 0 standard drinks of alcohol per week  She reports that she does not use drugs  Current Outpatient Medications   Medication Sig Dispense Refill    apixaban (ELIQUIS) 5 mg Take 5 mg by mouth 2 (two) times a day To stop 2 days prior to surgery per md      atorvastatin (LIPITOR) 80 mg tablet TAKE 1 TABLET BY MOUTH EVERY DAY 90 tablet 1    Coenzyme Q10 (CO Q 10 PO) Take 200 mg by mouth      diltiazem (CARDIZEM CD) 120 mg 24 hr capsule Take 120 mg by mouth every evening       FIBER PO Take 1 capsule by mouth daily as needed      Hydrocortisone Acetate (Vagisil) 1 % CREA Apply topically      levothyroxine 100 mcg tablet TAKE 1 TABLET BY MOUTH EVERY DAY 90 tablet 1    Multiple Vitamins-Calcium (ESSENTIAL ONE DAILY MULTIVIT PO) Take by mouth daily       mupirocin (BACTROBAN) 2 % ointment APPLY TO SORE AREA ON BOTTOM TWICE DAILY UNTIL HEALED      Omega-3 Fatty Acids (FISH OIL PO) Take 2 g by mouth daily       omeprazole (PriLOSEC) 20 mg delayed release capsule TAKE 1 CAPSULE (20 MG TOTAL) BY MOUTH AS NEEDED (GERD) 90 capsule 0     No current facility-administered medications for this visit       Current Outpatient Medications on File Prior to Visit   Medication Sig    apixaban (ELIQUIS) 5 mg Take 5 mg by mouth 2 (two) times a day To stop 2 days prior to surgery per md    atorvastatin (LIPITOR) 80 mg tablet TAKE 1 TABLET BY MOUTH EVERY DAY    Coenzyme Q10 (CO Q 10 PO) Take 200 mg by mouth    diltiazem (CARDIZEM CD) 120 mg 24 hr capsule Take 120 mg by mouth every evening     FIBER PO Take 1 capsule by mouth daily as needed    Hydrocortisone Acetate (Vagisil) 1 % CREA Apply topically    levothyroxine 100 mcg tablet TAKE 1 TABLET BY MOUTH EVERY DAY    Multiple Vitamins-Calcium (ESSENTIAL ONE DAILY MULTIVIT PO) Take by mouth daily     mupirocin (BACTROBAN) 2 % ointment APPLY TO SORE AREA ON BOTTOM TWICE DAILY UNTIL HEALED  Omega-3 Fatty Acids (FISH OIL PO) Take 2 g by mouth daily     omeprazole (PriLOSEC) 20 mg delayed release capsule TAKE 1 CAPSULE (20 MG TOTAL) BY MOUTH AS NEEDED (GERD)     No current facility-administered medications on file prior to visit  She is allergic to bee venom, flurbiprofen, ibuprofen, levaquin [levofloxacin in d5w], and tequin [gatifloxacin]       Review of Systems   Constitutional: Negative  HENT: Negative for sore throat and trouble swallowing  Gastrointestinal: Negative  Objective:      /70   Pulse 91          Physical Exam  Vitals reviewed  Constitutional:       Appearance: Normal appearance  Cardiovascular:      Rate and Rhythm: Normal rate and regular rhythm  Pulses: Normal pulses  Heart sounds: Normal heart sounds  No murmur heard  Pulmonary:      Effort: Pulmonary effort is normal  No respiratory distress  Breath sounds: Normal breath sounds  Chest:      Breasts:         Right: No swelling, bleeding, inverted nipple, nipple discharge, skin change or tenderness  Left: No swelling, bleeding, inverted nipple, mass, nipple discharge, skin change or tenderness  Comments: Status post right lumpectomy  Abdominal:      General: There is no distension  Palpations: Abdomen is soft  There is no mass  Tenderness: There is no abdominal tenderness  There is no guarding or rebound  Hernia: No hernia is present  There is no hernia in the left inguinal area or right inguinal area  Genitourinary:     General: Normal vulva  Labia:         Right: No rash, tenderness or lesion  Left: No rash, tenderness or lesion  Comments: Uterus and cervix surgically absent  No palpable adnexal masses or tenderness  Vagina with atrophic changes  Bartholin's and Rosser's glands normal   Urethral orifice normal   No cystocele or rectocele present  Musculoskeletal:      Cervical back: Normal range of motion and neck supple   No tenderness  Lymphadenopathy:      Cervical: No cervical adenopathy  Upper Body:      Right upper body: No supraclavicular, axillary or pectoral adenopathy  Left upper body: No supraclavicular, axillary or pectoral adenopathy  Lower Body: No right inguinal adenopathy  No left inguinal adenopathy  Neurological:      Mental Status: She is alert

## 2021-07-12 ENCOUNTER — APPOINTMENT (OUTPATIENT)
Dept: LAB | Facility: HOSPITAL | Age: 81
End: 2021-07-12
Payer: COMMERCIAL

## 2021-07-12 DIAGNOSIS — E66.01 MORBID OBESITY, UNSPECIFIED OBESITY TYPE (HCC): ICD-10-CM

## 2021-07-12 LAB
EST. AVERAGE GLUCOSE BLD GHB EST-MCNC: 126 MG/DL
HBA1C MFR BLD: 6 %

## 2021-07-12 PROCEDURE — 36415 COLL VENOUS BLD VENIPUNCTURE: CPT

## 2021-07-12 PROCEDURE — 83036 HEMOGLOBIN GLYCOSYLATED A1C: CPT

## 2021-07-14 ENCOUNTER — HOSPITAL ENCOUNTER (OUTPATIENT)
Dept: NON INVASIVE DIAGNOSTICS | Facility: HOSPITAL | Age: 81
Discharge: HOME | End: 2021-07-14
Payer: COMMERCIAL

## 2021-07-14 DIAGNOSIS — I10 ESSENTIAL HYPERTENSION: ICD-10-CM

## 2021-07-14 DIAGNOSIS — E66.01 MORBID OBESITY (HCC): ICD-10-CM

## 2021-07-14 DIAGNOSIS — E78.00 PURE HYPERCHOLESTEROLEMIA: ICD-10-CM

## 2021-07-14 DIAGNOSIS — I48.91 ATRIAL FIBRILLATION, UNSPECIFIED TYPE (HCC): ICD-10-CM

## 2021-07-14 DIAGNOSIS — K21.9 GASTROESOPHAGEAL REFLUX DISEASE WITHOUT ESOPHAGITIS: ICD-10-CM

## 2021-07-14 DIAGNOSIS — Z79.01 CHRONIC ANTICOAGULATION: ICD-10-CM

## 2021-07-14 PROCEDURE — 93351 STRESS TTE COMPLETE: CPT | Performed by: INTERNAL MEDICINE

## 2021-07-14 PROCEDURE — 93350 STRESS TTE ONLY: CPT

## 2021-07-16 LAB
CHEST PAIN STATEMENT: NORMAL
MAX DIASTOLIC BP: 78 MMHG
MAX HEART RATE: 179 BPM
MAX PREDICTED HEART RATE: 138 BPM
MAX. SYSTOLIC BP: 168 MMHG
PROTOCOL NAME: NORMAL
REASON FOR TERMINATION: NORMAL
TARGET HR FORMULA: NORMAL
TEST INDICATION: NORMAL
TIME IN EXERCISE PHASE: NORMAL

## 2021-08-05 ENCOUNTER — APPOINTMENT (OUTPATIENT)
Dept: LAB | Facility: HOSPITAL | Age: 81
End: 2021-08-05
Payer: COMMERCIAL

## 2021-08-05 DIAGNOSIS — K21.9 GASTROESOPHAGEAL REFLUX DISEASE WITHOUT ESOPHAGITIS: ICD-10-CM

## 2021-08-05 DIAGNOSIS — Z79.01 CHRONIC ANTICOAGULATION: ICD-10-CM

## 2021-08-05 DIAGNOSIS — I10 BENIGN ESSENTIAL HYPERTENSION: ICD-10-CM

## 2021-08-05 DIAGNOSIS — E89.0 POSTOPERATIVE HYPOTHYROIDISM: ICD-10-CM

## 2021-08-05 DIAGNOSIS — E06.3 HASHIMOTO'S THYROIDITIS: ICD-10-CM

## 2021-08-05 DIAGNOSIS — E78.2 MIXED HYPERLIPIDEMIA: ICD-10-CM

## 2021-08-05 DIAGNOSIS — E78.00 PURE HYPERCHOLESTEROLEMIA: ICD-10-CM

## 2021-08-05 DIAGNOSIS — E66.01 MORBID OBESITY (HCC): ICD-10-CM

## 2021-08-05 DIAGNOSIS — I48.91 ATRIAL FIBRILLATION, UNSPECIFIED TYPE (HCC): ICD-10-CM

## 2021-08-05 DIAGNOSIS — I10 ESSENTIAL HYPERTENSION: ICD-10-CM

## 2021-08-05 LAB
ALBUMIN SERPL BCP-MCNC: 3.3 G/DL (ref 3.5–5)
ALP SERPL-CCNC: 112 U/L (ref 46–116)
ALT SERPL W P-5'-P-CCNC: 35 U/L (ref 12–78)
ANION GAP SERPL CALCULATED.3IONS-SCNC: 4 MMOL/L (ref 4–13)
AST SERPL W P-5'-P-CCNC: 29 U/L (ref 5–45)
BILIRUB SERPL-MCNC: 0.73 MG/DL (ref 0.2–1)
BUN SERPL-MCNC: 15 MG/DL (ref 5–25)
CALCIUM ALBUM COR SERPL-MCNC: 9.9 MG/DL (ref 8.3–10.1)
CALCIUM SERPL-MCNC: 9.3 MG/DL (ref 8.3–10.1)
CHLORIDE SERPL-SCNC: 107 MMOL/L (ref 100–108)
CHOLEST SERPL-MCNC: 197 MG/DL (ref 50–200)
CO2 SERPL-SCNC: 25 MMOL/L (ref 21–32)
CREAT SERPL-MCNC: 0.99 MG/DL (ref 0.6–1.3)
ERYTHROCYTE [DISTWIDTH] IN BLOOD BY AUTOMATED COUNT: 13.8 % (ref 11.6–15.1)
GFR SERPL CREATININE-BSD FRML MDRD: 61 ML/MIN/1.73SQ M
GLUCOSE P FAST SERPL-MCNC: 97 MG/DL (ref 65–99)
HCT VFR BLD AUTO: 41.3 % (ref 34.8–46.1)
HDLC SERPL-MCNC: 104 MG/DL
HGB BLD-MCNC: 13.9 G/DL (ref 11.5–15.4)
LDLC SERPL CALC-MCNC: 80 MG/DL (ref 0–100)
MCH RBC QN AUTO: 33.3 PG (ref 26.8–34.3)
MCHC RBC AUTO-ENTMCNC: 33.7 G/DL (ref 31.4–37.4)
MCV RBC AUTO: 99 FL (ref 82–98)
NONHDLC SERPL-MCNC: 93 MG/DL
PLATELET # BLD AUTO: 203 THOUSANDS/UL (ref 149–390)
PMV BLD AUTO: 9.1 FL (ref 8.9–12.7)
POTASSIUM SERPL-SCNC: 4 MMOL/L (ref 3.5–5.3)
PROT SERPL-MCNC: 7.9 G/DL (ref 6.4–8.2)
RBC # BLD AUTO: 4.17 MILLION/UL (ref 3.81–5.12)
SODIUM SERPL-SCNC: 136 MMOL/L (ref 136–145)
TRIGL SERPL-MCNC: 64 MG/DL
TSH SERPL DL<=0.05 MIU/L-ACNC: 1.98 UIU/ML (ref 0.36–3.74)
WBC # BLD AUTO: 4.26 THOUSAND/UL (ref 4.31–10.16)

## 2021-08-05 PROCEDURE — 80053 COMPREHEN METABOLIC PANEL: CPT

## 2021-08-05 PROCEDURE — 36415 COLL VENOUS BLD VENIPUNCTURE: CPT

## 2021-08-05 PROCEDURE — 80061 LIPID PANEL: CPT

## 2021-08-05 PROCEDURE — 85027 COMPLETE CBC AUTOMATED: CPT

## 2021-08-05 PROCEDURE — 84443 ASSAY THYROID STIM HORMONE: CPT

## 2021-08-14 ENCOUNTER — APPOINTMENT (OUTPATIENT)
Dept: LAB | Facility: HOSPITAL | Age: 81
End: 2021-08-14
Payer: COMMERCIAL

## 2021-08-14 DIAGNOSIS — I10 HYPERTENSION, ESSENTIAL: ICD-10-CM

## 2021-08-14 DIAGNOSIS — E78.5 HYPERLIPIDEMIA, UNSPECIFIED HYPERLIPIDEMIA TYPE: ICD-10-CM

## 2021-08-14 DIAGNOSIS — K21.9 GASTROESOPHAGEAL REFLUX DISEASE WITHOUT ESOPHAGITIS: ICD-10-CM

## 2021-08-14 DIAGNOSIS — E78.00 PURE HYPERCHOLESTEROLEMIA: ICD-10-CM

## 2021-08-14 DIAGNOSIS — I48.19 ATRIAL FIBRILLATION, PERSISTENT (HCC): ICD-10-CM

## 2021-08-14 DIAGNOSIS — I10 ESSENTIAL HYPERTENSION: ICD-10-CM

## 2021-08-14 DIAGNOSIS — E66.01 MORBID OBESITY (HCC): ICD-10-CM

## 2021-08-14 DIAGNOSIS — Z79.01 CHRONIC ANTICOAGULATION: ICD-10-CM

## 2021-08-14 DIAGNOSIS — I48.91 ATRIAL FIBRILLATION, UNSPECIFIED TYPE (HCC): ICD-10-CM

## 2021-08-14 LAB
ALBUMIN SERPL BCP-MCNC: 3.2 G/DL (ref 3.5–5)
ALP SERPL-CCNC: 112 U/L (ref 46–116)
ALT SERPL W P-5'-P-CCNC: 32 U/L (ref 12–78)
ANION GAP SERPL CALCULATED.3IONS-SCNC: 4 MMOL/L (ref 4–13)
AST SERPL W P-5'-P-CCNC: 28 U/L (ref 5–45)
BASOPHILS # BLD AUTO: 0.03 THOUSANDS/ΜL (ref 0–0.1)
BASOPHILS NFR BLD AUTO: 1 % (ref 0–1)
BILIRUB SERPL-MCNC: 0.63 MG/DL (ref 0.2–1)
BUN SERPL-MCNC: 17 MG/DL (ref 5–25)
CALCIUM ALBUM COR SERPL-MCNC: 9.9 MG/DL (ref 8.3–10.1)
CALCIUM SERPL-MCNC: 9.3 MG/DL (ref 8.3–10.1)
CHLORIDE SERPL-SCNC: 108 MMOL/L (ref 100–108)
CHOLEST SERPL-MCNC: 201 MG/DL (ref 50–200)
CO2 SERPL-SCNC: 26 MMOL/L (ref 21–32)
CREAT SERPL-MCNC: 1.1 MG/DL (ref 0.6–1.3)
EOSINOPHIL # BLD AUTO: 0.06 THOUSAND/ΜL (ref 0–0.61)
EOSINOPHIL NFR BLD AUTO: 1 % (ref 0–6)
ERYTHROCYTE [DISTWIDTH] IN BLOOD BY AUTOMATED COUNT: 13.7 % (ref 11.6–15.1)
GFR SERPL CREATININE-BSD FRML MDRD: 54 ML/MIN/1.73SQ M
GLUCOSE P FAST SERPL-MCNC: 98 MG/DL (ref 65–99)
HCT VFR BLD AUTO: 41.2 % (ref 34.8–46.1)
HDLC SERPL-MCNC: 96 MG/DL
HGB BLD-MCNC: 13.8 G/DL (ref 11.5–15.4)
IMM GRANULOCYTES # BLD AUTO: 0.01 THOUSAND/UL (ref 0–0.2)
IMM GRANULOCYTES NFR BLD AUTO: 0 % (ref 0–2)
LDLC SERPL CALC-MCNC: 92 MG/DL (ref 0–100)
LYMPHOCYTES # BLD AUTO: 1.71 THOUSANDS/ΜL (ref 0.6–4.47)
LYMPHOCYTES NFR BLD AUTO: 38 % (ref 14–44)
MCH RBC QN AUTO: 32.9 PG (ref 26.8–34.3)
MCHC RBC AUTO-ENTMCNC: 33.5 G/DL (ref 31.4–37.4)
MCV RBC AUTO: 98 FL (ref 82–98)
MONOCYTES # BLD AUTO: 0.55 THOUSAND/ΜL (ref 0.17–1.22)
MONOCYTES NFR BLD AUTO: 12 % (ref 4–12)
NEUTROPHILS # BLD AUTO: 2.09 THOUSANDS/ΜL (ref 1.85–7.62)
NEUTS SEG NFR BLD AUTO: 48 % (ref 43–75)
NONHDLC SERPL-MCNC: 105 MG/DL
NRBC BLD AUTO-RTO: 0 /100 WBCS
PLATELET # BLD AUTO: 212 THOUSANDS/UL (ref 149–390)
PMV BLD AUTO: 9.3 FL (ref 8.9–12.7)
POTASSIUM SERPL-SCNC: 3.8 MMOL/L (ref 3.5–5.3)
PROT SERPL-MCNC: 7.8 G/DL (ref 6.4–8.2)
RBC # BLD AUTO: 4.2 MILLION/UL (ref 3.81–5.12)
SODIUM SERPL-SCNC: 138 MMOL/L (ref 136–145)
TRIGL SERPL-MCNC: 63 MG/DL
TSH SERPL DL<=0.05 MIU/L-ACNC: 2.84 UIU/ML (ref 0.36–3.74)
WBC # BLD AUTO: 4.45 THOUSAND/UL (ref 4.31–10.16)

## 2021-08-14 PROCEDURE — 85025 COMPLETE CBC W/AUTO DIFF WBC: CPT

## 2021-08-14 PROCEDURE — 84443 ASSAY THYROID STIM HORMONE: CPT

## 2021-08-14 PROCEDURE — 36415 COLL VENOUS BLD VENIPUNCTURE: CPT

## 2021-08-14 PROCEDURE — 80053 COMPREHEN METABOLIC PANEL: CPT

## 2021-08-14 PROCEDURE — 80061 LIPID PANEL: CPT

## 2021-09-23 ENCOUNTER — OFFICE VISIT (OUTPATIENT)
Dept: FAMILY MEDICINE CLINIC | Facility: CLINIC | Age: 81
End: 2021-09-23
Payer: COMMERCIAL

## 2021-09-23 VITALS
TEMPERATURE: 97.9 F | HEIGHT: 64 IN | DIASTOLIC BLOOD PRESSURE: 68 MMHG | BODY MASS INDEX: 28 KG/M2 | WEIGHT: 164 LBS | SYSTOLIC BLOOD PRESSURE: 110 MMHG

## 2021-09-23 DIAGNOSIS — E06.3 HASHIMOTO'S THYROIDITIS: ICD-10-CM

## 2021-09-23 DIAGNOSIS — Z85.118 HISTORY OF LUNG CANCER: ICD-10-CM

## 2021-09-23 DIAGNOSIS — Z86.000 PERSONAL HISTORY OF IN-SITU NEOPLASM OF BREAST: ICD-10-CM

## 2021-09-23 DIAGNOSIS — M85.89 OSTEOPENIA OF MULTIPLE SITES: ICD-10-CM

## 2021-09-23 DIAGNOSIS — I48.21 PERMANENT ATRIAL FIBRILLATION (HCC): ICD-10-CM

## 2021-09-23 DIAGNOSIS — Z85.038 HISTORY OF COLON CANCER: ICD-10-CM

## 2021-09-23 DIAGNOSIS — E78.2 MIXED HYPERLIPIDEMIA: ICD-10-CM

## 2021-09-23 DIAGNOSIS — I10 BENIGN ESSENTIAL HYPERTENSION: Primary | ICD-10-CM

## 2021-09-23 DIAGNOSIS — E66.3 OVERWEIGHT: ICD-10-CM

## 2021-09-23 DIAGNOSIS — E04.1 THYROID NODULE: ICD-10-CM

## 2021-09-23 DIAGNOSIS — E89.0 POSTOPERATIVE HYPOTHYROIDISM: ICD-10-CM

## 2021-09-23 PROCEDURE — 1036F TOBACCO NON-USER: CPT | Performed by: FAMILY MEDICINE

## 2021-09-23 PROCEDURE — 3078F DIAST BP <80 MM HG: CPT | Performed by: FAMILY MEDICINE

## 2021-09-23 PROCEDURE — 1160F RVW MEDS BY RX/DR IN RCRD: CPT | Performed by: FAMILY MEDICINE

## 2021-09-23 PROCEDURE — 3074F SYST BP LT 130 MM HG: CPT | Performed by: FAMILY MEDICINE

## 2021-09-23 PROCEDURE — 99214 OFFICE O/P EST MOD 30 MIN: CPT | Performed by: FAMILY MEDICINE

## 2021-09-23 NOTE — ASSESSMENT & PLAN NOTE
Blood pressure is stable on medication Patient to continue current medications as directed I will see her in 6 months

## 2021-09-23 NOTE — PATIENT INSTRUCTIONS
Chronic Hypertension   AMBULATORY CARE:   Hypertension  is high blood pressure  Your blood pressure is the force of your blood moving against the walls of your arteries  Hypertension causes your blood pressure to get so high that your heart has to work much harder than normal  This can damage your heart  Even if you have hypertension for years, lifestyle changes, medicines, or both can help bring your blood pressure to normal   Call your local emergency number (911 in the [address] [facility]) or have someone call if:   · You have chest pain  · You have any of the following signs of a heart attack:      ? Squeezing, pressure, or pain in your chest    ? You may  also have any of the following:     § Discomfort or pain in your back, neck, jaw, stomach, or arm    § Shortness of breath    § Nausea or vomiting    § Lightheadedness or a sudden cold sweat    · You become confused or have difficulty speaking  · You suddenly feel lightheaded or have trouble breathing  Seek care immediately if:   · You have a severe headache or vision loss  · You have weakness in an arm or leg  Call your doctor if:   · You feel faint, dizzy, confused, or drowsy  · You have been taking your blood pressure medicine but your pressure is higher than your provider says it should be  · You have questions or concerns about your condition or care  Treatment for chronic hypertension  may include medicine to lower your blood pressure and cholesterol levels  A low cholesterol level helps prevent heart disease and makes it easier to control your blood pressure  Heart disease can make your blood pressure harder to control  You may also need to make lifestyle changes  What you need to know about the stages of hypertension:       · Normal blood pressure is 119/79 or lower   Your healthcare provider may only check your blood pressure each year if it stays at a normal level  · Elevated blood pressure is 120/79 to 129/79    This is sometimes called prehypertension  Your healthcare provider may suggest lifestyle changes to help lower your blood pressure to a normal level  He or she may then check it again in 3 to 6 months  · Stage 1 hypertension is 130/80  to 139/89   Your provider may recommend lifestyle changes, medication, and checks every 3 to 6 months until your blood pressure is controlled  · Stage 2 hypertension is 140/90 or higher   Your provider will recommend lifestyle changes and have you take 2 kinds of hypertension medicines  You will also need to have your blood pressure checked monthly until it is controlled  Manage chronic hypertension:   · Check your blood pressure at home  Avoid smoking, caffeine, and exercise at least 30 minutes before checking your blood pressure  Sit and rest for 5 minutes before you take your blood pressure  Extend your arm and support it on a flat surface  Your arm should be at the same level as your heart  Follow the directions that came with your blood pressure monitor  Check your blood pressure 2 times, 1 minute apart, before you take your medicine in the morning  Also check your blood pressure before your evening meal  Keep a record of your readings and bring it to your follow-up visits  Ask your healthcare provider what your blood pressure should be  · Manage any other health conditions you have  Health conditions such as diabetes can increase your risk for hypertension  Follow your healthcare provider's instructions and take all your medicines as directed  Talk to your healthcare provider about any new health conditions you have recently developed  · Ask about all medicines  Certain medicines can increase your blood pressure  Examples include oral birth control pills, decongestants, herbal supplements, and NSAIDs, such as ibuprofen  Your healthcare provider can tell you which medicines are safe for you to take  This includes prescription and over-the-counter medicines      Lifestyle changes you can make to lower your blood pressure: Your provider may want you to make more lifestyle changes if you are having trouble controlling your blood pressure  This may feel difficult over time, especially if you think you are making good changes but your pressure is still high  It might help to focus on one new change at a time  For example, try to add 1 more day of exercise, or exercise for an extra 10 minutes on 2 days  Small changes can make a big difference  Your healthcare provider can also refer you to specialists such as a dietitian who can help you make small changes  Your family members may be included in helping you learn to create lifestyle changes, such as the following:  · Limit sodium (salt) as directed  Too much sodium can affect your fluid balance  Check labels to find low-sodium or no-salt-added foods  Some low-sodium foods use potassium salts for flavor  Too much potassium can also cause health problems  Your healthcare provider will tell you how much sodium and potassium are safe for you to have in a day  He or she may recommend that you limit sodium to 2,300 mg a day  · Follow the meal plan recommended by your healthcare provider  A dietitian or your provider can give you more information on low-sodium plans or the DASH (Dietary Approaches to Stop Hypertension) eating plan  The DASH plan is low in sodium, processed sugar, unhealthy fats, and total fat  It is high in potassium, calcium, and fiber  These can be found in vegetables, fruit, and whole-grain foods  · Be physically active throughout the day  Physical activity, such as exercise, can help control your blood pressure and your weight  Be physically active for at least 30 minutes per day, on most days of the week  Include aerobic activity, such as walking or riding a bicycle  Also include strength training at least 2 times each week  Your healthcare providers can help you create a physical activity plan  · Decrease stress  This may help lower your blood pressure  Learn ways to relax, such as deep breathing or listening to music  · Limit alcohol as directed  Alcohol can increase your blood pressure  A drink of alcohol is 12 ounces of beer, 5 ounces of wine, or 1½ ounces of liquor  · Do not smoke  Nicotine and other chemicals in cigarettes and cigars can increase your blood pressure and also cause lung damage  Ask your healthcare provider for information if you currently smoke and need help to quit  E-cigarettes or smokeless tobacco still contain nicotine  Talk to your healthcare provider before you use these products  Follow up with your doctor as directed: You will need to return to have your blood pressure checked and to have other lab tests done  Write down your questions so you remember to ask them during your visits  © Copyright [employer] 2021 Information is for End User's use only and may not be sold, redistributed or otherwise used for commercial purposes  All illustrations and images included in CareNotes® are the copyrighted property of [employer]  or [facility] Leonela Huddleston   The above information is an  only  It is not intended as medical advice for individual conditions or treatments  Talk to your doctor, nurse or pharmacist before following any medical regimen to see if it is safe and effective for you

## 2021-10-02 ENCOUNTER — IMMUNIZATIONS (OUTPATIENT)
Dept: FAMILY MEDICINE CLINIC | Facility: HOSPITAL | Age: 81
End: 2021-10-02

## 2021-10-02 DIAGNOSIS — Z23 ENCOUNTER FOR IMMUNIZATION: Primary | ICD-10-CM

## 2021-10-02 PROCEDURE — 91300 SARS-COV-2 / COVID-19 MRNA VACCINE (PFIZER-BIONTECH) 30 MCG: CPT

## 2021-10-02 PROCEDURE — 0001A SARS-COV-2 / COVID-19 MRNA VACCINE (PFIZER-BIONTECH) 30 MCG: CPT

## 2021-10-19 ENCOUNTER — TELEPHONE (OUTPATIENT)
Dept: SURGICAL ONCOLOGY | Facility: CLINIC | Age: 81
End: 2021-10-19

## 2021-10-26 ENCOUNTER — OFFICE VISIT (OUTPATIENT)
Dept: SURGICAL ONCOLOGY | Facility: CLINIC | Age: 81
End: 2021-10-26
Payer: COMMERCIAL

## 2021-10-26 VITALS
BODY MASS INDEX: 28.17 KG/M2 | HEART RATE: 98 BPM | SYSTOLIC BLOOD PRESSURE: 110 MMHG | TEMPERATURE: 97.5 F | WEIGHT: 165 LBS | OXYGEN SATURATION: 98 % | HEIGHT: 64 IN | DIASTOLIC BLOOD PRESSURE: 70 MMHG | RESPIRATION RATE: 16 BRPM

## 2021-10-26 DIAGNOSIS — Z86.000 PERSONAL HISTORY OF IN-SITU NEOPLASM OF BREAST: ICD-10-CM

## 2021-10-26 DIAGNOSIS — R92.2 DENSE BREAST TISSUE: ICD-10-CM

## 2021-10-26 DIAGNOSIS — Z08 ENCOUNTER FOR FOLLOW-UP SURVEILLANCE OF BREAST CANCER: Primary | ICD-10-CM

## 2021-10-26 DIAGNOSIS — Z85.3 ENCOUNTER FOR FOLLOW-UP SURVEILLANCE OF BREAST CANCER: Primary | ICD-10-CM

## 2021-10-26 PROCEDURE — 1160F RVW MEDS BY RX/DR IN RCRD: CPT | Performed by: SURGERY

## 2021-10-26 PROCEDURE — 3074F SYST BP LT 130 MM HG: CPT | Performed by: SURGERY

## 2021-10-26 PROCEDURE — 99213 OFFICE O/P EST LOW 20 MIN: CPT | Performed by: SURGERY

## 2021-10-26 PROCEDURE — 3078F DIAST BP <80 MM HG: CPT | Performed by: SURGERY

## 2021-10-26 PROCEDURE — 1036F TOBACCO NON-USER: CPT | Performed by: SURGERY

## 2021-10-28 ENCOUNTER — VBI (OUTPATIENT)
Dept: ADMINISTRATIVE | Facility: OTHER | Age: 81
End: 2021-10-28

## 2021-12-03 ENCOUNTER — HOSPITAL ENCOUNTER (OUTPATIENT)
Dept: RADIOLOGY | Facility: HOSPITAL | Age: 81
Discharge: HOME | End: 2021-12-03
Attending: THORACIC SURGERY (CARDIOTHORACIC VASCULAR SURGERY)
Payer: COMMERCIAL

## 2021-12-03 DIAGNOSIS — Z85.118 HISTORY OF LUNG CANCER: ICD-10-CM

## 2021-12-03 PROCEDURE — 71250 CT THORAX DX C-: CPT

## 2021-12-03 PROCEDURE — G1004 CDSM NDSC: HCPCS

## 2021-12-08 ENCOUNTER — OFFICE VISIT (OUTPATIENT)
Dept: CARDIAC SURGERY | Facility: CLINIC | Age: 81
End: 2021-12-08
Payer: COMMERCIAL

## 2021-12-08 VITALS
HEIGHT: 64 IN | WEIGHT: 173.5 LBS | SYSTOLIC BLOOD PRESSURE: 135 MMHG | DIASTOLIC BLOOD PRESSURE: 70 MMHG | OXYGEN SATURATION: 98 % | BODY MASS INDEX: 29.62 KG/M2 | RESPIRATION RATE: 16 BRPM | HEART RATE: 82 BPM | TEMPERATURE: 96.5 F

## 2021-12-08 DIAGNOSIS — Z85.118 HISTORY OF LUNG CANCER: Primary | ICD-10-CM

## 2021-12-08 PROCEDURE — 3078F DIAST BP <80 MM HG: CPT | Performed by: THORACIC SURGERY (CARDIOTHORACIC VASCULAR SURGERY)

## 2021-12-08 PROCEDURE — 1160F RVW MEDS BY RX/DR IN RCRD: CPT | Performed by: THORACIC SURGERY (CARDIOTHORACIC VASCULAR SURGERY)

## 2021-12-08 PROCEDURE — 1036F TOBACCO NON-USER: CPT | Performed by: THORACIC SURGERY (CARDIOTHORACIC VASCULAR SURGERY)

## 2021-12-08 PROCEDURE — 99213 OFFICE O/P EST LOW 20 MIN: CPT | Performed by: THORACIC SURGERY (CARDIOTHORACIC VASCULAR SURGERY)

## 2021-12-08 PROCEDURE — 3075F SYST BP GE 130 - 139MM HG: CPT | Performed by: THORACIC SURGERY (CARDIOTHORACIC VASCULAR SURGERY)

## 2022-03-10 ENCOUNTER — HOSPITAL ENCOUNTER (OUTPATIENT)
Dept: ULTRASOUND IMAGING | Facility: HOSPITAL | Age: 82
Discharge: HOME | End: 2022-03-10
Payer: COMMERCIAL

## 2022-03-10 DIAGNOSIS — E04.1 THYROID NODULE: ICD-10-CM

## 2022-03-10 PROCEDURE — 76536 US EXAM OF HEAD AND NECK: CPT

## 2022-03-18 ENCOUNTER — OFFICE VISIT (OUTPATIENT)
Dept: SURGICAL ONCOLOGY | Facility: CLINIC | Age: 82
End: 2022-03-18
Payer: COMMERCIAL

## 2022-03-18 VITALS
DIASTOLIC BLOOD PRESSURE: 74 MMHG | WEIGHT: 173 LBS | TEMPERATURE: 97.4 F | SYSTOLIC BLOOD PRESSURE: 126 MMHG | HEIGHT: 64 IN | BODY MASS INDEX: 29.53 KG/M2

## 2022-03-18 DIAGNOSIS — E04.1 THYROID NODULE: Primary | ICD-10-CM

## 2022-03-18 PROCEDURE — 99213 OFFICE O/P EST LOW 20 MIN: CPT | Performed by: SURGERY

## 2022-03-18 PROCEDURE — 1036F TOBACCO NON-USER: CPT | Performed by: SURGERY

## 2022-03-18 PROCEDURE — 1160F RVW MEDS BY RX/DR IN RCRD: CPT | Performed by: SURGERY

## 2022-03-18 NOTE — LETTER
March 18, 2022     Beck Schroeder, [address] [facility]  [address] [facility]    Patient: Archana Todd   YOB: 1940   Date of Visit: 3/18/2022       Dear Dr Samm Toney:    Thank you for referring Archana Todd to me for evaluation  Below are my notes for this consultation  If you have questions, please do not hesitate to call me  I look forward to following your patient along with you  Sincerely,        Rebeca Menjivar MD        CC: Fleurette Roach, MD Lillard Bound, MD Donette Rocks, MD Cristopher Lesch, MD Lum Malling, MD  3/18/2022  2:05 PM  Incomplete     Surgical Oncology Follow Up       [facility]  [place] [address]    Archana Todd  1940  [id number]  [facility] SURGICAL ONCOLOGY [place]  [facility]  [place] [address]    No chief complaint on file  Assessment/Plan:    No problem-specific Assessment & Plan notes found for this encounter  Diagnoses and all orders for this visit:    Thyroid nodule  -     US thyroid; Future        Advance Care Planning/Advance Directives:  Discussed disease status, cancer treatment plans and/or cancer treatment goals with the patient  Oncology History   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   11/20/2019 Initial Diagnosis    Ductal carcinoma in situ (DCIS) of right breast     5/22/2020 Surgery    A  Breast, right, lumpectomy:  -  Ductal carcinoma in situ, low nuclear grade       B  Breast, right new inferior margin, excision:  -  Ductal carcinoma in situ, low nuclear grade involving intraductal papilloma      C  Breast, right new lateral margin, excision:  -  Ductal carcinoma in situ, low nuclear grade      D  Breast, right new posterior margin, excision:  -  Benign breast parenchyma, negative for atypia or malignancy       6/9/2020 -  Cancer Staged    Staging form: Breast, AJCC 8th Edition  - Clinical: cT0, cN0, cM0 - Signed by Orly Hassan MD on 6/9/2020  Laterality: Right       6/9/2020 -  Cancer Staged    Staging form: Breast, AJCC 8th Edition  - Pathologic: Stage 0 (pTis (DCIS), pN0, cM0, ER+, UT+, HER2-) - Signed by Orly Hassan MD on 6/16/2020  Laterality: Right  Method of lymph node assessment: Clinical  Nuclear grade: G1       7/30/2020 - 8/28/2020 Radiation    Plan ID Energy Fractions Dose per Fraction (cGy) Dose Correction (cGy) Total Dose Delivered (cGy) Elapsed Days   R Breast 6X 16 / 16 266 0 4,256 21   R Brst Boost 6X 5 / 5 200 0 1,000 4        Primary adenocarcinoma of upper lobe of right lung ([address] ) (Resolved)   12/2/2019 Initial Diagnosis    Primary adenocarcinoma of upper lobe of right lung ([address] )     1/17/2020 Surgery    Lung, right upper lobe, wedge resection:  -  Invasive moderately differentiated adenocarcinoma  Stage IB         History of Present Illness:  Patient is an 43-year-old woman who we have been following for incidentally found left-sided thyroid nodule  -Interval History: She comes in with recent ultrasound done in anticipation of today's visit  No symptoms  Review of Systems:  Review of Systems   Constitutional: Negative  HENT: Negative  Eyes: Negative  Respiratory: Negative  Cardiovascular: Negative  Gastrointestinal: Negative  Endocrine: Negative  Genitourinary: Negative  Musculoskeletal: Negative  Skin: Negative  Allergic/Immunologic: Negative  Neurological: Negative  Hematological: Negative  Psychiatric/Behavioral: Negative          Patient Active Problem List   Diagnosis    A-fib ([address] )    Benign essential hypertension    Coagulopathy (HCC)    History of colon cancer    Overweight    Osteoarthritis    Postoperative hypothyroidism    Mixed hyperlipidemia    Medicare annual wellness visit, subsequent    Osteopenia of multiple sites    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    Dense breast tissue    Hashimoto's thyroiditis    History of lung cancer    Thyroid nodule    Encounter for follow-up surveillance of breast cancer     Past Medical History:   Diagnosis Date    Anesthesia     "cheap date"    Arthritis     Atrial fibrillation (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 ) 12/2/2019    Pulmonary nodules 2/19/2015    Refusal of blood transfusions as patient is Denominational     Tic disorder, transient, recurrent     not recent    Walks frequently      Past Surgical History:   Procedure Laterality Date    APPENDECTOMY      ARTHROSCOPY KNEE      BREAST BIOPSY Right 04/10/2017    US guided - intraductal papillomas    BREAST BIOPSY Right 10/24/2019    ADH    BREAST LUMPECTOMY W/ NEEDLE LOCALIZATION Right 5/22/2020    Procedure: EXCISIONAL BIOPSY;  1000 NEEDLE LOC;  Surgeon: Linsey Mariee MD;  [facility];  Service: Surgical Oncology    CARDIOVASCULAR STRESS TEST      CATARACT EXTRACTION Bilateral     COLON SURGERY      COLONOSCOPY      done 2010 due 2015 Dr Kuo    DILATION AND CURETTAGE OF UTERUS      ESOPHAGOGASTRODUODENOSCOPY      inflammation aonly    HEMICOLECTOMY Right 1977    HYSTERECTOMY      JOINT REPLACEMENT      L knee    LUNG SURGERY      MAMMO NEEDLE LOCALIZATION RIGHT (ALL INC) Right 5/22/2020    MAMMO NEEDLE LOCALIZATION RIGHT (ALL INC) EACH ADD Right 5/22/2020    MAMMO STEREOTACTIC BREAST BIOPSY RIGHT (ALL INC) Right 10/24/2019    PARATHYROID GLAND SURGERY exploration     [place] BRONCHOSCOPY,DIAGNOSTIC N/A 2020    Procedure: FLEXIBLE BRONCHOSCOPY;  Surgeon: Redd Renteria MD;  [facility];  Service: Thoracic    [place] THORACOSCOPY SURG LOBECTOMY Right 2020    Procedure: ROBOTIC ASSISTED COMPLETE LUNG LOBECTOMY;  Surgeon: Redd Renteria MD;  [facility];  Service: Thoracic    [place] THORACOSCOPY W/THERA WEDGE RESEXN INITIAL UNILAT Right 2020    Procedure: ROBOTIC ASSISTED UPPER-LOBE WEDGE RESECTION, MEDIASTINAL LYMPH NODE DISECTION;  Surgeon: Redd Renteria MD;  [facility];  Service: Thoracic    REPLACEMENT TOTAL KNEE      THYROID SURGERY      TOTAL ABDOMINAL HYSTERECTOMY  1983    TOTAL THYROIDECTOMY      US GUIDED BREAST BIOPSY RIGHT COMPLETE Right 4/10/2017    US GUIDED THYROID BIOPSY  2020     Family History   Problem Relation Age of Onset    Esophageal cancer Mother         [de-identified]    Hypertension Mother     Stroke Family         TIA    Breast cancer Paternal Aunt 80    No Known Problems Father     COPD Sister     COPD Sister     No Known Problems Maternal Grandmother     No Known Problems Maternal Grandfather     No Known Problems Paternal Grandmother     No Known Problems Paternal Grandfather     No Known Problems Maternal Aunt     No Known Problems Maternal Aunt     No Known Problems Maternal Aunt     No Known Problems Maternal Aunt     No Known Problems Maternal Aunt      Social History     Socioeconomic History    Marital status: Single     Spouse name: Not on file    Number of children: Not on file    Years of education: Not on file    Highest education level: Not on file   Occupational History    Not on file   Tobacco Use    Smoking status: Former Smoker     Packs/day: 1 50     Years: 20 00     Pack years: 30 00     Types: Cigarettes     Quit date: 5     Years since quittin 2    Smokeless tobacco: Never Used   Vaping Use    Vaping Use: Never used   Substance and Sexual Activity    Alcohol use:  Yes     Alcohol/week: 5 0 standard drinks     Types: 5 Cans of beer per week     Comment: occasional beer with food    Drug use: No    Sexual activity: Not on file   Other Topics Concern    Not on file   Social History Narrative    Caffeine use- 1-3, 8oz coffees per day    Denied history of housing without smoke detectors    Always uses a seatbelt     Social Determinants of Health     Financial Resource Strain: Not on file   Food Insecurity: Not on file   Transportation Needs: Not on file   Physical Activity: Not on file   Stress: Not on file   Social Connections: Not on file   Intimate Partner Violence: Not on file   Housing Stability: Not on file       Current Outpatient Medications:     apixaban (ELIQUIS) 5 mg, Take 5 mg by mouth 2 (two) times a day To stop 2 days prior to surgery per md, Disp: , Rfl:     atorvastatin (LIPITOR) 80 mg tablet, TAKE 1 TABLET BY MOUTH EVERY DAY, Disp: 90 tablet, Rfl: 1    Coenzyme Q10 (CO Q 10 PO), Take 200 mg by mouth, Disp: , Rfl:     cyanocobalamin (VITAMIN B-12) 1000 MCG tablet, Take 1,000 mcg by mouth, Disp: , Rfl:     levothyroxine 100 mcg tablet, TAKE 1 TABLET BY MOUTH EVERY DAY, Disp: 90 tablet, Rfl: 1    Multiple Vitamins-Calcium (ESSENTIAL ONE DAILY MULTIVIT PO), Take by mouth daily , Disp: , Rfl:     Omega-3 Fatty Acids (FISH OIL PO), Take 2 g by mouth daily , Disp: , Rfl:     omeprazole (PriLOSEC) 20 mg delayed release capsule, TAKE 1 CAPSULE (20 MG TOTAL) BY MOUTH AS NEEDED (GERD), Disp: 90 capsule, Rfl: 0    diltiazem (CARDIZEM CD) 120 mg 24 hr capsule, Take 120 mg by mouth every evening , Disp: , Rfl:     FIBER PO, Take 1 capsule by mouth daily as needed (Patient not taking: Reported on 9/23/2021), Disp: , Rfl:     Hydrocortisone Acetate (Vagisil) 1 % CREA, Apply topically (Patient not taking: Reported on 3/18/2022 ), Disp: , Rfl:     mupirocin (BACTROBAN) 2 % ointment, APPLY TO SORE AREA ON BOTTOM TWICE DAILY UNTIL HEALED (Patient not taking: Reported on 3/18/2022), Disp: , Rfl:   Allergies   Allergen Reactions    Bee Venom      Tongue swelling    Flurbiprofen Hives    Ibuprofen Shortness Of Breath    Levaquin [Levofloxacin In D5w] Other (See Comments)     Dark spots under eyes and wrists    Tequin [Gatifloxacin] Rash     Vitals:    03/18/22 1325   BP: 126/74   Temp: (!) 97 4 °F (36 3 °C)       Physical Exam  Constitutional:       Appearance: Normal appearance  HENT:      Head: Normocephalic and atraumatic  Right Ear: External ear normal       Left Ear: External ear normal       Mouth/Throat:      Mouth: Mucous membranes are moist    Eyes:      Extraocular Movements: Extraocular movements intact  Pupils: Pupils are equal, round, and reactive to light  Cardiovascular:      Rate and Rhythm: Normal rate and regular rhythm  Pulses: Normal pulses  Heart sounds: Normal heart sounds  Pulmonary:      Effort: Pulmonary effort is normal       Breath sounds: Normal breath sounds  Abdominal:      General: Abdomen is flat  Palpations: Abdomen is soft  Musculoskeletal:         General: Normal range of motion  Cervical back: Normal range of motion and neck supple  No rigidity  Lymphadenopathy:      Cervical: No cervical adenopathy  Skin:     General: Skin is warm and dry  Neurological:      General: No focal deficit present  Mental Status: She is alert and oriented to person, place, and time  Psychiatric:         Mood and Affect: Mood normal          Behavior: Behavior normal          Thought Content: Thought content normal          Judgment: Judgment normal            Results:  Labs:  Lab Results   Component Value Date    [id number] 2 840 08/14/2021    [id number] 13 0 02/28/2018         Imaging  US thyroid    Result Date: 3/14/2022  Narrative: THYROID ULTRASOUND INDICATION:    E04 1: Nontoxic single thyroid nodule  COMPARISON:  Thyroid ultrasound dated 3/4/2021   TECHNIQUE:   Ultrasound of the thyroid was performed with a high frequency linear transducer in transverse and sagittal planes including volumetric imaging sweeps as well as traditional still imaging technique  FINDINGS:  Left thyroid parenchyma is diffusely heterogeneous in echotexture  Right lobe: Surgically absent  Left lobe:  5 0 x 2 9 x 1 5 cm  Volume 10 3 mL Nodule #1  Image 23  LEFT midgland nodule measuring 0 6 x 0 5 x 0 4 cm  Stable  COMPOSITION:  2 points, solid or almost completely solid   ECHOGENICITY:  1 point, hyperechoic or isoechoic  SHAPE:  0 points, wider-than-tall  MARGIN: 0 points, smooth  ECHOGENIC FOCI:  0 points, none or large comet-tail artifacts  TI-RADS Classification: TR 3 (3 points), FNA if >2 5 cm  Follow if >1 5 cm  Impression: Heterogeneous left thyroid lobe with stable subcentimeter nodule  No new nodules  No nodule meets current ACR criteria for requiring biopsy or follow-up ultrasounds  Status post right thyroidectomy  Reference: ACR Thyroid Imaging, Reporting and Data System (TI-RADS): White Paper of the [employer]  J AM Mary Radiol 8695;22:699-567  (additional recommendations based on American Thyroid Association 2015 guidelines ) Workstation performed: [id number]     I reviewed the above laboratory and imaging data  Discussion/Summary:  Left-sided thyroid nodule  It is stable in size and appearance  No worrisome features  She is opting for surveillance  We will therefore plan 1 year follow-up with ultrasound at that time  All questions answered and copies of reports given her for records      Lorenzo Balderas MD  3/18/2022  2:05 PM  Sign when Signing Visit     Surgical Oncology Follow Up       [facility] [address]    Genoveva Taylor  1940  [id number]  [facility] [place]  [facility] SURGICAL ONCOLOGY [facility] [address]    No chief complaint on file       Assessment/Plan:    No problem-specific Assessment & Plan notes found for this encounter  Diagnoses and all orders for this visit:    Thyroid nodule  -     US thyroid; Future        Advance Care Planning/Advance Directives:  Discussed disease status, cancer treatment plans and/or cancer treatment goals with the patient  Oncology History   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   11/20/2019 Initial Diagnosis    Ductal carcinoma in situ (DCIS) of right breast     5/22/2020 Surgery    A  Breast, right, lumpectomy:  -  Ductal carcinoma in situ, low nuclear grade       B  Breast, right new inferior margin, excision:  -  Ductal carcinoma in situ, low nuclear grade involving intraductal papilloma      C  Breast, right new lateral margin, excision:  -  Ductal carcinoma in situ, low nuclear grade      D  Breast, right new posterior margin, excision:  -  Benign breast parenchyma, negative for atypia or malignancy  6/9/2020 -  Cancer Staged    Staging form: Breast, AJCC 8th Edition  - Clinical: cT0, cN0, cM0 - Signed by Tin Liriano MD on 6/9/2020  Laterality: Right       6/9/2020 -  Cancer Staged    Staging form: Breast, AJCC 8th Edition  - Pathologic: Stage 0 (pTis (DCIS), pN0, cM0, ER+, DC+, HER2-) - Signed by Tin Liriano MD on 6/16/2020  Laterality: Right  Method of lymph node assessment: Clinical  Nuclear grade: G1       7/30/2020 - 8/28/2020 Radiation    Plan ID Energy Fractions Dose per Fraction (cGy) Dose Correction (cGy) Total Dose Delivered (cGy) Elapsed Days   R Breast 6X 16 / 16 266 0 4,256 21   R Brst Boost 6X 5 / 5 200 0 1,000 4        Primary adenocarcinoma of upper lobe of right lung ([address] ) (Resolved)   12/2/2019 Initial Diagnosis    Primary adenocarcinoma of upper lobe of right lung ([address] )     1/17/2020 Surgery    Lung, right upper lobe, wedge resection:  -  Invasive moderately differentiated adenocarcinoma    Stage IB         History of Present Illness:  Patient is an 73-year-old woman who we have been following for incidentally found left-sided thyroid nodule  She comes in with recent ultrasound done in anticipation of today's visit  No symptoms   -Interval History:***    Review of Systems:  Review of Systems   Constitutional: Negative  HENT: Negative  Eyes: Negative  Respiratory: Negative  Cardiovascular: Negative  Gastrointestinal: Negative  Endocrine: Negative  Genitourinary: Negative  Musculoskeletal: Negative  Skin: Negative  Allergic/Immunologic: Negative  Neurological: Negative  Hematological: Negative  Psychiatric/Behavioral: Negative          Patient Active Problem List   Diagnosis    A-fib [facility] )    Benign essential hypertension    Coagulopathy (HCC)    History of colon cancer    Overweight    Osteoarthritis    Postoperative hypothyroidism    Mixed hyperlipidemia    Medicare annual wellness visit, subsequent    Osteopenia of multiple sites    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    Dense breast tissue    Hashimoto's thyroiditis    History of lung cancer    Thyroid nodule    Encounter for follow-up surveillance of breast cancer     Past Medical History:   Diagnosis Date    Anesthesia     "cheap date"    Arthritis     [facility]     Benign polyp of large intestine     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    Colon cancer [facility]) 46    34 yo    Colon polyp     Full dentures     full upper/ partial lower    GERD (gastroesophageal reflux disease)     Glaucoma suspect     History of neck problems     "C3-C7 and had epidural injections years ago"    Hyperlipidemia     Hypertension     Lung cancer ([facility] )     jan 2020- had surgery    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[facility] )     Prediabetes     Primary adenocarcinoma of upper lobe of right lung ([facility] ) 12/2/2019    Pulmonary nodules 2/19/2015    Refusal of blood transfusions as patient is Sabianist     Tic disorder, transient, recurrent     not recent   [facility]      Past Surgical History:   Procedure Laterality Date    APPENDECTOMY      ARTHROSCOPY KNEE      BREAST BIOPSY Right 04/10/2017    US guided - intraductal papillomas    BREAST BIOPSY Right 10/24/2019    ADH    BREAST LUMPECTOMY W/ NEEDLE LOCALIZATION Right 5/22/2020    Procedure: EXCISIONAL BIOPSY;  1000 NEEDLE LOC;  Surgeon: Linsey Mariee MD;  [facility];  Service: Surgical Oncology    CARDIOVASCULAR STRESS TEST      CATARACT EXTRACTION Bilateral     COLON SURGERY      COLONOSCOPY      done 2010 due 2015 Dr Kuo    DILATION AND CURETTAGE OF UTERUS      ESOPHAGOGASTRODUODENOSCOPY      inflammation aonly    HEMICOLECTOMY Right 1977    HYSTERECTOMY      JOINT REPLACEMENT      L knee    LUNG SURGERY      MAMMO NEEDLE LOCALIZATION RIGHT (ALL INC) Right 5/22/2020    MAMMO NEEDLE LOCALIZATION RIGHT (ALL INC) EACH ADD Right 5/22/2020    MAMMO STEREOTACTIC BREAST BIOPSY RIGHT (ALL INC) Right 10/24/2019    PARATHYROID GLAND SURGERY      exploration     [place] BRONCHOSCOPY,DIAGNOSTIC N/A 1/17/2020    Procedure: FLEXIBLE BRONCHOSCOPY;  Surgeon: Lydia Moore MD;  [facility];  Service: Thoracic    SC THORACOSCOPY SURG LOBECTOMY Right 1/17/2020    Procedure: ROBOTIC ASSISTED COMPLETE LUNG LOBECTOMY;  Surgeon: Lydia Moore MD;  [facility];  Service: Thoracic    [place] THORACOSCOPY W/THERA WEDGE RESEXN INITIAL UNILAT Right 1/17/2020    Procedure: ROBOTIC ASSISTED UPPER-LOBE WEDGE RESECTION, MEDIASTINAL LYMPH NODE DISECTION;  Surgeon: Lydia Moore MD;  [facility];  Service: Thoracic    REPLACEMENT TOTAL KNEE      THYROID SURGERY      TOTAL ABDOMINAL HYSTERECTOMY  1983    TOTAL THYROIDECTOMY      US GUIDED BREAST BIOPSY RIGHT COMPLETE Right 4/10/2017    US GUIDED THYROID BIOPSY  2/14/2020     Family History   Problem Relation Age of Onset    Esophageal cancer Mother         [de-identified]    Hypertension Mother     Stroke Family TIA    Breast cancer Paternal Aunt 80    No Known Problems Father     COPD Sister     COPD Sister     No Known Problems Maternal Grandmother     No Known Problems Maternal Grandfather     No Known Problems Paternal Grandmother     No Known Problems Paternal Grandfather     No Known Problems Maternal Aunt     No Known Problems Maternal Aunt     No Known Problems Maternal Aunt     No Known Problems Maternal Aunt     No Known Problems Maternal Aunt      Social History     Socioeconomic History    Marital status: Single     Spouse name: Not on file    Number of children: Not on file    Years of education: Not on file    Highest education level: Not on file   Occupational History    Not on file   Tobacco Use    Smoking status: Former Smoker     Packs/day: 1 50     Years: 20 00     Pack years: 30 00     Types: Cigarettes     Quit date: 5     Years since quittin 2    Smokeless tobacco: Never Used   Vaping Use    Vaping Use: Never used   Substance and Sexual Activity    Alcohol use:  Yes     Alcohol/week: 5 0 standard drinks     Types: 5 Cans of beer per week     Comment: occasional beer with food    Drug use: No    Sexual activity: Not on file   Other Topics Concern    Not on file   Social History Narrative    Caffeine use- 1-3, 8oz coffees per day    Denied history of housing without smoke detectors    Always uses a seatbelt     Social Determinants of Health     Financial Resource Strain: Not on file   Food Insecurity: Not on file   Transportation Needs: Not on file   Physical Activity: Not on file   Stress: Not on file   Social Connections: Not on file   Intimate Partner Violence: Not on file   Housing Stability: Not on file       Current Outpatient Medications:     apixaban (ELIQUIS) 5 mg, Take 5 mg by mouth 2 (two) times a day To stop 2 days prior to surgery per md, Disp: , Rfl:     atorvastatin (LIPITOR) 80 mg tablet, TAKE 1 TABLET BY MOUTH EVERY DAY, Disp: 90 tablet, Rfl: 1   Coenzyme Q10 (CO Q 10 PO), Take 200 mg by mouth, Disp: , Rfl:     cyanocobalamin (VITAMIN B-12) 1000 MCG tablet, Take 1,000 mcg by mouth, Disp: , Rfl:     levothyroxine 100 mcg tablet, TAKE 1 TABLET BY MOUTH EVERY DAY, Disp: 90 tablet, Rfl: 1    Multiple Vitamins-Calcium (ESSENTIAL ONE DAILY MULTIVIT PO), Take by mouth daily , Disp: , Rfl:     Omega-3 Fatty Acids (FISH OIL PO), Take 2 g by mouth daily , Disp: , Rfl:     omeprazole (PriLOSEC) 20 mg delayed release capsule, TAKE 1 CAPSULE (20 MG TOTAL) BY MOUTH AS NEEDED (GERD), Disp: 90 capsule, Rfl: 0    diltiazem (CARDIZEM CD) 120 mg 24 hr capsule, Take 120 mg by mouth every evening , Disp: , Rfl:     FIBER PO, Take 1 capsule by mouth daily as needed (Patient not taking: Reported on 9/23/2021), Disp: , Rfl:     Hydrocortisone Acetate (Vagisil) 1 % CREA, Apply topically (Patient not taking: Reported on 3/18/2022 ), Disp: , Rfl:     mupirocin (BACTROBAN) 2 % ointment, APPLY TO SORE AREA ON BOTTOM TWICE DAILY UNTIL HEALED (Patient not taking: Reported on 3/18/2022), Disp: , Rfl:   Allergies   Allergen Reactions    Bee Venom      Tongue swelling    Flurbiprofen Hives    Ibuprofen Shortness Of Breath    Levaquin [Levofloxacin In D5w] Other (See Comments)     Dark spots under eyes and wrists    Tequin [Gatifloxacin] Rash     Vitals:    03/18/22 1325   BP: 126/74   Temp: (!) 97 4 °F (36 3 °C)       Physical Exam  Constitutional:       Appearance: Normal appearance  HENT:      Head: Normocephalic and atraumatic  Right Ear: External ear normal       Left Ear: External ear normal       Mouth/Throat:      Mouth: Mucous membranes are moist    Eyes:      Extraocular Movements: Extraocular movements intact  Pupils: Pupils are equal, round, and reactive to light  Cardiovascular:      Rate and Rhythm: Normal rate and regular rhythm  Pulses: Normal pulses  Heart sounds: Normal heart sounds     Pulmonary:      Effort: Pulmonary effort is normal  Breath sounds: Normal breath sounds  Abdominal:      General: Abdomen is flat  Palpations: Abdomen is soft  Musculoskeletal:         General: Normal range of motion  Cervical back: Normal range of motion and neck supple  No rigidity  Lymphadenopathy:      Cervical: No cervical adenopathy  Skin:     General: Skin is warm and dry  Neurological:      General: No focal deficit present  Mental Status: She is alert and oriented to person, place, and time  Psychiatric:         Mood and Affect: Mood normal          Behavior: Behavior normal          Thought Content: Thought content normal          Judgment: Judgment normal            Results:  Labs:  Lab Results   Component Value Date    [id number] 2 840 08/14/2021    R8UDEMF 13 0 02/28/2018         Imaging  US thyroid    Result Date: 3/14/2022  Narrative: THYROID ULTRASOUND INDICATION:    E04 1: Nontoxic single thyroid nodule  COMPARISON:  Thyroid ultrasound dated 3/4/2021  TECHNIQUE:   Ultrasound of the thyroid was performed with a high frequency linear transducer in transverse and sagittal planes including volumetric imaging sweeps as well as traditional still imaging technique  FINDINGS:  Left thyroid parenchyma is diffusely heterogeneous in echotexture  Right lobe: Surgically absent  Left lobe:  5 0 x 2 9 x 1 5 cm  Volume 10 3 mL Nodule #1  Image 23  LEFT midgland nodule measuring 0 6 x 0 5 x 0 4 cm  Stable  COMPOSITION:  2 points, solid or almost completely solid   ECHOGENICITY:  1 point, hyperechoic or isoechoic  SHAPE:  0 points, wider-than-tall  MARGIN: 0 points, smooth  ECHOGENIC FOCI:  0 points, none or large comet-tail artifacts  TI-RADS Classification: TR 3 (3 points), FNA if >2 5 cm  Follow if >1 5 cm  Impression: Heterogeneous left thyroid lobe with stable subcentimeter nodule  No new nodules  No nodule meets current ACR criteria for requiring biopsy or follow-up ultrasounds  Status post right thyroidectomy  Reference: ACR Thyroid Imaging, Reporting and Data System (TI-RADS): White Paper of the [employer]  J AM Mary Radiol [id number]  (additional recommendations based on American Thyroid Association 2015 guidelines ) Workstation performed: [id number]     I reviewed the above laboratory and imaging data  Discussion/Summary:  Left-sided thyroid nodule  It is stable in size and appearance  No worrisome features  She is opting for surveillance  We will therefore plan 1 year follow-up with ultrasound at that time  All questions answered and copies of reports given her for records

## 2022-03-18 NOTE — LETTER
March 18, 2022     Mirela Jefferson, [address] [facility]  [address] [facility]    Patient: Joselito Amor   YOB: 1940   Date of Visit: 3/18/2022       Dear Dr Kevin Nance:    Thank you for referring Joselito Amor to me for evaluation  Below are my notes for this consultation  If you have questions, please do not hesitate to call me  I look forward to following your patient along with you  Sincerely,        Janelle Dey MD        CC: MD Babar Gee MD Melda Form, MD Julaine Prude, MD Bonni Alm, MD  3/18/2022  2:05 PM  Incomplete     Surgical Oncology Follow Up       [facility] SURGICAL ONCOLOGY [facility] [address]    Joselito Amor  1940  [id number]  [facility] SURGICAL ONCOLOGY [facility] [address]    No chief complaint on file  Assessment/Plan:    No problem-specific Assessment & Plan notes found for this encounter  Diagnoses and all orders for this visit:    Thyroid nodule  -     US thyroid; Future        Advance Care Planning/Advance Directives:  Discussed disease status, cancer treatment plans and/or cancer treatment goals with the patient  Oncology History   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   11/20/2019 Initial Diagnosis    Ductal carcinoma in situ (DCIS) of right breast     5/22/2020 Surgery    A  Breast, right, lumpectomy:  -  Ductal carcinoma in situ, low nuclear grade       B  Breast, right new inferior margin, excision:  -  Ductal carcinoma in situ, low nuclear grade involving intraductal papilloma      C  Breast, right new lateral margin, excision:  -  Ductal carcinoma in situ, low nuclear grade      D  Breast, right new posterior margin, excision:  -  Benign breast parenchyma, negative for atypia or malignancy       6/9/2020 -  Cancer Staged    Staging form: Breast, AJCC 8th Edition  - Clinical: cT0, cN0, cM0 - Signed by Hortensia Rubi MD on 6/9/2020  Laterality: Right       6/9/2020 -  Cancer Staged    Staging form: Breast, AJCC 8th Edition  - Pathologic: Stage 0 (pTis (DCIS), pN0, cM0, ER+, [place]+, HER2-) - Signed by Hortensia Rubi MD on 6/16/2020  Laterality: Right  Method of lymph node assessment: Clinical  Nuclear grade: G1       7/30/2020 - 8/28/2020 Radiation    Plan ID Energy Fractions Dose per Fraction (cGy) Dose Correction (cGy) Total Dose Delivered (cGy) Elapsed Days   R Breast 6X 16 / 16 266 0 4,256 21   R Brst Boost 6X 5 / 5 200 0 1,000 4        Primary adenocarcinoma of upper lobe of right lung ([facility] [address] ) (Resolved)   12/2/2019 Initial Diagnosis    Primary adenocarcinoma of upper lobe of right lung ([facility] [address] )     1/17/2020 Surgery    Lung, right upper lobe, wedge resection:  -  Invasive moderately differentiated adenocarcinoma  Stage IB         History of Present Illness:  Patient is an 25-year-old woman who we have been following for incidentally found left-sided thyroid nodule  -Interval History: She comes in with recent ultrasound done in anticipation of today's visit  No symptoms  Review of Systems:  Review of Systems   Constitutional: Negative  HENT: Negative  Eyes: Negative  Respiratory: Negative  Cardiovascular: Negative  Gastrointestinal: Negative  Endocrine: Negative  Genitourinary: Negative  Musculoskeletal: Negative  Skin: Negative  Allergic/Immunologic: Negative  Neurological: Negative  Hematological: Negative  Psychiatric/Behavioral: Negative          Patient Active Problem List   Diagnosis    A-fib ([address] )    Benign essential hypertension    Coagulopathy (HCC)    History of colon cancer    Overweight    Osteoarthritis    Postoperative hypothyroidism    Mixed hyperlipidemia    Medicare annual wellness visit, subsequent    Osteopenia of multiple sites    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    Dense breast tissue    Hashimoto's thyroiditis    History of lung cancer    Thyroid nodule    Encounter for follow-up surveillance of breast cancer     Past Medical History:   Diagnosis Date    Anesthesia     "cheap date"    Arthritis     Atrial fibrillation ([facility] [address] )     Benign polyp of large intestine     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    Colon cancer [facility]) 44    34 yo    Colon polyp     Full dentures     full upper/ partial lower    GERD (gastroesophageal reflux disease)     Glaucoma suspect     History of neck problems     "C3-C7 and had epidural injections years ago"    Hyperlipidemia     Hypertension     [facility]     jan 2020- had surgery    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[facility]     Prediabetes     Primary adenocarcinoma of upper lobe of right lung ([facility] [address] ) 12/2/2019    Pulmonary nodules 2/19/2015    Refusal of blood transfusions as patient is Religion     Tic disorder, transient, recurrent     not recent    Walks frequently      Past Surgical History:   Procedure Laterality Date    APPENDECTOMY      ARTHROSCOPY KNEE      BREAST BIOPSY Right 04/10/2017    US guided - intraductal papillomas    BREAST BIOPSY Right 10/24/2019    ADH    BREAST LUMPECTOMY W/ NEEDLE LOCALIZATION Right 5/22/2020    Procedure: EXCISIONAL BIOPSY;  1000 NEEDLE LOC;  Surgeon: Tin Liriano MD;  [facility];  Service: Surgical Oncology    CARDIOVASCULAR STRESS TEST      CATARACT EXTRACTION Bilateral     COLON SURGERY      COLONOSCOPY      done 2010 due 2015 Dr Kuo    DILATION AND CURETTAGE OF UTERUS      ESOPHAGOGASTRODUODENOSCOPY      inflammation aonly    HEMICOLECTOMY Right 1977    HYSTERECTOMY      JOINT REPLACEMENT      L knee    LUNG SURGERY      MAMMO NEEDLE LOCALIZATION RIGHT (ALL INC) Right 5/22/2020    MAMMO NEEDLE LOCALIZATION RIGHT (ALL INC) EACH ADD Right 5/22/2020    MAMMO STEREOTACTIC BREAST BIOPSY RIGHT (ALL INC) Right 10/24/2019    PARATHYROID GLAND SURGERY exploration     ND BRONCHOSCOPY,DIAGNOSTIC N/A 2020    Procedure: FLEXIBLE BRONCHOSCOPY;  Surgeon: Mario Solo MD;  [facility];  Service: Thoracic    ND THORACOSCOPY SURG LOBECTOMY Right 2020    Procedure: ROBOTIC ASSISTED COMPLETE LUNG LOBECTOMY;  Surgeon: Mario Solo MD;  [facility];  Service: Thoracic    ND THORACOSCOPY W/THERA WEDGE RESEXN INITIAL UNILAT Right 2020    Procedure: ROBOTIC ASSISTED UPPER-LOBE WEDGE RESECTION, MEDIASTINAL LYMPH NODE DISECTION;  Surgeon: Mario Solo MD;  [facility];  Service: Thoracic    REPLACEMENT TOTAL KNEE      THYROID SURGERY      TOTAL ABDOMINAL HYSTERECTOMY  1983    TOTAL THYROIDECTOMY      US GUIDED BREAST BIOPSY RIGHT COMPLETE Right 4/10/2017    US GUIDED THYROID BIOPSY  2020     Family History   Problem Relation Age of Onset    Esophageal cancer Mother         [de-identified]    Hypertension Mother     Stroke Family         TIA    Breast cancer Paternal Aunt 80    No Known Problems Father     COPD Sister     COPD Sister     No Known Problems Maternal Grandmother     No Known Problems Maternal Grandfather     No Known Problems Paternal Grandmother     No Known Problems Paternal Grandfather     No Known Problems Maternal Aunt     No Known Problems Maternal Aunt     No Known Problems Maternal Aunt     No Known Problems Maternal Aunt     No Known Problems Maternal Aunt      Social History     Socioeconomic History    Marital status: Single     Spouse name: Not on file    Number of children: Not on file    Years of education: Not on file    Highest education level: Not on file   Occupational History    Not on file   Tobacco Use    Smoking status: Former Smoker     Packs/day: 1 50     Years: 20 00     Pack years: 30 00     Types: Cigarettes     Quit date: 5     Years since quittin 2    Smokeless tobacco: Never Used   Vaping Use    Vaping Use: Never used   Substance and Sexual Activity    Alcohol use:  Yes     Alcohol/week: 5 0 standard drinks     Types: 5 Cans of beer per week     Comment: occasional beer with food    Drug use: No    Sexual activity: Not on file   Other Topics Concern    Not on file   Social History Narrative    Caffeine use- 1-3, 8oz coffees per day    Denied history of housing without smoke detectors    Always uses a seatbelt     Social Determinants of Health     Financial Resource Strain: Not on file   Food Insecurity: Not on file   Transportation Needs: Not on file   Physical Activity: Not on file   Stress: Not on file   Social Connections: Not on file   Intimate Partner Violence: Not on file   Housing Stability: Not on file       Current Outpatient Medications:     apixaban (ELIQUIS) 5 mg, Take 5 mg by mouth 2 (two) times a day To stop 2 days prior to surgery per md, Disp: , Rfl:     atorvastatin (LIPITOR) 80 mg tablet, TAKE 1 TABLET BY MOUTH EVERY DAY, Disp: 90 tablet, Rfl: 1    Coenzyme Q10 (CO Q 10 PO), Take 200 mg by mouth, Disp: , Rfl:     cyanocobalamin (VITAMIN B-12) 1000 MCG tablet, Take 1,000 mcg by mouth, Disp: , Rfl:     levothyroxine 100 mcg tablet, TAKE 1 TABLET BY MOUTH EVERY DAY, Disp: 90 tablet, Rfl: 1    Multiple Vitamins-Calcium (ESSENTIAL ONE DAILY MULTIVIT PO), Take by mouth daily , Disp: , Rfl:     Omega-3 Fatty Acids (FISH OIL PO), Take 2 g by mouth daily , Disp: , Rfl:     omeprazole (PriLOSEC) 20 mg delayed release capsule, TAKE 1 CAPSULE (20 MG TOTAL) BY MOUTH AS NEEDED (GERD), Disp: 90 capsule, Rfl: 0    diltiazem (CARDIZEM CD) 120 mg 24 hr capsule, Take 120 mg by mouth every evening , Disp: , Rfl:     FIBER PO, Take 1 capsule by mouth daily as needed (Patient not taking: Reported on 9/23/2021), Disp: , Rfl:     Hydrocortisone Acetate (Vagisil) 1 % CREA, Apply topically (Patient not taking: Reported on 3/18/2022 ), Disp: , Rfl:     mupirocin (BACTROBAN) 2 % ointment, APPLY TO SORE AREA ON BOTTOM TWICE DAILY UNTIL HEALED (Patient not taking: Reported on 3/18/2022), Disp: , Rfl:   Allergies   Allergen Reactions    Bee Venom      Tongue swelling    Flurbiprofen Hives    Ibuprofen Shortness Of Breath    Levaquin [Levofloxacin In D5w] Other (See Comments)     Dark spots under eyes and wrists    Tequin [Gatifloxacin] Rash     Vitals:    03/18/22 1325   BP: 126/74   Temp: (!) 97 4 °F (36 3 °C)       Physical Exam  Constitutional:       Appearance: Normal appearance  HENT:      Head: Normocephalic and atraumatic  Right Ear: External ear normal       Left Ear: External ear normal       Mouth/Throat:      Mouth: Mucous membranes are moist    Eyes:      Extraocular Movements: Extraocular movements intact  Pupils: Pupils are equal, round, and reactive to light  Cardiovascular:      Rate and Rhythm: Normal rate and regular rhythm  Pulses: Normal pulses  Heart sounds: Normal heart sounds  Pulmonary:      Effort: Pulmonary effort is normal       Breath sounds: Normal breath sounds  Abdominal:      General: Abdomen is flat  Palpations: Abdomen is soft  Musculoskeletal:         General: Normal range of motion  Cervical back: Normal range of motion and neck supple  No rigidity  Lymphadenopathy:      Cervical: No cervical adenopathy  Skin:     General: Skin is warm and dry  Neurological:      General: No focal deficit present  Mental Status: She is alert and oriented to person, place, and time  Psychiatric:         Mood and Affect: Mood normal          Behavior: Behavior normal          Thought Content: Thought content normal          Judgment: Judgment normal            Results:  Labs:  Lab Results   Component Value Date    [id number] 2 840 08/14/2021    E6BYVCN 13 0 02/28/2018         Imaging  US thyroid    Result Date: 3/14/2022  Narrative: THYROID ULTRASOUND INDICATION:    E04 1: Nontoxic single thyroid nodule  COMPARISON:  Thyroid ultrasound dated 3/4/2021   TECHNIQUE:   Ultrasound of the thyroid was performed with a high frequency linear transducer in transverse and sagittal planes including volumetric imaging sweeps as well as traditional still imaging technique  FINDINGS:  Left thyroid parenchyma is diffusely heterogeneous in echotexture  Right lobe: Surgically absent  Left lobe:  5 0 x 2 9 x 1 5 cm  Volume 10 3 mL Nodule #1  Image 23  LEFT midgland nodule measuring 0 6 x 0 5 x 0 4 cm  Stable  COMPOSITION:  2 points, solid or almost completely solid   ECHOGENICITY:  1 point, hyperechoic or isoechoic  SHAPE:  0 points, wider-than-tall  MARGIN: 0 points, smooth  ECHOGENIC FOCI:  0 points, none or large comet-tail artifacts  TI-RADS Classification: TR 3 (3 points), FNA if >2 5 cm  Follow if >1 5 cm  Impression: Heterogeneous left thyroid lobe with stable subcentimeter nodule  No new nodules  No nodule meets current ACR criteria for requiring biopsy or follow-up ultrasounds  Status post right thyroidectomy  Reference: ACR Thyroid Imaging, Reporting and Data System (TI-RADS): White Paper of the IT MOVES IT  J AM Mary Radiol 7524;74:272-464  (additional recommendations based on American Thyroid Association 2015 guidelines ) Workstation performed: [id number]     I reviewed the above laboratory and imaging data  Discussion/Summary:  Left-sided thyroid nodule  It is stable in size and appearance  No worrisome features  She is opting for surveillance  We will therefore plan 1 year follow-up with ultrasound at that time  All questions answered and copies of reports given her for records      Shanda Akhtar MD  3/18/2022  2:05 PM  Sign when Signing Visit     Surgical Oncology Follow Up       [facility] [address]    [facility]  1940  [id number]  [facility]  [facility] SURGICAL ONCOLOGY [facility] [address]    No chief complaint on file       Assessment/Plan:    No problem-specific Assessment & Plan notes found for this encounter  Diagnoses and all orders for this visit:    Thyroid nodule  -     US thyroid; Future        Advance Care Planning/Advance Directives:  Discussed disease status, cancer treatment plans and/or cancer treatment goals with the patient  Oncology History   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   11/20/2019 Initial Diagnosis    Ductal carcinoma in situ (DCIS) of right breast     5/22/2020 Surgery    A  Breast, right, lumpectomy:  -  Ductal carcinoma in situ, low nuclear grade       B  Breast, right new inferior margin, excision:  -  Ductal carcinoma in situ, low nuclear grade involving intraductal papilloma      C  Breast, right new lateral margin, excision:  -  Ductal carcinoma in situ, low nuclear grade      D  Breast, right new posterior margin, excision:  -  Benign breast parenchyma, negative for atypia or malignancy  6/9/2020 -  Cancer Staged    Staging form: Breast, AJCC 8th Edition  - Clinical: cT0, cN0, cM0 - Signed by Yonny Baeza MD on 6/9/2020  Laterality: Right       6/9/2020 -  Cancer Staged    Staging form: Breast, AJCC 8th Edition  - Pathologic: Stage 0 (pTis (DCIS), pN0, cM0, ER+, [place]+, HER2-) - Signed by Yonny Baeza MD on 6/16/2020  Laterality: Right  Method of lymph node assessment: Clinical  Nuclear grade: G1       7/30/2020 - 8/28/2020 Radiation    Plan ID Energy Fractions Dose per Fraction (cGy) Dose Correction (cGy) Total Dose Delivered (cGy) Elapsed Days   R Breast 6X 16 / 16 266 0 4,256 21   R Brst Boost 6X 5 / 5 200 0 1,000 4        Primary adenocarcinoma of upper lobe of right lung ([address] ) (Resolved)   12/2/2019 Initial Diagnosis    Primary adenocarcinoma of upper lobe of right lung ([address] )     1/17/2020 Surgery    Lung, right upper lobe, wedge resection:  -  Invasive moderately differentiated adenocarcinoma    Stage IB         History of Present Illness:  Patient is an 78-year-old woman who we have been following for incidentally found left-sided thyroid nodule  She comes in with recent ultrasound done in anticipation of today's visit  No symptoms   -Interval History:***    Review of Systems:  Review of Systems   Constitutional: Negative  HENT: Negative  Eyes: Negative  Respiratory: Negative  Cardiovascular: Negative  Gastrointestinal: Negative  Endocrine: Negative  Genitourinary: Negative  Musculoskeletal: Negative  Skin: Negative  Allergic/Immunologic: Negative  Neurological: Negative  Hematological: Negative  Psychiatric/Behavioral: Negative          Patient Active Problem List   Diagnosis    A-fib ([facility] [address] )    Benign essential hypertension    Coagulopathy (HCC)    History of colon cancer    Overweight    Osteoarthritis    Postoperative hypothyroidism    Mixed hyperlipidemia    Medicare annual wellness visit, subsequent    Osteopenia of multiple sites    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    Dense breast tissue    Hashimoto's thyroiditis    History of lung cancer    Thyroid nodule    Encounter for follow-up surveillance of breast cancer     Past Medical History:   Diagnosis Date    Anesthesia     "cheap date"    Arthritis     Atrial fibrillation ([facility] [address] )     Benign polyp of large intestine     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    Colon cancer [facility]) 46    36 yo    Colon polyp     Full dentures     full upper/ partial lower    GERD (gastroesophageal reflux disease)     Glaucoma suspect     History of neck problems     "C3-C7 and had epidural injections years ago"    Hyperlipidemia     Hypertension     Lung cancer ([facility] [address] )     jan 2020- had surgery    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[facility] [address] )     Prediabetes     Primary adenocarcinoma of upper lobe of right lung ([facility] [address] ) 12/2/2019    Pulmonary nodules 2/19/2015    Refusal of blood transfusions as patient is Mandaen     Tic disorder, transient, recurrent     not recent   [facility]      Past Surgical History:   Procedure Laterality Date    APPENDECTOMY      ARTHROSCOPY KNEE      BREAST BIOPSY Right 04/10/2017    US guided - intraductal papillomas    BREAST BIOPSY Right 10/24/2019    ADH    BREAST LUMPECTOMY W/ NEEDLE LOCALIZATION Right 5/22/2020    Procedure: EXCISIONAL BIOPSY;  1000 NEEDLE LOC;  Surgeon: Rufino Graves MD;  [facility];  Service: Surgical Oncology    CARDIOVASCULAR STRESS TEST      CATARACT EXTRACTION Bilateral     COLON SURGERY      COLONOSCOPY      done 2010 due 2015 Dr Kuo    DILATION AND CURETTAGE OF UTERUS      ESOPHAGOGASTRODUODENOSCOPY      inflammation aonly    HEMICOLECTOMY Right 1977    HYSTERECTOMY      JOINT REPLACEMENT      L knee    LUNG SURGERY      MAMMO NEEDLE LOCALIZATION RIGHT (ALL INC) Right 5/22/2020    MAMMO NEEDLE LOCALIZATION RIGHT (ALL INC) EACH ADD Right 5/22/2020    MAMMO STEREOTACTIC BREAST BIOPSY RIGHT (ALL INC) Right 10/24/2019    PARATHYROID GLAND SURGERY      exploration     NM BRONCHOSCOPY,DIAGNOSTIC N/A 1/17/2020    Procedure: FLEXIBLE BRONCHOSCOPY;  Surgeon: Nannette Hernandez MD;  [facility];  Service: Thoracic    NM THORACOSCOPY SURG LOBECTOMY Right 1/17/2020    Procedure: ROBOTIC ASSISTED COMPLETE LUNG LOBECTOMY;  Surgeon: Nannette Hernandez MD;  [facility];  Service: Thoracic    NM THORACOSCOPY W/THERA WEDGE RESEXN INITIAL UNILAT Right 1/17/2020    Procedure: ROBOTIC ASSISTED UPPER-LOBE WEDGE RESECTION, MEDIASTINAL LYMPH NODE DISECTION;  Surgeon: Nannette Hernandez MD;  [facility];  Service: Thoracic    REPLACEMENT TOTAL KNEE      THYROID SURGERY      TOTAL ABDOMINAL HYSTERECTOMY  1983    TOTAL THYROIDECTOMY      US GUIDED BREAST BIOPSY RIGHT COMPLETE Right 4/10/2017    US GUIDED THYROID BIOPSY  2/14/2020     Family History   Problem Relation Age of Onset    Esophageal cancer Mother         [de-identified]    Hypertension Mother     Stroke Family TIA    Breast cancer Paternal Aunt 80    No Known Problems Father     COPD Sister     COPD Sister     No Known Problems Maternal Grandmother     No Known Problems Maternal Grandfather     No Known Problems Paternal Grandmother     No Known Problems Paternal Grandfather     No Known Problems Maternal Aunt     No Known Problems Maternal Aunt     No Known Problems Maternal Aunt     No Known Problems Maternal Aunt     No Known Problems Maternal Aunt      Social History     Socioeconomic History    Marital status: Single     Spouse name: Not on file    Number of children: Not on file    Years of education: Not on file    Highest education level: Not on file   Occupational History    Not on file   Tobacco Use    Smoking status: Former Smoker     Packs/day: 1 50     Years: 20 00     Pack years: 30 00     Types: Cigarettes     Quit date: 5     Years since quittin 2    Smokeless tobacco: Never Used   Vaping Use    Vaping Use: Never used   Substance and Sexual Activity    Alcohol use:  Yes     Alcohol/week: 5 0 standard drinks     Types: 5 Cans of beer per week     Comment: occasional beer with food    Drug use: No    Sexual activity: Not on file   Other Topics Concern    Not on file   Social History Narrative    Caffeine use- 1-3, 8oz coffees per day    Denied history of housing without smoke detectors    Always uses a seatbelt     Social Determinants of Health     Financial Resource Strain: Not on file   Food Insecurity: Not on file   Transportation Needs: Not on file   Physical Activity: Not on file   Stress: Not on file   Social Connections: Not on file   Intimate Partner Violence: Not on file   Housing Stability: Not on file       Current Outpatient Medications:     apixaban (ELIQUIS) 5 mg, Take 5 mg by mouth 2 (two) times a day To stop 2 days prior to surgery per md, Disp: , Rfl:     atorvastatin (LIPITOR) 80 mg tablet, TAKE 1 TABLET BY MOUTH EVERY DAY, Disp: 90 tablet, Rfl: 1   Coenzyme Q10 (CO Q 10 PO), Take 200 mg by mouth, Disp: , Rfl:     cyanocobalamin (VITAMIN B-12) 1000 MCG tablet, Take 1,000 mcg by mouth, Disp: , Rfl:     levothyroxine 100 mcg tablet, TAKE 1 TABLET BY MOUTH EVERY DAY, Disp: 90 tablet, Rfl: 1    Multiple Vitamins-Calcium (ESSENTIAL ONE DAILY MULTIVIT PO), Take by mouth daily , Disp: , Rfl:     Omega-3 Fatty Acids (FISH OIL PO), Take 2 g by mouth daily , Disp: , Rfl:     omeprazole (PriLOSEC) 20 mg delayed release capsule, TAKE 1 CAPSULE (20 MG TOTAL) BY MOUTH AS NEEDED (GERD), Disp: 90 capsule, Rfl: 0    diltiazem (CARDIZEM CD) 120 mg 24 hr capsule, Take 120 mg by mouth every evening , Disp: , Rfl:     FIBER PO, Take 1 capsule by mouth daily as needed (Patient not taking: Reported on 9/23/2021), Disp: , Rfl:     Hydrocortisone Acetate (Vagisil) 1 % CREA, Apply topically (Patient not taking: Reported on 3/18/2022 ), Disp: , Rfl:     mupirocin (BACTROBAN) 2 % ointment, APPLY TO SORE AREA ON BOTTOM TWICE DAILY UNTIL HEALED (Patient not taking: Reported on 3/18/2022), Disp: , Rfl:   Allergies   Allergen Reactions    Bee Venom      Tongue swelling    Flurbiprofen Hives    Ibuprofen Shortness Of Breath    Levaquin [Levofloxacin In D5w] Other (See Comments)     Dark spots under eyes and wrists    Tequin [Gatifloxacin] Rash     Vitals:    03/18/22 1325   BP: 126/74   Temp: (!) 97 4 °F (36 3 °C)       Physical Exam  Constitutional:       Appearance: Normal appearance  HENT:      Head: Normocephalic and atraumatic  Right Ear: External ear normal       Left Ear: External ear normal       Mouth/Throat:      Mouth: Mucous membranes are moist    Eyes:      Extraocular Movements: Extraocular movements intact  Pupils: Pupils are equal, round, and reactive to light  Cardiovascular:      Rate and Rhythm: Normal rate and regular rhythm  Pulses: Normal pulses  Heart sounds: Normal heart sounds     Pulmonary:      Effort: Pulmonary effort is normal  Breath sounds: Normal breath sounds  Abdominal:      General: Abdomen is flat  Palpations: Abdomen is soft  Musculoskeletal:         General: Normal range of motion  Cervical back: Normal range of motion and neck supple  No rigidity  Lymphadenopathy:      Cervical: No cervical adenopathy  Skin:     General: Skin is warm and dry  Neurological:      General: No focal deficit present  Mental Status: She is alert and oriented to person, place, and time  Psychiatric:         Mood and Affect: Mood normal          Behavior: Behavior normal          Thought Content: Thought content normal          Judgment: Judgment normal            Results:  Labs:  Lab Results   Component Value Date    [id number] 2 840 08/14/2021    U8NVIDP 13 0 02/28/2018         Imaging  US thyroid    Result Date: 3/14/2022  Narrative: THYROID ULTRASOUND INDICATION:    E04 1: Nontoxic single thyroid nodule  COMPARISON:  Thyroid ultrasound dated 3/4/2021  TECHNIQUE:   Ultrasound of the thyroid was performed with a high frequency linear transducer in transverse and sagittal planes including volumetric imaging sweeps as well as traditional still imaging technique  FINDINGS:  Left thyroid parenchyma is diffusely heterogeneous in echotexture  Right lobe: Surgically absent  Left lobe:  5 0 x 2 9 x 1 5 cm  Volume 10 3 mL Nodule #1  Image 23  LEFT midgland nodule measuring 0 6 x 0 5 x 0 4 cm  Stable  COMPOSITION:  2 points, solid or almost completely solid   ECHOGENICITY:  1 point, hyperechoic or isoechoic  SHAPE:  0 points, wider-than-tall  MARGIN: 0 points, smooth  ECHOGENIC FOCI:  0 points, none or large comet-tail artifacts  TI-RADS Classification: TR 3 (3 points), FNA if >2 5 cm  Follow if >1 5 cm  Impression: Heterogeneous left thyroid lobe with stable subcentimeter nodule  No new nodules  No nodule meets current ACR criteria for requiring biopsy or follow-up ultrasounds  Status post right thyroidectomy  Reference: ACR Thyroid Imaging, Reporting and Data System (TI-RADS): White Paper of the [employer]  J AM Mary Radiol 7284;03:521-042  (additional recommendations based on American Thyroid Association 2015 guidelines ) Workstation performed: [id number]     I reviewed the above laboratory and imaging data  Discussion/Summary:  Left-sided thyroid nodule  It is stable in size and appearance  No worrisome features  She is opting for surveillance  We will therefore plan 1 year follow-up with ultrasound at that time  All questions answered and copies of reports given her for records

## 2022-03-18 NOTE — LETTER
March 18, 2022     Micheal Bills, [address] [facility]  [address] [facility]    Patient: Jose Gunter   YOB: 1940   Date of Visit: 3/18/2022       Dear Dr Luis A Brewer:    Thank you for referring Jose Gunter to me for evaluation  Below are my notes for this consultation  If you have questions, please do not hesitate to call me  I look forward to following your patient along with you  Sincerely,        Arleth Graves MD        CC: MD Ramsey Prasad MD Sherrilyn Dickinson, MD Caldwell Cote, MD Twylla Scarpa, MD  3/18/2022  2:05 PM  Sign when Signing Visit     Surgical Oncology Follow Up       [facility] [id number]    Jose Gunter  1940  [id number]  [facility]  CANCER [facility] SURGICAL ONCOLOGY [address] [address]    No chief complaint on file  Assessment/Plan:    No problem-specific Assessment & Plan notes found for this encounter  Diagnoses and all orders for this visit:    Thyroid nodule  -     US thyroid; Future        Advance Care Planning/Advance Directives:  Discussed disease status, cancer treatment plans and/or cancer treatment goals with the patient  Oncology History   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   11/20/2019 Initial Diagnosis    Ductal carcinoma in situ (DCIS) of right breast     5/22/2020 Surgery    A  Breast, right, lumpectomy:  -  Ductal carcinoma in situ, low nuclear grade       B  Breast, right new inferior margin, excision:  -  Ductal carcinoma in situ, low nuclear grade involving intraductal papilloma      C  Breast, right new lateral margin, excision:  -  Ductal carcinoma in situ, low nuclear grade      D  Breast, right new posterior margin, excision:  -  Benign breast parenchyma, negative for atypia or malignancy       6/9/2020 -  Cancer Staged    Staging form: Breast, AJCC 8th Edition  - Clinical: cT0, cN0, cM0 - Signed by Leonie Ramirez MD on 6/9/2020  Laterality: Right       6/9/2020 -  Cancer Staged    Staging form: Breast, AJCC 8th Edition  - Pathologic: Stage 0 (pTis (DCIS), pN0, cM0, ER+, AL+, HER2-) - Signed by Leonie Ramirez MD on 6/16/2020  Laterality: Right  Method of lymph node assessment: Clinical  Nuclear grade: G1       7/30/2020 - 8/28/2020 Radiation    Plan ID Energy Fractions Dose per Fraction (cGy) Dose Correction (cGy) Total Dose Delivered (cGy) Elapsed Days   R Breast 6X 16 / 16 266 0 4,256 21   R Brst Boost 6X 5 / 5 200 0 1,000 4        Primary adenocarcinoma of upper lobe of right lung ([facility] [address] ) (Resolved)   12/2/2019 Initial Diagnosis    Primary adenocarcinoma of upper lobe of right lung ([facility] [address] )     1/17/2020 Surgery    Lung, right upper lobe, wedge resection:  -  Invasive moderately differentiated adenocarcinoma  Stage IB         History of Present Illness:  Patient is an 18-year-old woman who we have been following for incidentally found left-sided thyroid nodule  She comes in with recent ultrasound done in anticipation of today's visit  No symptoms   -Interval History:***    Review of Systems:  Review of Systems   Constitutional: Negative  HENT: Negative  Eyes: Negative  Respiratory: Negative  Cardiovascular: Negative  Gastrointestinal: Negative  Endocrine: Negative  Genitourinary: Negative  Musculoskeletal: Negative  Skin: Negative  Allergic/Immunologic: Negative  Neurological: Negative  Hematological: Negative  Psychiatric/Behavioral: Negative          Patient Active Problem List   Diagnosis    A-fib ([address] )    Benign essential hypertension    Coagulopathy (HCC)    History of colon cancer    Overweight    Osteoarthritis    Postoperative hypothyroidism    Mixed hyperlipidemia    Medicare annual wellness visit, subsequent    Osteopenia of multiple sites    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    Dense breast tissue    Hashimoto's thyroiditis    History of lung cancer    Thyroid nodule    Encounter for follow-up surveillance of breast cancer     Past Medical History:   Diagnosis Date    Anesthesia     "cheap date"    Arthritis     Atrial fibrillation ([facility] [address] )     Benign polyp of large intestine     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) 44    36 yo    Colon polyp     Full dentures     full upper/ partial lower    GERD (gastroesophageal reflux disease)     Glaucoma suspect     History of neck problems     "C3-C7 and had epidural injections years ago"    Hyperlipidemia     Hypertension     Lung cancer ([facility] [address] )     jan 2020- had surgery    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     Prediabetes     Primary adenocarcinoma of upper lobe of right lung ([facility] [address] ) 12/2/2019    Pulmonary nodules 2/19/2015    Refusal of blood transfusions as patient is Jehovah's witness     Tic disorder, transient, recurrent     not recent    Walks frequently      Past Surgical History:   Procedure Laterality Date    APPENDECTOMY      ARTHROSCOPY KNEE      BREAST BIOPSY Right 04/10/2017    US guided - intraductal papillomas    BREAST BIOPSY Right 10/24/2019    ADH    BREAST LUMPECTOMY W/ NEEDLE LOCALIZATION Right 5/22/2020    Procedure: EXCISIONAL BIOPSY;  1000 NEEDLE LOC;  Surgeon: Tadeo Leal MD;  Location: [facility];  Service: Surgical Oncology    CARDIOVASCULAR STRESS TEST      CATARACT EXTRACTION Bilateral     COLON SURGERY      COLONOSCOPY      done 2010 due 2015 Dr Kuo    DILATION AND CURETTAGE OF UTERUS      ESOPHAGOGASTRODUODENOSCOPY      inflammation aonly    HEMICOLECTOMY Right 1977    HYSTERECTOMY      JOINT REPLACEMENT      L knee    LUNG SURGERY      MAMMO NEEDLE LOCALIZATION RIGHT (ALL INC) Right 5/22/2020    MAMMO NEEDLE LOCALIZATION RIGHT (ALL INC) EACH ADD Right 5/22/2020    MAMMO STEREOTACTIC BREAST BIOPSY RIGHT (ALL INC) Right 10/24/2019    PARATHYROID GLAND SURGERY      exploration     OR BRONCHOSCOPY,DIAGNOSTIC N/A 2020    Procedure: FLEXIBLE BRONCHOSCOPY;  Surgeon: Ambrose Moore MD;  [facility];  Service: Thoracic    OR THORACOSCOPY SURG LOBECTOMY Right 2020    Procedure: ROBOTIC ASSISTED COMPLETE LUNG LOBECTOMY;  Surgeon: Ambrose Moore MD;  [facility];  Service: Thoracic    OR THORACOSCOPY W/THERA WEDGE RESEXN INITIAL UNILAT Right 2020    Procedure: ROBOTIC ASSISTED UPPER-LOBE WEDGE RESECTION, MEDIASTINAL LYMPH NODE DISECTION;  Surgeon: Ambrose Moore MD;  [facility];  Service: Thoracic    REPLACEMENT TOTAL KNEE      THYROID SURGERY      TOTAL ABDOMINAL HYSTERECTOMY      TOTAL THYROIDECTOMY      US GUIDED BREAST BIOPSY RIGHT COMPLETE Right 4/10/2017    US GUIDED THYROID BIOPSY  2020     Family History   Problem Relation Age of Onset    Esophageal cancer Mother         [de-identified]    Hypertension Mother     Stroke Family         TIA    Breast cancer Paternal Aunt 80    No Known Problems Father     COPD Sister     COPD Sister     No Known Problems Maternal Grandmother     No Known Problems Maternal Grandfather     No Known Problems Paternal Grandmother     No Known Problems Paternal Grandfather     No Known Problems Maternal Aunt     No Known Problems Maternal Aunt     No Known Problems Maternal Aunt     No Known Problems Maternal Aunt     No Known Problems Maternal Aunt      Social History     Socioeconomic History    Marital status: Single     Spouse name: Not on file    Number of children: Not on file    Years of education: Not on file    Highest education level: Not on file   Occupational History    Not on file   Tobacco Use    Smoking status: Former Smoker     Packs/day: 1 50     Years: 20 00     Pack years: 30 00     Types: Cigarettes     Quit date:      Years since quittin 2    Smokeless tobacco: Never Used   Vaping Use    Vaping Use: Never used   Substance and Sexual Activity    Alcohol use:  Yes     Alcohol/week: 5 0 standard drinks     Types: 5 Cans of beer per week     Comment: occasional beer with food    Drug use: No    Sexual activity: Not on file   Other Topics Concern    Not on file   Social History Narrative    Caffeine use- 1-3, 8oz coffees per day    Denied history of housing without smoke detectors    Always uses a seatbelt     Social Determinants of Health     Financial Resource Strain: Not on file   Food Insecurity: Not on file   Transportation Needs: Not on file   Physical Activity: Not on file   Stress: Not on file   Social Connections: Not on file   Intimate Partner Violence: Not on file   Housing Stability: Not on file       Current Outpatient Medications:     apixaban (ELIQUIS) 5 mg, Take 5 mg by mouth 2 (two) times a day To stop 2 days prior to surgery per md, Disp: , Rfl:     atorvastatin (LIPITOR) 80 mg tablet, TAKE 1 TABLET BY MOUTH EVERY DAY, Disp: 90 tablet, Rfl: 1    Coenzyme Q10 (CO Q 10 PO), Take 200 mg by mouth, Disp: , Rfl:     cyanocobalamin (VITAMIN B-12) 1000 MCG tablet, Take 1,000 mcg by mouth, Disp: , Rfl:     levothyroxine 100 mcg tablet, TAKE 1 TABLET BY MOUTH EVERY DAY, Disp: 90 tablet, Rfl: 1    Multiple Vitamins-Calcium (ESSENTIAL ONE DAILY MULTIVIT PO), Take by mouth daily , Disp: , Rfl:     Omega-3 Fatty Acids (FISH OIL PO), Take 2 g by mouth daily , Disp: , Rfl:     omeprazole (PriLOSEC) 20 mg delayed release capsule, TAKE 1 CAPSULE (20 MG TOTAL) BY MOUTH AS NEEDED (GERD), Disp: 90 capsule, Rfl: 0    diltiazem (CARDIZEM CD) 120 mg 24 hr capsule, Take 120 mg by mouth every evening , Disp: , Rfl:     FIBER PO, Take 1 capsule by mouth daily as needed (Patient not taking: Reported on 9/23/2021), Disp: , Rfl:     Hydrocortisone Acetate (Vagisil) 1 % CREA, Apply topically (Patient not taking: Reported on 3/18/2022 ), Disp: , Rfl:     mupirocin (BACTROBAN) 2 % ointment, APPLY TO SORE AREA ON BOTTOM TWICE DAILY UNTIL HEALED (Patient not taking: Reported on 3/18/2022), Disp: , Rfl:   Allergies   Allergen Reactions    Bee Venom      Tongue swelling    Flurbiprofen Hives    Ibuprofen Shortness Of Breath    Levaquin [Levofloxacin In D5w] Other (See Comments)     Dark spots under eyes and wrists    Tequin [Gatifloxacin] Rash     Vitals:    03/18/22 1325   BP: 126/74   Temp: (!) 97 4 °F (36 3 °C)       Physical Exam  Constitutional:       Appearance: Normal appearance  HENT:      Head: Normocephalic and atraumatic  Right Ear: External ear normal       Left Ear: External ear normal       Mouth/Throat:      Mouth: Mucous membranes are moist    Eyes:      Extraocular Movements: Extraocular movements intact  Pupils: Pupils are equal, round, and reactive to light  Cardiovascular:      Rate and Rhythm: Normal rate and regular rhythm  Pulses: Normal pulses  Heart sounds: Normal heart sounds  Pulmonary:      Effort: Pulmonary effort is normal       Breath sounds: Normal breath sounds  Abdominal:      General: Abdomen is flat  Palpations: Abdomen is soft  Musculoskeletal:         General: Normal range of motion  Cervical back: Normal range of motion and neck supple  No rigidity  Lymphadenopathy:      Cervical: No cervical adenopathy  Skin:     General: Skin is warm and dry  Neurological:      General: No focal deficit present  Mental Status: She is alert and oriented to person, place, and time  Psychiatric:         Mood and Affect: Mood normal          Behavior: Behavior normal          Thought Content: Thought content normal          Judgment: Judgment normal            Results:  Labs:  Lab Results   Component Value Date    [id number] 2 840 08/14/2021    [id number] 13 0 02/28/2018         Imaging  US thyroid    Result Date: 3/14/2022  Narrative: THYROID ULTRASOUND INDICATION:    E04 1: Nontoxic single thyroid nodule  COMPARISON:  Thyroid ultrasound dated 3/4/2021   TECHNIQUE: Ultrasound of the thyroid was performed with a high frequency linear transducer in transverse and sagittal planes including volumetric imaging sweeps as well as traditional still imaging technique  FINDINGS:  Left thyroid parenchyma is diffusely heterogeneous in echotexture  Right lobe: Surgically absent  Left lobe:  5 0 x 2 9 x 1 5 cm  Volume 10 3 mL Nodule #1  Image 23  LEFT midgland nodule measuring 0 6 x 0 5 x 0 4 cm  Stable  COMPOSITION:  2 points, solid or almost completely solid   ECHOGENICITY:  1 point, hyperechoic or isoechoic  SHAPE:  0 points, wider-than-tall  MARGIN: 0 points, smooth  ECHOGENIC FOCI:  0 points, none or large comet-tail artifacts  TI-RADS Classification: TR 3 (3 points), FNA if >2 5 cm  Follow if >1 5 cm  Impression: Heterogeneous left thyroid lobe with stable subcentimeter nodule  No new nodules  No nodule meets current ACR criteria for requiring biopsy or follow-up ultrasounds  Status post right thyroidectomy  Reference: ACR Thyroid Imaging, Reporting and Data System (TI-RADS): White Paper of the [employer]  J AM Mary Radiol 1704;47:670-656  (additional recommendations based on American Thyroid Association 2015 guidelines ) Workstation performed: [id number]     I reviewed the above laboratory and imaging data  Discussion/Summary:  Left-sided thyroid nodule  It is stable in size and appearance  No worrisome features  She is opting for surveillance  We will therefore plan 1 year follow-up with ultrasound at that time  All questions answered and copies of reports given her for records

## 2022-03-18 NOTE — PROGRESS NOTES
Surgical Oncology Follow Up       [facility] [place]  [facility] [address]    Jacob Socks  1940  [id number]  [address] [facility] [place]  [place] [address]    No chief complaint on file  Assessment/Plan:    No problem-specific Assessment & Plan notes found for this encounter  Diagnoses and all orders for this visit:    Thyroid nodule  -     US thyroid; Future        Advance Care Planning/Advance Directives:  Discussed disease status, cancer treatment plans and/or cancer treatment goals with the patient  Oncology History   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   11/20/2019 Initial Diagnosis    Ductal carcinoma in situ (DCIS) of right breast     5/22/2020 Surgery    A  Breast, right, lumpectomy:  -  Ductal carcinoma in situ, low nuclear grade       B  Breast, right new inferior margin, excision:  -  Ductal carcinoma in situ, low nuclear grade involving intraductal papilloma      C  Breast, right new lateral margin, excision:  -  Ductal carcinoma in situ, low nuclear grade      D  Breast, right new posterior margin, excision:  -  Benign breast parenchyma, negative for atypia or malignancy       6/9/2020 -  Cancer Staged    Staging form: Breast, AJCC 8th Edition  - Clinical: cT0, cN0, cM0 - Signed by Benitze Betts MD on 6/9/2020  Laterality: Right       6/9/2020 -  Cancer Staged    Staging form: Breast, AJCC 8th Edition  - Pathologic: Stage 0 (pTis (DCIS), pN0, cM0, ER+, OK+, HER2-) - Signed by Benitez Betts MD on 6/16/2020  Laterality: Right  Method of lymph node assessment: Clinical  Nuclear grade: G1       7/30/2020 - 8/28/2020 Radiation    Plan ID Energy Fractions Dose per Fraction (cGy) Dose Correction (cGy) Total Dose Delivered (cGy) Elapsed Days   R Breast 6X 16 / 16 266 0 4,256 21   R Brst Boost 6X 5 / 5 200 0 1,000 4        Primary adenocarcinoma of upper lobe of right lung ([facility] [address] ) (Resolved)   12/2/2019 Initial Diagnosis    Primary adenocarcinoma of upper lobe of right lung ([facility] [address] )     1/17/2020 Surgery    Lung, right upper lobe, wedge resection:  -  Invasive moderately differentiated adenocarcinoma  Stage IB         History of Present Illness:  Patient is an 78-year-old woman who we have been following for incidentally found left-sided thyroid nodule  -Interval History: She comes in with recent ultrasound done in anticipation of today's visit  No symptoms  Review of Systems:  Review of Systems   Constitutional: Negative  HENT: Negative  Eyes: Negative  Respiratory: Negative  Cardiovascular: Negative  Gastrointestinal: Negative  Endocrine: Negative  Genitourinary: Negative  Musculoskeletal: Negative  Skin: Negative  Allergic/Immunologic: Negative  Neurological: Negative  Hematological: Negative  Psychiatric/Behavioral: Negative          Patient Active Problem List   Diagnosis    A-fib ([facility] [address] )    Benign essential hypertension    Coagulopathy (HCC)    History of colon cancer    Overweight    Osteoarthritis    Postoperative hypothyroidism    Mixed hyperlipidemia    Medicare annual wellness visit, subsequent    Osteopenia of multiple sites    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    Dense breast tissue    Hashimoto's thyroiditis    History of lung cancer    Thyroid nodule    Encounter for follow-up surveillance of breast cancer     Past Medical History:   Diagnosis Date    Anesthesia     "cheap date"    Arthritis     Atrial fibrillation ([facility] [address] )     Benign polyp of large intestine     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 [address] )     Prediabetes     Primary adenocarcinoma of upper lobe of right lung ([facility] [address] ) 12/2/2019    Pulmonary nodules 2/19/2015    Refusal of blood transfusions as patient is [employer]     Tic disorder, transient, recurrent     not recent    Walks frequently      Past Surgical History:   Procedure Laterality Date    APPENDECTOMY      ARTHROSCOPY KNEE      BREAST BIOPSY Right 04/10/2017    US guided - intraductal papillomas    BREAST BIOPSY Right 10/24/2019    ADH    BREAST LUMPECTOMY W/ NEEDLE LOCALIZATION Right 5/22/2020    Procedure: EXCISIONAL BIOPSY;  1000 NEEDLE LOC;  Surgeon: Remedios De Anda MD;  Location: [facility];  Service: Surgical Oncology    CARDIOVASCULAR STRESS TEST      CATARACT EXTRACTION Bilateral     COLON SURGERY      COLONOSCOPY      done 2010 due 2015 Dr Kuo    DILATION AND CURETTAGE OF UTERUS      ESOPHAGOGASTRODUODENOSCOPY      inflammation aonly    HEMICOLECTOMY Right 1977    HYSTERECTOMY      JOINT REPLACEMENT      L knee    LUNG SURGERY      MAMMO NEEDLE LOCALIZATION RIGHT (ALL INC) Right 5/22/2020    MAMMO NEEDLE LOCALIZATION RIGHT (ALL INC) EACH ADD Right 5/22/2020    MAMMO STEREOTACTIC BREAST BIOPSY RIGHT (ALL INC) Right 10/24/2019    PARATHYROID GLAND SURGERY      exploration     MD BRONCHOSCOPY,DIAGNOSTIC N/A 1/17/2020    Procedure: Tigist Shabazz;  Surgeon: Duane Hazel, MD;  [facility]: [facility];  Service: Thoracic    MD THORACOSCOPY SURG LOBECTOMY Right 1/17/2020    Procedure: ROBOTIC ASSISTED COMPLETE LUNG LOBECTOMY;  Surgeon: Duane Hazel, MD;  [facility];  Service: Thoracic    MD THORACOSCOPY W/THERA WEDGE RESEXN INITIAL UNILAT Right 1/17/2020    Procedure: ROBOTIC ASSISTED UPPER-LOBE WEDGE RESECTION, MEDIASTINAL LYMPH NODE DISECTION;  Surgeon: Duane Hazel, MD;  [facility];  Service: Thoracic    REPLACEMENT TOTAL KNEE  THYROID SURGERY      TOTAL ABDOMINAL HYSTERECTOMY  1983    TOTAL THYROIDECTOMY      US GUIDED BREAST BIOPSY RIGHT COMPLETE Right 4/10/2017    US GUIDED THYROID BIOPSY  2020     Family History   Problem Relation Age of Onset    Esophageal cancer Mother         [de-identified]    Hypertension Mother     Stroke Family         TIA    Breast cancer Paternal Aunt 80    No Known Problems Father     COPD Sister     COPD Sister     No Known Problems Maternal Grandmother     No Known Problems Maternal Grandfather     No Known Problems Paternal Grandmother     No Known Problems Paternal Grandfather     No Known Problems Maternal Aunt     No Known Problems Maternal Aunt     No Known Problems Maternal Aunt     No Known Problems Maternal Aunt     No Known Problems Maternal Aunt      Social History     Socioeconomic History    Marital status: Single     Spouse name: Not on file    Number of children: Not on file    Years of education: Not on file    Highest education level: Not on file   Occupational History    Not on file   Tobacco Use    Smoking status: Former Smoker     Packs/day: 1 50     Years: 20 00     Pack years: 30 00     Types: Cigarettes     Quit date: 5     Years since quittin 2    Smokeless tobacco: Never Used   Vaping Use    Vaping Use: Never used   Substance and Sexual Activity    Alcohol use:  Yes     Alcohol/week: 5 0 standard drinks     Types: 5 Cans of beer per week     Comment: occasional beer with food    Drug use: No    Sexual activity: Not on file   Other Topics Concern    Not on file   Social History Narrative    Caffeine use- 1-3, 8oz coffees per day    Denied history of housing without smoke detectors    Always uses a seatbelt     Social Determinants of Health     Financial Resource Strain: Not on file   Food Insecurity: Not on file   Transportation Needs: Not on file   Physical Activity: Not on file   Stress: Not on file   Social Connections: Not on file   Intimate Partner Violence: Not on file   Housing Stability: Not on file       Current Outpatient Medications:     apixaban (ELIQUIS) 5 mg, Take 5 mg by mouth 2 (two) times a day To stop 2 days prior to surgery per md, Disp: , Rfl:     atorvastatin (LIPITOR) 80 mg tablet, TAKE 1 TABLET BY MOUTH EVERY DAY, Disp: 90 tablet, Rfl: 1    Coenzyme Q10 (CO Q 10 PO), Take 200 mg by mouth, Disp: , Rfl:     cyanocobalamin (VITAMIN B-12) 1000 MCG tablet, Take 1,000 mcg by mouth, Disp: , Rfl:     levothyroxine 100 mcg tablet, TAKE 1 TABLET BY MOUTH EVERY DAY, Disp: 90 tablet, Rfl: 1    Multiple Vitamins-Calcium (ESSENTIAL ONE DAILY MULTIVIT PO), Take by mouth daily , Disp: , Rfl:     Omega-3 Fatty Acids (FISH OIL PO), Take 2 g by mouth daily , Disp: , Rfl:     omeprazole (PriLOSEC) 20 mg delayed release capsule, TAKE 1 CAPSULE (20 MG TOTAL) BY MOUTH AS NEEDED (GERD), Disp: 90 capsule, Rfl: 0    diltiazem (CARDIZEM CD) 120 mg 24 hr capsule, Take 120 mg by mouth every evening , Disp: , Rfl:     FIBER PO, Take 1 capsule by mouth daily as needed (Patient not taking: Reported on 9/23/2021), Disp: , Rfl:     Hydrocortisone Acetate (Vagisil) 1 % CREA, Apply topically (Patient not taking: Reported on 3/18/2022 ), Disp: , Rfl:     mupirocin (BACTROBAN) 2 % ointment, APPLY TO SORE AREA ON BOTTOM TWICE DAILY UNTIL HEALED (Patient not taking: Reported on 3/18/2022), Disp: , Rfl:   Allergies   Allergen Reactions    Bee Venom      Tongue swelling    Flurbiprofen Hives    Ibuprofen Shortness Of Breath    Levaquin [Levofloxacin In D5w] Other (See Comments)     Dark spots under eyes and wrists    Tequin [Gatifloxacin] Rash     Vitals:    03/18/22 1325   BP: 126/74   Temp: (!) 97 4 °F (36 3 °C)       Physical Exam  Constitutional:       Appearance: Normal appearance  HENT:      Head: Normocephalic and atraumatic        Right Ear: External ear normal       Left Ear: External ear normal       Mouth/Throat:      Mouth: Mucous membranes are moist    Eyes:      Extraocular Movements: Extraocular movements intact  Pupils: Pupils are equal, round, and reactive to light  Cardiovascular:      Rate and Rhythm: Normal rate and regular rhythm  Pulses: Normal pulses  Heart sounds: Normal heart sounds  Pulmonary:      Effort: Pulmonary effort is normal       Breath sounds: Normal breath sounds  Abdominal:      General: Abdomen is flat  Palpations: Abdomen is soft  Musculoskeletal:         General: Normal range of motion  Cervical back: Normal range of motion and neck supple  No rigidity  Lymphadenopathy:      Cervical: No cervical adenopathy  Skin:     General: Skin is warm and dry  Neurological:      General: No focal deficit present  Mental Status: She is alert and oriented to person, place, and time  Psychiatric:         Mood and Affect: Mood normal          Behavior: Behavior normal          Thought Content: Thought content normal          Judgment: Judgment normal            Results:  Labs:  Lab Results   Component Value Date    [id number] 2 840 08/14/2021    M9QWQDT 13 0 02/28/2018         Imaging  US thyroid    Result Date: 3/14/2022  Narrative: THYROID ULTRASOUND INDICATION:    E04 1: Nontoxic single thyroid nodule  COMPARISON:  Thyroid ultrasound dated 3/4/2021  TECHNIQUE:   Ultrasound of the thyroid was performed with a high frequency linear transducer in transverse and sagittal planes including volumetric imaging sweeps as well as traditional still imaging technique  FINDINGS:  Left thyroid parenchyma is diffusely heterogeneous in echotexture  Right lobe: Surgically absent  Left lobe:  5 0 x 2 9 x 1 5 cm  Volume 10 3 mL Nodule #1  Image 23  LEFT midgland nodule measuring 0 6 x 0 5 x 0 4 cm  Stable  COMPOSITION:  2 points, solid or almost completely solid   ECHOGENICITY:  1 point, hyperechoic or isoechoic  SHAPE:  0 points, wider-than-tall  MARGIN: 0 points, smooth   ECHOGENIC FOCI:  0 points, none or large comet-tail artifacts  TI-RADS Classification: TR 3 (3 points), FNA if >2 5 cm  Follow if >1 5 cm  Impression: Heterogeneous left thyroid lobe with stable subcentimeter nodule  No new nodules  No nodule meets current ACR criteria for requiring biopsy or follow-up ultrasounds  Status post right thyroidectomy  Reference: ACR Thyroid Imaging, Reporting and Data System (TI-RADS): White Paper of the [employer]  J AM Mary Radiol 4415;29:451-229  (additional recommendations based on American Thyroid Association 2015 guidelines ) Workstation performed: [id number]     I reviewed the above laboratory and imaging data  Discussion/Summary:  Left-sided thyroid nodule  It is stable in size and appearance  No worrisome features  She is opting for surveillance  We will therefore plan 1 year follow-up with ultrasound at that time  All questions answered and copies of reports given her for records

## 2022-03-31 ENCOUNTER — HOSPITAL ENCOUNTER (OUTPATIENT)
Dept: ULTRASOUND IMAGING | Facility: CLINIC | Age: 82
Discharge: HOME | End: 2022-03-31
Payer: COMMERCIAL

## 2022-03-31 ENCOUNTER — HOSPITAL ENCOUNTER (OUTPATIENT)
Dept: MAMMOGRAPHY | Facility: CLINIC | Age: 82
Discharge: HOME | End: 2022-03-31
Payer: COMMERCIAL

## 2022-03-31 VITALS — WEIGHT: 169 LBS | BODY MASS INDEX: 28.85 KG/M2 | HEIGHT: 64 IN

## 2022-03-31 DIAGNOSIS — Z86.000 PERSONAL HISTORY OF IN-SITU NEOPLASM OF BREAST: ICD-10-CM

## 2022-03-31 PROCEDURE — 77066 DX MAMMO INCL CAD BI: CPT

## 2022-03-31 PROCEDURE — G0279 TOMOSYNTHESIS, MAMMO: HCPCS

## 2022-03-31 PROCEDURE — 76642 ULTRASOUND BREAST LIMITED: CPT

## 2022-04-12 ENCOUNTER — RADIATION ONCOLOGY FOLLOW-UP (OUTPATIENT)
Dept: RADIATION ONCOLOGY | Facility: CLINIC | Age: 82
End: 2022-04-12
Attending: RADIOLOGY
Payer: COMMERCIAL

## 2022-04-12 VITALS
TEMPERATURE: 97.2 F | HEIGHT: 64 IN | SYSTOLIC BLOOD PRESSURE: 112 MMHG | BODY MASS INDEX: 29.01 KG/M2 | HEART RATE: 83 BPM | RESPIRATION RATE: 16 BRPM | DIASTOLIC BLOOD PRESSURE: 70 MMHG | OXYGEN SATURATION: 96 %

## 2022-04-12 DIAGNOSIS — Z86.000 PERSONAL HISTORY OF IN-SITU NEOPLASM OF BREAST: Primary | ICD-10-CM

## 2022-04-12 PROCEDURE — 99211 OFF/OP EST MAY X REQ PHY/QHP: CPT | Performed by: RADIOLOGY

## 2022-04-12 PROCEDURE — 99213 OFFICE O/P EST LOW 20 MIN: CPT | Performed by: RADIOLOGY

## 2022-04-12 NOTE — PROGRESS NOTES
Follow-up - [address] [place] [address] 1940 78 y o  female [id number]      History of Present Illness   Cancer Staging  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  Staging form: Breast, AJCC 8th Edition  - Clinical: cT0, cN0, cM0 - Signed by Whitley Olmstead MD on 6/9/2020  Laterality: Right  - Pathologic: Stage 0 (pTis (DCIS), pN0, cM0, ER+, VT+, HER2-) - Signed by Whitley Olmstead MD on 6/16/2020  Method of lymph node assessment: Clinical  Nuclear grade: G1  Laterality: Right          Interval History:  78year old female returns for follow-up for right breast DCIS  She completed adjuvant radiation to the right breast on 8/28/2020  She met with Dr Ovi Elmore, adjuvant hormonal therapy was not recommended       She was last seen 4/6/21       She also has a history of Stage IB RUL adenocarcinoma lung cancer, s/p wedge resection in Jan 2020 4/14/21 Saumya CAMARA   Continues to have residual hematoma in the lumpectomy bed  Advised massage to the breast as well as the upper chest wall scar tissue in use warm compresses as needed       6/7/21 CT chest wo contrast  IMPRESSION:   No definite tumor recurrence      5 mm right lower lobe nodule, stable since November 2020, slightly increased from July 2020   This may be benign and part of an adjacent scar, but attention will be directed to this lesion on follow-up         10/26/21 Saumya CAMARA  Continues to have discomfort in the right lumpectomy bed as well as the scar tissue from the lobectomy        12/3/21 CT chest wo contrast  IMPRESSION:   1  The subpleural right lower lobe nodule has not increased in size, currently measuring slightly smaller (4 mm versus 5 mm   2 mm nodule adjacent to the right diaphragm is unchanged   No new or enlarging nodules to suggest pulmonary metastasis  2   No evidence of mediastinal or hilar adenopathy      She is followed by thoracic surgery, last seen by Dr Elis Torrez on 12/8/21       3/10/22 US thyroid  IMPRESSION:   Heterogeneous left thyroid lobe with stable subcentimeter nodule   No new nodules   No nodule meets current ACR criteria for requiring biopsy or follow-up ultrasounds      Status post right thyroidectomy        3/18/22 Lael Pallas, Kota Faulkner  Following for thyroid nodule, stable, continue surveillance  3/31/22 Mammo diagnostic bilateral w 3d & cad; US breast right limited (diagnostic)  IMPRESSION:  Postoperative changes including seroma in the right breast       ASSESSMENT/BI-RADS CATEGORY:  Right: 2 - Benign  Overall: 2 - Benign     RECOMMENDATION: Diagnostic mammogram in 1 year for both breasts         Today, she reports feeling well  No breast complaints  She reports seroma at right breast incisional area has been decreasing in size             Upcomin22 [place] Baird  22 Gyn, [place]  22 CT chest  22 Thoracic Surg, [place]  3/23/23 US thyroid  3/31/23 [facility]  23 Mammo diagnostic bilateral         Historical Information   Oncology History   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   2019 Initial Diagnosis    Ductal carcinoma in situ (DCIS) of right breast     2020 Surgery    A  Breast, right, lumpectomy:  -  Ductal carcinoma in situ, low nuclear grade   B  Breast, right new inferior margin, excision:  -  Ductal carcinoma in situ, low nuclear grade involving intraductal papilloma  C   Breast, right new lateral margin, excision:  -  Ductal carcinoma in situ, low nuclear grade  D   Breast, right new posterior margin, excision:  -  Benign breast parenchyma, negative for atypia or malignancy      %, SC 60-70%, HER2 1+ negative     2020 -  Cancer Staged    Staging form: Breast, AJCC 8th Edition  - Clinical: cT0, cN0, cM0 - Signed by Cintia Sifuentes MD on 2020  Laterality: Right       2020 -  Cancer Staged    Staging form: Breast, AJCC 8th Edition  - Pathologic: Stage 0 (pTis (DCIS), pN0, cM0, ER+, SC+, HER2-) - Signed by Pro Ngo MD on 6/16/2020  Laterality: Right  Method of lymph node assessment: Clinical  Nuclear grade: G1       7/30/2020 - 8/28/2020 Radiation    Plan ID Energy Fractions Dose per Fraction (cGy) Dose Correction (cGy) Total Dose Delivered (cGy) Elapsed Days   R Breast 6X 16 / 16 266 0 4,256 21   R Brst Boost 6X 5 / 5 200 0 1,000 4        Primary adenocarcinoma of upper lobe of right lung ([address] ) (Resolved)   12/2/2019 Initial Diagnosis    Primary adenocarcinoma of upper lobe of right lung ([address] )     1/17/2020 Surgery    Lung, right upper lobe, wedge resection:  -  Invasive moderately differentiated adenocarcinoma    Stage IB         Past Medical History:   Diagnosis Date    Anesthesia     "cheap date"    Arthritis     Atrial fibrillation ([address] )     Benign polyp of large intestine     Breast cancer ([address] ) 2020    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    Colon cancer [facility]) 44    34 yo    Colon polyp     Full dentures     full upper/ partial lower    GERD (gastroesophageal reflux disease)     Glaucoma suspect     History of neck problems     "C3-C7 and had epidural injections years ago"    Hyperlipidemia     Hypertension     Lung cancer ([address] )     jan 2020- had surgery    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[facility] )     Prediabetes     Primary adenocarcinoma of upper lobe of right lung ([facility] [address] [facility] ) 12/2/2019    Pulmonary nodules 2/19/2015    Refusal of blood transfusions as patient is Sikh     Tic disorder, transient, recurrent     not recent    Walks frequently      Past Surgical History:   Procedure Laterality Date    APPENDECTOMY      ARTHROSCOPY KNEE      BREAST BIOPSY Right 04/10/2017    US guided - intraductal papillomas    BREAST BIOPSY Right 10/24/2019    ADH    BREAST LUMPECTOMY W/ NEEDLE LOCALIZATION Right 5/22/2020    Procedure: EXCISIONAL BIOPSY;  1000 NEEDLE LOC;  Surgeon: Pro Ngo MD;  Location: [facility];  Service: Surgical Oncology    CARDIOVASCULAR STRESS TEST      CATARACT EXTRACTION Bilateral     COLON SURGERY      COLONOSCOPY      done  due  Dr Kuo    DILATION AND CURETTAGE OF UTERUS      ESOPHAGOGASTRODUODENOSCOPY      inflammation aonly    HEMICOLECTOMY Right     HYSTERECTOMY      JOINT REPLACEMENT      L knee    LUNG SURGERY      MAMMO NEEDLE LOCALIZATION RIGHT (ALL INC) Right 2020    MAMMO NEEDLE LOCALIZATION RIGHT (ALL INC) EACH ADD Right 2020    MAMMO STEREOTACTIC BREAST BIOPSY RIGHT (ALL INC) Right 10/24/2019    PARATHYROID GLAND SURGERY      exploration     OH BRONCHOSCOPY,DIAGNOSTIC N/A 2020    Procedure: FLEXIBLE BRONCHOSCOPY;  Surgeon: Cal Duque MD;  [facility];  Service: Thoracic    OH THORACOSCOPY SURG LOBECTOMY Right 2020    Procedure: ROBOTIC ASSISTED COMPLETE LUNG LOBECTOMY;  Surgeon: Cal Duque MD;  [facility];  Service: Thoracic    OH THORACOSCOPY W/THERA WEDGE RESEXN INITIAL UNILAT Right 2020    Procedure: ROBOTIC ASSISTED UPPER-LOBE WEDGE RESECTION, MEDIASTINAL LYMPH NODE DISECTION;  Surgeon: Cal Duque MD;  [facility];  Service: Thoracic    REPLACEMENT TOTAL KNEE      THYROID SURGERY      TOTAL ABDOMINAL HYSTERECTOMY  1983    TOTAL THYROIDECTOMY      US GUIDED BREAST BIOPSY RIGHT COMPLETE Right 4/10/2017    US GUIDED THYROID BIOPSY  2020       Social History   Social History     Substance and Sexual Activity   Alcohol Use Yes    Alcohol/week: 5 0 standard drinks    Types: 5 Cans of beer per week    Comment: occasional beer with food     Social History     Substance and Sexual Activity   Drug Use No     Social History     Tobacco Use   Smoking Status Former Smoker    Packs/day: 1 50    Years: 20 00    Pack years: 30 00    Types: Cigarettes    Quit date: 5    Years since quittin 3   Smokeless Tobacco Never Used         Meds/Allergies     Current Outpatient Medications:     apixaban (ELIQUIS) 5 mg, Take 5 mg by mouth 2 (two) times a day To stop 2 days prior to surgery per md, Disp: , Rfl:     atorvastatin (LIPITOR) 80 mg tablet, TAKE 1 TABLET BY MOUTH EVERY DAY, Disp: 90 tablet, Rfl: 1    Coenzyme Q10 (CO Q 10 PO), Take 200 mg by mouth, Disp: , Rfl:     cyanocobalamin (VITAMIN B-12) 1000 MCG tablet, Take 1,000 mcg by mouth, Disp: , Rfl:     diltiazem (CARDIZEM CD) 120 mg 24 hr capsule, Take 120 mg by mouth every evening , Disp: , Rfl:     levothyroxine 100 mcg tablet, TAKE 1 TABLET BY MOUTH EVERY DAY, Disp: 90 tablet, Rfl: 1    Multiple Vitamins-Calcium (ESSENTIAL ONE DAILY MULTIVIT PO), Take by mouth daily , Disp: , Rfl:     Omega-3 Fatty Acids (FISH OIL PO), Take 2 g by mouth daily , Disp: , Rfl:     omeprazole (PriLOSEC) 20 mg delayed release capsule, TAKE 1 CAPSULE (20 MG TOTAL) BY MOUTH AS NEEDED (GERD), Disp: 90 capsule, Rfl: 0    FIBER PO, Take 1 capsule by mouth daily as needed (Patient not taking: Reported on 9/23/2021), Disp: , Rfl:     Hydrocortisone Acetate (Vagisil) 1 % CREA, Apply topically (Patient not taking: Reported on 3/18/2022 ), Disp: , Rfl:     mupirocin (BACTROBAN) 2 % ointment, APPLY TO SORE AREA ON BOTTOM TWICE DAILY UNTIL HEALED (Patient not taking: Reported on 3/18/2022), Disp: , Rfl:   Allergies   Allergen Reactions    Bee Venom      Tongue swelling    Flurbiprofen Hives    Ibuprofen Shortness Of Breath    Levaquin [Levofloxacin In D5w] Other (See Comments)     Dark spots under eyes and wrists    Tequin [Gatifloxacin] Rash         Review of Systems   Constitutional: Positive for fatigue  HENT: Negative  Eyes: Negative  Respiratory: Negative  Negative for cough, choking and wheezing  Cardiovascular: Negative  Gastrointestinal: Negative  Negative for abdominal pain (occasional RUQ tenderness/discomfort since lung surgery)  Endocrine: Negative  Genitourinary: Negative  Musculoskeletal: Positive for arthralgias (occasonal hand stiffness)  Negative for gait problem     Skin: Positive for color change (mild hyperpigmentation to right breast at incisional area  )  Allergic/Immunologic: Negative  Neurological: Negative  Negative for dizziness, light-headedness and numbness  Hematological: Negative  Psychiatric/Behavioral: Negative  Negative for dysphoric mood              OBJECTIVE:   /70 (BP Location: Left arm, Patient Position: Sitting)   Pulse 83   Temp (!) 97 2 °F (36 2 °C) (Temporal)   Resp 16   Ht 5' 4" (1 626 m)   SpO2 96%   BMI 29 01 kg/m²   Pain Assessment:  0  ECOG/Zubrod/WHO: 1 - Symptomatic but completely ambulatory    Physical Exam  Constitutional:       Appearance: She is well-developed  HENT:      Head: Normocephalic  Hair is normal    Eyes:      General: No scleral icterus  Cardiovascular:      Rate and Rhythm: Normal rate and regular rhythm  Pulmonary:      Effort: Pulmonary effort is normal  No respiratory distress  Breath sounds: Normal breath sounds  No wheezing  Chest:      Chest wall: No tenderness  Comments: Is no supraclavicular or axillary adenopathy palpable  Her skin is in good condition  She has a small residual hematoma noted under the the primary site in the right breast   Significantly improved  No suspicious lesions palpable in either breast no evidence of breast or arm edema  Abdominal:      General: Bowel sounds are normal  There is no distension  Palpations: Abdomen is soft  There is no mass  Tenderness: There is no abdominal tenderness  There is no rebound  Genitourinary:     Vagina: Normal    Musculoskeletal:         General: No tenderness  Normal range of motion  Cervical back: Neck supple  No edema  No spinous process tenderness  Lymphadenopathy:      Cervical: No cervical adenopathy  Neurological:      Mental Status: She is alert  Cranial Nerves: No cranial nerve deficit        Coordination: Coordination normal       Gait: Gait normal    Psychiatric:         Speech: Speech normal  Behavior: Behavior normal              RESULTS    Lab Results: No results found for this or any previous visit (from the past 672 hour(s))  Imaging Studies:Mammo diagnostic bilateral w 3d & cad, US breast right limited (diagnostic)    Result Date: 3/31/2022  Narrative: DIAGNOSIS: 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 TECHNIQUE: Digital diagnostic mammography was performed  Computer Aided Detection (CAD) analyzed all applicable images  Ultrasound of the the right breast was performed  COMPARISONS: Prior breast imaging dated: 03/29/2021, 03/26/2020, 10/24/2019, 10/17/2019, 04/11/2019, 04/04/2019, 03/29/2018, 10/26/2017, 04/10/2017, 03/30/2017, 03/17/2016, 03/11/2015, and 02/27/2014 RELEVANT HISTORY: Family Breast Cancer History: History of breast cancer in Paternal Aunt  Family Medical History: Family medical history includes breast cancer in paternal aunt  Personal History: No known relevant hormone history  Surgical history includes breast biopsy, lumpectomy, and hysterectomy  Medical history includes breast cancer and colon cancer  RISK ASSESSMENT: 5 Year Tyrer-Cuzick: No Score 10 Year Tyrer-Cuzick: No Score Lifetime Tyrer-Cuzick: 0 79 % TISSUE DENSITY: The breasts are heterogeneously dense, which may obscure small masses  INDICATION: Gabriella Beaulieu is a 78 y o  female presenting for history of breast cancer  FINDINGS: RIGHT C) POST-SURGICAL changes, specifically seroma: There are post-surgical findings from a previous lumpectomy with radiation seen in the right breast   In addition, a complicated collection 12 o'clock in the posterior depth, 4 cm from the nipple  This postoperative seroma measures up to 3 cm  This was characterized with ultrasound  There has been no interval development of a suspicious mass, microcalcification, or architectural distortion  The skin and nipple areolar complex are unremarkable   Left There are no suspicious masses, grouped microcalcifications or areas of unexplained architectural distortion  The skin and nipple areolar complex are unremarkable  Impression: Postoperative changes including seroma in the right breast  ASSESSMENT/BI-RADS CATEGORY: Right: 2 - Benign Overall: 2 - Benign RECOMMENDATION:      - Diagnostic mammogram in 1 year for both breasts  Workstation ID: [id number]           Assessment/Plan:  No orders of the defined types were placed in this encounter  Sabrina Larry is a 78y o  year old female who is 1 and half years post adjuvant radiation therapy for DCIS of the right breast   She has no clinical evidence of recurrence  Mammogram returned stable  She also had wedge resection of the right upper lobe in 2020  No evidence of recurrence  She follows with thoracic surgery  She will return to for follow-up visit in 1 year  Benita Manriquez MD  4/12/2022,9:29 AM    Portions of the record may have been created with voice recognition software   Occasional wrong word or "sound a like" substitutions may have occurred due to the inherent limitations of voice recognition software   Read the chart carefully and recognize, using context, where substitutions have occurred

## 2022-04-15 DIAGNOSIS — E78.2 MIXED HYPERLIPIDEMIA: ICD-10-CM

## 2022-04-15 RX ORDER — ATORVASTATIN CALCIUM 80 MG/1
TABLET, FILM COATED ORAL
Qty: 90 TABLET | Refills: 1 | Status: SHIPPED | OUTPATIENT
Start: 2022-04-15

## 2022-04-25 ENCOUNTER — APPOINTMENT (OUTPATIENT)
Dept: LAB | Facility: HOSPITAL | Age: 82
End: 2022-04-25
Payer: COMMERCIAL

## 2022-04-25 DIAGNOSIS — Z85.118 HISTORY OF LUNG CANCER: ICD-10-CM

## 2022-04-25 DIAGNOSIS — I10 HYPERTENSION, ESSENTIAL: ICD-10-CM

## 2022-04-25 DIAGNOSIS — Z79.01 CHRONIC ANTICOAGULATION: ICD-10-CM

## 2022-04-25 DIAGNOSIS — Z87.891 FORMER SMOKER: ICD-10-CM

## 2022-04-25 DIAGNOSIS — I48.19 ATRIAL FIBRILLATION, PERSISTENT (HCC): ICD-10-CM

## 2022-04-25 DIAGNOSIS — E78.00 PURE HYPERCHOLESTEROLEMIA: ICD-10-CM

## 2022-04-25 DIAGNOSIS — Z85.3 HISTORY OF BREAST CANCER: ICD-10-CM

## 2022-04-25 DIAGNOSIS — K21.9 GASTROESOPHAGEAL REFLUX DISEASE WITHOUT ESOPHAGITIS: ICD-10-CM

## 2022-04-25 LAB
ANION GAP SERPL CALCULATED.3IONS-SCNC: 1 MMOL/L (ref 4–13)
BUN SERPL-MCNC: 21 MG/DL (ref 5–25)
CALCIUM SERPL-MCNC: 9.1 MG/DL (ref 8.3–10.1)
CHLORIDE SERPL-SCNC: 108 MMOL/L (ref 100–108)
CHOLEST SERPL-MCNC: 183 MG/DL
CO2 SERPL-SCNC: 28 MMOL/L (ref 21–32)
CREAT SERPL-MCNC: 1.13 MG/DL (ref 0.6–1.3)
ERYTHROCYTE [DISTWIDTH] IN BLOOD BY AUTOMATED COUNT: 14.4 % (ref 11.6–15.1)
GFR SERPL CREATININE-BSD FRML MDRD: 45 ML/MIN/1.73SQ M
GLUCOSE P FAST SERPL-MCNC: 92 MG/DL (ref 65–99)
HCT VFR BLD AUTO: 41.2 % (ref 34.8–46.1)
HDLC SERPL-MCNC: 97 MG/DL
HGB BLD-MCNC: 13.7 G/DL (ref 11.5–15.4)
LDLC SERPL CALC-MCNC: 72 MG/DL (ref 0–100)
MCH RBC QN AUTO: 32.5 PG (ref 26.8–34.3)
MCHC RBC AUTO-ENTMCNC: 33.3 G/DL (ref 31.4–37.4)
MCV RBC AUTO: 98 FL (ref 82–98)
NONHDLC SERPL-MCNC: 86 MG/DL
PLATELET # BLD AUTO: 226 THOUSANDS/UL (ref 149–390)
PMV BLD AUTO: 9.5 FL (ref 8.9–12.7)
POTASSIUM SERPL-SCNC: 3.7 MMOL/L (ref 3.5–5.3)
RBC # BLD AUTO: 4.22 MILLION/UL (ref 3.81–5.12)
SODIUM SERPL-SCNC: 137 MMOL/L (ref 136–145)
TRIGL SERPL-MCNC: 68 MG/DL
WBC # BLD AUTO: 7.6 THOUSAND/UL (ref 4.31–10.16)

## 2022-04-25 PROCEDURE — 80048 BASIC METABOLIC PNL TOTAL CA: CPT

## 2022-04-25 PROCEDURE — 80061 LIPID PANEL: CPT

## 2022-04-25 PROCEDURE — 36415 COLL VENOUS BLD VENIPUNCTURE: CPT

## 2022-04-25 PROCEDURE — 85027 COMPLETE CBC AUTOMATED: CPT

## 2022-05-17 ENCOUNTER — HOSPITAL ENCOUNTER (OUTPATIENT)
Dept: NON INVASIVE DIAGNOSTICS | Facility: HOSPITAL | Age: 82
Discharge: HOME | End: 2022-05-17
Payer: COMMERCIAL

## 2022-05-17 VITALS
DIASTOLIC BLOOD PRESSURE: 70 MMHG | HEIGHT: 64 IN | SYSTOLIC BLOOD PRESSURE: 112 MMHG | HEART RATE: 75 BPM | BODY MASS INDEX: 28.85 KG/M2 | WEIGHT: 169 LBS

## 2022-05-17 DIAGNOSIS — I48.91 A-FIB (HCC): ICD-10-CM

## 2022-05-17 LAB
AORTIC ROOT: 3.2 CM
APICAL FOUR CHAMBER EJECTION FRACTION: 66 %
ASCENDING AORTA: 3 CM
FRACTIONAL SHORTENING: 34 % (ref 28–44)
INTERVENTRICULAR SEPTUM IN DIASTOLE (PARASTERNAL SHORT AXIS VIEW): 1.2 CM
INTERVENTRICULAR SEPTUM: 1.2 CM (ref 0.6–1.1)
LAAS-AP2: 17.4 CM2
LAAS-AP4: 20.1 CM2
LEFT ATRIUM AREA SYSTOLE SINGLE PLANE A4C: 18.3 CM2
LEFT ATRIUM SIZE: 3.7 CM
LEFT INTERNAL DIMENSION IN SYSTOLE: 2.7 CM (ref 2.1–4)
LEFT VENTRICULAR INTERNAL DIMENSION IN DIASTOLE: 4.1 CM (ref 3.5–6)
LEFT VENTRICULAR POSTERIOR WALL IN END DIASTOLE: 1.2 CM
LEFT VENTRICULAR STROKE VOLUME: 49 ML
LVSV (TEICH): 49 ML
RA PRESSURE ESTIMATED: 5 MMHG
RIGHT ATRIUM AREA SYSTOLE A4C: 14 CM2
RIGHT VENTRICLE ID DIMENSION: 3.1 CM
RV PSP: 28 MMHG
SL CV LEFT ATRIUM LENGTH A2C: 6 CM
SL CV LV EF: 65
SL CV PED ECHO LEFT VENTRICLE DIASTOLIC VOLUME (MOD BIPLANE) 2D: 75 ML
SL CV PED ECHO LEFT VENTRICLE SYSTOLIC VOLUME (MOD BIPLANE) 2D: 26 ML
TR MAX PG: 23 MMHG
TR PEAK VELOCITY: 2.4 M/S
TRICUSPID VALVE PEAK REGURGITATION VELOCITY: 2.39 M/S

## 2022-05-17 PROCEDURE — 93306 TTE W/DOPPLER COMPLETE: CPT | Performed by: INTERNAL MEDICINE

## 2022-05-17 PROCEDURE — 93306 TTE W/DOPPLER COMPLETE: CPT

## 2022-05-23 ENCOUNTER — OFFICE VISIT (OUTPATIENT)
Dept: SURGICAL ONCOLOGY | Facility: CLINIC | Age: 82
End: 2022-05-23
Payer: COMMERCIAL

## 2022-05-23 VITALS
SYSTOLIC BLOOD PRESSURE: 122 MMHG | TEMPERATURE: 96.6 F | DIASTOLIC BLOOD PRESSURE: 74 MMHG | RESPIRATION RATE: 17 BRPM | HEIGHT: 64 IN | BODY MASS INDEX: 28.85 KG/M2 | WEIGHT: 169 LBS | OXYGEN SATURATION: 96 %

## 2022-05-23 DIAGNOSIS — R92.2 DENSE BREAST TISSUE: ICD-10-CM

## 2022-05-23 DIAGNOSIS — Z08 ENCOUNTER FOR FOLLOW-UP SURVEILLANCE OF BREAST CANCER: Primary | ICD-10-CM

## 2022-05-23 DIAGNOSIS — Z85.3 ENCOUNTER FOR FOLLOW-UP SURVEILLANCE OF BREAST CANCER: Primary | ICD-10-CM

## 2022-05-23 DIAGNOSIS — Z86.000 PERSONAL HISTORY OF IN-SITU NEOPLASM OF BREAST: ICD-10-CM

## 2022-05-23 PROCEDURE — 99213 OFFICE O/P EST LOW 20 MIN: CPT | Performed by: SURGERY

## 2022-05-23 PROCEDURE — 1036F TOBACCO NON-USER: CPT | Performed by: SURGERY

## 2022-05-23 PROCEDURE — 1160F RVW MEDS BY RX/DR IN RCRD: CPT | Performed by: SURGERY

## 2022-05-23 NOTE — PROGRESS NOTES
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    Dense breast tissue        Advance Care Planning/Advance Directives:  Discussed disease status, cancer treatment plans and/or cancer treatment goals with the patient  Oncology History:    Oncology History   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   11/20/2019 Initial Diagnosis    Ductal carcinoma in situ (DCIS) of right breast     5/22/2020 Surgery    A  Breast, right, lumpectomy:  -  Ductal carcinoma in situ, low nuclear grade   B  Breast, right new inferior margin, excision:  -  Ductal carcinoma in situ, low nuclear grade involving intraductal papilloma  C   Breast, right new lateral margin, excision:  -  Ductal carcinoma in situ, low nuclear grade  D   Breast, right new posterior margin, excision:  -  Benign breast parenchyma, negative for atypia or malignancy      %, KY 60-70%, HER2 1+ negative     6/9/2020 -  Cancer Staged    Staging form: Breast, AJCC 8th Edition  - Clinical: cT0, cN0, cM0 - Signed by Orlin Gracia MD on 6/9/2020  Laterality: Right       6/9/2020 -  Cancer Staged    Staging form: Breast, AJCC 8th Edition  - Pathologic: Stage 0 (pTis (DCIS), pN0, cM0, ER+, KY+, HER2-) - Signed by Orlin Gracia MD on 6/16/2020  Laterality: Right  Method of lymph node assessment: Clinical  Nuclear grade: G1       7/30/2020 - 8/28/2020 Radiation    Plan ID Energy Fractions Dose per Fraction (cGy) Dose Correction (cGy) Total Dose Delivered (cGy) Elapsed Days   R Breast 6X 16 / 16 266 0 4,256 21   R Brst Boost 6X 5 / 5 200 0 1,000 4        Primary adenocarcinoma of upper lobe of right lung ([facility] ) (Resolved)   12/2/2019 Initial Diagnosis    Primary adenocarcinoma of upper lobe of right lung ([facility] )     1/17/2020 Surgery    Lung, right upper lobe, wedge resection:  -  Invasive moderately differentiated adenocarcinoma  Stage IB         History of Present Illness:  Breast cancer follow-up, no breast referable concerns  -Interval History:  Recent mammogram    Review of Systems:  Review of Systems   Constitutional: Negative  Negative for appetite change and fever  Eyes: Negative  Respiratory: Negative for shortness of breath  Cardiovascular: Negative  Gastrointestinal: Negative  Endocrine: Negative  Genitourinary: Negative  Musculoskeletal: Negative  Negative for arthralgias and myalgias  Skin: Negative  Allergic/Immunologic: Negative  Neurological: Negative  Hematological: Negative  Negative for adenopathy  Does not bruise/bleed easily  Psychiatric/Behavioral: Negative          Patient Active Problem List   Diagnosis    A-fib ([place] [id number] )    Benign essential hypertension    Coagulopathy (HCC)    History of colon cancer    Overweight    Osteoarthritis    Postoperative hypothyroidism    Mixed hyperlipidemia    Medicare annual wellness visit, subsequent    Osteopenia of multiple sites    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    Dense breast tissue    Hashimoto's thyroiditis    History of lung cancer    Thyroid nodule    Encounter for follow-up surveillance of breast cancer     Past Medical History:   Diagnosis Date    Anesthesia     "cheap date"    Arthritis     Atrial fibrillation ([facility] )     Benign polyp of large intestine     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    Colon cancer [facility]) 44    36 yo    Colon polyp     Full dentures     full upper/ partial lower    GERD (gastroesophageal reflux disease)     Glaucoma suspect     History of neck problems "C3-C7 and had epidural injections years ago"    Hyperlipidemia     Hypertension     Lung cancer ([facility] )     jan 2020- had surgery    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[facility] )     Prediabetes     Primary adenocarcinoma of upper lobe of right lung ([facility] ) 12/2/2019    Pulmonary nodules 2/19/2015    Refusal of blood transfusions as patient is Oriental orthodox     Tic disorder, transient, recurrent     not recent    Walks frequently      Past Surgical History:   Procedure Laterality Date    APPENDECTOMY      ARTHROSCOPY KNEE      BREAST BIOPSY Right 04/10/2017    US guided - intraductal papillomas    BREAST BIOPSY Right 10/24/2019    ADH    BREAST LUMPECTOMY W/ NEEDLE LOCALIZATION Right 5/22/2020    Procedure: EXCISIONAL BIOPSY;  1000 NEEDLE LOC;  Surgeon: Sherrell Woodall MD;  Location: [facility];  Service: Surgical Oncology    CARDIOVASCULAR STRESS TEST      CATARACT EXTRACTION Bilateral     COLON SURGERY      COLONOSCOPY      done 2010 due 2015 Dr Kuo    DILATION AND CURETTAGE OF UTERUS      ESOPHAGOGASTRODUODENOSCOPY      inflammation aonly    HEMICOLECTOMY Right 1977    HYSTERECTOMY      JOINT REPLACEMENT      L knee    LUNG SURGERY      MAMMO NEEDLE LOCALIZATION RIGHT (ALL INC) Right 5/22/2020    MAMMO NEEDLE LOCALIZATION RIGHT (ALL INC) EACH ADD Right 5/22/2020    MAMMO STEREOTACTIC BREAST BIOPSY RIGHT (ALL INC) Right 10/24/2019    PARATHYROID GLAND SURGERY      exploration     CO BRONCHOSCOPY,DIAGNOSTIC N/A 1/17/2020    Procedure: Latrice Sizer;  Surgeon: Abby Warner MD;  [facility];  Service: Thoracic    CO THORACOSCOPY SURG LOBECTOMY Right 1/17/2020    Procedure: ROBOTIC ASSISTED COMPLETE LUNG LOBECTOMY;  Surgeon: Abby Warner MD;  [facility];  Service: Thoracic    CO THORACOSCOPY W/THERA WEDGE RESEXN INITIAL UNILAT Right 1/17/2020    Procedure: ROBOTIC ASSISTED UPPER-LOBE WEDGE RESECTION, MEDIASTINAL LYMPH NODE DISECTION;  Surgeon: Abby Warner MD; [facility];  Service: Thoracic    REPLACEMENT TOTAL KNEE      THYROID SURGERY      TOTAL ABDOMINAL HYSTERECTOMY      TOTAL THYROIDECTOMY      US GUIDED BREAST BIOPSY RIGHT COMPLETE Right 4/10/2017    US GUIDED THYROID BIOPSY  2020     Family History   Problem Relation Age of Onset    Esophageal cancer Mother         [de-identified]    Hypertension Mother     Stroke Family         TIA    Breast cancer Paternal Aunt 80    No Known Problems Father     COPD Sister     COPD Sister     No Known Problems Maternal Grandmother     No Known Problems Maternal Grandfather     No Known Problems Paternal Grandmother     No Known Problems Paternal Grandfather     No Known Problems Maternal Aunt     No Known Problems Maternal Aunt     No Known Problems Maternal Aunt     No Known Problems Maternal Aunt     No Known Problems Maternal Aunt      Social History     Socioeconomic History    Marital status: Single     Spouse name: Not on file    Number of children: Not on file    Years of education: Not on file    Highest education level: Not on file   Occupational History    Not on file   Tobacco Use    Smoking status: Former Smoker     Packs/day: 1 50     Years: 20 00     Pack years: 30 00     Types: Cigarettes     Quit date: 5     Years since quittin 4    Smokeless tobacco: Never Used   Vaping Use    Vaping Use: Never used   Substance and Sexual Activity    Alcohol use:  Yes     Alcohol/week: 5 0 standard drinks     Types: 5 Cans of beer per week     Comment: occasional beer with food    Drug use: No    Sexual activity: Not on file   Other Topics Concern    Not on file   Social History Narrative    Caffeine use- 1-3, 8oz coffees per day    Denied history of housing without smoke detectors    Always uses a seatbelt     Social Determinants of Health     Financial Resource Strain: Not on file   Food Insecurity: Not on file   Transportation Needs: Not on file   Physical Activity: Not on file Stress: Not on file   Social Connections: Not on file   Intimate Partner Violence: Not on file   Housing Stability: Not on file       Current Outpatient Medications:     apixaban (ELIQUIS) 5 mg, Take 5 mg by mouth 2 (two) times a day To stop 2 days prior to surgery per md, Disp: , Rfl:     atorvastatin (LIPITOR) 80 mg tablet, TAKE 1 TABLET BY MOUTH EVERY DAY, Disp: 90 tablet, Rfl: 1    Coenzyme Q10 (CO Q 10 PO), Take 200 mg by mouth, Disp: , Rfl:     cyanocobalamin (VITAMIN B-12) 1000 MCG tablet, Take 1,000 mcg by mouth, Disp: , Rfl:     levothyroxine 100 mcg tablet, TAKE 1 TABLET BY MOUTH EVERY DAY, Disp: 90 tablet, Rfl: 1    Multiple Vitamins-Calcium (ESSENTIAL ONE DAILY MULTIVIT PO), Take by mouth daily , Disp: , Rfl:     Omega-3 Fatty Acids (FISH OIL PO), Take 2 g by mouth daily , Disp: , Rfl:     omeprazole (PriLOSEC) 20 mg delayed release capsule, TAKE 1 CAPSULE (20 MG TOTAL) BY MOUTH AS NEEDED (GERD), Disp: 90 capsule, Rfl: 0    diltiazem (CARDIZEM CD) 120 mg 24 hr capsule, Take 120 mg by mouth every evening , Disp: , Rfl:     FIBER PO, Take 1 capsule by mouth daily as needed (Patient not taking: No sig reported), Disp: , Rfl:     Hydrocortisone Acetate 1 % CREA, Apply topically (Patient not taking: No sig reported), Disp: , Rfl:     mupirocin (BACTROBAN) 2 % ointment, APPLY TO SORE AREA ON BOTTOM TWICE DAILY UNTIL HEALED (Patient not taking: No sig reported), Disp: , Rfl:   Allergies   Allergen Reactions    Bee Venom      Tongue swelling    Flurbiprofen Hives    Ibuprofen Shortness Of Breath    Levaquin [Levofloxacin In D5w] Other (See Comments)     Dark spots under eyes and wrists    Tequin [Gatifloxacin] Rash       The following portions of the patient's history were reviewed and updated as appropriate: allergies, current medications, past family history, past medical history, past social history, past surgical history and problem list         Vitals:    05/23/22 1123   BP: 122/74 Resp: 17   Temp: (!) 96 6 °F (35 9 °C)   SpO2: 96%       Physical Exam  Constitutional:       General: She is not in acute distress  Appearance: She is well-developed  HENT:      Head: Normocephalic and atraumatic  Cardiovascular:      Heart sounds: Normal heart sounds  Pulmonary:      Breath sounds: Normal breath sounds  Chest:   Breasts:      Right: Skin change (Lumpectomy scar, decreased size of the seroma) present  No inverted nipple, mass, nipple discharge, tenderness, axillary adenopathy or supraclavicular adenopathy  Left: No inverted nipple, mass, nipple discharge, skin change, tenderness, axillary adenopathy or supraclavicular adenopathy  Abdominal:      Palpations: Abdomen is soft  Lymphadenopathy:      Upper Body:      Right upper body: No supraclavicular, axillary or pectoral adenopathy  Left upper body: No supraclavicular, axillary or pectoral adenopathy  Neurological:      Mental Status: She is alert and oriented to person, place, and time  Psychiatric:         Mood and Affect: Mood normal            Results:  Labs:      Imaging  03/31/2022 bilateral 3D diagnostic mammogram and right breast ultrasound was benign BI-RADS two with postsurgical changes only with smaller seroma, density is a three    I reviewed the above imaging data  Discussion/Summary:  78-year-old female status post right breast conservation for ductal carcinoma in Situ  She had radiation therapy  No hormone therapy was recommended  There is no evidence of disease based on exam today  Her seroma has decreased in size  The mammogram done the end of March was also benign  I will therefore see her again in six months or sooner should the need arise

## 2022-06-14 ENCOUNTER — HOSPITAL ENCOUNTER (OUTPATIENT)
Facility: HOSPITAL | Age: 82
Setting detail: OBSERVATION
Discharge: HOME | End: 2022-06-15
Attending: EMERGENCY MEDICINE | Admitting: INTERNAL MEDICINE
Payer: COMMERCIAL

## 2022-06-14 ENCOUNTER — TELEPHONE (OUTPATIENT)
Dept: FAMILY MEDICINE CLINIC | Facility: CLINIC | Age: 82
End: 2022-06-14

## 2022-06-14 DIAGNOSIS — T63.441A ALLERGIC REACTION TO BEE STING: ICD-10-CM

## 2022-06-14 DIAGNOSIS — I48.91 ATRIAL FIBRILLATION WITH RVR (HCC): ICD-10-CM

## 2022-06-14 DIAGNOSIS — T63.441A ALLERGIC REACTION TO BEE STING: Primary | ICD-10-CM

## 2022-06-14 DIAGNOSIS — T63.441A BEE STING: Primary | ICD-10-CM

## 2022-06-14 PROBLEM — N17.9 ACUTE RENAL FAILURE (ARF) (HCC): Status: ACTIVE | Noted: 2022-06-14

## 2022-06-14 PROBLEM — R79.89 ELEVATED SERUM CREATININE: Status: ACTIVE | Noted: 2022-06-14

## 2022-06-14 LAB
ANION GAP SERPL CALCULATED.3IONS-SCNC: 2 MMOL/L (ref 4–13)
ATRIAL RATE: 220 BPM
BASOPHILS # BLD AUTO: 0.03 THOUSANDS/ΜL (ref 0–0.1)
BASOPHILS NFR BLD AUTO: 1 % (ref 0–1)
BUN SERPL-MCNC: 15 MG/DL (ref 5–25)
CALCIUM SERPL-MCNC: 9.1 MG/DL (ref 8.3–10.1)
CARDIAC TROPONIN I PNL SERPL HS: 4 NG/L
CHLORIDE SERPL-SCNC: 110 MMOL/L (ref 100–108)
CO2 SERPL-SCNC: 26 MMOL/L (ref 21–32)
CREAT SERPL-MCNC: 1.4 MG/DL (ref 0.6–1.3)
EOSINOPHIL # BLD AUTO: 0 THOUSAND/ΜL (ref 0–0.61)
EOSINOPHIL NFR BLD AUTO: 0 % (ref 0–6)
ERYTHROCYTE [DISTWIDTH] IN BLOOD BY AUTOMATED COUNT: 14 % (ref 11.6–15.1)
GFR SERPL CREATININE-BSD FRML MDRD: 34 ML/MIN/1.73SQ M
GLUCOSE SERPL-MCNC: 248 MG/DL (ref 65–140)
HCT VFR BLD AUTO: 40.1 % (ref 34.8–46.1)
HGB BLD-MCNC: 13.4 G/DL (ref 11.5–15.4)
IMM GRANULOCYTES # BLD AUTO: 0.02 THOUSAND/UL (ref 0–0.2)
IMM GRANULOCYTES NFR BLD AUTO: 0 % (ref 0–2)
LYMPHOCYTES # BLD AUTO: 0.49 THOUSANDS/ΜL (ref 0.6–4.47)
LYMPHOCYTES NFR BLD AUTO: 9 % (ref 14–44)
MCH RBC QN AUTO: 32.9 PG (ref 26.8–34.3)
MCHC RBC AUTO-ENTMCNC: 33.4 G/DL (ref 31.4–37.4)
MCV RBC AUTO: 99 FL (ref 82–98)
MONOCYTES # BLD AUTO: 0.05 THOUSAND/ΜL (ref 0.17–1.22)
MONOCYTES NFR BLD AUTO: 1 % (ref 4–12)
NEUTROPHILS # BLD AUTO: 4.66 THOUSANDS/ΜL (ref 1.85–7.62)
NEUTS SEG NFR BLD AUTO: 89 % (ref 43–75)
NRBC BLD AUTO-RTO: 0 /100 WBCS
PLATELET # BLD AUTO: 187 THOUSANDS/UL (ref 149–390)
PMV BLD AUTO: 9.5 FL (ref 8.9–12.7)
POTASSIUM SERPL-SCNC: 4.2 MMOL/L (ref 3.5–5.3)
QRS AXIS: -4 DEGREES
QRSD INTERVAL: 90 MS
QT INTERVAL: 368 MS
QTC INTERVAL: 483 MS
RBC # BLD AUTO: 4.07 MILLION/UL (ref 3.81–5.12)
SODIUM SERPL-SCNC: 138 MMOL/L (ref 136–145)
T WAVE AXIS: 64 DEGREES
TSH SERPL DL<=0.05 MIU/L-ACNC: 0.62 UIU/ML (ref 0.45–4.5)
VENTRICULAR RATE: 104 BPM
WBC # BLD AUTO: 5.25 THOUSAND/UL (ref 4.31–10.16)

## 2022-06-14 PROCEDURE — 96372 THER/PROPH/DIAG INJ SC/IM: CPT

## 2022-06-14 PROCEDURE — 96374 THER/PROPH/DIAG INJ IV PUSH: CPT

## 2022-06-14 PROCEDURE — 84443 ASSAY THYROID STIM HORMONE: CPT | Performed by: INTERNAL MEDICINE

## 2022-06-14 PROCEDURE — 36415 COLL VENOUS BLD VENIPUNCTURE: CPT | Performed by: STUDENT IN AN ORGANIZED HEALTH CARE EDUCATION/TRAINING PROGRAM

## 2022-06-14 PROCEDURE — 99220 PR INITIAL OBSERVATION CARE/DAY 70 MINUTES: CPT | Performed by: INTERNAL MEDICINE

## 2022-06-14 PROCEDURE — 93005 ELECTROCARDIOGRAM TRACING: CPT

## 2022-06-14 PROCEDURE — 99283 EMERGENCY DEPT VISIT LOW MDM: CPT

## 2022-06-14 PROCEDURE — 99285 EMERGENCY DEPT VISIT HI MDM: CPT | Performed by: EMERGENCY MEDICINE

## 2022-06-14 PROCEDURE — 96375 TX/PRO/DX INJ NEW DRUG ADDON: CPT

## 2022-06-14 PROCEDURE — 80048 BASIC METABOLIC PNL TOTAL CA: CPT | Performed by: STUDENT IN AN ORGANIZED HEALTH CARE EDUCATION/TRAINING PROGRAM

## 2022-06-14 PROCEDURE — 93010 ELECTROCARDIOGRAM REPORT: CPT | Performed by: INTERNAL MEDICINE

## 2022-06-14 PROCEDURE — 84484 ASSAY OF TROPONIN QUANT: CPT | Performed by: STUDENT IN AN ORGANIZED HEALTH CARE EDUCATION/TRAINING PROGRAM

## 2022-06-14 PROCEDURE — 85025 COMPLETE CBC W/AUTO DIFF WBC: CPT | Performed by: STUDENT IN AN ORGANIZED HEALTH CARE EDUCATION/TRAINING PROGRAM

## 2022-06-14 RX ORDER — EPINEPHRINE 0.3 MG/.3ML
0.3 INJECTION SUBCUTANEOUS ONCE
Qty: 0.6 ML | Refills: 0 | Status: SHIPPED | OUTPATIENT
Start: 2022-06-14 | End: 2023-02-15

## 2022-06-14 RX ORDER — ATORVASTATIN CALCIUM 80 MG/1
80 TABLET, FILM COATED ORAL
Status: DISCONTINUED | OUTPATIENT
Start: 2022-06-15 | End: 2022-06-15 | Stop reason: HOSPADM

## 2022-06-14 RX ORDER — EPINEPHRINE 1 MG/ML
0.3 INJECTION, SOLUTION, CONCENTRATE INTRAVENOUS ONCE
Status: COMPLETED | OUTPATIENT
Start: 2022-06-14 | End: 2022-06-14

## 2022-06-14 RX ORDER — EPINEPHRINE 0.3 MG/.3ML
0.3 INJECTION SUBCUTANEOUS ONCE AS NEEDED
Qty: 0.6 ML | Refills: 0 | Status: SHIPPED | OUTPATIENT
Start: 2022-06-14

## 2022-06-14 RX ORDER — ONDANSETRON 2 MG/ML
4 INJECTION INTRAMUSCULAR; INTRAVENOUS EVERY 6 HOURS PRN
Status: DISCONTINUED | OUTPATIENT
Start: 2022-06-14 | End: 2022-06-15 | Stop reason: HOSPADM

## 2022-06-14 RX ORDER — SODIUM CHLORIDE, SODIUM GLUCONATE, SODIUM ACETATE, POTASSIUM CHLORIDE, MAGNESIUM CHLORIDE, SODIUM PHOSPHATE, DIBASIC, AND POTASSIUM PHOSPHATE .53; .5; .37; .037; .03; .012; .00082 G/100ML; G/100ML; G/100ML; G/100ML; G/100ML; G/100ML; G/100ML
100 INJECTION, SOLUTION INTRAVENOUS CONTINUOUS
Status: DISCONTINUED | OUTPATIENT
Start: 2022-06-14 | End: 2022-06-15 | Stop reason: HOSPADM

## 2022-06-14 RX ORDER — DIPHENHYDRAMINE HYDROCHLORIDE 50 MG/ML
25 INJECTION INTRAMUSCULAR; INTRAVENOUS ONCE
Status: COMPLETED | OUTPATIENT
Start: 2022-06-14 | End: 2022-06-14

## 2022-06-14 RX ORDER — METHYLPREDNISOLONE SODIUM SUCCINATE 125 MG/2ML
125 INJECTION, POWDER, LYOPHILIZED, FOR SOLUTION INTRAMUSCULAR; INTRAVENOUS DAILY
Status: DISCONTINUED | OUTPATIENT
Start: 2022-06-14 | End: 2022-06-15 | Stop reason: HOSPADM

## 2022-06-14 RX ORDER — FAMOTIDINE 10 MG/ML
20 INJECTION, SOLUTION INTRAVENOUS ONCE
Status: COMPLETED | OUTPATIENT
Start: 2022-06-14 | End: 2022-06-14

## 2022-06-14 RX ORDER — METHYLPREDNISOLONE SODIUM SUCCINATE 125 MG/2ML
125 INJECTION, POWDER, LYOPHILIZED, FOR SOLUTION INTRAMUSCULAR; INTRAVENOUS ONCE
Status: DISCONTINUED | OUTPATIENT
Start: 2022-06-14 | End: 2022-06-14

## 2022-06-14 RX ORDER — DILTIAZEM HYDROCHLORIDE 5 MG/ML
10 INJECTION INTRAVENOUS ONCE
Status: COMPLETED | OUTPATIENT
Start: 2022-06-14 | End: 2022-06-14

## 2022-06-14 RX ORDER — DILTIAZEM HYDROCHLORIDE 120 MG/1
120 CAPSULE, COATED, EXTENDED RELEASE ORAL EVERY EVENING
Status: DISCONTINUED | OUTPATIENT
Start: 2022-06-14 | End: 2022-06-15 | Stop reason: HOSPADM

## 2022-06-14 RX ORDER — PANTOPRAZOLE SODIUM 40 MG/1
40 TABLET, DELAYED RELEASE ORAL
Status: DISCONTINUED | OUTPATIENT
Start: 2022-06-15 | End: 2022-06-15 | Stop reason: HOSPADM

## 2022-06-14 RX ORDER — LEVOTHYROXINE SODIUM 0.1 MG/1
100 TABLET ORAL
Status: DISCONTINUED | OUTPATIENT
Start: 2022-06-15 | End: 2022-06-15 | Stop reason: HOSPADM

## 2022-06-14 RX ORDER — CHLORAL HYDRATE 500 MG
2000 CAPSULE ORAL DAILY
Status: DISCONTINUED | OUTPATIENT
Start: 2022-06-15 | End: 2022-06-15 | Stop reason: HOSPADM

## 2022-06-14 RX ADMIN — METHYLPREDNISOLONE SODIUM SUCCINATE 125 MG: 125 INJECTION, POWDER, FOR SOLUTION INTRAMUSCULAR; INTRAVENOUS at 15:43

## 2022-06-14 RX ADMIN — DILTIAZEM HYDROCHLORIDE 120 MG: 120 CAPSULE, COATED, EXTENDED RELEASE ORAL at 22:38

## 2022-06-14 RX ADMIN — SODIUM CHLORIDE, SODIUM GLUCONATE, SODIUM ACETATE, POTASSIUM CHLORIDE, MAGNESIUM CHLORIDE, SODIUM PHOSPHATE, DIBASIC, AND POTASSIUM PHOSPHATE 100 ML/HR: .53; .5; .37; .037; .03; .012; .00082 INJECTION, SOLUTION INTRAVENOUS at 21:51

## 2022-06-14 RX ADMIN — EPINEPHRINE 0.3 MG: 1 INJECTION, SOLUTION, CONCENTRATE INTRAVENOUS at 15:46

## 2022-06-14 RX ADMIN — FAMOTIDINE 20 MG: 10 INJECTION INTRAVENOUS at 15:43

## 2022-06-14 RX ADMIN — APIXABAN 5 MG: 5 TABLET, FILM COATED ORAL at 22:38

## 2022-06-14 RX ADMIN — DIPHENHYDRAMINE HYDROCHLORIDE 25 MG: 50 INJECTION, SOLUTION INTRAMUSCULAR; INTRAVENOUS at 15:44

## 2022-06-14 RX ADMIN — DILTIAZEM HYDROCHLORIDE 10 MG: 5 INJECTION, SOLUTION INTRAVENOUS at 20:02

## 2022-06-14 NOTE — ED NOTES
Patient ambulatory to the bathroom without assistance at this time       Patrick Montgomery RN  06/14/22 [id number]

## 2022-06-14 NOTE — DISCHARGE INSTRUCTIONS
You have been evaluated in the Emergency Department and briefly admitted today for an allergic reaction  You have been given medications to control your symptoms  You have been observed for several hours in the Emergency Department and you are stable for discharge at this time  You can take Benadryl and Pepcid, which are available over the counter, to help control your symptoms at home  Please follow up with your primary care physician as soon as possible  Please  EpiPen as ordered by your PCP    Return to the hospital if you experience rashes, difficulty breathing or swallowing, lip/mouth/tongue swelling, vomiting, or for any other concerning symptoms  For atrial fibrillation/flutter your noted with rapid heart rate improved with IV rate controlling medication  Continue your home dose of Cardizem and Eliquis    Follow-up with your cardiologist

## 2022-06-14 NOTE — ED PROVIDER NOTES
History  Chief Complaint   Patient presents with    Bee Sting     Pt was stung around 1000 this morning  Stated that she was feeling okay until now  Stated that she feels as though her throat is clogged and like her face is swollen  Took ASA around 1100   - benadryl     Patient is an 79 YO F, PMHx of Afib on eliquis, arthritis, HTN, hyperlipidemia, who presents to the ED after a bee sting  Patient states the sting occurred around 925 AM this morning (approximately 5 5 hours ago) on top of her right foot earlier today  She states she swatted the bee away, then immediately put ice and a topical pain relief cream on the sting  Initially, she felt fine and decided to take a nap  When she woke up a couple of hours ago, she didn't "feel right"  She states she feels as if her face has hives and like her throat is closing  She states she has had similar symptoms in the past after a bee sting 20 years ago and required epinephrine and "ice"  She denies any SOB, difficulty swallowing, N/V/D, abdominal pain, chest pain, rashes, or any other new or concerning symptoms  She did not take any benadryl prior to coming in, but did take an ASA  Prior to Admission Medications   Prescriptions Last Dose Informant Patient Reported? Taking?    Coenzyme Q10 (CO Q 10 PO)  Self Yes No   Sig: Take 200 mg by mouth   EPINEPHrine (EPIPEN) 0 3 mg/0 3 mL SOAJ   No No   Sig: Inject 0 3 mL (0 3 mg total) into a muscle once for 1 dose   FIBER PO  Self Yes No   Sig: Take 1 capsule by mouth daily as needed   Patient not taking: No sig reported   Hydrocortisone Acetate 1 % CREA  Self Yes No   Sig: Apply topically   Patient not taking: No sig reported   Multiple Vitamins-Calcium (ESSENTIAL ONE DAILY MULTIVIT PO)  Self Yes No   Sig: Take by mouth daily    Omega-3 Fatty Acids (FISH OIL PO)  Self Yes No   Sig: Take 2 g by mouth daily    apixaban (ELIQUIS) 5 mg  Self Yes No   Sig: Take 5 mg by mouth 2 (two) times a day To stop 2 days prior to surgery per md   atorvastatin (LIPITOR) 80 mg tablet  Self No No   Sig: TAKE 1 TABLET BY MOUTH EVERY DAY   cyanocobalamin (VITAMIN B-12) 1000 MCG tablet  Self Yes No   Sig: Take 1,000 mcg by mouth   diltiazem (CARDIZEM CD) 120 mg 24 hr capsule  Self Yes No   Sig: Take 120 mg by mouth every evening    levothyroxine 100 mcg tablet  Self No No   Sig: TAKE 1 TABLET BY MOUTH EVERY DAY   mupirocin (BACTROBAN) 2 % ointment  Self Yes No   Sig: APPLY TO SORE AREA ON BOTTOM TWICE DAILY UNTIL HEALED   Patient not taking: No sig reported   omeprazole (PriLOSEC) 20 mg delayed release capsule  Self No No   Sig: TAKE 1 CAPSULE (20 MG TOTAL) BY MOUTH AS NEEDED (GERD)      Facility-Administered Medications: None       Past Medical History:   Diagnosis Date    Anesthesia     "cheap date"    Arthritis     Atrial fibrillation (HCC)     Benign polyp of large intestine     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    Colon cancer [facility]) 44    36 yo    Colon polyp     Full dentures     full upper/ partial lower    GERD (gastroesophageal reflux disease)     Glaucoma suspect     History of neck problems     "C3-C7 and had epidural injections years ago"    Hyperlipidemia     Hypertension     Lung cancer ([facility] [address] )     jan 2020- had surgery    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[facility] [address] )     Prediabetes     Primary adenocarcinoma of upper lobe of right lung ([facility] [address] ) 12/2/2019    Pulmonary nodules 2/19/2015    Refusal of blood transfusions as patient is Sikh     Tic disorder, transient, recurrent     not recent    Walks frequently        Past Surgical History:   Procedure Laterality Date    APPENDECTOMY      ARTHROSCOPY KNEE      BREAST BIOPSY Right 04/10/2017    US guided - intraductal papillomas    BREAST BIOPSY Right 10/24/2019    ADH    BREAST LUMPECTOMY W/ NEEDLE LOCALIZATION Right 5/22/2020    Procedure: EXCISIONAL BIOPSY;  1000 NEEDLE LOC;  Surgeon: Feli Graham MD;  Location: [facility];  Service: Surgical Oncology    CARDIOVASCULAR STRESS TEST      CATARACT EXTRACTION Bilateral     COLON SURGERY      COLONOSCOPY      done 2010 due 2015 Dr Kuo    DILATION AND CURETTAGE OF UTERUS      ESOPHAGOGASTRODUODENOSCOPY      inflammation aonly    HEMICOLECTOMY Right 1977    HYSTERECTOMY      JOINT REPLACEMENT      L knee    LUNG SURGERY      MAMMO NEEDLE LOCALIZATION RIGHT (ALL INC) Right 5/22/2020    MAMMO NEEDLE LOCALIZATION RIGHT (ALL INC) EACH ADD Right 5/22/2020    MAMMO STEREOTACTIC BREAST BIOPSY RIGHT (ALL INC) Right 10/24/2019    PARATHYROID GLAND SURGERY      exploration     AL BRONCHOSCOPY,DIAGNOSTIC N/A 1/17/2020    Procedure: FLEXIBLE BRONCHOSCOPY;  Surgeon: Emeli Campbell MD;  [facility];  Service: Thoracic    AL THORACOSCOPY SURG LOBECTOMY Right 1/17/2020    Procedure: ROBOTIC ASSISTED COMPLETE LUNG LOBECTOMY;  Surgeon: Emeli Campbell MD;  [facility];  Service: Thoracic    AL THORACOSCOPY W/THERA WEDGE RESEXN INITIAL UNILAT Right 1/17/2020    Procedure: ROBOTIC ASSISTED UPPER-LOBE WEDGE RESECTION, MEDIASTINAL LYMPH NODE DISECTION;  Surgeon: Emeli Campbell MD;  [facility];  Service: Thoracic    REPLACEMENT TOTAL KNEE      THYROID SURGERY      TOTAL ABDOMINAL HYSTERECTOMY  1983    TOTAL THYROIDECTOMY      US GUIDED BREAST BIOPSY RIGHT COMPLETE Right 4/10/2017    US GUIDED THYROID BIOPSY  2/14/2020       Family History   Problem Relation Age of Onset    Esophageal cancer Mother         [de-identified]    Hypertension Mother     Stroke Family         TIA    Breast cancer Paternal Aunt 80    No Known Problems Father     COPD Sister     COPD Sister     No Known Problems Maternal Grandmother     No Known Problems Maternal Grandfather     No Known Problems Paternal Grandmother     No Known Problems Paternal Grandfather     No Known Problems Maternal Aunt     No Known Problems Maternal Aunt     No Known Problems Maternal Aunt     No Known Problems Maternal Aunt     No Known Problems Maternal Aunt      I have reviewed and agree with the history as documented  E-Cigarette/Vaping    E-Cigarette Use Never User      E-Cigarette/Vaping Substances    Nicotine No     THC No     CBD No     Flavoring No     Other No     Unknown No      Social History     Tobacco Use    Smoking status: Former Smoker     Packs/day: 1 50     Years: 20 00     Pack years: 30 00     Types: Cigarettes     Quit date: 5     Years since quittin 4    Smokeless tobacco: Never Used   Vaping Use    Vaping Use: Never used   Substance Use Topics    Alcohol use: Yes     Alcohol/week: 5 0 standard drinks     Types: 5 Cans of beer per week     Comment: occasional beer with food    Drug use: No        Review of Systems   Constitutional: Negative for chills and fever  HENT: Negative for sore throat and trouble swallowing  Altered face sensation, throat tightness   Respiratory: Negative for cough and shortness of breath  Cardiovascular: Negative for chest pain and leg swelling  Gastrointestinal: Negative for abdominal pain, diarrhea, nausea and vomiting  Skin: Negative for rash and wound  All other systems reviewed and are negative  Physical Exam  ED Triage Vitals   Temperature Pulse Respirations Blood Pressure SpO2   22 1551 22 1444 22 1444 22 1444 22 1444   98 1 °F (36 7 °C) 72 20 (!) 195/90 100 %      Temp Source Heart Rate Source Patient Position - Orthostatic VS BP Location FiO2 (%)   22 1551 22 1630 22 1630 22 1630 --   Oral Monitor Lying Right arm       Pain Score       22       No Pain             Orthostatic Vital Signs  Vitals:    22 1630 22 1937 22 2002 22 2241   BP: 162/77 164/83 164/83 (!) 175/85   Pulse: 74 105 100 99   Patient Position - Orthostatic VS: Lying Lying Lying Lying       Physical Exam  Vitals and nursing note reviewed     Constitutional:       General: She is not in acute distress  Appearance: She is well-developed  She is not ill-appearing, toxic-appearing or diaphoretic  HENT:      Head: Normocephalic and atraumatic  Comments: Slight erythema noted over the upper cheeks     Right Ear: External ear normal       Left Ear: External ear normal       Nose: Nose normal    Eyes:      General: Lids are normal  No scleral icterus  Cardiovascular:      Rate and Rhythm: Normal rate and regular rhythm  Heart sounds: Normal heart sounds  No murmur heard  No friction rub  No gallop  Pulmonary:      Effort: Pulmonary effort is normal  No respiratory distress  Breath sounds: Normal breath sounds  No wheezing or rales  Abdominal:      Palpations: Abdomen is soft  Tenderness: There is no abdominal tenderness  There is no guarding or rebound  Musculoskeletal:         General: No deformity  Normal range of motion  Cervical back: Normal range of motion and neck supple  Skin:     General: Skin is warm and dry  Comments: There is a small area of erythema to the dorsum of the distal R 2nd metatarsal  No retained stingers  No hives noted  Neurological:      General: No focal deficit present  Mental Status: She is alert     Psychiatric:         Mood and Affect: Mood normal          Behavior: Behavior normal          ED Medications  Medications   methylPREDNISolone sodium succinate (Solu-MEDROL) injection 125 mg (125 mg Intravenous Given 6/14/22 1543)   apixaban (ELIQUIS) tablet 5 mg (5 mg Oral Given 6/14/22 2238)   atorvastatin (LIPITOR) tablet 80 mg (has no administration in time range)   cyanocobalamin (VITAMIN B-12) tablet 1,000 mcg (has no administration in time range)   diltiazem (CARDIZEM CD) 24 hr capsule 120 mg (120 mg Oral Given 6/14/22 2238)   levothyroxine tablet 100 mcg (has no administration in time range)   multivitamin-minerals (CENTRUM) tablet 1 tablet (has no administration in time range)   fish oil capsule 2,000 mg (has no administration in time range)   pantoprazole (PROTONIX) EC tablet 40 mg (has no administration in time range)   ondansetron (ZOFRAN) injection 4 mg (has no administration in time range)   multi-electrolyte (PLASMALYTE-A/ISOLYTE-S PH 7 4) IV solution (100 mL/hr Intravenous New Bag 6/14/22 2151)   diphenhydrAMINE (BENADRYL) injection 25 mg (25 mg Intravenous Given 6/14/22 1544)   Famotidine (PF) (PEPCID) injection 20 mg (20 mg Intravenous Given 6/14/22 1543)   EPINEPHrine PF (ADRENALIN) 1 mg/mL injection 0 3 mg (0 3 mg Intramuscular Given 6/14/22 1546)   diltiazem (CARDIZEM) injection 10 mg (10 mg Intravenous Given 6/14/22 2002)       Diagnostic Studies  Results Reviewed     Procedure Component Value Units Date/Time    TSH, 3rd generation [[id number]]  (Normal) Collected: 06/14/22 2000    Lab Status: Final result Specimen: Blood from Hand, Left Updated: 06/14/22 2135     TSH 3RD GENERATON 0 616 uIU/mL     Narrative:      Patients undergoing fluorescein dye angiography may retain small amounts of fluorescein in the body for 48-72 hours post procedure  Samples containing fluorescein can produce falsely depressed TSH values  If the patient had this procedure,a specimen should be resubmitted post fluorescein clearance        HS Troponin 0hr (reflex protocol) [[id number]]  (Normal) Collected: 06/14/22 2000    Lab Status: Final result Specimen: Blood from Hand, Left Updated: 06/14/22 2042     hs TnI 0hr 4 ng/L     Basic metabolic panel [[id number]]  (Abnormal) Collected: 06/14/22 2000    Lab Status: Final result Specimen: Blood from Hand, Left Updated: 06/14/22 2033     Sodium 138 mmol/L      Potassium 4 2 mmol/L      Chloride 110 mmol/L      CO2 26 mmol/L      ANION GAP 2 mmol/L      BUN 15 mg/dL      Creatinine 1 40 mg/dL      Glucose 248 mg/dL      Calcium 9 1 mg/dL      eGFR 34 ml/min/1 73sq m     Narrative:      [place] guidelines for Chronic Kidney Disease (CKD):     Stage 1 with normal or high GFR (GFR > 90 mL/min/1 73 square meters)    Stage 2 Mild CKD (GFR = 60-89 mL/min/1 73 square meters)    Stage 3A Moderate CKD (GFR = 45-59 mL/min/1 73 square meters)    Stage 3B Moderate CKD (GFR = 30-44 mL/min/1 73 square meters)    Stage 4 Severe CKD (GFR = 15-29 mL/min/1 73 square meters)    Stage 5 End Stage CKD (GFR <15 mL/min/1 73 square meters)  Note: GFR calculation is accurate only with a steady state creatinine    CBC and differential [[id number]]  (Abnormal) Collected: 06/14/22 2000    Lab Status: Final result Specimen: Blood from Hand, Left Updated: 06/14/22 2016     WBC 5 25 Thousand/uL      RBC 4 07 Million/uL      Hemoglobin 13 4 g/dL      Hematocrit 40 1 %      MCV 99 fL      MCH 32 9 pg      MCHC 33 4 g/dL      RDW 14 0 %      MPV 9 5 fL      Platelets 253 Thousands/uL      nRBC 0 /100 WBCs      Neutrophils Relative 89 %      Immat GRANS % 0 %      Lymphocytes Relative 9 %      Monocytes Relative 1 %      Eosinophils Relative 0 %      Basophils Relative 1 %      Neutrophils Absolute 4 66 Thousands/µL      Immature Grans Absolute 0 02 Thousand/uL      Lymphocytes Absolute 0 49 Thousands/µL      Monocytes Absolute 0 05 Thousand/µL      Eosinophils Absolute 0 00 Thousand/µL      Basophils Absolute 0 03 Thousands/µL                  No orders to display         Procedures  Procedures      ED Course  ED Course as of 06/14/22 2301   Tue Jun 14, 2022   1559 Patient re-evaluated, resting comfortably, states face feels "relaxed"  Slight improvement of throat sensation  Vitals stable   1742 Patient again re-evaluated  Resting comfortably  Patient states she continues to improve  Will continue to monitor for 4 hours after epi given  1934 Patient re-evaluated  Resting comfortably  Now in Afib w/ rvr  Will obtain basic labs  Will give 10 mg of IV Cardizem  2017 CBC and differential(!)  Stable     2036 Creatinine(!): 1 40   2100 Patient re-evaluated  Resting comfortably  Heart rate still elevated  Will admit  Patient agreeable to admission  SBIRT 20yo+    Flowsheet Row Most Recent Value   SBIRT (25 yo +)    In order to provide better care to our patients, we are screening all of our patients for alcohol and drug use  Would it be okay to ask you these screening questions? No Filed at: 06/14/2022 7712                [facility]  Number of Diagnoses or Management Options  Allergic reaction to bee sting  Atrial fibrillation with RVR ([address] )  Bee sting  Diagnosis management comments: Patient is a 78 y o  female who presents to the ED after a bee sting  Clinical impression is delayed anaphylaxis (has 2 involved organ systems)  Will treat with epi, steroids, benadryl, and pepcid  Will observe for several hours with frequent re-assessment  Portions of the record may have been created with voice recognition software  Occasional wrong word or "sound a like" substitutions may have occurred due to the inherent limitations of voice recognition software  Read the chart carefully and recognize, using context, where substitutions have occurred           Amount and/or Complexity of Data Reviewed  Clinical lab tests: ordered  Tests in the radiology section of CPT®: ordered  Tests in the medicine section of CPT®: ordered    Risk of Complications, Morbidity, and/or Mortality  Presenting problems: high  Diagnostic procedures: moderate  Management options: high    Patient Progress  Patient progress: stable      Disposition  Final diagnoses:   Bee sting   Allergic reaction to bee sting   Atrial fibrillation with RVR ([address] )     Time reflects when diagnosis was documented in both MDM as applicable and the Disposition within this note     Time User Action Codes Description Comment    6/14/2022  3:07 PM Lakisha Patel Add [A62 142V] Bee sting     6/14/2022  6:52 PM Lakisha Patel Add [M07 556U] Allergic reaction to bee sting     6/14/2022  6:54 PM Lakisha Patel Modify [Y47 220W] Allergic reaction to bee sting     6/14/2022 10:58 PM Janet Wong Add [I48 91] Atrial fibrillation with RVR [facility])       ED Disposition     ED Disposition   Admit    Condition   Stable    Date/Time   Tue Jun 14, 2022  9:01 PM    Comment   Case was discussed with Dr Nichole Shaw and the patient's admission status was agreed to be Admission Status: observation status to the service of Dr Nichole Shaw   Follow-up Information     Follow up With Specialties Details Why Contact Info Additional [address] [facility],  Family Medicine   [address]  [address] Emergency Department Emergency Medicine  As needed [address] [id number]  [address] Emergency Department, [address] [place], [place], [address] [phone]          Patient's Medications   Discharge Prescriptions    EPINEPHRINE (EPIPEN 2-YOVANNY) 0 3 MG/0 3 ML SOAJ    Inject 0 3 mL (0 3 mg total) into a muscle once as needed for anaphylaxis for up to 1 dose       Start Date: 6/14/2022 End Date: --       Order Dose: 0 3 mg       Quantity: 0 6 mL    Refills: 0     No discharge procedures on file  PDMP Review       Value Time User    PDMP Reviewed  Yes 6/14/2022  8:58 PM Fatmata Fry DO           ED Provider  Attending physically available and evaluated Joselito Amor  I managed the patient along with the ED Attending      Electronically Signed by         Lyndon Charles DO  06/14/22 2116

## 2022-06-14 NOTE — ED ATTENDING ATTESTATION
6/14/2022  I, Wally Greenberg MD, saw and evaluated the patient  I have discussed the patient with the resident/non-physician practitioner and agree with the resident's/non-physician practitioner's findings, Plan of Care, and MDM as documented in the resident's/non-physician practitioner's note, except where noted  All available labs and Radiology studies were reviewed  I was present for key portions of any procedure(s) performed by the resident/non-physician practitioner and I was immediately available to provide assistance  At this point I agree with the current assessment done in the Emergency Department  I have conducted an independent evaluation of this patient a history and physical is as follows:    OA: 77 y/o f who presents with bee sting to her R foot earlier this morning between 9 and 1000 hours  Patient states that she was outside she saw a large black bee sting her and swatted it away  She came inside and cleaned the area and put topical medication on it noting that it seem to improve with decreased pain and decreased swelling  Patient notes that she has a history of allergy to bee stings for which she previously had an EpiPen  She has a history of having multiple bee stings requiring epinephrine in the past   She states that she was able to take a nap shortly after this occurred and when she awoke she felt itchy throughout her body, noted facial swelling with what she thought were highs as well as a discomfort in her throat  She describes the throat discomfort as a dysphagia and a thickness  She denies overt shortness of breath  No difficulty talking  Denies chest pain pressure or palpitations  No nausea no vomiting  No dizziness or lightheadedness  Patient did call her physician who advised that if her symptoms persisted or worsen to come to the emergency department  She also did call her in an EpiPen  On exam patient is currently nontoxic    She is awake alert oriented and speaking in complete sentences  Vital signs initially hypertensive but improved on repeat  She does admit to feeling nervous regarding her symptoms  HEENT is normocephalic and atraumatic  Patient has mild swelling to face with areas of erythema and no obvious areas of urticaria  Pupils equal round reactive to light  Sclera not injected  Posterior oropharynx within normal limits  There is no drooling  Neck is supple full range of motion there is no stridor  Heart is regular rate without appreciable murmurs  Lungs are clear no wheezing rhonchi or rales  Abdomen is soft positive bowel sounds no rebound no guarding  No lower extremity edema or calf tenderness to palpation  Intact distal pulses and capillary refill less than 2 seconds  Awake alert oriented and appropriate  Assessment and plan allergic reaction  Discussed that given time since bee sting and patient currently maintaining airway will treat with Benadryl for symptomatic relief can consider steroids  Discussed epinephrine with patient  Discussed risks and benefits of receiving it at this time  Patient states that she prefers to proceed with epinephrine at this time  Understands risks and benefits  Will monitor patient closely  Will observe for at least 2-4 hours after dosing  Portions of the record may have been created with voice recognition software  Occasional wrong word or sound-a-like" substitutions may have occurred due to the inherent limitations of voice recognition software  Review chart carefully and recognize, using context, where substitutions have occurred  ED Course   Pt states she feels significantly improved  Itching/swelling improved and throat sxms almost resolved  VSS with HR in the 70s, BP 484G-874X systolic  Pt with rapid atrial fibrillation while in the ED  Will treat HR, continue to monitor       Critical Care Time  Procedures

## 2022-06-14 NOTE — Clinical Note
Case was discussed with Dr Dom Sethi and the patient's admission status was agreed to be Admission Status: observation status to the service of Dr Dom Sethi

## 2022-06-15 VITALS
SYSTOLIC BLOOD PRESSURE: 175 MMHG | RESPIRATION RATE: 18 BRPM | HEART RATE: 89 BPM | TEMPERATURE: 98.1 F | DIASTOLIC BLOOD PRESSURE: 85 MMHG | OXYGEN SATURATION: 95 %

## 2022-06-15 PROBLEM — T63.441A ALLERGIC REACTION TO BEE STING: Status: RESOLVED | Noted: 2022-06-14 | Resolved: 2022-06-15

## 2022-06-15 LAB
ATRIAL RATE: 326 BPM
QRS AXIS: -17 DEGREES
QRSD INTERVAL: 86 MS
QT INTERVAL: 328 MS
QTC INTERVAL: 412 MS
T WAVE AXIS: -50 DEGREES
VENTRICULAR RATE: 95 BPM

## 2022-06-15 PROCEDURE — 93010 ELECTROCARDIOGRAM REPORT: CPT | Performed by: INTERNAL MEDICINE

## 2022-06-15 PROCEDURE — 99217 PR OBSERVATION CARE DISCHARGE MANAGEMENT: CPT | Performed by: INTERNAL MEDICINE

## 2022-06-15 RX ADMIN — LEVOTHYROXINE SODIUM 100 MCG: 100 TABLET ORAL at 05:58

## 2022-06-15 RX ADMIN — PANTOPRAZOLE SODIUM 40 MG: 40 TABLET, DELAYED RELEASE ORAL at 05:58

## 2022-06-15 NOTE — DISCHARGE SUMMARY
[facility]  Discharge- Kali Ban 1940, 78 y o  female MRN: [id number]  Unit/Bed#: ED 06 Encounter: [id number]  Primary Care Provider: Malick Shaw DO   Date and time admitted to hospital: 6/14/2022  2:42 PM    * Atrial fibrillation with RVR ([address] )  Assessment & Plan  · Apparently noted with atrial fibrillation with RVR after treatment for bee sting allergic reaction as below, heart rate improved with IV Cardizem and currently HR  throughout my evaluation, some slight increases to 120s when patient is speaking rapidly  · Labs without marked abnormality  · Overnight telemetry without significant events  · TSH WNL  · Continue home dose Cardizem  · Continue anticoagulation with Eliquis  · Patient remained asymptomatic and is now in sinus rhythm HR 90s    Allergic reaction to bee sting  Assessment & Plan  · Patient initially presented with reported facial erythema and urticaria after bee sting in the morning  · Symptoms resolved after epinephrine, Benadryl, Pepcid provide during ED evaluation  · Continue to monitor for recurrence  Patient states she had prescription for EpiPen placed by PCP prior to arrival here  · All symptoms resolved and no recurrence of symptoms during evaluation    Strongly advised patient to  EpiPen    Elevated serum creatinine  Assessment & Plan  · Baseline creatinine appearing 1 1-1 5, somewhat elevated from this  · IV fluids provided, can follow-up repeat BMP as outpatient as appropriate    Postoperative hypothyroidism  Assessment & Plan  · Repeat TSH WNL, continue home dose levothyroxine      Medical Problems             Resolved Problems  Date Reviewed: 6/15/2022   None               Discharging Physician / Practitioner: Mary Jo Story DO  PCP: Malick Shaw DO  Admission Date:   Admission Orders (From admission, onward)     Ordered        06/14/22 2101  Place in Observation  Once                      Discharge Date: 06/15/22    Consultations During Hospital Stay:  · None    Procedures Performed:   · None    Significant Findings / Test Results:   · None    Incidental Findings:   · None     Test Results Pending at Discharge (will require follow up): · None     Outpatient Tests Requested:  · None    Complications:  None    Reason for Admission:  Atrial fibrillation with RVR    Hospital Course: Francisco Story is a 78 y o  female patient who originally presented to the hospital on 6/14/2022 due to allergic reaction secondary to bee sting  She was treated with epinephrine, Pepcid, and Benadryl with improvement in symptoms  Unfortunately during ED evaluation she developed atrial fibrillation with RVR requiring IV Cardizem for rate controlled and patient was admitted as observation in this setting  Her heart rate remained between 90-low 100s since admission and patient remained otherwise asymptomatic, and patient requesting discharge this a m     Advised patient to  EpiPen as ordered by PCP/ED, and return precautions advised  Please see above list of diagnoses and related plan for additional information  Condition at Discharge: stable    Discharge Day Visit / Exam:   Subjective:  No events overnight, patient states she feels at baseline  Denies any recurrence of throat closing for hives and denies any episodes of fevers/chills, chest pain/pressure, shortness of breath, or dizziness/lightheadedness  Sheila Corea to return home  Vitals: Blood Pressure: (!) 175/85 (06/15/22 0100)  Pulse: 89 (06/15/22 0100)  Temperature: 98 1 °F (36 7 °C) (06/14/22 1551)  Temp Source: Oral (06/14/22 1551)  Respirations: 18 (06/15/22 0100)  SpO2: 95 % (06/15/22 0100)  Exam:   Physical Exam  Vitals and nursing note reviewed  Constitutional:       General: She is not in acute distress  Appearance: She is well-developed  She is not diaphoretic  HENT:      Head: Normocephalic and atraumatic        Mouth/Throat:      Pharynx: No oropharyngeal exudate  Eyes:      General: No scleral icterus  Conjunctiva/sclera: Conjunctivae normal       Pupils: Pupils are equal, round, and reactive to light  Cardiovascular:      Rate and Rhythm: Normal rate and regular rhythm  Heart sounds: Normal heart sounds  No murmur heard  No friction rub  No gallop  Pulmonary:      Effort: Pulmonary effort is normal  No respiratory distress  Breath sounds: No wheezing or rales  Abdominal:      General: Bowel sounds are normal  There is no distension  Palpations: Abdomen is soft  Tenderness: There is no abdominal tenderness  Musculoskeletal:         General: No tenderness  Normal range of motion  Cervical back: Normal range of motion and neck supple  Lymphadenopathy:      Cervical: No cervical adenopathy  Skin:     General: Skin is warm and dry  Findings: No erythema or rash  Neurological:      General: No focal deficit present  Mental Status: She is alert and oriented to person, place, and time  Cranial Nerves: No cranial nerve deficit  Discussion with Family: Patient declined call to   Discharge instructions/Information to patient and family:   See after visit summary for information provided to patient and family  Provisions for Follow-Up Care:  See after visit summary for information related to follow-up care and any pertinent home health orders  Disposition:   Home    Planned Readmission: No     Discharge Statement:  I spent 30 minutes discharging the patient  This time was spent on the day of discharge  I had direct contact with the patient on the day of discharge  Greater than 50% of the total time was spent examining patient, answering all patient questions, arranging and discussing plan of care with patient as well as directly providing post-discharge instructions  Additional time then spent on discharge activities      Discharge Medications:  See after visit summary for reconciled discharge medications provided to patient and/or family        **Please Note: This note may have been constructed using a voice recognition system**

## 2022-06-15 NOTE — ASSESSMENT & PLAN NOTE
· Baseline creatinine appearing 1 1-1 5, somewhat elevated from this  · IV fluids provided, can follow-up repeat BMP as outpatient as appropriate

## 2022-06-15 NOTE — ASSESSMENT & PLAN NOTE
· Apparently noted with atrial fibrillation with RVR after treatment for bee sting allergic reaction as below, heart rate improved with IV Cardizem and currently HR  throughout my evaluation, some slight increases to 120s when patient is speaking rapidly  · Labs without marked abnormality  · Overnight telemetry without significant events  · TSH WNL  · Continue home dose Cardizem  · Continue anticoagulation with Eliquis  · Patient remained asymptomatic and is now in sinus rhythm HR 90s

## 2022-06-15 NOTE — UTILIZATION REVIEW
Observation Admission Authorization Request   NOTIFICATION OF OBSERVATION ADMISSION/OBSERVATION AUTHORIZATION REQUEST   SERVICING FACILITY:   [facility]  Address: [address] [place] [address], [address] [place] [address]  Tax ID: [id number]  NPI: [id number]  Place of Service: On [address] [facility] Code: 22  CPT Code for Observation: CPT   CPT 72767     ATTENDING PROVIDER:  Attending Name and NPI#: Randolph Bajwa [[id number]]  Address: [address] [place] [address], [address] [place] [id number]  Phone: [phone]     UTILIZATION REVIEW CONTACT:  Maxine Stringer Utilization   Network Utilization Review Department  Phone: [phone]  Fax: [phone]  Email: [email or web address]     PHYSICIAN ADVISORY SERVICES:  FOR DJMK-RX-FRSN REVIEW - MEDICAL NECESSITY DENIAL  Phone: [phone]  Fax: [phone]  Email: [email or web address]     TYPE OF REQUEST:  Observation Status     ADMISSION INFORMATION:  ADMISSION DATE/TIME: 06 14 2022 [de-identified] pm  PATIENT DIAGNOSIS CODE/DESCRIPTION:  Bee sting [T63 441A]  DISCHARGE DATE/TIME: 6/15/2022  6:47 AM   IMPORTANT INFORMATION:  Please contact the Maxine Stringer directly with any questions or concerns regarding this request  Department voicemails are confidential     Send requests for admission clinical reviews, concurrent reviews, approvals, and administrative denials due to lack of clinical to fax [phone]

## 2022-06-15 NOTE — ASSESSMENT & PLAN NOTE
· Patient initially presented with reported facial erythema and urticaria after bee sting in the morning  · Symptoms resolved after epinephrine, Benadryl, Pepcid provide during ED evaluation  · Continue to monitor for recurrence  Patient states she had prescription for EpiPen placed by PCP prior to arrival here  · All symptoms resolved and no recurrence of symptoms during evaluation    Strongly advised patient to  EpiPen

## 2022-06-15 NOTE — H&P
[address] [place] [address] 1940, 78 y o  female MRN: [id number]  Unit/Bed#: ED 06 Encounter: [id number]  Primary Care Provider: Theodore Acosta DO   Date and time admitted to hospital: 6/14/2022  2:42 PM    * Atrial fibrillation with RVR ([address] )  Assessment & Plan  · Apparently noted with atrial fibrillation with RVR after treatment for bee sting allergic reaction as below, heart rate improved with IV Cardizem and currently HR  throughout my evaluation, some slight increases to 120s when patient is speaking rapidly  · Labs without marked abnormality  · Will monitor overnight on telemetry, check TSH  · Resume home dose Cardizem  · Continue anticoagulation with Eliquis    Allergic reaction to bee sting  Assessment & Plan  · Patient initially presented with reported facial erythema and urticaria after bee sting in the morning  · Symptoms resolved after epinephrine, Benadryl, Pepcid provide during ED evaluation  · Continue to monitor for recurrence  Patient states she had prescription for EpiPen placed by PCP prior to arrival here    Elevated serum creatinine  Assessment & Plan  · Baseline creatinine appearing 1 1-1 5, somewhat elevated from this  · Provide IVF overnight    Postoperative hypothyroidism  Assessment & Plan  · Follow-up repeat TSH, continue home dose levothyroxine in the interim      VTE Prophylaxis: Apixaban (Eliquis)  / sequential compression device   Code Status: Level 1 - Full Code  POLST: POLST form is not discussed and not completed at this time  Anticipated Length of Stay:  Patient will be admitted on an Observation basis with an anticipated length of stay of  Less than 2 midnights  Justification for Hospital Stay: Please see detailed plans noted above  Chief Complaint:     Bee sting  History of Present Illness:   Alejandro Rea is a 78 y o  female who initially presented with complaint of allergic reaction following bee-sting this morning  She has past medical history significant for atrial fibrillation/flutter anticoagulated on Eliquis, hypertension, hypothyroidism, 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, and 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  Patient stated she was stung by bee approximately 0925H this morning on top of right foot while gardening, with some mild swelling at the foot site however improved with ice and topical pain relief  She took a nap following this, however on awakening states she did not feel right and felt as though face had hives and that her throat was closing  She contacted her PCP who prescribed EpiPen but advised her to present for evaluation  On ED arrival here she received epinephrine, Benadryl, and Pepcid with resolution of symptoms  Unfortunately during monitoring she developed reported atrial fibrillation with RVR -130 for which IV Cardizem was provided with improvement and patient admitted for further treatment  Currently, patient is lying in bed in no acute distress and comfortable appearing  She offers no acute complaints whatsoever at this time, noting resolution of throat closing" sensation and hives, and denies any chest pain/pressure, shortness breath, dizziness/lightheadedness, or palpitations at this time  States if she feels this well early this a m  she would like to leave to attend orthopedic surgery appointment      Review of Systems:    Constitutional:  Denies fever or chills   Eyes:  Denies change in visual acuity   HENT:  Denies nasal congestion or sore throat   Respiratory:  Denies cough or shortness of breath   Cardiovascular:  Denies chest pain or edema   GI:  Denies abdominal pain, nausea, vomiting, bloody stools or diarrhea   :  Denies dysuria   Musculoskeletal:  Denies back pain or joint pain   Integument:  Previously endorsed rash (now resolved)  Neurologic:  Denies headache, focal weakness or sensory changes   Endocrine:  Denies polyuria or polydipsia   Lymphatic:  Denies swollen glands   Psychiatric:  Denies depression or anxiety     Past Medical and Surgical History:   Past Medical History:   Diagnosis Date    Anesthesia     "cheap date"    Arthritis     Atrial fibrillation [facility] )     Benign polyp of large intestine     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    Colon cancer [facility]) 44    36 yo    Colon polyp     Full dentures     full upper/ partial lower    GERD (gastroesophageal reflux disease)     Glaucoma suspect     History of neck problems     "C3-C7 and had epidural injections years ago"    Hyperlipidemia     Hypertension     Lung cancer ([facility] )     jan 2020- had surgery    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[facility] )     Prediabetes     Primary adenocarcinoma of upper lobe of right lung ([facility] ) 12/2/2019    Pulmonary nodules 2/19/2015    Refusal of blood transfusions as patient is Yarsanism     Tic disorder, transient, recurrent     not recent    Walks frequently      Past Surgical History:   Procedure Laterality Date    APPENDECTOMY      ARTHROSCOPY KNEE      BREAST BIOPSY Right 04/10/2017    US guided - intraductal papillomas    BREAST BIOPSY Right 10/24/2019    ADH    BREAST LUMPECTOMY W/ NEEDLE LOCALIZATION Right 5/22/2020    Procedure: EXCISIONAL BIOPSY;  1000 NEEDLE LOC;  Surgeon: Harpal Voss MD;  Location: [facility];  Service: Surgical Oncology    CARDIOVASCULAR STRESS TEST      CATARACT EXTRACTION Bilateral     COLON SURGERY      COLONOSCOPY      done 2010 due 2015 Dr Kuo    DILATION AND CURETTAGE OF UTERUS      ESOPHAGOGASTRODUODENOSCOPY      inflammation aonly    HEMICOLECTOMY Right 1977    HYSTERECTOMY      JOINT REPLACEMENT      L knee    LUNG SURGERY      MAMMO NEEDLE LOCALIZATION RIGHT (ALL INC) Right 5/22/2020    MAMMO NEEDLE LOCALIZATION RIGHT (ALL INC) EACH ADD Right 5/22/2020    MAMMO STEREOTACTIC BREAST BIOPSY RIGHT (ALL INC) Right 10/24/2019    PARATHYROID GLAND SURGERY      exploration     MT BRONCHOSCOPY,DIAGNOSTIC N/A 1/17/2020    Procedure: FLEXIBLE BRONCHOSCOPY;  Surgeon: Elizabeth Santiago MD;  [facility]: [facility];  Service: Thoracic    MT THORACOSCOPY SURG LOBECTOMY Right 1/17/2020    Procedure: ROBOTIC ASSISTED COMPLETE LUNG LOBECTOMY;  Surgeon: Elizabeth Santiago MD;  [facility];  Service: Thoracic    MT THORACOSCOPY W/THERA WEDGE RESEXN INITIAL UNILAT Right 1/17/2020    Procedure: ROBOTIC ASSISTED UPPER-LOBE WEDGE RESECTION, MEDIASTINAL LYMPH NODE DISECTION;  Surgeon: Elizabeth Santiago MD;  [facility];  Service: Thoracic    REPLACEMENT TOTAL KNEE      THYROID SURGERY      TOTAL ABDOMINAL HYSTERECTOMY  1983    TOTAL THYROIDECTOMY      US GUIDED BREAST BIOPSY RIGHT COMPLETE Right 4/10/2017    US GUIDED THYROID BIOPSY  2/14/2020       Meds/Allergies:    Current Facility-Administered Medications:     apixaban (ELIQUIS) tablet 5 mg, 5 mg, Oral, BID, Edith Endave Simpson, DO    [START ON 6/15/2022] atorvastatin (LIPITOR) tablet 80 mg, 80 mg, Oral, Daily, Edith Endave Simpson, DO    [START ON 6/15/2022] cyanocobalamin (VITAMIN B-12) tablet 1,000 mcg, 1,000 mcg, Oral, Daily, Edith Endave Simpson, DO    diltiazem (CARDIZEM CD) 24 hr capsule 120 mg, 120 mg, Oral, QPM, Edith Endave Simpson, DO    [START ON 6/15/2022] fish oil capsule 2,000 mg, 2,000 mg, Oral, Daily, Edith Endave Simpson, DO    [START ON 6/15/2022] levothyroxine tablet 100 mcg, 100 mcg, Oral, Daily, Edith Endave Simpson, DO    methylPREDNISolone sodium succinate (Solu-MEDROL) injection 125 mg, 125 mg, Intravenous, Daily, Edda Contrerass, DO, 125 mg at 06/14/22 1543    multi-electrolyte (PLASMALYTE-A/ISOLYTE-S PH 7 4) IV solution, 100 mL/hr, Intravenous, Continuous, Edda Simpson, DO    [START ON 6/15/2022] multivitamin-minerals (CENTRUM) tablet 1 tablet, 1 tablet, Oral, Daily, Edda Contrerass, DO    ondansetron [facility]) injection 4 mg, 4 mg, Intravenous, Q6H PRN, [employer] DO Trina    [START ON 6/15/2022] pantoprazole (PROTONIX) EC tablet 40 mg, 40 mg, Oral, Early Morning, Pieter Reyes DO    Current Outpatient Medications:     EPINEPHrine (EpiPen 2-Pa) 0 3 mg/0 3 mL SOAJ, Inject 0 3 mL (0 3 mg total) into a muscle once as needed for anaphylaxis for up to 1 dose, Disp: 0 6 mL, Rfl: 0    apixaban (ELIQUIS) 5 mg, Take 5 mg by mouth 2 (two) times a day To stop 2 days prior to surgery per md, Disp: , Rfl:     atorvastatin (LIPITOR) 80 mg tablet, TAKE 1 TABLET BY MOUTH EVERY DAY, Disp: 90 tablet, Rfl: 1    Coenzyme Q10 (CO Q 10 PO), Take 200 mg by mouth, Disp: , Rfl:     cyanocobalamin (VITAMIN B-12) 1000 MCG tablet, Take 1,000 mcg by mouth, Disp: , Rfl:     diltiazem (CARDIZEM CD) 120 mg 24 hr capsule, Take 120 mg by mouth every evening , Disp: , Rfl:     EPINEPHrine (EPIPEN) 0 3 mg/0 3 mL SOAJ, Inject 0 3 mL (0 3 mg total) into a muscle once for 1 dose, Disp: 0 6 mL, Rfl: 0    FIBER PO, Take 1 capsule by mouth daily as needed (Patient not taking: No sig reported), Disp: , Rfl:     Hydrocortisone Acetate 1 % CREA, Apply topically (Patient not taking: No sig reported), Disp: , Rfl:     levothyroxine 100 mcg tablet, TAKE 1 TABLET BY MOUTH EVERY DAY, Disp: 90 tablet, Rfl: 1    Multiple Vitamins-Calcium (ESSENTIAL ONE DAILY MULTIVIT PO), Take by mouth daily , Disp: , Rfl:     mupirocin (BACTROBAN) 2 % ointment, APPLY TO SORE AREA ON BOTTOM TWICE DAILY UNTIL HEALED (Patient not taking: No sig reported), Disp: , Rfl:     Omega-3 Fatty Acids (FISH OIL PO), Take 2 g by mouth daily , Disp: , Rfl:     omeprazole (PriLOSEC) 20 mg delayed release capsule, TAKE 1 CAPSULE (20 MG TOTAL) BY MOUTH AS NEEDED (GERD), Disp: 90 capsule, Rfl: 0    Allergies:    Allergies   Allergen Reactions    Bee Venom      Tongue swelling    Flurbiprofen Hives    Ibuprofen Shortness Of Breath    Levaquin [Levofloxacin In D5w] Other (See Comments)     Dark spots under eyes and wrists    Tequin [Gatifloxacin] Rash     History:  Marital Status: Single     Substance Use History:   Social History     Substance and Sexual Activity   Alcohol Use Yes    Alcohol/week: 5 0 standard drinks    Types: 5 Cans of beer per week    Comment: occasional beer with food     Social History     Tobacco Use   Smoking Status Former Smoker    Packs/day: 1 50    Years: 20 00    Pack years: 30 00    Types: Cigarettes    Quit date: 5    Years since quittin 4   Smokeless Tobacco Never Used     Social History     Substance and Sexual Activity   Drug Use No       Family History:  Family History   Problem Relation Age of Onset    Esophageal cancer Mother         [de-identified]    Hypertension Mother     Stroke Family         TIA    Breast cancer Paternal Aunt 80    No Known Problems Father     COPD Sister     COPD Sister     No Known Problems Maternal Grandmother     No Known Problems Maternal Grandfather     No Known Problems Paternal Grandmother     No Known Problems Paternal Grandfather     No Known Problems Maternal Aunt     No Known Problems Maternal Aunt     No Known Problems Maternal Aunt     No Known Problems Maternal Aunt     No Known Problems Maternal Aunt        Physical Exam:     Vitals:   Blood Pressure: 164/83 (22)  Pulse: 100 (22)  Temperature: 98 1 °F (36 7 °C) (22 1551)  Temp Source: Oral (22 1551)  Respirations: 22 (22)  SpO2: 95 % (22)    Constitutional:  Well developed, well nourished, no acute distress, non-toxic appearance   Eyes:  PERRL, conjunctiva normal   HENT:  Atraumatic, external ears normal, nose normal, oropharynx moist, no pharyngeal exudates   Neck- normal range of motion, no tenderness, supple   Respiratory:  No respiratory distress, normal breath sounds, no rales, no wheezing   Cardiovascular:  Normal rate, normal rhythm, no murmurs, no gallops, no rubs   GI:  Soft, nondistended, normal bowel sounds, nontender, no organomegaly, no mass, no rebound, no guarding   :  No costovertebral angle tenderness   Musculoskeletal:  No edema, no tenderness, no deformities  Back- no tenderness  Integument:  Well hydrated, no rash   Lymphatic:  No lymphadenopathy noted   Neurologic:  Alert &awake, communicative, CN 2-12 normal, normal motor function, normal sensory function, no focal deficits noted   Psychiatric:  Speech and behavior appropriate       Lab Results: I have personally reviewed pertinent reports  Results from last 7 days   Lab Units 06/14/22 2000   WBC Thousand/uL 5 25   HEMOGLOBIN g/dL 13 4   HEMATOCRIT % 40 1   PLATELETS Thousands/uL 187   NEUTROS PCT % 89*   LYMPHS PCT % 9*   MONOS PCT % 1*   EOS PCT % 0     Results from last 7 days   Lab Units 06/14/22 2000   POTASSIUM mmol/L 4 2   CHLORIDE mmol/L 110*   CO2 mmol/L 26   BUN mg/dL 15   CREATININE mg/dL 1 40*   CALCIUM mg/dL 9 1           EKG:  Atrial flutter with variable AV block HR 96    Imaging: I have personally reviewed pertinent reports  Echo complete w/ contrast if indicated    Result Date: 5/17/2022  Narrative: Annie Valladares  Left Ventricle: Left ventricular cavity size is normal  Wall thickness is mildly increased  There is concentric remodeling  The left ventricular ejection fraction is 65%  Systolic function is normal  Wall motion is normal    Right Ventricle: Right ventricular cavity size is normal  Systolic function is normal    Mitral Valve: There is mild regurgitation    Tricuspid Valve: There is mild regurgitation    Pulmonic Valve: There is mild regurgitation  ** Please Note: Dragon 360 Dictation voice to text software was used in the creation of this document   **

## 2022-06-15 NOTE — ASSESSMENT & PLAN NOTE
· Patient initially presented with reported facial erythema and urticaria after bee sting in the morning  · Symptoms resolved after epinephrine, Benadryl, Pepcid provide during ED evaluation  · Continue to monitor for recurrence    Patient states she had prescription for EpiPen placed by PCP prior to arrival here

## 2022-06-15 NOTE — ED NOTES
Pt ambulated to bathroom  No signs of distress        [facility]  06/15/22 3611

## 2022-06-15 NOTE — ED NOTES
Pt sleeping at this time  No signs of distress        Harini Gómez, [facility]  06/15/22 8460

## 2022-06-15 NOTE — ASSESSMENT & PLAN NOTE
· Apparently noted with atrial fibrillation with RVR after treatment for bee sting allergic reaction as below, heart rate improved with IV Cardizem and currently HR  throughout my evaluation, some slight increases to 120s when patient is speaking rapidly  · Labs without marked abnormality  · Will monitor overnight on telemetry, check TSH  · Resume home dose Cardizem  · Continue anticoagulation with Eliquis

## 2022-06-20 ENCOUNTER — TELEPHONE (OUTPATIENT)
Dept: FAMILY MEDICINE CLINIC | Facility: CLINIC | Age: 82
End: 2022-06-20

## 2022-06-20 DIAGNOSIS — R73.9 HYPERGLYCEMIA: Primary | ICD-10-CM

## 2022-06-21 ENCOUNTER — APPOINTMENT (OUTPATIENT)
Dept: LAB | Facility: HOSPITAL | Age: 82
End: 2022-06-21
Payer: COMMERCIAL

## 2022-06-21 DIAGNOSIS — R73.9 HYPERGLYCEMIA: ICD-10-CM

## 2022-06-21 LAB
EST. AVERAGE GLUCOSE BLD GHB EST-MCNC: 123 MG/DL
HBA1C MFR BLD: 5.9 %

## 2022-06-21 PROCEDURE — 36415 COLL VENOUS BLD VENIPUNCTURE: CPT

## 2022-06-21 PROCEDURE — 83036 HEMOGLOBIN GLYCOSYLATED A1C: CPT

## 2022-07-03 ENCOUNTER — TELEPHONE (OUTPATIENT)
Dept: OTHER | Facility: OTHER | Age: 82
End: 2022-07-03

## 2022-07-12 ENCOUNTER — ANNUAL EXAM (OUTPATIENT)
Dept: GYNECOLOGY | Facility: CLINIC | Age: 82
End: 2022-07-12
Payer: COMMERCIAL

## 2022-07-12 VITALS — DIASTOLIC BLOOD PRESSURE: 80 MMHG | SYSTOLIC BLOOD PRESSURE: 130 MMHG | HEART RATE: 79 BPM

## 2022-07-12 DIAGNOSIS — Z13.820 ENCOUNTER FOR SCREENING FOR OSTEOPOROSIS: ICD-10-CM

## 2022-07-12 DIAGNOSIS — M85.80 OSTEOPENIA, UNSPECIFIED LOCATION: Primary | ICD-10-CM

## 2022-07-12 DIAGNOSIS — Z78.0 MENOPAUSE: ICD-10-CM

## 2022-07-12 DIAGNOSIS — Z86.000 PERSONAL HISTORY OF IN-SITU NEOPLASM OF BREAST: ICD-10-CM

## 2022-07-12 PROCEDURE — G0101 CA SCREEN;PELVIC/BREAST EXAM: HCPCS | Performed by: OBSTETRICS & GYNECOLOGY

## 2022-07-12 NOTE — PROGRESS NOTES
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    Menopause  -     DXA bone density spine hip and pelvis; Future    Encounter for screening for osteoporosis  -     DXA bone density spine hip and pelvis; Future        Subjective:      Patient ID: Francisco Story is a 78 y o  female  HPI  patient for annual examination  She has a history of right breast cancer diagnosed in November 2019  This is managed by Dr Trey Long  Status post right lumpectomy  Does a history of a right lung cancer diagnosed in 2020  Also history colon cancer diagnosed in 1977  She denies any vaginal irritation, burning, discharge or bleeding denies any dysuria, hematuria urgency urinary contents  No GI complaints  Status post BRANDI in 1987 secondary to fibroids    The following portions of the patient's history were reviewed and updated as appropriate:   She  has a past medical history of Anesthesia, Arthritis, Atrial fibrillation ([address] ), Benign polyp of large intestine, 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), Colon cancer ([address] ) (1977), Colon polyp, Full dentures, GERD (gastroesophageal reflux disease), Glaucoma suspect, History of neck problems, Hyperlipidemia, Hypertension, Lung cancer ([address] ), 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[address] ), Prediabetes, Primary adenocarcinoma of upper lobe of right lung ([address] ) (12/2/2019), Pulmonary nodules (2/19/2015), Refusal of blood transfusions as patient is [employer], Tic disorder, transient, recurrent, and Walks frequently    She   Patient Active Problem List    Diagnosis Date Noted    Elevated serum creatinine 06/14/2022    Encounter for follow-up surveillance of breast cancer 04/14/2021    Thyroid nodule 03/12/2021    History of lung cancer 07/29/2020    Hashimoto's thyroiditis 03/02/2020    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 11/20/2019    Dense breast tissue 11/20/2019    Medicare annual wellness visit, subsequent 09/20/2018    Osteopenia of multiple sites 09/20/2018    Mixed hyperlipidemia 03/13/2018    History of colon cancer 02/14/2018    Postoperative hypothyroidism 09/01/2016    Benign essential hypertension 02/25/2016    At[facility] fibrillation [facility] RVR [facility] ) 03/19/2015    [facility] ) 03/19/2015    Overweight 03/19/2015    Osteoarthritis 04/20/2012     She  has a past surgical history that includes Joint replacement; ARTHROSCOPY KNEE; Cardiovascular stress test; Colonoscopy; Esophagogastroduodenoscopy; Replacement total knee; Parathyroid gland surgery; Total thyroidectomy; US guided breast biopsy right complete (Right, 04/10/2017); Hysterectomy; Total abdominal hysterectomy (1983); Mammo stereotactic breast biopsy right (all inc) (Right, 10/24/2019); Hemicolectomy (Right, 1977); Colon surgery; Appendectomy; Dilation and curettage of uterus; pr bronchoscopy,diagnostic (N/A, 01/17/2020); pr thoracoscopy w/thera wedge resexn initial unilat (Right, 01/17/2020); pr thoracoscopy surg lobectomy (Right, 01/17/2020); US guided thyroid biopsy (02/14/2020); Cataract extraction (Bilateral); Mammo needle localization right (all inc) (Right, 05/22/2020); Mammo needle localization right (all inc) each add (Right, 05/22/2020); Lung surgery; Thyroid surgery; Breast biopsy (Right, 04/10/2017); Breast biopsy (Right, 10/24/2019); Breast lumpectomy w/ needle localization (Right, 05/22/2020); Carpal tunnel release (Left, 07/01/2022); and Trigger finger release (Left, 07/01/2022)    Her family history includes Breast cancer (age of onset: 80) in her paternal aunt; COPD in her sister and sister; Esophageal cancer in her mother; Hypertension in her mother; No Known Problems in her father, maternal aunt, maternal aunt, maternal aunt, maternal aunt, maternal aunt, maternal grandfather, maternal grandmother, paternal grandfather, and paternal grandmother; Stroke in her family  She  reports that she quit smoking about 55 years ago  Her smoking use included cigarettes  She has a 30 00 pack-year smoking history  She has never used smokeless tobacco  She reports current alcohol use of about 5 0 standard drinks of alcohol per week  She reports that she does not use drugs  Current Outpatient Medications   Medication Sig Dispense Refill    apixaban (ELIQUIS) 5 mg Take 5 mg by mouth 2 (two) times a day To stop 2 days prior to surgery per md      atorvastatin (LIPITOR) 80 mg tablet TAKE 1 TABLET BY MOUTH EVERY DAY 90 tablet 1    diltiazem (CARDIZEM CD) 120 mg 24 hr capsule Take 120 mg by mouth every evening       EPINEPHrine (EpiPen 2-Pa) 0 3 mg/0 3 mL SOAJ Inject 0 3 mL (0 3 mg total) into a muscle once as needed for anaphylaxis for up to 1 dose 0 6 mL 0    levothyroxine 100 mcg tablet TAKE 1 TABLET BY MOUTH EVERY DAY 90 tablet 1    Multiple Vitamins-Calcium (ESSENTIAL ONE DAILY MULTIVIT PO) Take by mouth daily       omeprazole (PriLOSEC) 20 mg delayed release capsule TAKE 1 CAPSULE (20 MG TOTAL) BY MOUTH AS NEEDED (GERD) 90 capsule 0    Coenzyme Q10 (CO Q 10 PO) Take 200 mg by mouth (Patient not taking: Reported on 7/12/2022)      cyanocobalamin (VITAMIN B-12) 1000 MCG tablet Take 1,000 mcg by mouth (Patient not taking: Reported on 7/12/2022)      EPINEPHrine (EPIPEN) 0 3 mg/0 3 mL SOAJ Inject 0 3 mL (0 3 mg total) into a muscle once for 1 dose 0 6 mL 0    Omega-3 Fatty Acids (FISH OIL PO) Take 2 g by mouth daily        No current facility-administered medications for this visit       Current Outpatient Medications on File Prior to Visit   Medication Sig    apixaban (ELIQUIS) 5 mg Take 5 mg by mouth 2 (two) times a day To stop 2 days prior to surgery per md    atorvastatin (LIPITOR) 80 mg tablet TAKE 1 TABLET BY MOUTH EVERY DAY    diltiazem (CARDIZEM CD) 120 mg 24 hr capsule Take 120 mg by mouth every evening     EPINEPHrine (EpiPen 2-Pa) 0 3 mg/0 3 mL SOAJ Inject 0 3 mL (0 3 mg total) into a muscle once as needed for anaphylaxis for up to 1 dose    levothyroxine 100 mcg tablet TAKE 1 TABLET BY MOUTH EVERY DAY    Multiple Vitamins-Calcium (ESSENTIAL ONE DAILY MULTIVIT PO) Take by mouth daily     omeprazole (PriLOSEC) 20 mg delayed release capsule TAKE 1 CAPSULE (20 MG TOTAL) BY MOUTH AS NEEDED (GERD)    Coenzyme Q10 (CO Q 10 PO) Take 200 mg by mouth (Patient not taking: Reported on 7/12/2022)    cyanocobalamin (VITAMIN B-12) 1000 MCG tablet Take 1,000 mcg by mouth (Patient not taking: Reported on 7/12/2022)    EPINEPHrine (EPIPEN) 0 3 mg/0 3 mL SOAJ Inject 0 3 mL (0 3 mg total) into a muscle once for 1 dose    Omega-3 Fatty Acids (FISH OIL PO) Take 2 g by mouth daily     [DISCONTINUED] Hydrocortisone Acetate 1 % CREA Apply topically (Patient not taking: No sig reported)    [DISCONTINUED] mupirocin (BACTROBAN) 2 % ointment APPLY TO SORE AREA ON BOTTOM TWICE DAILY UNTIL HEALED (Patient not taking: No sig reported)     No current facility-administered medications on file prior to visit  She is allergic to bee venom, flurbiprofen, ibuprofen, levaquin [levofloxacin in d5w], and tequin [gatifloxacin]       Review of Systems   Constitutional: Negative  HENT: Negative for sore throat and trouble swallowing  Gastrointestinal: Negative  Genitourinary: Negative  Objective:      /80   Pulse 79          Physical Exam  Vitals reviewed  Cardiovascular:      Rate and Rhythm: Normal rate and regular rhythm  Pulses: Normal pulses  Heart sounds: Normal heart sounds  No murmur heard  Pulmonary:      Effort: Pulmonary effort is normal  No respiratory distress  Breath sounds: Normal breath sounds  Chest:   Breasts:      Right: No swelling, bleeding, inverted nipple, mass, nipple discharge, skin change, tenderness, axillary adenopathy or supraclavicular adenopathy        Left: No swelling, bleeding, inverted nipple, mass, nipple discharge, skin change, tenderness, axillary adenopathy or supraclavicular adenopathy  Comments: Status post right lumpectomy  Abdominal:      General: There is no distension  Palpations: Abdomen is soft  There is no mass  Tenderness: There is no abdominal tenderness  There is no guarding or rebound  Hernia: No hernia is present  There is no hernia in the left inguinal area or right inguinal area  Genitourinary:     General: Normal vulva  Labia:         Right: No rash, tenderness or lesion  Left: No rash, tenderness or lesion  Comments: Uterus  and cervix surgically absent  No adnexal masses or tenderness  Vagina normal   [place]'s [place]'s glands normal   Urethral orifice normal   No cystocele or rectocele  Musculoskeletal:      Cervical back: Normal range of motion and neck supple  No tenderness  Lymphadenopathy:      Cervical: No cervical adenopathy  Upper Body:      Right upper body: No supraclavicular, axillary or pectoral adenopathy  Left upper body: No supraclavicular, axillary or pectoral adenopathy  Lower Body: No right inguinal adenopathy  No left inguinal adenopathy  Neurological:      Mental Status: She is alert

## 2022-08-15 ENCOUNTER — TELEPHONE (OUTPATIENT)
Dept: FAMILY MEDICINE CLINIC | Facility: CLINIC | Age: 82
End: 2022-08-15

## 2022-09-08 ENCOUNTER — TRANSCRIBE ORDERS (OUTPATIENT)
Dept: LAB | Facility: CLINIC | Age: 82
End: 2022-09-08

## 2022-09-08 ENCOUNTER — APPOINTMENT (OUTPATIENT)
Dept: LAB | Facility: CLINIC | Age: 82
End: 2022-09-08
Payer: COMMERCIAL

## 2022-09-08 DIAGNOSIS — Z85.118 PERSONAL HISTORY OF MALIGNANT NEOPLASM OF BRONCHUS AND LUNG: ICD-10-CM

## 2022-09-08 DIAGNOSIS — E78.00 PURE HYPERCHOLESTEROLEMIA: ICD-10-CM

## 2022-09-08 DIAGNOSIS — Z85.3 PERSONAL HISTORY OF BREAST CANCER: ICD-10-CM

## 2022-09-08 DIAGNOSIS — K21.9 GASTROESOPHAGEAL REFLUX DISEASE WITHOUT ESOPHAGITIS: ICD-10-CM

## 2022-09-08 DIAGNOSIS — Z87.891 FORMER SMOKER: ICD-10-CM

## 2022-09-08 DIAGNOSIS — I48.19 PERSISTENT ATRIAL FIBRILLATION (HCC): ICD-10-CM

## 2022-09-08 DIAGNOSIS — I10 ESSENTIAL HYPERTENSION, MALIGNANT: ICD-10-CM

## 2022-09-08 DIAGNOSIS — I48.19 PERSISTENT ATRIAL FIBRILLATION (HCC): Primary | ICD-10-CM

## 2022-09-08 DIAGNOSIS — Z79.01 LONG TERM (CURRENT) USE OF ANTICOAGULANTS: ICD-10-CM

## 2022-09-08 LAB
ANION GAP SERPL CALCULATED.3IONS-SCNC: 3 MMOL/L (ref 4–13)
BUN SERPL-MCNC: 13 MG/DL (ref 5–25)
CALCIUM SERPL-MCNC: 9.1 MG/DL (ref 8.3–10.1)
CHLORIDE SERPL-SCNC: 107 MMOL/L (ref 96–108)
CHOLEST SERPL-MCNC: 169 MG/DL
CO2 SERPL-SCNC: 28 MMOL/L (ref 21–32)
CREAT SERPL-MCNC: 1.1 MG/DL (ref 0.6–1.3)
ERYTHROCYTE [DISTWIDTH] IN BLOOD BY AUTOMATED COUNT: 13.2 % (ref 11.6–15.1)
GFR SERPL CREATININE-BSD FRML MDRD: 46 ML/MIN/1.73SQ M
GLUCOSE P FAST SERPL-MCNC: 95 MG/DL (ref 65–99)
HCT VFR BLD AUTO: 39.8 % (ref 34.8–46.1)
HDLC SERPL-MCNC: 78 MG/DL
HGB BLD-MCNC: 13.2 G/DL (ref 11.5–15.4)
LDLC SERPL CALC-MCNC: 78 MG/DL (ref 0–100)
MCH RBC QN AUTO: 32.9 PG (ref 26.8–34.3)
MCHC RBC AUTO-ENTMCNC: 33.2 G/DL (ref 31.4–37.4)
MCV RBC AUTO: 99 FL (ref 82–98)
NONHDLC SERPL-MCNC: 91 MG/DL
PLATELET # BLD AUTO: 199 THOUSANDS/UL (ref 149–390)
PMV BLD AUTO: 9.8 FL (ref 8.9–12.7)
POTASSIUM SERPL-SCNC: 3.7 MMOL/L (ref 3.5–5.3)
RBC # BLD AUTO: 4.01 MILLION/UL (ref 3.81–5.12)
SODIUM SERPL-SCNC: 138 MMOL/L (ref 135–147)
TRIGL SERPL-MCNC: 66 MG/DL
WBC # BLD AUTO: 4.84 THOUSAND/UL (ref 4.31–10.16)

## 2022-09-08 PROCEDURE — 36415 COLL VENOUS BLD VENIPUNCTURE: CPT

## 2022-09-08 PROCEDURE — 80061 LIPID PANEL: CPT

## 2022-09-08 PROCEDURE — 80048 BASIC METABOLIC PNL TOTAL CA: CPT

## 2022-09-08 PROCEDURE — 85027 COMPLETE CBC AUTOMATED: CPT

## 2022-09-14 ENCOUNTER — HOSPITAL ENCOUNTER (OUTPATIENT)
Dept: BONE DENSITY | Facility: MEDICAL CENTER | Age: 82
Discharge: HOME | End: 2022-09-14
Payer: COMMERCIAL

## 2022-09-14 DIAGNOSIS — M85.80 OSTEOPENIA, UNSPECIFIED LOCATION: ICD-10-CM

## 2022-09-14 DIAGNOSIS — Z78.0 MENOPAUSE: ICD-10-CM

## 2022-09-14 DIAGNOSIS — Z13.820 ENCOUNTER FOR SCREENING FOR OSTEOPOROSIS: ICD-10-CM

## 2022-09-14 PROCEDURE — 77080 DXA BONE DENSITY AXIAL: CPT

## 2022-09-20 ENCOUNTER — OFFICE VISIT (OUTPATIENT)
Dept: FAMILY MEDICINE CLINIC | Facility: CLINIC | Age: 82
End: 2022-09-20
Payer: COMMERCIAL

## 2022-09-20 VITALS
WEIGHT: 164 LBS | TEMPERATURE: 97.6 F | SYSTOLIC BLOOD PRESSURE: 140 MMHG | BODY MASS INDEX: 28 KG/M2 | DIASTOLIC BLOOD PRESSURE: 78 MMHG | HEIGHT: 64 IN

## 2022-09-20 DIAGNOSIS — Z00.00 MEDICARE ANNUAL WELLNESS VISIT, SUBSEQUENT: Primary | ICD-10-CM

## 2022-09-20 DIAGNOSIS — Z85.118 HISTORY OF LUNG CANCER: ICD-10-CM

## 2022-09-20 DIAGNOSIS — Z85.038 HISTORY OF COLON CANCER: ICD-10-CM

## 2022-09-20 DIAGNOSIS — E78.2 MIXED HYPERLIPIDEMIA: ICD-10-CM

## 2022-09-20 DIAGNOSIS — D68.9 COAGULOPATHY (HCC): ICD-10-CM

## 2022-09-20 DIAGNOSIS — N18.31 STAGE 3A CHRONIC KIDNEY DISEASE (HCC): ICD-10-CM

## 2022-09-20 DIAGNOSIS — E89.0 POSTOPERATIVE HYPOTHYROIDISM: ICD-10-CM

## 2022-09-20 DIAGNOSIS — E66.3 OVERWEIGHT: ICD-10-CM

## 2022-09-20 DIAGNOSIS — I48.19 PERSISTENT ATRIAL FIBRILLATION (HCC): ICD-10-CM

## 2022-09-20 DIAGNOSIS — I10 BENIGN ESSENTIAL HYPERTENSION: ICD-10-CM

## 2022-09-20 DIAGNOSIS — M85.89 OSTEOPENIA OF MULTIPLE SITES: ICD-10-CM

## 2022-09-20 PROBLEM — R79.89 ELEVATED SERUM CREATININE: Status: RESOLVED | Noted: 2022-06-14 | Resolved: 2022-09-20

## 2022-09-20 PROCEDURE — 1160F RVW MEDS BY RX/DR IN RCRD: CPT | Performed by: FAMILY MEDICINE

## 2022-09-20 PROCEDURE — 3288F FALL RISK ASSESSMENT DOCD: CPT | Performed by: FAMILY MEDICINE

## 2022-09-20 PROCEDURE — 1125F AMNT PAIN NOTED PAIN PRSNT: CPT | Performed by: FAMILY MEDICINE

## 2022-09-20 PROCEDURE — 1170F FXNL STATUS ASSESSED: CPT | Performed by: FAMILY MEDICINE

## 2022-09-20 PROCEDURE — 3725F SCREEN DEPRESSION PERFORMED: CPT | Performed by: FAMILY MEDICINE

## 2022-09-20 PROCEDURE — 99214 OFFICE O/P EST MOD 30 MIN: CPT | Performed by: FAMILY MEDICINE

## 2022-09-20 PROCEDURE — G0439 PPPS, SUBSEQ VISIT: HCPCS | Performed by: FAMILY MEDICINE

## 2022-09-20 RX ORDER — CICLOPIROX OLAMINE 7.7 MG/100ML
SUSPENSION TOPICAL
COMMUNITY
Start: 2022-08-03

## 2022-09-20 NOTE — ASSESSMENT & PLAN NOTE
Continue with lab monitorng and her dose of thyroid medication is to stay the same Repeat labs in 6 months

## 2022-09-20 NOTE — PATIENT INSTRUCTIONS
Medicare Preventive Visit Patient Instructions  Thank you for completing your Welcome to Medicare Visit or Medicare Annual Wellness Visit today  Your next wellness visit will be due in one year (9/21/2023)  The screening/preventive services that you may require over the next 5-10 years are detailed below  Some tests may not apply to you based off risk factors and/or age  Screening tests ordered at today's visit but not completed yet may show as past due  Also, please note that scanned in results may not display below  Preventive Screenings:  Service Recommendations Previous Testing/Comments   Colorectal Cancer Screening  * Colonoscopy    * Fecal Occult Blood Test (FOBT)/Fecal Immunochemical Test (FIT)  * Fecal DNA/Cologuard Test  * Flexible Sigmoidoscopy Age: 39-70 years old   Colonoscopy: every 10 years (may be performed more frequently if at higher risk)  OR  FOBT/FIT: every 1 year  OR  Cologuard: every 3 years  OR  Sigmoidoscopy: every 5 years  Screening may be recommended earlier than age 39 if at higher risk for colorectal cancer  Also, an individualized decision between you and your healthcare provider will decide whether screening between the ages of 74-80 would be appropriate  Colonoscopy: Not on file  FOBT/FIT: Not on file  Cologuard: Not on file  Sigmoidoscopy: Not on file    History Colorectal Cancer     Breast Cancer Screening Age: 34 years old  Frequency: every 1-2 years  Not required if history of left and right mastectomy Mammogram: 03/31/2022    Screening Current   Cervical Cancer Screening Between the ages of 21-29, pap smear recommended once every 3 years  Between the ages of 33-67, can perform pap smear with HPV co-testing every 5 years     Recommendations may differ for women with a history of total hysterectomy, cervical cancer, or abnormal pap smears in past  Pap Smear: 07/12/2022    Screening Not Indicated   Hepatitis C Screening Once for adults born between 1945 and 1965  More frequently in patients at high risk for Hepatitis C Hep C Antibody: Not on file        Diabetes Screening 1-2 times per year if you're at risk for diabetes or have pre-diabetes Fasting glucose: 95 mg/dL (9/8/2022)  A1C: 5 9 % (6/21/2022)  Screening Current   Cholesterol Screening Once every 5 years if you don't have a lipid disorder  May order more often based on risk factors  Lipid panel: 09/08/2022    Screening Not Indicated  History Lipid Disorder     Other Preventive Screenings Covered by Medicare:  1  Abdominal Aortic Aneurysm (AAA) Screening: covered once if your at risk  You're considered to be at risk if you have a family history of AAA  2  Lung Cancer Screening: covers low dose CT scan once per year if you meet all of the following conditions: (1) Age 50-69; (2) No signs or symptoms of lung cancer; (3) Current smoker or have quit smoking within the last 15 years; (4) You have a tobacco smoking history of at least 20 pack years (packs per day multiplied by number of years you smoked); (5) You get a written order from a healthcare provider  3  Glaucoma Screening: covered annually if you're considered high risk: (1) You have diabetes OR (2) Family history of glaucoma OR (3)  aged 48 and older OR (3)  American aged 72 and older  3  Osteoporosis Screening: covered every 2 years if you meet one of the following conditions: (1) You're estrogen deficient and at risk for osteoporosis based off medical history and other findings; (2) Have a vertebral abnormality; (3) On glucocorticoid therapy for more than 3 months; (4) Have primary hyperparathyroidism; (5) On osteoporosis medications and need to assess response to drug therapy  · Last bone density test (DXA Scan): 09/14/2022   5  HIV Screening: covered annually if you're between the age of 15-65  Also covered annually if you are younger than 13 and older than 72 with risk factors for HIV infection   For pregnant patients, it is covered up to 3 times per pregnancy  Immunizations:  Immunization Recommendations   Influenza Vaccine Annual influenza vaccination during flu season is recommended for all persons aged >= 6 months who do not have contraindications   Pneumococcal Vaccine   * Pneumococcal conjugate vaccine = PCV13 (Prevnar 13), PCV15 (Vaxneuvance), PCV20 (Prevnar 20)  * Pneumococcal polysaccharide vaccine = PPSV23 (Pneumovax) Adults 25-60 years old: 1-3 doses may be recommended based on certain risk factors  Adults 72 years old: 1-2 doses may be recommended based off what pneumonia vaccine you previously received   Hepatitis B Vaccine 3 dose series if at intermediate or high risk (ex: diabetes, end stage renal disease, liver disease)   Tetanus (Td) Vaccine - COST NOT COVERED BY MEDICARE PART B Following completion of primary series, a booster dose should be given every 10 years to maintain immunity against tetanus  Td may also be given as tetanus wound prophylaxis  Tdap Vaccine - COST NOT COVERED BY MEDICARE PART B Recommended at least once for all adults  For pregnant patients, recommended with each pregnancy  Shingles Vaccine (Shingrix) - COST NOT COVERED BY MEDICARE PART B  2 shot series recommended in those aged 48 and above     Health Maintenance Due:  There are no preventive care reminders to display for this patient  Immunizations Due:      Topic Date Due    COVID-19 Vaccine (4 - Booster for Neovacs series) 02/02/2022    Influenza Vaccine (1) 09/01/2022     Advance Directives   What are advance directives? Advance directives are legal documents that state your wishes and plans for medical care  These plans are made ahead of time in case you lose your ability to make decisions for yourself  Advance directives can apply to any medical decision, such as the treatments you want, and if you want to donate organs  What are the types of advance directives?   There are many types of advance directives, and each state has rules about how to use them  You may choose a combination of any of the following:  · Living will: This is a written record of the treatment you want  You can also choose which treatments you do not want, which to limit, and which to stop at a certain time  This includes surgery, medicine, IV fluid, and tube feedings  · Durable power of  for healthcare [facility]): This is a written record that states who you want to make healthcare choices for you when you are unable to make them for yourself  This person, called a proxy, is usually a family member or a friend  You may choose more than 1 proxy  · Do not resuscitate (DNR) order:  A DNR order is used in case your heart stops beating or you stop breathing  It is a request not to have certain forms of treatment, such as CPR  A DNR order may be included in other types of advance directives  · Medical directive: This covers the care that you want if you are in a coma, near death, or unable to make decisions for yourself  You can list the treatments you want for each condition  Treatment may include pain medicine, surgery, blood transfusions, dialysis, IV or tube feedings, and a ventilator (breathing machine)  · Values history: This document has questions about your views, beliefs, and how you feel and think about life  This information can help others choose the care that you would choose  Why are advance directives important? An advance directive helps you control your care  Although spoken wishes may be used, it is better to have your wishes written down  Spoken wishes can be misunderstood, or not followed  Treatments may be given even if you do not want them  An advance directive may make it easier for your family to make difficult choices about your care     Weight Management   Why it is important to manage your weight:  Being overweight increases your risk of health conditions such as heart disease, high blood pressure, type 2 diabetes, and certain types of cancer  It can also increase your risk for osteoarthritis, sleep apnea, and other respiratory problems  Aim for a slow, steady weight loss  Even a small amount of weight loss can lower your risk of health problems  How to lose weight safely:  A safe and healthy way to lose weight is to eat fewer calories and get regular exercise  You can lose up about 1 pound a week by decreasing the number of calories you eat by 500 calories each day  Healthy meal plan for weight management:  A healthy meal plan includes a variety of foods, contains fewer calories, and helps you stay healthy  A healthy meal plan includes the following:  · Eat whole-grain foods more often  A healthy meal plan should contain fiber  Fiber is the part of grains, fruits, and vegetables that is not broken down by your body  Whole-grain foods are healthy and provide extra fiber in your diet  Some examples of whole-grain foods are whole-wheat breads and pastas, oatmeal, brown rice, and bulgur  · Eat a variety of vegetables every day  Include dark, leafy greens such as spinach, kale, judy greens, and mustard greens  Eat yellow and orange vegetables such as carrots, sweet potatoes, and winter squash  · Eat a variety of fruits every day  Choose fresh or canned fruit (canned in its own juice or light syrup) instead of juice  Fruit juice has very little or no fiber  · Eat low-fat dairy foods  Drink fat-free (skim) milk or 1% milk  Eat fat-free yogurt and low-fat cottage cheese  Try low-fat cheeses such as mozzarella and other reduced-fat cheeses  · Choose meat and other protein foods that are low in fat  Choose beans or other legumes such as split peas or lentils  Choose fish, skinless poultry (chicken or turkey), or lean cuts of red meat (beef or pork)  Before you cook meat or poultry, cut off any visible fat  · Use less fat and oil  Try baking foods instead of frying them   Add less fat, such as margarine, sour cream, regular salad dressing and mayonnaise to foods  Eat fewer high-fat foods  Some examples of high-fat foods include french fries, doughnuts, ice cream, and cakes  · Eat fewer sweets  Limit foods and drinks that are high in sugar  This includes candy, cookies, regular soda, and sweetened drinks  Exercise:  Exercise at least 30 minutes per day on most days of the week  Some examples of exercise include walking, biking, dancing, and swimming  You can also fit in more physical activity by taking the stairs instead of the elevator or parking farther away from stores  Ask your healthcare provider about the best exercise plan for you  © Copyright [employer] 2018 Information is for End User's use only and may not be sold, redistributed or otherwise used for commercial purposes   All illustrations and images included in CareNotes® are the copyrighted property of A [employer] A M , [employer]  or [address] [place] [address] [employer]

## 2022-09-20 NOTE — PROGRESS NOTES
Assessment and Plan:     Problem List Items Addressed This Visit        Endocrine    Postoperative hypothyroidism     Continue with lab monitorng and her dose of thyroid medication is to stay the same Repeat labs in 6 months          Relevant Orders    TSH, 3rd generation       Cardiovascular and Mediastinum    Persistent [facility] fibrillation ([facility] )     Continue with rate control and eliquis follow up with cardiology         Benign essential hypertension     Blood pressure is stable on medication continue current medication FOllowup in 6 months          Relevant Orders    Comprehensive metabolic panel    Lipid panel       Musculoskeletal and Integument    Osteopenia of multiple sites     Continue calcium and vitamin D reviewed dexa with her             Hematopoietic and Hemostatic    Coagulopathy (HCC)     On eliquis no falls in last year conitnue eliquis            Genitourinary    Stage 3a chronic kidney disease ([facility] )     Lab Results   Component Value Date    EGFR 46 09/08/2022    EGFR 34 06/14/2022    EGFR 45 04/25/2022    CREATININE 1 10 09/08/2022    CREATININE 1 40 (H) 06/14/2022    CREATININE 1 13 04/25/2022   renal function is stable continue with blood pressure control Patient to avoid NSAIDS followup in  6months          Relevant Orders    Comprehensive metabolic panel       Other    History of colon cancer     Follow up with oncology         Overweight     Weight is stable continue to stay active and watch diet         Mixed hyperlipidemia     LDL was <80 continue statin followup in 6 months          Relevant Orders    Comprehensive metabolic panel    Lipid panel    Medicare annual wellness visit, subsequent - Primary     pateint is full code She will need flu shot in middle of October Patient is up to date with screenings Discussed diet and exercise         History of lung cancer     followup with oncology                Preventive health issues were discussed with patient, and age appropriate screening tests were ordered as noted in patient's After Visit Summary  Personalized health advice and appropriate referrals for health education or preventive services given if needed, as noted in patient's After Visit Summary  History of Present Illness:     Patient presents for a Medicare Wellness Visit    Patient is here for medicare wellness and followup of persistent atrial fibrillation on eliquis post operative hypothyroidism stage 3 a renal disease hypertension hyperlipidemia osteopneia her weight and her history of thyroid lung breast and colon cancer Pateint cancers are in remission She sees hematology Patient is seeing the heart doctor and her lipids blood pressure and her atrial fibrillation is stable Patient is tolerating all all medication patient dexa was stable Her mammogram in spring 2022 was also normal Patient is feeling well and is active Her arthritis is an issue some times      Patient Care Team:  Tiffanie Mccall DO as PCP - [address] [place] [address], MD (Inactive) as PCP - OBGYN (Gynecology)  Tiffanie Mccall DO as PCP - [address] [facility] [facility] ([facility])  Jacqueline Lemus MD as Surgeon (Surgical Oncology)  Neetu Livingston MD as Surgeon (Thoracic Surgery)  Karthik Hallman MD (General Surgery)  Rich Christiansen MD (Radiation Oncology)  Jonas Crespo MD as Surgeon (Thoracic Surgery)     Review of Systems:     Review of Systems   Constitutional: Negative for fatigue, fever and unexpected weight change  HENT: Negative for congestion, sinus pain and trouble swallowing  Eyes: Negative for discharge and visual disturbance  Respiratory: Negative for cough, chest tightness, shortness of breath and wheezing  Cardiovascular: Negative for chest pain, palpitations and leg swelling  Gastrointestinal: Negative for abdominal pain, blood in stool, constipation, diarrhea, nausea and vomiting  Genitourinary: Negative for difficulty urinating, dysuria, frequency and hematuria     Musculoskeletal: Negative for arthralgias, gait problem and joint swelling  Skin: Negative for rash and wound  Allergic/Immunologic: Negative for environmental allergies and food allergies  Neurological: Negative for dizziness, syncope, weakness, numbness and headaches  Hematological: Negative for adenopathy  Does not bruise/bleed easily  Psychiatric/Behavioral: Negative for confusion, decreased concentration and sleep disturbance  The patient is not nervous/anxious           Problem List:     Patient Active Problem List   Diagnosis    Persistent atrial fibrillation (HCC)    Benign essential hypertension    Coagulopathy (HCC)    History of colon cancer    Overweight    Osteoarthritis    Postoperative hypothyroidism    Mixed hyperlipidemia    Medicare annual wellness visit, subsequent    Osteopenia of multiple sites    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    Dense breast tissue    Hashimoto's thyroiditis    History of lung cancer    Thyroid nodule    Encounter for follow-up surveillance of breast cancer    Stage 3a chronic kidney disease ([facility] )      Past Medical and Surgical History:     Past Medical History:   Diagnosis Date    Anesthesia     "cheap date"    Arthritis     Atrial fibrillation ([facility] )     Benign polyp of large intestine     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    Colon cancer ([facility] ) 44    36 yo    Colon polyp     Full dentures     full upper/ partial lower    GERD (gastroesophageal reflux disease)     Glaucoma suspect     History of neck problems     "C3-C7 and had epidural injections years ago"    Hyperlipidemia     Hypertension     Lung cancer ([facility] )     jan 2020- had surgery    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[facility] )     Prediabetes     Primary adenocarcinoma of upper lobe of right lung ([facility] ) 12/2/2019    Pulmonary nodules 2/19/2015    Refusal of blood transfusions as patient is [employer]     Tic disorder, transient, recurrent     not recent    Walks frequently      Past Surgical History:   Procedure Laterality Date    APPENDECTOMY      ARTHROSCOPY KNEE      BREAST BIOPSY Right 04/10/2017    US guided - intraductal papillomas    BREAST BIOPSY Right 10/24/2019    ADH    BREAST LUMPECTOMY W/ NEEDLE LOCALIZATION Right 05/22/2020    Procedure: EXCISIONAL BIOPSY;  1000 NEEDLE LOC;  Surgeon: Flori Mcclelland MD;  Location: [facility];  Service: Surgical Oncology    CARDIOVASCULAR STRESS TEST      CARPAL TUNNEL RELEASE Left 07/01/2022    CATARACT EXTRACTION Bilateral     COLON SURGERY      COLONOSCOPY      done 2010 due 2015 Dr Kuo    DILATION AND CURETTAGE OF UTERUS      ESOPHAGOGASTRODUODENOSCOPY      inflammation aonly    HEMICOLECTOMY Right 1977    HYSTERECTOMY      JOINT REPLACEMENT      L knee    LUNG SURGERY      MAMMO NEEDLE LOCALIZATION RIGHT (ALL INC) Right 05/22/2020    MAMMO NEEDLE LOCALIZATION RIGHT (ALL INC) EACH ADD Right 05/22/2020    MAMMO STEREOTACTIC BREAST BIOPSY RIGHT (ALL INC) Right 10/24/2019    PARATHYROID GLAND SURGERY      exploration     AR BRONCHOSCOPY,DIAGNOSTIC N/A 01/17/2020    Procedure: FLEXIBLE BRONCHOSCOPY;  Surgeon: Arti Carvajal MD;  [facility];  Service: Thoracic    AR THORACOSCOPY SURG LOBECTOMY Right 01/17/2020    Procedure: ROBOTIC ASSISTED COMPLETE LUNG LOBECTOMY;  Surgeon: Arti Carvajal MD;  [facility];  Service: Thoracic    AR THORACOSCOPY W/THERA WEDGE RESEXN INITIAL UNILAT Right 01/17/2020    Procedure: ROBOTIC ASSISTED UPPER-LOBE WEDGE RESECTION, MEDIASTINAL LYMPH NODE DISECTION;  Surgeon: Arti Carvajal MD;  [facility];  Service: Thoracic    REPLACEMENT TOTAL KNEE      THYROID SURGERY      TOTAL ABDOMINAL HYSTERECTOMY  1983    TOTAL THYROIDECTOMY      TRIGGER FINGER RELEASE Left 07/01/2022    US GUIDED BREAST BIOPSY RIGHT COMPLETE Right 04/10/2017    US GUIDED THYROID BIOPSY  02/14/2020      Family History:     Family History   Problem Relation Age of Onset    Esophageal cancer Mother         [de-identified]    Hypertension Mother     Stroke Family         TIA    Breast cancer Paternal Aunt 80    No Known Problems Father     COPD Sister     COPD Sister     No Known Problems Maternal Grandmother     No Known Problems Maternal Grandfather     No Known Problems Paternal Grandmother     No Known Problems Paternal Grandfather     No Known Problems Maternal Aunt     No Known Problems Maternal Aunt     No Known Problems Maternal Aunt     No Known Problems Maternal Aunt     No Known Problems Maternal Aunt       Social History:     Social History     Socioeconomic History    Marital status: Single     Spouse name: None    Number of children: None    Years of education: None    Highest education level: None   Occupational History    None   Tobacco Use    Smoking status: Former Smoker     Packs/day: 1 50     Years: 20 00     Pack years: 30 00     Types: Cigarettes     Quit date: 5     Years since quittin 7    Smokeless tobacco: Never Used   Vaping Use    Vaping Use: Never used   Substance and Sexual Activity    Alcohol use: Yes     Alcohol/week: 5 0 standard drinks     Types: 5 Cans of beer per week     Comment: occasional beer with food    Drug use: No    Sexual activity: None     Comment: hysterectomy   Other Topics Concern    None   Social History Narrative    Caffeine use- 1-3, 8oz coffees per day    Denied history of housing without smoke detectors    Always uses a seatbelt     Social Determinants of Health     Financial Resource Strain: Low Risk     Difficulty of Paying Living Expenses: Not hard at all   Food Insecurity: Not on file   Transportation Needs: No Transportation Needs    Lack of Transportation (Medical): No    Lack of Transportation (Non-Medical):  No   Physical Activity: Not on file   Stress: Not on file   Social Connections: Not on file   Intimate Partner Violence: Not on file   Housing Stability: Not on file      Medications and Allergies:     Current Outpatient Medications   Medication Sig Dispense Refill    apixaban (ELIQUIS) 5 mg Take 5 mg by mouth 2 (two) times a day To stop 2 days prior to surgery per md      atorvastatin (LIPITOR) 80 mg tablet TAKE 1 TABLET BY MOUTH EVERY DAY 90 tablet 1    Coenzyme Q10 (CO Q 10 PO) Take 200 mg by mouth      cyanocobalamin (VITAMIN B-12) 1000 MCG tablet Take 1,000 mcg by mouth      EPINEPHrine (EpiPen 2-Pa) 0 3 mg/0 3 mL SOAJ Inject 0 3 mL (0 3 mg total) into a muscle once as needed for anaphylaxis for up to 1 dose 0 6 mL 0    levothyroxine 100 mcg tablet TAKE 1 TABLET BY MOUTH EVERY DAY 90 tablet 0    Multiple Vitamins-Calcium (ESSENTIAL ONE DAILY MULTIVIT PO) Take by mouth daily       Omega-3 Fatty Acids (FISH OIL PO) Take 2 g by mouth daily       omeprazole (PriLOSEC) 20 mg delayed release capsule TAKE 1 CAPSULE (20 MG TOTAL) BY MOUTH AS NEEDED (GERD) 90 capsule 0    ciclopirox (LOPROX) 0 77 % SUSP APPLY 1-2 TIMES DAILY TO AFFECTED TOENAILS      diltiazem (CARDIZEM CD) 120 mg 24 hr capsule Take 120 mg by mouth every evening       EPINEPHrine (EPIPEN) 0 3 mg/0 3 mL SOAJ Inject 0 3 mL (0 3 mg total) into a muscle once for 1 dose 0 6 mL 0     No current facility-administered medications for this visit  Allergies   Allergen Reactions    Bee Venom      Tongue swelling    Flurbiprofen Hives    Ibuprofen Shortness Of Breath    Levaquin [Levofloxacin In D5w] Other (See Comments)     Dark spots under eyes and wrists    Tequin [Gatifloxacin] Rash      Immunizations:     Immunization History   Administered Date(s) Administered    COVID-19 PFIZER VACCINE 0 3 ML IM 02/06/2021, 02/25/2021, 10/02/2021    INFLUENZA 12/03/2004, 10/15/2008, 10/14/2010, 10/27/2011, 11/23/2012, 12/14/2012    Influenza Split High Dose Preservative Free IM 11/23/2012, 11/05/2013, 10/20/2014      Health Maintenance: There are no preventive care reminders to display for this patient        Topic Date Due    COVID-19 Vaccine (4 - Booster for Pfizer series) 02/02/2022    Influenza Vaccine (1) 09/01/2022      Medicare Screening Tests and Risk Assessments:     Hong is here for her Subsequent Wellness visit  Health Risk Assessment:   Patient rates overall health as good  Patient feels that their physical health rating is same  Patient is satisfied with their life  Eyesight was rated as same  Hearing was rated as same  Patient feels that their emotional and mental health rating is same  Patients states they are never, rarely angry  Patient states they are sometimes unusually tired/fatigued  Pain experienced in the last 7 days has been some  Patient's pain rating has been 2/10  Patient states that she has experienced no weight loss or gain in last 6 months  Depression Screening:   PHQ-2 Score: 0      Fall Risk Screening: In the past year, patient has experienced: no history of falling in past year      Urinary Incontinence Screening:   Patient has not leaked urine accidently in the last six months  Home Safety:  Patient does not have trouble with stairs inside or outside of their home  Patient has working smoke alarms and has working carbon monoxide detector  Home safety hazards include: none  Nutrition:   Current diet is Regular  Medications:   Patient is currently taking over-the-counter supplements  OTC medications include: see medication list  Patient is able to manage medications  Activities of Daily Living (ADLs)/Instrumental Activities of Daily Living (IADLs):   Walk and transfer into and out of bed and chair?: Yes  Dress and groom yourself?: Yes    Bathe or shower yourself?: Yes    Feed yourself?  Yes  Do your laundry/housekeeping?: Yes  Manage your money, pay your bills and track your expenses?: Yes  Make your own meals?: Yes    Do your own shopping?: Yes    Previous Hospitalizations:   Any hospitalizations or ED visits within the last 12 months?: No      Advance Care Planning:   Living will: Yes    Durable POA for healthcare: Yes    Advanced directive: Yes    Provider agrees with end of life decisions: Yes      Comments: Patient has desire for full code Patient states that she is ok with being on life support for up to one year     Cognitive Screening:   Provider or family/friend/caregiver concerned regarding cognition?: No    PREVENTIVE SCREENINGS      Cardiovascular Screening:    General: Screening Not Indicated and History Lipid Disorder      Diabetes Screening:     General: Screening Current      Colorectal Cancer Screening:     General: History Colorectal Cancer      Breast Cancer Screening:     General: Screening Current      Cervical Cancer Screening:    General: Screening Not Indicated      Abdominal Aortic Aneurysm (AAA) Screening:        General: Screening Not Indicated      Lung Cancer Screening:     General: Screening Not Indicated and History Lung Cancer      Hepatitis C Screening:    General: Screening Not Indicated    Hep C Screening Accepted: No     Screening, Brief Intervention, and Referral to Treatment (SBIRT)    Screening  Typical number of drinks in a day: 1  Typical number of drinks in a week: 7  Interpretation: Low risk drinking behavior  Single Item Drug Screening:  How often have you used an illegal drug (including marijuana) or a prescription medication for non-medical reasons in the past year? never    Single Item Drug Screen Score: 0  Interpretation: Negative screen for possible drug use disorder    No exam data present     Physical Exam:     /78   Temp 97 6 °F (36 4 °C)   Ht 5' 4" (1 626 m)   Wt 74 4 kg (164 lb)   BMI 28 15 kg/m²     Physical Exam  Vitals and nursing note reviewed  Constitutional:       Appearance: Normal appearance  She is well-developed  HENT:      Head: Normocephalic and atraumatic        Right Ear: Tympanic membrane and external ear normal       Left Ear: Tympanic membrane and external ear normal       Nose: Nose normal  Mouth/Throat:      Pharynx: No oropharyngeal exudate  Eyes:      Extraocular Movements: Extraocular movements intact  Conjunctiva/sclera: Conjunctivae normal       Pupils: Pupils are equal, round, and reactive to light  Neck:      Thyroid: No thyromegaly  Trachea: No tracheal deviation  Cardiovascular:      Rate and Rhythm: Normal rate  Rhythm irregular  Heart sounds: Normal heart sounds  Pulmonary:      Effort: Pulmonary effort is normal  No respiratory distress  Breath sounds: Normal breath sounds  No wheezing or rales  Abdominal:      General: Bowel sounds are normal       Palpations: Abdomen is soft  Musculoskeletal:         General: Normal range of motion  Cervical back: Normal range of motion and neck supple  Lymphadenopathy:      Cervical: No cervical adenopathy  Skin:     General: Skin is warm  Findings: No rash  Neurological:      General: No focal deficit present  Mental Status: She is alert and oriented to person, place, and time  Cranial Nerves: No cranial nerve deficit  Motor: No abnormal muscle tone  Psychiatric:         Mood and Affect: Mood normal          Behavior: Behavior normal          Thought Content:  Thought content normal          Judgment: Judgment normal           Kimberley Nichols DO

## 2022-09-20 NOTE — ASSESSMENT & PLAN NOTE
pateint is full code She will need flu shot in middle of October Patient is up to date with screenings Discussed diet and exercise

## 2022-09-20 NOTE — ASSESSMENT & PLAN NOTE
Lab Results   Component Value Date    EGFR 46 09/08/2022    EGFR 34 06/14/2022    EGFR 45 04/25/2022    CREATININE 1 10 09/08/2022    CREATININE 1 40 (H) 06/14/2022    CREATININE 1 13 04/25/2022   renal function is stable continue with blood pressure control Patient to avoid NSAIDS followup in  6months

## 2022-10-04 DIAGNOSIS — E89.0 POSTOPERATIVE HYPOTHYROIDISM: ICD-10-CM

## 2022-10-04 RX ORDER — LEVOTHYROXINE SODIUM 0.1 MG/1
TABLET ORAL
Qty: 90 TABLET | Refills: 0 | Status: SHIPPED | OUTPATIENT
Start: 2022-10-04

## 2022-11-07 ENCOUNTER — CONSULT (OUTPATIENT)
Dept: GASTROENTEROLOGY | Facility: CLINIC | Age: 82
End: 2022-11-07

## 2022-11-07 VITALS — TEMPERATURE: 98.1 F | DIASTOLIC BLOOD PRESSURE: 60 MMHG | SYSTOLIC BLOOD PRESSURE: 118 MMHG

## 2022-11-07 DIAGNOSIS — Z85.118 HISTORY OF LUNG CANCER: ICD-10-CM

## 2022-11-07 DIAGNOSIS — Z85.038 HISTORY OF COLON CANCER: Primary | ICD-10-CM

## 2022-11-07 DIAGNOSIS — Z53.1 REFUSAL OF BLOOD TRANSFUSIONS AS PATIENT IS JEHOVAH'S WITNESS: ICD-10-CM

## 2022-11-07 DIAGNOSIS — Z86.000 PERSONAL HISTORY OF IN-SITU NEOPLASM OF BREAST: ICD-10-CM

## 2022-11-07 DIAGNOSIS — K21.9 GASTROESOPHAGEAL REFLUX DISEASE, UNSPECIFIED WHETHER ESOPHAGITIS PRESENT: ICD-10-CM

## 2022-11-07 PROBLEM — IMO0001 REFUSAL OF BLOOD TRANSFUSIONS AS PATIENT IS JEHOVAH'S WITNESS: Status: ACTIVE | Noted: 2022-11-07

## 2022-11-07 NOTE — PROGRESS NOTES
[facility] - Outpatient Consultation  [facility] 78 [facility]  female MRN: [id number]  Encounter: [id number]          ASSESSMENT AND PLAN:  78-year-old female referred by OBGYN  She has a history of colon cancer diagnosed in the 76s as well as recent diagnosis of lung and breast cancer  She has had multiple colonoscopies through the years since her original colon cancer diagnosis  She also has AFib on Eliquis  She follows with [facility]  Her last colonoscopy was in 2018  I do not have the report  It was done at [facility]   She does not have any pathology from that and I do have access to their pathology so I suspect no polyps were found at that one  We had a long discussion discussing need for further colonoscopy given her prior colon cancer diagnosis but also her advanced age  We used joint decision making to come to the conclusion to do a CT colonography which would decrease her risk of need for sedation, need for someone to drive her, noninvasive nature and given she is Gnosticist and does not accept blood this would safest option for her  I did discuss that I think this was an okay approach given she has had multiple colonoscopies since her original colon cancer diagnosis and given her advanced age  I will see her in follow-up in office 2 months after the CT colonography is done but also explained to the patient that as soon as I get the results from the radiologist I will make sure she is made aware of the results  She also has history of atrial fibrillation and is on Eliquis and so if she needs a colonoscopy her cardiologist at [facility] already gave us recommendations for holding the Eliquis for 5 days given her CKD as well as holding her fish oil for 7 days  1  History of colon cancer  2  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  3  History of lung cancer  4   Gastroesophageal reflux disease, unspecified whether esophagitis present  5  Refusal of blood transfusions as patient is Mandaen      ____________________________________________    HPI:  78year old female referred by Dr Samira Wheeler     She was diagnosed with colon cancer in 1977 and had what sounds like appendectomy and and right hemicolectomy  She has had a colonoscopy every 5 years  Reports    She also had breast cancer and lung cancer more recently  She also has history thyroid cancer  She reports her last colonoscopy was about 5 years ago and when she did she went into rapid A fib and her cardiologist had to come and treat with meds and she was able to have her colonoscopy that day  She has a history of Atrial fibrillation and is maintained on eliquis  She also has CKD STage III  REVIEW OF SYSTEMS:    CONSTITUTIONAL: Denies any fever, chills, rigors, and weight loss  HEENT: No earache or tinnitus  Denies hearing loss or visual disturbances  CARDIOVASCULAR: No chest pain or palpitations  RESPIRATORY: Denies any cough, hemoptysis, shortness of breath or dyspnea on exertion  GASTROINTESTINAL: As noted in the History of Present Illness  GENITOURINARY: No problems with urination  Denies any hematuria or dysuria  NEUROLOGIC: No dizziness or vertigo, denies headaches  MUSCULOSKELETAL: Denies any muscle or joint pain  SKIN: Denies skin rashes or itching  ENDOCRINE: Denies excessive thirst  Denies intolerance to heat or cold  PSYCHOSOCIAL: Denies depression or anxiety  Denies any recent memory loss         Historical Information   Past Medical History:   Diagnosis Date   • Anesthesia     "cheap date"   • Arthritis    • Atrial fibrillation [facility])    • Benign polyp of large intestine    • 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 [address] ) 1977   • Colon cancer [facility]) 1977    43 yo   • Colon polyp    • Full dentures     full upper/ partial lower   • GERD (gastroesophageal reflux disease)    • Glaucoma suspect    • History of neck problems "C3-C7 and had epidural injections years ago"   • Hyperlipidemia    • Hypertension    • Lung cancer ([facility] [address] )     jan 2020- had surgery   • 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[facility] [address] )    • Prediabetes    • Primary adenocarcinoma of upper lobe of right lung ([facility] [address] ) 12/2/2019   • Pulmonary nodules 2/19/2015   • Refusal of blood transfusions as patient is Yazidi    • Tic disorder, transient, recurrent     not recent   • Walks frequently      Past Surgical History:   Procedure Laterality Date   • APPENDECTOMY     • ARTHROSCOPY KNEE     • BREAST BIOPSY Right 04/10/2017    US guided - intraductal papillomas   • BREAST BIOPSY Right 10/24/2019    ADH   • BREAST LUMPECTOMY W/ NEEDLE LOCALIZATION Right 05/22/2020    Procedure: EXCISIONAL BIOPSY;  1000 NEEDLE LOC;  Surgeon: Jitendra Mays MD;  [facility];  Service: Surgical Oncology   • CARDIOVASCULAR STRESS TEST     • CARPAL TUNNEL RELEASE Left 07/01/2022   • CATARACT EXTRACTION Bilateral    • COLON SURGERY     • COLONOSCOPY      done 2010 due 2015 Dr Kuo   • DILATION AND CURETTAGE OF UTERUS     • ESOPHAGOGASTRODUODENOSCOPY      inflammation aonly   • HEMICOLECTOMY Right 1977   • HYSTERECTOMY     • JOINT REPLACEMENT      L knee   • LUNG SURGERY     • MAMMO NEEDLE LOCALIZATION RIGHT (ALL INC) Right 05/22/2020   • MAMMO NEEDLE LOCALIZATION RIGHT (ALL INC) EACH ADD Right 05/22/2020   • MAMMO STEREOTACTIC BREAST BIOPSY RIGHT (ALL INC) Right 10/24/2019   • PARATHYROID GLAND SURGERY      exploration    • [place] BRONCHOSCOPY,DIAGNOSTIC N/A 01/17/2020    Procedure: Ashley Lanier;  Surgeon: Rajendra Damon MD;  [facility];  Service: Thoracic   • KY THORACOSCOPY SURG LOBECTOMY Right 01/17/2020    Procedure: ROBOTIC ASSISTED COMPLETE LUNG LOBECTOMY;  Surgeon: Rajendra Damon MD;  [facility];  Service: Thoracic   • KY THORACOSCOPY W/THERA WEDGE RESEXN INITIAL UNILAT Right 01/17/2020    Procedure: ROBOTIC ASSISTED UPPER-LOBE WEDGE RESECTION, MEDIASTINAL LYMPH NODE DISECTION;  Surgeon: Griffin Adler MD;  [facility];  Service: Thoracic   • REPLACEMENT TOTAL KNEE     • THYROID SURGERY     • TOTAL ABDOMINAL HYSTERECTOMY     • TOTAL THYROIDECTOMY     • TRIGGER FINGER RELEASE Left 2022   • US GUIDED BREAST BIOPSY RIGHT COMPLETE Right 04/10/2017   • US GUIDED THYROID BIOPSY  2020     Social History   Social History     Substance and Sexual Activity   Alcohol Use Yes   • Alcohol/week: 5 0 standard drinks   • Types: 5 Cans of beer per week    Comment: occasional beer with food     Social History     Substance and Sexual Activity   Drug Use No     Social History     Tobacco Use   Smoking Status Former Smoker   • Packs/day: 1 50   • Years: 20 00   • Pack years: 30 00   • Types: Cigarettes   • Quit date: 5   • Years since quittin 8   Smokeless Tobacco Never Used     Family History   Problem Relation Age of Onset   • Esophageal cancer Mother         [de-identified]   • Hypertension Mother    • Stroke Family         TIA   • Breast cancer Paternal Aunt 95   • No Known Problems Father    • COPD Sister    • COPD Sister    • No Known Problems Maternal Grandmother    • No Known Problems Maternal Grandfather    • No Known Problems Paternal Grandmother    • No Known Problems Paternal Grandfather    • No Known Problems Maternal Aunt    • No Known Problems Maternal Aunt    • No Known Problems Maternal Aunt    • No Known Problems Maternal Aunt    • No Known Problems Maternal Aunt        Meds/Allergies       Current Outpatient Medications:   •  apixaban (ELIQUIS) 5 mg  •  atorvastatin (LIPITOR) 80 mg tablet  •  ciclopirox (LOPROX) 0 77 % SUSP  •  Coenzyme Q10 (CO Q 10 PO)  •  cyanocobalamin (VITAMIN B-12) 1000 MCG tablet  •  diltiazem (CARDIZEM CD) 120 mg 24 hr capsule  •  EPINEPHrine (EpiPen 2-Pa) 0 3 mg/0 3 mL SOAJ  •  EPINEPHrine (EPIPEN) 0 3 mg/0 3 mL SOAJ  •  levothyroxine 100 mcg tablet  •  Multiple Vitamins-Calcium (ESSENTIAL ONE DAILY MULTIVIT PO)  • Omega-3 Fatty Acids (FISH OIL PO)  •  omeprazole (PriLOSEC) 20 mg delayed release capsule    Allergies   Allergen Reactions   • Bee Venom      Tongue swelling   • Flurbiprofen Hives   • Ibuprofen Shortness Of Breath   • Levaquin [Levofloxacin In D5w] Other (See Comments)     Dark spots under eyes and wrists   • Tequin [Gatifloxacin] Rash           Objective     not currently breastfeeding  There is no height or weight on file to calculate BMI  PHYSICAL EXAM:      General Appearance:   Alert, cooperative, no distress   HEENT:   Normocephalic, atraumatic, anicteric      Neck:  Supple, symmetrical, trachea midline   Lungs:   Clear to auscultation bilaterally; no rales, rhonchi or wheezing; respirations unlabored    Heart[de-identified]   Regular rate and rhythm; no murmur, rub, or gallop  Abdomen:   Soft, non-tender, non-distended; normal bowel sounds; no masses, no organomegaly    Genitalia:   Deferred    Rectal:   Deferred    Extremities:  No cyanosis, clubbing or edema, left knee incision    Pulses:  2+ and symmetric    Skin:  No jaundice, rashes, or lesions    Lymph nodes:  No palpable cervical lymphadenopathy        Lab Results:   No visits with results within 1 Day(s) from this visit     Latest known visit with results is:   Appointment on 09/08/2022   Component Date Value   • WBC 09/08/2022 4 84    • RBC 09/08/2022 4 01    • Hemoglobin 09/08/2022 13 2    • Hematocrit 09/08/2022 39 8    • MCV 09/08/2022 99 (A)   • MCH 09/08/2022 32 9    • MCHC 09/08/2022 33 2    • RDW 09/08/2022 13 2    • Platelets 99/20/6268 199    • MPV 09/08/2022 9 8    • Sodium 09/08/2022 138    • Potassium 09/08/2022 3 7    • Chloride 09/08/2022 107    • CO2 09/08/2022 28    • ANION GAP 09/08/2022 3 (A)   • BUN 09/08/2022 13    • Creatinine 09/08/2022 1 10    • Glucose, Fasting 09/08/2022 95    • Calcium 09/08/2022 9 1    • eGFR 09/08/2022 46    • Cholesterol 09/08/2022 169    • Triglycerides 09/08/2022 66    • HDL, Direct 09/08/2022 78    • LDL Calculated 09/08/2022 78    • Non-HDL-Chol (CHOL-HDL) 09/08/2022 91          Radiology Results:   No results found

## 2022-11-14 ENCOUNTER — TELEPHONE (OUTPATIENT)
Dept: HEMATOLOGY ONCOLOGY | Facility: CLINIC | Age: 82
End: 2022-11-14

## 2022-11-14 NOTE — TELEPHONE ENCOUNTER
Appointment Confirmation (to confirm pre existing appointments - ONLY)  No need to route   Appointment with  Dr Manish Newman   Appointment date & time 11/17/22 @ 9:30am   Location [place]   Patient verbilized Understanding Yes

## 2022-11-17 ENCOUNTER — OFFICE VISIT (OUTPATIENT)
Dept: SURGICAL ONCOLOGY | Facility: CLINIC | Age: 82
End: 2022-11-17

## 2022-11-17 VITALS
DIASTOLIC BLOOD PRESSURE: 72 MMHG | HEART RATE: 67 BPM | HEIGHT: 64 IN | RESPIRATION RATE: 16 BRPM | BODY MASS INDEX: 27.66 KG/M2 | TEMPERATURE: 97.4 F | OXYGEN SATURATION: 97 % | SYSTOLIC BLOOD PRESSURE: 122 MMHG | WEIGHT: 162 LBS

## 2022-11-17 DIAGNOSIS — R92.2 DENSE BREAST TISSUE: ICD-10-CM

## 2022-11-17 DIAGNOSIS — Z85.3 ENCOUNTER FOR FOLLOW-UP SURVEILLANCE OF BREAST CANCER: Primary | ICD-10-CM

## 2022-11-17 DIAGNOSIS — Z08 ENCOUNTER FOR FOLLOW-UP SURVEILLANCE OF BREAST CANCER: Primary | ICD-10-CM

## 2022-11-17 DIAGNOSIS — R31.9 HEMATURIA, UNSPECIFIED TYPE: ICD-10-CM

## 2022-11-17 DIAGNOSIS — Z86.000 PERSONAL HISTORY OF IN-SITU NEOPLASM OF BREAST: ICD-10-CM

## 2022-11-17 NOTE — PROGRESS NOTES
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    Dense breast tissue    Hematuria, unspecified type  -     Ambulatory referral to Urology; Future        Advance Care Planning/Advance Directives:  Discussed disease status, cancer treatment plans and/or cancer treatment goals with the patient  Oncology History:    Oncology History   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   11/20/2019 Initial Diagnosis    Ductal carcinoma in situ (DCIS) of right breast     5/22/2020 Surgery    A  Breast, right, lumpectomy:  -  Ductal carcinoma in situ, low nuclear grade   B  Breast, right new inferior margin, excision:  -  Ductal carcinoma in situ, low nuclear grade involving intraductal papilloma  C   Breast, right new lateral margin, excision:  -  Ductal carcinoma in situ, low nuclear grade  D   Breast, right new posterior margin, excision:  -  Benign breast parenchyma, negative for atypia or malignancy      %, AL 60-70%, HER2 1+ negative     6/9/2020 -  Cancer Staged    Staging form: Breast, AJCC 8th Edition  - Clinical: cT0, cN0, cM0 - Signed by Aidan Carbajal MD on 6/9/2020  Laterality: Right       6/9/2020 -  Cancer Staged    Staging form: Breast, AJCC 8th Edition  - Pathologic: Stage 0 (pTis (DCIS), pN0, cM0, ER+, AL+, HER2-) - Signed by Aidan Carbajal MD on 6/16/2020  Laterality: Right  Method of lymph node assessment: Clinical  Nuclear grade: G1       7/30/2020 - 8/28/2020 Radiation    Plan ID Energy Fractions Dose per Fraction (cGy) Dose Correction (cGy) Total Dose Delivered (cGy) Elapsed Days   R Breast 6X 16 / 16 266 0 4,256 21   R Brst Boost 6X 5 / 5 200 0 1,000 4        Primary adenocarcinoma of upper lobe of right lung ([facility] [address] ) (Resolved)   12/2/2019 Initial Diagnosis    Primary adenocarcinoma of [facility] of [facility] )     1/17/2020 Surgery    Lung, right upper lobe, wedge resection:  -  Invasive moderately differentiated adenocarcinoma  Stage IB         History of Present Illness:  Breast cancer follow-up, no breasts referable concerns  -Interval History:  Benign mammogram    Review of Systems:  Review of Systems   Constitutional: Negative  Negative for appetite change and fever  Eyes: Negative  Respiratory: Negative for shortness of breath  Cardiovascular: Negative  Gastrointestinal: Negative  Endocrine: Negative  Genitourinary: Positive for hematuria  Musculoskeletal: Negative  Negative for arthralgias and myalgias  Skin: Negative  Allergic/Immunologic: Negative  Neurological: Negative  Hematological: Negative  Negative for adenopathy  Does not bruise/bleed easily  Psychiatric/Behavioral: Negative          Patient Active Problem List   Diagnosis   • Persistent atrial fibrillation (HCC)   • Benign essential hypertension   • Coagulopathy (HCC)   • History of colon cancer   • Overweight   • Osteoarthritis   • Postoperative hypothyroidism   • Mixed hyperlipidemia   • Medicare annual wellness visit, subsequent   • Osteopenia of multiple sites   • 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    • Benign polyp of large intestine    • Cancer [facility] ) [facility]   • Colon cancer [facility]) 1977    43 yo   • Colon polyp    • Full dentures     full upper/ partial lower   • GERD (gastroesophageal reflux disease)    • Glaucoma suspect    • History of neck problems     "C3-C7 and had epidural injections years ago"   • Hyperlipidemia    • Hypertension    • Lung cancer ([place] [address] )     jan 2020- had surgery   • 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[place] [address] )    • Prediabetes    • Primary adenocarcinoma of upper lobe of right lung ([facility] ) 12/2/2019   • Pulmonary nodules 2/19/2015   • Refusal of blood transfusions as patient is Yazidism    • Tic disorder, transient, recurrent     not recent   • Walks frequently      Past Surgical History:   Procedure Laterality Date   • APPENDECTOMY     • ARTHROSCOPY KNEE     • BREAST BIOPSY Right 04/10/2017    US guided - intraductal papillomas   • BREAST BIOPSY Right 10/24/2019    ADH   • BREAST LUMPECTOMY W/ NEEDLE LOCALIZATION Right 05/22/2020    Procedure: EXCISIONAL BIOPSY;  1000 NEEDLE LOC;  Surgeon: Orly Hassan MD;  [facility];  Service: Surgical Oncology   • CARDIOVASCULAR STRESS TEST     • CARPAL TUNNEL RELEASE Left 07/01/2022   • CATARACT EXTRACTION Bilateral    • COLON SURGERY     • COLONOSCOPY      done 2010 due 2015 Dr Kuo   • DILATION AND CURETTAGE OF UTERUS     • ESOPHAGOGASTRODUODENOSCOPY      inflammation aonly   • HEMICOLECTOMY Right 1977   • HYSTERECTOMY     • JOINT REPLACEMENT      L knee   • LUNG SURGERY     • MAMMO NEEDLE LOCALIZATION RIGHT (ALL INC) Right 05/22/2020   • MAMMO NEEDLE LOCALIZATION RIGHT (ALL INC) EACH ADD Right 05/22/2020   • MAMMO STEREOTACTIC BREAST BIOPSY RIGHT (ALL INC) Right 10/24/2019   • PARATHYROID GLAND SURGERY      exploration    • [place] BRONCHOSCOPY,DIAGNOSTIC N/A 01/17/2020    Procedure: Day Canal;  Surgeon: Alanis Gunter MD;  [facility];  Service: Thoracic   • UT THORACOSCOPY SURG LOBECTOMY Right 2020    Procedure: ROBOTIC ASSISTED COMPLETE LUNG LOBECTOMY;  Surgeon: Mario Solo MD;  [facility];  Service: Thoracic   • NE THORACOSCOPY W/THERA WEDGE RESEXN INITIAL UNILAT Right 2020    Procedure: ROBOTIC ASSISTED UPPER-LOBE WEDGE RESECTION, MEDIASTINAL LYMPH NODE DISECTION;  Surgeon: Mario Solo MD;  [facility];  Service: Thoracic   • REPLACEMENT TOTAL KNEE     • THYROID SURGERY     • TOTAL ABDOMINAL HYSTERECTOMY     • TOTAL THYROIDECTOMY     • TRIGGER FINGER RELEASE Left 2022   • US GUIDED BREAST BIOPSY RIGHT COMPLETE Right 04/10/2017   • US GUIDED THYROID BIOPSY  2020     Family History   Problem Relation Age of Onset   • Esophageal cancer Mother         [de-identified]   • Hypertension Mother    • Stroke Family         TIA   • Breast cancer Paternal Aunt 80   • No Known Problems Father    • COPD Sister    • COPD Sister    • No Known Problems Maternal Grandmother    • No Known Problems Maternal Grandfather    • No Known Problems Paternal Grandmother    • No Known Problems Paternal Grandfather    • No Known Problems Maternal Aunt    • No Known Problems Maternal Aunt    • No Known Problems Maternal Aunt    • No Known Problems Maternal Aunt    • No Known Problems Maternal Aunt      Social History     Socioeconomic History   • Marital status: Single     Spouse name: Not on file   • Number of children: Not on file   • Years of education: Not on file   • Highest education level: Not on file   Occupational History   • Not on file   Tobacco Use   • Smoking status: Former     Packs/day: 1 50     Years: 20 00     Pack years: 30 00     Types: Cigarettes     Quit date: 5     Years since quittin 9   • Smokeless tobacco: Never   Vaping Use   • Vaping Use: Never used   Substance and Sexual Activity   • Alcohol use:  Yes     Alcohol/week: 5 0 standard drinks     Types: 5 Cans of beer per week     Comment: occasional beer with food   • Drug use: No   • Sexual activity: Not on file     Comment: hysterectomy   Other Topics Concern   • Not on file   Social History Narrative    Caffeine use- 1-3, 8oz coffees per day    Denied history of housing without smoke detectors    Always uses a seatbelt     Social Determinants of Health     Financial Resource Strain: Low Risk    • Difficulty of Paying Living Expenses: Not hard at all   Food Insecurity: Not on file   Transportation Needs: No Transportation Needs   • Lack of Transportation (Medical): No   • Lack of Transportation (Non-Medical):  No   Physical Activity: Not on file   Stress: Not on file   Social Connections: Not on file   Intimate Partner Violence: Not on file   Housing Stability: Not on file       Current Outpatient Medications:   •  apixaban (ELIQUIS) 5 mg, Take 5 mg by mouth 2 (two) times a day To stop 2 days prior to surgery per md, Disp: , Rfl:   •  atorvastatin (LIPITOR) 80 mg tablet, TAKE 1 TABLET BY MOUTH EVERY DAY, Disp: 90 tablet, Rfl: 1  •  ciclopirox (LOPROX) 0 77 % SUSP, APPLY 1-2 TIMES DAILY TO AFFECTED TOENAILS, Disp: , Rfl:   •  Coenzyme Q10 (CO Q 10 PO), Take 200 mg by mouth, Disp: , Rfl:   •  cyanocobalamin (VITAMIN B-12) 1000 MCG tablet, Take 1,000 mcg by mouth, Disp: , Rfl:   •  diltiazem (CARDIZEM CD) 120 mg 24 hr capsule, Take 120 mg by mouth every evening , Disp: , Rfl:   •  EPINEPHrine (EpiPen 2-Pa) 0 3 mg/0 3 mL SOAJ, Inject 0 3 mL (0 3 mg total) into a muscle once as needed for anaphylaxis for up to 1 dose, Disp: 0 6 mL, Rfl: 0  •  levothyroxine 100 mcg tablet, TAKE 1 TABLET BY MOUTH EVERY DAY, Disp: 90 tablet, Rfl: 0  •  Multiple Vitamins-Calcium (ESSENTIAL ONE DAILY MULTIVIT PO), Take by mouth daily , Disp: , Rfl:   •  Omega-3 Fatty Acids (FISH OIL PO), Take 2 g by mouth daily , Disp: , Rfl:   •  omeprazole (PriLOSEC) 20 mg delayed release capsule, TAKE 1 CAPSULE (20 MG TOTAL) BY MOUTH AS NEEDED (GERD), Disp: 90 capsule, Rfl: 0  •  EPINEPHrine (EPIPEN) 0 3 mg/0 3 mL SOAJ, Inject 0 3 mL (0 3 mg total) into a muscle once for 1 dose (Patient not taking: Reported on 11/17/2022), Disp: 0 6 mL, Rfl: 0  Allergies   Allergen Reactions   • Bee Venom      Tongue swelling   • Flurbiprofen Hives   • Ibuprofen Shortness Of Breath   • Levaquin [Levofloxacin In D5w] Other (See Comments)     Dark spots under eyes and wrists   • Tequin [Gatifloxacin] Rash       The following portions of the patient's history were reviewed and updated as appropriate: allergies, current medications, past family history, past medical history, past social history, past surgical history and problem list         Vitals:    11/17/22 0919   BP: 122/72   Pulse: 67   Resp: 16   Temp: (!) 97 4 °F (36 3 °C)   SpO2: 97%       Physical Exam  Constitutional:       General: She is not in acute distress  Appearance: Normal appearance  She is well-developed and well-nourished  HENT:      Head: Normocephalic and atraumatic  Cardiovascular:      Heart sounds: Normal heart sounds  Pulmonary:      Breath sounds: Normal breath sounds  Chest:   Breasts:     Right: Skin change (Lumpectomy scar with decreasing seroma) present  No inverted nipple, mass, nipple discharge or tenderness  Left: No inverted nipple, mass, nipple discharge, skin change or tenderness  Abdominal:      Palpations: Abdomen is soft  Lymphadenopathy:      Upper Body:      Right upper body: No supraclavicular, axillary, pectoral or lateral adenopathy  Left upper body: No supraclavicular, axillary, pectoral or lateral adenopathy  Neurological:      Mental Status: She is alert and oriented to person, place, and time     Psychiatric:         Mood and Affect: Mood and affect and mood normal            Results:  Labs:      Imaging  03/31/2022 bilateral 3D diagnostic mammogram and right breast ultrasound show postsurgical changes on the right side with a seroma, left side is benign overall benign study density of three    I reviewed the above imaging data     Discussion/Summary:  59-year-old female status post right breast conservation for ductal carcinoma in-situ  She had radiation therapy but no hormone therapy was recommended  Her seroma has markedly decreased in size  There is no evidence of disease based on exam today  Her last mammogram was benign  I will make arrangements for her mammogram for next year  I will see her again in six months for another exam   She is requesting a referral to Urology  She states that she was followed for hematuria in the past and has not seen anybody for a couple years  I will place a referral for her

## 2022-11-25 ENCOUNTER — TELEPHONE (OUTPATIENT)
Dept: HEMATOLOGY ONCOLOGY | Facility: CLINIC | Age: 82
End: 2022-11-25

## 2022-11-25 NOTE — TELEPHONE ENCOUNTER
Left message for patient to call the office to reschedule FU appt with Dr Buitrago Sessions to Dorina siddiqui or Gurpreet schedule

## 2022-12-02 ENCOUNTER — HOSPITAL ENCOUNTER (OUTPATIENT)
Dept: RADIOLOGY | Facility: HOSPITAL | Age: 82
Discharge: HOME | End: 2022-12-02
Attending: THORACIC SURGERY (CARDIOTHORACIC VASCULAR SURGERY)

## 2022-12-02 DIAGNOSIS — Z85.118 HISTORY OF LUNG CANCER: ICD-10-CM

## 2022-12-07 ENCOUNTER — OFFICE VISIT (OUTPATIENT)
Dept: CARDIAC SURGERY | Facility: CLINIC | Age: 82
End: 2022-12-07

## 2022-12-07 VITALS
WEIGHT: 163 LBS | HEART RATE: 63 BPM | DIASTOLIC BLOOD PRESSURE: 77 MMHG | SYSTOLIC BLOOD PRESSURE: 126 MMHG | BODY MASS INDEX: 27.83 KG/M2 | RESPIRATION RATE: 16 BRPM | TEMPERATURE: 98.2 F | OXYGEN SATURATION: 98 % | HEIGHT: 64 IN

## 2022-12-07 DIAGNOSIS — Z85.118 HISTORY OF LUNG CANCER: Primary | ICD-10-CM

## 2022-12-07 NOTE — PROGRESS NOTES
Thoracic Follow-Up  Assessment/Plan:   78-year-old female status post 01/17/2020 robotic right upper lobectomy for what proved to be a stage I B lung cancer doing well with no evidence of recurrent or metastatic disease    I had a good meeting with Hong today  She is doing well almost 3 years out from surgery  Her scan shows no evidence of recurrent or metastatic disease  I would recommend continued surveillance imaging yearly  We will order a CT scan for December/January of next year and see her back afterwards  I have asked her to call us sooner if any other questions or concerns  Chris [place]      Diagnoses and all orders for this visit:    History of lung cancer  -     CT chest wo contrast; Future          Thoracic History   Problem: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  Procedure: right robotic diagnostic upper lobe wedge resection, completion lobectomy, mediastinal lymph node dissection 1/17/20  Pathology: Right upper lobe tumor consistent with invasive moderately differentiated adenocarcinoma, measuring 1 6 cm, with visceral pleura invasion  Lymph node levels 2R, 4R, 8R, 10R, 11R and 7 all negative for malignancy  8th Edition AJCC tumor Stage IB (pT2a, N0, M0, G2)        Subjective:    Patient ID: Trini Davis is a 78 y o  female  HPI  Hong comes back to our office 3 years out from a robotic right upper lobectomy for a stage I B adenocarcinoma  Overall she is doing well  She denies any respiratory issues  Minimal cough  No shortness of breath  No hemoptysis  No fevers, chills or night attentional weight loss  She denies any significant discomfort at her incisions    No other complaints at this time    The following portions of the patient's history were reviewed and updated as appropriate: allergies, current medications, past family history, past medical history, past social history, past surgical history and problem list     Review of Systems   Constitutional: Negative for activity change, appetite change, chills, diaphoresis, fatigue, fever and unexpected weight change  HENT: Negative  Eyes: Negative  Respiratory: Negative for apnea, cough, chest tightness, shortness of breath, wheezing and stridor  Cardiovascular: Negative for chest pain, palpitations and leg swelling  Gastrointestinal: Negative for abdominal distention, abdominal pain, blood in stool, constipation, diarrhea, nausea and vomiting  Endocrine: Negative  Genitourinary: Negative  Musculoskeletal: Negative  Skin: Negative  Allergic/Immunologic: Negative  Neurological: Negative  Hematological: Negative  Psychiatric/Behavioral: Negative  Objective:   Physical Exam  Vitals and nursing note reviewed  Constitutional:       General: She is not in acute distress  Appearance: She is well-developed  She is not diaphoretic  HENT:      Head: Normocephalic and atraumatic  Mouth/Throat:      Pharynx: No oropharyngeal exudate  Eyes:      General: No scleral icterus  Conjunctiva/sclera: Conjunctivae normal       Pupils: Pupils are equal, round, and reactive to light  Neck:      Thyroid: No thyromegaly  Vascular: No JVD  Trachea: No tracheal deviation  Cardiovascular:      Rate and Rhythm: Normal rate and regular rhythm  Heart sounds: Normal heart sounds  No murmur heard  No friction rub  No gallop  Pulmonary:      Effort: Pulmonary effort is normal  No respiratory distress  Breath sounds: Normal breath sounds  No wheezing or rales  Comments: Normal work of breathing on room air  Chest:      Chest wall: No tenderness  Abdominal:      General: Bowel sounds are normal  There is no distension  Palpations: Abdomen is soft  There is no mass  Tenderness: There is no abdominal tenderness  There is no guarding or rebound  Musculoskeletal:         General: No tenderness or deformity  Normal range of motion        Cervical back: Normal range of motion and neck supple  Skin:     General: Skin is warm and dry  Coloration: Skin is not pale  Findings: No erythema or rash  Neurological:      Mental Status: She is alert and oriented to person, place, and time  Psychiatric:         Behavior: Behavior normal          Thought Content: Thought content normal          Judgment: Judgment normal      /77 (BP Location: Left arm, Patient Position: Sitting, Cuff Size: Standard)   Pulse 63   Temp 98 2 °F (36 8 °C) (Temporal)   Resp 16   Ht 5' 4" (1 626 m)   Wt 73 9 kg (163 lb)   SpO2 98%   BMI 27 98 kg/m²     No Chest XR results available for this patient  CT chest wo contrast    Result Date: 12/6/2022  Narrative CT CHEST WITHOUT IV CONTRAST INDICATION:   Z85 118: Personal history of other malignant neoplasm of bronchus and lung  COMPARISON:  12/3/2021 TECHNIQUE: CT examination of the chest was performed without intravenous contrast  Axial, sagittal, and coronal 2D reformatted images were created from the source data and submitted for interpretation  Radiation dose length product (DLP) for this visit:  287 22 mGy-cm   This examination, like all CT scans performed in the [facility], was performed utilizing techniques to minimize radiation dose exposure, including the use of iterative  reconstruction and automated exposure control  FINDINGS: LUNGS:  The right lower lobe subpleural nodule continues to measure 3 to 4 mm in size, stable (3/80)  Stable 3 mm nodule adjacent to the diaphragm in the right lower lobe (3/88)  Stable postoperative changes status post right upper lobectomy  No developing suspicious appearing pulmonary nodule  PLEURA:  Unremarkable  HEART/GREAT VESSELS: Mild coronary artery calcification  No thoracic aortic aneurysm  MEDIASTINUM AND SELENA:  Unremarkable  CHEST WALL AND LOWER NECK:  Right thyroid resection    Postoperative changes with focal fluid collection in the right breast consistent with postoperative seroma, evaluated in March by mammography and ultrasound  VISUALIZED STRUCTURES IN THE UPPER ABDOMEN:  Unremarkable  OSSEOUS STRUCTURES:  No acute fracture or destructive osseous lesion  Impression Stable appearance of the chest   Stable small right lung nodules  No new pulmonary nodule is seen  Workstation performed: [id number]     I personally reviewed her CT scan in PACs  I showed this to the patient  She has no evidence of recurrent or metastatic disease  Stable small 3 mm nodules  No other suspicious findings      Lucio Quan MD  Thoracic Surgeon    (Available by [facility] Text)

## 2022-12-08 ENCOUNTER — HOSPITAL ENCOUNTER (OUTPATIENT)
Dept: RADIOLOGY | Facility: HOSPITAL | Age: 82
Discharge: HOME | End: 2022-12-08
Attending: INTERNAL MEDICINE

## 2022-12-08 DIAGNOSIS — Z85.038 HISTORY OF COLON CANCER: ICD-10-CM

## 2022-12-13 ENCOUNTER — OFFICE VISIT (OUTPATIENT)
Dept: GASTROENTEROLOGY | Facility: CLINIC | Age: 82
End: 2022-12-13

## 2022-12-13 VITALS
WEIGHT: 160 LBS | DIASTOLIC BLOOD PRESSURE: 64 MMHG | SYSTOLIC BLOOD PRESSURE: 122 MMHG | BODY MASS INDEX: 27.31 KG/M2 | TEMPERATURE: 97.8 F | HEIGHT: 64 IN

## 2022-12-13 DIAGNOSIS — K63.5 POLYP OF TRANSVERSE COLON, UNSPECIFIED TYPE: Primary | ICD-10-CM

## 2022-12-19 NOTE — PROGRESS NOTES
[facility] - Outpatient Follow-up Note  Ana Lilia Truong 78 y o  female MRN: [id number]  Encounter: [id number]          ASSESSMENT AND PLAN:    24-year-old female with history of colon cancer diagnosed in the 76s  She also has a history of lung and breast cancer  She has had multiple colonoscopies through the years since her original colon cancer diagnosis  She also has A  fib and is on Eliquis  She follows with we have [facility] cardiology  She is here for follow-up with me after she had a CT colonography as she refused colonoscopy given her advanced age  She is also a Taoism and does not accept blood products  Her CT colonoscopy shows a 6 mm colon polyp in the transverse colon  We discussed options including repeat colonoscopy for colon polyp removal   She is going to think about it  She states she will come back to see me in 6 months time  This is not unreasonable given that it is a small polyp and may or may not cause her problem in the future given that she is already 78years old  She will come see me in follow-up in 6 months time so that we can readdress this at that time  ______________________________________________________________________    SUBJECTIVE: Patient here for follow-up of her recent CT colonography  She states that it was uncomfortable but she was happy to do that as she did not want to come for colonoscopy  She is otherwise feeling well and is thankful for the care she receives from her PCP and her cardiologist whom she is  well known to  REVIEW OF SYSTEMS IS OTHERWISE NEGATIVE        Historical Information   Past Medical History:   Diagnosis Date   • Anesthesia     "cheap date"   • Arthritis    • Atrial fibrillation 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) 1977    43 yo   • Colon polyp    • Full dentures     full upper/ partial lower   • GERD (gastroesophageal reflux disease)    • Glaucoma suspect    • History of neck problems     "C3-C7 and had epidural injections years ago"   • Hyperlipidemia    • Hypertension    • Lung cancer ([facility] [address] )     jan 2020- had surgery   • 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[facility] [address] )    • Prediabetes    • Primary adenocarcinoma of upper lobe of right lung ([facility] [address] ) 12/2/2019   • Pulmonary nodules 2/19/2015   • Refusal of blood transfusions as patient is [place]    • Tic disorder, transient, recurrent     not recent   • Walks frequently      Past Surgical History:   Procedure Laterality Date   • APPENDECTOMY     • ARTHROSCOPY KNEE     • BREAST BIOPSY Right 04/10/2017    US guided - intraductal papillomas   • BREAST BIOPSY Right 10/24/2019    ADH   • BREAST LUMPECTOMY W/ NEEDLE LOCALIZATION Right 05/22/2020    Procedure: EXCISIONAL BIOPSY;  1000 NEEDLE LOC;  Surgeon: Carolyne Kwok MD;  Location: [facility];  Service: Surgical Oncology   • CARDIOVASCULAR STRESS TEST     • CARPAL TUNNEL RELEASE Left 07/01/2022   • CATARACT EXTRACTION Bilateral    • COLON SURGERY     • COLONOSCOPY      done 2010 due 2015 Dr Kuo   • DILATION AND CURETTAGE OF UTERUS     • ESOPHAGOGASTRODUODENOSCOPY      inflammation aonly   • HEMICOLECTOMY Right 1977   • HYSTERECTOMY     • JOINT REPLACEMENT      L knee   • LUNG SURGERY     • MAMMO NEEDLE LOCALIZATION RIGHT (ALL INC) Right 05/22/2020   • MAMMO NEEDLE LOCALIZATION RIGHT (ALL INC) EACH ADD Right 05/22/2020   • MAMMO STEREOTACTIC BREAST BIOPSY RIGHT (ALL INC) Right 10/24/2019   • PARATHYROID GLAND SURGERY      exploration    • LA BRONCHOSCOPY,DIAGNOSTIC N/A 01/17/2020    Procedure: June Hamburger;  Surgeon: Marly Chamberlain MD;  [facility]: [facility];  Service: Thoracic   • LA THORACOSCOPY SURG LOBECTOMY Right 01/17/2020    Procedure: ROBOTIC ASSISTED COMPLETE LUNG LOBECTOMY;  Surgeon: Marly Chamberlain MD;  [facility];  Service: Thoracic   • LA THORACOSCOPY W/THERA WEDGE RESEXN INITIAL UNILAT Right 01/17/2020 Procedure: ROBOTIC ASSISTED UPPER-LOBE WEDGE RESECTION, MEDIASTINAL LYMPH NODE DISECTION;  Surgeon: Dalia Perez MD;  Location: [facility];  Service: Thoracic   • REPLACEMENT TOTAL KNEE     • THYROID SURGERY     • TOTAL ABDOMINAL HYSTERECTOMY     • TOTAL THYROIDECTOMY     • TRIGGER FINGER RELEASE Left 2022   • US GUIDED BREAST BIOPSY RIGHT COMPLETE Right 04/10/2017   • [address] THYROID BIOPSY  2020     Social History   Social History     Substance and Sexual Activity   Alcohol Use Yes   • Alcohol/week: 5 0 standard drinks   • Types: 5 Cans of beer per week    Comment: occasional beer with food     Social History     Substance and Sexual Activity   Drug Use No     Social History     Tobacco Use   Smoking Status Former   • Packs/day: 1 50   • Years: 20 00   • Pack years: 30 00   • Types: Cigarettes   • Quit date: 5   • Years since quittin 0   Smokeless Tobacco Never     Family History   Problem Relation Age of Onset   • Esophageal cancer Mother         [de-identified]   • Hypertension Mother    • Stroke Family         TIA   • Breast cancer Paternal Aunt 95   • No Known Problems Father    • COPD Sister    • COPD Sister    • No Known Problems Maternal Grandmother    • No Known Problems Maternal Grandfather    • No Known Problems Paternal Grandmother    • No Known Problems Paternal Grandfather    • No Known Problems Maternal Aunt    • No Known Problems Maternal Aunt    • No Known Problems Maternal Aunt    • No Known Problems Maternal Aunt    • No Known Problems Maternal Aunt        Meds/Allergies       Current Outpatient Medications:   •  apixaban (ELIQUIS) 5 mg  •  atorvastatin (LIPITOR) 80 mg tablet  •  ciclopirox (LOPROX) 0 77 % SUSP  •  Coenzyme Q10 (CO Q 10 PO)  •  cyanocobalamin (VITAMIN B-12) 1000 MCG tablet  •  EPINEPHrine (EpiPen 2-Pa) 0 3 mg/0 3 mL SOAJ  •  levothyroxine 100 mcg tablet  •  Multiple Vitamins-Calcium (ESSENTIAL ONE DAILY MULTIVIT PO)  •  Omega-3 Fatty Acids (FISH OIL PO)  • omeprazole (PriLOSEC) 20 mg delayed release capsule  •  diltiazem (CARDIZEM CD) 120 mg 24 hr capsule  •  EPINEPHrine (EPIPEN) 0 3 mg/0 3 mL SOAJ    Allergies   Allergen Reactions   • Bee Venom      Tongue swelling   • Flurbiprofen Hives   • Ibuprofen Shortness Of Breath   • Levaquin [Levofloxacin In D5w] Other (See Comments)     Dark spots under eyes and wrists   • Tequin [Gatifloxacin] Rash           Objective     Blood pressure 122/64, temperature 97 8 °F (36 6 °C), temperature source Tympanic, height 5' 4" (1 626 m), weight 72 6 kg (160 lb), not currently breastfeeding  Body mass index is 27 46 kg/m²  PHYSICAL EXAM:      General Appearance:   Alert, cooperative, no distress   HEENT:   Normocephalic, atraumatic, anicteric      Neck:  Supple, symmetrical, trachea midline   Lungs:   Clear to auscultation bilaterally; no rales, rhonchi or wheezing; respirations unlabored    Heart[de-identified]   Regular rate and rhythm; no murmur, rub, or gallop  Abdomen:   Soft, non-tender, non-distended; normal bowel sounds; no masses, no organomegaly    Genitalia:   Deferred    Rectal:   Deferred    Extremities:  No cyanosis, clubbing or edema    Pulses:  2+ and symmetric    Skin:  No jaundice, rashes, or lesions    Lymph nodes:  No palpable cervical lymphadenopathy        Lab Results:   No visits with results within 1 Day(s) from this visit     Latest known visit with results is:   Appointment on 09/08/2022   Component Date Value   • WBC 09/08/2022 4 84    • RBC 09/08/2022 4 01    • Hemoglobin 09/08/2022 13 2    • Hematocrit 09/08/2022 39 8    • MCV 09/08/2022 99 (H)    • MCH 09/08/2022 32 9    • MCHC 09/08/2022 33 2    • RDW 09/08/2022 13 2    • Platelets 24/30/2232 199    • MPV 09/08/2022 9 8    • Sodium 09/08/2022 138    • Potassium 09/08/2022 3 7    • Chloride 09/08/2022 107    • CO2 09/08/2022 28    • ANION GAP 09/08/2022 3 (L)    • BUN 09/08/2022 13    • Creatinine 09/08/2022 1 10    • Glucose, Fasting 09/08/2022 95    • Calcium 09/08/2022 9 1    • eGFR 09/08/2022 46    • Cholesterol 09/08/2022 169    • Triglycerides 09/08/2022 66    • HDL, Direct 09/08/2022 78    • LDL Calculated 09/08/2022 78    • Non-HDL-Chol (CHOL-HDL) 09/08/2022 91          Radiology Results:   CT chest wo contrast    Result Date: 12/6/2022  Narrative: CT CHEST WITHOUT IV CONTRAST INDICATION:   Z85 118: Personal history of other malignant neoplasm of bronchus and lung  COMPARISON:  12/3/2021 TECHNIQUE: CT examination of the chest was performed without intravenous contrast  Axial, sagittal, and coronal 2D reformatted images were created from the source data and submitted for interpretation  Radiation dose length product (DLP) for this visit:  287 22 mGy-cm   This examination, like all CT scans performed in the [facility], was performed utilizing techniques to minimize radiation dose exposure, including the use of iterative  reconstruction and automated exposure control  FINDINGS: LUNGS:  The right lower lobe subpleural nodule continues to measure 3 to 4 mm in size, stable (3/80)  Stable 3 mm nodule adjacent to the diaphragm in the right lower lobe (3/88)  Stable postoperative changes status post right upper lobectomy  No developing suspicious appearing pulmonary nodule  PLEURA:  Unremarkable  HEART/GREAT VESSELS: Mild coronary artery calcification  No thoracic aortic aneurysm  MEDIASTINUM AND SELENA:  Unremarkable  CHEST WALL AND LOWER NECK:  Right thyroid resection  Postoperative changes with focal fluid collection in the right breast consistent with postoperative seroma, evaluated in March by mammography and ultrasound  VISUALIZED STRUCTURES IN THE UPPER ABDOMEN:  Unremarkable  OSSEOUS STRUCTURES:  No acute fracture or destructive osseous lesion  Impression: Stable appearance of the chest   Stable small right lung nodules  No new pulmonary nodule is seen   Workstation performed: [id number]     CT colonoscopy diagnostic wo contrast    Result Date: 12/9/2022  Narrative: VIRTUAL CT COLONOSCOPY - CT ABDOMEN AND PELVIS WITHOUT IV CONTRAST INDICATION:   Z85 038: Personal history of other malignant neoplasm of large intestine  COMPARISON:  None  TECHNIQUE:  CT examination of the abdomen and pelvis was performed without intravenous and without enteric contrast using low radiation dose according to a protocol designed specifically to evaluate the colonic mucosa  Three dimensional reconstructed images were created on an independent workstation and included for evaluation  Reformatted images were created in axial, sagittal, and coronal planes  Radiation dose length product (DLP) for this visit:  369 08 mGy-cm   This examination, like all CT scans performed in the [facility], was performed utilizing techniques to minimize radiation dose exposure, including the use of iterative  reconstruction and automated exposure control  COLON PREP: Patient was administered our standard virtual colonoscopy preparation  Colon prep was slightly suboptimal with greater than typical residual foci of well tagged fecal material   Room air distention of the colon is adequate throughout on both  the supine and prone sequences  FINDINGS: COLONIC MUCOSA: Sigmoid colon is tortuous and redundant  Extensive sigmoid diverticulosis noted  Also noted is tortuosity and redundancy of the splenic flecture  There is an 8 x 6 mm polyp along the anterior wall of the proximal transverse colon well seen on supine image 358 of series 3 and prone image 343 of series 6  No other colonic polyp or mass of 6 mm or greater in size is detected  ABDOMEN AND PELVIS: Limited evaluation demonstrates no significant abnormality of liver, spleen, kidneys, pancreas, or adrenal glands  No calcified gallstones or gallbladder wall thickening noted  No ascites or lymphadenopathy  Lumbar degenerative changes  No acute osseous abnormality       Impression: Polyp in the proximal transverse colon, 8 x 6 mm  This examination was marked "significant notification" in Epic in order to begin the standard process by which the radiology reading room liaison alerts the referring practitioner     Workstation performed: [id number]

## 2022-12-20 NOTE — ASSESSMENT & PLAN NOTE
We had a discussion with Ms Lurlene Aase regarding her recent testing  She has a PET avid right upper lobe lung mass that is concerning for a lung primary with some low level hilar activity  There was no evidence of distant metastases  There is some left thyroid activity, which will need further testing, especially given her thyroid cancer history  She has a clinical stage II lung cancer and therefore Dr Laura Rivera is recommending a robotic assisted right upper lobe wedge resection and possible completion lobectomy  There is a possibility she may need adjuvant chemotherapy, secondary to the hilar lymph node activity  The patient is in agreement with the plan and we will schedule this for 1/17/20  She will hold her Eliquis 2 days prior to the surgery, with last dose being Tuesday night, the 14th  We will also send her to our geriatric pre operative clinic 
No

## 2022-12-21 ENCOUNTER — TELEPHONE (OUTPATIENT)
Dept: GASTROENTEROLOGY | Facility: CLINIC | Age: 82
End: 2022-12-21

## 2022-12-21 ENCOUNTER — TELEPHONE (OUTPATIENT)
Dept: OTHER | Facility: OTHER | Age: 82
End: 2022-12-21

## 2022-12-21 NOTE — TELEPHONE ENCOUNTER
I called the patient in regards to her CT reports from 12/8/22  I spoke with the patient and told her that Iris Mealing from the CV office will be reaching out and mailing and emailing her the reports  I advised the patient to call back in with further questions or concerns

## 2022-12-21 NOTE — TELEPHONE ENCOUNTER
LMOM for patient to let her know that I am sending her a release of health information form so that she can request her CT colonoscopy report  I also let her know that the report should be available on my chart

## 2022-12-21 NOTE — TELEPHONE ENCOUNTER
I called the patient in regards to her CT reports from 12/8/22  I spoke with the patient and told her that Remigio Valencia from the CV office will be reaching out and mailing and emailing her the reports  I advised the patient to call back in with further questions or concerns

## 2022-12-21 NOTE — TELEPHONE ENCOUNTER
Patient is requesting that a copy of her CT colonoscopy result be mailed to her home address  No call back needed

## 2022-12-21 NOTE — TELEPHONE ENCOUNTER
Patient is returning a call back from [place] regarding a copy of her CT colonoscopy report  She states that Dr Marley Gonzalez told her she can have a copy the day she saw her on 12/13/22  She does not want to complete any release form to obtain the report if she was already told by the doctor that it would be included in her documents from her recent visit

## 2023-01-24 ENCOUNTER — TELEPHONE (OUTPATIENT)
Dept: OTHER | Facility: OTHER | Age: 83
End: 2023-01-24

## 2023-01-24 NOTE — TELEPHONE ENCOUNTER
Patient has a referral from her OBGYN  Please contact [place] after 2 PM today  Prefers the home #  There are some security screens on her home phone

## 2023-01-25 NOTE — TELEPHONE ENCOUNTER
New Patient    What is the reason for the patient’s appointment? Patient being referred by obgyn because she was concerned that she had microscopic hematuria and not sure where it was coming from  What office location does the patient prefer? [facility]    Imaging/Lab Results:    Do we accept the patient's insurance or is the patient Self-Pay? Yes, [employer] Provider:  Plan Type/Number:  Member ID#: Has the patient had any previous Urologist(s)? Dr Samuel Wiley    Have patient records been requested? If not are records showing in Epic: Will be   Dropping them off tomorrow  Has the patient had any outside testing done? n/a    Does the patient have a personal history of cancer? Thyroid cancer, breast cancer  Had colon cancer a long time ago and lung cancer  Please advise appropriate time for scheduling since there are no upcoming NP appts  Patient can be reached at [phone] and has a program to prevent spam calls, to get through, have to say our name and press # sign

## 2023-02-02 ENCOUNTER — TELEPHONE (OUTPATIENT)
Dept: HEMATOLOGY ONCOLOGY | Facility: CLINIC | Age: 83
End: 2023-02-02

## 2023-02-02 NOTE — TELEPHONE ENCOUNTER
Call Transfer   Reason for patient call? Patient calling feeling pain and discomfort   If someone other than patient is calling, are they listed on the communication consent? yes   Patient's primary oncologist? Carmencita Trinh   Person/Department that the call was transferred to? Time that call was transferred? VALERIA Conner 10:20am 2/2    Informed patient that the message will be forwarded to the team and someone will get back to them as soon as possible  Did you relay this information to the patient?   yes

## 2023-02-02 NOTE — TELEPHONE ENCOUNTER
Patient left a voice message stating, "last night I was woke up because something on my thyroid bothered me  It wasn't a burning sensation, but it was a feeling that I never had before"    LM for patient advising her that if she is having pain in her throat, she should reach out to her PCP as there may be other reasons for her discomfort  I gave my teams number and asked her to call me back to further assess her condition

## 2023-02-15 ENCOUNTER — OFFICE VISIT (OUTPATIENT)
Dept: UROLOGY | Facility: CLINIC | Age: 83
End: 2023-02-15

## 2023-02-15 VITALS
WEIGHT: 166 LBS | HEART RATE: 86 BPM | BODY MASS INDEX: 28.34 KG/M2 | DIASTOLIC BLOOD PRESSURE: 82 MMHG | SYSTOLIC BLOOD PRESSURE: 158 MMHG | HEIGHT: 64 IN | OXYGEN SATURATION: 96 %

## 2023-02-15 DIAGNOSIS — R31.29 MICROSCOPIC HEMATURIA: Primary | ICD-10-CM

## 2023-02-15 DIAGNOSIS — R31.29 OTHER MICROSCOPIC HEMATURIA: ICD-10-CM

## 2023-02-15 LAB
BACTERIA UR QL AUTO: NORMAL /HPF
BILIRUB UR QL STRIP: NEGATIVE
CLARITY UR: CLEAR
COLOR UR: NORMAL
GLUCOSE UR STRIP-MCNC: NEGATIVE MG/DL
HGB UR QL STRIP.AUTO: NEGATIVE
KETONES UR STRIP-MCNC: NEGATIVE MG/DL
LEUKOCYTE ESTERASE UR QL STRIP: NEGATIVE
NITRITE UR QL STRIP: NEGATIVE
NON-SQ EPI CELLS URNS QL MICRO: NORMAL /HPF
PH UR STRIP.AUTO: 5.5 [PH]
PROT UR STRIP-MCNC: NEGATIVE MG/DL
RBC #/AREA URNS AUTO: NORMAL /HPF
SP GR UR STRIP.AUTO: 1.01 (ref 1–1.03)
UROBILINOGEN UR STRIP-ACNC: <2 MG/DL
WBC #/AREA URNS AUTO: NORMAL /HPF

## 2023-02-15 NOTE — PROGRESS NOTES
Assessment and plan:     Hematuria  · Microscopic and culture  · Follow-up 6 months  · Recommend hematuria work-up for any true ongoing microscopic hematuria or any gross hematuria    NEYDA Alfaro    History of Present Illness     Vivek Alvarez is a 78 y o  new patient who presents for microscopic hematuria  She formerly followed with Dr Xiong Sox  She had prior urine testing in 2020 but no microscopic testing  The last microscopic urine was in 2019 and had 0-1 rbc/hpf  Previously in 2018 with 0-2 rbc/hpf  Her last ultrasound was in 2021 and was unremarkable  She was referred by oncology due to hx of  Microhematuria  She was getting yearly cystoscopy per her report  Previous urine cytology was negative in 2018 and 2019  She does note some right upper quadrant discomfort which I recommend she follow-up with her gastroenterologist for  She had recent trouble colonoscopy December 2022 was unremarkable other than a polyp in the proximal transverse colon  Gross hematuria:denies    Smoking history:quit in 1967, 20-30 pack year hx    Chemical exposure: denies    Agent orange exposure:denies    Urine dip: unable to provide sample in the office    History of kidney stones:denies    Prior urological procedures: cystoscopy many years ago    History of pelvic radiation: no    Family history of urothelial or renal cell carcinoma: denies    History of recurrent UTI: denies    We discussed the definition of microscopic hematuria which is defined by the American urological Association as > 3-5 RBCs/high-power field on microscopic testing  I do not see any results true microscopic hematuria  She denies ever seeing any gross hematuria  I recommend sending urine for microscopic we will determine need for any additional testing based off of her results    She is agreeable with the plan  Laboratory     Lab Results   Component Value Date    BUN 13 09/08/2022    CREATININE 1 10 09/08/2022       No components found for: GFR    Lab Results   Component Value Date    GLUCOSE 113 07/06/2016    CALCIUM 9 1 09/08/2022     08/11/2015    K 3 7 09/08/2022    CO2 28 09/08/2022     09/08/2022       Lab Results   Component Value Date    WBC 4 84 09/08/2022    HGB 13 2 09/08/2022    HCT 39 8 09/08/2022    MCV 99 (H) 09/08/2022     09/08/2022       No results found for: PSA    No results found for this or any previous visit (from the past 1 hour(s))  @RESULT(URINEMICROSCOPIC)@    @RESULT(URINECULTURE)@    Radiology   VIRTUAL CT COLONOSCOPY - CT ABDOMEN AND PELVIS WITHOUT IV CONTRAST 12/8/2022     INDICATION:   [id number]: Personal history of other malignant neoplasm of large intestine      COMPARISON:  None      TECHNIQUE:  CT examination of the abdomen and pelvis was performed without intravenous and without enteric contrast using low radiation dose according to a protocol designed specifically to evaluate the colonic mucosa  Three dimensional reconstructed   images were created on an independent workstation and included for evaluation  Reformatted images were created in axial, sagittal, and coronal planes          Radiation dose length product (DLP) for this visit:  369 08 mGy-cm   This examination, like all CT scans performed in the [facility], was performed utilizing techniques to minimize radiation dose exposure, including the use of iterative   reconstruction and automated exposure control       COLON PREP: Patient was administered our standard virtual colonoscopy preparation  Colon prep was slightly suboptimal with greater than typical residual foci of well tagged fecal material   Room air distention of the colon is adequate throughout on both   the supine and prone sequences      FINDINGS:     COLONIC MUCOSA: Sigmoid colon is tortuous and redundant  Extensive sigmoid diverticulosis noted    Also noted is tortuosity and redundancy of the splenic flecture      There is an 8 x 6 mm polyp along the anterior wall of the proximal transverse colon well seen on supine image 358 of series 3 and prone image 343 of series 6  No other colonic polyp or mass of 6 mm or greater in size is detected            ABDOMEN AND PELVIS:  Limited evaluation demonstrates no significant abnormality of liver, spleen, kidneys, pancreas, or adrenal glands  No calcified gallstones or gallbladder wall thickening noted  No ascites or lymphadenopathy  Lumbar degenerative changes  No acute osseous abnormality      IMPRESSION:     Polyp in the proximal transverse colon, 8 x 6 mm      This examination was marked "significant notification" in Epic in order to begin the standard process by which the radiology reading room liaison alerts the referring practitioner  RENAL ULTRASOUND 3/4/2021     INDICATION:   R31 1: Benign essential microscopic hematuria      COMPARISON: 4/29/2019     TECHNIQUE:   Ultrasound of the retroperitoneum was performed with a curvilinear transducer utilizing volumetric sweeps and still imaging techniques       FINDINGS:     KIDNEYS:  Symmetric and normal size  Right kidney:  9 5 x 4 7 x 4 2 cm  Left kidney:  9 2 x 5 x 4 9 cm      Right kidney  Normal echogenicity and contour  No suspicious masses detected  No hydronephrosis  No shadowing calculi  No perinephric fluid collections      Left kidney  Normal echogenicity and contour  No suspicious masses detected  No hydronephrosis  No shadowing calculi  No perinephric fluid collections      URETERS:  Nonvisualized      BLADDER:   Normally distended  No focal thickening or mass lesions  Bilateral ureteral jets detected         IMPRESSION:  1  Unremarkable exam   Review of Systems     Review of Systems   Constitutional: Negative for activity change, appetite change, chills, fatigue, fever and unexpected weight change  HENT: Negative for facial swelling  Eyes: Negative for discharge  Respiratory: Negative    Negative for cough and shortness of breath  Cardiovascular: Negative for chest pain and leg swelling  Gastrointestinal: Positive for abdominal distention  Negative for abdominal pain, constipation, diarrhea, nausea and vomiting  RUQ abdominal pain "for awhile"   Endocrine: Negative  Genitourinary: Negative  Negative for decreased urine volume, difficulty urinating, dysuria, enuresis, flank pain, frequency, genital sores, hematuria and urgency  Musculoskeletal: Negative for back pain and myalgias  Skin: Negative for pallor and rash  Allergic/Immunologic: Negative  Negative for immunocompromised state  Neurological: Negative for facial asymmetry and speech difficulty  Psychiatric/Behavioral: Negative for agitation and confusion  Allergies     Allergies   Allergen Reactions   • Bee Venom      Tongue swelling   • Flurbiprofen Hives   • Ibuprofen Shortness Of Breath   • Levaquin [Levofloxacin In D5w] Other (See Comments)     Dark spots under eyes and wrists   • Tequin [Gatifloxacin] Rash       Physical Exam     Physical Exam  Vitals reviewed  Constitutional:       General: She is not in acute distress  Appearance: Normal appearance  She is obese  She is not ill-appearing, toxic-appearing or diaphoretic  HENT:      Head: Normocephalic and atraumatic  Eyes:      General: No scleral icterus  Cardiovascular:      Rate and Rhythm: Normal rate  Pulmonary:      Effort: Pulmonary effort is normal  No respiratory distress  Abdominal:      General: Abdomen is flat  There is no distension  Palpations: Abdomen is soft  Tenderness: There is no abdominal tenderness  There is no right CVA tenderness, left CVA tenderness, guarding or rebound  Musculoskeletal:         General: No swelling  Cervical back: Normal range of motion  Skin:     General: Skin is warm and dry  Coloration: Skin is not jaundiced or pale  Findings: No rash  Neurological:      General: No focal deficit present        Mental Status: She is alert and oriented to person, place, and time  Gait: Gait normal    Psychiatric:         Mood and Affect: Mood normal          Behavior: Behavior normal          Thought Content:  Thought content normal          Judgment: Judgment normal          Vital Signs     Vitals:    02/15/23 0941   BP: 158/82   BP Location: Left arm   Patient Position: Sitting   Cuff Size: Adult   Pulse: 86   SpO2: 96%   Weight: 75 3 kg (166 lb)   Height: 5' 4" (1 626 m)       Current Medications       Current Outpatient Medications:   •  apixaban (ELIQUIS) 5 mg, Take 5 mg by mouth 2 (two) times a day To stop 2 days prior to surgery per md, Disp: , Rfl:   •  atorvastatin (LIPITOR) 80 mg tablet, TAKE 1 TABLET BY MOUTH EVERY DAY, Disp: 90 tablet, Rfl: 1  •  ciclopirox (LOPROX) 0 77 % SUSP, APPLY 1-2 TIMES DAILY TO AFFECTED TOENAILS, Disp: , Rfl:   •  Coenzyme Q10 (CO Q 10 PO), Take 200 mg by mouth, Disp: , Rfl:   •  cyanocobalamin (VITAMIN B-12) 1000 MCG tablet, Take 1,000 mcg by mouth, Disp: , Rfl:   •  diltiazem (CARDIZEM CD) 120 mg 24 hr capsule, Take 120 mg by mouth every evening , Disp: , Rfl:   •  EPINEPHrine (EpiPen 2-Pa) 0 3 mg/0 3 mL SOAJ, Inject 0 3 mL (0 3 mg total) into a muscle once as needed for anaphylaxis for up to 1 dose, Disp: 0 6 mL, Rfl: 0  •  EPINEPHrine (EPIPEN) 0 3 mg/0 3 mL SOAJ, Inject 0 3 mL (0 3 mg total) into a muscle once for 1 dose, Disp: 0 6 mL, Rfl: 0  •  levothyroxine 100 mcg tablet, TAKE 1 TABLET BY MOUTH EVERY DAY, Disp: 90 tablet, Rfl: 0  •  Multiple Vitamins-Calcium (ESSENTIAL ONE DAILY MULTIVIT PO), Take by mouth daily , Disp: , Rfl:   •  Omega-3 Fatty Acids (FISH OIL PO), Take 2 g by mouth daily , Disp: , Rfl:   •  omeprazole (PriLOSEC) 20 mg delayed release capsule, TAKE 1 CAPSULE (20 MG TOTAL) BY MOUTH AS NEEDED (GERD), Disp: 90 capsule, Rfl: 0    Active Problems     Patient Active Problem List   Diagnosis   • Persistent atrial fibrillation (HCC)   • Benign essential hypertension   • Coagulopathy ([place] [address] )   • History of colon cancer   • Overweight   • Osteoarthritis   • Postoperative hypothyroidism   • Mixed hyperlipidemia   • Medicare annual wellness visit, subsequent   • Osteopenia of multiple sites   • 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   • Dense breast tissue   • Hashimoto's thyroiditis   • History of lung cancer   • Thyroid nodule   • Encounter for follow-up surveillance of breast cancer   • Stage 3a chronic kidney disease ([place] [address] )   • Refusal of blood transfusions as patient is [employer]   • Gastroesophageal reflux disease   • Hematuria       Past Medical History     Past Medical History:   Diagnosis Date   • Anesthesia     "cheap date"   • Arthritis    • Atrial fibrillation ([place] [address] )    • Benign polyp of large intestine    • 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   • Colon cancer [facility]) 1977    43 yo   • Colon polyp    • Full dentures     full upper/ partial lower   • GERD (gastroesophageal reflux disease)    • Glaucoma suspect    • History of neck problems     "C3-C7 and had epidural injections years ago"   • Hyperlipidemia    • Hypertension    • Lung cancer ([place] [address] )     jan 2020- had surgery   • 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[place] [address] )    • Prediabetes    • Primary adenocarcinoma of upper lobe of right lung ([place] [address] ) 12/2/2019   • Pulmonary nodules 2/19/2015   • Refusal of blood transfusions as patient is [employer]    • Tic disorder, transient, recurrent     not recent   • Walks frequently        Surgical History     Past Surgical History:   Procedure Laterality Date   • APPENDECTOMY     • ARTHROSCOPY KNEE     • BREAST BIOPSY Right 04/10/2017    US guided - intraductal papillomas   • BREAST BIOPSY Right 10/24/2019    ADH   • BREAST LUMPECTOMY W/ NEEDLE LOCALIZATION Right 05/22/2020    Procedure: EXCISIONAL BIOPSY;  1000 NEEDLE LOC;  Surgeon: Sukumar Soriano MD;  [facility];  Service: Surgical Oncology   • CARDIOVASCULAR STRESS TEST     • CARPAL TUNNEL RELEASE Left 07/01/2022   • CATARACT EXTRACTION Bilateral    • COLON SURGERY     • COLONOSCOPY      done 2010 due 2015 Dr Kuo   • DILATION AND CURETTAGE OF UTERUS     • ESOPHAGOGASTRODUODENOSCOPY      inflammation aonly   • HEMICOLECTOMY Right 1977   • HYSTERECTOMY     • JOINT REPLACEMENT      L knee   • LUNG SURGERY     • MAMMO NEEDLE LOCALIZATION RIGHT (ALL INC) Right 05/22/2020   • MAMMO NEEDLE LOCALIZATION RIGHT (ALL INC) EACH ADD Right 05/22/2020   • MAMMO STEREOTACTIC BREAST BIOPSY RIGHT (ALL INC) Right 10/24/2019   • PARATHYROID GLAND SURGERY      exploration    • [place] [address] INCL FLUOR GDNCE DX W/CELL WASHG SPX N/A 01/17/2020    Procedure: Latanya Neither;  Surgeon: Nikole Rich MD;  [facility];  Service: Thoracic   • AL THORACOSCOPY W/LOBECTOMY SINGLE LOBE Right 01/17/2020    Procedure: ROBOTIC ASSISTED COMPLETE LUNG LOBECTOMY;  Surgeon: Nikole Rich MD;  [facility];  Service: Thoracic   • AL THORACOSCOPY W/THERA WEDGE RESEXN INITIAL UNILAT Right 01/17/2020    Procedure: ROBOTIC ASSISTED UPPER-LOBE WEDGE RESECTION, MEDIASTINAL LYMPH NODE DISECTION;  Surgeon: Nikole Rich MD;  [facility];  Service: Thoracic   • REPLACEMENT TOTAL KNEE     • THYROID SURGERY     • TOTAL ABDOMINAL HYSTERECTOMY  1983   • TOTAL THYROIDECTOMY     • TRIGGER FINGER RELEASE Left 07/01/2022   • US GUIDED BREAST BIOPSY RIGHT COMPLETE Right 04/10/2017   • US GUIDED THYROID BIOPSY  02/14/2020       Family History     Family History   Problem Relation Age of Onset   • Esophageal cancer Mother         [de-identified]   • Hypertension Mother    • Stroke Family         TIA   • Breast cancer Paternal Aunt 80   • No Known Problems Father    • COPD Sister    • COPD Sister    • No Known Problems Maternal Grandmother    • No Known Problems Maternal Grandfather    • No Known Problems Paternal Grandmother    • No Known Problems Paternal Grandfather    • No Known Problems Maternal Aunt    • No Known Problems Maternal Aunt    • No Known Problems Maternal Aunt    • No Known Problems Maternal Aunt    • No Known Problems Maternal Aunt        Social History     Social History     Social History     Tobacco Use   Smoking Status Former   • Packs/day: 1 50   • Years: 20 00   • Pack years: 30 00   • Types: Cigarettes   • Quit date: 5   • Years since quittin 1   Smokeless Tobacco Never       Past Surgical History:   Procedure Laterality Date   • APPENDECTOMY     • ARTHROSCOPY KNEE     • BREAST BIOPSY Right 04/10/2017    US guided - intraductal papillomas   • BREAST BIOPSY Right 10/24/2019    ADH   • BREAST LUMPECTOMY W/ NEEDLE LOCALIZATION Right 2020    Procedure: EXCISIONAL BIOPSY;  1000 NEEDLE LOC;  Surgeon: Casey Rocha MD;  [facility];  Service: Surgical Oncology   • CARDIOVASCULAR STRESS TEST     • CARPAL TUNNEL RELEASE Left 2022   • CATARACT EXTRACTION Bilateral    • COLON SURGERY     • COLONOSCOPY      done  due  Dr Kuo   • DILATION AND CURETTAGE OF UTERUS     • ESOPHAGOGASTRODUODENOSCOPY      inflammation aonly   • HEMICOLECTOMY Right    • HYSTERECTOMY     • JOINT REPLACEMENT      L knee   • LUNG SURGERY     • MAMMO NEEDLE LOCALIZATION RIGHT (ALL INC) Right 2020   • MAMMO NEEDLE LOCALIZATION RIGHT (ALL INC) EACH ADD Right 2020   • MAMMO STEREOTACTIC BREAST BIOPSY RIGHT (ALL INC) Right 10/24/2019   • PARATHYROID GLAND SURGERY      exploration    • [place] [address] INCL FLUOR GDNCE DX W/CELL WASHG SPX N/A 2020    Procedure: FLEXIBLE BRONCHOSCOPY;  Surgeon: Calli Price MD;  [facility];  Service: Thoracic   • NM THORACOSCOPY W/LOBECTOMY SINGLE LOBE Right 2020    Procedure: ROBOTIC ASSISTED COMPLETE LUNG LOBECTOMY;  Surgeon: Calli Price MD;  [facility];  Service: Thoracic   • NM THORACOSCOPY W/THERA WEDGE RESEXN INITIAL UNILAT Right 2020    Procedure: ROBOTIC ASSISTED UPPER-LOBE WEDGE RESECTION, MEDIASTINAL LYMPH NODE DISECTION;  Surgeon: Katherine Lira MD Prosper;  [facility];  Service: Thoracic   • REPLACEMENT TOTAL KNEE     • THYROID SURGERY     • TOTAL ABDOMINAL HYSTERECTOMY  1983   • TOTAL THYROIDECTOMY     • TRIGGER FINGER RELEASE Left 07/01/2022   • US GUIDED BREAST BIOPSY RIGHT COMPLETE Right 04/10/2017   • US GUIDED THYROID BIOPSY  02/14/2020         The following portions of the patient's history were reviewed and updated as appropriate: allergies, current medications, past family history, past medical history, past social history, past surgical history and problem list    Please note :  Voice dictation software has been used to create this document  There may be inadvertent transcription errors      [id number] [place] [id number] Blaine Kar

## 2023-02-15 NOTE — PATIENT INSTRUCTIONS
Hematuria   WHAT YOU NEED TO KNOW:   What is hematuria? Hematuria is blood in your urine  Your urine may be bright red to dark brown  What other signs and symptoms might I have with hematuria? Fever    Nausea and vomiting    Pain or bruising on your lower back or sides    Pain or burning when you urinate    More urination than usual, or the need to urinate right away    Blood clots in the toilet after you urinate    What causes hematuria? Ask your healthcare provider for more information about these and other causes of hematuria:  Urinary tract infection    Kidney or bladder stones    Swollen prostate    Kidney disease    Abdomen or pelvic injury    Kidney, bladder, or prostate cancer    Intense exercise    How is hematuria diagnosed? Your healthcare provider will ask when you first saw a change in the color of your urine  Tell him or her about any medical conditions or medicines you take  Some medicines can damage your kidneys or increase your risk for bleeding  You may need any of the following:  Blood and urine tests  may show infection and how well your kidneys are working  An ultrasound or CT  may show the cause of your hematuria  You may be given contrast liquid to help your urinary tract show up better in the pictures  Tell the healthcare provider if you have ever had an allergic reaction to contrast liquid  A cystoscopy  may show problems inside your bladder  The cystoscope is a long tube with a lens and a light on the end  How is hematuria treated? Hematuria may go away without treatment  You may need medicines to treat an infection  Treatment depends on the cause of your hematuria  Ask your healthcare provider for more information about the treatment you may need  How can I manage my symptoms? Drink liquids as directed  You may need to drink extra liquids to help flush the blood from your body through your urine  Water is the best liquid to drink   Ask how much liquid to drink each day and which liquids are best for you  When should I seek immediate care? You have blood in your urine after a new injury, such as a fall  You have severe back or side pain that does not go away with treatment  When should I call my doctor? You are urinating very small amounts or not at all  You feel like you cannot empty your bladder  You have a fever that gets worse or does not go away with treatment  You cannot keep liquids or medicines down  Your urine gets darker, even after you drink extra liquids  You have questions or concerns about your condition, treatment, or care  CARE AGREEMENT:   You have the right to help plan your care  Learn about your health condition and how it may be treated  Discuss treatment options with your healthcare providers to decide what care you want to receive  You always have the right to refuse treatment  The above information is an  only  It is not intended as medical advice for individual conditions or treatments  Talk to your doctor, nurse or pharmacist before following any medical regimen to see if it is safe and effective for you  © Copyright [employer] 2022 Information is for End User's use only and may not be sold, redistributed or otherwise used for commercial purposes   All illustrations and images included in CareNotes® are the copyrighted property of [employer]  or [address] [place] [address] [employer]

## 2023-02-15 NOTE — LETTER
February 15, 2023     Maricarmenus Pennington, [address] [facility] [address]  [place]  [address]  [id number]    Patient: Asael Carty   YOB: 1940   Date of Visit: 2/15/2023       Dear Dr Chas Blanton:    Thank you for referring Asael Carty to me for evaluation  Below are my notes for this consultation  If you have questions, please do not hesitate to call me  I look forward to following your patient along with you  Sincerely,        NEYDA Rivera        CC: MD Lauren Colin, [address]  2/15/2023 10:16 AM  Incomplete    Assessment and plan:     No problem-specific Assessment & Plan notes found for this encounter  NEYDA Rivera    History of Present Illness     Asael Carty is a 78 y o  new patient who presents for microscopic hematuria  She formerly followed with Dr Eddie Schultz  She had prior urine testing in 2020 but no microscopic testing  The last microscopic urine was in 2019 and had 0-1 rbc/hpf  Previously in 2018 with 0-2 rbc/hpf  Her last ultrasound was in 2021 and was unremarkable  She was referred by oncology due to hx of  Microhematuria  She was getting yearly cystoscopy per her report         Gross hematuria:denies    Smoking history:quit in 1967, 20-30 pack year hx    Chemical exposure: denies    Agent orange exposure:denies    Urine dip: unable to provide sample in the office    History of kidney stones:denies    Prior urological procedures: cystoscopy many years ago    History of pelvic radiation: no    Family history of urothelial or renal cell carcinoma: denies    History of recurrent UTI: denies      Laboratory     Lab Results   Component Value Date    BUN 13 09/08/2022    CREATININE 1 10 09/08/2022       No components found for: GFR    Lab Results   Component Value Date    GLUCOSE 113 07/06/2016    CALCIUM 9 1 09/08/2022     08/11/2015    K 3 7 09/08/2022    CO2 28 09/08/2022     09/08/2022       Lab Results   Component Value Date    WBC 4 84 09/08/2022    HGB 13 2 09/08/2022    HCT 39 8 09/08/2022    MCV 99 (H) 09/08/2022     09/08/2022       No results found for: PSA    No results found for this or any previous visit (from the past 1 hour(s))  @RESULT(URINEMICROSCOPIC)@    @RESULT(URINECULTURE)@    Radiology   VIRTUAL CT COLONOSCOPY - CT ABDOMEN AND PELVIS WITHOUT IV CONTRAST 12/8/2022     INDICATION:   [id number]: Personal history of other malignant neoplasm of large intestine      COMPARISON:  None      TECHNIQUE:  CT examination of the abdomen and pelvis was performed without intravenous and without enteric contrast using low radiation dose according to a protocol designed specifically to evaluate the colonic mucosa  Three dimensional reconstructed   images were created on an independent workstation and included for evaluation  Reformatted images were created in axial, sagittal, and coronal planes          Radiation dose length product (DLP) for this visit:  369 08 mGy-cm   This examination, like all CT scans performed in the [facility], was performed utilizing techniques to minimize radiation dose exposure, including the use of iterative   reconstruction and automated exposure control       COLON PREP: Patient was administered our standard virtual colonoscopy preparation  Colon prep was slightly suboptimal with greater than typical residual foci of well tagged fecal material   Room air distention of the colon is adequate throughout on both   the supine and prone sequences      FINDINGS:     COLONIC MUCOSA: Sigmoid colon is tortuous and redundant  Extensive sigmoid diverticulosis noted  Also noted is tortuosity and redundancy of the splenic flecture      There is an 8 x 6 mm polyp along the anterior wall of the proximal transverse colon well seen on supine image 358 of series 3 and prone image 343 of series 6    No other colonic polyp or mass of 6 mm or greater in size is detected            ABDOMEN AND PELVIS:  Limited evaluation demonstrates no significant abnormality of liver, spleen, kidneys, pancreas, or adrenal glands  No calcified gallstones or gallbladder wall thickening noted  No ascites or lymphadenopathy  Lumbar degenerative changes  No acute osseous abnormality      IMPRESSION:     Polyp in the proximal transverse colon, 8 x 6 mm      This examination was marked "significant notification" in Epic in order to begin the standard process by which the radiology reading room liaison alerts the referring practitioner  RENAL ULTRASOUND 3/4/2021     INDICATION:   R31 1: Benign essential microscopic hematuria      COMPARISON: 4/29/2019     TECHNIQUE:   Ultrasound of the retroperitoneum was performed with a curvilinear transducer utilizing volumetric sweeps and still imaging techniques       FINDINGS:     KIDNEYS:  Symmetric and normal size  Right kidney:  9 5 x 4 7 x 4 2 cm  Left kidney:  9 2 x 5 x 4 9 cm      Right kidney  Normal echogenicity and contour  No suspicious masses detected  No hydronephrosis  No shadowing calculi  No perinephric fluid collections      Left kidney  Normal echogenicity and contour  No suspicious masses detected  No hydronephrosis  No shadowing calculi  No perinephric fluid collections      URETERS:  Nonvisualized      BLADDER:   Normally distended  No focal thickening or mass lesions  Bilateral ureteral jets detected         IMPRESSION:  1  Unremarkable exam   Review of Systems     Review of Systems   Constitutional: Negative for activity change, appetite change, chills, fatigue, fever and unexpected weight change  HENT: Negative for facial swelling  Eyes: Negative for discharge  Respiratory: Negative  Negative for cough and shortness of breath  Cardiovascular: Negative for chest pain and leg swelling  Gastrointestinal: Positive for abdominal distention  Negative for abdominal pain, constipation, diarrhea, nausea and vomiting          RUQ abdominal pain "for awhile"   Endocrine: Negative  Genitourinary: Negative  Negative for decreased urine volume, difficulty urinating, dysuria, enuresis, flank pain, frequency, genital sores, hematuria and urgency  Musculoskeletal: Negative for back pain and myalgias  Skin: Negative for pallor and rash  Allergic/Immunologic: Negative  Negative for immunocompromised state  Neurological: Negative for facial asymmetry and speech difficulty  Psychiatric/Behavioral: Negative for agitation and confusion       Allergies     Allergies   Allergen Reactions   • Bee Venom      Tongue swelling   • Flurbiprofen Hives   • Ibuprofen Shortness Of Breath   • Levaquin [Levofloxacin In D5w] Other (See Comments)     Dark spots under eyes and wrists   • Tequin [Gatifloxacin] Rash       Physical Exam     Physical Exam    Vital Signs     Vitals:    02/15/23 0941   BP: 158/82   BP Location: Left arm   Patient Position: Sitting   Cuff Size: Adult   Pulse: 86   SpO2: 96%   Weight: 75 3 kg (166 lb)   Height: 5' 4" (1 626 m)       Current Medications       Current Outpatient Medications:   •  apixaban (ELIQUIS) 5 mg, Take 5 mg by mouth 2 (two) times a day To stop 2 days prior to surgery per md, Disp: , Rfl:   •  atorvastatin (LIPITOR) 80 mg tablet, TAKE 1 TABLET BY MOUTH EVERY DAY, Disp: 90 tablet, Rfl: 1  •  ciclopirox (LOPROX) 0 77 % SUSP, APPLY 1-2 TIMES DAILY TO AFFECTED TOENAILS, Disp: , Rfl:   •  Coenzyme Q10 (CO Q 10 PO), Take 200 mg by mouth, Disp: , Rfl:   •  cyanocobalamin (VITAMIN B-12) 1000 MCG tablet, Take 1,000 mcg by mouth, Disp: , Rfl:   •  diltiazem (CARDIZEM CD) 120 mg 24 hr capsule, Take 120 mg by mouth every evening , Disp: , Rfl:   •  EPINEPHrine (EpiPen 2-Pa) 0 3 mg/0 3 mL SOAJ, Inject 0 3 mL (0 3 mg total) into a muscle once as needed for anaphylaxis for up to 1 dose, Disp: 0 6 mL, Rfl: 0  •  EPINEPHrine (EPIPEN) 0 3 mg/0 3 mL SOAJ, Inject 0 3 mL (0 3 mg total) into a muscle once for 1 dose, Disp: 0 6 mL, Rfl: 0  • levothyroxine 100 mcg tablet, TAKE 1 TABLET BY MOUTH EVERY DAY, Disp: 90 tablet, Rfl: 0  •  Multiple Vitamins-Calcium (ESSENTIAL ONE DAILY MULTIVIT PO), Take by mouth daily , Disp: , Rfl:   •  Omega-3 Fatty Acids (FISH OIL PO), Take 2 g by mouth daily , Disp: , Rfl:   •  omeprazole (PriLOSEC) 20 mg delayed release capsule, TAKE 1 CAPSULE (20 MG TOTAL) BY MOUTH AS NEEDED (GERD), Disp: 90 capsule, Rfl: 0    Active Problems     Patient Active Problem List   Diagnosis   • Persistent atrial fibrillation (HCC)   • Benign essential hypertension   • Coagulopathy (HCC)   • History of colon cancer   • Overweight   • Osteoarthritis   • Postoperative hypothyroidism   • Mixed hyperlipidemia   • Medicare annual wellness visit, subsequent   • Osteopenia of multiple sites   • 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   • Dense breast tissue   • Hashimoto's thyroiditis   • History of lung cancer   • Thyroid nodule   • Encounter for follow-up surveillance of breast cancer   • Stage 3a [facility] kidney [facility] ([facility] )   • Refusal of blood transfusions as patient is Jainism   • Gastroesophageal reflux disease   • Hematuria       Past Medical History     Past Medical History:   Diagnosis Date   • Anesthesia     "cheap date"   • Arthritis    • Atrial fibrillation (HCC)    • Benign polyp of large intestine    • 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   • Colon cancer [facility]) 1977    41 yo   • Colon polyp    • Full dentures     full upper/ partial lower   • GERD (gastroesophageal reflux disease)    • Glaucoma suspect    • History of neck problems     "C3-C7 and had epidural injections years ago"   • Hyperlipidemia    • Hypertension    • Lung cancer ([facility] )     jan 2020- had surgery   • 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[facility] )    • Prediabetes    • Primary adenocarcinoma of upper lobe of right lung ([facility] ) 12/2/2019   • Pulmonary nodules 2/19/2015   • Refusal of blood transfusions as patient is Jainism    • Tic disorder, transient, recurrent     not recent   • Walks frequently        Surgical History     Past Surgical History:   Procedure Laterality Date   • APPENDECTOMY     • ARTHROSCOPY KNEE     • BREAST BIOPSY Right 04/10/2017    US guided - intraductal papillomas   • BREAST BIOPSY Right 10/24/2019    ADH   • BREAST LUMPECTOMY W/ NEEDLE LOCALIZATION Right 05/22/2020    Procedure: EXCISIONAL BIOPSY;  1000 NEEDLE LOC;  Surgeon: Rebekah Shaw MD;  [facility];  Service: Surgical Oncology   • CARDIOVASCULAR STRESS TEST     • CARPAL TUNNEL RELEASE Left 07/01/2022   • CATARACT EXTRACTION Bilateral    • COLON SURGERY     • COLONOSCOPY      done 2010 due 2015 Dr Kuo   • DILATION AND CURETTAGE OF UTERUS     • ESOPHAGOGASTRODUODENOSCOPY      inflammation aonly   • HEMICOLECTOMY Right 1977   • HYSTERECTOMY     • JOINT REPLACEMENT      L knee   • LUNG SURGERY     • MAMMO NEEDLE LOCALIZATION RIGHT (ALL INC) Right 05/22/2020   • MAMMO NEEDLE LOCALIZATION RIGHT (ALL INC) EACH ADD Right 05/22/2020   • MAMMO STEREOTACTIC BREAST BIOPSY RIGHT (ALL INC) Right 10/24/2019   • PARATHYROID GLAND SURGERY      exploration    • [place] [address] INCL FLUOR GDNCE DX W/CELL WASHG SPX N/A 01/17/2020    Procedure: FLEXIBLE BRONCHOSCOPY;  Surgeon: Jesus Eldridge MD;  [facility];  Service: Thoracic   • IA THORACOSCOPY W/LOBECTOMY SINGLE LOBE Right 01/17/2020    Procedure: ROBOTIC ASSISTED COMPLETE LUNG LOBECTOMY;  Surgeon: Jesus Eldridge MD;  [facility];  Service: Thoracic   • IA THORACOSCOPY W/THERA WEDGE RESEXN INITIAL UNILAT Right 01/17/2020    Procedure: ROBOTIC ASSISTED UPPER-LOBE WEDGE RESECTION, MEDIASTINAL LYMPH NODE DISECTION;  Surgeon: Jesus Eldridge MD;  [facility];  Service: Thoracic   • REPLACEMENT TOTAL KNEE     • THYROID SURGERY     • TOTAL ABDOMINAL HYSTERECTOMY  1983   • TOTAL THYROIDECTOMY     • TRIGGER FINGER RELEASE Left 07/01/2022   • US GUIDED BREAST BIOPSY RIGHT COMPLETE Right 04/10/2017   • US GUIDED THYROID BIOPSY 2020       Family History     Family History   Problem Relation Age of Onset   • Esophageal cancer Mother         [de-identified]   • Hypertension Mother    • Stroke Family         TIA   • Breast cancer Paternal Aunt 80   • No Known Problems Father    • COPD Sister    • COPD Sister    • No Known Problems Maternal Grandmother    • No Known Problems Maternal Grandfather    • No Known Problems Paternal Grandmother    • No Known Problems Paternal Grandfather    • No Known Problems Maternal Aunt    • No Known Problems Maternal Aunt    • No Known Problems Maternal Aunt    • No Known Problems Maternal Aunt    • No Known Problems Maternal Aunt        Social History     Social History     Social History     Tobacco Use   Smoking Status Former   • Packs/day: 1 50   • Years: 20 00   • Pack years: 30 00   • Types: Cigarettes   • Quit date: 5   • Years since quittin 1   Smokeless Tobacco Never       Past Surgical History:   Procedure Laterality Date   • APPENDECTOMY     • ARTHROSCOPY KNEE     • BREAST BIOPSY Right 04/10/2017    US guided - intraductal papillomas   • BREAST BIOPSY Right 10/24/2019    ADH   • BREAST LUMPECTOMY W/ NEEDLE LOCALIZATION Right 2020    Procedure: EXCISIONAL BIOPSY;  1000 NEEDLE LOC;  Surgeon: Aaron Renteria MD;  [facility];  Service: Surgical Oncology   • CARDIOVASCULAR STRESS TEST     • CARPAL TUNNEL RELEASE Left 2022   • CATARACT EXTRACTION Bilateral    • COLON SURGERY     • COLONOSCOPY      done  due  Dr Kuo   • DILATION AND CURETTAGE OF UTERUS     • ESOPHAGOGASTRODUODENOSCOPY      inflammation aonly   • HEMICOLECTOMY Right    • HYSTERECTOMY     • JOINT REPLACEMENT      L knee   • LUNG SURGERY     • MAMMO NEEDLE LOCALIZATION RIGHT (ALL INC) Right 2020   • MAMMO NEEDLE LOCALIZATION RIGHT (ALL INC) EACH ADD Right 2020   • MAMMO STEREOTACTIC BREAST BIOPSY RIGHT (ALL INC) Right 10/24/2019   • PARATHYROID GLAND SURGERY      exploration    • [place] [address] INCL FLUOR GDNCE DX W/CELL WASHG SPX N/A 01/17/2020    Procedure: FLEXIBLE BRONCHOSCOPY;  Surgeon: Coni Wilson MD;  [facility];  Service: Thoracic   • ND THORACOSCOPY W/LOBECTOMY SINGLE LOBE Right 01/17/2020    Procedure: ROBOTIC ASSISTED COMPLETE LUNG LOBECTOMY;  Surgeon: Coni Wilson MD;  [facility];  Service: Thoracic   • ND THORACOSCOPY W/THERA WEDGE RESEXN INITIAL UNILAT Right 01/17/2020    Procedure: ROBOTIC ASSISTED UPPER-LOBE WEDGE RESECTION, MEDIASTINAL LYMPH NODE DISECTION;  Surgeon: Coni Wilson MD;  [facility];  Service: Thoracic   • REPLACEMENT TOTAL KNEE     • THYROID SURGERY     • TOTAL ABDOMINAL HYSTERECTOMY  1983   • TOTAL THYROIDECTOMY     • TRIGGER FINGER RELEASE Left 07/01/2022   • US GUIDED BREAST BIOPSY RIGHT COMPLETE Right 04/10/2017   • US GUIDED THYROID BIOPSY  02/14/2020         The following portions of the patient's history were reviewed and updated as appropriate: allergies, current medications, past family history, past medical history, past social history, past surgical history and problem list    Please note :  Voice dictation software has been used to create this document  There may be inadvertent transcription errors      Jewel Licea Check

## 2023-02-16 ENCOUNTER — TELEPHONE (OUTPATIENT)
Dept: OTHER | Facility: OTHER | Age: 83
End: 2023-02-16

## 2023-02-16 NOTE — TELEPHONE ENCOUNTER
Per patient's phone call, she would a call back with lab results and also she would like to have the lab report mailed to her as she has been locked out of [facility]

## 2023-02-16 NOTE — TELEPHONE ENCOUNTER
Telephone call to pt and left message instructing pt to call back  Printed urine and placed at  to mail

## 2023-02-17 NOTE — TELEPHONE ENCOUNTER
Called and spoke with patient  She reports she was able to see negative urinalysis results and MyChart and does not have any additional questions  She is scheduled for 6 month follow up and is aware will repeat urinalysis at that time  Advised patient copy of results was placed in outgoing mail yesterday per request  Patient thankful for the call

## 2023-03-11 DIAGNOSIS — E78.2 MIXED HYPERLIPIDEMIA: ICD-10-CM

## 2023-03-11 DIAGNOSIS — E89.0 POSTOPERATIVE HYPOTHYROIDISM: ICD-10-CM

## 2023-03-13 RX ORDER — ATORVASTATIN CALCIUM 80 MG/1
TABLET, FILM COATED ORAL
Qty: 90 TABLET | Refills: 1 | Status: SHIPPED | OUTPATIENT
Start: 2023-03-13

## 2023-03-13 RX ORDER — LEVOTHYROXINE SODIUM 0.1 MG/1
TABLET ORAL
Qty: 90 TABLET | Refills: 0 | Status: SHIPPED | OUTPATIENT
Start: 2023-03-13

## 2023-03-23 ENCOUNTER — HOSPITAL ENCOUNTER (OUTPATIENT)
Dept: ULTRASOUND IMAGING | Facility: HOSPITAL | Age: 83
Discharge: HOME | End: 2023-03-23
Attending: SURGERY

## 2023-03-23 DIAGNOSIS — E04.1 THYROID NODULE: ICD-10-CM

## 2023-03-24 ENCOUNTER — OFFICE VISIT (OUTPATIENT)
Dept: FAMILY MEDICINE CLINIC | Facility: CLINIC | Age: 83
End: 2023-03-24

## 2023-03-24 VITALS
WEIGHT: 165 LBS | HEIGHT: 65 IN | BODY MASS INDEX: 27.49 KG/M2 | DIASTOLIC BLOOD PRESSURE: 85 MMHG | SYSTOLIC BLOOD PRESSURE: 138 MMHG

## 2023-03-24 DIAGNOSIS — E89.0 POSTOPERATIVE HYPOTHYROIDISM: ICD-10-CM

## 2023-03-24 DIAGNOSIS — Z85.038 HISTORY OF COLON CANCER: ICD-10-CM

## 2023-03-24 DIAGNOSIS — E06.3 HASHIMOTO'S THYROIDITIS: ICD-10-CM

## 2023-03-24 DIAGNOSIS — Z79.01 LONG TERM (CURRENT) USE OF ANTICOAGULANTS: ICD-10-CM

## 2023-03-24 DIAGNOSIS — K21.9 GASTROESOPHAGEAL REFLUX DISEASE, UNSPECIFIED WHETHER ESOPHAGITIS PRESENT: ICD-10-CM

## 2023-03-24 DIAGNOSIS — E78.2 MIXED HYPERLIPIDEMIA: ICD-10-CM

## 2023-03-24 DIAGNOSIS — I10 BENIGN ESSENTIAL HYPERTENSION: Primary | ICD-10-CM

## 2023-03-24 DIAGNOSIS — E66.3 OVERWEIGHT: ICD-10-CM

## 2023-03-24 DIAGNOSIS — N18.31 STAGE 3A CHRONIC KIDNEY DISEASE (HCC): ICD-10-CM

## 2023-03-24 DIAGNOSIS — I48.19 PERSISTENT ATRIAL FIBRILLATION (HCC): ICD-10-CM

## 2023-03-24 NOTE — PATIENT INSTRUCTIONS
Chronic Hypertension   AMBULATORY CARE:   Hypertension is considered chronic  when it continues for 3 months or longer  Hypertension that continues causes your heart to work much harder than normal, which may lead to heart damage  Even if you have hypertension for years, lifestyle changes, medicines, or both may help lower your blood pressure  Call your local emergency number [phone] in the [address] [facility]) or have someone call if:   You have chest pain  You have any of the following signs of a heart attack:      Squeezing, pressure, or pain in your chest    You may  also have any of the following:     Discomfort or pain in your back, neck, jaw, stomach, or arm    Shortness of breath    Nausea or vomiting    Lightheadedness or a sudden cold sweat    You become confused or have difficulty speaking  You suddenly feel lightheaded or have trouble breathing  Seek care immediately if:   You have a severe headache or vision loss  You have weakness in an arm or leg  Call your doctor or cardiologist if:   You feel faint, dizzy, confused, or drowsy  You have been taking your blood pressure medicine but your pressure is higher than your provider says it should be  You have questions or concerns about your condition or care  Treatment for chronic hypertension  may include medicine to lower your blood pressure and cholesterol levels  A low cholesterol level helps prevent heart disease and makes it easier to control your blood pressure  Heart disease can make your blood pressure harder to control  You may also need to make lifestyle changes  What you need to know about the stages of hypertension:  Your healthcare provider will give you a blood pressure goal based on your age, health, and risk for cardiovascular disease  The following are general guidelines on the stages of hypertension:  Normal blood pressure is 119/79 or lower    Your provider may only check your blood pressure each year if it stays at a normal level     Elevated blood pressure is 120/79 to 129/79   This is sometimes called prehypertension  Your provider may suggest lifestyle changes to help lower your blood pressure to a normal level  He or she may then check it again in 3 to 6 months  Stage 1 hypertension is 130/80  to 139/89   Your provider may recommend lifestyle changes, medication, and checks every 3 to 6 months until your blood pressure is controlled  Stage 2 hypertension is 140/90 or higher   Your provider will recommend lifestyle changes and have you take 2 kinds of hypertension medicines  You will also need to have your blood pressure checked monthly until it is controlled  Manage chronic hypertension:   Check your blood pressure at home  Do not smoke, have caffeine, or exercise for at least 30 minutes before you check your blood pressure  Sit and rest for 5 minutes before you check your blood pressure  Extend your arm and support it on a flat surface  Your arm should be at the same level as your heart  Follow the directions that came with your blood pressure monitor  Check your blood pressure 2 times, 1 minute apart, before you take your medicine in the morning  Also check your blood pressure before your evening meal  Keep a record of your readings and bring it to your follow-up visits  Your healthcare provider may use the readings to make changes to your treatment plan  Manage any other health conditions you have  Health conditions such as diabetes can increase your risk for hypertension  Follow your provider's instructions and take all your medicines as directed  Talk to your provider about any new health conditions you have recently developed  Ask about all medicines  Certain medicines can increase your blood pressure  Examples include oral birth control pills, decongestants, herbal supplements, and NSAIDs, such as ibuprofen  Your provider can tell you which medicines are safe for you to take   This includes prescription and over-the-counter medicines  Lifestyle changes you can make to lower your blood pressure: Your provider may want you to make more lifestyle changes if you are having trouble controlling your blood pressure  This may feel difficult over time, especially if you think you are making good changes but your pressure is still high  It might help to focus on one new change at a time  For example, try to add 1 more day of exercise, or exercise for an extra 10 minutes on 2 days  Small changes can make a big difference  Your healthcare provider can also refer you to specialists such as a dietitian who can help you make small changes  Your family members may be included in helping you learn to create lifestyle changes, such as the following:     Limit sodium (salt) as directed  Too much sodium can affect your fluid balance  Check labels to find low-sodium or no-salt-added foods  Some low-sodium foods use potassium salts for flavor  Too much potassium can also cause health problems  Your provider will tell you how much sodium and potassium are safe for you to have in a day  He or she may recommend that you limit sodium to 2,300 mg a day  Follow the meal plan recommended by your provider  A dietitian or your provider can give you more information on low-sodium plans or the DASH (Dietary Approaches to Stop Hypertension) eating plan  The DASH plan is low in sodium, processed sugar, unhealthy fats, and total fat  It is high in potassium, calcium, and fiber  These can be found in vegetables, fruit, and whole-grain foods  Be physically active throughout the day  Physical activity, such as exercise, can help control your blood pressure and your weight  Be physically active for at least 30 minutes per day, on most days of the week  Include aerobic activity, such as walking or riding a bicycle  Also include strength training at least 2 times each week   Your provider can help you create a physical activity plan  Decrease stress  This may help lower your blood pressure  Learn ways to relax, such as deep breathing or listening to music  Limit alcohol as directed  Alcohol can increase your blood pressure  A drink of alcohol is 12 ounces of beer, 5 ounces of wine, or 1½ ounces of liquor  Your provider can help you set daily and weekly drink limits  He or she may recommend no alcohol if your blood pressure stays higher than goal even with medicine or other measures  Ask your provider for information if you need help to quit  Do not smoke  Nicotine and other chemicals in cigarettes and cigars can increase your blood pressure and also cause lung damage  Ask your provider for information if you currently smoke and need help to quit  E-cigarettes or smokeless tobacco still contain nicotine  Talk to your provider before you use these products  Follow up with your doctor or cardiologist as directed: You will need to return to have your blood pressure checked and to have other lab tests done  Write down your questions so you remember to ask them during your visits  © [employer] 2022 Information is for End User's use only and may not be sold, redistributed or otherwise used for commercial purposes  The above information is an  only  It is not intended as medical advice for individual conditions or treatments  Talk to your doctor, nurse or pharmacist before following any medical regimen to see if it is safe and effective for you

## 2023-03-24 NOTE — PROGRESS NOTES
Name: Trini Davis      : 1940      MRN: [id number]  Encounter Provider: Nitza Beasley DO  Encounter Date: 3/24/2023   Encounter department: [facility] PRIMARY CARE    Assessment & Plan     1  Benign essential hypertension  Assessment & Plan:  Blood pressure is controlled on medication Continue meds and follow up in 6 months      2  Persistent atrial fibrillation [facility])  Assessment & Plan:  Continues on eliquis Patient to continue yearly follow up with cardiology also      3  Long term (current) use of anticoagulants  Assessment & Plan:  Continue eliquis      4  Mixed hyperlipidemia  Assessment & Plan:  Lipid panel due continue meds       5  Postoperative hypothyroidism  Assessment & Plan:  Repeat TSH and continue medication I will see her in 6 months       6  Stage 3a chronic kidney disease [facility])  Assessment & Plan:  Lab Results   Component Value Date    EGFR 46 2022    EGFR 34 2022    EGFR 45 2022    CREATININE 1 10 2022    CREATININE 1 40 (H) 2022    CREATININE 1 13 2022   GFR is stable repeat labs and avoid all NSAID's      7  Hashimoto's thyroiditis  Assessment & Plan:  Reviewed the thyroid ultrasound Patient to also have albs done Continue meds and followup in  6 months      8  Gastroesophageal reflux disease, unspecified whether esophagitis present  Assessment & Plan:  Reflux is controlled continue meds      9  History of colon cancer  Assessment & Plan:  Continue with current care      10  Overweight  Assessment & Plan:  Weight is unchanged continue to monitor diet and try to exercise      BMI Counseling: Body mass index is 27 88 kg/m²  The BMI is above normal  Nutrition recommendations include decreasing portion sizes, encouraging healthy choices of fruits and vegetables and moderation in carbohydrate intake  Exercise recommendations include exercising 3-5 times per week   Rationale for BMI follow-up plan is due to patient being overweight or obese          Subjective      Patient is here for follow up her post operative hypothyroidism due to thyroid cancer history with autoimmune thyroiditis persistent atrial fibrillation hypertension hyperlipidemia stage 3 renal disease and her weight Patient has appt with cancer specialist Patient thyroid US was reviewed Patient is due for labs Patient had her yearly cardiology visit last fall Patient has no chest pain Patient is tolerating all medication Patient has not had any palpitations Patient bowel and bladder are stable      Chief Complaint   Patient presents with   • Follow-up   • Hypertension       Review of Systems   Constitutional: Negative for fatigue, fever and unexpected weight change  HENT: Negative for congestion, sinus pain and trouble swallowing  Eyes: Negative for discharge and visual disturbance  Respiratory: Negative for cough, chest tightness, shortness of breath and wheezing  Cardiovascular: Negative for chest pain, palpitations and leg swelling  Gastrointestinal: Negative for abdominal pain, blood in stool, constipation, diarrhea, nausea and vomiting  Genitourinary: Negative for difficulty urinating, dysuria, frequency and hematuria  Musculoskeletal: Negative for arthralgias, gait problem and joint swelling  Skin: Negative for rash and wound  Allergic/Immunologic: Negative for environmental allergies and food allergies  Neurological: Negative for dizziness, syncope, weakness, numbness and headaches  Hematological: Negative for adenopathy  Does not bruise/bleed easily  Psychiatric/Behavioral: Negative for confusion, decreased concentration and sleep disturbance  The patient is not nervous/anxious          Current Outpatient Medications on File Prior to Visit   Medication Sig   • apixaban (ELIQUIS) 5 mg Take 5 mg by mouth 2 (two) times a day To stop 2 days prior to surgery per md   • atorvastatin (LIPITOR) 80 mg tablet TAKE 1 TABLET BY MOUTH EVERY DAY   • ciclopirox (LOPROX) 0 77 % SUSP APPLY 1-2 TIMES DAILY TO AFFECTED TOENAILS   • Coenzyme Q10 (CO Q 10 PO) Take 200 mg by mouth   • cyanocobalamin (VITAMIN B-12) 1000 MCG tablet Take 1,000 mcg by mouth   • EPINEPHrine (EpiPen 2-Pa) 0 3 mg/0 3 mL SOAJ Inject 0 3 mL (0 3 mg total) into a muscle once as needed for anaphylaxis for up to 1 dose   • levothyroxine 100 mcg tablet TAKE 1 TABLET BY MOUTH EVERY DAY   • Multiple Vitamins-Calcium (ESSENTIAL ONE DAILY MULTIVIT PO) Take by mouth daily    • Omega-3 Fatty Acids (FISH OIL PO) Take 2 g by mouth daily    • omeprazole (PriLOSEC) 20 mg delayed release capsule TAKE 1 CAPSULE (20 MG TOTAL) BY MOUTH AS NEEDED (GERD)   • diltiazem (CARDIZEM CD) 120 mg 24 hr capsule Take 120 mg by mouth every evening    • EPINEPHrine (EPIPEN) 0 3 mg/0 3 mL SOAJ Inject 0 3 mL (0 3 mg total) into a muscle once for 1 dose   • [DISCONTINUED] Hydrocortisone Acetate 1 % CREA Apply topically (Patient not taking: No sig reported)   • [DISCONTINUED] mupirocin (BACTROBAN) 2 % ointment APPLY TO SORE AREA ON BOTTOM TWICE DAILY UNTIL HEALED (Patient not taking: No sig reported)       Objective     /85   Ht 5' 4 5" (1 638 m)   Wt 74 8 kg (165 lb)   BMI 27 88 kg/m²     Physical Exam  Vitals and nursing note reviewed  Constitutional:       Appearance: She is well-developed  She is obese  HENT:      Head: Normocephalic and atraumatic  Right Ear: Hearing, tympanic membrane and external ear normal       Left Ear: Hearing, tympanic membrane and external ear normal    Eyes:      Extraocular Movements: Extraocular movements intact  Conjunctiva/sclera: Conjunctivae normal       Pupils: Pupils are equal, round, and reactive to light  Neck:      Thyroid: No thyromegaly  Cardiovascular:      Rate and Rhythm: Normal rate and regular rhythm  Heart sounds: Normal heart sounds  Pulmonary:      Effort: Pulmonary effort is normal       Breath sounds: Normal breath sounds  No wheezing or rales  Abdominal:      General: Bowel sounds are normal  There is no distension  Palpations: Abdomen is soft  Tenderness: There is no abdominal tenderness  Musculoskeletal:         General: No tenderness  Cervical back: Neck supple  Lymphadenopathy:      Cervical: No cervical adenopathy  Skin:     General: Skin is warm and dry  Findings: No rash  Neurological:      General: No focal deficit present  Mental Status: She is alert and oriented to person, place, and time  Cranial Nerves: No cranial nerve deficit  Coordination: Coordination normal    Psychiatric:         Mood and Affect: Mood normal          Behavior: Behavior normal          Thought Content:  Thought content normal          Judgment: Judgment normal        Brooklyn Bilis, DO

## 2023-03-24 NOTE — ASSESSMENT & PLAN NOTE
Lab Results   Component Value Date    EGFR 46 09/08/2022    EGFR 34 06/14/2022    EGFR 45 04/25/2022    CREATININE 1 10 09/08/2022    CREATININE 1 40 (H) 06/14/2022    CREATININE 1 13 04/25/2022   GFR is stable repeat labs and avoid all NSAID's

## 2023-03-24 NOTE — ASSESSMENT & PLAN NOTE
Reviewed the thyroid ultrasound Patient to also have albs done Continue meds and followup in  6 months

## 2023-03-28 ENCOUNTER — APPOINTMENT (OUTPATIENT)
Dept: LAB | Facility: CLINIC | Age: 83
End: 2023-03-28

## 2023-03-28 ENCOUNTER — RA CDI HCC (OUTPATIENT)
Dept: OTHER | Facility: HOSPITAL | Age: 83
End: 2023-03-28

## 2023-03-28 DIAGNOSIS — N18.31 STAGE 3A CHRONIC KIDNEY DISEASE (HCC): ICD-10-CM

## 2023-03-28 DIAGNOSIS — I10 BENIGN ESSENTIAL HYPERTENSION: ICD-10-CM

## 2023-03-28 DIAGNOSIS — E78.2 MIXED HYPERLIPIDEMIA: ICD-10-CM

## 2023-03-28 DIAGNOSIS — E89.0 POSTOPERATIVE HYPOTHYROIDISM: ICD-10-CM

## 2023-03-28 LAB
ALBUMIN SERPL BCP-MCNC: 3.3 G/DL (ref 3.5–5)
ALP SERPL-CCNC: 104 U/L (ref 46–116)
ALT SERPL W P-5'-P-CCNC: 27 U/L (ref 12–78)
ANION GAP SERPL CALCULATED.3IONS-SCNC: 3 MMOL/L (ref 4–13)
AST SERPL W P-5'-P-CCNC: 27 U/L (ref 5–45)
BILIRUB SERPL-MCNC: 0.62 MG/DL (ref 0.2–1)
BUN SERPL-MCNC: 19 MG/DL (ref 5–25)
CALCIUM ALBUM COR SERPL-MCNC: 9.7 MG/DL (ref 8.3–10.1)
CALCIUM SERPL-MCNC: 9.1 MG/DL (ref 8.3–10.1)
CHLORIDE SERPL-SCNC: 106 MMOL/L (ref 96–108)
CHOLEST SERPL-MCNC: 173 MG/DL
CO2 SERPL-SCNC: 28 MMOL/L (ref 21–32)
CREAT SERPL-MCNC: 1.16 MG/DL (ref 0.6–1.3)
GFR SERPL CREATININE-BSD FRML MDRD: 43 ML/MIN/1.73SQ M
GLUCOSE P FAST SERPL-MCNC: 108 MG/DL (ref 65–99)
HDLC SERPL-MCNC: 90 MG/DL
LDLC SERPL CALC-MCNC: 72 MG/DL (ref 0–100)
NONHDLC SERPL-MCNC: 83 MG/DL
POTASSIUM SERPL-SCNC: 4.4 MMOL/L (ref 3.5–5.3)
PROT SERPL-MCNC: 7.6 G/DL (ref 6.4–8.4)
SODIUM SERPL-SCNC: 137 MMOL/L (ref 135–147)
TRIGL SERPL-MCNC: 57 MG/DL
TSH SERPL DL<=0.05 MIU/L-ACNC: 4.52 UIU/ML (ref 0.45–4.5)

## 2023-03-28 NOTE — PROGRESS NOTES
[facility] [address]  coding opportunities       Chart reviewed, no opportunity found: CHART REVIEWED, NO OPPORTUNITY FOUND  Mismatch report     Patients Insurance        Commercial Insurance: [employer]

## 2023-03-31 ENCOUNTER — OFFICE VISIT (OUTPATIENT)
Dept: SURGICAL ONCOLOGY | Facility: CLINIC | Age: 83
End: 2023-03-31

## 2023-03-31 VITALS
SYSTOLIC BLOOD PRESSURE: 118 MMHG | DIASTOLIC BLOOD PRESSURE: 70 MMHG | TEMPERATURE: 98.8 F | RESPIRATION RATE: 16 BRPM | HEIGHT: 65 IN | BODY MASS INDEX: 27.49 KG/M2 | WEIGHT: 165 LBS

## 2023-03-31 DIAGNOSIS — E04.1 THYROID NODULE: Primary | ICD-10-CM

## 2023-03-31 NOTE — PROGRESS NOTES
Surgical Oncology Follow Up       [address] [facility] SURGICAL ONCOLOGY [facility] [address]    Girish Carl  1940  [id number]  [facility] SURGICAL ONCOLOGY [place]  [place] [id number]    Chief Complaint   Patient presents with   • Follow-up       Assessment/Plan:  1  Thyroid nodule  - prn     Discussion/Summary: Patient is an 18-year-old female presenting today for 1 year follow-up for thyroid nodule  She was incidentally found to have a left-sided thyroid nodule  Last year thyroid nodule measured 0 6 x 0 5 x 0 4 cm however she had an ultrasound of the thyroid on 3/23/2023 and no nodules were identified  It appeared as though the subcentimeter echogenic focus in the left mid gland represents an area of heterogeneity consistent of chronic autoimmune thyroiditis rather than a true nodule  I explained the findings to the patient and she was very pleasant  She no longer needs to follow us for thyroid, however she is still being followed for hx of breast ca so if anything changes we can address the thyroid then  She was instructed to call with questions or concerns in the future  All questions were answered today  History of Present Illness:     Oncology History   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   11/20/2019 Initial Diagnosis    Ductal carcinoma in situ (DCIS) of right breast     5/22/2020 Surgery    A  Breast, right, lumpectomy:  -  Ductal carcinoma in situ, low nuclear grade   B  Breast, right new inferior margin, excision:  -  Ductal carcinoma in situ, low nuclear grade involving intraductal papilloma  C   Breast, right new lateral margin, excision:  -  Ductal carcinoma in situ, low nuclear grade  D   Breast, right new posterior margin, excision:  -  Benign breast parenchyma, negative for atypia or malignancy      %, NC 60-70%, HER2 1+ negative     6/9/2020 -  Cancer Staged    Staging form: Breast, AJCC 8th Edition  - Clinical: cT0, cN0, cM0 - Signed by Africa Balderas MD on 6/9/2020  Laterality: Right       6/9/2020 -  Cancer Staged    Staging form: Breast, AJCC 8th Edition  - Pathologic: Stage 0 (pTis (DCIS), pN0, cM0, ER+, WA+, HER2-) - Signed by Africa Balderas MD on 6/16/2020  Laterality: Right  Method of lymph node assessment: Clinical  Nuclear grade: G1       7/30/2020 - 8/28/2020 Radiation    Plan ID Energy Fractions Dose per Fraction (cGy) Dose Correction (cGy) Total Dose Delivered (cGy) Elapsed Days   R Breast 6X 16 / 16 266 0 4,256 21   R Brst Boost 6X 5 / 5 200 0 1,000 4        Primary adenocarcinoma of upper lobe of right lung ([address] ) (Resolved)   12/2/2019 Initial Diagnosis    Primary adenocarcinoma of upper lobe of right lung ([address] )     1/17/2020 Surgery    Lung, right upper lobe, wedge resection:  -  Invasive moderately differentiated adenocarcinoma  Stage IB          -Interval History: Patient is an 25-year-old female presenting today for 1 year follow-up for thyroid nodule  She had an ultrasound of the thyroid on 3/23/2023 and no nodules were identified  It appeared as though the subcentimeter echogenic focus in the left mid gland represents an area of heterogeneity consistent of chronic autoimmune thyroiditis rather than a true nodule   She denies changes on her exam     Review of Systems:  Review of Systems    Patient Active Problem List   Diagnosis   • Persistent atrial fibrillation (HCC)   • Benign essential hypertension   • Long term (current) use of anticoagulants   • History of colon cancer   • Overweight   • Osteoarthritis   • Postoperative hypothyroidism   • Mixed hyperlipidemia   • Medicare annual wellness visit, subsequent   • Osteopenia of multiple sites   • 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   • Dense breast tissue   • Hashimoto's thyroiditis   • History of lung cancer   • Thyroid nodule   • Encounter for follow-up surveillance of breast "cancer   • Stage 3a chronic kidney disease ([facility] )   • Refusal of blood transfusions as patient is Sikhism   • Gastroesophageal reflux disease   • Hematuria     Past Medical History:   Diagnosis Date   • Anesthesia     \"cheap date\"   • Arthritis    • Atrial fibrillation (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   • Colon cancer [facility]) 1977    41 yo   • Colon polyp    • Full dentures     full upper/ partial lower   • GERD (gastroesophageal reflux disease)    • Glaucoma suspect    • History of neck problems     \"C3-C7 and had epidural injections years ago\"   • Hyperlipidemia    • Hypertension    • Lung cancer ([place] [address] )     jan 2020- had surgery   • 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[place] [address] )    • Prediabetes    • Primary adenocarcinoma of upper lobe of right lung ([place] [address] ) 12/2/2019   • Pulmonary nodules 2/19/2015   • Refusal of blood transfusions as patient is Sikhism    • Tic disorder, transient, recurrent     not recent   • Walks frequently      Past Surgical History:   Procedure Laterality Date   • APPENDECTOMY     • ARTHROSCOPY KNEE     • BREAST BIOPSY Right 04/10/2017    US guided - intraductal papillomas   • BREAST BIOPSY Right 10/24/2019    ADH   • BREAST LUMPECTOMY W/ NEEDLE LOCALIZATION Right 05/22/2020    Procedure: EXCISIONAL BIOPSY;  1000 NEEDLE LOC;  Surgeon: Johnathon Alonzo MD;  Location: [facility];  Service: Surgical Oncology   • CARDIOVASCULAR STRESS TEST     • CARPAL TUNNEL RELEASE Left 07/01/2022   • CATARACT EXTRACTION Bilateral    • COLON SURGERY     • COLONOSCOPY      done 2010 due 2015 Dr Kuo   • DILATION AND CURETTAGE OF UTERUS     • ESOPHAGOGASTRODUODENOSCOPY      inflammation aonly   • HEMICOLECTOMY Right 1977   • HYSTERECTOMY     • JOINT REPLACEMENT      L knee   • LUNG SURGERY     • MAMMO NEEDLE LOCALIZATION RIGHT (ALL INC) Right 05/22/2020   • MAMMO NEEDLE LOCALIZATION RIGHT (ALL INC) EACH ADD Right 05/22/2020   • MAMMO STEREOTACTIC BREAST BIOPSY RIGHT (ALL " INC) Right 10/24/2019   • PARATHYROID GLAND SURGERY      exploration    • AR [address] INCL FLUOR GDNCE DX W/CELL WASHG SPX N/A 01/17/2020    Procedure: Martin Sherwood;  Surgeon: Adin Mills MD;  Location: [facility];  Service: Thoracic   • AR THORACOSCOPY W/LOBECTOMY SINGLE LOBE Right 01/17/2020    Procedure: ROBOTIC ASSISTED COMPLETE LUNG LOBECTOMY;  Surgeon: Adin Mills MD;  Location: [facility];  Service: Thoracic   • AR THORACOSCOPY W/THERA WEDGE RESEXN INITIAL UNILAT Right 01/17/2020    Procedure: ROBOTIC ASSISTED UPPER-LOBE WEDGE RESECTION, MEDIASTINAL LYMPH NODE DISECTION;  Surgeon: Adin Mills MD;  Location: [facility];  Service: Thoracic   • REPLACEMENT TOTAL KNEE     • THYROID SURGERY     • TOTAL ABDOMINAL HYSTERECTOMY  1983   • TOTAL THYROIDECTOMY     • TRIGGER FINGER RELEASE Left 07/01/2022   • US GUIDED BREAST BIOPSY RIGHT COMPLETE Right 04/10/2017   • US GUIDED THYROID BIOPSY  02/14/2020     Family History   Problem Relation Age of Onset   • Esophageal cancer Mother         [de-identified]   • Hypertension Mother    • Stroke Family         TIA   • Breast cancer Paternal Aunt 80   • No Known Problems Father    • COPD Sister    • COPD Sister    • No Known Problems Maternal Grandmother    • No Known Problems Maternal Grandfather    • No Known Problems Paternal Grandmother    • No Known Problems Paternal Grandfather    • No Known Problems Maternal Aunt    • No Known Problems Maternal Aunt    • No Known Problems Maternal Aunt    • No Known Problems Maternal Aunt    • No Known Problems Maternal Aunt      Social History     Socioeconomic History   • Marital status: Single     Spouse name: Not on file   • Number of children: Not on file   • Years of education: Not on file   • Highest education level: Not on file   Occupational History   • Not on file   Tobacco Use   • Smoking status: Former     Packs/day: 1 50     Years: 20 00     Pack years: 30 00     Types: Cigarettes     Quit date: 5 Years since quittin 2   • Smokeless tobacco: Never   Vaping Use   • Vaping Use: Never used   Substance and Sexual Activity   • Alcohol use: Yes     Alcohol/week: 5 0 standard drinks     Types: 5 Cans of beer per week     Comment: occasional beer with food   • Drug use: No   • Sexual activity: Not on file     Comment: hysterectomy   Other Topics Concern   • Not on file   Social History Narrative    Caffeine use- 1-3, 8oz coffees per day    Denied history of housing without smoke detectors    Always uses a seatbelt     Social Determinants of Health     Financial Resource Strain: Low Risk    • Difficulty of Paying Living Expenses: Not hard at all   Food Insecurity: Not on file   Transportation Needs: No Transportation Needs   • Lack of Transportation (Medical): No   • Lack of Transportation (Non-Medical):  No   Physical Activity: Not on file   Stress: Not on file   Social Connections: Not on file   Intimate Partner Violence: Not on file   Housing Stability: Not on file       Current Outpatient Medications:   •  apixaban (ELIQUIS) 5 mg, Take 5 mg by mouth 2 (two) times a day To stop 2 days prior to surgery per md, Disp: , Rfl:   •  atorvastatin (LIPITOR) 80 mg tablet, TAKE 1 TABLET BY MOUTH EVERY DAY, Disp: 90 tablet, Rfl: 1  •  ciclopirox (LOPROX) 0 77 % SUSP, APPLY 1-2 TIMES DAILY TO AFFECTED TOENAILS, Disp: , Rfl:   •  Coenzyme Q10 (CO Q 10 PO), Take 200 mg by mouth, Disp: , Rfl:   •  cyanocobalamin (VITAMIN B-12) 1000 MCG tablet, Take 1,000 mcg by mouth, Disp: , Rfl:   •  diltiazem (CARDIZEM CD) 120 mg 24 hr capsule, Take 120 mg by mouth every evening , Disp: , Rfl:   •  EPINEPHrine (EpiPen 2-Pa) 0 3 mg/0 3 mL SOAJ, Inject 0 3 mL (0 3 mg total) into a muscle once as needed for anaphylaxis for up to 1 dose, Disp: 0 6 mL, Rfl: 0  •  EPINEPHrine (EPIPEN) 0 3 mg/0 3 mL SOAJ, Inject 0 3 mL (0 3 mg total) into a muscle once for 1 dose, Disp: 0 6 mL, Rfl: 0  •  levothyroxine 100 mcg tablet, TAKE 1 TABLET BY MOUTH EVERY DAY, Disp: 90 tablet, Rfl: 0  •  Multiple Vitamins-Calcium (ESSENTIAL ONE DAILY MULTIVIT PO), Take by mouth daily , Disp: , Rfl:   •  Omega-3 Fatty Acids (FISH OIL PO), Take 2 g by mouth daily , Disp: , Rfl:   •  omeprazole (PriLOSEC) 20 mg delayed release capsule, TAKE 1 CAPSULE (20 MG TOTAL) BY MOUTH AS NEEDED (GERD), Disp: 90 capsule, Rfl: 0  Allergies   Allergen Reactions   • Bee Venom      Tongue swelling   • Flurbiprofen Hives   • Ibuprofen Shortness Of Breath   • Levaquin [Levofloxacin In D5w] Other (See Comments)     Dark spots under eyes and wrists   • Tequin [Gatifloxacin] Rash     Vitals:    03/31/23 1334   BP: 118/70   Resp: 16   Temp: 98 8 °F (37 1 °C)       Physical Exam      Results:    Imaging  US thyroid    Result Date: 3/23/2023  Narrative: THYROID ULTRASOUND INDICATION:    E04 1: Nontoxic single thyroid nodule  History of Hashimoto's thyroiditis status post right hemithyroidectomy  COMPARISON:  3/10/2022 TECHNIQUE:   Ultrasound of the thyroid was performed with a high frequency linear transducer in transverse and sagittal planes including volumetric imaging sweeps as well as traditional still imaging technique  FINDINGS:  Thyroid parenchyma is diffusely heterogeneous in echotexture  Right lobe: Status post right hemithyroidectomy  Thyroidectomy bed is normal  Left lobe:  4 3 x 2 5 x 2 6 cm  Volume 13 3 mL The subcentimeter echogenic focus in the left mid gland is again seen though this appears to represents an area of heterogeneity rather than a true nodule  Impression: Stable heterogeneous left thyroid consistent with chronic autoimmune thyroiditis  No nodules identified as above  Workstation performed: [id number]       I reviewed the above imaging data  Advance Care Planning/Advance Directives:  Discussed disease status, cancer treatment plans and/or cancer treatment goals with the patient

## 2023-04-06 ENCOUNTER — HOSPITAL ENCOUNTER (OUTPATIENT)
Dept: MAMMOGRAPHY | Facility: CLINIC | Age: 83
Discharge: HOME | End: 2023-04-06

## 2023-04-06 VITALS — HEIGHT: 64 IN | BODY MASS INDEX: 27.85 KG/M2 | WEIGHT: 163.14 LBS

## 2023-04-06 DIAGNOSIS — Z86.000 PERSONAL HISTORY OF IN-SITU NEOPLASM OF BREAST: ICD-10-CM

## 2023-04-17 ENCOUNTER — TELEPHONE (OUTPATIENT)
Dept: INFUSION CENTER | Facility: CLINIC | Age: 83
End: 2023-04-17

## 2023-05-11 ENCOUNTER — TELEPHONE (OUTPATIENT)
Dept: HEMATOLOGY ONCOLOGY | Facility: CLINIC | Age: 83
End: 2023-05-11

## 2023-05-11 NOTE — TELEPHONE ENCOUNTER
Attempted returning Alberta's call, no answer  Left her a detailed message regarding her request for thyroid testing and shared that she would need to discuss this request with her PCP

## 2023-05-11 NOTE — TELEPHONE ENCOUNTER
Call Transfer   Who are you speaking with? Patient   If it is not the patient, are they listed on an active communication consent form? N/A   Who is the patients HemOnc/SurgOnc provider? Dr Gilda Haynes   What is the reason for this call? Patient calling in wanting to know if she can get a script from Dr Gilda Haynes for a specific test she would like  Person/Department that the call was transferred to? Time that call was transferred? Ivonne @ 1:22PM   Your call will be transferred now  If you receive a voicemail, please leave a detailed message and a member of the team will return your call as soon as possible  Did you relay this information to the caller?   Yes

## 2023-05-11 NOTE — TELEPHONE ENCOUNTER
Patient Call    Who are you speaking with? Patient    If it is not the patient, are they listed on an active communication consent form? N/A   What is the reason for this call? The patient would like to know if she was tested for her T3  Or T4 she states if she did not get tested for this she will have Dr Jayme Miller do it for her  The patient would like a call back to discuss this  Does this require a call back? Yes   If a call back is required, please list best call back number [phone]   If a call back is required, advise that a message will be forwarded to their care team and someone will return their call as soon as possible  Did you relay this information to the patient?  Yes

## 2023-05-18 ENCOUNTER — OFFICE VISIT (OUTPATIENT)
Dept: SURGICAL ONCOLOGY | Facility: CLINIC | Age: 83
End: 2023-05-18

## 2023-05-18 VITALS
TEMPERATURE: 96.9 F | BODY MASS INDEX: 27.83 KG/M2 | WEIGHT: 163 LBS | OXYGEN SATURATION: 98 % | RESPIRATION RATE: 18 BRPM | HEIGHT: 64 IN | DIASTOLIC BLOOD PRESSURE: 70 MMHG | SYSTOLIC BLOOD PRESSURE: 132 MMHG | HEART RATE: 70 BPM

## 2023-05-18 DIAGNOSIS — R10.11 ABDOMINAL DISCOMFORT IN RIGHT UPPER QUADRANT: ICD-10-CM

## 2023-05-18 DIAGNOSIS — R92.2 DENSE BREAST TISSUE: ICD-10-CM

## 2023-05-18 DIAGNOSIS — Z85.3 ENCOUNTER FOR FOLLOW-UP SURVEILLANCE OF BREAST CANCER: ICD-10-CM

## 2023-05-18 DIAGNOSIS — Z08 ENCOUNTER FOR FOLLOW-UP SURVEILLANCE OF BREAST CANCER: ICD-10-CM

## 2023-05-18 DIAGNOSIS — Z86.000 PERSONAL HISTORY OF IN-SITU NEOPLASM OF BREAST: Primary | ICD-10-CM

## 2023-05-18 NOTE — PROGRESS NOTES
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    Dense breast tissue    Encounter for follow-up surveillance of breast cancer    Abdominal discomfort in right upper quadrant  -     US right upper quadrant; Future        Advance Care Planning/Advance Directives:  Discussed disease status, cancer treatment plans and/or cancer treatment goals with the patient  Oncology History:    Oncology History   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   11/20/2019 Initial Diagnosis    Ductal carcinoma in situ (DCIS) of right breast     5/22/2020 Surgery    A  Breast, right, lumpectomy:  -  Ductal carcinoma in situ, low nuclear grade   B  Breast, right new inferior margin, excision:  -  Ductal carcinoma in situ, low nuclear grade involving intraductal papilloma  C   Breast, right new lateral margin, excision:  -  Ductal carcinoma in situ, low nuclear grade  D   Breast, right new posterior margin, excision:  -  Benign breast parenchyma, negative for atypia or malignancy      %, AZ 60-70%, HER2 1+ negative     6/9/2020 -  Cancer Staged    Staging form: Breast, AJCC 8th Edition  - Clinical: cT0, cN0, cM0 - Signed by Elva Mendez MD on 6/9/2020  Laterality: Right       6/9/2020 -  Cancer Staged    Staging form: Breast, AJCC 8th Edition  - Pathologic: Stage 0 (pTis (DCIS), pN0, cM0, ER+, AZ+, HER2-) - Signed by Elva Mendez MD on 6/16/2020  Laterality: Right  Method of lymph node assessment: Clinical  Nuclear grade: G1       7/30/2020 - 8/28/2020 Radiation    Plan ID Energy Fractions Dose per Fraction (cGy) Dose Correction (cGy) Total Dose Delivered (cGy) Elapsed Days   R Breast 6X 16 / 16 266 0 4,256 21   R Brst Boost 6X 5 / 5 200 0 1,000 4        Primary adenocarcinoma of upper lobe of right lung ([facility] ) (Resolved)   12/2/2019 Initial Diagnosis    Primary adenocarcinoma of upper lobe of right lung ([facility] )     1/17/2020 Surgery    Lung, right upper lobe, wedge resection:  -  Invasive moderately differentiated adenocarcinoma  Stage IB         History of Present Illness: Breast cancer follow-up, states that the breast has been healing well and she has no concerns, other issues as noted in ROS  -Interval History: recent mammogram    Review of Systems:  Review of Systems   Constitutional: Negative  Negative for appetite change and fever  Eyes: Negative  Respiratory: Negative for shortness of breath  Cardiovascular: Negative  Gastrointestinal: Positive for abdominal pain ( discomfort right upper quadrant) and nausea ( On occasion)  Endocrine: Negative  Genitourinary: Negative  Musculoskeletal: Negative  Negative for arthralgias and myalgias  Skin: Negative  Allergic/Immunologic: Negative  Neurological: Negative  Hematological: Negative  Negative for adenopathy  Does not bruise/bleed easily  Psychiatric/Behavioral: Negative          Patient Active Problem List   Diagnosis   • Persistent atrial fibrillation (HCC)   • Benign essential hypertension   • Long term (current) use of anticoagulants   • History of colon cancer   • Overweight   • Osteoarthritis   • Postoperative hypothyroidism   • Mixed hyperlipidemia   • Medicare annual wellness visit, subsequent   • Osteopenia of multiple sites   • 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   • Dense breast tissue   • Hashimoto's thyroiditis   • History of lung cancer   • Thyroid nodule   • Encounter for follow-up surveillance of breast cancer   • Stage 3a chronic kidney disease ([facility] )   • Refusal of blood transfusions as patient "is Episcopal   • Gastroesophageal reflux disease   • Hematuria   • Abdominal discomfort in right upper quadrant     Past Medical History:   Diagnosis Date   • Anesthesia     \"cheap date\"   • Arthritis    • Atrial fibrillation ([facility] )    • Benign polyp of large intestine    • 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   • Colon cancer [facility]) 1977    41 yo   • Colon polyp    • Full dentures     full upper/ partial lower   • GERD (gastroesophageal reflux disease)    • Glaucoma suspect    • History of neck problems     \"C3-C7 and had epidural injections years ago\"   • Hyperlipidemia    • Hypertension    • Lung cancer ([facility] )     jan 2020- had surgery   • 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[place] [address] )    • Prediabetes    • Primary adenocarcinoma of upper lobe of right lung ([place] [address] ) 12/02/2019   • Pulmonary nodules 02/19/2015   • Refusal of blood transfusions as patient is Episcopal    • Tic disorder, transient, recurrent     not recent   • Walks frequently      Past Surgical History:   Procedure Laterality Date   • APPENDECTOMY     • ARTHROSCOPY KNEE     • BREAST BIOPSY Right 04/10/2017    US guided - intraductal papillomas   • BREAST BIOPSY Right 10/24/2019    ADH- dcis   • BREAST LUMPECTOMY W/ NEEDLE LOCALIZATION Right 05/22/2020    Procedure: EXCISIONAL BIOPSY;  1000 NEEDLE LOC;  Surgeon: Zenobia Milligan MD;  Location: [facility];  Service: Surgical Oncology   • CARDIOVASCULAR STRESS TEST     • CARPAL TUNNEL RELEASE Left 07/01/2022   • CATARACT EXTRACTION Bilateral    • COLON SURGERY     • COLONOSCOPY      done 2010 due 2015 Dr Kuo   • DILATION AND CURETTAGE OF UTERUS     • ESOPHAGOGASTRODUODENOSCOPY      inflammation aonly   • HEMICOLECTOMY Right 1977   • HYSTERECTOMY     • JOINT REPLACEMENT      L knee   • LUNG SURGERY     • MAMMO NEEDLE LOCALIZATION RIGHT (ALL INC) Right 05/22/2020   • MAMMO NEEDLE LOCALIZATION RIGHT (ALL INC) EACH ADD Right 05/22/2020   • MAMMO STEREOTACTIC BREAST BIOPSY RIGHT (ALL INC) Right 10/24/2019   • PARATHYROID " GLAND SURGERY      exploration    • AL  [address] INCL FLUOR GDNCE DX W/CELL WASHG SPX N/A 2020    Procedure: Akash Ferraro;  Surgeon: Dilcia Solano MD;  Location: [facility];  Service: Thoracic   • AL THORACOSCOPY W/LOBECTOMY SINGLE LOBE Right 2020    Procedure: ROBOTIC ASSISTED COMPLETE LUNG LOBECTOMY;  Surgeon: Dilcia Solano MD;  Location: [facility];  Service: Thoracic   • AL THORACOSCOPY W/THERA WEDGE RESEXN INITIAL UNILAT Right 2020    Procedure: ROBOTIC ASSISTED UPPER-LOBE WEDGE RESECTION, MEDIASTINAL LYMPH NODE DISECTION;  Surgeon: Dilcia Solano MD;  Location: [facility];  Service: Thoracic   • REPLACEMENT TOTAL KNEE     • THYROID SURGERY     • TOTAL ABDOMINAL HYSTERECTOMY     • TOTAL THYROIDECTOMY     • TRIGGER FINGER RELEASE Left 2022   • US GUIDED BREAST BIOPSY RIGHT COMPLETE Right 04/10/2017   • US GUIDED THYROID BIOPSY  2020     Family History   Problem Relation Age of Onset   • Esophageal cancer Mother         [de-identified]   • Hypertension Mother    • Stroke Family         TIA   • Breast cancer Paternal Aunt 80   • No Known Problems Father    • COPD Sister    • COPD Sister    • No Known Problems Maternal Grandmother    • No Known Problems Maternal Grandfather    • No Known Problems Paternal Grandmother    • No Known Problems Paternal Grandfather    • No Known Problems Maternal Aunt    • No Known Problems Maternal Aunt    • No Known Problems Maternal Aunt    • No Known Problems Maternal Aunt    • No Known Problems Maternal Aunt      Social History     Socioeconomic History   • Marital status: Single     Spouse name: Not on file   • Number of children: Not on file   • Years of education: Not on file   • Highest education level: Not on file   Occupational History   • Not on file   Tobacco Use   • Smoking status: Former     Packs/day: 1 50     Years: 20 00     Pack years: 30 00     Types: Cigarettes     Quit date: 5     Years since quittin 4   • Smokeless tobacco: Never   Vaping Use   • Vaping Use: Never used   Substance and Sexual Activity   • Alcohol use: Yes     Alcohol/week: 5 0 standard drinks     Types: 5 Cans of beer per week     Comment: occasional beer with food   • Drug use: No   • Sexual activity: Not on file     Comment: hysterectomy   Other Topics Concern   • Not on file   Social History Narrative    Caffeine use- 1-3, 8oz coffees per day    Denied history of housing without smoke detectors    Always uses a seatbelt     Social Determinants of Health     Financial Resource Strain: Low Risk    • Difficulty of Paying Living Expenses: Not hard at all   Food Insecurity: Not on file   Transportation Needs: No Transportation Needs   • Lack of Transportation (Medical): No   • Lack of Transportation (Non-Medical):  No   Physical Activity: Not on file   Stress: Not on file   Social Connections: Not on file   Intimate Partner Violence: Not on file   Housing Stability: Not on file       Current Outpatient Medications:   •  apixaban (ELIQUIS) 5 mg, Take 5 mg by mouth 2 (two) times a day To stop 2 days prior to surgery per md, Disp: , Rfl:   •  atorvastatin (LIPITOR) 80 mg tablet, TAKE 1 TABLET BY MOUTH EVERY DAY, Disp: 90 tablet, Rfl: 1  •  ciclopirox (LOPROX) 0 77 % SUSP, APPLY 1-2 TIMES DAILY TO AFFECTED TOENAILS, Disp: , Rfl:   •  Coenzyme Q10 (CO Q 10 PO), Take 200 mg by mouth, Disp: , Rfl:   •  cyanocobalamin (VITAMIN B-12) 1000 MCG tablet, Take 1,000 mcg by mouth, Disp: , Rfl:   •  EPINEPHrine (EpiPen 2-Pa) 0 3 mg/0 3 mL SOAJ, Inject 0 3 mL (0 3 mg total) into a muscle once as needed for anaphylaxis for up to 1 dose, Disp: 0 6 mL, Rfl: 0  •  levothyroxine 100 mcg tablet, TAKE 1 TABLET BY MOUTH EVERY DAY, Disp: 90 tablet, Rfl: 0  •  Multiple Vitamins-Calcium (ESSENTIAL ONE DAILY MULTIVIT PO), Take by mouth daily , Disp: , Rfl:   •  Omega-3 Fatty Acids (FISH OIL PO), Take 2 g by mouth daily , Disp: , Rfl:   •  omeprazole (PriLOSEC) 20 mg delayed release capsule, TAKE 1 CAPSULE (20 MG TOTAL) BY MOUTH AS NEEDED (GERD), Disp: 90 capsule, Rfl: 0  •  diltiazem (CARDIZEM CD) 120 mg 24 hr capsule, Take 120 mg by mouth every evening , Disp: , Rfl:   •  EPINEPHrine (EPIPEN) 0 3 mg/0 3 mL SOAJ, Inject 0 3 mL (0 3 mg total) into a muscle once for 1 dose, Disp: 0 6 mL, Rfl: 0  Allergies   Allergen Reactions   • Bee Venom      Tongue swelling   • Flurbiprofen Hives   • Ibuprofen Shortness Of Breath   • Levaquin [Levofloxacin In D5w] Other (See Comments)     Dark spots under eyes and wrists   • Tequin [Gatifloxacin] Rash       The following portions of the patient's history were reviewed and updated as appropriate: allergies, current medications, past family history, past medical history, past social history, past surgical history and problem list         Vitals:    05/18/23 0927   BP: 132/70   Pulse: 70   Resp: 18   Temp: (!) 96 9 °F (36 1 °C)   SpO2: 98%       Physical Exam  Constitutional:       General: She is not in acute distress  Appearance: Normal appearance  She is well-developed  HENT:      Head: Normocephalic and atraumatic  Cardiovascular:      Heart sounds: Normal heart sounds  Pulmonary:      Breath sounds: Normal breath sounds  Chest:   Breasts:     Right: Skin change ( lumpectomy scar) present  No inverted nipple, mass, nipple discharge or tenderness  Left: No inverted nipple, mass, nipple discharge, skin change or tenderness  Abdominal:      General: There is no distension  Palpations: Abdomen is soft  There is no hepatomegaly or mass  Tenderness: There is abdominal tenderness ( could be secondary to prior thoracic surgery in area of scar tissue)  Lymphadenopathy:      Upper Body:      Right upper body: No supraclavicular, axillary or pectoral adenopathy  Left upper body: No supraclavicular, axillary or pectoral adenopathy  Neurological:      Mental Status: She is alert and oriented to person, place, and time  Psychiatric:         Mood and Affect: Mood normal            Results:  Labs:      Imaging  4/6/2023 bilateral 3D diagnostic mammogram was benign BI-RADS 2 with a density of 3    I reviewed the above imaging data  Discussion/Summary: 22-year-old female status post right breast conservation for ductal carcinoma in situ  She had radiation therapy  She did not take any hormone therapy  There is no evidence of disease based on exam today or recent mammogram   She does have some discomfort in the right upper abdomen  This could be secondary to scar tissue from the thoracic surgery  She does report some mild nausea  I will therefore order an ultrasound for her  I will call her with these results  Otherwise we will see her again in 6 months or sooner should the need arise

## 2023-05-19 ENCOUNTER — HOSPITAL ENCOUNTER (OUTPATIENT)
Dept: ULTRASOUND IMAGING | Facility: HOSPITAL | Age: 83
Discharge: HOME | End: 2023-05-19
Attending: SURGERY

## 2023-05-19 DIAGNOSIS — R10.11 ABDOMINAL DISCOMFORT IN RIGHT UPPER QUADRANT: ICD-10-CM

## 2023-05-22 ENCOUNTER — TELEPHONE (OUTPATIENT)
Dept: HEMATOLOGY ONCOLOGY | Facility: CLINIC | Age: 83
End: 2023-05-22

## 2023-05-22 NOTE — TELEPHONE ENCOUNTER
Call Transfer   Who are you speaking with? [facility], Dav Senters  If it is not the patient, are they listed on an active communication consent form? N/A   Who is the patients HemOnc/SurgOnc provider? Dr George Vance and Dr Dot Flores   What is the reason for this call? Dav Senters from [facility] radiology is calling to speak to somebody from the office about significant findings on the patient's [address]  Person/Department that the call was transferred to? Time that call was transferred? Cj Stage @9:02AM    Your call will be transferred now  If you receive a voicemail, please leave a detailed message and a member of the team will return your call as soon as possible  Did you relay this information to the caller?   Yes

## 2023-05-23 ENCOUNTER — TELEPHONE (OUTPATIENT)
Dept: SURGICAL ONCOLOGY | Facility: CLINIC | Age: 83
End: 2023-05-23

## 2023-05-23 NOTE — TELEPHONE ENCOUNTER
As per Dr Kusum Koo, called and provided results of Abdominal US to [place]  She understands  She requested a copy be mailed to her which was done

## 2023-05-23 NOTE — TELEPHONE ENCOUNTER
----- Message from Edouard Ibarra MD sent at 5/22/2023 10:03 AM EDT -----  Can you please call her with the results of the right upper quadrant us which shows no signs of cholecystitis   Thanks

## 2023-06-10 DIAGNOSIS — E89.0 POSTOPERATIVE HYPOTHYROIDISM: ICD-10-CM

## 2023-06-12 RX ORDER — LEVOTHYROXINE SODIUM 0.1 MG/1
TABLET ORAL
Qty: 90 TABLET | Refills: 0 | Status: SHIPPED | OUTPATIENT
Start: 2023-06-12

## 2023-07-13 ENCOUNTER — ANNUAL EXAM (OUTPATIENT)
Dept: GYNECOLOGY | Facility: CLINIC | Age: 83
End: 2023-07-13
Payer: COMMERCIAL

## 2023-07-13 VITALS
HEIGHT: 64 IN | SYSTOLIC BLOOD PRESSURE: 118 MMHG | HEART RATE: 60 BPM | WEIGHT: 163 LBS | DIASTOLIC BLOOD PRESSURE: 78 MMHG | BODY MASS INDEX: 27.83 KG/M2

## 2023-07-13 DIAGNOSIS — Z86.000 PERSONAL HISTORY OF IN-SITU NEOPLASM OF BREAST: Primary | ICD-10-CM

## 2023-07-13 DIAGNOSIS — Z01.419 ENCOUNTER FOR GYNECOLOGICAL EXAMINATION WITHOUT ABNORMAL FINDING: ICD-10-CM

## 2023-07-13 PROCEDURE — G0101 CA SCREEN;PELVIC/BREAST EXAM: HCPCS | Performed by: OBSTETRICS & GYNECOLOGY

## 2023-07-13 NOTE — PROGRESS NOTES
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 female. HPI patient presents for GYN exam.  She has a history of DCIS of the right breast diagnosed in 2019. Status postlumpectomy and radiation. She also has a history of right lung cancer diagnosed in 2020. She also has a history of colon cancer diagnosed in 0. She is status post total abdominal hysterectomy 1987 secondary to fibroids. She denies any vaginal irritation, burning, discharge or bleeding. Denies any dysuria, hematuria urgency or urinary incontinence. No GI complaints. CT colonography December 2022.  6 mm polyp identified in the transverse colon. This has been followed by     The following portions of the patient's history were reviewed and updated as appropriate:   She  has a past medical history of Anesthesia, Arthritis, Atrial fibrillation ([address]), Benign polyp of large intestine, Cancer of appendix ([address]) (1977), Colon cancer ([address]) (1977), Colon polyp, Full dentures, GERD (gastroesophageal reflux disease), Glaucoma suspect, History of neck problems, Hyperlipidemia, Hypertension, Lung cancer ([address]), 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[address]), Prediabetes, Primary adenocarcinoma of upper lobe of right lung ([address]) (12/02/2019), Pulmonary nodules (02/19/2015), Refusal of blood transfusions as patient is Gnosticist, Tic disorder, transient, recurrent, and Walks frequently.   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 11/20/2019   • Dense breast tissue 11/20/2019   • Medicare annual wellness visit, subsequent 09/20/2018   • Osteopenia of multiple sites 09/20/2018   • Mixed hyperlipidemia 03/13/2018   • History of colon cancer 02/14/2018   • Postoperative hypothyroidism 09/01/2016   • Benign essential hypertension 02/25/2016   • Persistent atrial fibrillation ([address]) 03/19/2015   • Long term (current) use of anticoagulants 03/19/2015   • Overweight 03/19/2015   • Osteoarthritis 04/20/2012     She  has a past surgical history that includes Joint replacement; ARTHROSCOPY KNEE; Cardiovascular stress test; Colonoscopy; Esophagogastroduodenoscopy; Replacement total knee; Parathyroid gland surgery; Total thyroidectomy; US guided breast biopsy right complete (Right, 04/10/2017); Hysterectomy; Total abdominal hysterectomy (1983); Mammo stereotactic breast biopsy right (all inc) (Right, 10/24/2019); Hemicolectomy (Right, 1977); Colon surgery; Appendectomy; Dilation and curettage of uterus; pr [facility] incl fluor gdnce dx w/cell washg spx (N/A, 01/17/2020); pr thoracoscopy w/thera wedge resexn initial unilat (Right, 01/17/2020); pr thoracoscopy w/lobectomy single lobe (Right, 01/17/2020); US guided thyroid biopsy (02/14/2020); Cataract extraction (Bilateral); Mammo needle localization right (all inc) (Right, 05/22/2020); Mammo needle localization right (all inc) each add (Right, 05/22/2020); Lung surgery; Thyroid surgery; Breast biopsy (Right, 04/10/2017); Breast biopsy (Right, 10/24/2019); Breast lumpectomy w/ needle localization (Right, 05/22/2020); Carpal tunnel release (Left, 07/01/2022); and Trigger finger release (Left, 07/01/2022).   Her family history includes Breast cancer (age of onset: 80) in her paternal aunt; COPD in her sister and sister; Esophageal cancer in her mother; Hypertension in her mother; No Known Problems in her father, maternal aunt, maternal aunt, maternal aunt, maternal aunt, maternal aunt, maternal grandfather, maternal grandmother, paternal grandfather, and paternal grandmother; Stroke in her family. She  reports that she quit smoking about 56 years ago. Her smoking use included cigarettes. She has a 30.00 pack-year smoking history. She has never used smokeless tobacco. She reports current alcohol use of about 5.0 standard drinks of alcohol per week. She reports that she does not use drugs. Current Outpatient Medications   Medication Sig Dispense Refill   • apixaban (ELIQUIS) 5 mg Take 5 mg by mouth 2 (two) times a day To stop 2 days prior to surgery per md     • atorvastatin (LIPITOR) 80 mg tablet TAKE 1 TABLET BY MOUTH EVERY DAY 90 tablet 1   • ciclopirox (LOPROX) 0.77 % SUSP APPLY 1-2 TIMES DAILY TO AFFECTED TOENAILS     • Coenzyme Q10 (CO Q 10 PO) Take 200 mg by mouth     • cyanocobalamin (VITAMIN B-12) 1000 MCG tablet Take 1,000 mcg by mouth     • diltiazem (CARDIZEM CD) 120 mg 24 hr capsule Take 120 mg by mouth every evening      • EPINEPHrine (EpiPen 2-Pa) 0.3 mg/0.3 mL SOAJ Inject 0.3 mL (0.3 mg total) into a muscle once as needed for anaphylaxis for up to 1 dose 0.6 mL 0   • EPINEPHrine (EPIPEN) 0.3 mg/0.3 mL SOAJ Inject 0.3 mL (0.3 mg total) into a muscle once for 1 dose 0.6 mL 0   • levothyroxine 100 mcg tablet TAKE 1 TABLET BY MOUTH EVERY DAY 90 tablet 0   • Multiple Vitamins-Calcium (ESSENTIAL ONE DAILY MULTIVIT PO) Take by mouth daily      • Omega-3 Fatty Acids (FISH OIL PO) Take 2 g by mouth daily      • omeprazole (PriLOSEC) 20 mg delayed release capsule TAKE 1 CAPSULE (20 MG TOTAL) BY MOUTH AS NEEDED (GERD) 90 capsule 0     No current facility-administered medications for this visit.      Current Outpatient Medications on File Prior to Visit   Medication Sig   • apixaban (ELIQUIS) 5 mg Take 5 mg by mouth 2 (two) times a day To stop 2 days prior to surgery per md   • atorvastatin (LIPITOR) 80 mg tablet TAKE 1 TABLET BY MOUTH EVERY DAY   • ciclopirox (LOPROX) 0.77 % SUSP APPLY 1-2 TIMES DAILY TO AFFECTED TOENAILS   • Coenzyme Q10 (CO Q 10 PO) Take 200 mg by mouth   • cyanocobalamin (VITAMIN B-12) 1000 MCG tablet Take 1,000 mcg by mouth   • diltiazem (CARDIZEM CD) 120 mg 24 hr capsule Take 120 mg by mouth every evening    • EPINEPHrine (EpiPen 2-Pa) 0.3 mg/0.3 mL SOAJ Inject 0.3 mL (0.3 mg total) into a muscle once as needed for anaphylaxis for up to 1 dose   • EPINEPHrine (EPIPEN) 0.3 mg/0.3 mL SOAJ Inject 0.3 mL (0.3 mg total) into a muscle once for 1 dose   • levothyroxine 100 mcg tablet TAKE 1 TABLET BY MOUTH EVERY DAY   • Multiple Vitamins-Calcium (ESSENTIAL ONE DAILY MULTIVIT PO) Take by mouth daily    • Omega-3 Fatty Acids (FISH OIL PO) Take 2 g by mouth daily    • omeprazole (PriLOSEC) 20 mg delayed release capsule TAKE 1 CAPSULE (20 MG TOTAL) BY MOUTH AS NEEDED (GERD)   • [DISCONTINUED] Hydrocortisone Acetate 1 % CREA Apply topically (Patient not taking: No sig reported)   • [DISCONTINUED] mupirocin (BACTROBAN) 2 % ointment APPLY TO SORE AREA ON BOTTOM TWICE DAILY UNTIL HEALED (Patient not taking: No sig reported)     No current facility-administered medications on file prior to visit. She is allergic to bee venom, flurbiprofen, ibuprofen, levaquin [levofloxacin in d5w], and tequin [gatifloxacin]. .    Review of Systems   Constitutional: Negative. HENT: Negative for sore throat and trouble swallowing. Gastrointestinal: Negative. Genitourinary: Negative. Objective:      /78   Pulse 60   Ht 5' 4" (1.626 m)   Wt 73.9 kg (163 lb) Comment: per patient. did not want to do weight in hallway. BMI 27.98 kg/m²          Physical Exam  Vitals reviewed. Cardiovascular:      Rate and Rhythm: Normal rate and regular rhythm. Pulses: Normal pulses. Heart sounds: Normal heart sounds. No murmur heard.   Pulmonary: Effort: Pulmonary effort is normal. No respiratory distress. Breath sounds: Normal breath sounds. Chest:   Breasts:     Right: No swelling, bleeding, inverted nipple, mass, nipple discharge, skin change or tenderness. Left: No swelling, bleeding, inverted nipple, mass, nipple discharge, skin change or tenderness. Abdominal:      General: There is no distension. Palpations: Abdomen is soft. There is no mass. Tenderness: There is no abdominal tenderness. There is no guarding or rebound. Hernia: No hernia is present. There is no hernia in the left inguinal area or right inguinal area. Genitourinary:     General: Normal vulva. Labia:         Right: No rash, tenderness or lesion. Left: No rash, tenderness or lesion. Vagina: Normal.      Uterus: Absent. Adnexa:         Right: No mass, tenderness or fullness. Left: No mass, tenderness or fullness. Comments: Uterus and cervix surgically absent. No palpable adnexal masses or tenderness. Vagina with atrophic changes. [place]th[place]'s and [place]'s glands normal.  Urethral orifice normal.  No cystocele or rectocele. Musculoskeletal:      Cervical back: Normal range of motion and neck supple. No tenderness. Lymphadenopathy:      Cervical: No cervical adenopathy. Upper Body:      Right upper body: No supraclavicular, axillary or pectoral adenopathy. Left upper body: No supraclavicular, axillary or pectoral adenopathy. Lower Body: No right inguinal adenopathy. No left inguinal adenopathy. Neurological:      Mental Status: She is alert.

## 2023-08-16 DIAGNOSIS — E89.0 POSTOPERATIVE HYPOTHYROIDISM: ICD-10-CM

## 2023-08-16 RX ORDER — LEVOTHYROXINE SODIUM 0.1 MG/1
TABLET ORAL
Qty: 90 TABLET | Refills: 0 | Status: SHIPPED | OUTPATIENT
Start: 2023-08-16

## 2023-09-01 ENCOUNTER — OFFICE VISIT (OUTPATIENT)
Dept: UROLOGY | Facility: CLINIC | Age: 83
End: 2023-09-01
Payer: COMMERCIAL

## 2023-09-01 VITALS
WEIGHT: 157 LBS | BODY MASS INDEX: 26.8 KG/M2 | DIASTOLIC BLOOD PRESSURE: 78 MMHG | SYSTOLIC BLOOD PRESSURE: 130 MMHG | HEART RATE: 60 BPM | HEIGHT: 64 IN

## 2023-09-01 DIAGNOSIS — R31.29 MICROSCOPIC HEMATURIA: Primary | ICD-10-CM

## 2023-09-01 LAB
BACTERIA UR QL AUTO: NORMAL /HPF
BILIRUB UR QL STRIP: NEGATIVE
CLARITY UR: CLEAR
COLOR UR: NORMAL
GLUCOSE UR STRIP-MCNC: NEGATIVE MG/DL
HGB UR QL STRIP.AUTO: NEGATIVE
KETONES UR STRIP-MCNC: NEGATIVE MG/DL
LEUKOCYTE ESTERASE UR QL STRIP: NEGATIVE
NITRITE UR QL STRIP: NEGATIVE
NON-SQ EPI CELLS URNS QL MICRO: NORMAL /HPF
PH UR STRIP.AUTO: 5.5 [PH]
PROT UR STRIP-MCNC: NEGATIVE MG/DL
RBC #/AREA URNS AUTO: NORMAL /HPF
SL AMB  POCT GLUCOSE, UA: ABNORMAL
SL AMB LEUKOCYTE ESTERASE,UA: ABNORMAL
SL AMB POCT BILIRUBIN,UA: ABNORMAL
SL AMB POCT BLOOD,UA: ABNORMAL
SL AMB POCT CLARITY,UA: CLEAR
SL AMB POCT COLOR,UA: YELLOW
SL AMB POCT KETONES,UA: ABNORMAL
SL AMB POCT NITRITE,UA: ABNORMAL
SL AMB POCT PH,UA: 5
SL AMB POCT SPECIFIC GRAVITY,UA: 1.01
SL AMB POCT URINE PROTEIN: ABNORMAL
SL AMB POCT UROBILINOGEN: 0.2
SP GR UR STRIP.AUTO: 1.01 (ref 1–1.03)
UROBILINOGEN UR STRIP-ACNC: <2 MG/DL
WBC #/AREA URNS AUTO: NORMAL /HPF

## 2023-09-01 PROCEDURE — 81001 URINALYSIS AUTO W/SCOPE: CPT

## 2023-09-01 PROCEDURE — 81002 URINALYSIS NONAUTO W/O SCOPE: CPT

## 2023-09-01 PROCEDURE — 99213 OFFICE O/P EST LOW 20 MIN: CPT

## 2023-09-01 PROCEDURE — 87086 URINE CULTURE/COLONY COUNT: CPT

## 2023-09-01 NOTE — PROGRESS NOTES
Office Visit- Urology  Cathi Jolly 1940 MRN: [id number]      Assessment/Discussion/Plan    78 y.o. female managed by     1. Concern for microscopic hematuria  -Review of urinalysis with microscopy did not reveal clinically significant microscopic hematuria as defined by [employer] [facility] guidelines if 3 or more red blood cells seen in high-powered field  -Patient denies gross hematuria  -Send out urine today for repeat microanalysis  -If negative patient can continue to follow with PCP with routine urinalyses with microscopy. If there is ever 3 or more RBCs seen on high-powered field patient should reestablish care with us to obtain hematuria work-up  -Patient notified to call office if she ever has gross hematuria  -Call patient with results and follow-up as needed      Chief Complaint:   Frank Hidalgo is a 78 y.o. female presenting to the office for a follow up visit regarding Microscopic hematuria        Subjective    -23-year-old female with a past medical history of:/Appendix and lung cancer  -She presents to the office today for 6-month follow-up in regards to microscopic hematuria  -She was seen in February 2023. On microscopic urinalyses from 2018 there was less than 3 RBCs seen on microscopy. However out of an abundance of caution she was referred to urology from oncology  -She had an ultrasound of the bladder in 2021 which was without concern  -She was told to follow-up in 6 months  -She follows up today with no concerns  -She denies any gross hematuria  -She denies bothersome urinary symptoms. She does report nocturia at night but is not bothered by this  -No urine testing since February 2023 which revealed 1-2 RBC        ROS:   Review of Systems   Constitutional: Negative. Negative for chills, fatigue and fever. HENT: Negative. Respiratory: Negative for shortness of breath. Cardiovascular: Negative for chest pain. Gastrointestinal: Negative. Negative for abdominal pain. Endocrine: Negative. Musculoskeletal: Negative. Skin: Negative. Neurological: Negative. Negative for dizziness and light-headedness. Hematological: Negative. Psychiatric/Behavioral: Negative.           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   • Colon cancer [facility]) 1977    43 yo   • Colon polyp    • Full dentures     full upper/ partial lower   • GERD (gastroesophageal reflux disease)    • Glaucoma suspect    • History of neck problems     "C3-C7 and had epidural injections years ago"   • Hyperlipidemia    • Hypertension    • Lung cancer ([address])     jan 2020- had surgery   • 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[address])    • Prediabetes    • Primary adenocarcinoma of upper lobe of right lung ([address]) 12/02/2019   • Pulmonary nodules 02/19/2015   • Refusal of blood transfusions as patient is [employer]    • Tic disorder, transient, recurrent     not recent   • Walks frequently        Past Surgical History  Past Surgical History:   Procedure Laterality Date   • APPENDECTOMY     • ARTHROSCOPY KNEE     • BREAST BIOPSY Right 04/10/2017    US guided - intraductal papillomas   • BREAST BIOPSY Right 10/24/2019    ADH- dcis   • BREAST LUMPECTOMY W/ NEEDLE LOCALIZATION Right 05/22/2020    Procedure: EXCISIONAL BIOPSY;  1000 NEEDLE LOC;  Surgeon: Sheliah Primrose, MD;  Location: [facility];  Service: Surgical Oncology   • CARDIOVASCULAR STRESS TEST     • CARPAL TUNNEL RELEASE Left 07/01/2022   • CATARACT EXTRACTION Bilateral    • COLON SURGERY     • COLONOSCOPY      done 2010 due 2015    • DILATION AND CURETTAGE OF UTERUS     • ESOPHAGOGASTRODUODENOSCOPY      inflammation aonly   • HEMICOLECTOMY Right 1977   • HYSTERECTOMY     • JOINT REPLACEMENT      L knee   • LUNG SURGERY     • MAMMO NEEDLE LOCALIZATION RIGHT (ALL INC) Right 05/22/2020   • MAMMO NEEDLE LOCALIZATION RIGHT (ALL INC) EACH ADD Right 05/22/2020   • MAMMO STEREOTACTIC BREAST BIOPSY RIGHT (ALL INC) Right 10/24/2019   • PARATHYROID GLAND SURGERY      exploration    •  [address] INCL FLUOR GDNCE DX W/CELL WASHG SPX N/A 01/17/2020    Procedure: Yeison Joe;  Surgeon: Franchesca Pitts MD;  Location: [facility];  Service: Thoracic   • AR THORACOSCOPY W/LOBECTOMY SINGLE LOBE Right 01/17/2020    Procedure: ROBOTIC ASSISTED COMPLETE LUNG LOBECTOMY;  Surgeon: Franchesca Pitts MD;  Location: [facility];  Service: Thoracic   • AR THORACOSCOPY W/THERA WEDGE RESEXN INITIAL UNILAT Right 01/17/2020    Procedure: ROBOTIC ASSISTED UPPER-LOBE WEDGE RESECTION, MEDIASTINAL LYMPH NODE DISECTION;  Surgeon: Franchesca Pitts MD;  Location: [facility];  Service: Thoracic   • REPLACEMENT TOTAL KNEE     • THYROID SURGERY     • TOTAL ABDOMINAL HYSTERECTOMY  1983   • TOTAL THYROIDECTOMY     • TRIGGER FINGER RELEASE Left 07/01/2022   • US GUIDED BREAST BIOPSY RIGHT COMPLETE Right 04/10/2017   • US GUIDED THYROID BIOPSY  02/14/2020       Past Family History  Family History   Problem Relation Age of Onset   • Esophageal cancer Mother         80s   • Hypertension Mother    • Stroke Family         TIA   • Breast cancer Paternal Aunt 80   • No Known Problems Father    • COPD Sister    • COPD Sister    • No Known Problems Maternal Grandmother    • No Known Problems Maternal Grandfather    • No Known Problems Paternal Grandmother    • No Known Problems Paternal Grandfather    • No Known Problems Maternal Aunt    • No Known Problems Maternal Aunt    • No Known Problems Maternal Aunt    • No Known Problems Maternal Aunt    • No Known Problems Maternal Aunt        Past Social history  Social History     Socioeconomic History   • Marital status: Single     Spouse name: Not on file   • Number of children: Not on file   • Years of education: Not on file   • Highest education level: Not on file   Occupational History   • Not on file   Tobacco Use   • Smoking status: Former     Packs/day: 1.50     Years: 20.00     Total pack years: 30.00     Types: Cigarettes     Quit date: 5     Years since quittin.7   • Smokeless tobacco: Never   Vaping Use   • Vaping Use: Never used   Substance and Sexual Activity   • Alcohol use: Yes     Alcohol/week: 5.0 standard drinks of alcohol     Types: 5 Cans of beer per week     Comment: occasional beer with food   • Drug use: No   • Sexual activity: Not on file     Comment: hysterectomy   Other Topics Concern   • Not on file   Social History Narrative    Caffeine use- 1-3, 8oz coffees per day    Denied history of housing without smoke detectors    Always uses a seatbelt     Social Determinants of Health     Financial Resource Strain: Low Risk  (2022)    Overall Financial Resource Strain (CARDIA)    • Difficulty of Paying Living Expenses: Not hard at all   Food Insecurity: Not on file   Transportation Needs: No Transportation Needs (2022)    PRAPARE - Transportation    • Lack of Transportation (Medical): No    • Lack of Transportation (Non-Medical):  No   Physical Activity: Not on file   Stress: Not on file   Social Connections: Not on file   Intimate Partner Violence: Not on file   Housing Stability: Not on file       Current Medications  Current Outpatient Medications   Medication Sig Dispense Refill   • apixaban (ELIQUIS) 5 mg Take 5 mg by mouth 2 (two) times a day To stop 2 days prior to surgery per md     • atorvastatin (LIPITOR) 80 mg tablet TAKE 1 TABLET BY MOUTH EVERY DAY 90 tablet 1   • ciclopirox (LOPROX) 0.77 % SUSP APPLY 1-2 TIMES DAILY TO AFFECTED TOENAILS     • Coenzyme Q10 (CO Q 10 PO) Take 200 mg by mouth     • cyanocobalamin (VITAMIN B-12) 1000 MCG tablet Take 1,000 mcg by mouth     • diltiazem (CARDIZEM CD) 120 mg 24 hr capsule Take 120 mg by mouth every evening      • EPINEPHrine (EpiPen 2-Pa) 0.3 mg/0.3 mL SOAJ Inject 0.3 mL (0.3 mg total) into a muscle once as needed for anaphylaxis for up to 1 dose 0.6 mL 0   • levothyroxine 100 mcg tablet TAKE 1 TABLET BY MOUTH EVERY DAY 90 tablet 0   • Multiple Vitamins-Calcium (ESSENTIAL ONE DAILY MULTIVIT PO) Take by mouth daily      • omeprazole (PriLOSEC) 20 mg delayed release capsule TAKE 1 CAPSULE (20 MG TOTAL) BY MOUTH AS NEEDED (GERD) 90 capsule 0   • EPINEPHrine (EPIPEN) 0.3 mg/0.3 mL SOAJ Inject 0.3 mL (0.3 mg total) into a muscle once for 1 dose 0.6 mL 0   • Omega-3 Fatty Acids (FISH OIL PO) Take 2 g by mouth daily  (Patient not taking: Reported on 9/1/2023)       No current facility-administered medications for this visit. Allergies  Allergies   Allergen Reactions   • Bee Venom      Tongue swelling   • Flurbiprofen Hives   • Ibuprofen Shortness Of Breath   • Levaquin [Levofloxacin In D5w] Other (See Comments)     Dark spots under eyes and wrists   • Tequin [Gatifloxacin] Rash       OBJECTIVE    Vitals   Vitals:    09/01/23 0829   BP: 130/78   BP Location: Left arm   Patient Position: Sitting   Cuff Size: Adult   Pulse: 60   Weight: 71.2 kg (157 lb)   Height: 5' 4" (1.626 m)       Physical Exam  Constitutional:       General: She is not in acute distress. Appearance: Normal appearance. She is normal weight. She is not ill-appearing or toxic-appearing. HENT:      Head: Normocephalic and atraumatic. Eyes:      Conjunctiva/sclera: Conjunctivae normal.   Cardiovascular:      Rate and Rhythm: Normal rate. Pulmonary:      Effort: Pulmonary effort is normal. No respiratory distress. Musculoskeletal:         General: Normal range of motion. Cervical back: Normal range of motion and neck supple. Skin:     General: Skin is warm and dry. Neurological:      General: No focal deficit present. Mental Status: She is alert and oriented to person, place, and time. Cranial Nerves: No cranial nerve deficit.    Psychiatric:         Mood and Affect: Mood normal.         Behavior: Behavior normal.         Thought Content: Thought content normal.          Labs:     Lab Results   Component Value Date    CREATININE 1.16 03/28/2023      Lab Results   Component Value Date    HGBA1C 5.9 (H) 06/21/2022     Lab Results   Component Value Date    GLUCOSE 113 07/06/2016    CALCIUM 9.1 03/28/2023     08/11/2015    K 4.4 03/28/2023    CO2 28 03/28/2023     03/28/2023    BUN 19 03/28/2023    CREATININE 1.16 03/28/2023       I have personally reviewed all pertinent lab results and reviewed with patient    Imaging       Quoc Kelley PA-C  Date: 9/1/2023 Time: 8:50 AM  [facility]    This note was written using fluency dictation software. Please excuse any resulting minor grammatical errors.

## 2023-09-03 LAB — BACTERIA UR CULT: NORMAL

## 2023-09-05 ENCOUNTER — TELEPHONE (OUTPATIENT)
Dept: UROLOGY | Facility: CLINIC | Age: 83
End: 2023-09-05

## 2023-09-05 NOTE — TELEPHONE ENCOUNTER
Called patient to inform her that her urine test came back negative and also her last office notes was mailed to her. Asked to call back with any other questions.

## 2023-09-05 NOTE — TELEPHONE ENCOUNTER
----- Message from Panda Caballero PA-C sent at 9/5/2023  2:33 PM EDT -----  Please call patient to inform her that her urine testing is negative. Requested that we call her personal cell number at [phone] also if you can send physical copy of my note to Mrs. Trent Lemus by mail.   She requested that we do so at time of office visit

## 2023-09-05 NOTE — TELEPHONE ENCOUNTER
Pt called and was informed of the msg. Pt stated that it says abnormal on my chart.      Please review

## 2023-09-07 NOTE — TELEPHONE ENCOUNTER
Called and left voicemail on patients mobile number per communication consent with urine culture results of mixed contaminants. Explained this is due to skin/vaginal osiel and is not a true bacterial infection therefore not treated with antibiotics and is considered a negative result. Asked patient to call back with any additional questions or concerns.

## 2023-09-25 ENCOUNTER — APPOINTMENT (OUTPATIENT)
Dept: LAB | Facility: CLINIC | Age: 83
End: 2023-09-25
Payer: COMMERCIAL

## 2023-09-25 ENCOUNTER — TRANSCRIBE ORDERS (OUTPATIENT)
Dept: LAB | Facility: CLINIC | Age: 83
End: 2023-09-25

## 2023-09-25 DIAGNOSIS — I10 HYPERTENSION, ESSENTIAL: ICD-10-CM

## 2023-09-25 DIAGNOSIS — I10 HYPERTENSION, ESSENTIAL: Primary | ICD-10-CM

## 2023-09-25 DIAGNOSIS — E78.00 PURE HYPERCHOLESTEROLEMIA: ICD-10-CM

## 2023-09-25 DIAGNOSIS — I48.0 PAROXYSMAL ATRIAL FIBRILLATION (HCC): ICD-10-CM

## 2023-09-25 DIAGNOSIS — Z79.01 CHRONIC ANTICOAGULATION: ICD-10-CM

## 2023-09-25 LAB
ALBUMIN SERPL BCP-MCNC: 3.7 G/DL (ref 3.5–5)
ALP SERPL-CCNC: 97 U/L (ref 34–104)
ALT SERPL W P-5'-P-CCNC: 18 U/L (ref 7–52)
ANION GAP SERPL CALCULATED.3IONS-SCNC: 7 MMOL/L
AST SERPL W P-5'-P-CCNC: 23 U/L (ref 13–39)
BILIRUB SERPL-MCNC: 0.64 MG/DL (ref 0.2–1)
BUN SERPL-MCNC: 17 MG/DL (ref 5–25)
CALCIUM SERPL-MCNC: 9.3 MG/DL (ref 8.4–10.2)
CHLORIDE SERPL-SCNC: 105 MMOL/L (ref 96–108)
CHOLEST SERPL-MCNC: 173 MG/DL
CO2 SERPL-SCNC: 28 MMOL/L (ref 21–32)
CREAT SERPL-MCNC: 1.07 MG/DL (ref 0.6–1.3)
ERYTHROCYTE [DISTWIDTH] IN BLOOD BY AUTOMATED COUNT: 14.1 % (ref 11.6–15.1)
GFR SERPL CREATININE-BSD FRML MDRD: 47 ML/MIN/1.73SQ M
GLUCOSE P FAST SERPL-MCNC: 95 MG/DL (ref 65–99)
HCT VFR BLD AUTO: 38 % (ref 34.8–46.1)
HDLC SERPL-MCNC: 83 MG/DL
HGB BLD-MCNC: 12.2 G/DL (ref 11.5–15.4)
LDLC SERPL CALC-MCNC: 80 MG/DL (ref 0–100)
MCH RBC QN AUTO: 32.3 PG (ref 26.8–34.3)
MCHC RBC AUTO-ENTMCNC: 32.1 G/DL (ref 31.4–37.4)
MCV RBC AUTO: 101 FL (ref 82–98)
NONHDLC SERPL-MCNC: 90 MG/DL
PLATELET # BLD AUTO: 238 THOUSANDS/UL (ref 149–390)
PMV BLD AUTO: 9.3 FL (ref 8.9–12.7)
POTASSIUM SERPL-SCNC: 4.1 MMOL/L (ref 3.5–5.3)
PROT SERPL-MCNC: 7.6 G/DL (ref 6.4–8.4)
RBC # BLD AUTO: 3.78 MILLION/UL (ref 3.81–5.12)
SODIUM SERPL-SCNC: 140 MMOL/L (ref 135–147)
TRIGL SERPL-MCNC: 49 MG/DL
WBC # BLD AUTO: 4.45 THOUSAND/UL (ref 4.31–10.16)

## 2023-09-25 PROCEDURE — 80061 LIPID PANEL: CPT

## 2023-09-25 PROCEDURE — 85027 COMPLETE CBC AUTOMATED: CPT

## 2023-09-25 PROCEDURE — 80053 COMPREHEN METABOLIC PANEL: CPT

## 2023-09-25 PROCEDURE — 36415 COLL VENOUS BLD VENIPUNCTURE: CPT

## 2023-09-26 ENCOUNTER — OFFICE VISIT (OUTPATIENT)
Dept: FAMILY MEDICINE CLINIC | Facility: CLINIC | Age: 83
End: 2023-09-26
Payer: COMMERCIAL

## 2023-09-26 VITALS
WEIGHT: 156 LBS | SYSTOLIC BLOOD PRESSURE: 114 MMHG | DIASTOLIC BLOOD PRESSURE: 60 MMHG | RESPIRATION RATE: 16 BRPM | BODY MASS INDEX: 25.99 KG/M2 | OXYGEN SATURATION: 99 % | HEIGHT: 65 IN | TEMPERATURE: 97 F | HEART RATE: 71 BPM

## 2023-09-26 DIAGNOSIS — E78.2 MIXED HYPERLIPIDEMIA: ICD-10-CM

## 2023-09-26 DIAGNOSIS — M85.89 OSTEOPENIA OF MULTIPLE SITES: ICD-10-CM

## 2023-09-26 DIAGNOSIS — I48.19 PERSISTENT ATRIAL FIBRILLATION (HCC): ICD-10-CM

## 2023-09-26 DIAGNOSIS — K21.9 GASTROESOPHAGEAL REFLUX DISEASE WITHOUT ESOPHAGITIS: ICD-10-CM

## 2023-09-26 DIAGNOSIS — N18.31 STAGE 3A CHRONIC KIDNEY DISEASE (HCC): ICD-10-CM

## 2023-09-26 DIAGNOSIS — Z00.00 MEDICARE ANNUAL WELLNESS VISIT, SUBSEQUENT: Primary | ICD-10-CM

## 2023-09-26 DIAGNOSIS — E66.3 OVERWEIGHT: ICD-10-CM

## 2023-09-26 DIAGNOSIS — Z86.000 PERSONAL HISTORY OF IN-SITU NEOPLASM OF BREAST: ICD-10-CM

## 2023-09-26 DIAGNOSIS — Z79.01 LONG TERM (CURRENT) USE OF ANTICOAGULANTS: ICD-10-CM

## 2023-09-26 DIAGNOSIS — I10 BENIGN ESSENTIAL HYPERTENSION: ICD-10-CM

## 2023-09-26 DIAGNOSIS — Z85.038 HISTORY OF COLON CANCER: ICD-10-CM

## 2023-09-26 DIAGNOSIS — E89.0 POSTOPERATIVE HYPOTHYROIDISM: ICD-10-CM

## 2023-09-26 DIAGNOSIS — Z85.118 HISTORY OF LUNG CANCER: ICD-10-CM

## 2023-09-26 PROBLEM — R31.9 HEMATURIA: Status: RESOLVED | Noted: 2022-11-17 | Resolved: 2023-09-26

## 2023-09-26 PROBLEM — R10.11 ABDOMINAL DISCOMFORT IN RIGHT UPPER QUADRANT: Status: RESOLVED | Noted: 2023-05-18 | Resolved: 2023-09-26

## 2023-09-26 PROCEDURE — 99214 OFFICE O/P EST MOD 30 MIN: CPT | Performed by: FAMILY MEDICINE

## 2023-09-26 PROCEDURE — G0439 PPPS, SUBSEQ VISIT: HCPCS | Performed by: FAMILY MEDICINE

## 2023-09-26 RX ORDER — OMEPRAZOLE 20 MG/1
20 CAPSULE, DELAYED RELEASE ORAL AS NEEDED
Qty: 90 CAPSULE | Refills: 0 | Status: SHIPPED | OUTPATIENT
Start: 2023-09-26

## 2023-09-26 NOTE — PROGRESS NOTES
Assessment and Plan:     Problem List Items Addressed This Visit        Digestive    Gastroesophageal reflux disease     Reflux is stable continue omeprazole         Relevant Medications    omeprazole (PriLOSEC) 20 mg delayed release capsule       Endocrine    Postoperative hypothyroidism     Thyroid numbers are stable Patient to continue with current care and will see me in 6 months         Relevant Orders    TSH, 3rd generation with Free T4 reflex       Cardiovascular and Mediastinum    Persistent atrial fibrillation ([address])     Patient to see dr Elizabeth Morris She is in sinus rhythm and is on blood thinner to prevent stroke         Benign essential hypertension     Blood pressure is stable on medication continue meds and follow up in 6 months         Relevant Orders    Comprehensive metabolic panel       Musculoskeletal and Integument    Osteopenia of multiple sites     Continue weight bearing exercise Reviewed dexa from 2022 Patient is getting vitamin D and calcium in her multiple vitamin I will check dexa next year            Genitourinary    Stage 3a chronic kidney disease ([address])     Lab Results   Component Value Date    EGFR 47 09/25/2023    EGFR 43 03/28/2023    EGFR 46 09/08/2022    CREATININE 1.07 09/25/2023    CREATININE 1.16 03/28/2023    CREATININE 1.10 09/08/2022   GFR is stable Ptaient to continue with good blood pressure control adequate hydration and avoidance of NSAID's I will see her in 6 months         Relevant Orders    Comprehensive metabolic panel       Other    Long term (current) use of anticoagulants     Continue eliquis         History of colon cancer     Continue with followup with oncology         Overweight     Patient is exercising and is watching her diet Patient has lost weight and wants to continue to lose a little more         Mixed hyperlipidemia     Lipids are stable continue [facility] care repeat labs in 6 months          Relevant Orders    Comprehensive metabolic panel    Lipid panel Medicare annual wellness visit, subsequent - Primary     Patient has advanced directives She wants to get immunizations at the pharmacy for flu and covid Patient is up to date with screening tests Patient is a [address] [employer] witness and does not want to get any blood products          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 They screened negative with a PHQ-2 score of 0. Preventive health issues were discussed with patient, and age appropriate screening tests were ordered as noted in patient's After Visit Summary. Personalized health advice and appropriate referrals for health education or preventive services given if needed, as noted in patient's After Visit Summary.      History of Present Illness:     Patient presents for a Medicare Wellness Visit    Patient is here for medicare welllness and follow up of hypertension with stage 3 renal diseae  post operative hypothyroidism GERD atrial fibrillation hyperlipidemia and history of lung colon cancer as well as carcinoma in situ of breast Patient feels well Patient was under going PT for her sciatica and some neck issues Patient is seeing cardiology Patient is seeing Dr Adriana Moran Patient blood pressure is good Her last labs show thyroid and lipids are stable Patient renal function is also stable GFR is 47 Patient is tolerating all meds with no issues She has not had any issues with palpitations or any GERD symptoms      Patient Care Team:  Dustin Clinton DO as PCP - General  Phong Orozco MD (Inactive) as PCP - OBGYN (Gynecology)  Dustin Clinton DO as PCP - [facility] [employer] [facility] ([facility])  Manish Haynes MD as Surgeon (Surgical Oncology)  Amarjit Kc MD as Surgeon (Thoracic Surgery)  Bryson Barone MD (General Surgery)  Shira Santana MD (Radiation Oncology)  Tylor Quinonez MD as Surgeon (Thoracic Surgery)  Emmalene Boxer as Nurse Practitioner (Nurse Practitioner)     Review of Systems:     Review of Systems   Constitutional: Negative for fatigue, fever and unexpected weight change. HENT: Negative for congestion, sinus pain and trouble swallowing. Eyes: Negative for discharge and visual disturbance. Respiratory: Negative for cough, chest tightness, shortness of breath and wheezing. Cardiovascular: Negative for chest pain, palpitations and leg swelling. Gastrointestinal: Negative for abdominal pain, blood in stool, constipation, diarrhea, nausea and vomiting. Genitourinary: Negative for difficulty urinating, dysuria, frequency and hematuria. Musculoskeletal: Positive for back pain and neck pain. Negative for arthralgias, gait problem and joint swelling. Stable back and neck pain   Skin: Negative for rash and wound. Allergic/Immunologic: Negative for environmental allergies and food allergies. Neurological: Negative for dizziness, syncope, weakness, numbness and headaches. Hematological: Negative for adenopathy. Does not bruise/bleed easily. Psychiatric/Behavioral: Negative for confusion, decreased concentration and sleep disturbance. The patient is not nervous/anxious.          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[address])    • Prediabetes    • Primary adenocarcinoma of upper lobe of right lung ([address]) 12/02/2019   • Pulmonary nodules 02/19/2015   • Refusal of blood transfusions as patient is [employer]    • Tic disorder, transient, recurrent     not recent   • Walks frequently      Past Surgical History:   Procedure Laterality Date   • APPENDECTOMY     • ARTHROSCOPY KNEE     • BREAST BIOPSY Right 04/10/2017    US guided - intraductal papillomas   • BREAST BIOPSY Right 10/24/2019    ADH- dcis   • BREAST LUMPECTOMY W/ NEEDLE LOCALIZATION Right 05/22/2020    Procedure: EXCISIONAL BIOPSY;  1000 NEEDLE LOC;  Surgeon: Shannan Us MD;  Location: [facility];  Service: Surgical Oncology   • CARDIOVASCULAR STRESS TEST     • CARPAL TUNNEL RELEASE Left 07/01/2022   • CATARACT EXTRACTION Bilateral    • COLON SURGERY     • COLONOSCOPY      done 2010 due 2015    • DILATION AND CURETTAGE OF UTERUS     • ESOPHAGOGASTRODUODENOSCOPY      inflammation aonly   • HEMICOLECTOMY Right 1977   • HYSTERECTOMY     • JOINT REPLACEMENT      L knee   • LUNG SURGERY     • MAMMO NEEDLE LOCALIZATION RIGHT (ALL INC) Right 05/22/2020   • MAMMO NEEDLE LOCALIZATION RIGHT (ALL INC) EACH ADD Right 05/22/2020   • MAMMO STEREOTACTIC BREAST BIOPSY RIGHT (ALL INC) Right 10/24/2019   • PARATHYROID GLAND SURGERY      exploration    •  [address] INCL FLUOR GDNCE DX W/CELL WASHG SPX N/A 01/17/2020    Procedure: Kaylin Gu;  Surgeon: Phoebe Flowers MD;  [facility];  Service: Thoracic   • AL THORACOSCOPY W/LOBECTOMY SINGLE LOBE Right 01/17/2020    Procedure: ROBOTIC ASSISTED COMPLETE LUNG LOBECTOMY;  Surgeon: Phoebe Flowers MD;  [facility];  Service: Thoracic   • AL THORACOSCOPY W/THERA WEDGE RESEXN INITIAL UNILAT Right 01/17/2020    Procedure: ROBOTIC ASSISTED UPPER-LOBE WEDGE RESECTION, MEDIASTINAL LYMPH NODE DISECTION;  Surgeon: Phoebe Flowers MD;  [facility];  Service: Thoracic   • REPLACEMENT TOTAL KNEE     • THYROID SURGERY     • TOTAL ABDOMINAL HYSTERECTOMY  1983   • TOTAL THYROIDECTOMY     • TRIGGER FINGER RELEASE Left 07/01/2022   • US GUIDED BREAST BIOPSY RIGHT COMPLETE Right 04/10/2017   • US GUIDED THYROID BIOPSY  02/14/2020      Family History:     Family History   Problem Relation Age of Onset   • Esophageal cancer Mother         80s   • Hypertension Mother    • Stroke Family         TIA   • Breast cancer Paternal Aunt 80   • No Known Problems Father    • COPD Sister    • COPD Sister    • No Known Problems Maternal Grandmother    • No Known Problems Maternal Grandfather    • No Known Problems Paternal Grandmother    • No Known Problems Paternal Grandfather    • No Known Problems Maternal Aunt    • No Known Problems Maternal Aunt    • No Known Problems Maternal Aunt    • No Known Problems Maternal Aunt    • No Known Problems Maternal Aunt       Social History:     Social History     Socioeconomic History   • Marital status: Single     Spouse name: None   • Number of children: None   • Years of education: None   • Highest education level: None   Occupational History   • None   Tobacco Use   • Smoking status: Former     Packs/day: 1.50     Years: 20.00     Total pack years: 30.00     Types: Cigarettes Quit date: 5     Years since quittin.7   • Smokeless tobacco: Never   Vaping Use   • Vaping Use: Never used   Substance and Sexual Activity   • Alcohol use: Yes     Alcohol/week: 5.0 standard drinks of alcohol     Types: 5 Cans of beer per week     Comment: occasional beer with food   • Drug use: No   • Sexual activity: None     Comment: hysterectomy   Other Topics Concern   • None   Social History Narrative    Caffeine use- 1-3, 8oz coffees per day    Denied history of housing without smoke detectors    Always uses a seatbelt     Social Determinants of Health     Financial Resource Strain: Low Risk  (2023)    Overall Financial Resource Strain (CARDIA)    • Difficulty of Paying Living Expenses: Not hard at all   Food Insecurity: Not on file   Transportation Needs: No Transportation Needs (2023)    PRAPARE - Transportation    • Lack of Transportation (Medical): No    • Lack of Transportation (Non-Medical):  No   Physical Activity: Not on file   Stress: Not on file   Social Connections: Not on file   Intimate Partner Violence: Not on file   Housing Stability: Not on file      Medications and Allergies:     Current Outpatient Medications   Medication Sig Dispense Refill   • apixaban (ELIQUIS) 5 mg Take 5 mg by mouth 2 (two) times a day To stop 2 days prior to surgery per md     • atorvastatin (LIPITOR) 80 mg tablet TAKE 1 TABLET BY MOUTH EVERY DAY 90 tablet 1   • ciclopirox (LOPROX) 0.77 % SUSP APPLY 1-2 TIMES DAILY TO AFFECTED TOENAILS     • EPINEPHrine (EpiPen 2-Pa) 0.3 mg/0.3 mL SOAJ Inject 0.3 mL (0.3 mg total) into a muscle once as needed for anaphylaxis for up to 1 dose 0.6 mL 0   • levothyroxine 100 mcg tablet TAKE 1 TABLET BY MOUTH EVERY DAY 90 tablet 0   • Multiple Vitamins-Calcium (ESSENTIAL ONE DAILY MULTIVIT PO) Take by mouth daily      • omeprazole (PriLOSEC) 20 mg delayed release capsule Take 1 capsule (20 mg total) by mouth as needed (gerd) 90 capsule 0   • diltiazem (CARDIZEM CD) 120 mg 24 hr capsule Take 120 mg by mouth every evening      • EPINEPHrine (EPIPEN) 0.3 mg/0.3 mL SOAJ Inject 0.3 mL (0.3 mg total) into a muscle once for 1 dose 0.6 mL 0     No current facility-administered medications for this visit. Allergies   Allergen Reactions   • Bee Venom      Tongue swelling   • Flurbiprofen Hives   • Ibuprofen Shortness Of Breath   • Levaquin [Levofloxacin In D5w] Other (See Comments)     Dark spots under eyes and wrists   • Tequin [Gatifloxacin] Rash      Immunizations:     Immunization History   Administered Date(s) Administered   • COVID-19 PFIZER VACCINE 0.3 ML IM 02/06/2021, 02/25/2021, 10/02/2021   • INFLUENZA 12/03/2004, 10/15/2008, 10/14/2010, 10/27/2011, 11/23/2012, 12/14/2012   • Influenza Split High Dose Preservative Free IM 11/23/2012, 11/05/2013, 10/20/2014      Health Maintenance: There are no preventive care reminders to display for this patient. Topic Date Due   • COVID-19 Vaccine (4 - Pfizer series) 11/27/2021   • Influenza Vaccine (1) 09/01/2023      Medicare Screening Tests and Risk Assessments:     Zuleima is here for her Subsequent Wellness visit. Health Risk Assessment:   Patient rates overall health as good. Patient feels that their physical health rating is much better. Patient is satisfied with their life. Eyesight was rated as same. Hearing was rated as same. Patient feels that their emotional and mental health rating is much better. Patients states they are never, rarely angry. Patient states they are sometimes unusually tired/fatigued. Pain experienced in the last 7 days has been some. Patient's pain rating has been 3/10. Patient states that she has experienced no weight loss or gain in last 6 months. Back and neck     Depression Screening:   PHQ-2 Score: 0      Fall Risk Screening:    In the past year, patient has experienced: no history of falling in past year      Urinary Incontinence Screening:   Patient has not leaked urine accidently in the last six months. Home Safety:  Patient does not have trouble with stairs inside or outside of their home. Patient has working smoke alarms and has working carbon monoxide detector. Home safety hazards include: none. Nutrition:   Current diet is Limited junk food. Medications:   Patient is currently taking over-the-counter supplements. OTC medications include: see medication list. Patient is able to manage medications. Activities of Daily Living (ADLs)/Instrumental Activities of Daily Living (IADLs):   Walk and transfer into and out of bed and chair?: Yes  Dress and groom yourself?: Yes    Bathe or shower yourself?: Yes    Feed yourself? Yes  Do your laundry/housekeeping?: Yes  Manage your money, pay your bills and track your expenses?: Yes  Make your own meals?: Yes    Do your own shopping?: Yes    Previous Hospitalizations:   Any hospitalizations or ED visits within the last 12 months?: No      Advance Care Planning:   Living will: Yes    Durable POA for healthcare:  Yes    Advanced directive: Yes    Five wishes given: Yes      Comments: No blood products  Patient  does want resuscitation but not to be kept alive on machinery     Cognitive Screening:   Provider or family/friend/caregiver concerned regarding cognition?: No    PREVENTIVE SCREENINGS      Cardiovascular Screening:    General: Screening Not Indicated and History Lipid Disorder      Diabetes Screening:     General: Screening Current      Colorectal Cancer Screening:     General: Screening Not Indicated      Breast Cancer Screening:     General: Screening Current      Cervical Cancer Screening:    General: Screening Not Indicated      Abdominal Aortic Aneurysm (AAA) Screening:        General: Screening Not Indicated      Lung Cancer Screening:     General: Screening Not Indicated and History Lung Cancer      Hepatitis C Screening:    General: Screening Not Indicated    Screening, Brief Intervention, and Referral to Treatment (SBIRT)    Screening  Typical number of drinks in a day: 0  Typical number of drinks in a week: 3  Interpretation: Low risk drinking behavior. Single Item Drug Screening:  How often have you used an illegal drug (including marijuana) or a prescription medication for non-medical reasons in the past year? never    Single Item Drug Screen Score: 0  Interpretation: Negative screen for possible drug use disorder    No results found. Physical Exam:     /60   Pulse 71   Temp (!) 97 °F (36.1 °C) (Temporal)   Resp 16   Ht 5' 4.5" (1.638 m)   Wt 70.8 kg (156 lb)   SpO2 99%   BMI 26.36 kg/m²     Physical Exam  Vitals and nursing note reviewed. Constitutional:       Appearance: Normal appearance. She is well-developed. HENT:      Head: Normocephalic and atraumatic. Right Ear: Tympanic membrane and external ear normal.      Left Ear: Tympanic membrane and external ear normal.      Nose: Nose normal.      Mouth/Throat:      Pharynx: No oropharyngeal exudate. Eyes:      Extraocular Movements: Extraocular movements intact. Conjunctiva/sclera: Conjunctivae normal.      Pupils: Pupils are equal, round, and reactive to light. Neck:      Thyroid: No thyromegaly. Trachea: No tracheal deviation. Cardiovascular:      Rate and Rhythm: Normal rate and regular rhythm. Heart sounds: Normal heart sounds. Pulmonary:      Effort: Pulmonary effort is normal. No respiratory distress. Breath sounds: Normal breath sounds. No wheezing or rales. Abdominal:      General: Bowel sounds are normal.      Palpations: Abdomen is soft. Musculoskeletal:         General: Normal range of motion. Cervical back: Normal range of motion and neck supple. Lymphadenopathy:      Cervical: No cervical adenopathy. Skin:     General: Skin is warm. Findings: No rash. Neurological:      General: No focal deficit present. Mental Status: She is alert and oriented to person, place, and time. Cranial Nerves: No cranial nerve deficit. Motor: No abnormal muscle tone. Psychiatric:         Mood and Affect: Mood normal.         Behavior: Behavior normal.         Thought Content:  Thought content normal.         Judgment: Judgment normal.          Vivi Taylor DO

## 2023-09-26 NOTE — ASSESSMENT & PLAN NOTE
Lab Results   Component Value Date    EGFR 47 09/25/2023    EGFR 43 03/28/2023    EGFR 46 09/08/2022    CREATININE 1.07 09/25/2023    CREATININE 1.16 03/28/2023    CREATININE 1.10 09/08/2022   GFR is stable Ptaient to continue with good blood pressure control adequate hydration and avoidance of NSAID's I will see her in 6 months

## 2023-09-26 NOTE — ASSESSMENT & PLAN NOTE
Patient is exercising and is watching her diet Patient has lost weight and wants to continue to lose a little more

## 2023-09-26 NOTE — ASSESSMENT & PLAN NOTE
Patient has advanced directives She wants to get immunizations at the pharmacy for flu and covid Patient is up to date with screening tests Patient is a [address] [employer] witness and does not want to get any blood products

## 2023-09-26 NOTE — PATIENT INSTRUCTIONS
Medicare Preventive Visit Patient Instructions  Thank you for completing your Welcome to Medicare Visit or Medicare Annual Wellness Visit today. Your next wellness visit will be due in one year (9/26/2024). The screening/preventive services that you may require over the next 5-10 years are detailed below. Some tests may not apply to you based off risk factors and/or age. Screening tests ordered at today's visit but not completed yet may show as past due. Also, please note that scanned in results may not display below. Preventive Screenings:  Service Recommendations Previous Testing/Comments   Colorectal Cancer Screening  * Colonoscopy    * Fecal Occult Blood Test (FOBT)/Fecal Immunochemical Test (FIT)  * Fecal DNA/Cologuard Test  * Flexible Sigmoidoscopy Age: 43-73 years old   Colonoscopy: every 10 years (may be performed more frequently if at higher risk)  OR  FOBT/FIT: every 1 year  OR  Cologuard: every 3 years  OR  Sigmoidoscopy: every 5 years  Screening may be recommended earlier than age 39 if at higher risk for colorectal cancer. Also, an individualized decision between you and your healthcare provider will decide whether screening between the ages of 77-80 would be appropriate. Colonoscopy: Not on file  FOBT/FIT: Not on file  Cologuard: Not on file  Sigmoidoscopy: Not on file    Screening Not Indicated     Breast Cancer Screening Age: 36 years old  Frequency: every 1-2 years  Not required if history of left and right mastectomy Mammogram: 04/06/2023    Screening Current   Cervical Cancer Screening Between the ages of 21-29, pap smear recommended once every 3 years. Between the ages of 32-69, can perform pap smear with HPV co-testing every 5 years.    Recommendations may differ for women with a history of total hysterectomy, cervical cancer, or abnormal pap smears in past. Pap Smear: 07/13/2023    Screening Not Indicated   Hepatitis C Screening Once for adults born between 1945 and 1965  More frequently in patients at high risk for Hepatitis C Hep C Antibody: Not on file        Diabetes Screening 1-2 times per year if you're at risk for diabetes or have pre-diabetes Fasting glucose: 95 mg/dL (9/25/2023)  A1C: 5.9 % (6/21/2022)  Screening Current   Cholesterol Screening Once every 5 years if you don't have a lipid disorder. May order more often based on risk factors. Lipid panel: 09/25/2023    Screening Not Indicated  History Lipid Disorder     Other Preventive Screenings Covered by Medicare:  1. Abdominal Aortic Aneurysm (AAA) Screening: covered once if your at risk. You're considered to be at risk if you have a family history of AAA. 2. Lung Cancer Screening: covers low dose CT scan once per year if you meet all of the following conditions: (1) Age 48-67; (2) No signs or symptoms of lung cancer; (3) Current smoker or have quit smoking within the last 15 years; (4) You have a tobacco smoking history of at least 20 pack years (packs per day multiplied by number of years you smoked); (5) You get a written order from a healthcare provider. 3. Glaucoma Screening: covered annually if you're considered high risk: (1) You have diabetes OR (2) Family history of glaucoma OR (3)  aged 48 and older OR (3)  American aged 72 and older  3. Osteoporosis Screening: covered every 2 years if you meet one of the following conditions: (1) You're estrogen deficient and at risk for osteoporosis based off medical history and other findings; (2) Have a vertebral abnormality; (3) On glucocorticoid therapy for more than 3 months; (4) Have primary hyperparathyroidism; (5) On osteoporosis medications and need to assess response to drug therapy. · Last bone density test (DXA Scan): 09/16/2022.  5. HIV Screening: covered annually if you're between the age of 15-65. Also covered annually if you are younger than 13 and older than 72 with risk factors for HIV infection.  For pregnant patients, it is covered up to 3 times per pregnancy. Immunizations:  Immunization Recommendations   Influenza Vaccine Annual influenza vaccination during flu season is recommended for all persons aged >= 6 months who do not have contraindications   Pneumococcal Vaccine   * Pneumococcal conjugate vaccine = PCV13 (Prevnar 13), PCV15 (Vaxneuvance), PCV20 (Prevnar 20)  * Pneumococcal polysaccharide vaccine = PPSV23 (Pneumovax) Adults 20-63 years old: 1-3 doses may be recommended based on certain risk factors  Adults 72 years old: 1-2 doses may be recommended based off what pneumonia vaccine you previously received   Hepatitis B Vaccine 3 dose series if at intermediate or high risk (ex: diabetes, end stage renal disease, liver disease)   Tetanus (Td) Vaccine - COST NOT COVERED BY MEDICARE PART B Following completion of primary series, a booster dose should be given every 10 years to maintain immunity against tetanus. Td may also be given as tetanus wound prophylaxis. Tdap Vaccine - COST NOT COVERED BY MEDICARE PART B Recommended at least once for all adults. For pregnant patients, recommended with each pregnancy. Shingles Vaccine (Shingrix) - COST NOT COVERED BY MEDICARE PART B  2 shot series recommended in those aged 48 and above     Health Maintenance Due:  There are no preventive care reminders to display for this patient. Immunizations Due:      Topic Date Due   • COVID-19 Vaccine (4 - Pfizer series) 11/27/2021   • Influenza Vaccine (1) 09/01/2023     Advance Directives   What are advance directives? Advance directives are legal documents that state your wishes and plans for medical care. These plans are made ahead of time in case you lose your ability to make decisions for yourself. Advance directives can apply to any medical decision, such as the treatments you want, and if you want to donate organs. What are the types of advance directives? There are many types of advance directives, and each state has rules about how to use them.  You may choose a combination of any of the following:  · Living will: This is a written record of the treatment you want. You can also choose which treatments you do not want, which to limit, and which to stop at a certain time. This includes surgery, medicine, IV fluid, and tube feedings. · Durable power of  for healthcare [facility]): This is a written record that states who you want to make healthcare choices for you when you are unable to make them for yourself. This person, called a proxy, is usually a family member or a friend. You may choose more than 1 proxy. · Do not resuscitate (DNR) order:  A DNR order is used in case your heart stops beating or you stop breathing. It is a request not to have certain forms of treatment, such as CPR. A DNR order may be included in other types of advance directives. · Medical directive: This covers the care that you want if you are in a coma, near death, or unable to make decisions for yourself. You can list the treatments you want for each condition. Treatment may include pain medicine, surgery, blood transfusions, dialysis, IV or tube feedings, and a ventilator (breathing machine). · Values history: This document has questions about your views, beliefs, and how you feel and think about life. This information can help others choose the care that you would choose. Why are advance directives important? An advance directive helps you control your care. Although spoken wishes may be used, it is better to have your wishes written down. Spoken wishes can be misunderstood, or not followed. Treatments may be given even if you do not want them. An advance directive may make it easier for your family to make difficult choices about your care. Weight Management   Why it is important to manage your weight:  Being overweight increases your risk of health conditions such as heart disease, high blood pressure, type 2 diabetes, and certain types of cancer.  It can also increase your risk for osteoarthritis, sleep apnea, and other respiratory problems. Aim for a slow, steady weight loss. Even a small amount of weight loss can lower your risk of health problems. How to lose weight safely:  A safe and healthy way to lose weight is to eat fewer calories and get regular exercise. You can lose up about 1 pound a week by decreasing the number of calories you eat by 500 calories each day. Healthy meal plan for weight management:  A healthy meal plan includes a variety of foods, contains fewer calories, and helps you stay healthy. A healthy meal plan includes the following:  · Eat whole-grain foods more often. A healthy meal plan should contain fiber. Fiber is the part of grains, fruits, and vegetables that is not broken down by your body. Whole-grain foods are healthy and provide extra fiber in your diet. Some examples of whole-grain foods are whole-wheat breads and pastas, oatmeal, brown rice, and bulgur. · Eat a variety of vegetables every day. Include dark, leafy greens such as spinach, kale, judy greens, and mustard greens. Eat yellow and orange vegetables such as carrots, sweet potatoes, and winter squash. · Eat a variety of fruits every day. Choose fresh or canned fruit (canned in its own juice or light syrup) instead of juice. Fruit juice has very little or no fiber. · Eat low-fat dairy foods. Drink fat-free (skim) milk or 1% milk. Eat fat-free yogurt and low-fat cottage cheese. Try low-fat cheeses such as mozzarella and other reduced-fat cheeses. · Choose meat and other protein foods that are low in fat. Choose beans or other legumes such as split peas or lentils. Choose fish, skinless poultry (chicken or turkey), or lean cuts of red meat (beef or pork). Before you cook meat or poultry, cut off any visible fat. · Use less fat and oil. Try baking foods instead of frying them. Add less fat, such as margarine, sour cream, regular salad dressing and mayonnaise to foods.  Eat fewer high-fat foods. Some examples of high-fat foods include french fries, doughnuts, ice cream, and cakes. · Eat fewer sweets. Limit foods and drinks that are high in sugar. This includes candy, cookies, regular soda, and sweetened drinks. Exercise:  Exercise at least 30 minutes per day on most days of the week. Some examples of exercise include walking, biking, dancing, and swimming. You can also fit in more physical activity by taking the stairs instead of the elevator or parking farther away from stores. Ask your healthcare provider about the best exercise plan for you. © Copyright [employer] 2018 Information is for End User's use only and may not be sold, redistributed or otherwise used for commercial purposes.  All illustrations and images included in CareNotes® are the copyrighted property of [employer] or [address] [place] [address]

## 2023-09-26 NOTE — ASSESSMENT & PLAN NOTE
Continue weight bearing exercise Reviewed dexa from 2022 Patient is getting vitamin D and calcium in her multiple vitamin I will check dexa next year

## 2023-10-17 ENCOUNTER — TELEPHONE (OUTPATIENT)
Dept: HEMATOLOGY ONCOLOGY | Facility: CLINIC | Age: 83
End: 2023-10-17

## 2023-10-17 ENCOUNTER — TELEPHONE (OUTPATIENT)
Dept: SURGICAL ONCOLOGY | Facility: CLINIC | Age: 83
End: 2023-10-17

## 2023-10-17 NOTE — TELEPHONE ENCOUNTER
Appointment Confirmation   Who are you speaking with? Patient   If it is not the patient, are they listed on an active communication consent form? N/A   Which provider is the appointment scheduled with? CT Scan   When is the appointment scheduled? Please list date and time 12/7/23 @ 8am   At which location is the appointment scheduled to take place? [place]   Did caller verbalize understanding of appointment details?  yes

## 2023-10-17 NOTE — TELEPHONE ENCOUNTER
Appointment Confirmation   Who are you speaking with? Patient   If it is not the patient, are they listed on an active communication consent form? N/A   Which provider is the appointment scheduled with? NEYDA Ho   When is the appointment scheduled? Please list date and time 11/16/23 10:30am   At which location is the appointment scheduled to take place? [place]   Did caller verbalize understanding of appointment details? Yes       Patient would like to know if she needs any scans or labs drawn for this appointment. Patient would like a call back to discuss. [phone] Patient gave permission to leave any information on her voicemail.

## 2023-10-17 NOTE — TELEPHONE ENCOUNTER
Left message for patient that she did not have to do any labs or scans for her next appointment with DAYANA Bishop 11/18/23 follow up appointment

## 2023-11-01 DIAGNOSIS — E89.0 POSTOPERATIVE HYPOTHYROIDISM: ICD-10-CM

## 2023-11-01 DIAGNOSIS — E78.2 MIXED HYPERLIPIDEMIA: ICD-10-CM

## 2023-11-01 RX ORDER — LEVOTHYROXINE SODIUM 0.1 MG/1
TABLET ORAL
Qty: 90 TABLET | Refills: 0 | Status: SHIPPED | OUTPATIENT
Start: 2023-11-01

## 2023-11-01 RX ORDER — ATORVASTATIN CALCIUM 80 MG/1
TABLET, FILM COATED ORAL
Qty: 90 TABLET | Refills: 1 | Status: SHIPPED | OUTPATIENT
Start: 2023-11-01

## 2023-11-16 ENCOUNTER — OFFICE VISIT (OUTPATIENT)
Dept: SURGICAL ONCOLOGY | Facility: CLINIC | Age: 83
End: 2023-11-16
Payer: COMMERCIAL

## 2023-11-16 VITALS
DIASTOLIC BLOOD PRESSURE: 76 MMHG | OXYGEN SATURATION: 99 % | TEMPERATURE: 98.2 F | HEIGHT: 65 IN | HEART RATE: 66 BPM | SYSTOLIC BLOOD PRESSURE: 122 MMHG | RESPIRATION RATE: 16 BRPM | WEIGHT: 154 LBS | BODY MASS INDEX: 25.66 KG/M2

## 2023-11-16 DIAGNOSIS — Z85.3 ENCOUNTER FOR FOLLOW-UP SURVEILLANCE OF BREAST CANCER: Primary | ICD-10-CM

## 2023-11-16 DIAGNOSIS — Z86.000 PERSONAL HISTORY OF IN-SITU NEOPLASM OF BREAST: ICD-10-CM

## 2023-11-16 DIAGNOSIS — R92.30 DENSE BREAST TISSUE: ICD-10-CM

## 2023-11-16 DIAGNOSIS — Z08 ENCOUNTER FOR FOLLOW-UP SURVEILLANCE OF BREAST CANCER: Primary | ICD-10-CM

## 2023-11-16 PROCEDURE — 99213 OFFICE O/P EST LOW 20 MIN: CPT

## 2023-11-16 NOTE — PROGRESS NOTES
Surgical Oncology Follow Up       [address] [facility]  [address] [address]    Colettshadi Drop  1940  [id number]  [facility]COL[facility] [place]  [address] [address]    Chief Complaint   Patient presents with    Follow-up       Assessment/Plan:  1. Encounter for follow-up surveillance of breast cancer  - 6 mo follow up  - mammo scheduled for April    2. 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    3. 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 She underwent a right breast lumpectomy and whole breast radiation therapy. She is currently on observation. She had a bilateral diagnostic mammogram on 4/6/2023 which was BI-RADS 2 category 3 density. Her mammogram is already scheduled for next year. There were no concerns on her cbe. I will see the patient back in 6 months or sooner should the need arise. She was instructed to call with any questions or concerns prior to this time. All questions were answered today. History of Present Illness:     Oncology History   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 Breast, right, lumpectomy:  -  Ductal carcinoma in situ, low nuclear grade . B. Breast, right new inferior margin, excision:  -  Ductal carcinoma in situ, low nuclear grade involving intraductal papilloma. C.  Breast, right new lateral margin, excision:  -  Ductal carcinoma in situ, low nuclear grade. D.  Breast, right new posterior margin, excision:  -  Benign breast parenchyma, negative for atypia or malignancy.     %, TX 60-70%, HER2 1+ negative     6/9/2020 -  Cancer Staged    Staging form: Breast, AJCC 8th Edition  - Clinical: cT0, cN0, cM0 - Signed by Daria Hoover MD on 6/9/2020  Laterality: Right       6/9/2020 -  Cancer Staged    Staging form: Breast, AJCC 8th Edition  - Pathologic: Stage 0 (pTis (DCIS), pN0, cM0, ER+, DE+, HER2-) - Signed by Daria Hoover MD on 6/16/2020  Laterality: Right  Method of lymph node assessment: Clinical  Nuclear grade: G1       7/30/2020 - 8/28/2020 Radiation    Plan ID Energy Fractions Dose per Fraction (cGy) Dose Correction (cGy) Total Dose Delivered (cGy) Elapsed Days   R Breast 6X 16 / 16 266 0 4,256 21   R Brst Boost 6X 5 / 5 200 0 1,000 4        Primary adenocarcinoma of upper lobe of right lung ([address]) (Resolved)   12/2/2019 Initial Diagnosis    Primary adenocarcinoma of upper lobe of right lung ([address])     1/17/2020 Surgery    Lung, right upper lobe, wedge resection:  -  Invasive moderately differentiated adenocarcinoma. 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 She is currently on observation. She had a bilateral diagnostic mammogram on 4/6/2023 which was BI-RADS 2 category 3 density. She notes tightness on her right chest wall. I recommended stretches and wearing a supportive bra. Patient denies changes on her breast exam. She denies persistent headaches, bone pain, back pain, shortness of breath, or abdominal pain. Review of Systems:  Review of Systems   Constitutional:  Negative for activity change, appetite change, fatigue and unexpected weight change. Respiratory:  Negative for cough and shortness of breath. Cardiovascular:  Negative for chest pain. Gastrointestinal:  Negative for abdominal pain, diarrhea, nausea and vomiting. Endocrine: Negative for heat intolerance. Musculoskeletal:  Negative for arthralgias, back pain and myalgias. Skin:  Negative for rash. Neurological:  Negative for weakness and headaches.    Hematological: Negative for adenopathy.        Patient Active Problem List   Diagnosis    Persistent atrial fibrillation (HCC)    Benign essential hypertension    Long term (current) use of anticoagulants    History of colon cancer    Overweight    Osteoarthritis    Postoperative hypothyroidism    Mixed hyperlipidemia    Medicare annual wellness visit, subsequent    Osteopenia of multiple sites    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    Dense breast tissue    Hashimoto's thyroiditis    History of lung cancer    Thyroid nodule    Encounter for follow-up surveillance of breast cancer    Stage 3a chronic kidney disease ([address])    Refusal of blood transfusions as patient is Anabaptist    Gastroesophageal reflux disease     Past Medical History:   Diagnosis Date    Anesthesia     "cheap date"    Arthritis     Atrial fibrillation ([address])     Benign polyp of large intestine     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    Colon cancer ([address]) 1977    41 yo    Colon polyp     Full dentures     full upper/ partial lower    GERD (gastroesophageal reflux disease)     Glaucoma suspect     History of neck problems     "C3-C7 and had epidural injections years ago"    Hyperlipidemia     Hypertension     Lung cancer ([address])     jan 2020- had surgery    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[address])     Prediabetes     Primary adenocarcinoma of upper lobe of right lung ([address]) 12/02/2019    Pulmonary nodules 02/19/2015    Refusal of blood transfusions as patient is Anabaptist     Tic disorder, transient, recurrent     not recent    Walks frequently      Past Surgical History:   Procedure Laterality Date    APPENDECTOMY      ARTHROSCOPY KNEE      BREAST BIOPSY Right 04/10/2017    US guided - intraductal papillomas    BREAST BIOPSY Right 10/24/2019    ADH- dcis    BREAST LUMPECTOMY W/ NEEDLE LOCALIZATION Right 05/22/2020    Procedure: EXCISIONAL BIOPSY;  1000 NEEDLE LOC;  Surgeon: Betty Ricardo MD;  Location: [facility];  Service: Surgical Oncology    CARDIOVASCULAR STRESS TEST      CARPAL TUNNEL RELEASE Left 07/01/2022    CATARACT EXTRACTION Bilateral     COLON SURGERY      COLONOSCOPY      done 2010 due 2015     DILATION AND CURETTAGE OF UTERUS      ESOPHAGOGASTRODUODENOSCOPY      inflammation aonly    HEMICOLECTOMY Right 1977    HYSTERECTOMY      JOINT REPLACEMENT      L knee    LUNG SURGERY      MAMMO NEEDLE LOCALIZATION RIGHT (ALL INC) Right 05/22/2020    MAMMO NEEDLE LOCALIZATION RIGHT (ALL INC) EACH ADD Right 05/22/2020    MAMMO STEREOTACTIC BREAST BIOPSY RIGHT (ALL INC) Right 10/24/2019    PARATHYROID GLAND SURGERY      exploration      [address] INCL FLUOR GDNCE DX W/CELL WASHG SPX N/A 01/17/2020    Procedure: FLEXIBLE BRONCHOSCOPY;  Surgeon: Loreta Fajardo MD;  Location: [facility];  Service: Thoracic    MO THORACOSCOPY W/LOBECTOMY SINGLE LOBE Right 01/17/2020    Procedure: ROBOTIC ASSISTED COMPLETE LUNG LOBECTOMY;  Surgeon: Loreta Fajardo MD;  Location: [facility];  Service: Thoracic    MO THORACOSCOPY W/THERA WEDGE RESEXN INITIAL UNILAT Right 01/17/2020    Procedure: ROBOTIC ASSISTED UPPER-LOBE WEDGE RESECTION, MEDIASTINAL LYMPH NODE DISECTION;  Surgeon: Loreta Fajardo MD;  Location: [facility];  Service: Thoracic    REPLACEMENT TOTAL KNEE      THYROID SURGERY      TOTAL ABDOMINAL HYSTERECTOMY  1983    TOTAL THYROIDECTOMY      TRIGGER FINGER RELEASE Left 07/01/2022    US GUIDED BREAST BIOPSY RIGHT COMPLETE Right 04/10/2017    US GUIDED THYROID BIOPSY  02/14/2020     Family History   Problem Relation Age of Onset    Esophageal cancer Mother         80s    Hypertension Mother     Stroke Family         TIA    Breast cancer Paternal Aunt 80    No Known Problems Father     COPD Sister     COPD Sister     No Known Problems Maternal Grandmother     No Known Problems Maternal Grandfather     No Known Problems Paternal Grandmother     No Known Problems Paternal Grandfather     No Known Problems Maternal Aunt     No Known Problems Maternal Aunt     No Known Problems Maternal Aunt     No Known Problems Maternal Aunt     No Known Problems Maternal Aunt      Social History     Socioeconomic History    Marital status: Single     Spouse name: Not on file    Number of children: Not on file    Years of education: Not on file    Highest education level: Not on file   Occupational History    Not on file   Tobacco Use    Smoking status: Former     Packs/day: 1.50     Years: 20.00     Total pack years: 30.00     Types: Cigarettes     Quit date: 5     Years since quittin.9     Passive exposure: Past    Smokeless tobacco: Never   Vaping Use    Vaping Use: Never used   Substance and Sexual Activity    Alcohol use: Yes     Alcohol/week: 5.0 standard drinks of alcohol     Types: 5 Cans of beer per week     Comment: occasional beer with food    Drug use: No    Sexual activity: Not on file     Comment: hysterectomy   Other Topics Concern    Not on file   Social History Narrative    Caffeine use- 1-3, 8oz coffees per day    Denied history of housing without smoke detectors    Always uses a seatbelt     Social Determinants of Health     Financial Resource Strain: Low Risk  (2023)    Overall Financial Resource Strain (CARDIA)     Difficulty of Paying Living Expenses: Not hard at all   Food Insecurity: Not on file   Transportation Needs: No Transportation Needs (2023)    PRAPARE - Transportation     Lack of Transportation (Medical): No     Lack of Transportation (Non-Medical):  No   Physical Activity: Not on file   Stress: Not on file   Social Connections: Not on file   Intimate Partner Violence: Not on file   Housing Stability: Not on file       Current Outpatient Medications:     apixaban (ELIQUIS) 5 mg, Take 5 mg by mouth 2 (two) times a day To stop 2 days prior to surgery per md, Disp: , Rfl:     atorvastatin (LIPITOR) 80 mg tablet, TAKE 1 TABLET BY MOUTH EVERY DAY, Disp: 90 tablet, Rfl: 1    ciclopirox (LOPROX) 0.77 % SUSP, APPLY 1-2 TIMES DAILY TO AFFECTED TOENAILS, Disp: , Rfl:     diltiazem (CARDIZEM CD) 120 mg 24 hr capsule, Take 120 mg by mouth every evening , Disp: , Rfl:     EPINEPHrine (EpiPen 2-Pa) 0.3 mg/0.3 mL SOAJ, Inject 0.3 mL (0.3 mg total) into a muscle once as needed for anaphylaxis for up to 1 dose, Disp: 0.6 mL, Rfl: 0    levothyroxine 100 mcg tablet, TAKE 1 TABLET BY MOUTH EVERY DAY, Disp: 90 tablet, Rfl: 0    Multiple Vitamins-Calcium (ESSENTIAL ONE DAILY MULTIVIT PO), Take by mouth daily , Disp: , Rfl:     omeprazole (PriLOSEC) 20 mg delayed release capsule, Take 1 capsule (20 mg total) by mouth as needed (gerd), Disp: 90 capsule, Rfl: 0    EPINEPHrine (EPIPEN) 0.3 mg/0.3 mL SOAJ, Inject 0.3 mL (0.3 mg total) into a muscle once for 1 dose, Disp: 0.6 mL, Rfl: 0  Allergies   Allergen Reactions    Bee Venom      Tongue swelling    Flurbiprofen Hives    Ibuprofen Shortness Of Breath    Levaquin [Levofloxacin In D5w] Other (See Comments)     Dark spots under eyes and wrists    Tequin [Gatifloxacin] Rash     Vitals:    11/16/23 1028   BP: 122/76   Pulse: 66   Resp: 16   Temp: 98.2 °F (36.8 °C)   SpO2: 99%       Physical Exam  Constitutional:       General: She is not in acute distress. Appearance: Normal appearance. Cardiovascular:      Rate and Rhythm: Normal rate and regular rhythm. Pulses: Normal pulses. Heart sounds: Normal heart sounds. Pulmonary:      Effort: Pulmonary effort is normal.      Breath sounds: Normal breath sounds. Chest:      Chest wall: No mass. Breasts:     Right: No swelling, bleeding, inverted nipple, mass, nipple discharge, skin change or tenderness. Left: No swelling, bleeding, inverted nipple, mass, nipple discharge, skin change or tenderness. Abdominal:      General: Abdomen is flat. Palpations: Abdomen is soft. Lymphadenopathy:      Upper Body:      Right upper body: No supraclavicular, axillary or pectoral adenopathy.       Left upper body: No supraclavicular, axillary or pectoral adenopathy. Skin:     General: Skin is warm. Neurological:      General: No focal deficit present. Mental Status: She is alert and oriented to person, place, and time. Psychiatric:         Mood and Affect: Mood normal.         Behavior: Behavior normal.           Results:    Imaging  No results found. I reviewed the above imaging data. Advance Care Planning/Advance Directives:  Discussed disease status, cancer treatment plans and/or cancer treatment goals with the patient.

## 2023-12-07 ENCOUNTER — HOSPITAL ENCOUNTER (OUTPATIENT)
Dept: RADIOLOGY | Facility: HOSPITAL | Age: 83
Discharge: HOME | End: 2023-12-07
Attending: THORACIC SURGERY (CARDIOTHORACIC VASCULAR SURGERY)
Payer: COMMERCIAL

## 2023-12-07 DIAGNOSIS — Z85.118 HISTORY OF LUNG CANCER: ICD-10-CM

## 2023-12-07 PROCEDURE — G1004 CDSM NDSC: HCPCS

## 2023-12-07 PROCEDURE — 71250 CT THORAX DX C-: CPT

## 2023-12-11 ENCOUNTER — OFFICE VISIT (OUTPATIENT)
Dept: CARDIAC SURGERY | Facility: CLINIC | Age: 83
End: 2023-12-11
Payer: COMMERCIAL

## 2023-12-11 VITALS
TEMPERATURE: 98.5 F | OXYGEN SATURATION: 97 % | HEIGHT: 65 IN | RESPIRATION RATE: 14 BRPM | SYSTOLIC BLOOD PRESSURE: 122 MMHG | HEART RATE: 67 BPM | DIASTOLIC BLOOD PRESSURE: 70 MMHG | BODY MASS INDEX: 26.03 KG/M2

## 2023-12-11 DIAGNOSIS — C34.11 ADENOCARCINOMA OF UPPER LOBE OF RIGHT LUNG (HCC): ICD-10-CM

## 2023-12-11 DIAGNOSIS — Z85.118 HISTORY OF LUNG CANCER: Primary | ICD-10-CM

## 2023-12-11 DIAGNOSIS — R91.1 PULMONARY NODULE: ICD-10-CM

## 2023-12-11 PROCEDURE — 99214 OFFICE O/P EST MOD 30 MIN: CPT | Performed by: PHYSICIAN ASSISTANT

## 2023-12-11 NOTE — ASSESSMENT & PLAN NOTE
Ms Michelle Mejia is about 3 years out from a right robotic-assisted upper lobe wedge resection and completion lobectomy with MLND for Stage IB adenocarcinoma. Her most recent CT chest shows interval increase in size of a right lower lobe nodule now measuring 9mm from 3mm about a year ago. Given it's appearance this warrant further evaluation. I reviewed imaging and case with Dr Rudy Hernandez. This is not amendable to navigational bronchoscopy given lack of airway to it. I offered attempt at IR biopsy, although given proximity to diaphragm this may be technically difficult. Ms Sowmya Sutherland would prefer a surgical biopsy with diagnostic wedge resection. We will plan for PET-CT and then I will have her come back to see Dr Rudy Hernandez to discuss option of diagnostic wedge resection. All questions were answered and she is in agreement with this plan.

## 2023-12-11 NOTE — PROGRESS NOTES
Thoracic Follow-Up  Assessment/Plan:    Adenocarcinoma of upper lobe of right lung [facility])  Ms Dewey is about 3 years out from a right robotic-assisted upper lobe wedge resection and completion lobectomy with MLND for Stage IB adenocarcinoma. Her most recent CT chest shows interval increase in size of a right lower lobe nodule now measuring 9mm from 3mm about a year ago. Given it's appearance this warrant further evaluation. I reviewed imaging and case with Dr Theresa Hwang. This is not amendable to navigational bronchoscopy given lack of airway to it. I offered attempt at IR biopsy, although given proximity to diaphragm this may be technically difficult. Ms Gabriella Webster would prefer a surgical biopsy with diagnostic wedge resection. We will plan for PET-CT and then I will have her come back to see Dr Theresa Hwang to discuss option of diagnostic wedge resection. All questions were answered and she is in agreement with this plan. Diagnoses and all orders for this visit:    History of lung cancer  -     NM PET CT skull base to mid thigh; Future    Pulmonary nodule  -     NM PET CT skull base to mid thigh; Future    Adenocarcinoma of upper lobe of right lung [facility])          Thoracic History     Problem: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  Procedure: right robotic diagnostic upper lobe wedge resection, completion lobectomy, mediastinal lymph node dissection 1/17/20  Pathology: Right upper lobe tumor consistent with invasive moderately differentiated adenocarcinoma, measuring 1.6 cm, with visceral pleura invasion. Lymph node levels 2R, 4R, 8R, 10R, 11R and 7 all negative for malignancy. 8th Edition AJCC tumor Stage IB (pT2a, N0, M0, G2)        Cancer Staging   Adenocarcinoma of upper lobe of right lung [facility])  Staging form: Lung, AJCC 8th Edition  - Pathologic stage from 1/17/2020: Stage IB (pT2a, pN0, cM0) - Signed by Redd Martino PA-C on 12/11/2023  Histopathologic type:  Adenocarcinoma, NOS  Histologic grade (G): G2  Histologic grading system: 4 grade system    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  Staging form: Breast, AJCC 8th Edition  - Clinical: cT0, cN0, cM0 - Signed by Christos Kingsley MD on 6/9/2020  Laterality: Right  - Pathologic: Stage 0 (pTis (DCIS), pN0, cM0, ER+, MI+, HER2-) - Signed by Christos Kingsley MD on 6/16/2020  Method of lymph node assessment: Clinical  Nuclear grade: G1  Laterality: Right  Oncology History   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 Breast, right, lumpectomy:  -  Ductal carcinoma in situ, low nuclear grade . B. Breast, right new inferior margin, excision:  -  Ductal carcinoma in situ, low nuclear grade involving intraductal papilloma. C.  Breast, right new lateral margin, excision:  -  Ductal carcinoma in situ, low nuclear grade. D.  Breast, right new posterior margin, excision:  -  Benign breast parenchyma, negative for atypia or malignancy.     %, MI 60-70%, HER2 1+ negative     6/9/2020 -  Cancer Staged    Staging form: Breast, AJCC 8th Edition  - Clinical: cT0, cN0, cM0 - Signed by hCristos Kingsley MD on 6/9/2020  Laterality: Right       6/9/2020 -  Cancer Staged    Staging form: Breast, AJCC 8th Edition  - Pathologic: Stage 0 (pTis (DCIS), pN0, cM0, ER+, MI+, HER2-) - Signed by Christos Kingsley MD on 6/16/2020  Laterality: Right  Method of lymph node assessment: Clinical  Nuclear grade: G1       7/30/2020 - 8/28/2020 Radiation    Plan ID Energy Fractions Dose per Fraction (cGy) Dose Correction (cGy) Total Dose Delivered (cGy) Elapsed Days   R Breast 6X 16 / 16 266 0 4,256 21   R Brst Boost 6X 5 / 5 200 0 1,000 4        Primary adenocarcinoma of upper lobe of right lung ([address]) (Resolved)   12/2/2019 Initial Diagnosis    Primary adenocarcinoma of upper lobe of right lung ([address])     1/17/2020 Surgery    Lung, right upper lobe, wedge resection:  -  Invasive moderately differentiated adenocarcinoma. Stage IB     Adenocarcinoma of upper lobe of right lung ([address])   1/17/2020 Surgery    Right upper lobectomy, MLND     1/17/2020 -  Cancer Staged    Staging form: Lung, AJCC 8th Edition  - Pathologic stage from 1/17/2020: Stage IB (pT2a, pN0, cM0) - Signed by Smith Falk PA-C on 12/11/2023  Histopathologic type: Adenocarcinoma, NOS  Histologic grade (G): G2  Histologic grading system: 4 grade system       7/29/2020 Initial Diagnosis    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)            Subjective:    Patient ID: Aidan Santillan is a 78 y.o. female. ECOG 1    HPI  Mr Surjit James is an 78year old female with PMH Stage IB adenocarcinoma s/p robotic-assisted right upper lobectomy MLND on 1/17/2020     CT chest on 12/7/2023 was personally reviewed by myself in PACS and shows an enlarging solid nodule in the right lower lobe measuring 9mm from 3mm a year ago. No mediastinal nor hilar adenopathy. On discussion she feels very well. She denies SOB, cough, fevers/chills, hemoptysis, unintentional weight loss or recent illness. The following portions of the patient's history were reviewed and updated as appropriate: allergies, current medications, past family history, past medical history, past social history, past surgical history, and problem list.    Review of Systems   Constitutional:  Negative for chills, diaphoresis and unexpected weight change. HENT: Negative. Eyes: Negative. Respiratory:  Negative for cough, shortness of breath and wheezing. Cardiovascular:  Negative for chest pain and leg swelling. Gastrointestinal:  Negative for abdominal pain, diarrhea and nausea. Endocrine: Negative. Genitourinary: Negative. Musculoskeletal: Negative. Skin: Negative. Allergic/Immunologic: Negative. Neurological: Negative. Hematological:  Negative for adenopathy. Does not bruise/bleed easily.    Psychiatric/Behavioral: Negative. All other systems reviewed and are negative. Objective:   Physical Exam  Vitals reviewed. Constitutional:       General: She is not in acute distress. Appearance: Normal appearance. She is not ill-appearing. HENT:      Head: Normocephalic. Nose: Nose normal.      Mouth/Throat:      Mouth: Mucous membranes are moist.   Eyes:      Pupils: Pupils are equal, round, and reactive to light. Cardiovascular:      Rate and Rhythm: Normal rate and regular rhythm. Heart sounds: Normal heart sounds. Pulmonary:      Effort: Pulmonary effort is normal.      Breath sounds: Normal breath sounds. No wheezing, rhonchi or rales. Abdominal:      Palpations: Abdomen is soft. Musculoskeletal:         General: No swelling. Normal range of motion. Lymphadenopathy:      Cervical: No cervical adenopathy. Skin:     General: Skin is warm. Neurological:      General: No focal deficit present. Mental Status: She is alert and oriented to person, place, and time. Psychiatric:         Mood and Affect: Mood normal.     /70 (BP Location: Left arm, Patient Position: Sitting, Cuff Size: Standard)   Pulse 67   Temp 98.5 °F (36.9 °C) (Temporal)   Resp 14   Ht 5' 4.5" (1.638 m)   SpO2 97%   BMI 26.03 kg/m²     No Chest XR results available for this patient. CT chest wo contrast    Result Date: 12/9/2023  Narrative CT CHEST WITHOUT IV CONTRAST INDICATION:   Z85.118: Personal history of other malignant neoplasm of bronchus and lung. COMPARISON: Chest CT  12/2/2022. TECHNIQUE: CT examination of the chest was performed without intravenous contrast. Multiplanar 2D reformatted images were created from the source data. This examination, like all CT scans performed in the [facility], was performed utilizing techniques to minimize radiation dose exposure, including the use of iterative reconstruction and automated exposure control.  Radiation dose length product (DLP) for this visit:  212.34 mGy-cm FINDINGS: LUNGS: Increased size of now 9 mm solid pulmonary nodule in the right lower lobe (series 3, image 166), previously 3 mm. Right lower lobe subpleural nodule is unchanged (series 3, image 159). Stable postoperative changes status post right upper lobectomy. PLEURA: No pleural effusion. No pneumothorax. HEART/GREAT VESSELS: Normal heart size. Trace coronary artery calcifications. No thoracic aortic aneurysm. MEDIASTINUM AND SELENA:  Unremarkable. CHEST WALL AND LOWER NECK: Right thyroidectomy. Postoperative changes in the right breast including unchanged 1.2 cm fluid collection previously evaluated by ultrasound. VISUALIZED STRUCTURES IN THE UPPER ABDOMEN:  Unremarkable. OSSEOUS STRUCTURES:  No acute fracture or destructive osseous lesion. Impression Enlarging solid pulmonary nodule in the right lower lobe, now 9 mm. Finding is suspicious for neoplasm, recommend further evaluation with PET/CT and/or tissue sampling. The study was marked in EPIC for significant notification. Workstation performed: [id number]     No CT Chest,Abdomen,Pelvis results available for this patient. No NM PET CT results available for this patient. No Barium Swallow results available for this patient.

## 2023-12-23 DIAGNOSIS — K21.9 GASTROESOPHAGEAL REFLUX DISEASE WITHOUT ESOPHAGITIS: ICD-10-CM

## 2023-12-26 RX ORDER — OMEPRAZOLE 20 MG/1
20 CAPSULE, DELAYED RELEASE ORAL AS NEEDED
Qty: 90 CAPSULE | Refills: 0 | Status: SHIPPED | OUTPATIENT
Start: 2023-12-26

## 2023-12-28 ENCOUNTER — HOSPITAL ENCOUNTER (OUTPATIENT)
Dept: RADIOLOGY | Age: 83
Discharge: HOME | End: 2023-12-28
Payer: COMMERCIAL

## 2023-12-28 DIAGNOSIS — Z85.118 HISTORY OF LUNG CANCER: ICD-10-CM

## 2023-12-28 DIAGNOSIS — R91.1 PULMONARY NODULE: ICD-10-CM

## 2023-12-28 LAB — GLUCOSE SERPL-MCNC: 93 MG/DL (ref 65–140)

## 2023-12-28 PROCEDURE — G1004 CDSM NDSC: HCPCS

## 2023-12-28 PROCEDURE — 78815 PET IMAGE W/CT SKULL-THIGH: CPT

## 2023-12-28 PROCEDURE — A9552 F18 FDG: HCPCS

## 2023-12-28 PROCEDURE — 82948 REAGENT STRIP/BLOOD GLUCOSE: CPT

## 2023-12-28 NOTE — LETTER
[facility]  [address] [place] [address]      January 5, 2024    MRN: [id number]     Phone: [phone]     Dear Ms. Anders,    You recently had a(n) Nuclear Medicine performed on 12/28/2023 at  [facility] that was requested by Gudelia Giraldo PA-C. The study was reviewed by a radiologist, which is a physician who specializes in medical imaging. The radiologist issued a report describing his or her findings. In that report there was a finding that the radiologist felt warranted further discussion with your health care provider and that discussion would be beneficial to you.      The results were sent to Gudelia Giraldo PA-C on 12/28/2023  3:11 PM. We recommend that you contact Gudelia Giraldo PA-C at [phone] or set up an appointment to discuss the results of the imaging test. If you have already heard from Gudelia Giraldo PA-C regarding the results of your study, you can disregard this letter.     This letter is not meant to alarm you, but intended to encourage you to follow-up on your results with the provider that sent you for the imaging study. In addition, we have enclosed answers to frequently asked questions by other patients who have also received a letter to review results with their health care provider (see page two).      Thank you for choosing [facility] for your medical imaging needs.                                                                                                                                                        FREQUENTLY ASKED QUESTIONS    Why am I receiving this letter?  [place] State Law requires us to notify patients who have findings on imaging exams that may require more testing or follow-up with a health professional within the next 3 months.        How serious is the finding on the imaging test?  This letter is sent to all patients who may need follow-up or more testing within  the next 3 months.  Receiving this letter does not necessarily mean you have a life-threatening imaging finding or disease.  Recommendations in the radiologist’s imaging report are general in nature and it is up to your healthcare provider to say whether those recommendations make sense for your situation.  You are strongly encouraged to talk to your health care provider about the results and ask whether additional steps need to be taken.    Where can I get a copy of the final report for my recent radiology exam?  To get a full copy of the report you can access your records online at [email or web address] or please contact [facility] Records Department at [phone] Monday through Friday between 8 am and 6 pm.         What do I need to do now?           Please contact your health care provider who requested the imaging study to discuss what further actions (if any) are needed.  You may have already heard from (your ordering provider) in regard to this test in which case you can disregard this letter.        NOTICE IN ACCORDANCE WITH THE [place] STATE “PATIENT TEST RESULT INFORMATION ACT OF 2018”    You are receiving this notice as a result of a determination by your diagnostic imaging service that further discussions of your test results are warranted and would be beneficial to you.    The complete results of your test or tests have been or will be sent to the health care practitioner that ordered the test or tests. It is recommended that you contact your health care practitioner to discuss your results as soon as possible.

## 2024-01-03 ENCOUNTER — TELEPHONE (OUTPATIENT)
Dept: ANESTHESIOLOGY | Facility: CLINIC | Age: 84
End: 2024-01-03

## 2024-01-03 ENCOUNTER — OFFICE VISIT (OUTPATIENT)
Dept: CARDIAC SURGERY | Facility: CLINIC | Age: 84
End: 2024-01-03
Payer: COMMERCIAL

## 2024-01-03 VITALS
SYSTOLIC BLOOD PRESSURE: 132 MMHG | HEIGHT: 65 IN | OXYGEN SATURATION: 94 % | RESPIRATION RATE: 15 BRPM | DIASTOLIC BLOOD PRESSURE: 79 MMHG | HEART RATE: 72 BPM | BODY MASS INDEX: 26.03 KG/M2 | TEMPERATURE: 98.1 F

## 2024-01-03 DIAGNOSIS — R79.1 ABNORMAL COAGULATION PROFILE: ICD-10-CM

## 2024-01-03 DIAGNOSIS — R91.8 RIGHT LOWER LOBE LUNG MASS: Primary | ICD-10-CM

## 2024-01-03 DIAGNOSIS — C34.11 ADENOCARCINOMA OF UPPER LOBE OF RIGHT LUNG (HCC): ICD-10-CM

## 2024-01-03 DIAGNOSIS — R94.5 ABNORMAL RESULTS OF LIVER FUNCTION STUDIES: ICD-10-CM

## 2024-01-03 PROCEDURE — 99214 OFFICE O/P EST MOD 30 MIN: CPT | Performed by: THORACIC SURGERY (CARDIOTHORACIC VASCULAR SURGERY)

## 2024-01-03 RX ORDER — ACETAMINOPHEN 325 MG/1
975 TABLET ORAL ONCE
OUTPATIENT
Start: 2024-01-03 | End: 2024-01-03

## 2024-01-03 RX ORDER — CEFAZOLIN SODIUM 1 G/50ML
1000 SOLUTION INTRAVENOUS ONCE
OUTPATIENT
Start: 2024-01-03 | End: 2024-01-03

## 2024-01-03 RX ORDER — HEPARIN SODIUM 5000 [USP'U]/ML
5000 INJECTION, SOLUTION INTRAVENOUS; SUBCUTANEOUS
OUTPATIENT
Start: 2024-01-03 | End: 2024-01-04

## 2024-01-03 RX ORDER — GABAPENTIN 300 MG/1
600 CAPSULE ORAL ONCE
OUTPATIENT
Start: 2024-01-03 | End: 2024-01-03

## 2024-01-03 RX ORDER — AMOXICILLIN AND CLAVULANATE POTASSIUM 875; 125 MG/1; MG/1
TABLET, FILM COATED ORAL
COMMUNITY
Start: 2023-12-14

## 2024-01-03 NOTE — H&P (VIEW-ONLY)
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      I had a lengthy discussion with the patient about this right lower lobe mass.  Given her history of cancer and the enlarging PET avid mass I am concerned that this represents a malignancy.  We discussed either biopsy or surgical resection.  Ultimately she would like to proceed with surgical resection.  I am in agreement.  Surgery would be a robotic right lower lobe diagnostic and therapeutic wedge resection.  We did attempt to do a mediastinal lymph node dissection.  We discussed risk include risk of anesthesia, bleeding, blood clots, prolonged air leak, etc.  She would like to proceed.  This will be about 90-minute procedure.  I will have her be seen and evaluated by the surgical optimization clinic and get preoperative labs.  She voiced understanding agreement with this plan.    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)  -     Ambulatory Referral to Surgical Optimization; Future  -     Case request operating room: RESECTION,LUNG WEDGE THORACOSCOPIC W/ ROBOTICS; Standing  -     Type and screen; Future  -     Basic metabolic panel; Future  -     CBC and Platelet; Future  -     APTT; Future  -     Protime-INR; Future  -     Case request operating room: RESECTION,LUNG WEDGE THORACOSCOPIC  W/ ROBOTICS    Abnormal coagulation profile  -     APTT; Future    Abnormal results of liver function studies  -     Protime-INR; Future    Other orders  -     amoxicillin-clavulanate (AUGMENTIN) 875-125 mg per tablet;  (Patient not taking: Reported on 1/3/2024)  -     ciclopirox (PENLAC) 8 % solution; PLEASE SEE ATTACHED FOR DETAILED DIRECTIONS  -     Diet NPO; Sips with meds; Standing  -     Nursing communication Please give pre-op Carbohydrate drink to patient 2-4 hours prior to surgery; Standing  -     Height and weight upon arrival; Standing  -     Void on call to OR; Standing  -     Insert peripheral IV; Standing  -     Shower/scrub; Standing  -     Place sequential compression device; Standing  -     ceFAZolin (ANCEF) IVPB (premix in dextrose) 1,000 mg 50 mL  -     acetaminophen (TYLENOL) tablet 975 mg  -     gabapentin (NEURONTIN) capsule 600 mg  -     heparin (porcine) subcutaneous injection 5,000 Units          Thoracic History     Problem: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  Procedure: right robotic diagnostic upper lobe wedge resection, completion lobectomy, mediastinal lymph node dissection 1/17/20  Pathology: Right upper lobe tumor consistent with invasive moderately differentiated adenocarcinoma, measuring 1.6 cm, with visceral pleura invasion. Lymph node levels 2R, 4R, 8R, 10R, 11R and 7 all negative for malignancy. 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  Breast, right, lumpectomy:  -  Ductal carcinoma in situ, low nuclear grade .     B.  Breast, right new inferior margin, excision:  -  Ductal carcinoma in situ, low nuclear grade involving intraductal papilloma.     C.  Breast, right new lateral margin, excision:  -  Ductal carcinoma in situ, low nuclear grade.     D.  Breast, right new posterior margin, excision:  -  Benign breast parenchyma, negative for atypia or malignancy.     %, MI 60-70%, HER2 1+ negative      6/9/2020 -  Cancer Staged     Staging form: Breast, AJCC 8th Edition  - Clinical: cT0, cN0, cM0 - Signed by Hortencia Pennington MD on 6/9/2020  Laterality: Right         6/9/2020 -  Cancer Staged     Staging form: Breast, AJCC 8th Edition  - Pathologic: Stage 0 (pTis (DCIS), pN0, cM0, ER+, MI+, HER2-) - Signed by Hortencia Pennington MD on 6/16/2020  Laterality: Right  Method of lymph node assessment: Clinical  Nuclear grade: G1         7/30/2020 - 8/28/2020 Radiation     Plan ID Energy Fractions Dose per Fraction (cGy) Dose Correction (cGy) Total Dose Delivered (cGy) Elapsed Days   R Breast 6X 16 / 16 266 0 4,256 21   R Brst Boost 6X 5 / 5 200 0 1,000 4          Primary adenocarcinoma of upper lobe of right lung (HCC) (Resolved)   12/2/2019 Initial Diagnosis     Primary adenocarcinoma of upper lobe of right lung (HCC)      1/17/2020 Surgery     Lung, right upper lobe, wedge resection:  -  Invasive moderately differentiated adenocarcinoma.  Stage IB      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)   1/17/2020 Surgery     Right upper lobectomy, MLND      1/17/2020 -  Cancer Staged     Staging  form: Lung, AJCC 8th Edition  - Pathologic stage from 1/17/2020: Stage IB (pT2a, pN0, cM0) - Signed by Gudelia Giraldo PA-C on 12/11/2023  Histopathologic type: Adenocarcinoma, NOS  Histologic grade (G): G2  Histologic grading system: 4 grade system         7/29/2020 Initial Diagnosis     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)                  Subjective:    Patient ID: Destinee Anders is a 83 y.o. female.    CIARA Felipe comes back to our office following her PET scan.  She does note some worsening fatigue over the past 6 months.  No unintentional weight loss.  No fevers or chills but she just feels more tired than her usual.  No cough or hemoptysis.  No other major shortness of breath.  No other major complaints    The following portions of the patient's history were reviewed and updated as appropriate: allergies, current medications, past family history, past medical history, past social history, past surgical history and problem list.    Review of Systems   Constitutional:  Positive for activity change and fatigue. Negative for appetite change, chills, diaphoresis, fever and unexpected weight change.   HENT: Negative.     Eyes: Negative.    Respiratory:  Negative for apnea, cough, chest tightness, shortness of breath, wheezing and stridor.    Cardiovascular:  Negative for chest pain, palpitations and leg swelling.   Gastrointestinal:  Negative for abdominal distention, abdominal pain, blood in stool, constipation, diarrhea, nausea and vomiting.   Endocrine: Negative.    Genitourinary: Negative.    Musculoskeletal: Negative.    Skin: Negative.    Allergic/Immunologic: Negative.    Neurological: Negative.    Hematological: Negative.    Psychiatric/Behavioral: Negative.           Objective:   Physical Exam  Vitals and nursing note reviewed.   Constitutional:       General: She is not in acute distress.     Appearance: She is well-developed. She is not diaphoretic.   HENT:      Head: Normocephalic and  "atraumatic.      Mouth/Throat:      Pharynx: No oropharyngeal exudate.   Eyes:      General: No scleral icterus.     Conjunctiva/sclera: Conjunctivae normal.      Pupils: Pupils are equal, round, and reactive to light.   Neck:      Thyroid: No thyromegaly.      Vascular: No JVD.      Trachea: No tracheal deviation.   Cardiovascular:      Rate and Rhythm: Normal rate and regular rhythm.      Heart sounds: Normal heart sounds. No murmur heard.     No friction rub. No gallop.   Pulmonary:      Effort: Pulmonary effort is normal. No respiratory distress.      Breath sounds: Normal breath sounds. No wheezing or rales.   Chest:      Chest wall: No tenderness.   Abdominal:      General: Bowel sounds are normal. There is no distension.      Palpations: Abdomen is soft. There is no mass.      Tenderness: There is no abdominal tenderness. There is no guarding or rebound.   Musculoskeletal:         General: No tenderness or deformity. Normal range of motion.      Cervical back: Normal range of motion and neck supple.   Skin:     General: Skin is warm and dry.      Coloration: Skin is not pale.      Findings: No erythema or rash.   Neurological:      Mental Status: She is alert and oriented to person, place, and time.   Psychiatric:         Behavior: Behavior normal.         Thought Content: Thought content normal.         Judgment: Judgment normal.     /79 (BP Location: Left arm, Patient Position: Sitting, Cuff Size: Standard)   Pulse 72   Temp 98.1 °F (36.7 °C) (Temporal)   Resp 15   Ht 5' 4.5\" (1.638 m)   SpO2 94%   BMI 26.03 kg/m²     No Chest XR results available for this patient.   CT chest wo contrast    Result Date: 12/9/2023  Narrative CT CHEST WITHOUT IV CONTRAST INDICATION:   Z85.118: Personal history of other malignant neoplasm of bronchus and lung. COMPARISON: Chest CT  12/2/2022. TECHNIQUE: CT examination of the chest was performed without intravenous contrast. Multiplanar 2D reformatted images were " created from the source data. This examination, like all CT scans performed in the [facility], was performed utilizing techniques to minimize radiation dose exposure, including the use of iterative reconstruction and automated exposure control. Radiation dose length product (DLP) for this visit:  212.34 mGy-cm FINDINGS: LUNGS: Increased size of now 9 mm solid pulmonary nodule in the right lower lobe (series 3, image 166), previously 3 mm. Right lower lobe subpleural nodule is unchanged (series 3, image 159). Stable postoperative changes status post right upper lobectomy. PLEURA: No pleural effusion. No pneumothorax. HEART/GREAT VESSELS: Normal heart size. Trace coronary artery calcifications. No thoracic aortic aneurysm. MEDIASTINUM AND SELENA:  Unremarkable. CHEST WALL AND LOWER NECK: Right thyroidectomy. Postoperative changes in the right breast including unchanged 1.2 cm fluid collection previously evaluated by ultrasound. VISUALIZED STRUCTURES IN THE UPPER ABDOMEN:  Unremarkable. OSSEOUS STRUCTURES:  No acute fracture or destructive osseous lesion.     Impression Enlarging solid pulmonary nodule in the right lower lobe, now 9 mm. Finding is suspicious for neoplasm, recommend further evaluation with PET/CT and/or tissue sampling. The study was marked in EPIC for significant notification. Workstation performed: [id number]     No CT Chest,Abdomen,Pelvis results available for this patient.   NM PET CT skull base to mid thigh    Result Date: 12/28/2023  Narrative PET/CT SCAN INDICATION: History of lung cancer. Restaging exam. Enlarging right lower lobe nodule. Additional history of colon cancer status post resection in 1997. Additional history of right breast cancer resected in 2020. R91.1: Solitary pulmonary nodule Z85.118: Personal history of other malignant neoplasm of bronchus and lung MODIFIER: PS COMPARISON: PET/CT 12/20/2019, CT chest exam 12/7/2023 and additional priors CELL TYPE: Right upper  lobe adenocarcinoma TECHNIQUE:   8.6 mCi F-18-FDG administered IV. Multiplanar attenuation corrected and non attenuation corrected PET images are available for interpretation, and contiguous, low dose, axial CT sections were obtained from the skull base through the femurs. Intravenous contrast material was not utilized. This examination, like all CT scans performed in the [facility], was performed utilizing techniques to minimize radiation dose exposure, including the use of iterative reconstruction and automated exposure control. Fasting serum glucose: 93 mg/dl FINDINGS: VISUALIZED BRAIN: No acute abnormalities are seen. HEAD/NECK: Diffuse radiotracer uptake in the remaining left lobe of the thyroid SUV max of 15, slightly increased from prior, previously 10.6. Question related to thyroiditis. No FDG-avid lymph nodes. CT images: Right lobe of the thyroid appears absent. CHEST: There does appear to be discrete mild FDG uptake in the 9 mm right lower lobe nodule SUV max of 2.1. This is higher than background lung activity, SUV max 0.8. No suspicious focal uptake in the mediastinum or perihilar regions. Mild asymmetric uptake at the right breast, SUV max of 2.8. This is new from prior PET/CT however CT appearance is stable with suggestion of underlying 1.3 cm loculated collection image 86 series 3. Decreasing collection noted here on serial exams. Activity is probably inflammatory. Mild FDG uptake in the upper esophagus may be physiologic or inflammatory. CT images: Status post right upper lobectomy. Suggestion of a small fat-containing Bochdalek hernia on the right. ABDOMEN: No FDG avid soft tissue lesions are seen. Now suggestion of segmental biliary ductal dilatation in the left lobe of the liver laterally, new from prior PET/CT. See for example images 121 to 126 series 3. No associated abnormal FDG uptake. CT images: Surgical staple line along the proximal colon. Colonic diverticulosis. PELVIS: No  FDG avid soft tissue lesions are seen. CT images: Unremarkable. OSSEOUS STRUCTURES: No FDG avid lesions are seen. Scattered mild foci of uptake in the spine corresponding to degenerative changes on the CT. CT images: Multilevel degenerative changes of the spine.     Impression 1. Mild but discrete FDG uptake in the enlarging 9 mm right lower lobe nodule. Malignancy should be excluded. 2. No findings for hypermetabolic metastasis. 3. Now suggestion of segmental biliary ductal dilatation in the left lobe of the liver laterally, new from prior PET/CT. Recommend further evaluation with dedicated MRI of the liver to assess for any possible underlying hepatobiliary lesion. The study was marked in EPIC for significant notification. Workstation performed: [id number]      I personally reviewed her PET scan with her in PACS.  She has a 9 mm right lower lobe nodule.  This has more PET avidity than background.  No evidence of distant PET uptake.  This is new over the past year.  No PET avid adenopathy.    Chris Cheng MD  Thoracic Surgeon    (Available by [employer])

## 2024-01-03 NOTE — PROGRESS NOTES
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      I had a lengthy discussion with the patient about this right lower lobe mass.  Given her history of cancer and the enlarging PET avid mass I am concerned that this represents a malignancy.  We discussed either biopsy or surgical resection.  Ultimately she would like to proceed with surgical resection.  I am in agreement.  Surgery would be a robotic right lower lobe diagnostic and therapeutic wedge resection.  We did attempt to do a mediastinal lymph node dissection.  We discussed risk include risk of anesthesia, bleeding, blood clots, prolonged air leak, etc.  She would like to proceed.  This will be about 90-minute procedure.  I will have her be seen and evaluated by the surgical optimization clinic and get preoperative labs.  She voiced understanding agreement with this plan.    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)  -     Ambulatory Referral to Surgical Optimization; Future  -     Case request operating room: RESECTION,LUNG WEDGE THORACOSCOPIC W/ ROBOTICS; Standing  -     Type and screen; Future  -     Basic metabolic panel; Future  -     CBC and Platelet; Future  -     APTT; Future  -     Protime-INR; Future  -     Case request operating room: RESECTION,LUNG WEDGE THORACOSCOPIC  W/ ROBOTICS    Abnormal coagulation profile  -     APTT; Future    Abnormal results of liver function studies  -     Protime-INR; Future    Other orders  -     amoxicillin-clavulanate (AUGMENTIN) 875-125 mg per tablet;  (Patient not taking: Reported on 1/3/2024)  -     ciclopirox (PENLAC) 8 % solution; PLEASE SEE ATTACHED FOR DETAILED DIRECTIONS  -     Diet NPO; Sips with meds; Standing  -     Nursing communication Please give pre-op Carbohydrate drink to patient 2-4 hours prior to surgery; Standing  -     Height and weight upon arrival; Standing  -     Void on call to OR; Standing  -     Insert peripheral IV; Standing  -     Shower/scrub; Standing  -     Place sequential compression device; Standing  -     ceFAZolin (ANCEF) IVPB (premix in dextrose) 1,000 mg 50 mL  -     acetaminophen (TYLENOL) tablet 975 mg  -     gabapentin (NEURONTIN) capsule 600 mg  -     heparin (porcine) subcutaneous injection 5,000 Units          Thoracic History     Problem: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  Procedure: right robotic diagnostic upper lobe wedge resection, completion lobectomy, mediastinal lymph node dissection 1/17/20  Pathology: Right upper lobe tumor consistent with invasive moderately differentiated adenocarcinoma, measuring 1.6 cm, with visceral pleura invasion. Lymph node levels 2R, 4R, 8R, 10R, 11R and 7 all negative for malignancy. 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  Breast, right, lumpectomy:  -  Ductal carcinoma in situ, low nuclear grade .     B.  Breast, right new inferior margin, excision:  -  Ductal carcinoma in situ, low nuclear grade involving intraductal papilloma.     C.  Breast, right new lateral margin, excision:  -  Ductal carcinoma in situ, low nuclear grade.     D.  Breast, right new posterior margin, excision:  -  Benign breast parenchyma, negative for atypia or malignancy.     %, MN 60-70%, HER2 1+ negative      6/9/2020 -  Cancer Staged     Staging form: Breast, AJCC 8th Edition  - Clinical: cT0, cN0, cM0 - Signed by Hortencia Pennington MD on 6/9/2020  Laterality: Right         6/9/2020 -  Cancer Staged     Staging form: Breast, AJCC 8th Edition  - Pathologic: Stage 0 (pTis (DCIS), pN0, cM0, ER+, MN+, HER2-) - Signed by Hortencia Pennington MD on 6/16/2020  Laterality: Right  Method of lymph node assessment: Clinical  Nuclear grade: G1         7/30/2020 - 8/28/2020 Radiation     Plan ID Energy Fractions Dose per Fraction (cGy) Dose Correction (cGy) Total Dose Delivered (cGy) Elapsed Days   R Breast 6X 16 / 16 266 0 4,256 21   R Brst Boost 6X 5 / 5 200 0 1,000 4          Primary adenocarcinoma of upper lobe of right lung (HCC) (Resolved)   12/2/2019 Initial Diagnosis     Primary adenocarcinoma of upper lobe of right lung (HCC)      1/17/2020 Surgery     Lung, right upper lobe, wedge resection:  -  Invasive moderately differentiated adenocarcinoma.  Stage IB      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)   1/17/2020 Surgery     Right upper lobectomy, MLND      1/17/2020 -  Cancer Staged     Staging  form: Lung, AJCC 8th Edition  - Pathologic stage from 1/17/2020: Stage IB (pT2a, pN0, cM0) - Signed by Gudelia Giraldo PA-C on 12/11/2023  Histopathologic type: Adenocarcinoma, NOS  Histologic grade (G): G2  Histologic grading system: 4 grade system         7/29/2020 Initial Diagnosis     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)                  Subjective:    Patient ID: Destinee Anders is a 83 y.o. female.    CIARA Felipe comes back to our office following her PET scan.  She does note some worsening fatigue over the past 6 months.  No unintentional weight loss.  No fevers or chills but she just feels more tired than her usual.  No cough or hemoptysis.  No other major shortness of breath.  No other major complaints    The following portions of the patient's history were reviewed and updated as appropriate: allergies, current medications, past family history, past medical history, past social history, past surgical history and problem list.    Review of Systems   Constitutional:  Positive for activity change and fatigue. Negative for appetite change, chills, diaphoresis, fever and unexpected weight change.   HENT: Negative.     Eyes: Negative.    Respiratory:  Negative for apnea, cough, chest tightness, shortness of breath, wheezing and stridor.    Cardiovascular:  Negative for chest pain, palpitations and leg swelling.   Gastrointestinal:  Negative for abdominal distention, abdominal pain, blood in stool, constipation, diarrhea, nausea and vomiting.   Endocrine: Negative.    Genitourinary: Negative.    Musculoskeletal: Negative.    Skin: Negative.    Allergic/Immunologic: Negative.    Neurological: Negative.    Hematological: Negative.    Psychiatric/Behavioral: Negative.           Objective:   Physical Exam  Vitals and nursing note reviewed.   Constitutional:       General: She is not in acute distress.     Appearance: She is well-developed. She is not diaphoretic.   HENT:      Head: Normocephalic and  "atraumatic.      Mouth/Throat:      Pharynx: No oropharyngeal exudate.   Eyes:      General: No scleral icterus.     Conjunctiva/sclera: Conjunctivae normal.      Pupils: Pupils are equal, round, and reactive to light.   Neck:      Thyroid: No thyromegaly.      Vascular: No JVD.      Trachea: No tracheal deviation.   Cardiovascular:      Rate and Rhythm: Normal rate and regular rhythm.      Heart sounds: Normal heart sounds. No murmur heard.     No friction rub. No gallop.   Pulmonary:      Effort: Pulmonary effort is normal. No respiratory distress.      Breath sounds: Normal breath sounds. No wheezing or rales.   Chest:      Chest wall: No tenderness.   Abdominal:      General: Bowel sounds are normal. There is no distension.      Palpations: Abdomen is soft. There is no mass.      Tenderness: There is no abdominal tenderness. There is no guarding or rebound.   Musculoskeletal:         General: No tenderness or deformity. Normal range of motion.      Cervical back: Normal range of motion and neck supple.   Skin:     General: Skin is warm and dry.      Coloration: Skin is not pale.      Findings: No erythema or rash.   Neurological:      Mental Status: She is alert and oriented to person, place, and time.   Psychiatric:         Behavior: Behavior normal.         Thought Content: Thought content normal.         Judgment: Judgment normal.     /79 (BP Location: Left arm, Patient Position: Sitting, Cuff Size: Standard)   Pulse 72   Temp 98.1 °F (36.7 °C) (Temporal)   Resp 15   Ht 5' 4.5\" (1.638 m)   SpO2 94%   BMI 26.03 kg/m²     No Chest XR results available for this patient.   CT chest wo contrast    Result Date: 12/9/2023  Narrative CT CHEST WITHOUT IV CONTRAST INDICATION:   Z85.118: Personal history of other malignant neoplasm of bronchus and lung. COMPARISON: Chest CT  12/2/2022. TECHNIQUE: CT examination of the chest was performed without intravenous contrast. Multiplanar 2D reformatted images were " created from the source data. This examination, like all CT scans performed in the [facility], was performed utilizing techniques to minimize radiation dose exposure, including the use of iterative reconstruction and automated exposure control. Radiation dose length product (DLP) for this visit:  212.34 mGy-cm FINDINGS: LUNGS: Increased size of now 9 mm solid pulmonary nodule in the right lower lobe (series 3, image 166), previously 3 mm. Right lower lobe subpleural nodule is unchanged (series 3, image 159). Stable postoperative changes status post right upper lobectomy. PLEURA: No pleural effusion. No pneumothorax. HEART/GREAT VESSELS: Normal heart size. Trace coronary artery calcifications. No thoracic aortic aneurysm. MEDIASTINUM AND SELENA:  Unremarkable. CHEST WALL AND LOWER NECK: Right thyroidectomy. Postoperative changes in the right breast including unchanged 1.2 cm fluid collection previously evaluated by ultrasound. VISUALIZED STRUCTURES IN THE UPPER ABDOMEN:  Unremarkable. OSSEOUS STRUCTURES:  No acute fracture or destructive osseous lesion.     Impression Enlarging solid pulmonary nodule in the right lower lobe, now 9 mm. Finding is suspicious for neoplasm, recommend further evaluation with PET/CT and/or tissue sampling. The study was marked in EPIC for significant notification. Workstation performed: [id number]     No CT Chest,Abdomen,Pelvis results available for this patient.   NM PET CT skull base to mid thigh    Result Date: 12/28/2023  Narrative PET/CT SCAN INDICATION: History of lung cancer. Restaging exam. Enlarging right lower lobe nodule. Additional history of colon cancer status post resection in 1997. Additional history of right breast cancer resected in 2020. R91.1: Solitary pulmonary nodule Z85.118: Personal history of other malignant neoplasm of bronchus and lung MODIFIER: PS COMPARISON: PET/CT 12/20/2019, CT chest exam 12/7/2023 and additional priors CELL TYPE: Right upper  lobe adenocarcinoma TECHNIQUE:   8.6 mCi F-18-FDG administered IV. Multiplanar attenuation corrected and non attenuation corrected PET images are available for interpretation, and contiguous, low dose, axial CT sections were obtained from the skull base through the femurs. Intravenous contrast material was not utilized. This examination, like all CT scans performed in the [facility], was performed utilizing techniques to minimize radiation dose exposure, including the use of iterative reconstruction and automated exposure control. Fasting serum glucose: 93 mg/dl FINDINGS: VISUALIZED BRAIN: No acute abnormalities are seen. HEAD/NECK: Diffuse radiotracer uptake in the remaining left lobe of the thyroid SUV max of 15, slightly increased from prior, previously 10.6. Question related to thyroiditis. No FDG-avid lymph nodes. CT images: Right lobe of the thyroid appears absent. CHEST: There does appear to be discrete mild FDG uptake in the 9 mm right lower lobe nodule SUV max of 2.1. This is higher than background lung activity, SUV max 0.8. No suspicious focal uptake in the mediastinum or perihilar regions. Mild asymmetric uptake at the right breast, SUV max of 2.8. This is new from prior PET/CT however CT appearance is stable with suggestion of underlying 1.3 cm loculated collection image 86 series 3. Decreasing collection noted here on serial exams. Activity is probably inflammatory. Mild FDG uptake in the upper esophagus may be physiologic or inflammatory. CT images: Status post right upper lobectomy. Suggestion of a small fat-containing Bochdalek hernia on the right. ABDOMEN: No FDG avid soft tissue lesions are seen. Now suggestion of segmental biliary ductal dilatation in the left lobe of the liver laterally, new from prior PET/CT. See for example images 121 to 126 series 3. No associated abnormal FDG uptake. CT images: Surgical staple line along the proximal colon. Colonic diverticulosis. PELVIS: No  FDG avid soft tissue lesions are seen. CT images: Unremarkable. OSSEOUS STRUCTURES: No FDG avid lesions are seen. Scattered mild foci of uptake in the spine corresponding to degenerative changes on the CT. CT images: Multilevel degenerative changes of the spine.     Impression 1. Mild but discrete FDG uptake in the enlarging 9 mm right lower lobe nodule. Malignancy should be excluded. 2. No findings for hypermetabolic metastasis. 3. Now suggestion of segmental biliary ductal dilatation in the left lobe of the liver laterally, new from prior PET/CT. Recommend further evaluation with dedicated MRI of the liver to assess for any possible underlying hepatobiliary lesion. The study was marked in EPIC for significant notification. Workstation performed: [id number]      I personally reviewed her PET scan with her in PACS.  She has a 9 mm right lower lobe nodule.  This has more PET avidity than background.  No evidence of distant PET uptake.  This is new over the past year.  No PET avid adenopathy.    Chris Cheng MD  Thoracic Surgeon    (Available by [employer])

## 2024-01-07 PROBLEM — Z01.89 ENCOUNTER FOR GERIATRIC ASSESSMENT: Status: ACTIVE | Noted: 2018-09-20

## 2024-01-08 ENCOUNTER — CONSULT (OUTPATIENT)
Dept: ANESTHESIOLOGY | Facility: CLINIC | Age: 84
End: 2024-01-08
Payer: COMMERCIAL

## 2024-01-08 VITALS
SYSTOLIC BLOOD PRESSURE: 132 MMHG | HEIGHT: 64 IN | BODY MASS INDEX: 26.29 KG/M2 | DIASTOLIC BLOOD PRESSURE: 70 MMHG | OXYGEN SATURATION: 99 % | TEMPERATURE: 96 F | WEIGHT: 154 LBS | HEART RATE: 60 BPM

## 2024-01-08 DIAGNOSIS — I48.19 PERSISTENT ATRIAL FIBRILLATION (HCC): Primary | ICD-10-CM

## 2024-01-08 DIAGNOSIS — Z01.89 ENCOUNTER FOR GERIATRIC ASSESSMENT: ICD-10-CM

## 2024-01-08 DIAGNOSIS — Z86.000 PERSONAL HISTORY OF IN-SITU NEOPLASM OF BREAST: ICD-10-CM

## 2024-01-08 DIAGNOSIS — R91.1 PULMONARY NODULE: ICD-10-CM

## 2024-01-08 DIAGNOSIS — I10 BENIGN ESSENTIAL HYPERTENSION: ICD-10-CM

## 2024-01-08 DIAGNOSIS — Z53.1 REFUSAL OF BLOOD TRANSFUSIONS AS PATIENT IS JEHOVAH'S WITNESS: ICD-10-CM

## 2024-01-08 DIAGNOSIS — N18.31 STAGE 3A CHRONIC KIDNEY DISEASE (HCC): ICD-10-CM

## 2024-01-08 DIAGNOSIS — R91.8 RIGHT LOWER LOBE LUNG MASS: ICD-10-CM

## 2024-01-08 DIAGNOSIS — C34.11 ADENOCARCINOMA OF UPPER LOBE OF RIGHT LUNG (HCC): ICD-10-CM

## 2024-01-08 DIAGNOSIS — Z01.818 PRE-OP EXAMINATION: ICD-10-CM

## 2024-01-08 DIAGNOSIS — D35.00 ADRENAL CORTICAL ADENOMA, UNSPECIFIED LATERALITY: ICD-10-CM

## 2024-01-08 PROCEDURE — 99215 OFFICE O/P EST HI 40 MIN: CPT | Performed by: NURSE PRACTITIONER

## 2024-01-08 NOTE — PROGRESS NOTES
See Geriatric Assessment below...  Cognitive Assessment:   CAM:   TUG <15 sec:  Falls (last 6 months): no  Hand  score: 21.33  -Attempt 1: 22  -Attempt 2:22  -Attempt 3:20  Eagle Total Score: 23  PHQ- 9 Depression Scale: 0  Nutrition Assessment Score: 14  METS:7.59  Incentive Spirometry Level: 1500  Health goals:  -What are your overall health goals? Get better    -What brings you strength? Friends, caregiver    -What activities are important to you? Sew, gardening     As always we discussed having your BEST surgery, and BEST recovery.  Surgery goals reviewed today.      Breathing exercises   Patient was encouraged to begin lung exercises today.  This could be accomplished through deep breathing and cough exercises.  Patient was taught how to use an incentive spirometer.  Return demonstration provided.    Eating/nutrition   Encouraged patient to increase oral protein intake prior to surgery.  This can be accomplished by consuming chicken, fish, tuna fish, cottage cheese, cheese, eggs, Greek yogurt, and protein shakes as needed.  I encouraged use of protein shakes such ENLIVE.  I also recommended making your own protein shakes with protein powder.   Sleep/Stress management  Patient was encouraged to rest their body prior to surgery.  Encouraged attempting to get 8 hours of sleep at night.  Avoid stress.  Avoid sick contacts.  Encouraged to find a relaxing hobby such as reading, meditation, listening to music.  Training exercises  Patient was encouraged to remain active as possible.  Today bilateral lower extremity generic exercises were taught for muscle strengthening and balance.  All exercises to be done sitting down.

## 2024-01-08 NOTE — PROGRESS NOTES
"[facility]  CONSULT: GERIATRIC SURGERY      Assessment/Plan:  83 year old female referred to [facility] for pre-surgery geriatric screening 2.2 advanced age   Consult concerns: advanced age: Right lower lobe lung mass: Adenocarcinoma of upper lobe of right lung SHE is scheduled on   Case: [id number] Date/Time: 01/19/24 1115   Procedures:      Robotic right lower lobe wedge, lymph node dissection (Right: Chest)      BRONCHOSCOPY FLEXIBLE (Bronchus)   Anesthesia type: General   Diagnosis:      Right lower lobe lung mass [R91.8]      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) [C34.11]   Pre-op diagnosis:      Right lower lobe lung mass [R91.8]      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) [C34.11]   Location:  OR ROOM 14 / [facility]   Surgeons: Chris Cheng MD     No problem-specific Assessment & Plan notes found for this encounter.    Surgery to do:   Blood work and EKG   I reviewed blood work from September 2023, which was stable,  do not anticipate any upcoming concerns on the new blood work  Work on BEST  Increase protein prior to surgery -drink 1 bottle of protein shakes every day until n.p.o. for surgery    Voodoo     Refusal of blood transfusions as patient is Voodoo     No whole blood products at all  States \"I Will take fractions of blood products\"  (patient was not 100% sure today on what that meant)   She was instructed to bring her card DOS   Blood Products Refusal     FYI record is being used elsewhere by user Mirta Isabel since 1/8/2024 9:36 AM EST on workstation [id number].       High risk geriatric surgery patient- patient is doing well over all.  These measure are implemented for preventative cautions 2.2 advanced age 83    Today, I had a discussion with the patient.  I discussed the risk and benefits of general anesthesia including; but not limit too, the risk of postoperative respiratory distress, " postop pneumonia, postop cognitive impairment (both temporarily and/or permanent), postop urinary retention (MANOLO), postop deconditioning, and death.  The patient verbalized they are fully aware of these risks and wishes to proceed.      In preparation for general anesthesia we started our BEST protocol placing emphasis on breathing exercises, increasing protein, and staying active.    Breathing  Encouraged exercise lungs prior to surgery.  IS given to patient today     Eating  Encouraged to increase protein prior to surgery. 10 bottles of protein shakes given to patient today.  Instructed to increase protein prior to surgery.      Self  Encouraged to prepare their home for recovery  Encouraged to sleep 8- 10 hours nightly   Encouraged to limit napping during the day   Encouraged to limit stress   Encouraged to exercise the mind daily-this can be done through completing crossword puzzles, sudoku puzzles, reading, self reflecting through journaling, adult number coloring, flash cards, board games, game shows on TV, and staying socially active    Train   Encouraged to stay active prior to surgery  Encouraged to increase exercise prior to surgery   Continue to go to gym      Problem List Items Addressed This Visit       Persistent atrial fibrillation (HCC)    Benign essential hypertension  DX in 2019- afib   Followed by PCP   AFIB on [facility] - has received approval to hold times 2 days prior    Stable HR today @ 60  METS 7 - ACTIVE- DENIES CP, DENIES SOB   Denies CP/SOB           Encounter for geriatric assessment - Primary  Recommend inpatient geriatric surgery after surgery for advanced age, having surgery, and receiving anesthesia   This consult should not hold up discharge  See Geriatric Assessment below...  Cognitive Assessment: 1-high risk postop delirium  TUG <15 sec:YES  Falls (last 6 months): no  Hand  score: 21.33  -Attempt 1: 22  -Attempt 2:22  -Attempt 3:20  Eagle Total Score: 23  PHQ- 9 Depression  Scale: 0  Nutrition Assessment Score: 14  METS:7.59  Incentive Spirometry Level:   Health goals:  -What are your overall health goals? Get better     -What brings you strength? Friends, caregiver     -What activities are important to you? Sew, gardening    High risk for post-op delirium RT age, inpatient surgery  Mini cognitive screen level 1  Inpatient geriatric consult ordered for post-op   Delirium precautions on admit    High risk for post- op pneumonia RT age, inpatient surgery  Incentive spirometer taught to the patient and given  Instructed to start lung exercises tonight  Non smoker     GERIATRIC NEEDS  INSTRUCTED TO GET OOB AS SOON AS POSSIBLE   INSTRUCTED to NOT TO EAT IN THE BED, DO NOT EAT MEALS IN BED   WALK HALLWAYS   Fall precautions on admit  Standard PT eval after surgery for safe discharge  Today lower extremity exercises were taught to patient for muscle strengthening and balance  Exercise routine developed, patient instructed to start tonight, all to be done sitting down only  CONTINUE TO GO TO THE GYM    Pre- frail RT age, inpatient surgery  Inpatient geriatric consult ordered for postop  Nutritional supplements- EDUCATION ON HIGH PROTEIN DIET GIVEN   On admission aspiration precautions, skin precautions, delirium precautions, fall precautions    Be your BEST pathway   Breath, eat, sleep/stress relief, and tracking exercise      DENIES EVER HAVING ANY PROBLEMS WITH ANESTHESIA   MULTIPLE SURGERIES IN HER PAST  LAST SURGERY 2020    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  CT images: Multilevel degenerative changes of the spine.     IMPRESSION:     1. Mild but discrete FDG uptake in the enlarging 9 mm right lower lobe nodule. Malignancy should be excluded.  2. No findings for hypermetabolic metastasis.  3. Now suggestion of segmental biliary ductal dilatation in the left lobe of the liver laterally, new from prior PET/CT. Recommend further evaluation with dedicated MRI of the liver to assess for any possible underlying hepatobiliary lesion.     The study was marked in EPIC for significant notification.           Stage 3a chronic kidney disease (HCC)  High risk MANOLO after surgery   Can see nephrology post-op   Consult should not hold  up discharge      Latest Reference Range & Units 09/25/23 07:02   Sodium 135 - 147 mmol/L 140   Potassium 3.5 - 5.3 mmol/L 4.1   Chloride 96 - 108 mmol/L 105   CO2 21 - 32 mmol/L 28   Anion Gap mmol/L 7   BUN 5 - 25 mg/dL 17   Creatinine 0.60 - 1.30 mg/dL 1.07   GLUCOSE FASTING 65 - 99 mg/dL 95   Calcium 8.4 - 10.2 mg/dL 9.3   AST 13 - 39 U/L 23   ALT 7 - 52 U/L 18   Alkaline Phosphatase 34 - 104 U/L 97   Total Protein 6.4 - 8.4 g/dL 7.6   Albumin 3.5 - 5.0 g/dL 3.7   TOTAL BILIRUBIN 0.20 - 1.00 mg/dL 0.64   eGFR ml/min/1.73sq m 47       Refusal of blood transfusions as patient is Amish       Colon CA   Appendix CA   1977    Hysterectomy   1987  Felt swollen         Subjective:      Patient ID: Destinee Anders is a 83 y.o. female who was referred to [facility] for pre-surgery geriatric screening secondary to advanced age, having surgery, and receiving anesthesia.  Patient has reported having multiple surgeries in her past and denies ever having any problems with anesthesia.  Last surgery was in 2020.  Tolerated surgery and anesthesia well IN 2020.  Patient is aware this about 4 years ago, and now she is  slightly older now.      I did recommend inpatient geriatric consult after surgery due to her advanced age, having surgery, and receiving anesthesia.  This consult should not hold up discharge.  Recommend geriatric pain protocol.  Recommend delirium precautions on admit.  Overall patient was well today.   I did not note any cognitive concerns on my verbal exam.  She did score a level 1 on her mini cognitive assessment indicating high risk for postop delirium.  I have no active cognitive concerns.  Patient lives at home alone.  She is independent with all ADLs.  METS is 7.  Denies chest pain &  denies shortness of breath.  She states she is very active.  Still drives.  States she goes to [employer] and [employer].  She feels safe at home.      I did have a discussion with the patient today and informed her that due to her advanced age she is at a high risk for possible side effects from anesthesia.  They are outlined above.  Patient is aware and wishes to proceed.  In efforts to minimize these risks we are practicing OUR best program with emphasis on cognitive exercises, lung exercises, and protein supplements.  Protein shakes given to patient today.  Once hospitalized after surgery, I did encourage her to get out of bed as soon as possible.  When allowed walk in the hallways. I also encouraged her to not eat meals while in bed.  I encouraged her to bring her Bible to read.  Or book to read.      She is a Jehovah witness.  States that she does not except whole blood.  She states she does except fractions of blood components.  She was not able to detail exactly what this meant.  She was unsure of the questions I asked her.  She does have a card.  Unfortunately I cannot find in her medical records.  The FYI alert is set for blood product removal.  I instructed her to bring the card day of surgery.  Patient agrees.    She still needs to complete her PAT's.  I reviewed past blood work from September 2023 which was  stable.  Do not anticipate any upcoming concerns on the new blood work.  She recently had a PET scan.  I will have anesthesia review the PET scan for new findings of the liver.  I will inquire if anything needs to be done with this prior to this operation.  At this time I do not believe so.  She can follow-up after surgery.   As always we started our best protocol      As always we discussed having your BEST surgery, and BEST recovery.  Surgery goals reviewed today.      Breathing exercises   Patient was encouraged to begin lung exercises today.  This could be accomplished through deep breathing and cough exercises.  Patient was taught how to use an incentive spirometer.  Return demonstration provided.    Eating/nutrition   Encouraged patient to increase oral protein intake prior to surgery.    This can be accomplished by consuming chicken, fish, tuna fish, cottage cheese, cheese, eggs, Greek yogurt, and protein shakes as needed.  I encouraged use of protein shakes such ENLIVE.  I also recommended making your own protein shakes with protein powder.   Sleep/Stress management  Patient was encouraged to rest their body prior to surgery.  Encouraged attempting to get 8 hours of sleep at night.  Avoid stress.  Avoid sick contacts.  Encouraged to find a relaxing hobby such as reading, meditation, listening to music.  Training exercises  Patient was encouraged to remain active as possible.  Today bilateral lower extremity generic exercises were taught for muscle strengthening and balance.  All exercises to be done sitting down.         The following portions of the patient's history were reviewed and updated as appropriate: allergies, current medications, past family history, past medical history, past social history, past surgical history, and problem list.    Review of Systems   Constitutional:  Negative for activity change, chills and fatigue.   HENT:  Negative for mouth sores and sore throat.    Respiratory:  Negative  "for apnea, cough, choking and chest tightness.    Cardiovascular:  Negative for chest pain, palpitations and leg swelling.   Gastrointestinal:  Negative for nausea, rectal pain and vomiting.   Genitourinary:  Negative for difficulty urinating.   Musculoskeletal:  Negative for arthralgias.   Skin:  Negative for color change, pallor and rash.   Neurological:  Negative for dizziness, facial asymmetry and headaches.   Psychiatric/Behavioral:  Negative for dysphoric mood and hallucinations. The patient is not nervous/anxious and is not hyperactive.          Objective:      1/8/2024 8:57 EBLJ130/70BP LocationRight armPatient XewujgbnPtbikjwRkifx70Xzsg99 °F (35.6 °C)Temp ekfSssyrwaaJmV531 %Ugwzno07.9 kg (154 lb)Height5' 4\" (1.626 m)        Physical Exam  HENT:      Head: Normocephalic.      Nose: Nose normal.      Mouth/Throat:      Mouth: Mucous membranes are moist.   Cardiovascular:      Rate and Rhythm: Normal rate.   Pulmonary:      Effort: Pulmonary effort is normal.   Musculoskeletal:         General: Normal range of motion.   Skin:     General: Skin is warm.   Neurological:      General: No focal deficit present.      Mental Status: She is alert and oriented to person, place, and time. Mental status is at baseline.   Psychiatric:         Mood and Affect: Mood normal.         Behavior: Behavior normal.         Thought Content: Thought content normal.         Judgment: Judgment normal.           "

## 2024-01-09 ENCOUNTER — TELEPHONE (OUTPATIENT)
Dept: CARDIAC SURGERY | Facility: CLINIC | Age: 84
End: 2024-01-09

## 2024-01-09 NOTE — TELEPHONE ENCOUNTER
Called pt and offered a sooner surgery date of 1/12/2024.Pt is unable to accommodate sooner date, due to arrangements she already made for SX: 1/19/2024.

## 2024-01-10 ENCOUNTER — APPOINTMENT (OUTPATIENT)
Dept: LAB | Facility: CLINIC | Age: 84
End: 2024-01-10
Payer: COMMERCIAL

## 2024-01-10 ENCOUNTER — TELEPHONE (OUTPATIENT)
Dept: HEMATOLOGY ONCOLOGY | Facility: CLINIC | Age: 84
End: 2024-01-10

## 2024-01-10 DIAGNOSIS — C34.11 ADENOCARCINOMA OF UPPER LOBE OF RIGHT LUNG (HCC): ICD-10-CM

## 2024-01-10 DIAGNOSIS — I48.19 PERSISTENT ATRIAL FIBRILLATION (HCC): ICD-10-CM

## 2024-01-10 DIAGNOSIS — R91.8 RIGHT LOWER LOBE LUNG MASS: ICD-10-CM

## 2024-01-10 DIAGNOSIS — R79.1 ABNORMAL COAGULATION PROFILE: ICD-10-CM

## 2024-01-10 DIAGNOSIS — R94.5 ABNORMAL RESULTS OF LIVER FUNCTION STUDIES: ICD-10-CM

## 2024-01-10 DIAGNOSIS — Z01.818 PRE-OP EXAMINATION: ICD-10-CM

## 2024-01-10 LAB
ABO GROUP BLD: NORMAL
ANION GAP SERPL CALCULATED.3IONS-SCNC: 8 MMOL/L
APTT PPP: 35 SECONDS (ref 23–37)
ATRIAL RATE: 53 BPM
BLD GP AB SCN SERPL QL: NEGATIVE
BUN SERPL-MCNC: 20 MG/DL (ref 5–25)
CALCIUM SERPL-MCNC: 9.6 MG/DL (ref 8.4–10.2)
CHLORIDE SERPL-SCNC: 105 MMOL/L (ref 96–108)
CO2 SERPL-SCNC: 26 MMOL/L (ref 21–32)
CREAT SERPL-MCNC: 0.95 MG/DL (ref 0.6–1.3)
ERYTHROCYTE [DISTWIDTH] IN BLOOD BY AUTOMATED COUNT: 14.1 % (ref 11.6–15.1)
GFR SERPL CREATININE-BSD FRML MDRD: 54 ML/MIN/1.73SQ M
GLUCOSE P FAST SERPL-MCNC: 108 MG/DL (ref 65–99)
HCT VFR BLD AUTO: 38.2 % (ref 34.8–46.1)
HGB BLD-MCNC: 12.5 G/DL (ref 11.5–15.4)
INR PPP: 1.17 (ref 0.84–1.19)
MCH RBC QN AUTO: 32.6 PG (ref 26.8–34.3)
MCHC RBC AUTO-ENTMCNC: 32.7 G/DL (ref 31.4–37.4)
MCV RBC AUTO: 100 FL (ref 82–98)
P AXIS: 53 DEGREES
PLATELET # BLD AUTO: 201 THOUSANDS/UL (ref 149–390)
PMV BLD AUTO: 9.5 FL (ref 8.9–12.7)
POTASSIUM SERPL-SCNC: 4.1 MMOL/L (ref 3.5–5.3)
PR INTERVAL: 218 MS
PROTHROMBIN TIME: 14.7 SECONDS (ref 11.6–14.5)
QRS AXIS: 7 DEGREES
QRSD INTERVAL: 88 MS
QT INTERVAL: 422 MS
QTC INTERVAL: 395 MS
RBC # BLD AUTO: 3.83 MILLION/UL (ref 3.81–5.12)
RH BLD: NEGATIVE
SODIUM SERPL-SCNC: 139 MMOL/L (ref 135–147)
SPECIMEN EXPIRATION DATE: NORMAL
T WAVE AXIS: 48 DEGREES
VENTRICULAR RATE: 53 BPM
WBC # BLD AUTO: 5.19 THOUSAND/UL (ref 4.31–10.16)

## 2024-01-10 PROCEDURE — 85610 PROTHROMBIN TIME: CPT

## 2024-01-10 PROCEDURE — 86900 BLOOD TYPING SEROLOGIC ABO: CPT

## 2024-01-10 PROCEDURE — 36415 COLL VENOUS BLD VENIPUNCTURE: CPT

## 2024-01-10 PROCEDURE — 86850 RBC ANTIBODY SCREEN: CPT

## 2024-01-10 PROCEDURE — 80048 BASIC METABOLIC PNL TOTAL CA: CPT

## 2024-01-10 PROCEDURE — 85027 COMPLETE CBC AUTOMATED: CPT

## 2024-01-10 PROCEDURE — 85730 THROMBOPLASTIN TIME PARTIAL: CPT

## 2024-01-11 NOTE — PRE-PROCEDURE INSTRUCTIONS
Pre-Surgery Instructions:   Medication Instructions    apixaban (ELIQUIS) 5 mg Instructions provided by MD. Patient states she is to hold Eliquis 2 days prior to surgery    atorvastatin (LIPITOR) 80 mg tablet Take night before surgery    diltiazem (CARDIZEM CD) 120 mg 24 hr capsule Take night before surgery    levothyroxine 100 mcg tablet Take day of surgery.    Multiple Vitamins-Calcium (ESSENTIAL ONE DAILY MULTIVIT PO) Stop taking 7 days prior to surgery.    omeprazole (PriLOSEC) 20 mg delayed release capsule Uses PRN- OK to take day of surgery      Medication instructions for day surgery reviewed. Please use only a sip of water to take your instructed medications. Avoid all over the counter vitamins, supplements and NSAIDS for one week prior to surgery per anesthesia guidelines. Tylenol is ok to take as needed.     You will receive a call one business day prior to surgery with an arrival time and hospital directions. If your surgery is scheduled on a Monday, the hospital will be calling you on the Friday prior to your surgery. If you have not heard from anyone by 8pm, please call the hospital supervisor through the hospital  at [phone]. (Dg [phone]).    Do not eat or drink anything after midnight the night before your surgery, including candy, mints, lifesavers, or chewing gum. Do not drink alcohol 24hrs before your surgery. Try not to smoke at least 24hrs before your surgery.       Follow the pre surgery showering instructions as listed in the “My Surgical Experience Booklet” or otherwise provided by your surgeon's office. Do not use a blade to shave the surgical area 1 week before surgery. It is okay to use a clean electric clippers up to 24 hours before surgery. Do not apply any lotions, creams, including makeup, cologne, deodorant, or perfumes after showering on the day of your surgery. Do not use dry shampoo, hair spray, hair gel, or any type of hair products.     No contact lenses,  eye make-up, or artificial eyelashes. Remove nail polish, including gel polish, and any artificial, gel, or acrylic nails if possible. Remove all jewelry including rings and body piercing jewelry.     Wear causal clothing that is easy to take on and off. Consider your type of surgery.    Keep any valuables, jewelry, piercings at home. Please bring any specially ordered equipment (sling, braces) if indicated.    Arrange for a responsible person to drive you to and from the hospital on the day of your surgery. Visitor Guidelines discussed.     Call the surgeon's office with any new illnesses, exposures, or additional questions prior to surgery.    Please reference your “My Surgical Experience Booklet” for additional information to prepare for your upcoming surgery.

## 2024-01-18 ENCOUNTER — ANESTHESIA EVENT (OUTPATIENT)
Dept: PERIOP | Facility: HOSPITAL | Age: 84
DRG: 164 | End: 2024-01-18
Payer: COMMERCIAL

## 2024-01-18 DIAGNOSIS — N17.9 AKI (ACUTE KIDNEY INJURY) (HCC): Primary | ICD-10-CM

## 2024-01-19 ENCOUNTER — ANESTHESIA (OUTPATIENT)
Dept: PERIOP | Facility: HOSPITAL | Age: 84
DRG: 164 | End: 2024-01-19
Payer: COMMERCIAL

## 2024-01-19 ENCOUNTER — APPOINTMENT (INPATIENT)
Dept: RADIOLOGY | Facility: HOSPITAL | Age: 84
DRG: 164 | End: 2024-01-19
Payer: COMMERCIAL

## 2024-01-19 ENCOUNTER — HOSPITAL ENCOUNTER (INPATIENT)
Facility: HOSPITAL | Age: 84
LOS: 1 days | Discharge: HOME HEALTH | DRG: 164 | End: 2024-01-20
Attending: THORACIC SURGERY (CARDIOTHORACIC VASCULAR SURGERY) | Admitting: THORACIC SURGERY (CARDIOTHORACIC VASCULAR SURGERY)
Payer: COMMERCIAL

## 2024-01-19 DIAGNOSIS — R91.8 RIGHT LOWER LOBE LUNG MASS: ICD-10-CM

## 2024-01-19 DIAGNOSIS — N17.9 AKI (ACUTE KIDNEY INJURY) (HCC): ICD-10-CM

## 2024-01-19 DIAGNOSIS — C34.11 ADENOCARCINOMA OF UPPER LOBE OF RIGHT LUNG (HCC): ICD-10-CM

## 2024-01-19 LAB
ANION GAP SERPL CALCULATED.3IONS-SCNC: 5 MMOL/L
BUN SERPL-MCNC: 20 MG/DL (ref 5–25)
CALCIUM SERPL-MCNC: 8.6 MG/DL (ref 8.4–10.2)
CHLORIDE SERPL-SCNC: 108 MMOL/L (ref 96–108)
CO2 SERPL-SCNC: 25 MMOL/L (ref 21–32)
CREAT SERPL-MCNC: 1 MG/DL (ref 0.6–1.3)
ERYTHROCYTE [DISTWIDTH] IN BLOOD BY AUTOMATED COUNT: 14.2 % (ref 11.6–15.1)
GFR SERPL CREATININE-BSD FRML MDRD: 51 ML/MIN/1.73SQ M
GLUCOSE SERPL-MCNC: 113 MG/DL (ref 65–140)
HCT VFR BLD AUTO: 31.9 % (ref 34.8–46.1)
HGB BLD-MCNC: 10.4 G/DL (ref 11.5–15.4)
MAGNESIUM SERPL-MCNC: 1.7 MG/DL (ref 1.9–2.7)
MCH RBC QN AUTO: 32 PG (ref 26.8–34.3)
MCHC RBC AUTO-ENTMCNC: 32.6 G/DL (ref 31.4–37.4)
MCV RBC AUTO: 98 FL (ref 82–98)
PLATELET # BLD AUTO: 181 THOUSANDS/UL (ref 149–390)
PMV BLD AUTO: 8.9 FL (ref 8.9–12.7)
POTASSIUM SERPL-SCNC: 4.2 MMOL/L (ref 3.5–5.3)
RBC # BLD AUTO: 3.25 MILLION/UL (ref 3.81–5.12)
SODIUM SERPL-SCNC: 138 MMOL/L (ref 135–147)
WBC # BLD AUTO: 5.41 THOUSAND/UL (ref 4.31–10.16)

## 2024-01-19 PROCEDURE — 88307 TISSUE EXAM BY PATHOLOGIST: CPT | Performed by: PATHOLOGY

## 2024-01-19 PROCEDURE — 07B74ZZ EXCISION OF THORAX LYMPHATIC, PERCUTANEOUS ENDOSCOPIC APPROACH: ICD-10-PCS | Performed by: THORACIC SURGERY (CARDIOTHORACIC VASCULAR SURGERY)

## 2024-01-19 PROCEDURE — 32666 THORACOSCOPY W/WEDGE RESECT: CPT | Performed by: THORACIC SURGERY (CARDIOTHORACIC VASCULAR SURGERY)

## 2024-01-19 PROCEDURE — 94760 N-INVAS EAR/PLS OXIMETRY 1: CPT

## 2024-01-19 PROCEDURE — 0BJ08ZZ INSPECTION OF TRACHEOBRONCHIAL TREE, VIA NATURAL OR ARTIFICIAL OPENING ENDOSCOPIC: ICD-10-PCS | Performed by: THORACIC SURGERY (CARDIOTHORACIC VASCULAR SURGERY)

## 2024-01-19 PROCEDURE — 88313 SPECIAL STAINS GROUP 2: CPT | Performed by: PATHOLOGY

## 2024-01-19 PROCEDURE — S2900 ROBOTIC SURGICAL SYSTEM: HCPCS | Performed by: THORACIC SURGERY (CARDIOTHORACIC VASCULAR SURGERY)

## 2024-01-19 PROCEDURE — 0BBF4ZZ EXCISION OF RIGHT LOWER LUNG LOBE, PERCUTANEOUS ENDOSCOPIC APPROACH: ICD-10-PCS | Performed by: THORACIC SURGERY (CARDIOTHORACIC VASCULAR SURGERY)

## 2024-01-19 PROCEDURE — 99222 1ST HOSP IP/OBS MODERATE 55: CPT | Performed by: INTERNAL MEDICINE

## 2024-01-19 PROCEDURE — 85027 COMPLETE CBC AUTOMATED: CPT | Performed by: PHYSICIAN ASSISTANT

## 2024-01-19 PROCEDURE — 86901 BLOOD TYPING SEROLOGIC RH(D): CPT

## 2024-01-19 PROCEDURE — 0W9B30Z DRAINAGE OF LEFT PLEURAL CAVITY WITH DRAINAGE DEVICE, PERCUTANEOUS APPROACH: ICD-10-PCS | Performed by: THORACIC SURGERY (CARDIOTHORACIC VASCULAR SURGERY)

## 2024-01-19 PROCEDURE — 88342 IMHCHEM/IMCYTCHM 1ST ANTB: CPT | Performed by: PATHOLOGY

## 2024-01-19 PROCEDURE — 32666 THORACOSCOPY W/WEDGE RESECT: CPT | Performed by: PHYSICIAN ASSISTANT

## 2024-01-19 PROCEDURE — 71045 X-RAY EXAM CHEST 1 VIEW: CPT

## 2024-01-19 PROCEDURE — C9290 INJ, BUPIVACAINE LIPOSOME: HCPCS | Performed by: THORACIC SURGERY (CARDIOTHORACIC VASCULAR SURGERY)

## 2024-01-19 PROCEDURE — 32674 THORACOSCOPY LYMPH NODE EXC: CPT | Performed by: THORACIC SURGERY (CARDIOTHORACIC VASCULAR SURGERY)

## 2024-01-19 PROCEDURE — 83735 ASSAY OF MAGNESIUM: CPT | Performed by: PHYSICIAN ASSISTANT

## 2024-01-19 PROCEDURE — 88305 TISSUE EXAM BY PATHOLOGIST: CPT | Performed by: PATHOLOGY

## 2024-01-19 PROCEDURE — 32674 THORACOSCOPY LYMPH NODE EXC: CPT | Performed by: PHYSICIAN ASSISTANT

## 2024-01-19 PROCEDURE — 88341 IMHCHEM/IMCYTCHM EA ADD ANTB: CPT | Performed by: PATHOLOGY

## 2024-01-19 PROCEDURE — 80048 BASIC METABOLIC PNL TOTAL CA: CPT | Performed by: PHYSICIAN ASSISTANT

## 2024-01-19 RX ORDER — ONDANSETRON 2 MG/ML
4 INJECTION INTRAMUSCULAR; INTRAVENOUS ONCE AS NEEDED
Status: DISCONTINUED | OUTPATIENT
Start: 2024-01-19 | End: 2024-01-19 | Stop reason: HOSPADM

## 2024-01-19 RX ORDER — DILTIAZEM HYDROCHLORIDE 120 MG/1
120 CAPSULE, COATED, EXTENDED RELEASE ORAL EVERY EVENING
Status: DISCONTINUED | OUTPATIENT
Start: 2024-01-20 | End: 2024-01-20 | Stop reason: HOSPADM

## 2024-01-19 RX ORDER — ENOXAPARIN SODIUM 100 MG/ML
40 INJECTION SUBCUTANEOUS DAILY
Status: DISCONTINUED | OUTPATIENT
Start: 2024-01-20 | End: 2024-01-20 | Stop reason: HOSPADM

## 2024-01-19 RX ORDER — LIDOCAINE HYDROCHLORIDE 10 MG/ML
INJECTION, SOLUTION EPIDURAL; INFILTRATION; INTRACAUDAL; PERINEURAL AS NEEDED
Status: DISCONTINUED | OUTPATIENT
Start: 2024-01-19 | End: 2024-01-19

## 2024-01-19 RX ORDER — ACETAMINOPHEN 325 MG/1
975 TABLET ORAL EVERY 6 HOURS
Status: DISCONTINUED | OUTPATIENT
Start: 2024-01-19 | End: 2024-01-20 | Stop reason: HOSPADM

## 2024-01-19 RX ORDER — POLYETHYLENE GLYCOL 3350 17 G/17G
17 POWDER, FOR SOLUTION ORAL DAILY PRN
Status: DISCONTINUED | OUTPATIENT
Start: 2024-01-19 | End: 2024-01-20 | Stop reason: HOSPADM

## 2024-01-19 RX ORDER — HYDROMORPHONE HCL/PF 1 MG/ML
0.5 SYRINGE (ML) INJECTION
Status: DISCONTINUED | OUTPATIENT
Start: 2024-01-19 | End: 2024-01-20 | Stop reason: HOSPADM

## 2024-01-19 RX ORDER — CEFAZOLIN SODIUM 1 G/50ML
1000 SOLUTION INTRAVENOUS ONCE
Status: COMPLETED | OUTPATIENT
Start: 2024-01-19 | End: 2024-01-19

## 2024-01-19 RX ORDER — SODIUM CHLORIDE 9 MG/ML
INJECTION, SOLUTION INTRAVENOUS CONTINUOUS PRN
Status: DISCONTINUED | OUTPATIENT
Start: 2024-01-19 | End: 2024-01-19

## 2024-01-19 RX ORDER — MAGNESIUM HYDROXIDE 1200 MG/15ML
LIQUID ORAL AS NEEDED
Status: DISCONTINUED | OUTPATIENT
Start: 2024-01-19 | End: 2024-01-19 | Stop reason: HOSPADM

## 2024-01-19 RX ORDER — SENNOSIDES 8.6 MG
1 TABLET ORAL DAILY
Status: DISCONTINUED | OUTPATIENT
Start: 2024-01-20 | End: 2024-01-20 | Stop reason: HOSPADM

## 2024-01-19 RX ORDER — SODIUM CHLORIDE, SODIUM LACTATE, POTASSIUM CHLORIDE, CALCIUM CHLORIDE 600; 310; 30; 20 MG/100ML; MG/100ML; MG/100ML; MG/100ML
75 INJECTION, SOLUTION INTRAVENOUS CONTINUOUS
Status: DISCONTINUED | OUTPATIENT
Start: 2024-01-19 | End: 2024-01-19

## 2024-01-19 RX ORDER — MAGNESIUM SULFATE HEPTAHYDRATE 40 MG/ML
2 INJECTION, SOLUTION INTRAVENOUS ONCE
Status: COMPLETED | OUTPATIENT
Start: 2024-01-19 | End: 2024-01-19

## 2024-01-19 RX ORDER — FENTANYL CITRATE/PF 50 MCG/ML
25 SYRINGE (ML) INJECTION
Status: DISCONTINUED | OUTPATIENT
Start: 2024-01-19 | End: 2024-01-19 | Stop reason: HOSPADM

## 2024-01-19 RX ORDER — HYDROMORPHONE HCL/PF 1 MG/ML
SYRINGE (ML) INJECTION AS NEEDED
Status: DISCONTINUED | OUTPATIENT
Start: 2024-01-19 | End: 2024-01-19

## 2024-01-19 RX ORDER — ONDANSETRON 2 MG/ML
INJECTION INTRAMUSCULAR; INTRAVENOUS AS NEEDED
Status: DISCONTINUED | OUTPATIENT
Start: 2024-01-19 | End: 2024-01-19

## 2024-01-19 RX ORDER — ACETAMINOPHEN 325 MG/1
650 TABLET ORAL EVERY 6 HOURS
Qty: 56 TABLET | Refills: 0 | Status: SHIPPED | OUTPATIENT
Start: 2024-01-19 | End: 2024-01-26

## 2024-01-19 RX ORDER — FENTANYL CITRATE 50 UG/ML
INJECTION, SOLUTION INTRAMUSCULAR; INTRAVENOUS AS NEEDED
Status: DISCONTINUED | OUTPATIENT
Start: 2024-01-19 | End: 2024-01-19

## 2024-01-19 RX ORDER — GABAPENTIN 300 MG/1
600 CAPSULE ORAL ONCE
Status: COMPLETED | OUTPATIENT
Start: 2024-01-19 | End: 2024-01-19

## 2024-01-19 RX ORDER — DOCUSATE SODIUM 100 MG/1
100 CAPSULE, LIQUID FILLED ORAL 2 TIMES DAILY PRN
Qty: 20 CAPSULE | Refills: 0 | Status: SHIPPED | OUTPATIENT
Start: 2024-01-19

## 2024-01-19 RX ORDER — SODIUM CHLORIDE, SODIUM LACTATE, POTASSIUM CHLORIDE, CALCIUM CHLORIDE 600; 310; 30; 20 MG/100ML; MG/100ML; MG/100ML; MG/100ML
60 INJECTION, SOLUTION INTRAVENOUS CONTINUOUS
Status: DISCONTINUED | OUTPATIENT
Start: 2024-01-19 | End: 2024-01-19

## 2024-01-19 RX ORDER — SODIUM CHLORIDE, SODIUM LACTATE, POTASSIUM CHLORIDE, CALCIUM CHLORIDE 600; 310; 30; 20 MG/100ML; MG/100ML; MG/100ML; MG/100ML
INJECTION, SOLUTION INTRAVENOUS CONTINUOUS PRN
Status: DISCONTINUED | OUTPATIENT
Start: 2024-01-19 | End: 2024-01-19

## 2024-01-19 RX ORDER — OXYCODONE HYDROCHLORIDE 5 MG/1
5 TABLET ORAL EVERY 4 HOURS PRN
Status: DISCONTINUED | OUTPATIENT
Start: 2024-01-19 | End: 2024-01-20 | Stop reason: HOSPADM

## 2024-01-19 RX ORDER — LEVOTHYROXINE SODIUM 0.1 MG/1
100 TABLET ORAL
Status: DISCONTINUED | OUTPATIENT
Start: 2024-01-20 | End: 2024-01-20 | Stop reason: HOSPADM

## 2024-01-19 RX ORDER — PROPOFOL 10 MG/ML
INJECTION, EMULSION INTRAVENOUS CONTINUOUS PRN
Status: DISCONTINUED | OUTPATIENT
Start: 2024-01-19 | End: 2024-01-19

## 2024-01-19 RX ORDER — PROPOFOL 10 MG/ML
INJECTION, EMULSION INTRAVENOUS AS NEEDED
Status: DISCONTINUED | OUTPATIENT
Start: 2024-01-19 | End: 2024-01-19

## 2024-01-19 RX ORDER — OXYCODONE HYDROCHLORIDE 5 MG/1
5 TABLET ORAL EVERY 4 HOURS PRN
Qty: 20 TABLET | Refills: 0 | Status: SHIPPED | OUTPATIENT
Start: 2024-01-19 | End: 2024-01-29

## 2024-01-19 RX ORDER — HEPARIN SODIUM 5000 [USP'U]/ML
5000 INJECTION, SOLUTION INTRAVENOUS; SUBCUTANEOUS
Status: DISCONTINUED | OUTPATIENT
Start: 2024-01-19 | End: 2024-01-19 | Stop reason: HOSPADM

## 2024-01-19 RX ORDER — SODIUM CHLORIDE, SODIUM LACTATE, POTASSIUM CHLORIDE, CALCIUM CHLORIDE 600; 310; 30; 20 MG/100ML; MG/100ML; MG/100ML; MG/100ML
50 INJECTION, SOLUTION INTRAVENOUS CONTINUOUS
Status: DISCONTINUED | OUTPATIENT
Start: 2024-01-19 | End: 2024-01-20

## 2024-01-19 RX ORDER — ROCURONIUM BROMIDE 10 MG/ML
INJECTION, SOLUTION INTRAVENOUS AS NEEDED
Status: DISCONTINUED | OUTPATIENT
Start: 2024-01-19 | End: 2024-01-19

## 2024-01-19 RX ORDER — GABAPENTIN 100 MG/1
100 CAPSULE ORAL 3 TIMES DAILY
Status: DISCONTINUED | OUTPATIENT
Start: 2024-01-19 | End: 2024-01-20 | Stop reason: HOSPADM

## 2024-01-19 RX ORDER — GABAPENTIN 100 MG/1
100 CAPSULE ORAL 3 TIMES DAILY
Qty: 63 CAPSULE | Refills: 0 | Status: SHIPPED | OUTPATIENT
Start: 2024-01-19

## 2024-01-19 RX ORDER — DOCUSATE SODIUM 100 MG/1
100 CAPSULE, LIQUID FILLED ORAL 2 TIMES DAILY
Status: DISCONTINUED | OUTPATIENT
Start: 2024-01-19 | End: 2024-01-20 | Stop reason: HOSPADM

## 2024-01-19 RX ORDER — ACETAMINOPHEN 325 MG/1
975 TABLET ORAL ONCE
Status: COMPLETED | OUTPATIENT
Start: 2024-01-19 | End: 2024-01-19

## 2024-01-19 RX ORDER — ONDANSETRON 2 MG/ML
4 INJECTION INTRAMUSCULAR; INTRAVENOUS EVERY 6 HOURS PRN
Status: DISCONTINUED | OUTPATIENT
Start: 2024-01-19 | End: 2024-01-20 | Stop reason: HOSPADM

## 2024-01-19 RX ADMIN — OXYCODONE HYDROCHLORIDE 5 MG: 5 TABLET ORAL at 23:43

## 2024-01-19 RX ADMIN — MAGNESIUM SULFATE HEPTAHYDRATE 2 G: 40 INJECTION, SOLUTION INTRAVENOUS at 17:14

## 2024-01-19 RX ADMIN — GABAPENTIN 100 MG: 100 CAPSULE ORAL at 13:52

## 2024-01-19 RX ADMIN — SODIUM CHLORIDE, SODIUM LACTATE, POTASSIUM CHLORIDE, AND CALCIUM CHLORIDE 50 ML/HR: .6; .31; .03; .02 INJECTION, SOLUTION INTRAVENOUS at 20:00

## 2024-01-19 RX ADMIN — ACETAMINOPHEN 975 MG: 325 TABLET, FILM COATED ORAL at 13:52

## 2024-01-19 RX ADMIN — SODIUM CHLORIDE, SODIUM LACTATE, POTASSIUM CHLORIDE, AND CALCIUM CHLORIDE: .6; .31; .03; .02 INJECTION, SOLUTION INTRAVENOUS at 07:10

## 2024-01-19 RX ADMIN — GABAPENTIN 600 MG: 300 CAPSULE ORAL at 06:03

## 2024-01-19 RX ADMIN — ROCURONIUM BROMIDE 15 MG: 10 INJECTION, SOLUTION INTRAVENOUS at 08:09

## 2024-01-19 RX ADMIN — ACETAMINOPHEN 975 MG: 325 TABLET, FILM COATED ORAL at 06:03

## 2024-01-19 RX ADMIN — PROPOFOL 80 MG: 10 INJECTION, EMULSION INTRAVENOUS at 07:37

## 2024-01-19 RX ADMIN — LIDOCAINE HYDROCHLORIDE 30 MG: 10 INJECTION, SOLUTION EPIDURAL; INFILTRATION; INTRACAUDAL; PERINEURAL at 07:37

## 2024-01-19 RX ADMIN — FENTANYL CITRATE 50 MCG: 50 INJECTION INTRAMUSCULAR; INTRAVENOUS at 07:56

## 2024-01-19 RX ADMIN — ONDANSETRON 4 MG: 2 INJECTION INTRAMUSCULAR; INTRAVENOUS at 07:44

## 2024-01-19 RX ADMIN — PROPOFOL 80 MCG/KG/MIN: 10 INJECTION, EMULSION INTRAVENOUS at 07:43

## 2024-01-19 RX ADMIN — PHENYLEPHRINE HYDROCHLORIDE 200 MCG: 10 INJECTION INTRAVENOUS at 08:43

## 2024-01-19 RX ADMIN — PHENYLEPHRINE HYDROCHLORIDE 50 MCG/MIN: 10 INJECTION INTRAVENOUS at 08:36

## 2024-01-19 RX ADMIN — DOCUSATE SODIUM 100 MG: 100 CAPSULE, LIQUID FILLED ORAL at 17:14

## 2024-01-19 RX ADMIN — CEFAZOLIN SODIUM 1000 MG: 1 SOLUTION INTRAVENOUS at 08:06

## 2024-01-19 RX ADMIN — PROPOFOL 20 MG: 10 INJECTION, EMULSION INTRAVENOUS at 08:23

## 2024-01-19 RX ADMIN — SODIUM CHLORIDE, SODIUM LACTATE, POTASSIUM CHLORIDE, AND CALCIUM CHLORIDE 60 ML/HR: .6; .31; .03; .02 INJECTION, SOLUTION INTRAVENOUS at 10:08

## 2024-01-19 RX ADMIN — HYDROMORPHONE HYDROCHLORIDE 0.5 MG: 1 INJECTION, SOLUTION INTRAMUSCULAR; INTRAVENOUS; SUBCUTANEOUS at 08:21

## 2024-01-19 RX ADMIN — ACETAMINOPHEN 975 MG: 325 TABLET, FILM COATED ORAL at 20:02

## 2024-01-19 RX ADMIN — FENTANYL CITRATE 50 MCG: 50 INJECTION INTRAMUSCULAR; INTRAVENOUS at 07:45

## 2024-01-19 RX ADMIN — SODIUM CHLORIDE: 0.9 INJECTION, SOLUTION INTRAVENOUS at 07:48

## 2024-01-19 RX ADMIN — SUGAMMADEX 200 MG: 100 INJECTION, SOLUTION INTRAVENOUS at 09:20

## 2024-01-19 RX ADMIN — GABAPENTIN 100 MG: 100 CAPSULE ORAL at 20:02

## 2024-01-19 NOTE — RESPIRATORY THERAPY NOTE
"RT Protocol Note  Destinee Anders 83 y.o. female MRN: [id number]  Unit/Bed#: [facility] 403-01 Encounter: [id number]    Assessment    Active Problems:  There are no active Hospital Problems.      Home Pulmonary Medications:  none       Past Medical History:   Diagnosis Date    Anesthesia     \"cheap date\"    Arthritis     Atrial fibrillation (HCC)     Benign polyp of large intestine     Cancer of appendix (HCC)     Colon cancer (HCC)     36 yo    Colon polyp     Full dentures     full upper/ partial lower    GERD (gastroesophageal reflux disease)     Glaucoma suspect     History of neck problems     \"C3-C7 and had epidural injections years ago\"    Hyperlipidemia     Hypertension     Lung cancer (HCC)     2020- had surgery    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HCC)     Prediabetes     Primary adenocarcinoma of upper lobe of right lung (HCC) 2019    Pulmonary nodules 2015    Refusal of blood transfusions as patient is Presybeterian     Tic disorder, transient, recurrent     not recent    Walks frequently      Social History     Socioeconomic History    Marital status: Single     Spouse name: None    Number of children: None    Years of education: None    Highest education level: None   Occupational History    None   Tobacco Use    Smoking status: Former     Current packs/day: 0.00     Average packs/day: 1.5 packs/day for 20.0 years (30.0 ttl pk-yrs)     Types: Cigarettes     Start date:      Quit date:      Years since quittin.0     Passive exposure: Past    Smokeless tobacco: Never   Vaping Use    Vaping status: Never Used   Substance and Sexual Activity    Alcohol use: Yes     Alcohol/week: 5.0 standard drinks of alcohol     Types: 5 Cans of beer per week     Comment: occasional beer with food    Drug use: No    Sexual activity: None     Comment: hysterectomy   Other Topics Concern    None   Social History Narrative    Caffeine use- 1-3, 8oz coffees per day    Denied history of housing without smoke " "detectors    Always uses a seatbelt     Social Determinants of Health     Financial Resource Strain: Low Risk  (9/26/2023)    Overall Financial Resource Strain (CARDIA)     Difficulty of Paying Living Expenses: Not hard at all   Food Insecurity: Not on file   Transportation Needs: No Transportation Needs (9/26/2023)    PRAPARE - Transportation     Lack of Transportation (Medical): No     Lack of Transportation (Non-Medical): No   Physical Activity: Not on file   Stress: Not on file   Social Connections: Not on file   Intimate Partner Violence: Not on file   Housing Stability: Not on file       Subjective         Objective    Physical Exam:   Assessment Type: Assess only  General Appearance: Alert, Awake  Respiratory Pattern: Normal  Chest Assessment: Chest expansion symmetrical  Bilateral Breath Sounds: Clear  O2 Device: nc    Vitals:  Blood pressure 137/68, pulse 65, temperature 98.6 °F (37 °C), resp. rate 20, height 5' 4.5\" (1.638 m), weight 70.8 kg (156 lb 1.4 oz), SpO2 97%, not currently breastfeeding.          Imaging and other studies:     O2 Device: nc     Plan       Airway Clearance Plan: Incentive Spirometer     Resp Comments: Bilateral BS clear. Per xray report, luns favor atelectasis. Will start pt on IS at this time   "

## 2024-01-19 NOTE — ANESTHESIA PREPROCEDURE EVALUATION
Procedure:  Robotic right lower lobe wedge, lymph node dissection (Right: Chest)  BRONCHOSCOPY FLEXIBLE (Bronchus)    Relevant Problems   CARDIO   (+) Benign essential hypertension   (+) Mixed hyperlipidemia   (+) Persistent atrial fibrillation (HCC)      ENDO   (+) Postoperative hypothyroidism      GI/HEPATIC   (+) Gastroesophageal reflux disease      /RENAL   (+) Stage 3a chronic kidney disease (HCC)      MUSCULOSKELETAL   (+) Osteoarthritis      Respiratory   (+) 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)   (+) Pulmonary nodule      Endocrine   (+) Hashimoto's thyroiditis      Other   (+) Refusal of blood transfusions as patient is Buddhist        Physical Exam    Airway    Mallampati score: III  TM Distance: >3 FB  Neck ROM: limited     Dental   Comment: Six lower central incisors  upper dentures and lower dentures    Cardiovascular  Rhythm: regular, Rate: normal    Pulmonary   Decreased breath sounds    Other Findings  post-pubertal.      Anesthesia Plan  ASA Score- 3     Anesthesia Type- general with ASA Monitors.         Additional Monitors:     Airway Plan: ETT.    Comment: LIV.       Plan Factors-Exercise tolerance (METS): >4 METS.    Chart reviewed. EKG reviewed. Imaging results reviewed. Existing labs reviewed. Patient summary reviewed.    Patient is not a current smoker.              Induction- intravenous.    Postoperative Plan-     Informed Consent- Anesthetic plan and risks discussed with patient.  I personally reviewed this patient with the CRNA. Discussed and agreed on the Anesthesia Plan with the CRNA..              Lab Results   Component Value Date    WBC 5.19 01/10/2024    HGB 12.5 01/10/2024    HCT 38.2 01/10/2024     (H) 01/10/2024     01/10/2024     Lab Results   Component Value Date    GLUCOSE 113 07/06/2016    CALCIUM 9.6 01/10/2024     08/11/2015    K 4.1 01/10/2024    CO2 26 01/10/2024     01/10/2024    BUN 20 01/10/2024    CREATININE 0.95  01/10/2024     Lab Results   Component Value Date    INR 1.17 01/10/2024    INR 1.14 05/12/2020    INR 1.12 12/02/2019    PROTIME 14.7 (H) 01/10/2024    PROTIME 14.8 (H) 05/12/2020    PROTIME 14.6 (H) 12/02/2019     Lab Results   Component Value Date    PTT 35 01/10/2024       Type and Screen:  O  No results found for this or any previous visit.

## 2024-01-19 NOTE — ANESTHESIA PROCEDURE NOTES
"Arterial Line Insertion    Performed by: Elsa Romero CRNA  Authorized by: Missael Carter DO  Risks and benefits: risks, benefits and alternatives were discussed  Consent given by: patient  Patient understanding: patient states understanding of the procedure being performed  Patient consent: the patient's understanding of the procedure matches consent given  Procedure consent: procedure consent matches procedure scheduled  Relevant documents: relevant documents present and verified  Test results: test results available and properly labeled  Site marked: the operative site was marked  Required items: required blood products, implants, devices, and special equipment available  Patient identity confirmed: verbally with patient, arm band and provided demographic data  Time out: Immediately prior to procedure a \"time out\" was called to verify the correct patient, procedure, equipment, support staff and site/side marked as required.  Preparation: Patient was prepped and draped in the usual sterile fashion.  Indications: hemodynamic monitoring  Orientation:  Right  Location: radial artery  Procedure Details:  Needle gauge: 20  Number of attempts: 1    Post-procedure:  Post-procedure: dressing applied  Waveform: good waveform  Post-procedure CNS: normal  Patient tolerance: Patient tolerated the procedure well with no immediate complications and patient tolerated the procedure well with no immediate complications          "

## 2024-01-19 NOTE — ANESTHESIA POSTPROCEDURE EVALUATION
Post-Op Assessment Note    CV Status:  Stable    Pain management: adequate       Mental Status:  Sleepy   Hydration Status:  Euvolemic and stable   PONV Controlled:  Controlled   Airway Patency:  Patent     Post Op Vitals Reviewed: Yes      Staff: CRNA               BP   135/64   Temp   96.9   Pulse  58   Resp   16   SpO2   100  FM

## 2024-01-19 NOTE — OP NOTE
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  Able to identify right lower lobe mass.  Performed a diagnostic and therapeutic wedge resection.  Explored chest and performed to mediastinal lymph node  dissection    Complications:   None    Procedure and Technique:  After obtaining informed consent from the patient, they were transferred to the operating room and placed supine on the operating table. Bilateral SCDs were placed and turned on prior to induction of general anesthesia. General anesthesia was then induced and a double-lumen endotracheal tube was placed without incident.  Positioning of the double-lumen tube was verified with a pediatric bronchoscope.  The patient was then positioned in a left lateral decubitus position with the table fully flexed.  The left arm was secured to an armboard and the right arm was positioned safely in a arm benites.  Rolled blankets were used for support anterior and posterior and pillows were placed between her legs. All bony prominences were padded and checked.  Positioning of double-lumen endotracheal tube was verified at this time.     Next a formal time-out was called verifying patient , date of birth, procedure, consent, antibiotics, beta-blocker as indicated, anticipated specimens with handling, SCD use and other special considerations.      A bronchoscopy was performed through the double-lumen endotracheal tube.  There were no endobronchial masses or obstructions identified.  All secretions were suctioned out and positioning of the double lumen tube was again verified. There was no suspicion or identified risk for TB or other air board infectious disease.  This bronchoscopy was being performed for diagnostic purposes only.     The right chest was then prepped and draped in the standard sterile fashion. Anesthesia clamped and placed suction to the right lung at this time to help desufflate.  Bony landmarks were identified and planned incisions were mapped out.  We decided to use all previous incisions.  An 8 mm incision was made in the posterior axillary line 7th interspace for the camera port. The camera was inserted verifying that we are in the thoracic cavity and  insufflation was initiated to 8 mm of mercury.  A thorough thoracoscopy was carried out at this time.  There were no significant adhesions. Next an intercostal nerve block was performed using 60 mL of a mixture 1/2 percent Marcaine, normal saline and liposomal bupivacaine (Exparel).  Rib spaces 3 through 10 were identified and using a percutaneous approach a block was placed into each of these ribs spaces with approximately 5mL of this mixture.  The remainder of the robotic ports were placed at this time.  An 8 mm incision was made as far posterior as possible in the 8th interspace in the posterior port was placed under direct visualization. An 12 mm incision was made snf in between the camera and arm 1 port and this port was placed under direct visualization. Anteriorly a 12 mm incision was made as anterior and inferior as possible along the costal margin with the insertion point just above the diaphragm.  The robotic stapling port was placed through this under direct visualization. Finally a 15 mm incision was made snf between the camera and arm 4, as inferior as possible staying above the diaphragm.  A 15 mm assistant port was placed through here.   The robot was docked at this time.  A caudier forceps was inserted into arm 2, [place] bipolar grasper into arm 4 and tip up fenestrated grasper into arm 1.  I un-scrubbed at this time and went to the console.    We began by taking down on some mild adhesions to the chest wall.  We fully mobilized the lung from here.  We then examined the right lower lobe.  We could see our target lesion.  Using the sure form 45 mm stapler I performed a diagnostic and therapeutic wedge resection.  I made sure my margin was at least 1.5 cm with a 9 mm mass.  This was placed in a medium thoracoscopic anchor bag and removed through the assistant port.  We could clearly palpate our nodule of concern.  Our margin was at least 2 cm.  This was sent for permanent pathology    Next I  did a mediastinal lymph node dissection.  The inferior pulmonary was already taken.  I examined this area and there is no appreciable level 9 lymph node.  We explored the level he area along the esophagus and there was no significant lymph nodes.  We dissected out below the юлия and there were 2 separate level 7 lymph nodes.  These were removed separately and sent for permanent pathology.  There is no posterior hilar or anterior hilar lymph nodes.  At the apex the right middle lobe was densely fused to the azygos vein and SVC.  There were no appreciable lymph nodes on CT or PET scan.  Based on these dense adhesions I did not think it was necessary to cause further lung trauma and did not dissect out this area.    The right chest was thoroughly explored and aspirated dry.  There was no appreciable bleeding seen.  All robotic ports were removed under direct visualization and inspected for bleeding. Once satisfied with hemostasis, a 28 Italian straight chest tube was placed through the camera port and the left upper lobe was then insufflated by anesthesia. This came up without incident and the camera was removed at this time.     The 28 Italian chest tube was secured to the chest wall with an 0 Prolene.  An 0 Prolene U-stitch was also placed for help closing this incision following chest tube removal. The assistant port and specimen removal site was closed with running 0 Vicryl in the deep layers, 3-0 Vicryl in the soft tissue and 4 0 Monocryl for skin. The remainder of the ports were closed with interrupted 3-0 Vicryl and 4 Monocryl. Surgical glue was applied to all incisions.  The chest tube was placed to a Crane Pleural drainage device to water seal.  The initial airleak output was 0 mL per minute.      The patient extubated in the operating room and was [facility]  There were transferred to the PACU in stable condition.     KRISTY Merritt was scrubbed as a bedside assistant for the  entirety of the procedure as no other qualified assistant or general surgery resident was available. She was critical to the performance of this operation as she was the bedside robotic assistant. In doing so, she aided with retraction, specimen removal, irrigation, suctioning and instrument exchange.      I was present for the entire procedure., A qualified resident physician was not available., and A physician assistant was required during the procedure for retraction, tissue handling, dissection and suturing.    Patient Disposition:  PACU         SIGNATURE: Chris Cheng MD  DATE: January 19, 2024  TIME: 10:24 AM

## 2024-01-19 NOTE — QUICK NOTE
Surgery Post-Op Check  Destinee Anders 83 y.o. female MRN: [id number]  Unit/Bed#: [facility] 403-01 Encounter: [id number]     S:   Patient seen and examined bedside.  Resting comfortably.  No post-op events.  Tolerating diet without nausea or emesis.  Pain well controlled.    VSS on 1L NC sating 98%  CT -8 (10-20cc AL) 130cc total output serosang        O:   Vitals:    01/19/24 1600   BP: 139/74   Pulse: 72   Resp: 16   Temp:    SpO2:      I/O         01/17 0701  01/18 0700 01/18 0701  01/19 0700 01/19 0701  01/20 0700    I.V. (mL/kg)   1200 (16.9)    IV Piggyback   50    Total Intake(mL/kg)   1250 (17.7)    Urine (mL/kg/hr)   78 (0.1)    Blood   25    Chest Tube   85    Total Output   188    Net   +1062                 PE:  Gen:  No acute distress  CV:  Warm, well-perfused  Lung:  Normal work of breathing, no respiratory distress on 1L NC, CT to -8 thopaz unit with serosang output  Ext:  Moving all extremities  Neuro:  Alert and oriented, M/S grossly intact     Lab Results   Component Value Date    WBC 5.41 01/19/2024    HGB 10.4 (L) 01/19/2024    HCT 31.9 (L) 01/19/2024    MCV 98 01/19/2024     01/19/2024     Lab Results   Component Value Date    GLUCOSE 113 07/06/2016    CALCIUM 8.6 01/19/2024     08/11/2015    K 4.2 01/19/2024    CO2 25 01/19/2024     01/19/2024    BUN 20 01/19/2024    CREATININE 1.00 01/19/2024         A/P: 83 y.o. female Day of Surgery s/p Procedure(s) (LRB):  Robotic right lower lobe wedge, lymph node dissection (Right)  BRONCHOSCOPY FLEXIBLE (N/A)    Plan:  Regular diet as tolerated  Maintain CT -8  Monitor output and character  Wean O2  DVT ppx  Out of bed, encourage ambulation  Encourage incentive spirometer use  Strict I's and O's  Pain and nausea control p.r.n.  Please tiger text on call thoracic surgery resident for questions or concerns      Kisha Cama, MD  PGY3, General Surgery

## 2024-01-19 NOTE — CONSULTS
Consultation - Nephrology   Destinee Anders 83 y.o. female MRN: [id number]  Unit/Bed#: OR POOL Encounter: [id number]    ASSESSMENT and PLAN:  #1 chronic kidney disease stage IIIa, after reviewing previous labs noted creatinine fluctuating between 0.9-1.1 back since 2016  Patient is not taking any ACE, no ARB's, no diuretics.  Continue with IV fluids as per protocol.  Avoid relative hypotension  Follow daily labs and avoid nephrotoxic.    2.  Right lower lobe lung mass history of adenocarcinoma of the upper lobe of the right lung, status post robotic right lower lobe diagnostic and therapeutic wedge resection, mediastinal lymph node dissection, bronchoscopy.  Postoperative management as per thoracic surgery.    #3 hypertension, blood pressure is stable, avoid relative hypotension, keep systolic blood pressure over 120s        SUMMARY OF RECOMMENDATIONS:  Continue with IV fluids postoperatively as per protocol.  Avoid relative hypotension keep systolic blood pressure over 120s.  Noted patient was not on any ACE, ARB's, diuretics as an outpatient.  Follow daily labs      HISTORY OF PRESENT ILLNESS:  Requesting Physician: Chrsi Cheng MD  Reason for Consult: CKD, consult as part of the acute kidney injury prevention protocol after thoracic surgery    Destinee Anders is a 83 y.o. female who was admitted to [facility] after presenting with right lower lobe lung mass. A renal consultation is requested today for assistance in the management of CKD as part of the acute kidney injury prevention protocol.  Patient with history of hypertension, atrial fibrillation, history of adenocarcinoma of the upper lobe of the right lung, who presented with a right lower lobe mass, currently status post right lower lobe diagnostic and therapeutic wedge resection mediastinal lymph node dissection bronchoscopy.  She was seen immediately postop in the evie-op area, somnolent but arousable, complaining of some expected  "postoperative pain, denies any nausea, no vomiting, no chest pain, no abdominal pain    PAST MEDICAL HISTORY:  Past Medical History:   Diagnosis Date    Anesthesia     \"cheap date\"    Arthritis     Atrial fibrillation (HCC)     Benign polyp of large intestine     Cancer of appendix (HCC) 1977    Colon cancer (HCC) 1977    38 yo    Colon polyp     Full dentures     full upper/ partial lower    GERD (gastroesophageal reflux disease)     Glaucoma suspect     History of neck problems     \"C3-C7 and had epidural injections years ago\"    Hyperlipidemia     Hypertension     Lung cancer (HCC)     jan 2020- had surgery    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HCC)     Prediabetes     Primary adenocarcinoma of upper lobe of right lung (HCC) 12/02/2019    Pulmonary nodules 02/19/2015    Refusal of blood transfusions as patient is [employer]     Tic disorder, transient, recurrent     not recent    Walks frequently        PAST SURGICAL HISTORY:  Past Surgical History:   Procedure Laterality Date    APPENDECTOMY      ARTHROSCOPY KNEE      BREAST BIOPSY Right 04/10/2017    US guided - intraductal papillomas    BREAST BIOPSY Right 10/24/2019    ADH- dcis    BREAST LUMPECTOMY W/ NEEDLE LOCALIZATION Right 05/22/2020    Procedure: EXCISIONAL BIOPSY;  1000 NEEDLE LOC;  Surgeon: Hortencia Pennington MD;  Location: [facility];  Service: Surgical Oncology    CARDIOVASCULAR STRESS TEST      CARPAL TUNNEL RELEASE Left 07/01/2022    CATARACT EXTRACTION Bilateral     COLON SURGERY      COLONOSCOPY      done 2010 due 2015     DILATION AND CURETTAGE OF UTERUS      ESOPHAGOGASTRODUODENOSCOPY      inflammation aonly    HEMICOLECTOMY Right 1977    HYSTERECTOMY      JOINT REPLACEMENT      L knee    LUNG SURGERY      MAMMO NEEDLE LOCALIZATION RIGHT (ALL INC) Right 05/22/2020    MAMMO NEEDLE LOCALIZATION RIGHT (ALL INC) EACH ADD Right 05/22/2020    MAMMO STEREOTACTIC BREAST BIOPSY RIGHT (ALL INC) Right 10/24/2019    PARATHYROID GLAND SURGERY      exploration "     WI BRNCHSC INCL FLUOR GDNCE DX W/CELL WASHG SPX N/A 2020    Procedure: FLEXIBLE BRONCHOSCOPY;  Surgeon: Chris Cheng MD;  Location: [facility];  Service: Thoracic    WI THORACOSCOPY W/LOBECTOMY SINGLE LOBE Right 2020    Procedure: ROBOTIC ASSISTED COMPLETE LUNG LOBECTOMY;  Surgeon: Chris Cheng MD;  Location: [facility];  Service: Thoracic    WI THORACOSCOPY W/THERA WEDGE RESEXN INITIAL UNILAT Right 2020    Procedure: ROBOTIC ASSISTED UPPER-LOBE WEDGE RESECTION, MEDIASTINAL LYMPH NODE DISECTION;  Surgeon: Chris Cheng MD;  Location: [facility];  Service: Thoracic    REPLACEMENT TOTAL KNEE      THYROID SURGERY      TOTAL ABDOMINAL HYSTERECTOMY  1983    TOTAL THYROIDECTOMY      TRIGGER FINGER RELEASE Left 2022    US GUIDED BREAST BIOPSY RIGHT COMPLETE Right 04/10/2017    US GUIDED THYROID BIOPSY  2020       SOCIAL HISTORY:  Social History     Substance and Sexual Activity   Alcohol Use Yes    Alcohol/week: 5.0 standard drinks of alcohol    Types: 5 Cans of beer per week    Comment: occasional beer with food     Social History     Substance and Sexual Activity   Drug Use No     Social History     Tobacco Use   Smoking Status Former    Current packs/day: 0.00    Average packs/day: 1.5 packs/day for 20.0 years (30.0 ttl pk-yrs)    Types: Cigarettes    Start date:     Quit date:     Years since quittin.0    Passive exposure: Past   Smokeless Tobacco Never       FAMILY HISTORY:  Family History   Problem Relation Age of Onset    Esophageal cancer Mother         80s    Hypertension Mother     Stroke Family         TIA    Breast cancer Paternal Aunt 95    No Known Problems Father     COPD Sister     COPD Sister     No Known Problems Maternal Grandmother     No Known Problems Maternal Grandfather     No Known Problems Paternal Grandmother     No Known Problems Paternal Grandfather     No Known Problems Maternal Aunt     No Known Problems Maternal Aunt     No  Known Problems Maternal Aunt     No Known Problems Maternal Aunt     No Known Problems Maternal Aunt        ALLERGIES:  Allergies   Allergen Reactions    Bee Venom      Tongue swelling    Flurbiprofen Hives    Ibuprofen Shortness Of Breath    Levaquin [Levofloxacin In D5w] Other (See Comments)     Dark spots under eyes and wrists    Tequin [Gatifloxacin] Rash       MEDICATIONS:    Current Facility-Administered Medications:     acetaminophen (TYLENOL) tablet 975 mg, 975 mg, Oral, Q6H, Gudelia Giraldo PA-C    bupivacaine liposomal (EXPAREL) 1.3 % injection 20 mL, 20 mL, Infiltration, Once, Chris Cheng MD    [START ON 1/20/2024] diltiazem (CARDIZEM CD) 24 hr capsule 120 mg, 120 mg, Oral, QPM, Gudelia Giraldo PA-C    docusate sodium (COLACE) capsule 100 mg, 100 mg, Oral, BID, Gudelia Giraldo PA-C    enoxaparin (LOVENOX) subcutaneous injection 40 mg, 40 mg, Subcutaneous, Daily, Gudelia Giraldo PA-C    fentaNYL (SUBLIMAZE) injection 25 mcg, 25 mcg, Intravenous, Q5 Min PRN, Elsa Romero CRNA    gabapentin (NEURONTIN) capsule 100 mg, 100 mg, Oral, TID, Gudelia Giraldo PA-C    heparin (porcine) subcutaneous injection 5,000 Units, 5,000 Units, Subcutaneous, On Call To OR, Chris Cheng MD    HYDROmorphone (DILAUDID) injection 0.5 mg, 0.5 mg, Intravenous, Q1H PRN, Gudelia Giraldo PA-C    lactated ringers infusion, 75 mL/hr, Intravenous, Continuous, Missael Carter DO    lactated ringers infusion, 60 mL/hr, Intravenous, Continuous, Gudelia Giraldo PA-C, Last Rate: 60 mL/hr at 01/19/24 1008, 60 mL/hr at 01/19/24 1008    [START ON 1/20/2024] levothyroxine tablet 100 mcg, 100 mcg, Oral, Daily, Gudelia Giraldo PA-C    ondansetron (ZOFRAN) injection 4 mg, 4 mg, Intravenous, Q6H PRN, Gudelia Giraldo PA-C    ondansetron (ZOFRAN) injection 4 mg, 4 mg, Intravenous, Once PRN, Elsa Romero CRNA    oxyCODONE (ROXICODONE) IR tablet 5 mg, 5 mg, Oral, Q4H PRN, Gudelia Giraldo PA-C    polyethylene  "glycol (MIRALAX) packet 17 g, 17 g, Oral, Daily PRN, Gudelia Giraldo PA-C    senna (SENOKOT) tablet 8.6 mg, 1 tablet, Oral, Daily, Gudelia Giraldo PA-C    REVIEW OF SYSTEMS:  All the systems were reviewed and were negative except as documented on the HPI.    PHYSICAL EXAM:  Current Weight: Weight - Scale: 70.8 kg (156 lb 1.4 oz)  First Weight: Weight - Scale: 70.8 kg (156 lb 1.4 oz)  Vitals:    01/19/24 1015 01/19/24 1030 01/19/24 1045 01/19/24 1100   BP:    136/65   Pulse: (!) 52 (!) 52 (!) 46 (!) 48   Resp: 17 18 18 16   Temp:       TempSrc:       SpO2: 100% 100% 100% 100%   Weight:       Height:           Intake/Output Summary (Last 24 hours) at 1/19/2024 1133  Last data filed at 1/19/2024 0954  Gross per 24 hour   Intake 1250 ml   Output 25 ml   Net 1225 ml     General: Somnolent but arousable, cooperative, in not acute distress  Eyes: conjunctivae pink, anicteric sclerae  ENT: lips and mucous membranes moist, oxygen via nasal cannula  Neck: supple, no JVD  Chest: clear breath sounds bilateral, no crackles, ronchus or wheezings, chest tube in place  CVS: distinct S1 & S2, normal rate, regular rhythm  Abdomen: soft, non-tender, non-distended, normoactive bowel sounds  Extremities: no edema of both legs  Skin: no rash  Neuro: awake, alert, oriented      Invasive Devices:        Lab Results:   Results from last 7 days   Lab Units 01/19/24  0958   WBC Thousand/uL 5.41   HEMOGLOBIN g/dL 10.4*   HEMATOCRIT % 31.9*   PLATELETS Thousands/uL 181             Portions of the record may have been created with voice recognition software. Occasional wrong word or \"sound a like\" substitutions may have occurred due to the inherent limitations of voice recognition software. Read the chart carefully and recognize, using context, where substitutions have occurred.If you have any questions, please contact the dictating provider.  "

## 2024-01-20 ENCOUNTER — HOME HEALTH ADMISSION (OUTPATIENT)
Dept: HOME HEALTH SERVICES | Facility: HOME HEALTHCARE | Age: 84
End: 2024-01-20
Payer: COMMERCIAL

## 2024-01-20 ENCOUNTER — APPOINTMENT (INPATIENT)
Dept: RADIOLOGY | Facility: HOSPITAL | Age: 84
DRG: 164 | End: 2024-01-20
Payer: COMMERCIAL

## 2024-01-20 VITALS
HEIGHT: 65 IN | RESPIRATION RATE: 16 BRPM | WEIGHT: 165.79 LBS | SYSTOLIC BLOOD PRESSURE: 152 MMHG | BODY MASS INDEX: 27.62 KG/M2 | DIASTOLIC BLOOD PRESSURE: 76 MMHG | HEART RATE: 78 BPM | TEMPERATURE: 98.3 F | OXYGEN SATURATION: 93 %

## 2024-01-20 LAB
ANION GAP SERPL CALCULATED.3IONS-SCNC: 6 MMOL/L
BUN SERPL-MCNC: 15 MG/DL (ref 5–25)
CALCIUM SERPL-MCNC: 8.6 MG/DL (ref 8.4–10.2)
CHLORIDE SERPL-SCNC: 103 MMOL/L (ref 96–108)
CO2 SERPL-SCNC: 27 MMOL/L (ref 21–32)
CREAT SERPL-MCNC: 1.02 MG/DL (ref 0.6–1.3)
ERYTHROCYTE [DISTWIDTH] IN BLOOD BY AUTOMATED COUNT: 14.3 % (ref 11.6–15.1)
GFR SERPL CREATININE-BSD FRML MDRD: 50 ML/MIN/1.73SQ M
GLUCOSE SERPL-MCNC: 133 MG/DL (ref 65–140)
HCT VFR BLD AUTO: 34 % (ref 34.8–46.1)
HGB BLD-MCNC: 11 G/DL (ref 11.5–15.4)
MAGNESIUM SERPL-MCNC: 2.1 MG/DL (ref 1.9–2.7)
MCH RBC QN AUTO: 31.6 PG (ref 26.8–34.3)
MCHC RBC AUTO-ENTMCNC: 32.4 G/DL (ref 31.4–37.4)
MCV RBC AUTO: 98 FL (ref 82–98)
PLATELET # BLD AUTO: 204 THOUSANDS/UL (ref 149–390)
PMV BLD AUTO: 9.4 FL (ref 8.9–12.7)
POTASSIUM SERPL-SCNC: 4 MMOL/L (ref 3.5–5.3)
RBC # BLD AUTO: 3.48 MILLION/UL (ref 3.81–5.12)
SODIUM SERPL-SCNC: 136 MMOL/L (ref 135–147)
WBC # BLD AUTO: 7.75 THOUSAND/UL (ref 4.31–10.16)

## 2024-01-20 PROCEDURE — 83735 ASSAY OF MAGNESIUM: CPT | Performed by: STUDENT IN AN ORGANIZED HEALTH CARE EDUCATION/TRAINING PROGRAM

## 2024-01-20 PROCEDURE — 85027 COMPLETE CBC AUTOMATED: CPT | Performed by: PHYSICIAN ASSISTANT

## 2024-01-20 PROCEDURE — NC001 PR NO CHARGE: Performed by: THORACIC SURGERY (CARDIOTHORACIC VASCULAR SURGERY)

## 2024-01-20 PROCEDURE — 80048 BASIC METABOLIC PNL TOTAL CA: CPT | Performed by: PHYSICIAN ASSISTANT

## 2024-01-20 PROCEDURE — 99024 POSTOP FOLLOW-UP VISIT: CPT | Performed by: THORACIC SURGERY (CARDIOTHORACIC VASCULAR SURGERY)

## 2024-01-20 PROCEDURE — 97167 OT EVAL HIGH COMPLEX 60 MIN: CPT

## 2024-01-20 PROCEDURE — 97163 PT EVAL HIGH COMPLEX 45 MIN: CPT

## 2024-01-20 PROCEDURE — 99232 SBSQ HOSP IP/OBS MODERATE 35: CPT | Performed by: INTERNAL MEDICINE

## 2024-01-20 PROCEDURE — 71046 X-RAY EXAM CHEST 2 VIEWS: CPT

## 2024-01-20 RX ADMIN — GABAPENTIN 100 MG: 100 CAPSULE ORAL at 10:42

## 2024-01-20 RX ADMIN — ACETAMINOPHEN 975 MG: 325 TABLET, FILM COATED ORAL at 03:16

## 2024-01-20 RX ADMIN — LEVOTHYROXINE SODIUM 100 MCG: 100 TABLET ORAL at 05:11

## 2024-01-20 RX ADMIN — ENOXAPARIN SODIUM 40 MG: 40 INJECTION SUBCUTANEOUS at 10:42

## 2024-01-20 RX ADMIN — OXYCODONE HYDROCHLORIDE 5 MG: 5 TABLET ORAL at 04:28

## 2024-01-20 RX ADMIN — ONDANSETRON 4 MG: 2 INJECTION INTRAMUSCULAR; INTRAVENOUS at 04:27

## 2024-01-20 RX ADMIN — SENNOSIDES 8.6 MG: 8.6 TABLET, FILM COATED ORAL at 10:42

## 2024-01-20 RX ADMIN — DOCUSATE SODIUM 100 MG: 100 CAPSULE, LIQUID FILLED ORAL at 10:42

## 2024-01-20 RX ADMIN — ACETAMINOPHEN 975 MG: 325 TABLET, FILM COATED ORAL at 10:42

## 2024-01-20 NOTE — DISCHARGE INSTR - AVS FIRST PAGE
Gently wash your incisions daily with soap and water, do not soak in a tub. Do not apply any lotions, creams, or ointments to incisions. No lifting over 10 lbs or strenuous exercise. No driving until seen at your post operative visit. The blue stitch will be removed at your post operative visit. Please obtain a pa/lat chest xray at a [facility] within 3 days of your follow up visit.      Please call the office first if you have any questions or concerns during your post op period, prior to going to the emergency room or urgent care.

## 2024-01-20 NOTE — PROGRESS NOTES
NEPHROLOGY PROGRESS NOTE   Destinee Anders 83 y.o. female MRN: [id number]  Unit/Bed#: [facility] 403-01 Encounter: [id number]      ASSESSMENT & PLAN:  #1 chronic kidney disease stage IIIa baseline creatinine 0.9-1.1 back in 2016.  Status post IV fluids as per protocol after surgery.  Kidney function remains stable with a creatinine of 1.02 today.  Noted patient was not on any ACE, no ARB's, no diuretics as an outpatient.  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    4.  Mild anemia, hemoglobin is stable at 11.0    SUBJECTIVE:  Seen and examined, feeling okay other than some back pain and nausea, no significant chest pain, no shortness of breath, wondering when she can go home    OBJECTIVE:  Current Weight: Weight - Scale: 75.2 kg (165 lb 12.6 oz)  Vitals:    01/20/24 0744   BP: 143/71   Pulse:    Resp:    Temp:    SpO2:        Intake/Output Summary (Last 24 hours) at 1/20/2024 0816  Last data filed at 1/20/2024 0501  Gross per 24 hour   Intake 2029.17 ml   Output 1308 ml   Net 721.17 ml       General: conscious, cooperative, in not acute distress  Eyes: conjunctivae pink, anicteric sclerae  ENT: lips and mucous membranes moist  Neck: supple, no JVD  Chest: clear breath sounds bilateral, no crackles, ronchus or wheezings, chest tube in place  CVS: distinct S1 & S2, normal rate, regular rhythm  Abdomen: soft, non-tender, non-distended, normoactive bowel sounds  Extremities: no edema of both legs  Skin: no rash  Neuro: awake,  "alert, oriented      Medications:    Current Facility-Administered Medications:     acetaminophen (TYLENOL) tablet 975 mg, 975 mg, Oral, Q6H, KRISTY Ramirez-ALBA, 975 mg at 01/20/24 0316    diltiazem (CARDIZEM CD) 24 hr capsule 120 mg, 120 mg, Oral, QPM, KRISTY Ramirez-C    docusate sodium (COLACE) capsule 100 mg, 100 mg, Oral, BID, KRISTY Ramirez-C, 100 mg at 01/19/24 1714    enoxaparin (LOVENOX) subcutaneous injection 40 mg, 40 mg, Subcutaneous, Daily, Gudelia Giraldo PA-C    gabapentin (NEURONTIN) capsule 100 mg, 100 mg, Oral, TID, KRISTY Ramirez-C, 100 mg at 01/19/24 2002    HYDROmorphone (DILAUDID) injection 0.5 mg, 0.5 mg, Intravenous, Q1H PRN, Gudelia Giraldo PA-C    levothyroxine tablet 100 mcg, 100 mcg, Oral, Early Morning, KRISTY Ramirez-C, 100 mcg at 01/20/24 0511    ondansetron (ZOFRAN) injection 4 mg, 4 mg, Intravenous, Q6H PRN, KRISTY Ramirez-C, 4 mg at 01/20/24 0427    oxyCODONE (ROXICODONE) IR tablet 5 mg, 5 mg, Oral, Q4H PRN, KRISTY Ramirez-ALBA, 5 mg at 01/20/24 0428    polyethylene glycol (MIRALAX) packet 17 g, 17 g, Oral, Daily PRN, KRISTY Ramirez-ALBA    senna (SENOKOT) tablet 8.6 mg, 1 tablet, Oral, Daily, Gudelia Giraldo PA-C    Invasive Devices:        Lab Results:   Results from last 7 days   Lab Units 01/20/24  0433 01/19/24  0958   WBC Thousand/uL 7.75 5.41   HEMOGLOBIN g/dL 11.0* 10.4*   HEMATOCRIT % 34.0* 31.9*   PLATELETS Thousands/uL 204 181   SODIUM mmol/L 136 138   POTASSIUM mmol/L 4.0 4.2   CHLORIDE mmol/L 103 108   CO2 mmol/L 27 25   BUN mg/dL 15 20   CREATININE mg/dL 1.02 1.00   CALCIUM mg/dL 8.6 8.6   MAGNESIUM mg/dL 2.1 1.7*             Portions of the record may have been created with voice recognition software. Occasional wrong word or \"sound a like\" substitutions may have occurred due to the inherent limitations of voice recognition software. Read the chart carefully and recognize, using context, where substitutions have " occurred.If you have any questions, please contact the dictating provider.

## 2024-01-20 NOTE — RESTORATIVE TECHNICIAN NOTE
Restorative Technician Note      Patient Name: Destinee Anders     [facility] Visit Date: 01/20/24  Note Type: Mobility  Patient Position Upon Consult: Bedside chair  Activity Performed: Ambulated  Assistive Device: Roller walker  Patient Position at End of Consult: All needs within reach; Bedside chair

## 2024-01-20 NOTE — PHYSICAL THERAPY NOTE
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    Dense breast tissue    Hashimoto's thyroiditis    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)    Thyroid nodule    Encounter for follow-up surveillance of breast cancer    Stage 3a chronic kidney disease (HCC)    Refusal of blood transfusions as patient is [place]    Gastroesophageal reflux disease       Past Medical History  Past Medical History:   Diagnosis Date    Anesthesia     \"cheap date\"    Arthritis     Atrial fibrillation (HCC)     Benign polyp of large intestine     Cancer of appendix (HCC) 1977    Colon cancer (HCC) 1977    38 yo    Colon polyp     Full dentures     full upper/ partial lower    GERD (gastroesophageal reflux disease)     Glaucoma suspect     History of neck problems     \"C3-C7 and had epidural injections years ago\"    Hyperlipidemia     Hypertension     Lung cancer (HCC)     jan 2020- had surgery    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HCC)     Prediabetes     Primary adenocarcinoma of upper lobe of right lung (HCC) 12/02/2019    Pulmonary nodules 02/19/2015    Refusal of blood transfusions as patient is [place]     Tic disorder, transient, recurrent     not recent    Walks frequently        Past Surgical History  Past Surgical History:   Procedure Laterality Date    APPENDECTOMY      ARTHROSCOPY KNEE      BREAST BIOPSY Right 04/10/2017    US guided - intraductal papillomas    " BREAST BIOPSY Right 10/24/2019    ADH- dcis    BREAST LUMPECTOMY W/ NEEDLE LOCALIZATION Right 05/22/2020    Procedure: EXCISIONAL BIOPSY;  1000 NEEDLE LOC;  Surgeon: Hortencia Pennington MD;  Location: [facility];  Service: Surgical Oncology    CARDIOVASCULAR STRESS TEST      CARPAL TUNNEL RELEASE Left 07/01/2022    CATARACT EXTRACTION Bilateral     COLON SURGERY      COLONOSCOPY      done 2010 due 2015     DILATION AND CURETTAGE OF UTERUS      ESOPHAGOGASTRODUODENOSCOPY      inflammation aonly    HEMICOLECTOMY Right 1977    HYSTERECTOMY      JOINT REPLACEMENT      L knee    LUNG SURGERY      MAMMO NEEDLE LOCALIZATION RIGHT (ALL INC) Right 05/22/2020    MAMMO NEEDLE LOCALIZATION RIGHT (ALL INC) EACH ADD Right 05/22/2020    MAMMO STEREOTACTIC BREAST BIOPSY RIGHT (ALL INC) Right 10/24/2019    PARATHYROID GLAND SURGERY      exploration     [facility] W/CELL WASHG SPX N/A 01/17/2020    Procedure: FLEXIBLE BRONCHOSCOPY;  Surgeon: Chris Cheng MD;  [facility];  Service: Thoracic    CO THORACOSCOPY W/LOBECTOMY SINGLE LOBE Right 01/17/2020    Procedure: ROBOTIC ASSISTED COMPLETE LUNG LOBECTOMY;  Surgeon: Chris Cheng MD;  [facility];  Service: Thoracic    CO THORACOSCOPY W/THERA WEDGE RESEXN INITIAL UNILAT Right 01/17/2020    Procedure: ROBOTIC ASSISTED UPPER-LOBE WEDGE RESECTION, MEDIASTINAL LYMPH NODE DISECTION;  Surgeon: Chris Cheng MD;  [facility];  Service: Thoracic    REPLACEMENT TOTAL KNEE      THYROID SURGERY      TOTAL ABDOMINAL HYSTERECTOMY  1983    TOTAL THYROIDECTOMY      TRIGGER FINGER RELEASE Left 07/01/2022    US GUIDED BREAST BIOPSY RIGHT COMPLETE Right 04/10/2017    US GUIDED THYROID BIOPSY  02/14/2020 01/20/24 0934   PT Last Visit   PT Visit Date 01/20/24   Note Type   Note type Evaluation   Pain Assessment   Pain Assessment Tool 0-10   Pain Score 8   Pain Location/Orientation Orientation: Right;Location: Rib Cage  (CT site)    Pain Onset/Description Onset: Ongoing;Frequency: Constant/Continuous;Descriptor: Discomfort   Effect of Pain on Daily Activities limits comfort and mobility   Patient's Stated Pain Goal No pain   Hospital Pain Intervention(s) Repositioned;Ambulation/increased activity;Emotional support   Restrictions/Precautions   Weight Bearing Precautions Per Order No   Other Precautions Fall Risk;Pain;Telemetry;Multiple lines  (x1 CT)   Home Living   Type of Home House   Home Layout Two level;Access;Stairs to enter with rails;Able to live on main level with bedroom/bathroom  (3STE - FFSU available, FFOS to second floor)   Bathroom Shower/Tub Walk-in shower   Bathroom Toilet Standard   Bathroom Equipment   (denies)   Bathroom Accessibility Accessible   Home Equipment   (denies)   Prior Function   Level of [place] Independent with ADLs;Independent with functional mobility;Independent with IADLS   Lives With Alone   Receives Help From Friend(s)   IADLs Independent with driving;Independent with meal prep;Independent with medication management   Falls in the last 6 months 0   Vocational Retired   General   Family/Caregiver Present No   Cognition   Overall Cognitive Status WFL   Arousal/Participation Alert   Orientation Level Oriented X4   Memory Within functional limits   Following Commands Follows one step commands without difficulty   Comments Patient pleasant and cooperative   Subjective   Subjective Patient agreeable to PT eval   RUE Assessment   RUE Assessment WFL   LUE Assessment   LUE Assessment WFL   RLE Assessment   RLE Assessment X   Strength RLE   RLE Overall Strength 4-/5   LLE Assessment   LLE Assessment X   Strength LLE   LLE Overall Strength 4-/5   Bed Mobility   Additional Comments OOB in chair on arrival   Transfers   Sit to Stand 5  Supervision   Additional items Increased time required;Verbal cues   Stand to Sit 5  Supervision   Additional items Increased time required;Verbal cues   Additional Comments RW    Ambulation/Elevation   Gait pattern Short stride;Excessively slow;Forward Flexion   Gait Assistance 4  Minimal assist   Additional items Assist x 1;Verbal cues   Assistive Device Rolling walker   Distance 100'x2   Stair Management Assistance Not tested  (declines)   Balance   Static Sitting Fair +   Dynamic Sitting Fair   Static Standing Fair   Dynamic Standing Fair -   Ambulatory Fair -   Endurance Deficit   Endurance Deficit Yes   Endurance Deficit Description fatigue, weakness   Activity Tolerance   Activity Tolerance Patient limited by fatigue   Medical Staff Made Aware OT   Nurse Made Aware RN cleared   Assessment   Prognosis Good   Problem List Decreased strength;Decreased endurance;Impaired balance;Decreased mobility;Pain   Assessment Pt is a 83 y.o. female seen for a high complexity PT evaluation due to Ongoing medical management for primary dx, Increased reliance on more restrictive AD compared to baseline, Decreased activity tolerance compared to baseline, Fall risk, Ongoing telemetry monitoring. Patient is s/p admit to [facility] on 1/19/2024 for Right lower lobe lung mass (R91.8)  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) (C34.11). Patient  has a past medical history of Anesthesia, Arthritis, Atrial fibrillation (HCC), Benign polyp of large intestine, Cancer of appendix (HCC), Colon cancer (HCC), Colon polyp, Full dentures, GERD (gastroesophageal reflux disease), Glaucoma suspect, History of neck problems, Hyperlipidemia, Hypertension, Lung cancer (HCC), 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HCC), Prediabetes, Primary adenocarcinoma of upper lobe of right lung (HCC), Pulmonary nodules, Refusal of blood transfusions as patient is [place], Tic disorder, transient, recurrent, and Walks frequently..     PT now consulted to assess functional mobility and needs for safe d/c planning. Prior to admission, pt independent with functional mobility, independent ADLs, and independent IADLs. Personal  factors affecting status include fall risk, steps to enter home, steps to negotiate within home, lives alone, and medical status     Currently pt requires supervision for functional transfers with rolling walker ; minimal assistance x1 for ambulation with rolling walker. Pt presents functioning below baseline and w/ overall mobility deficits 2* to: decreased strength, decreased endurance, decreased mobility, impaired balance. These impairments place pt at risk for falls.     Pt will continue to benefit from skilled PT interventions to address stated impairments; to maximize functional potential; for ongoing pt/family education; and DME needs. The patient's AM-PAC Basic Mobility Inpatient Short Form Raw Score Is 17. PT is currently recommending Level 3 - Minimum Resource Intensity on d/c from hospital. Will continue to follow as able.   Goals   Patient Goals to go home   STG Expiration Date 02/03/24   Short Term Goal #1 In 14 days, patient will 1) increase strength in BUE/BLE by 1/2 to 1 full grade for increased strength and stability needed for functional mobility 2) improve bed mobility to MI for improved mobility and decreased need for assist 3) sit EOB x30' with MI to facilitate trunk stability and safety for completion of ADL tasks 4) increase functional transfers to MI for improved safety and functional mobility 5) ambulate 250ft with MI using rolling walker for increased endurance and safety ambulating home and community environments 6) improve balance by 1 grade for improved safety and stability and decreased risk for falls. 7) ascend/descend at least 3 stairs using HR with MI in order to safely enter home   PT Treatment Day 0   Plan   Treatment/Interventions ADL retraining;Functional transfer training;LE strengthening/ROM;Therapeutic exercise;Endurance training;Patient/family training;Equipment eval/education;Bed mobility;Gait training;Spoke to nursing;Spoke to case management;OT;Elevations   PT Frequency  Other (Comment)  (3-6x)   Discharge Recommendation   Rehab Resource Intensity Level, PT III (Minimum Resource Intensity)   Equipment Recommended Walker   Walker Package Recommended Wheeled walker   AM-PAC Basic Mobility Inpatient   Turning in Flat Bed Without Bedrails 3   Lying on Back to Sitting on Edge of Flat Bed Without Bedrails 3   Moving Bed to Chair 3   Standing Up From Chair Using Arms 3   Walk in Room 3   Climb 3-5 Stairs With Railing 2   Basic Mobility Inpatient Raw Score 17   Basic Mobility Standardized Score 39.67   Highest Level Of Mobility   JH-HLM Goal 5: Stand one or more mins   JH-HLM Achieved 7: Walk 25 feet or more   Modified [place] Scale   Modified [place] Scale 4   End of Consult   Patient Position at End of Consult All needs within reach;Bedside chair         Rowan Cordero, PT, DPT

## 2024-01-20 NOTE — UTILIZATION REVIEW
Initial Clinical Review    Elective IP surgical procedure  Age/Sex: 83 y.o. female  Surgery Date: 1/19/2024  Procedure:   Robotic right lower lobe diagnostic and therapeutic wedge resection  Mediastinal lymph node dissection  Bronchoscopy  R T3-T10 intercostal nerve block with exparel  Anesthesia: General  Operative Findings:   Mild adhesions.  Able to identify right lower lobe mass.  Performed a diagnostic and therapeutic wedge resection.  Explored chest and performed to mediastinal lymph node dissection     POD#1 Progress Note:  Some nausea overnight no emesis, tolerating diet, some discomfort w CT, voiding, no SOB/CP, using IS up to 1250. CTA b/l, no inc WOB, RA, R CT to WS no air leak serosang output. R CT to WS, no air leak, 305cc serosang output. VSS, remains on RA.  Reg diet. R CT, to WS, monitor for air leak, monitor output and character. D/c EZEQUIEL bernardos. Tx to med surg unit. Lovenox, SCDs for DVT ppx. Encourage ambulation. Incentive spirometry.    Admission Orders: Date/Time/Statement:   Admission Orders (From admission, onward)       Ordered        01/19/24 1600      01/19/24 0949  Inpatient Admission  Once                       Orders Placed This Encounter   Procedures    Inpatient Admission     Standing Status:   Standing     Number of Occurrences:   1     Order Specific Question:   Level of Care     Answer:   Level 1 Stepdown [13]     Order Specific Question:   Bed request comments     Answer:   p4     Order Specific Question:   Estimated length of stay     Answer:   More than 2 Midnights     Order Specific Question:   Certification     Answer:   I certify that inpatient services are medically necessary for this patient for a duration of greater than two midnights. See H&P and MD Progress Notes for additional information about the patient's course of treatment.     Vital Signs:   Date/Time Temp Pulse Resp BP MAP (mmHg) Arterial Line BP MAP SpO2 Calculated FIO2 (%) - Nasal Cannula O2 Flow Rate (L/min)  Nasal Cannula O2 Flow Rate (L/min) O2 Device Patient Position - Orthostatic VS   01/20/24 11:44:34 -- 78 -- 152/76 101 -- -- 93 % -- -- -- -- --   01/20/24 11:38:15 98.3 °F (36.8 °C) -- -- 152/76 101 -- -- -- -- -- -- -- --   01/20/24 07:44:37 -- -- -- 143/71 95 -- -- -- -- -- -- -- --   01/20/24 07:13:11 98.7 °F (37.1 °C) 68 -- 146/71 96 -- -- 95 % -- -- -- -- Sitting   01/20/24 0600 -- -- -- -- -- 158/68 104 mmHg -- -- -- -- -- --   01/20/24 0500 -- 78 -- -- -- 148/62 90 mmHg 94 % -- -- -- None (Room air) --   01/20/24 0400 -- 90 -- -- -- 130/52 78 mmHg -- -- -- -- -- --   01/20/24 02:38:10 99.6 °F (37.6 °C) 86 -- 128/65 86 -- -- 95 % -- -- -- -- --   01/20/24 0000 -- 96 -- 133/76 95 158/64 104 mmHg 96 % -- -- -- None (Room air) --   01/19/24 2300 -- 88 16 117/58 78 137/48 75 mmHg 94 % -- -- -- None (Room air) Lying   01/19/24 22:50:57 99.6 °F (37.6 °C) 93 -- 117/58 78 -- -- 96 % -- -- -- None (Room air) --   01/19/24 2200 -- 86 16 107/54 72 114/44 68 mmHg 94 % -- -- -- None (Room air) --   01/19/24 2100 -- -- -- 108/49 Abnormal  69 140/46 72 mmHg -- -- -- -- -- --   01/19/24 2000 -- 92 -- 115/55 75 163/60 94 mmHg 96 % -- -- -- None (Room air) --   01/19/24 1930 -- -- -- -- -- -- -- 95 % -- -- -- None (Room air) --   01/19/24 19:20:58 99.1 °F (37.3 °C) 80 -- 111/54 73 -- -- 95 % -- -- -- -- --   01/19/24 1900 -- 79 16 113/55 74 132/48 74 mmHg 91 % -- -- -- None (Room air) --   01/19/24 1800 -- 86 -- 139/71 94 166/48 84 mmHg 98 % 28 -- 2 L/min Nasal cannula --   01/19/24 1700 -- 78 -- 143/80 101 150/58 94 mmHg -- -- -- -- -- --   01/19/24 1600 -- 72 16 139/74 96 136/46 76 mmHg -- -- -- -- -- --   01/19/24 1530 -- 68 18 141/76 98 146/50 84 mmHg -- -- -- -- -- --   01/19/24 1500 -- 62 18 144/78 100 158/66 100 mmHg -- -- -- -- -- --   01/19/24 1445 -- 64 16 145/85 105 168/72 108 mmHg -- -- -- -- -- --   01/19/24 1430 -- 68 14 -- -- 146/62 96 mmHg -- -- -- -- -- --   01/19/24 1415 -- 64 -- 145/73 97 166/62 98 mmHg --  -- -- -- -- Lying   01/19/24 1400 -- -- -- 144/73 97 -- -- -- -- -- -- -- --   01/19/24 1347 -- 65 20 -- -- -- -- 97 % -- -- -- -- --   01/19/24 1312 -- 58 21 137/68 106 142/54 90 mmHg 96 % -- -- -- -- --   01/19/24 1300 98.6 °F (37 °C) 58 16 137/68 106 142/54 90 mmHg 97 % 28 -- 2 L/min Nasal cannula --   01/19/24 1230 -- 58 12 138/70 107 140/56 90 mmHg 97 % 28 -- 2 L/min Nasal cannula --   01/19/24 1200 -- 54 Abnormal  20 135/63 83 148/58 92 mmHg 97 % 28 -- 2 L/min Nasal cannula --   01/19/24 1145 98.4 °F (36.9 °C) 52 Abnormal  26 Abnormal  131/67 91 152/54 88 mmHg 100 % 28 -- 2 L/min Nasal cannula --   01/19/24 1130 -- 48 Abnormal  19 144/66 95 154/56 90 mmHg 100 % 28 -- 2 L/min Nasal cannula --   01/19/24 1100 -- 48 Abnormal  16 136/65 96 152/56 90 mmHg 100 % 28 -- 2 L/min Nasal cannula --   01/19/24 1045 -- 46 Abnormal  18 -- -- 152/58 92 mmHg 100 % 28 -- 2 L/min Nasal cannula --   01/19/24 1030 -- 52 Abnormal  18 -- -- 158/62 98 mmHg 100 % 28 -- 2 L/min Nasal cannula --   01/19/24 1015 -- 52 Abnormal  17 -- -- 146/60 92 mmHg 100 % 28 -- 2 L/min Nasal cannula --   01/19/24 1000 -- 52 Abnormal  20 135/64 91 146/56 90 mmHg 100 % 28 -- 2 L/min Nasal cannula --   01/19/24 0955 96.9 °F (36.1 °C) Abnormal  56 16 135/64 91 152/96 110 mmHg 100 % -- 6 L/min -- Simple mask --   01/19/24 0542 98.1 °F (36.7 °C) 74 16 151/65 -- -- -- 97 % -- -- -- None (Room air) --       Pertinent Labs/Diagnostic Test Results:   XR chest portable   Final Result by Adan Ashley MD (01/19 1102)      Technically limited study. No definite pneumothorax. Follow-up radiographic imaging recommended.         XR chest pa & lateral    (Results Pending)         Results from last 7 days   Lab Units 01/20/24  0433 01/19/24  0958   WBC Thousand/uL 7.75 5.41   HEMOGLOBIN g/dL 11.0* 10.4*   HEMATOCRIT % 34.0* 31.9*   PLATELETS Thousands/uL 204 181     Results from last 7 days   Lab Units 01/20/24  0433 01/19/24  0958   SODIUM mmol/L 136 138    POTASSIUM mmol/L 4.0 4.2   CHLORIDE mmol/L 103 108   CO2 mmol/L 27 25   ANION GAP mmol/L 6 5   BUN mg/dL 15 20   CREATININE mg/dL 1.02 1.00   EGFR ml/min/1.73sq m 50 51   CALCIUM mg/dL 8.6 8.6   MAGNESIUM mg/dL 2.1 1.7*     Results from last 7 days   Lab Units 01/20/24  0433 01/19/24  0958   GLUCOSE RANDOM mg/dL 133 113         Scheduled Medications:  acetaminophen, 975 mg, Oral, Q6H  diltiazem, 120 mg, Oral, QPM  docusate sodium, 100 mg, Oral, BID  enoxaparin, 40 mg, Subcutaneous, Daily  gabapentin, 100 mg, Oral, TID  levothyroxine, 100 mcg, Oral, Early Morning  senna, 1 tablet, Oral, Daily    Continuous IV Infusions:  Medications   lactated ringers infusion  Rate: 50 mL/hr Dose: 50 mL/hr  Freq: Continuous Route: IV  Indications of Use: IV Hydration  Last Dose: Stopped (01/20/24 0750)  Start: 01/19/24 1600 End: 01/20/24 0655           PRN Meds:  HYDROmorphone, 0.5 mg, Intravenous, Q1H PRN  ondansetron, 4 mg, Intravenous, Q6H PRN 1/20 x1  oxyCODONE, 5 mg, Oral, Q4H PRN 1/19 x1, 1/20 x1  polyethylene glycol, 17 g, Oral, Daily PRN        Network Utilization Review Department  ATTENTION: Please call with any questions or concerns to [phone] and carefully listen to the prompts so that you are directed to the right person. All voicemails are confidential.   For Discharge needs, contact Care Management DC Support Team at [phone] opt. [phone]  Send all requests for admission clinical reviews, approved or denied determinations and any other requests to dedicated fax number below belonging to the campus where the patient is receiving treatment. List of dedicated fax numbers for the Facilities:  FACILITY NAME UR FAX NUMBER   ADMISSION DENIALS (Administrative/Medical Necessity) [phone]   DISCHARGE SUPPORT TEAM (NETWORK) [phone]   PARENT CHILD HEALTH (Maternity/NICU/Pediatrics) [phone]   [facility] [phone]   [facility] [phone]   [facility]  [facility] [phone]   [facility] [phone]   [facility] [phone]   [facility] [phone]   [facility] [phone]   [facility] [phone]   [facility] [phone]   [facility] [phone]   [facility] [phone]

## 2024-01-20 NOTE — PLAN OF CARE
Problem: OCCUPATIONAL THERAPY ADULT  Goal: Performs self-care activities at highest level of function for planned discharge setting.  See evaluation for individualized goals.  Description: Treatment Interventions: ADL retraining, Functional transfer training, UE strengthening/ROM, Endurance training, Cognitive reorientation, Patient/family training, Equipment evaluation/education, Compensatory technique education, Fine motor coordination activities, Activityengagement, Energy conservation          See flowsheet documentation for full assessment, interventions and recommendations.   Note: Limitation: Decreased ADL status, Decreased UE strength, Decreased Safe judgement during ADL, Decreased high-level ADLs, Decreased endurance  Prognosis: Fair  Assessment: Pt is a 82 yo female seen for OT eval s/p adm to [facility] on 1/19/24 w/ adenocarcinoma of upper lobe of right lung now s/p RLL diagnostic and therapeutic wedge resection, mediastinal LN dissection on 1/19 . Pt  has a past medical history of Anesthesia, Arthritis, Atrial fibrillation (HCC), Benign polyp of large intestine, Cancer of appendix (HCC) (1977), Colon cancer (HCC) (1977), Colon polyp, Full dentures, GERD (gastroesophageal reflux disease), Glaucoma suspect, History of neck problems, Hyperlipidemia, Hypertension, Lung cancer (HCC), 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HCC), Prediabetes, Primary adenocarcinoma of upper lobe of right lung (HCC) (12/02/2019), Pulmonary nodules (02/19/2015), Refusal of blood transfusions as patient is Jainism, Tic disorder, transient, recurrent, and Walks frequently. Pt with active OT orders and activity as tolerated orders. Pt is retired and resides alone in 2SH w/ 3STE. Pt has supportive friends that can A as needed upon D/C. Pt was I w/  ADLS and IADLS, drove, & required no use of DME PTA. Pt is currently demonstrating the following occupational deficits: Min A UB self care, Mod A LB self care and toileting, S transfers, Min A mobility  w/ RW. These deficits that are impacting pt's baseline areas of occupation are a result of the following impairments: pain, endurance, activity tolerance, functional mobility, forward functional reach, balance, functional standing tolerance, unsupportive home environment, decreased I w/ ADLS/IADLS, strength, and decreased safety awareness. The following Occupational Performance Areas to address include: grooming, bathing/shower, toilet hygiene, dressing, health maintenance, functional mobility, and clothing management.  Based on the aforementioned OT evaluation, functional performance deficits, and assessments, pt has been identified as a high complexity evaluation. Recommend  level III Minimum Resource Intensity  upon D/C. The patient's raw score on the AM-PAC Daily Activity Inpatient Short Form is 16. A raw score of less than 19 suggests the patient may benefit from discharge to post-acute rehabilitation services. Please refer to the recommendation of the Occupational Therapist for safe discharge planning.  Pt to continue to benefit from acute immediate OT services to address the following goals 3-5x/week to  w/in 10-14 days:     Rehab Resource Intensity Level, OT: III (Minimum Resource Intensity) (home OT)

## 2024-01-20 NOTE — PROGRESS NOTES
"Progress Note - Thoracic Surgery   Destinee Anders 83 y.o. female MRN: [id number]  Unit/Bed#: [facility] 403-01 Encounter: [id number]    Assessment:  83yoF w hx of a stage Ib RUL adenocarcinoma s/p RULobectomy in January 2020 now w an enlarging PET avid RLL mass suspicious for a second primary, s/p RLL diagnostic and therapeutic wedge resection, mediastinal LN dissection on 1/19    Vitals stable, afebrile, RA  WBC 7.75 from 5.41  Hb 11 from 10.4  Cr 1.02      R CT to WS, no air leak, 305cc serosang output    Plan:  -reg diet  -R CT, to WS, monitor for air leak, monitor output and character  -dc tova  -dc IVF  -to med surg LOC  -lovenox  -encourage ambulation  -incentive spirometry/resp protocol    Subjective/Objective   Subjective:   Doing well, some nausea overnight no emesis, tolerating diet, some discomfort w CT, voiding, no SOB/CP, using IS up to 1250.     Objective:     Blood pressure 128/65, pulse 78, temperature 99.6 °F (37.6 °C), temperature source Oral, resp. rate 16, height 5' 4.5\" (1.638 m), weight 75.2 kg (165 lb 12.6 oz), SpO2 94%, not currently breastfeeding.,Body mass index is 28.02 kg/m².      Intake/Output Summary (Last 24 hours) at 1/20/2024 0651  Last data filed at 1/20/2024 0501  Gross per 24 hour   Intake 2079.17 ml   Output 1308 ml   Net 771.17 ml       Invasive Devices       Peripheral Intravenous Line  Duration             Peripheral IV 01/19/24 Left Arm <1 day    Peripheral IV 01/19/24 Right Forearm <1 day              Arterial Line  Duration             Arterial Line 01/19/24 Right Radial <1 day              Drain  Duration             Chest Tube Right Pleural 24 Fr. <1 day                    Physical Exam:   General: NAD  Skin: Warm, dry, anicteric, incisions c/d/i  HEENT: Normocephalic, atraumatic  CV: RRR, no m/r/g  Pulm: CTA b/l, no inc WOB, RA, R CT to WS no air leak serosang output as above  Abd: Soft, ND/NT  MSK: Symmetric, no edema, no tenderness, no deformity  Neuro: AOx3, GCS 15 "     Lab, Imaging and other studies:I have personally reviewed pertinent lab results.  , CBC:   Lab Results   Component Value Date    WBC 7.75 01/20/2024    HGB 11.0 (L) 01/20/2024    HCT 34.0 (L) 01/20/2024    MCV 98 01/20/2024     01/20/2024    RBC 3.48 (L) 01/20/2024    MCH 31.6 01/20/2024    MCHC 32.4 01/20/2024    RDW 14.3 01/20/2024    MPV 9.4 01/20/2024   , CMP:   Lab Results   Component Value Date    SODIUM 136 01/20/2024    K 4.0 01/20/2024     01/20/2024    CO2 27 01/20/2024    BUN 15 01/20/2024    CREATININE 1.02 01/20/2024    CALCIUM 8.6 01/20/2024    EGFR 50 01/20/2024     VTE Pharmacologic Prophylaxis: Enoxaparin (Lovenox)  VTE Mechanical Prophylaxis: sequential compression device

## 2024-01-20 NOTE — QUICK NOTE
Chest tube removal    The suture securing the chest tube was cut and a knot was started in the U-stitch. I pulled the U-stitch taught and removed the chest tube in one motion while the patient hummed.  The U-stitch knot was tied down securely.  The patient tolerated the procedure well. There was no bleeding or significant drainage.     [facility] Unit # 6 and  returned to soil utility closet on floor.    F/u PA/lateral chest XR    Remberto Maguire MD  1/20/2024 10:09 AM

## 2024-01-20 NOTE — OCCUPATIONAL THERAPY NOTE
"Occupational Therapy Evaluation      Destinee Anders    1/20/2024    Principal Problem:    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)      Past Medical History:   Diagnosis Date    Anesthesia     \"cheap date\"    Arthritis     Atrial fibrillation (HCC)     Benign polyp of large intestine     Cancer of appendix (HCC) 1977    Colon cancer (HCC) 1977    36 yo    Colon polyp     Full dentures     full upper/ partial lower    GERD (gastroesophageal reflux disease)     Glaucoma suspect     History of neck problems     \"C3-C7 and had epidural injections years ago\"    Hyperlipidemia     Hypertension     Lung cancer (HCC)     jan 2020- had surgery    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HCC)     Prediabetes     Primary adenocarcinoma of upper lobe of right lung (HCC) 12/02/2019    Pulmonary nodules 02/19/2015    Refusal of blood transfusions as patient is Muslim     Tic disorder, transient, recurrent     not recent    Walks frequently        Past Surgical History:   Procedure Laterality Date    APPENDECTOMY      ARTHROSCOPY KNEE      BREAST BIOPSY Right 04/10/2017    US guided - intraductal papillomas    BREAST BIOPSY Right 10/24/2019    ADH- dcis    BREAST LUMPECTOMY W/ NEEDLE LOCALIZATION Right 05/22/2020    Procedure: EXCISIONAL BIOPSY;  1000 NEEDLE LOC;  Surgeon: Hortencia Pennington MD;  Location: [facility];  Service: Surgical Oncology    CARDIOVASCULAR STRESS TEST      CARPAL TUNNEL RELEASE Left 07/01/2022    CATARACT EXTRACTION Bilateral     COLON SURGERY      COLONOSCOPY      done 2010 due 2015     DILATION AND CURETTAGE OF UTERUS      ESOPHAGOGASTRODUODENOSCOPY      inflammation aonly    HEMICOLECTOMY Right 1977    HYSTERECTOMY      JOINT REPLACEMENT      L knee    LUNG SURGERY      MAMMO NEEDLE LOCALIZATION RIGHT (ALL INC) Right 05/22/2020    MAMMO NEEDLE LOCALIZATION RIGHT (ALL INC) EACH ADD Right 05/22/2020    MAMMO STEREOTACTIC BREAST BIOPSY RIGHT (ALL INC) Right 10/24/2019    PARATHYROID GLAND SURGERY      " exploration     [place] [facility] INCL FLUOR GDNCE DX W/CELL WASHG SPX N/A 01/17/2020    Procedure: FLEXIBLE BRONCHOSCOPY;  Surgeon: Chris Cheng MD;  [facility]: [facility];  Service: Thoracic    OH THORACOSCOPY W/LOBECTOMY SINGLE LOBE Right 01/17/2020    Procedure: ROBOTIC ASSISTED COMPLETE LUNG LOBECTOMY;  Surgeon: Chris Cheng MD;  Location: [facility];  Service: Thoracic    OH THORACOSCOPY W/THERA WEDGE RESEXN INITIAL UNILAT Right 01/17/2020    Procedure: ROBOTIC ASSISTED UPPER-LOBE WEDGE RESECTION, MEDIASTINAL LYMPH NODE DISECTION;  Surgeon: Chris Cheng MD;  Location: [facility];  Service: Thoracic    REPLACEMENT TOTAL KNEE      THYROID SURGERY      TOTAL ABDOMINAL HYSTERECTOMY  1983    TOTAL THYROIDECTOMY      TRIGGER FINGER RELEASE Left 07/01/2022    US GUIDED BREAST BIOPSY RIGHT COMPLETE Right 04/10/2017    US GUIDED THYROID BIOPSY  02/14/2020 01/20/24 0935   OT Last Visit   OT Visit Date 01/20/24   Note Type   Note type Evaluation   Pain Assessment   Pain Assessment Tool 0-10   Pain Score 8   Pain Location/Orientation Orientation: Right;Location: Incision  (CT site)   Pain Onset/Description Onset: Ongoing   Patient's Stated Pain Goal No pain   Hospital Pain Intervention(s) Repositioned;Ambulation/increased activity;Emotional support;Relaxation technique   Restrictions/Precautions   Weight Bearing Precautions Per Order No   Other Precautions Multiple lines;Telemetry;Fall Risk;Pain  (CT)   Home Living   Type of Home House   Home Layout Two level;Stairs to enter with rails;Performs ADLs on one level  (2SH; 3STE; FFSU)   Bathroom Shower/Tub Walk-in shower   Bathroom Toilet Standard   Bathroom Equipment   (denies DME)   Bathroom Accessibility Accessible   Home Equipment   (denies DME)   Prior Function   Level of [place] Independent with ADLs;Independent with functional mobility;Independent with IADLS   Lives With Alone   Receives Help From Friend(s)   IADLs Independent with  driving;Independent with meal prep;Independent with medication management   Falls in the last 6 months 0   Vocational Retired   Lifestyle   Autonomy Pt reports being IND w/ all ADLS and IADLS; (+) drives; ambulates w/o AD PTA   Reciprocal Relationships Pt lives alone. Pt reports having supportive friends that live local and can provide A as needed upon D/C   Service to Others Pt is retired   Intrinsic Gratification Pt reports enjoying spending time w/ her friends   ADL   Where Assessed Chair   Eating Assistance 7  Independent   Grooming Assistance 5  Supervision/Setup   UB Bathing Assistance 4  Minimal Assistance   LB Bathing Assistance 3  Moderate Assistance   UB Dressing Assistance 4  Minimal Assistance   LB Dressing Assistance 3  Moderate Assistance   Toileting Assistance  3  Moderate Assistance   Bed Mobility   Supine to Sit Unable to assess   Sit to Supine Unable to assess   Additional Comments Pt seated OOB in chair upon OT arrival. Pt seated OOB in chair w/ all needs in reach s/p OT session   Transfers   Sit to Stand 5  Supervision   Additional items Assist x 1;Increased time required;Verbal cues;Armrests   Stand to Sit 5  Supervision   Additional items Assist x 1;Increased time required;Verbal cues;Armrests   Additional Comments Transfers w/ RW   Functional Mobility   Functional Mobility 4  Minimal assistance   Additional Comments Pt demonstrated short distance household mobility in hallway w/ RW at Min A level   Additional items Rolling walker   Balance   Static Sitting Fair +   Dynamic Sitting Fair   Static Standing Fair -   Dynamic Standing Poor +   Ambulatory Poor +   Activity Tolerance   Activity Tolerance Patient limited by fatigue   Medical Staff Made Aware PT Rowan; Pt seen as a co-session due to the patient's co-morbidities, clinically unstable presentation, and present impairments which are a regression from the patient's baseline.     Nurse Made Aware RN cleared Pt for OT layton CRUZ Assessment    RUE Assessment WFL   LUE Assessment   LUE Assessment WFL   Hand Function   Gross Motor Coordination Functional   Cognition   Overall Cognitive Status WFL   Arousal/Participation Alert;Cooperative   Attention Within functional limits   Orientation Level Oriented X4   Memory Within functional limits   Following Commands Follows all commands and directions without difficulty   Comments Pt is pleasant and cooperative for therapy today   Assessment   Limitation Decreased ADL status;Decreased UE strength;Decreased Safe judgement during ADL;Decreased high-level ADLs;Decreased endurance   Prognosis Fair   Assessment Pt is a 84 yo female seen for OT eval s/p adm to [facility] on 1/19/24 w/ adenocarcinoma of upper lobe of right lung now s/p RLL diagnostic and therapeutic wedge resection, mediastinal LN dissection on 1/19 . Pt  has a past medical history of Anesthesia, Arthritis, Atrial fibrillation (HCC), Benign polyp of large intestine, Cancer of appendix (HCC) (1977), Colon cancer (HCC) (1977), Colon polyp, Full dentures, GERD (gastroesophageal reflux disease), Glaucoma suspect, History of neck problems, Hyperlipidemia, Hypertension, Lung cancer (HCC), 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HCC), Prediabetes, Primary adenocarcinoma of upper lobe of right lung (HCC) (12/02/2019), Pulmonary nodules (02/19/2015), Refusal of blood transfusions as patient is Amish, Tic disorder, transient, recurrent, and Walks frequently. Pt with active OT orders and activity as tolerated orders. Pt is retired and resides alone in 2SH w/ 3STE. Pt has supportive friends that can A as needed upon D/C. Pt was I w/  ADLS and IADLS, drove, & required no use of DME PTA. Pt is currently demonstrating the following occupational deficits: Min A UB self care, Mod A LB self care and toileting, S transfers, Min A mobility w/ RW. These deficits that are impacting pt's baseline areas of occupation are a result of the following impairments: pain, endurance, activity  tolerance, functional mobility, forward functional reach, balance, functional standing tolerance, unsupportive home environment, decreased I w/ ADLS/IADLS, strength, and decreased safety awareness. The following Occupational Performance Areas to address include: grooming, bathing/shower, toilet hygiene, dressing, health maintenance, functional mobility, and clothing management.  Based on the aforementioned OT evaluation, functional performance deficits, and assessments, pt has been identified as a high complexity evaluation. Recommend  level III Minimum Resource Intensity  upon D/C. The patient's raw score on the AM-PAC Daily Activity Inpatient Short Form is 16. A raw score of less than 19 suggests the patient may benefit from discharge to post-acute rehabilitation services. Please refer to the recommendation of the Occupational Therapist for safe discharge planning.  Pt to continue to benefit from acute immediate OT services to address the following goals 3-5x/week to  w/in 10-14 days:   Goals   Patient Goals To return home   LTG Time Frame 10-14   Long Term Goal #1 Refer to goals below   Plan   Treatment Interventions ADL retraining;Functional transfer training;UE strengthening/ROM;Endurance training;Cognitive reorientation;Patient/family training;Equipment evaluation/education;Compensatory technique education;Fine motor coordination activities;Activityengagement;Energy conservation   Goal Expiration Date 24   OT Frequency 2-3x/wk   Discharge Recommendation   Rehab Resource Intensity Level, OT III (Minimum Resource Intensity)  (home OT)   AM-PAC Daily Activity Inpatient   Lower Body Dressing 2   Bathing 2   Toileting 2   Upper Body Dressing 3   Grooming 3   Eating 4   Daily Activity Raw Score 16   Daily Activity Standardized Score (Calc for Raw Score >=11) 35.96   -PAC Applied Cognition Inpatient   Following a Speech/Presentation 4   Understanding Ordinary Conversation 4   Taking Medications 4    Remembering Where Things Are Placed or Put Away 4   Remembering List of 4-5 Errands 4   Taking Care of Complicated Tasks 4   Applied Cognition Raw Score 24   Applied Cognition Standardized Score 62.21         GOALS    1) Pt will improve activity tolerance to G for min 30 min txment sessions for increase engagement in functional tasks    2) Pt will complete UB/LB dressing/self care w/ mod I using adaptive device and DME as needed    3) Pt will complete bathing w/ Mod I w/ use of AE and DME as needed    4) Pt will complete toileting w/ mod I w/ G hygiene/thoroughness using DME as needed    5) Pt will improve functional transfers to Mod I on/off all surfaces using DME as needed w/ G balance/safety     6) Pt will improve functional mobility during ADL/IADL/leisure tasks to Mod I using DME as needed w/ G balance/safety     7) Pt will participate in simulated IADL management task to increase independence to Mod I w/ G safety and endurance    8) Pt will be attentive 100% of the time during ongoing cognitive assessment w/ G participation to assist w/ safe d/c planning/recommendations    9) Pt will demonstrate G carryover of pt/caregiver education and training as appropriate w/o cues w/ good tolerance to increase safety during functional tasks    10) Pt will demonstrate 100% carryover of energy conservation techniques t/o functional I/ADL/leisure tasks w/o cues s/p skilled education to increase endurance during functional tasks         Katelyn Jaimes MS, OTR/L

## 2024-01-20 NOTE — DISCHARGE SUMMARY
"Discharge Summary - Thoracic Surgery  Destinee Anders 83 y.o. 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) [C34.11]    Principle/ Secondary Diagnosis:  Past Medical History:   Diagnosis Date    Anesthesia     \"cheap date\"    Arthritis     Atrial fibrillation (HCC)     Benign polyp of large intestine     Cancer of appendix (HCC) 1977    Colon cancer (HCC) 1977    36 yo    Colon polyp     Full dentures     full upper/ partial lower    GERD (gastroesophageal reflux disease)     Glaucoma suspect     History of neck problems     \"C3-C7 and had epidural injections years ago\"    Hyperlipidemia     Hypertension     Lung cancer (HCC)     jan 2020- had surgery    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HCC)     Prediabetes     Primary adenocarcinoma of upper lobe of right lung (HCC) 12/02/2019    Pulmonary nodules 02/19/2015    Refusal of blood transfusions as patient is [facility]     Tic disorder, transient, recurrent     not recent    Walks frequently      Past Surgical History:   Procedure Laterality Date    APPENDECTOMY      ARTHROSCOPY KNEE      BREAST BIOPSY Right 04/10/2017    US guided - intraductal papillomas    BREAST BIOPSY Right 10/24/2019    ADH- dcis    BREAST LUMPECTOMY W/ NEEDLE LOCALIZATION Right 05/22/2020    Procedure: EXCISIONAL BIOPSY;  1000 NEEDLE LOC;  Surgeon: Hortencia Pennington MD;  Location: [facility];  Service: Surgical Oncology    CARDIOVASCULAR STRESS TEST      CARPAL TUNNEL RELEASE Left 07/01/2022    CATARACT EXTRACTION Bilateral     COLON SURGERY      COLONOSCOPY      done 2010 due 2015     DILATION AND CURETTAGE OF UTERUS      ESOPHAGOGASTRODUODENOSCOPY      inflammation aonly    HEMICOLECTOMY Right 1977    HYSTERECTOMY      JOINT REPLACEMENT      L knee "    LUNG SURGERY      MAMMO NEEDLE LOCALIZATION RIGHT (ALL INC) Right 05/22/2020    MAMMO NEEDLE LOCALIZATION RIGHT (ALL INC) EACH ADD Right 05/22/2020    MAMMO STEREOTACTIC BREAST BIOPSY RIGHT (ALL INC) Right 10/24/2019    PARATHYROID GLAND SURGERY      exploration     [facility] INCL FLUOR GDNCE DX W/CELL WASHG SPX N/A 01/17/2020    Procedure: FLEXIBLE BRONCHOSCOPY;  Surgeon: Chris Cheng MD;  Location: [facility];  Service: Thoracic    LA THORACOSCOPY W/LOBECTOMY SINGLE LOBE Right 01/17/2020    Procedure: ROBOTIC ASSISTED COMPLETE LUNG LOBECTOMY;  Surgeon: Chris Cheng MD;  Location: [facility];  Service: Thoracic    LA THORACOSCOPY W/THERA WEDGE RESEXN INITIAL UNILAT Right 01/17/2020    Procedure: ROBOTIC ASSISTED UPPER-LOBE WEDGE RESECTION, MEDIASTINAL LYMPH NODE DISECTION;  Surgeon: Chris Cheng MD;  Location: [facility];  Service: Thoracic    REPLACEMENT TOTAL KNEE      THYROID SURGERY      TOTAL ABDOMINAL HYSTERECTOMY  1983    TOTAL THYROIDECTOMY      TRIGGER FINGER RELEASE Left 07/01/2022    US GUIDED BREAST BIOPSY RIGHT COMPLETE Right 04/10/2017    US GUIDED THYROID BIOPSY  02/14/2020       Procedures Performed: No orders of the defined types were placed in this encounter.    Robotic right lower lobe wedge, lymph node dissection  [facility] INCL FLUOR GDNCE DX W/CELL WASHG SPX [30256] (BRONCHOSCOPY FLEXIBLE)  LA THORACOSCOPY W/THERA WEDGE RESEXN INITIAL UNILAT [17341]  Robotic right lower lobe wedge, lymph node dissection (Right: Chest)  BRONCHOSCOPY FLEXIBLE ([place])  Procedure(s):  Robotic right lower lobe wedge, lymph node dissection  BRONCHOSCOPY FLEXIBLE    Laboratory data at discharge:   Results from last 7 days   Lab Units 01/20/24  0433 01/19/24  0958   WBC Thousand/uL 7.75 5.41   HEMOGLOBIN g/dL 11.0* 10.4*   HEMATOCRIT % 34.0* 31.9*   PLATELETS Thousands/uL 204 181     Results from last 7 days   Lab Units 01/20/24  0433 01/19/24  0958   POTASSIUM mmol/L 4.0 4.2   CHLORIDE  "mmol/L 103 108   CO2 mmol/L 27 25   BUN mg/dL 15 20   CREATININE mg/dL 1.02 1.00   CALCIUM mg/dL 8.6 8.6               Discharge instructions/Information to patient and family:   See after visit summary for information provided to patient and family.     Discharge Medications:  See after visit summary for reconciled discharge medications provided to patient and family.      Hospital Course:   HPI: Per Dr. Cheng \"83-year-old female with history of stage Ib right upper lobe adenocarcinoma status post right upper lobectomy in January 2020 who now has an enlarging PET avid right lower lobe nodule suspicious for second malignancy versus recurrence.       I had a lengthy discussion with the patient about this right lower lobe mass.  Given her history of cancer and the enlarging PET avid mass I am concerned that this represents a malignancy.  We discussed either biopsy or surgical resection.  Ultimately she would like to proceed with surgical resection.  I am in agreement.  Surgery would be a robotic right lower lobe diagnostic and therapeutic wedge resection.  We did attempt to do a mediastinal lymph node dissection.  We discussed risk include risk of anesthesia, bleeding, blood clots, prolonged air leak, etc.  She would like to proceed.  This will be about 90-minute procedure.  I will have her be seen and evaluated by the surgical optimization clinic and get preoperative labs.  She voiced understanding agreement with this plan.\"    Patient underwent robotic right lower lobe diagnostic and therapeutic wedge resection, mediastinal lymph node dissection, bronchoscopy and R T3-T10 intercostal nerve block with exparel. Nephrology was consulted for assistance in the management of CKD as part of the acute kidney injury prevention protocol.  Patient's postoperative course was unremarkable.  Patient's chest tube was without air leak and was removed.  When patient met criteria for discharge went home 1/20/2024.     Prior to " discharge, the patient was given instructions for outpatient care and follow-up.  The patient has been instructed to call w/ any questions, changes, or concerns for the medical condition.    For further details of the hospitalization, please refer to the medical record.    Condition at Discharge: good     Provisions for Follow-Up Care:  See after visit summary for information related to follow-up care and any pertinent home health orders.      Disposition: Home    Planned Readmission: No    Discharge Statement   I spent 25 minutes discharging the patient. This time was spent on the day of discharge. I had direct contact with the patient on the day of discharge. Additional documentation is required if more than 30 minutes were spent on discharge.     Remberto Maguire MD  General Surgery Resident    This text is generated with voice recognition software. There may be translation, syntax,  or grammatical errors. If you have any questions, please contact the dictating provider.    ADMIT

## 2024-01-21 ENCOUNTER — HOME CARE VISIT (OUTPATIENT)
Dept: HOME HEALTH SERVICES | Facility: HOME HEALTHCARE | Age: 84
End: 2024-01-21
Payer: COMMERCIAL

## 2024-01-21 VITALS — DIASTOLIC BLOOD PRESSURE: 76 MMHG | OXYGEN SATURATION: 97 % | SYSTOLIC BLOOD PRESSURE: 152 MMHG | HEART RATE: 82 BPM

## 2024-01-21 PROCEDURE — 400013 VN SOC

## 2024-01-21 PROCEDURE — G0151 HHCP-SERV OF PT,EA 15 MIN: HCPCS

## 2024-01-22 ENCOUNTER — HOME CARE VISIT (OUTPATIENT)
Dept: HOME HEALTH SERVICES | Facility: HOME HEALTHCARE | Age: 84
End: 2024-01-22
Payer: COMMERCIAL

## 2024-01-22 ENCOUNTER — TRANSITIONAL CARE MANAGEMENT (OUTPATIENT)
Dept: FAMILY MEDICINE CLINIC | Facility: CLINIC | Age: 84
End: 2024-01-22

## 2024-01-22 NOTE — UTILIZATION REVIEW
NOTIFICATION OF ADMISSION DISCHARGE   This is a Notification of Discharge from [facility]. Please be advised that this patient has been discharge from our facility. Below you will find the admission and discharge date and time including the patient’s disposition.   UTILIZATION REVIEW CONTACT:  Woodrow Matias  Utilization   Network Utilization Review Department  Phone: [phone] x carefully listen to the prompts. All voicemails are confidential.  Email: [email or web address]     ADMISSION INFORMATION  PRESENTATION DATE: 1/19/2024  5:06 AM  OBERVATION ADMISSION DATE:   INPATIENT ADMISSION DATE: 1/19/24  9:49 AM   DISCHARGE DATE: 1/20/2024  3:31 PM   DISPOSITION:Home with Home Health Care    Network Utilization Review Department  ATTENTION: Please call with any questions or concerns to [phone] and carefully listen to the prompts so that you are directed to the right person. All voicemails are confidential.   For Discharge needs, contact Care Management DC Support Team at [phone] opt. [phone]  Send all requests for admission clinical reviews, approved or denied determinations and any other requests to dedicated fax number below belonging to the campus where the patient is receiving treatment. List of dedicated fax numbers for the Facilities:  FACILITY NAME UR FAX NUMBER   ADMISSION DENIALS (Administrative/Medical Necessity) [phone]   DISCHARGE SUPPORT TEAM ([facility]) [phone]   PARENT CHILD HEALTH (Maternity/NICU/Pediatrics) [phone]   [facility] [phone]   [facility] [phone]   [facility] [phone]   [facility] [phone]   [facility] [phone]   [facility] [phone]   [facility] [phone]   [facility]  [phone]   [facility] [phone]   [facility] [phone]   [facility] [phone]

## 2024-01-24 ENCOUNTER — HOME CARE VISIT (OUTPATIENT)
Dept: HOME HEALTH SERVICES | Facility: HOME HEALTHCARE | Age: 84
End: 2024-01-24
Payer: COMMERCIAL

## 2024-01-24 VITALS — DIASTOLIC BLOOD PRESSURE: 66 MMHG | SYSTOLIC BLOOD PRESSURE: 122 MMHG | HEART RATE: 71 BPM | OXYGEN SATURATION: 94 %

## 2024-01-24 PROCEDURE — 88313 SPECIAL STAINS GROUP 2: CPT | Performed by: PATHOLOGY

## 2024-01-24 PROCEDURE — G0155 HHCP-SVS OF CSW,EA 15 MIN: HCPCS

## 2024-01-24 PROCEDURE — 88341 IMHCHEM/IMCYTCHM EA ADD ANTB: CPT | Performed by: PATHOLOGY

## 2024-01-24 PROCEDURE — 88305 TISSUE EXAM BY PATHOLOGIST: CPT | Performed by: PATHOLOGY

## 2024-01-24 PROCEDURE — 88307 TISSUE EXAM BY PATHOLOGIST: CPT | Performed by: PATHOLOGY

## 2024-01-24 PROCEDURE — G0151 HHCP-SERV OF PT,EA 15 MIN: HCPCS

## 2024-01-24 PROCEDURE — 88342 IMHCHEM/IMCYTCHM 1ST ANTB: CPT | Performed by: PATHOLOGY

## 2024-01-25 ENCOUNTER — TELEPHONE (OUTPATIENT)
Dept: CARDIAC SURGERY | Facility: CLINIC | Age: 84
End: 2024-01-25

## 2024-01-25 ENCOUNTER — HOME CARE VISIT (OUTPATIENT)
Dept: HOME HEALTH SERVICES | Facility: HOME HEALTHCARE | Age: 84
End: 2024-01-25
Payer: COMMERCIAL

## 2024-01-25 NOTE — TELEPHONE ENCOUNTER
Surgeon/Procedure/Date: [place] 1/19/24  Follow up appt:   2/7/24        Constipation: (Yes: Colace 100 mg BID, Senokot 2 tabs QHS or Senokot S 2 tabs qhs   No: Dulcolax/Miralax/suppository/enema)  Denies       2.   Pain Management: (Oxycodone 5-10 mg prn, Tylenol 650 mg q6 hrs and +/- Advil 600 mg TID for 7 days. Neurontin 300 mg TID for 21 days, or Neurontin 100 mg TID for patients over 70)  Pain controlled with pain meds      3.  Fever/Chills/Cough/shortness of breath:  (Call for temp >100.4 )  Denies all    4. Incisions: (Warmth, redness, drainage? May shower, no baths. No creams, lotions, or ointments to incisions)  Clean and dry. No drainage.    5.  Ambulation/Incentive Spirometry: (Any activity? Use IS every 15 min)   Patient getting PT at home.  Reviewed post-op appointment date, time and need for CXR.

## 2024-01-25 NOTE — TELEPHONE ENCOUNTER
I called and spoke with Alberta this afternoon.  She is doing very well.  She denies any significant pain.  No shortness of breath.  She is eating well.  No other concerns.    We reviewed her pathology.  This was a 1 cm non-small cell lung cancer.  All margins were clear.  The only lymph nodes that she did have were negative for malignancy.  I think this is a stage I cancer.  I discussed with her that she does not need any further treatment at this time.  She will see us back in our office in a few weeks.  She will call sooner with any concerns    Chris Cheng

## 2024-01-26 ENCOUNTER — HOME CARE VISIT (OUTPATIENT)
Dept: HOME HEALTH SERVICES | Facility: HOME HEALTHCARE | Age: 84
End: 2024-01-26
Payer: COMMERCIAL

## 2024-01-26 VITALS — DIASTOLIC BLOOD PRESSURE: 64 MMHG | HEART RATE: 60 BPM | SYSTOLIC BLOOD PRESSURE: 112 MMHG | OXYGEN SATURATION: 95 %

## 2024-01-26 PROCEDURE — G0180 MD CERTIFICATION HHA PATIENT: HCPCS | Performed by: THORACIC SURGERY (CARDIOTHORACIC VASCULAR SURGERY)

## 2024-01-26 PROCEDURE — G0152 HHCP-SERV OF OT,EA 15 MIN: HCPCS

## 2024-01-30 ENCOUNTER — HOME CARE VISIT (OUTPATIENT)
Dept: HOME HEALTH SERVICES | Facility: HOME HEALTHCARE | Age: 84
End: 2024-01-30
Payer: COMMERCIAL

## 2024-01-30 VITALS — SYSTOLIC BLOOD PRESSURE: 140 MMHG | OXYGEN SATURATION: 97 % | HEART RATE: 64 BPM | DIASTOLIC BLOOD PRESSURE: 68 MMHG

## 2024-01-30 PROCEDURE — G0151 HHCP-SERV OF PT,EA 15 MIN: HCPCS

## 2024-02-01 ENCOUNTER — HOME CARE VISIT (OUTPATIENT)
Dept: HOME HEALTH SERVICES | Facility: HOME HEALTHCARE | Age: 84
End: 2024-02-01
Payer: COMMERCIAL

## 2024-02-01 VITALS — HEART RATE: 58 BPM | DIASTOLIC BLOOD PRESSURE: 68 MMHG | SYSTOLIC BLOOD PRESSURE: 146 MMHG | OXYGEN SATURATION: 97 %

## 2024-02-01 PROCEDURE — G0151 HHCP-SERV OF PT,EA 15 MIN: HCPCS

## 2024-02-05 ENCOUNTER — TELEPHONE (OUTPATIENT)
Dept: SURGICAL ONCOLOGY | Facility: CLINIC | Age: 84
End: 2024-02-05

## 2024-02-05 ENCOUNTER — HOSPITAL ENCOUNTER (OUTPATIENT)
Dept: RADIOLOGY | Facility: HOSPITAL | Age: 84
Discharge: HOME | End: 2024-02-05
Payer: COMMERCIAL

## 2024-02-05 DIAGNOSIS — R91.8 RIGHT LOWER LOBE LUNG MASS: Primary | ICD-10-CM

## 2024-02-05 DIAGNOSIS — C34.11 ADENOCARCINOMA OF UPPER LOBE OF RIGHT LUNG (HCC): ICD-10-CM

## 2024-02-05 PROCEDURE — 71046 X-RAY EXAM CHEST 2 VIEWS: CPT

## 2024-02-05 NOTE — TELEPHONE ENCOUNTER
Spoke to pt in regards to post op appointment on 02/07/24 with Dr. Cheng. I informed pt that she will need to get a CXR done prior to the appointment. Pt verbalized understanding and was appreciative of the call.

## 2024-02-07 ENCOUNTER — OFFICE VISIT (OUTPATIENT)
Dept: CARDIAC SURGERY | Facility: CLINIC | Age: 84
End: 2024-02-07

## 2024-02-07 VITALS
TEMPERATURE: 98.2 F | BODY MASS INDEX: 25.33 KG/M2 | HEART RATE: 50 BPM | SYSTOLIC BLOOD PRESSURE: 118 MMHG | DIASTOLIC BLOOD PRESSURE: 75 MMHG | HEIGHT: 65 IN | RESPIRATION RATE: 14 BRPM | WEIGHT: 152 LBS | OXYGEN SATURATION: 97 %

## 2024-02-07 DIAGNOSIS — C34.31 ADENOCARCINOMA OF LOWER LOBE OF RIGHT LUNG (HCC): Primary | ICD-10-CM

## 2024-02-07 PROCEDURE — 99024 POSTOP FOLLOW-UP VISIT: CPT | Performed by: THORACIC SURGERY (CARDIOTHORACIC VASCULAR SURGERY)

## 2024-02-07 NOTE — PROGRESS NOTES
Thoracic Follow-Up  Assessment/Plan:    Adenocarcinoma of lower lobe of right lung (HCC)  Ms. Anders is approximately 2.5 weeks from right lower lobe wedge resection on 1/19/2024 for stage IB adenocarcinoma. Patient is recovering well from a thoracic surgery standpoint. Pathology reviewed and consistent with adenocarcinoma, lymph nodes negative from this procedure. CXR with small right sided pleural effusion, no pneumothorax.  Dr. Cheng recommends patient have a chest x-ray to be completed before her next visit in 4 weeks for routine post operative care.  Patient to call the office sooner as needed.  Patient in agreement with plan.       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)  -     XR chest pa & lateral; Future          Thoracic History      Cancer Staging   Adenocarcinoma of lower lobe of right lung (HCC)  Staging form: Lung, AJCC 8th Edition  - Pathologic stage from 1/19/2024: Stage IB (pT2a, pN0, cM0) - Signed by Katelyn Avalos PA-C on 2/7/2024  Histologic grade (G): G2  Histologic grading system: 4 grade system  Residual tumor (R): R0 - None  Laterality: Right  Lymph-vascular invasion (LVI): LVI not present (absent)/not identified    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)  Staging form: Lung, AJCC 8th Edition  - Pathologic stage from 1/17/2020: Stage IB (pT2a, pN0, cM0) - Signed by Gudelia Giralod PA-C on 12/11/2023  Histopathologic type: Adenocarcinoma, NOS  Histologic grade (G): G2  Histologic grading system: 4 grade system    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  Breast, right, lumpectomy:  -  Ductal carcinoma in situ, low nuclear grade .     B.  Breast, right new inferior margin, excision:  -  Ductal carcinoma in situ, low nuclear grade involving intraductal papilloma.     C.  Breast, right new lateral margin, excision:  -  Ductal carcinoma in situ, low nuclear grade.     D.  Breast, right new posterior margin, excision:  -  Benign breast parenchyma, negative for atypia or malignancy.    %, MO 60-70%, HER2 1+ negative     6/9/2020 -  Cancer Staged    Staging form: Breast, AJCC 8th Edition  - Clinical: cT0, cN0, cM0 - Signed by Hortencia Pennington MD on 6/9/2020  Laterality: Right       6/9/2020 -  Cancer Staged    Staging form: Breast, AJCC 8th Edition  - Pathologic: Stage 0 (pTis (DCIS), pN0, cM0, ER+, MO+, HER2-) - Signed by Hortencia Pennington MD on 6/16/2020  Laterality: Right  Method of lymph node assessment: Clinical  Nuclear grade: G1       7/30/2020 - 8/28/2020 Radiation    Plan ID Energy Fractions Dose per Fraction (cGy) Dose Correction (cGy) Total Dose Delivered (cGy) Elapsed Days   R Breast 6X 16 / 16 266 0 4,256 21   R Brst Boost 6X 5 / 5 200 0 1,000 4        Primary adenocarcinoma of upper lobe of right lung (HCC) (Resolved)   12/2/2019 Initial Diagnosis    Primary adenocarcinoma of upper lobe of right lung (HCC)     1/17/2020 Surgery    Lung, right upper lobe, wedge resection:  -  Invasive moderately differentiated adenocarcinoma.  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)   1/17/2020 Surgery    Right upper lobectomy, MLND     1/17/2020 -  Cancer Staged    Staging form: Lung, AJCC 8th Edition  - Pathologic stage from 1/17/2020: Stage IB (pT2a, pN0, cM0) - Signed by Gudelia Giraldo PA-C on 12/11/2023  Histopathologic type: Adenocarcinoma, NOS  Histologic grade (G): G2  Histologic grading system: 4 grade system        7/29/2020 Initial Diagnosis    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)     Adenocarcinoma of lower lobe of right lung (HCC)   1/19/2024 -  Cancer Staged    Staging form: Lung, AJCC 8th Edition  - Pathologic stage from 1/19/2024: Stage IB (pT2a, pN0, cM0) - Signed by Katelyn Avalos PA-C on 2/7/2024  Histologic grade (G): G2  Histologic grading system: 4 grade system  Residual tumor (R): R0 - None  Laterality: Right  Lymph-vascular invasion (LVI): LVI not present (absent)/not identified       1/19/2024 Surgery    Robotic assisted right lower lobe wedge resection, lymph node dissection, flexible bronchoscopy (Dr. Cheng)     1/19/2024 Initial Diagnosis    Adenocarcinoma of lower lobe of right lung (HCC)                Subjective:    Patient ID: Destinee Anders is a 83 y.o. female. ECOG 0     HPI    Destinee Anders is a 83 y.o. female who presents today for post operative follow up. Ms. Anders is approximately 2.5 weeks from right lower lobe wedge resection on 1/19/2024 for stage IB adenocarcinoma.  Patient had an uncomplicated hospital course and was discharged home on postoperative day 1.    CXR completed on 2/5/2024 was personally reviewed by myself and in PACS which demonstrates a small right-sided pleural effusion.  No evidence of pneumothorax.    On discussion today, patient reports pain is controlled with tylenol and gabapentin. Denies burning or numbness of the area. Using incentive spirometer, pulling over 1000mL. Actively walking up the steps. Denies cough, significant shortness of breath, fevers, chills, chest pain.  Notes that she has a slight headache.    The following portions of the patient's history were reviewed and updated as appropriate: allergies, current medications, past family history, past medical history, past social history, past surgical history, and problem list.    Review of Systems   Constitutional:  Negative for chills and fever.   Respiratory:  Negative for cough and  "shortness of breath.    Cardiovascular:  Negative for chest pain.   Gastrointestinal:  Negative for abdominal pain.   Neurological:  Positive for headaches (mild). Negative for light-headedness.         Objective:   Physical Exam  Constitutional:       General: She is not in acute distress.  HENT:      Head: Normocephalic and atraumatic.   Eyes:      Extraocular Movements: Extraocular movements intact.   Cardiovascular:      Rate and Rhythm: Normal rate and regular rhythm.   Pulmonary:      Effort: Pulmonary effort is normal.      Breath sounds: Normal breath sounds.      Comments: Right chest wall incisions are healing well, nonerythematous, no drainage. Chest tube stitch removed.   Abdominal:      Palpations: Abdomen is soft.      Tenderness: There is no abdominal tenderness.   Musculoskeletal:      Right lower leg: No edema.      Left lower leg: No edema.   Skin:     General: Skin is warm and dry.     /75 (BP Location: Left arm, Patient Position: Sitting, Cuff Size: Standard)   Pulse (!) 50   Temp 98.2 °F (36.8 °C) (Temporal)   Resp 14   Ht 5' 4.5\" (1.638 m)   Wt 68.9 kg (152 lb)   SpO2 97%   BMI 25.69 kg/m²     XR chest pa & lateral    Result Date: 2/6/2024  Impression Status post right lower lobe wedge resection with scarring noted. No pneumothorax. Workstation performed: [id number]     XR chest pa & lateral    Result Date: 1/20/2024  Impression Bibasilar atelectasis. No pneumothorax visualized. Workstation performed: [id number]      CT chest wo contrast    Result Date: 12/9/2023  Narrative CT CHEST WITHOUT IV CONTRAST INDICATION:   Z85.118: Personal history of other malignant neoplasm of bronchus and lung. COMPARISON: Chest CT  12/2/2022. TECHNIQUE: CT examination of the chest was performed without intravenous contrast. Multiplanar 2D reformatted images were created from the source data. This examination, like all CT scans performed in the [facility], was performed utilizing " techniques to minimize radiation dose exposure, including the use of iterative reconstruction and automated exposure control. Radiation dose length product (DLP) for this visit:  212.34 mGy-cm FINDINGS: LUNGS: Increased size of now 9 mm solid pulmonary nodule in the right lower lobe (series 3, image 166), previously 3 mm. Right lower lobe subpleural nodule is unchanged (series 3, image 159). Stable postoperative changes status post right upper lobectomy. PLEURA: No pleural effusion. No pneumothorax. HEART/GREAT VESSELS: Normal heart size. Trace coronary artery calcifications. No thoracic aortic aneurysm. MEDIASTINUM AND SELENA:  Unremarkable. CHEST WALL AND LOWER NECK: Right thyroidectomy. Postoperative changes in the right breast including unchanged 1.2 cm fluid collection previously evaluated by ultrasound. VISUALIZED STRUCTURES IN THE UPPER ABDOMEN:  Unremarkable. OSSEOUS STRUCTURES:  No acute fracture or destructive osseous lesion.     Impression Enlarging solid pulmonary nodule in the right lower lobe, now 9 mm. Finding is suspicious for neoplasm, recommend further evaluation with PET/CT and/or tissue sampling. The study was marked in EPIC for significant notification. Workstation performed: [id number]     No CT Chest,Abdomen,Pelvis results available for this patient.   NM PET CT skull base to mid thigh    Result Date: 12/28/2023  Narrative PET/CT SCAN INDICATION: History of lung cancer. Restaging exam. Enlarging right lower lobe nodule. Additional history of colon cancer status post resection in 1997. Additional history of right breast cancer resected in 2020. R91.1: Solitary pulmonary nodule Z85.118: Personal history of other malignant neoplasm of bronchus and lung MODIFIER: PS COMPARISON: PET/CT 12/20/2019, CT chest exam 12/7/2023 and additional priors CELL TYPE: Right upper lobe adenocarcinoma TECHNIQUE:   8.6 mCi F-18-FDG administered IV. Multiplanar attenuation corrected and non attenuation corrected PET  images are available for interpretation, and contiguous, low dose, axial CT sections were obtained from the skull base through the femurs. Intravenous contrast material was not utilized. This examination, like all CT scans performed in the [facility], was performed utilizing techniques to minimize radiation dose exposure, including the use of iterative reconstruction and automated exposure control. Fasting serum glucose: 93 mg/dl FINDINGS: VISUALIZED BRAIN: No acute abnormalities are seen. HEAD/NECK: Diffuse radiotracer uptake in the remaining left lobe of the thyroid SUV max of 15, slightly increased from prior, previously 10.6. Question related to thyroiditis. No FDG-avid lymph nodes. CT images: Right lobe of the thyroid appears absent. CHEST: There does appear to be discrete mild FDG uptake in the 9 mm right lower lobe nodule SUV max of 2.1. This is higher than background lung activity, SUV max 0.8. No suspicious focal uptake in the mediastinum or perihilar regions. Mild asymmetric uptake at the right breast, SUV max of 2.8. This is new from prior PET/CT however CT appearance is stable with suggestion of underlying 1.3 cm loculated collection image 86 series 3. Decreasing collection noted here on serial exams. Activity is probably inflammatory. Mild FDG uptake in the upper esophagus may be physiologic or inflammatory. CT images: Status post right upper lobectomy. Suggestion of a small fat-containing Bochdalek hernia on the right. ABDOMEN: No FDG avid soft tissue lesions are seen. Now suggestion of segmental biliary ductal dilatation in the left lobe of the liver laterally, new from prior PET/CT. See for example images 121 to 126 series 3. No associated abnormal FDG uptake. CT images: Surgical staple line along the proximal colon. Colonic diverticulosis. PELVIS: No FDG avid soft tissue lesions are seen. CT images: Unremarkable. OSSEOUS STRUCTURES: No FDG avid lesions are seen. Scattered mild foci  of uptake in the spine corresponding to degenerative changes on the CT. CT images: Multilevel degenerative changes of the spine.     Impression 1. Mild but discrete FDG uptake in the enlarging 9 mm right lower lobe nodule. Malignancy should be excluded. 2. No findings for hypermetabolic metastasis. 3. Now suggestion of segmental biliary ductal dilatation in the left lobe of the liver laterally, new from prior PET/CT. Recommend further evaluation with dedicated MRI of the liver to assess for any possible underlying hepatobiliary lesion. The study was marked in EPIC for significant notification. Workstation performed: [id number]      No Barium Swallow results available for this patient.

## 2024-02-07 NOTE — ASSESSMENT & PLAN NOTE
Ms. Anders is approximately 2.5 weeks from right lower lobe wedge resection on 1/19/2024 for stage IB adenocarcinoma. Patient is recovering well from a thoracic surgery standpoint. Pathology reviewed and consistent with adenocarcinoma, lymph nodes negative from this procedure. CXR with small right sided pleural effusion, no pneumothorax.  Dr. Cheng recommends patient have a chest x-ray to be completed before her next visit in 4 weeks for routine post operative care.  Patient to call the office sooner as needed.  Patient in agreement with plan.

## 2024-02-09 ENCOUNTER — OFFICE VISIT (OUTPATIENT)
Dept: GASTROENTEROLOGY | Facility: CLINIC | Age: 84
End: 2024-02-09
Payer: COMMERCIAL

## 2024-02-09 VITALS
TEMPERATURE: 98.7 F | WEIGHT: 154 LBS | DIASTOLIC BLOOD PRESSURE: 64 MMHG | BODY MASS INDEX: 25.66 KG/M2 | HEIGHT: 65 IN | SYSTOLIC BLOOD PRESSURE: 120 MMHG

## 2024-02-09 DIAGNOSIS — K63.5 POLYP OF TRANSVERSE COLON, UNSPECIFIED TYPE: Primary | ICD-10-CM

## 2024-02-09 PROCEDURE — 99213 OFFICE O/P EST LOW 20 MIN: CPT | Performed by: INTERNAL MEDICINE

## 2024-02-09 RX ORDER — POLYETHYLENE GLYCOL 3350, SODIUM SULFATE ANHYDROUS, SODIUM BICARBONATE, SODIUM CHLORIDE, POTASSIUM CHLORIDE 236; 22.74; 6.74; 5.86; 2.97 G/4L; G/4L; G/4L; G/4L; G/4L
4000 POWDER, FOR SOLUTION ORAL ONCE
Qty: 4000 ML | Refills: 0 | Status: SHIPPED | OUTPATIENT
Start: 2024-02-09 | End: 2024-02-09 | Stop reason: ALTCHOICE

## 2024-02-09 RX ORDER — POLYETHYLENE GLYCOL 3350 17 G/17G
238 POWDER, FOR SOLUTION ORAL ONCE
Qty: 238 G | Refills: 0 | Status: SHIPPED | OUTPATIENT
Start: 2024-02-09 | End: 2024-02-09

## 2024-02-09 NOTE — PATIENT INSTRUCTIONS
Scheduled date of colonoscopy (as of today):07.10.24  Physician performing colonoscopy:DR SERRANO  Location of colonoscopy:[facility]  Bowel prep reviewed with patient:DULCOLAX/MIRALAX  Instructions reviewed with patient by:ELIANE  Clearances: SULEMAN

## 2024-02-09 NOTE — PROGRESS NOTES
"[facility] - Outpatient Follow-up Note  Destinee Anders 83 y.o. female MRN: [id number]  Encounter: [id number]          ASSESSMENT AND PLAN:    Destinee Anders is a 83 y.o. 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    Colon Polyp in s/o hx of Colon Cancer and Hemicolectomy  Plan for colonoscopy in the summer with Dr. Buchanan  Mirbrenda prep   Will see Dr. Mcclelland at [facility] cardiology prior to procedure regarding cardiac clearance and holding apixaban prior to polyp removal    1. 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    Last seen 12/21/22 by Dr. Buchanan. At that time, for colon cancer screenign pt preferred CT colonography. Has had q5 year colonoscopies prior to this since original cancer dx. CT colonography showed 6 mm transverse colon polyp, deferred colonoscopy for removal given advanced age and small polyp size.     On discussion today, pt doing well post robotic resection of lung adenocarcinoma Jan 2024. Is agreeable to proceeding with colonoscopy for polyp removal at this time.     REVIEW OF SYSTEMS IS OTHERWISE NEGATIVE.      Historical Information   Past Medical History:   Diagnosis Date    Anesthesia     \"cheap date\"    Arthritis     Atrial fibrillation (HCC)     Benign polyp of large intestine     Cancer of appendix (HCC) 1977    Colon cancer (HCC) 1977    36 yo    Colon polyp  " "   Full dentures     full upper/ partial lower    GERD (gastroesophageal reflux disease)     Glaucoma suspect     History of neck problems     \"C3-C7 and had epidural injections years ago\"    Hyperlipidemia     Hypertension     Lung cancer (HCC)     jan 2020- had surgery    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HCC)     Prediabetes     Primary adenocarcinoma of upper lobe of right lung (HCC) 12/02/2019    Pulmonary nodules 02/19/2015    Refusal of blood transfusions as patient is Alevism     Tic disorder, transient, recurrent     not recent    Walks frequently      Past Surgical History:   Procedure Laterality Date    APPENDECTOMY      ARTHROSCOPY KNEE      BREAST BIOPSY Right 04/10/2017    US guided - intraductal papillomas    BREAST BIOPSY Right 10/24/2019    ADH- dcis    BREAST LUMPECTOMY W/ NEEDLE LOCALIZATION Right 05/22/2020    Procedure: EXCISIONAL BIOPSY;  1000 NEEDLE LOC;  Surgeon: Hortencia Pennington MD;  Location: [facility];  Service: Surgical Oncology    CARDIOVASCULAR STRESS TEST      CARPAL TUNNEL RELEASE Left 07/01/2022    CATARACT EXTRACTION Bilateral     COLON SURGERY      COLONOSCOPY      done 2010 due 2015     DILATION AND CURETTAGE OF UTERUS      ESOPHAGOGASTRODUODENOSCOPY      inflammation aonly    HEMICOLECTOMY Right 1977    HYSTERECTOMY      JOINT REPLACEMENT      L knee    LUNG SEGMENTECTOMY Right 1/19/2024    Procedure: Robotic right lower lobe wedge, lymph node dissection;  Surgeon: Chris Cheng MD;  [facility]: [facility];  Service: Thoracic    LUNG SURGERY      MAMMO NEEDLE LOCALIZATION RIGHT (ALL INC) Right 05/22/2020    MAMMO NEEDLE LOCALIZATION RIGHT (ALL INC) EACH ADD Right 05/22/2020    MAMMO STEREOTACTIC BREAST BIOPSY RIGHT (ALL INC) Right 10/24/2019    PARATHYROID GLAND SURGERY      exploration     [place] [facility] INCL FLUOR GDNCE DX W/CELL WASHG SPX N/A 01/17/2020    Procedure: FLEXIBLE BRONCHOSCOPY;  Surgeon: Chris Cheng MD;  Location: [facility];  Service: Thoracic    [place] " [facility] INCL FLUOR GDNCE DX W/CELL WASHG SPX N/A 2024    Procedure: BRONCHOSCOPY FLEXIBLE;  Surgeon: Chris Cheng MD;  [facility]: [facility];  Service: Thoracic    NM THORACOSCOPY W/LOBECTOMY SINGLE LOBE Right 2020    Procedure: ROBOTIC ASSISTED COMPLETE LUNG LOBECTOMY;  Surgeon: Chris Cheng MD;  [facility]: [facility];  Service: Thoracic    NM THORACOSCOPY W/THERA WEDGE RESEXN INITIAL UNILAT Right 2020    Procedure: ROBOTIC ASSISTED UPPER-LOBE WEDGE RESECTION, MEDIASTINAL LYMPH NODE DISECTION;  Surgeon: Chris Cheng MD;  [facility]: [facility];  Service: Thoracic    REPLACEMENT TOTAL KNEE      THYROID SURGERY      TOTAL ABDOMINAL HYSTERECTOMY  1983    TOTAL THYROIDECTOMY      TRIGGER FINGER RELEASE Left 2022    US GUIDED BREAST BIOPSY RIGHT COMPLETE Right 04/10/2017    US GUIDED THYROID BIOPSY  2020     Social History   Social History     Substance and Sexual Activity   Alcohol Use Yes    Alcohol/week: 5.0 standard drinks of alcohol    Types: 5 Cans of beer per week    Comment: occasional beer with food     Social History     Substance and Sexual Activity   Drug Use No     Social History     Tobacco Use   Smoking Status Former    Current packs/day: 0.00    Average packs/day: 1.5 packs/day for 20.0 years (30.0 ttl pk-yrs)    Types: Cigarettes    Start date:     Quit date:     Years since quittin.1    Passive exposure: Past   Smokeless Tobacco Never     Family History   Problem Relation Age of Onset    Esophageal cancer Mother         80s    Hypertension Mother     Stroke Family         TIA    Breast cancer Paternal Aunt 95    No Known Problems Father     COPD Sister     COPD Sister     No Known Problems Maternal Grandmother     No Known Problems Maternal Grandfather     No Known Problems Paternal Grandmother     No Known Problems Paternal Grandfather     No Known Problems Maternal Aunt     No Known Problems Maternal Aunt     No Known Problems Maternal Aunt      "No Known Problems Maternal Aunt     No Known Problems Maternal Aunt        Meds/Allergies       Current Outpatient Medications:     amoxicillin-clavulanate (AUGMENTIN) 875-125 mg per tablet    apixaban (ELIQUIS) 5 mg    atorvastatin (LIPITOR) 80 mg tablet    ciclopirox (LOPROX) 0.77 % SUSP    ciclopirox (PENLAC) 8 % solution    diltiazem (CARDIZEM CD) 120 mg 24 hr capsule    docusate sodium (COLACE) 100 mg capsule    EPINEPHrine (EpiPen 2-Pa) 0.3 mg/0.3 mL SOAJ    EPINEPHrine (EPIPEN) 0.3 mg/0.3 mL SOAJ    gabapentin (NEURONTIN) 100 mg capsule    levothyroxine 100 mcg tablet    Multiple Vitamins-Calcium (ESSENTIAL ONE DAILY MULTIVIT PO)    omeprazole (PriLOSEC) 20 mg delayed release capsule    Allergies   Allergen Reactions    Bee Venom      Tongue swelling    Flurbiprofen Hives    Ibuprofen Shortness Of Breath    Levaquin [Levofloxacin In D5w] Other (See Comments)     Dark spots under eyes and wrists    Tequin [Gatifloxacin] Rash           Objective     Blood pressure 120/64, temperature 98.7 °F (37.1 °C), temperature source Tympanic, height 5' 4.5\" (1.638 m), weight 69.9 kg (154 lb), not currently breastfeeding.   Body mass index is 26.03 kg/m².  Wt Readings from Last 3 Encounters:   02/09/24 69.9 kg (154 lb)   02/07/24 68.9 kg (152 lb)   01/20/24 75.2 kg (165 lb 12.6 oz)        PHYSICAL EXAM:      General Appearance:   Alert, cooperative, no distress   HEENT:   Normocephalic, atraumatic, anicteric.     Neck:  Supple, symmetrical, trachea midline   Lungs:   No respiratory distress   Heart::   Regular rate   Abdomen:   Soft, mild TTP at surgical site otherwise benign exam, non-distended   Genitalia:   Deferred    Rectal:   Deferred    Extremities:  No cyanosis, clubbing or edema    Pulses:  2+ and symmetric    Skin:  No jaundice, rashes, or lesions    Lymph nodes:  No palpable cervical lymphadenopathy        Lab Results:       Lab Units 01/20/24  0433 01/19/24  0958 01/10/24  0708 09/25/23  0702 03/28/23  0711 " "09/08/22  1204 06/14/22 2000   SODIUM mmol/L 136 138 139 140 137   < > 138   POTASSIUM mmol/L 4.0 4.2 4.1 4.1 4.4   < > 4.2   CHLORIDE mmol/L 103 108 105 105 106   < > 110*   CO2 mmol/L 27 25 26 28 28   < > 26   BUN mg/dL 15 20 20 17 19   < > 15   CREATININE mg/dL 1.02 1.00 0.95 1.07 1.16   < > 1.40*   GLUCOSE RANDOM mg/dL 133 113  --   --   --   --  248*   CALCIUM mg/dL 8.6 8.6 9.6 9.3 9.1   < > 9.1   MAGNESIUM mg/dL 2.1 1.7*  --   --   --   --   --     < > = values in this interval not displayed.            Lab Units 09/25/23  0702 03/28/23  0711   TOTAL PROTEIN g/dL 7.6 7.6   ALBUMIN g/dL 3.7 3.3*   TOTAL BILIRUBIN mg/dL 0.64 0.62   AST U/L 23 27   ALT U/L 18 27   ALK PHOS U/L 97 104           Lab Units 01/20/24  0433 01/19/24  0958 01/10/24  0708 09/25/23  0702 09/08/22  1204 06/14/22 2000   WBC Thousand/uL 7.75 5.41 5.19 4.45 4.84 5.25   HEMOGLOBIN g/dL 11.0* 10.4* 12.5 12.2 13.2 13.4   HEMATOCRIT % 34.0* 31.9* 38.2 38.0 39.8 40.1   PLATELETS Thousands/uL 204 181 201 238 199 187   MCV fL 98 98 100* 101* 99* 99*   MCH pg 31.6 32.0 32.6 32.3 32.9 32.9   MCHC g/dL 32.4 32.6 32.7 32.1 33.2 33.4   RDW % 14.3 14.2 14.1 14.1 13.2 14.0   MPV fL 9.4 8.9 9.5 9.3 9.8 9.5   NRBC AUTO /100 WBCs  --   --   --   --   --  0   NEUTROS PCT %  --   --   --   --   --  89*   IMMATURE GRANULOCYTES % %  --   --   --   --   --  0   LYMPHS PCT %  --   --   --   --   --  9*   MONOS PCT %  --   --   --   --   --  1*   EOS PCT %  --   --   --   --   --  0   BASOS PCT %  --   --   --   --   --  1   NEUTROS ABS Thousands/µL  --   --   --   --   --  4.66   IMMATURE GRANULOCYES ABS Thousand/uL  --   --   --   --   --  0.02   LYMPHS ABS Thousands/µL  --   --   --   --   --  0.49*   MONOS ABS Thousand/µL  --   --   --   --   --  0.05*   EOS ABS Thousand/µL  --   --   --   --   --  0.00   BASOS ABS Thousands/µL  --   --   --   --   --  0.03       No results for input(s): \"IRON\", \"TRANSFERRIN\", \"TRANSFERSAT\", \"FERRITIN\", \"RETIC\" in the last " "02541 hours.    No results found for: \"CRP\"    Lab Results   Component Value Date    [id number] 4.520 (H) 03/28/2023       Radiology Results:   XR chest pa & lateral    Result Date: 2/6/2024  Narrative: XR CHEST PA & LATERAL INDICATION: C34.11: Malignant neoplasm of upper lobe, right bronchus or lung. COMPARISON: 1/20/2024 FINDINGS: Status post right lower lobe wedge resection with scarring noted. No pneumothorax or pleural effusion. Normal cardiomediastinal silhouette. Bones are unremarkable for age. Normal upper abdomen.     Impression: Status post right lower lobe wedge resection with scarring noted. No pneumothorax. Workstation performed: [id number]     XR chest pa & lateral    Result Date: 1/20/2024  Narrative: CHEST INDICATION:   Chest tube removal. COMPARISON: 1/19/2024 EXAM PERFORMED/VIEWS:  XR CHEST PA & LATERAL FINDINGS: Interval removal of right chest tube. Cardiomediastinal silhouette appears unremarkable. Bibasilar atelectasis. No pneumothorax or pleural effusion. Right lower lateral chest wall subcutaneous emphysema. Osseous structures appear within normal limits for patient age. Spine and shoulder degenerative change.     Impression: Bibasilar atelectasis. No pneumothorax visualized. Workstation performed: [id number]     XR chest portable    Result Date: 1/19/2024  Narrative: CHEST INDICATION:   s/p r wedge resection. COMPARISON: 2/3/2020, 12/7/2023, 12/28/2023 EXAM PERFORMED/VIEWS:  XR CHEST PORTABLE FINDINGS: Right-sided chest tube. Technically limited study. No definite pneumothorax. No pleural effusion. Bibasilar reticular opacity is favored to represent atelectasis. No consolidation or edema. Stable cardiomediastinal silhouette.     Impression: Technically limited study. No definite pneumothorax. Follow-up radiographic imaging recommended. Workstation performed: [id number]     Wali Isidro MD  PGY-5 Gastroenterology Fellow  2/9/2024 9:37 AM    "

## 2024-02-20 LAB
CARIS ALK: NORMAL
CARIS GENOMIC LOH - EXOME: NORMAL
CARIS MSI - EXOME: NORMAL
CARIS PD-L1 (22C3): POSITIVE
CARIS PD-L1 (SP263): POSITIVE
CARIS PD-L1 FDA (28-8): POSITIVE
CARIS PD-L1 FDA(SP142): NEGATIVE
CARIS PTEN: POSITIVE
CARIS TMB - EXOME: NORMAL

## 2024-02-27 ENCOUNTER — TELEPHONE (OUTPATIENT)
Dept: HEMATOLOGY ONCOLOGY | Facility: CLINIC | Age: 84
End: 2024-02-27

## 2024-02-27 DIAGNOSIS — E89.0 POSTOPERATIVE HYPOTHYROIDISM: ICD-10-CM

## 2024-02-27 RX ORDER — LEVOTHYROXINE SODIUM 0.1 MG/1
TABLET ORAL
Qty: 90 TABLET | Refills: 1 | Status: SHIPPED | OUTPATIENT
Start: 2024-02-27

## 2024-02-27 NOTE — TELEPHONE ENCOUNTER
Appointment Confirmation   Who are you speaking with? Patient   If it is not the patient, are they listed on an active communication consent form? N/A   Which provider is the appointment scheduled with?  Dr. Najera   When is the appointment scheduled?  Please list date and time 3/14/24 @ 9am   At which location is the appointment scheduled to take place? [place]   Did caller verbalize understanding of appointment details? Yes

## 2024-02-29 ENCOUNTER — TELEPHONE (OUTPATIENT)
Age: 84
End: 2024-02-29

## 2024-03-05 DIAGNOSIS — T63.441A ALLERGIC REACTION TO BEE STING: Primary | ICD-10-CM

## 2024-03-05 RX ORDER — EPINEPHRINE 0.3 MG/.3ML
INJECTION SUBCUTANEOUS
Qty: 2 EACH | Refills: 1 | Status: SHIPPED | OUTPATIENT
Start: 2024-03-05

## 2024-03-11 ENCOUNTER — HOSPITAL ENCOUNTER (OUTPATIENT)
Dept: RADIOLOGY | Facility: HOSPITAL | Age: 84
Discharge: HOME | End: 2024-03-11
Payer: COMMERCIAL

## 2024-03-11 ENCOUNTER — TELEPHONE (OUTPATIENT)
Dept: CARDIAC SURGERY | Facility: CLINIC | Age: 84
End: 2024-03-11

## 2024-03-11 DIAGNOSIS — C34.31 ADENOCARCINOMA OF LOWER LOBE OF RIGHT LUNG (HCC): ICD-10-CM

## 2024-03-11 PROCEDURE — 71046 X-RAY EXAM CHEST 2 VIEWS: CPT

## 2024-03-11 NOTE — TELEPHONE ENCOUNTER
I spoke to pt in regards to follow-up appointment with Dr. Cheng on 03/13/24. I informed pt that she will need to get a CXR prior to the appointment. Pt verbalized understanding and was appreciative of the call.

## 2024-03-13 ENCOUNTER — OFFICE VISIT (OUTPATIENT)
Dept: CARDIAC SURGERY | Facility: CLINIC | Age: 84
End: 2024-03-13
Payer: COMMERCIAL

## 2024-03-13 VITALS
DIASTOLIC BLOOD PRESSURE: 79 MMHG | SYSTOLIC BLOOD PRESSURE: 130 MMHG | RESPIRATION RATE: 14 BRPM | HEART RATE: 84 BPM | WEIGHT: 154 LBS | TEMPERATURE: 98 F | OXYGEN SATURATION: 96 % | BODY MASS INDEX: 25.66 KG/M2 | HEIGHT: 65 IN

## 2024-03-13 DIAGNOSIS — C34.11 ADENOCARCINOMA OF UPPER LOBE OF RIGHT LUNG (HCC): Primary | ICD-10-CM

## 2024-03-13 DIAGNOSIS — C34.31 ADENOCARCINOMA OF LOWER LOBE OF RIGHT LUNG (HCC): ICD-10-CM

## 2024-03-13 PROCEDURE — 99213 OFFICE O/P EST LOW 20 MIN: CPT | Performed by: THORACIC SURGERY (CARDIOTHORACIC VASCULAR SURGERY)

## 2024-03-13 PROCEDURE — G2211 COMPLEX E/M VISIT ADD ON: HCPCS | Performed by: THORACIC SURGERY (CARDIOTHORACIC VASCULAR SURGERY)

## 2024-03-13 NOTE — ASSESSMENT & PLAN NOTE
Ms Anders presents for a second post op visit about 8 weeks out from a robotic-assisted right lower lobe wedge resection for Stage IB adenocarcinoma. She had previous Stage IB adenocarcinoma of the right upper lobe as well and had a right upper lobectomy on 1/17/2020. She is recovering well. She has mild pain controlled with prn Tylenol. She did have biliary dilation on her PET-CT pre-operatively and thus I will order an MRI liver to follow this up. She is scheduled to see Dr Najera tomorrow to review Caris testing results and if any other treatment is warranted. We will see her back in July for a surveillance visit with CT chest. All questions were answered and she is in agreement with this plan.

## 2024-03-13 NOTE — PROGRESS NOTES
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 She had previous Stage IB adenocarcinoma of the right upper lobe as well and had a right upper lobectomy on 1/17/2020. She is recovering well. She has mild pain controlled with prn Tylenol. She did have biliary dilation on her PET-CT pre-operatively and thus I will order an MRI liver to follow this up. She is scheduled to see Dr Najera tomorrow to review Caris testing results and if any other treatment is warranted. We will see her back in July for a surveillance visit with CT chest. All questions were answered and she is in agreement with this plan.        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)  Staging form: Lung, AJCC 8th Edition  - Pathologic stage from 1/19/2024: Stage IB (pT2a, pN0, cM0) - Signed by Katelyn Avalos PA-C on 2/7/2024  Histologic grade (G): G2  Histologic grading system: 4 grade system  Residual tumor (R): R0 - None  Laterality: Right  Lymph-vascular invasion (LVI): LVI not present (absent)/not identified    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)  Staging form: Lung, AJCC 8th Edition  - Pathologic stage from 1/17/2020: Stage IB (pT2a, pN0, cM0) - Signed by Gudelia Giraldo PA-C on 12/11/2023  Histopathologic type: Adenocarcinoma, NOS  Histologic grade (G): G2  Histologic grading system: 4 grade system    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  Breast, right, lumpectomy:  -  Ductal carcinoma in situ, low nuclear grade .     B.  Breast, right new inferior margin, excision:  -  Ductal carcinoma in situ, low nuclear grade involving intraductal papilloma.     C.  Breast, right new lateral margin, excision:  -  Ductal carcinoma in situ, low nuclear grade.     D.  Breast, right new posterior margin, excision:  -  Benign breast parenchyma, negative for atypia or malignancy.    %, TN 60-70%, HER2 1+ negative     6/9/2020 -  Cancer Staged    Staging form: Breast, AJCC 8th Edition  - Clinical: cT0, cN0, cM0 - Signed by Hortencia Pennington MD on 6/9/2020  Laterality: Right       6/9/2020 -  Cancer Staged    Staging form: Breast, AJCC 8th Edition  - Pathologic: Stage 0 (pTis (DCIS), pN0, cM0, ER+, TN+, HER2-) - Signed by Hortencia Pennington MD on 6/16/2020  Laterality: Right  Method of lymph node assessment: Clinical  Nuclear grade: G1       7/30/2020 - 8/28/2020 Radiation    Plan ID Energy Fractions Dose per Fraction (cGy) Dose Correction (cGy) Total Dose Delivered (cGy) Elapsed Days   R Breast 6X 16 / 16 266 0 4,256 21   R Brst Boost 6X 5 / 5 200 0 1,000 4        Primary adenocarcinoma of upper lobe of right lung (HCC) (Resolved)   12/2/2019 Initial Diagnosis    Primary adenocarcinoma of upper lobe of right lung (HCC)     1/17/2020 Surgery    Lung, right upper lobe, wedge resection:  -  Invasive moderately differentiated adenocarcinoma.  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 female.    HPI  Ms Felipe is an 83 year old female who presents for a second post-operative visit after a robotic-assisted right lower lobe wedge resection for Stage Ib adenocarcinoma on 1/19/2024.  She also has a history of a right upper lobe Stage IB adenocarcinoma and is s/p a right upper lobectomy on 1/17/2020.She was last seen in our office on 2/7/23 at which point she was recovering well. CXR at that time showed a small right pleural effusion and she now presents for a 4 week follow up.     CXR on 3/11/2024 was personally reviewed by myself in PACS and shows a very small residual right pleural effusion, Persistent scarring of the right lower lobe.     On discussion today she is feeling well. She has some residual right sided pain, she takes an occasional Tylenol. Her breathing normal and  has no SOB. Denies cough or hemoptysis. Denies fevers/chills or recent illness.     The following portions of the patient's history were reviewed and updated as appropriate: allergies, current medications, past family history, past medical history, past social history, past surgical history, and problem list.    Review of Systems   Constitutional:  Negative for chills, diaphoresis and unexpected weight change.   HENT: Negative.     Eyes: Negative.    Respiratory:  Negative for cough, shortness of breath and wheezing.    Cardiovascular:  Negative for chest pain and leg swelling.   Gastrointestinal:  Negative for abdominal pain, diarrhea and nausea.   Endocrine: Negative.    Genitourinary: Negative.    Musculoskeletal: Negative.    Skin: Negative.    Allergic/Immunologic: Negative.    Neurological: Negative.    Hematological:  Negative for adenopathy. Does not bruise/bleed easily.   Psychiatric/Behavioral: Negative.     All other systems reviewed and are negative.        Objective:   Physical Exam  Vitals reviewed.   Constitutional:       General: She is not in acute distress.     Appearance: Normal appearance. She is not ill-appearing.   HENT:      Head: Normocephalic.      Nose: Nose normal.      Mouth/Throat:      Mouth: Mucous membranes are moist.   Eyes:      Pupils: Pupils are equal, round, and reactive to light.   Cardiovascular:      Rate and Rhythm: Normal rate and regular rhythm.      Heart sounds: Normal heart sounds.   Pulmonary:      Effort: Pulmonary effort is normal.      Breath sounds: Normal breath sounds. No wheezing, rhonchi or rales.   Chest:      Comments: Surgical incisions well healed  Abdominal:      Palpations: Abdomen is soft.   Musculoskeletal:         General: No swelling. Normal range of motion.   Lymphadenopathy:      Cervical: No cervical adenopathy.   Skin:     General: Skin is warm.   Neurological:      General: No focal deficit present.      Mental Status: She is alert and oriented to  "person, place, and time.   Psychiatric:         Mood and Affect: Mood normal.     /79 (BP Location: Right arm, Patient Position: Sitting, Cuff Size: Standard)   Pulse 84   Temp 98 °F (36.7 °C) (Temporal)   Resp 14   Ht 5' 4.5\" (1.638 m)   Wt 69.9 kg (154 lb)   SpO2 96%   BMI 26.03 kg/m²     XR chest pa & lateral    Result Date: 3/11/2024  Impression Very small residual right pleural effusion decreased from prior. Persistent scarring in the base of the right lower lobe, little changed in appearance from prior. No airspace infiltrates or effusions. Mild lingular atelectasis. Electronically signed: 03/11/2024 05:57 PM Franck Jonas MD    XR chest pa & lateral    Result Date: 2/6/2024  Impression Status post right lower lobe wedge resection with scarring noted. No pneumothorax. Workstation performed: [id number]     XR chest pa & lateral    Result Date: 1/20/2024  Impression Bibasilar atelectasis. No pneumothorax visualized. Workstation performed: [id number]      CT chest wo contrast    Result Date: 12/9/2023  Narrative CT CHEST WITHOUT IV CONTRAST INDICATION:   Z85.118: Personal history of other malignant neoplasm of bronchus and lung. COMPARISON: Chest CT  12/2/2022. TECHNIQUE: CT examination of the chest was performed without intravenous contrast. Multiplanar 2D reformatted images were created from the source data. This examination, like all CT scans performed in the [facility], was performed utilizing techniques to minimize radiation dose exposure, including the use of iterative reconstruction and automated exposure control. Radiation dose length product (DLP) for this visit:  212.34 mGy-cm FINDINGS: LUNGS: Increased size of now 9 mm solid pulmonary nodule in the right lower lobe (series 3, image 166), previously 3 mm. Right lower lobe subpleural nodule is unchanged (series 3, image 159). Stable postoperative changes status post right upper lobectomy. PLEURA: No pleural effusion. No " pneumothorax. HEART/GREAT VESSELS: Normal heart size. Trace coronary artery calcifications. No thoracic aortic aneurysm. MEDIASTINUM AND SELENA:  Unremarkable. CHEST WALL AND LOWER NECK: Right thyroidectomy. Postoperative changes in the right breast including unchanged 1.2 cm fluid collection previously evaluated by ultrasound. VISUALIZED STRUCTURES IN THE UPPER ABDOMEN:  Unremarkable. OSSEOUS STRUCTURES:  No acute fracture or destructive osseous lesion.     Impression Enlarging solid pulmonary nodule in the right lower lobe, now 9 mm. Finding is suspicious for neoplasm, recommend further evaluation with PET/CT and/or tissue sampling. The study was marked in EPIC for significant notification. Workstation performed: [id number]     No CT Chest,Abdomen,Pelvis results available for this patient.   NM PET CT skull base to mid thigh    Result Date: 12/28/2023  Narrative PET/CT SCAN INDICATION: History of lung cancer. Restaging exam. Enlarging right lower lobe nodule. Additional history of colon cancer status post resection in 1997. Additional history of right breast cancer resected in 2020. R91.1: Solitary pulmonary nodule Z85.118: Personal history of other malignant neoplasm of bronchus and lung MODIFIER: PS COMPARISON: PET/CT 12/20/2019, CT chest exam 12/7/2023 and additional priors CELL TYPE: Right upper lobe adenocarcinoma TECHNIQUE:   8.6 mCi F-18-FDG administered IV. Multiplanar attenuation corrected and non attenuation corrected PET images are available for interpretation, and contiguous, low dose, axial CT sections were obtained from the skull base through the femurs. Intravenous contrast material was not utilized. This examination, like all CT scans performed in the [facility], was performed utilizing techniques to minimize radiation dose exposure, including the use of iterative reconstruction and automated exposure control. Fasting serum glucose: 93 mg/dl FINDINGS: VISUALIZED BRAIN: No acute  abnormalities are seen. HEAD/NECK: Diffuse radiotracer uptake in the remaining left lobe of the thyroid SUV max of 15, slightly increased from prior, previously 10.6. Question related to thyroiditis. No FDG-avid lymph nodes. CT images: Right lobe of the thyroid appears absent. CHEST: There does appear to be discrete mild FDG uptake in the 9 mm right lower lobe nodule SUV max of 2.1. This is higher than background lung activity, SUV max 0.8. No suspicious focal uptake in the mediastinum or perihilar regions. Mild asymmetric uptake at the right breast, SUV max of 2.8. This is new from prior PET/CT however CT appearance is stable with suggestion of underlying 1.3 cm loculated collection image 86 series 3. Decreasing collection noted here on serial exams. Activity is probably inflammatory. Mild FDG uptake in the upper esophagus may be physiologic or inflammatory. CT images: Status post right upper lobectomy. Suggestion of a small fat-containing Bochdalek hernia on the right. ABDOMEN: No FDG avid soft tissue lesions are seen. Now suggestion of segmental biliary ductal dilatation in the left lobe of the liver laterally, new from prior PET/CT. See for example images 121 to 126 series 3. No associated abnormal FDG uptake. CT images: Surgical staple line along the proximal colon. Colonic diverticulosis. PELVIS: No FDG avid soft tissue lesions are seen. CT images: Unremarkable. OSSEOUS STRUCTURES: No FDG avid lesions are seen. Scattered mild foci of uptake in the spine corresponding to degenerative changes on the CT. CT images: Multilevel degenerative changes of the spine.     Impression 1. Mild but discrete FDG uptake in the enlarging 9 mm right lower lobe nodule. Malignancy should be excluded. 2. No findings for hypermetabolic metastasis. 3. Now suggestion of segmental biliary ductal dilatation in the left lobe of the liver laterally, new from prior PET/CT. Recommend further evaluation with dedicated MRI of the liver to  assess for any possible underlying hepatobiliary lesion. The study was marked in EPIC for significant notification. Workstation performed: [id number]      No Barium Swallow results available for this patient.

## 2024-03-14 ENCOUNTER — OFFICE VISIT (OUTPATIENT)
Dept: HEMATOLOGY ONCOLOGY | Facility: CLINIC | Age: 84
End: 2024-03-14
Payer: COMMERCIAL

## 2024-03-14 VITALS
TEMPERATURE: 97.2 F | BODY MASS INDEX: 25.66 KG/M2 | OXYGEN SATURATION: 98 % | DIASTOLIC BLOOD PRESSURE: 76 MMHG | RESPIRATION RATE: 17 BRPM | WEIGHT: 154 LBS | HEART RATE: 61 BPM | SYSTOLIC BLOOD PRESSURE: 132 MMHG | HEIGHT: 65 IN

## 2024-03-14 DIAGNOSIS — Z86.000 PERSONAL HISTORY OF IN-SITU NEOPLASM OF BREAST: ICD-10-CM

## 2024-03-14 DIAGNOSIS — C34.31 ADENOCARCINOMA OF LOWER LOBE OF RIGHT LUNG (HCC): Primary | ICD-10-CM

## 2024-03-14 DIAGNOSIS — C34.11 ADENOCARCINOMA OF UPPER LOBE OF RIGHT LUNG (HCC): ICD-10-CM

## 2024-03-14 PROCEDURE — 99205 OFFICE O/P NEW HI 60 MIN: CPT | Performed by: INTERNAL MEDICINE

## 2024-03-14 NOTE — LETTER
March 14, 2024     Janet Chavez,   [address] [facility]  [address]  [facility] [address]    Patient: Destinee Anders   YOB: 1940   Date of Visit: 3/14/2024       Dear Dr. Chavez:    Thank you for referring Destinee Anders to me for evaluation. Below are my notes for this consultation.    If you have questions, please do not hesitate to call me. I look forward to following your patient along with you.         Sincerely,        Wali Najera,         CC: MD Hortencia Gutierrez MD Neil David Belman,   3/14/2024  9:50 AM  Sign when Signing Visit    Destinee Anders  1940  [facility] [address] [address]  [phone]    Chief Complaint   Patient presents with   • Consult         Oncology History   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  Breast, right, lumpectomy:  -  Ductal carcinoma in situ, low nuclear grade .     B.  Breast, right new inferior margin, excision:  -  Ductal carcinoma in situ, low nuclear grade involving intraductal papilloma.     C.  Breast, right new lateral margin, excision:  -  Ductal carcinoma in situ, low nuclear grade.     D.  Breast, right new posterior margin, excision:  -  Benign breast parenchyma, negative for atypia or malignancy.    %, MO 60-70%, HER2 1+ negative     6/9/2020 -  Cancer Staged    Staging form: Breast, AJCC 8th Edition  - Clinical: cT0, cN0, cM0 - Signed by Hortencia Pennington MD on 6/9/2020  Laterality: Right       6/9/2020 -  Cancer Staged    Staging form: Breast, AJCC 8th Edition  - Pathologic: Stage 0 (pTis (DCIS), pN0, cM0, ER+, MO+, HER2-) - Signed by Hortencia Pennington MD on 6/16/2020  Laterality: Right  Method of lymph node assessment: Clinical  Nuclear grade: G1       7/30/2020 - 8/28/2020 Radiation    Plan ID Energy Fractions Dose  per Fraction (cGy) Dose Correction (cGy) Total Dose Delivered (cGy) Elapsed Days   R Breast 6X 16 / 16 266 0 4,256 21   R Brst Boost 6X 5 / 5 200 0 1,000 4        Primary adenocarcinoma of upper lobe of right lung (HCC) (Resolved)   12/2/2019 Initial Diagnosis    Primary adenocarcinoma of upper lobe of right lung (HCC)     1/17/2020 Surgery    Lung, right upper lobe, wedge resection:  -  Invasive moderately differentiated adenocarcinoma.  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  Resected.  No adjuvant therapy.  Records unavailable regarding histology.  Partial thyroidectomy in approximately 1990 for malignancy.  Observation followed.  2019 patient was found to have a right breast mass.  In preoperative workup she was also found to have a right lung  lesion.    November 17, 2020 right upper lobectomy showed adenocarcinoma, T2 a, N0.  No adjuvant therapy.  June 9, 2020 patient underwent right breast lumpectomy for DCIS.  Postoperative RT.  With surveillance scanning new right lower lobe 1 cm mass identified.  Hypermetabolism on PET/CT.  January 19, 2024 right lower lobe wedge resection showed 1 cm adenocarcinoma.  No lymphovascular invasion.  Invasion through visceral pleural surface noted.  Level 7 lymph nodes negative x 2.  Caris showed PD-L1 50%, TMB high, MSI stable.  EGFR exon 19 pathogenic variant [id number] del.  Otherwise wild-type.    Review of Systems   Constitutional:  Negative for appetite change, diaphoresis, fatigue and fever.   HENT:  Negative for sinus pain.    Eyes:  Negative for discharge.   Respiratory:  Negative for cough and shortness of breath.    Cardiovascular:  Negative for chest pain.   Gastrointestinal:  Negative for abdominal pain, constipation and diarrhea.   Endocrine: Negative for cold intolerance.   Genitourinary:  Negative for difficulty urinating and hematuria.   Musculoskeletal:  Negative for joint swelling.   Skin:  Negative for rash.   Allergic/Immunologic: Negative for environmental allergies.   Neurological:  Negative for dizziness and headaches.   Hematological:  Negative for adenopathy.   Psychiatric/Behavioral:  Negative for agitation.        Patient Active Problem List   Diagnosis   • Persistent atrial fibrillation (HCC)   • Benign essential hypertension   • Long term (current) use of anticoagulants   • History of colon cancer   • Overweight   • Osteoarthritis   • Postoperative hypothyroidism   • Pulmonary nodule   • Mixed hyperlipidemia   • Encounter for geriatric assessment   • Osteopenia of multiple sites   • 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   • Dense breast tissue   • Hashimoto's thyroiditis   • 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)   • Thyroid nodule   • Encounter for follow-up surveillance  "of breast cancer   • Stage 3a chronic kidney disease (HCC)   • Refusal of blood transfusions as patient is Roman Catholic   • Gastroesophageal reflux disease   • Adenocarcinoma of lower lobe of right lung (HCC)     Past Medical History:   Diagnosis Date   • Anesthesia     \"cheap date\"   • Arthritis    • Atrial fibrillation (HCC)    • Benign polyp of large intestine    • Cancer of appendix (HCC) 1977   • Colon cancer (HCC) 1977    36 yo   • Colon polyp    • Full dentures     full upper/ partial lower   • GERD (gastroesophageal reflux disease)    • Glaucoma suspect    • History of neck problems     \"C3-C7 and had epidural injections years ago\"   • Hyperlipidemia    • Hypertension    • Lung cancer (HCC)     jan 2020- had surgery   • 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HCC)    • Prediabetes    • Primary adenocarcinoma of upper lobe of right lung (HCC) 12/02/2019   • Pulmonary nodules 02/19/2015   • Refusal of blood transfusions as patient is Roman Catholic    • Tic disorder, transient, recurrent     not recent   • Walks frequently      Past Surgical History:   Procedure Laterality Date   • APPENDECTOMY     • ARTHROSCOPY KNEE     • BREAST BIOPSY Right 04/10/2017    US guided - intraductal papillomas   • BREAST BIOPSY Right 10/24/2019    ADH- dcis   • BREAST LUMPECTOMY W/ NEEDLE LOCALIZATION Right 05/22/2020    Procedure: EXCISIONAL BIOPSY;  1000 NEEDLE LOC;  Surgeon: Hortencia Pennington MD;  Location: [facility];  Service: Surgical Oncology   • CARDIOVASCULAR STRESS TEST     • CARPAL TUNNEL RELEASE Left 07/01/2022   • CATARACT EXTRACTION Bilateral    • COLON SURGERY     • COLONOSCOPY      done 2010 due 2015    • DILATION AND CURETTAGE OF UTERUS     • ESOPHAGOGASTRODUODENOSCOPY      inflammation aonly   • HEMICOLECTOMY Right 1977   • HYSTERECTOMY     • JOINT REPLACEMENT      L knee   • LUNG SEGMENTECTOMY Right 1/19/2024    Procedure: Robotic right lower lobe wedge, lymph node dissection;  Surgeon: Chris Cheng MD;  " Location: [facility];  Service: Thoracic   • LUNG SURGERY     • MAMMO NEEDLE LOCALIZATION RIGHT (ALL INC) Right 05/22/2020   • MAMMO NEEDLE LOCALIZATION RIGHT (ALL INC) EACH ADD Right 05/22/2020   • MAMMO STEREOTACTIC BREAST BIOPSY RIGHT (ALL INC) Right 10/24/2019   • PARATHYROID GLAND SURGERY      exploration    • [facility] INCL FLUOR GDNCE DX W/CELL WASHG SPX N/A 01/17/2020    Procedure: FLEXIBLE BRONCHOSCOPY;  Surgeon: Chris Cheng MD;  Location: [facility];  Service: Thoracic   • [facility] INCL FLUOR GDNCE DX W/CELL WASHG SPX N/A 1/19/2024    Procedure: BRONCHOSCOPY FLEXIBLE;  Surgeon: Chris Cheng MD;  [facility];  Service: Thoracic   • OH THORACOSCOPY W/LOBECTOMY SINGLE LOBE Right 01/17/2020    Procedure: ROBOTIC ASSISTED COMPLETE LUNG LOBECTOMY;  Surgeon: Chris Cheng MD;  [facility];  Service: Thoracic   • OH THORACOSCOPY W/THERA WEDGE RESEXN INITIAL UNILAT Right 01/17/2020    Procedure: ROBOTIC ASSISTED UPPER-LOBE WEDGE RESECTION, MEDIASTINAL LYMPH NODE DISECTION;  Surgeon: Chris Cheng MD;  [facility]: [facility];  Service: Thoracic   • REPLACEMENT TOTAL KNEE     • THYROID SURGERY     • TOTAL ABDOMINAL HYSTERECTOMY  1983   • TOTAL THYROIDECTOMY     • TRIGGER FINGER RELEASE Left 07/01/2022   • US GUIDED BREAST BIOPSY RIGHT COMPLETE Right 04/10/2017   • US GUIDED THYROID BIOPSY  02/14/2020     Family History   Problem Relation Age of Onset   • Esophageal cancer Mother         80s   • Hypertension Mother    • Stroke Family         TIA   • Breast cancer Paternal Aunt 95   • No Known Problems Father    • COPD Sister    • COPD Sister    • No Known Problems Maternal Grandmother    • No Known Problems Maternal Grandfather    • No Known Problems Paternal Grandmother    • No Known Problems Paternal Grandfather    • No Known Problems Maternal Aunt    • No Known Problems Maternal Aunt    • No Known Problems Maternal Aunt    • No Known Problems Maternal Aunt    • No Known  Problems Maternal Aunt      Social History     Socioeconomic History   • Marital status: Single     Spouse name: Not on file   • Number of children: Not on file   • Years of education: Not on file   • Highest education level: Not on file   Occupational History   • Not on file   Tobacco Use   • Smoking status: Former     Current packs/day: 0.00     Average packs/day: 1.5 packs/day for 20.0 years (30.0 ttl pk-yrs)     Types: Cigarettes     Start date:      Quit date:      Years since quittin.2     Passive exposure: Past   • Smokeless tobacco: Never   Vaping Use   • Vaping status: Never Used   Substance and Sexual Activity   • Alcohol use: Yes     Alcohol/week: 5.0 standard drinks of alcohol     Types: 5 Cans of beer per week     Comment: occasional beer with food   • Drug use: No   • Sexual activity: Not on file     Comment: hysterectomy   Other Topics Concern   • Not on file   Social History Narrative    Caffeine use- 1-3, 8oz coffees per day    Denied history of housing without smoke detectors    Always uses a seatbelt     Social Determinants of Health     Financial Resource Strain: Low Risk  (2023)    Overall Financial Resource Strain (CARDIA)    • Difficulty of Paying Living Expenses: Not hard at all   Food Insecurity: No Food Insecurity (2024)    Hunger Vital Sign    • Worried About Running Out of Food in the Last Year: Never true    • Ran Out of Food in the Last Year: Never true   Transportation Needs: No Transportation Needs (2024)    PRAPARE - Transportation    • Lack of Transportation (Medical): No    • Lack of Transportation (Non-Medical): No   Physical Activity: Not on file   Stress: Not on file   Social Connections: Not on file   Intimate Partner Violence: Not on file   Housing Stability: Low Risk  (2024)    Housing Stability Vital Sign    • Unable to Pay for Housing in the Last Year: No    • Number of Places Lived in the Last Year: 1    • Unstable Housing in the Last Year:  No       Current Outpatient Medications:   •  amoxicillin-clavulanate (AUGMENTIN) 875-125 mg per tablet, , Disp: , Rfl:   •  apixaban (ELIQUIS) 5 mg, Take 5 mg by mouth 2 (two) times a day To stop 2 days prior to surgery per md, Disp: , Rfl:   •  atorvastatin (LIPITOR) 80 mg tablet, TAKE 1 TABLET BY MOUTH EVERY DAY, Disp: 90 tablet, Rfl: 1  •  ciclopirox (LOPROX) 0.77 % SUSP, , Disp: , Rfl:   •  ciclopirox (PENLAC) 8 % solution, PLEASE SEE ATTACHED FOR DETAILED DIRECTIONS, Disp: , Rfl:   •  docusate sodium (COLACE) 100 mg capsule, Take 1 capsule (100 mg total) by mouth 2 (two) times a day as needed for constipation, Disp: 20 capsule, Rfl: 0  •  EPINEPHrine (EpiPen 2-Pa) 0.3 mg/0.3 mL SOAJ, Inject 0.3 mL (0.3 mg total) into a muscle once as needed for anaphylaxis for up to 1 dose, Disp: 0.6 mL, Rfl: 0  •  EPINEPHrine (EPIPEN) 0.3 mg/0.3 mL SOAJ, INJECT 0.3 ML (0.3 MG TOTAL) INTO A MUSCLE ONCE FOR 1 DOSE, Disp: 2 each, Rfl: 1  •  gabapentin (NEURONTIN) 100 mg capsule, Take 1 capsule (100 mg total) by mouth 3 (three) times a day, Disp: 63 capsule, Rfl: 0  •  levothyroxine 100 mcg tablet, TAKE 1 TABLET BY MOUTH EVERY DAY, Disp: 90 tablet, Rfl: 1  •  Multiple Vitamins-Calcium (ESSENTIAL ONE DAILY MULTIVIT PO), Take by mouth daily , Disp: , Rfl:   •  omeprazole (PriLOSEC) 20 mg delayed release capsule, TAKE 1 CAPSULE (20 MG TOTAL) BY MOUTH AS NEEDED (GERD), Disp: 90 capsule, Rfl: 0  •  diltiazem (CARDIZEM CD) 120 mg 24 hr capsule, Take 120 mg by mouth every evening , Disp: , Rfl:   •  EPINEPHrine (EPIPEN) 0.3 mg/0.3 mL SOAJ, Inject 0.3 mL (0.3 mg total) into a muscle once for 1 dose, Disp: 0.6 mL, Rfl: 0  •  polyethylene glycol (MiraLax) 17 GM/SCOOP powder, Take 238 g by mouth once for 1 dose Take 238 g my mouth. Use as directed, Disp: 238 g, Rfl: 0  Allergies   Allergen Reactions   • Bee Venom      Tongue swelling   • Flurbiprofen Hives   • Ibuprofen Shortness Of Breath   • Levaquin [Levofloxacin In D5w] Other (See  Comments)     Dark spots under eyes and wrists   • Tequin [Gatifloxacin] Rash     Vitals:    03/14/24 0851   BP: 132/76   Pulse: 61   Resp: 17   Temp: (!) 97.2 °F (36.2 °C)   SpO2: 98%       Physical Exam  Constitutional:       Appearance: She is well-developed.   HENT:      Head: Normocephalic and atraumatic.   Eyes:      Pupils: Pupils are equal, round, and reactive to light.   Cardiovascular:      Rate and Rhythm: Normal rate.      Heart sounds: No murmur heard.  Pulmonary:      Effort: No respiratory distress.      Breath sounds: No wheezing or rales.   Abdominal:      General: There is no distension.      Palpations: Abdomen is soft.      Tenderness: There is no abdominal tenderness. There is no rebound.   Musculoskeletal:         General: No tenderness.      Cervical back: Neck supple.   Lymphadenopathy:      Cervical: No cervical adenopathy.   Skin:     General: Skin is warm.      Findings: No rash.   Neurological:      Mental Status: She is alert and oriented to person, place, and time.      Deep Tendon Reflexes: Reflexes normal.   Psychiatric:         Thought Content: Thought content normal.           Labs:  CBC, Coags, BMP, Mg, Phos     Imaging  XR chest pa & lateral    Result Date: 3/11/2024  Narrative: CHEST INDICATION:   Malignant neoplasm of lower lobe, right bronchus or lung. COMPARISON:  None. EXAM PERFORMED/VIEWS:  XR CHEST PA & LATERAL FINDINGS: Cardiomediastinal silhouette appears unremarkable. Compared to prior study of 2/5/2024, there is decreased size of the small right pleural effusion. There is scarring in the right base laterally persisting. Appears to be predominantly lower lobe. Minimal atelectasis in the lingula. No airspace infiltrates. Osseous structures appear within normal limits for patient age.     Impression: Very small residual right pleural effusion decreased from prior. Persistent scarring in the base of the right lower lobe, little changed in appearance from prior. No airspace  infiltrates or effusions. Mild lingular atelectasis. Electronically signed: 03/11/2024 05:57 PM Franck Jonas MD    I reviewed the above laboratory and imaging data.    Discussion/Summary: In summary, this is an 83-year-old female with a history of recently resected stage Ib adenocarcinoma of the right lung.  She had had prior lung cancer 4 years earlier.  She quit smoking in 1967, smoked for less than 10 years, only a few cigarettes per day.  We reviewed the role for consideration of adjuvant medical therapy to decrease likelihood of recurrence/increase chance of cure.  We reviewed that recurrent cancer could be treated although with likely be suppressed, not curable.  Chemotherapy would provide minimal benefit, probably 3-4% with expected toxicities.  Immunotherapy could be considered although has been approved for stage II-3A.  There certainly are features of her case making this appealing, however, PD-L1 and TMB status.  Alternatively, EGFR mutation argues against utility of adjuvant immunotherapy and EGFR mutant patients were excluded from adjuvant immunotherapy trials.  Further, consideration for adjuvant targeted therapy is reasonable to consider.  There is no direct information in the adjuvant setting on utility of Tagrisso in patients with her EGFR variant.  However, there is information in patients with this variant who have advanced disease indicating similar benefits to EGFR blockade to those in patients with more classic EGFR mutations.  We discussed the above at some length.  The patient asked questions indicating good understanding of these concepts.  Given all considerations outlined above, she is inclined to pursue surveillance without active therapy and I think that that is entirely reasonable.  As such, she will continue to follow under thoracic, PCP, surgical oncology.  Follow-up in our office as needed.  Patient voiced understanding and agreement.

## 2024-03-14 NOTE — PROGRESS NOTES
Destinee Anders  1940  [facility] [place]  [address] [facility] [address] [phone]    Chief Complaint   Patient presents with    Consult         Oncology History   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  Breast, right, lumpectomy:  -  Ductal carcinoma in situ, low nuclear grade .     B.  Breast, right new inferior margin, excision:  -  Ductal carcinoma in situ, low nuclear grade involving intraductal papilloma.     C.  Breast, right new lateral margin, excision:  -  Ductal carcinoma in situ, low nuclear grade.     D.  Breast, right new posterior margin, excision:  -  Benign breast parenchyma, negative for atypia or malignancy.    %, NM 60-70%, HER2 1+ negative     6/9/2020 -  Cancer Staged    Staging form: Breast, AJCC 8th Edition  - Clinical: cT0, cN0, cM0 - Signed by Hortencia Pennington MD on 6/9/2020  Laterality: Right       6/9/2020 -  Cancer Staged    Staging form: Breast, AJCC 8th Edition  - Pathologic: Stage 0 (pTis (DCIS), pN0, cM0, ER+, NM+, HER2-) - Signed by Hortencia Pennington MD on 6/16/2020  Laterality: Right  Method of lymph node assessment: Clinical  Nuclear grade: G1       7/30/2020 - 8/28/2020 Radiation    Plan ID Energy Fractions Dose per Fraction (cGy) Dose Correction (cGy) Total Dose Delivered (cGy) Elapsed Days   R Breast 6X 16 / 16 266 0 4,256 21   R Brst Boost 6X 5 / 5 200 0 1,000 4        Primary adenocarcinoma of upper lobe of right lung (HCC) (Resolved)   12/2/2019 Initial Diagnosis    Primary adenocarcinoma of upper lobe of right lung (HCC)     1/17/2020 Surgery    Lung, right upper lobe, wedge resection:  -  Invasive moderately differentiated adenocarcinoma.  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)   1/17/2020 Surgery    Right upper lobectomy, MLND     1/17/2020 -  Cancer Staged     Staging form: Lung, AJCC 8th Edition  - Pathologic stage from 1/17/2020: Stage IB (pT2a, pN0, cM0) - Signed by Gudelia Giraldo PA-C on 12/11/2023  Histopathologic type: Adenocarcinoma, NOS  Histologic grade (G): G2  Histologic grading system: 4 grade system       7/29/2020 Initial Diagnosis    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)     Adenocarcinoma of lower lobe of right lung (HCC)   1/19/2024 -  Cancer Staged    Staging form: Lung, AJCC 8th Edition  - Pathologic stage from 1/19/2024: Stage IB (pT2a, pN0, cM0) - Signed by Katelyn Avalos PA-C on 2/7/2024  Histologic grade (G): G2  Histologic grading system: 4 grade system  Residual tumor (R): R0 - None  Laterality: Right  Lymph-vascular invasion (LVI): LVI not present (absent)/not identified       1/19/2024 Surgery    Robotic assisted right lower lobe wedge resection, lymph node dissection, flexible bronchoscopy (Dr. 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  Resected.  No adjuvant therapy.  Records unavailable regarding histology.  Partial thyroidectomy in approximately 1990 for malignancy.  Observation followed.  2019 patient was found to have a right breast mass.  In preoperative workup she was also found to have a right lung lesion.    November 17, 2020 right upper lobectomy showed adenocarcinoma, T2 a, N0.  No adjuvant therapy.  June 9, 2020 patient underwent right breast lumpectomy for DCIS.  Postoperative RT.  With surveillance scanning new right lower lobe 1 cm mass identified.  Hypermetabolism on PET/CT.  January 19, 2024 right lower lobe wedge resection showed 1 cm adenocarcinoma.  No lymphovascular invasion.  Invasion through visceral pleural surface noted.  Level 7 lymph nodes negative x 2.  Caris showed PD-L1 50%, TMB high, MSI stable.  EGFR exon 19 pathogenic variant [id number] del.  Otherwise  "wild-type.    Review of Systems   Constitutional:  Negative for appetite change, diaphoresis, fatigue and fever.   HENT:  Negative for sinus pain.    Eyes:  Negative for discharge.   Respiratory:  Negative for cough and shortness of breath.    Cardiovascular:  Negative for chest pain.   Gastrointestinal:  Negative for abdominal pain, constipation and diarrhea.   Endocrine: Negative for cold intolerance.   Genitourinary:  Negative for difficulty urinating and hematuria.   Musculoskeletal:  Negative for joint swelling.   Skin:  Negative for rash.   Allergic/Immunologic: Negative for environmental allergies.   Neurological:  Negative for dizziness and headaches.   Hematological:  Negative for adenopathy.   Psychiatric/Behavioral:  Negative for agitation.        Patient Active Problem List   Diagnosis    Persistent atrial fibrillation (HCC)    Benign essential hypertension    Long term (current) use of anticoagulants    History of colon cancer    Overweight    Osteoarthritis    Postoperative hypothyroidism    Pulmonary nodule    Mixed hyperlipidemia    Encounter for geriatric assessment    Osteopenia of multiple sites    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    Dense breast tissue    Hashimoto's thyroiditis    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)    Thyroid nodule    Encounter for follow-up surveillance of breast cancer    Stage 3a chronic kidney disease (HCC)    Refusal of blood transfusions as patient is Holiness    Gastroesophageal reflux disease    Adenocarcinoma of lower lobe of right lung (HCC)     Past Medical History:   Diagnosis Date    Anesthesia     \"cheap date\"    Arthritis     Atrial fibrillation (HCC)     Benign polyp of large intestine     Cancer of appendix (HCC) 1977    Colon cancer (HCC) 1977    38 yo    Colon polyp     Full dentures     full upper/ partial lower    GERD (gastroesophageal reflux disease)     Glaucoma suspect     History of neck problems     \"C3-C7 and had " "epidural injections years ago\"    Hyperlipidemia     Hypertension     Lung cancer (HCC)     jan 2020- had surgery    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HCC)     Prediabetes     Primary adenocarcinoma of upper lobe of right lung (HCC) 12/02/2019    Pulmonary nodules 02/19/2015    Refusal of blood transfusions as patient is Judaism     Tic disorder, transient, recurrent     not recent    Walks frequently      Past Surgical History:   Procedure Laterality Date    APPENDECTOMY      ARTHROSCOPY KNEE      BREAST BIOPSY Right 04/10/2017    US guided - intraductal papillomas    BREAST BIOPSY Right 10/24/2019    ADH- dcis    BREAST LUMPECTOMY W/ NEEDLE LOCALIZATION Right 05/22/2020    Procedure: EXCISIONAL BIOPSY;  1000 NEEDLE LOC;  Surgeon: Hortencia Pennington MD;  Location: [facility];  Service: Surgical Oncology    CARDIOVASCULAR STRESS TEST      CARPAL TUNNEL RELEASE Left 07/01/2022    CATARACT EXTRACTION Bilateral     COLON SURGERY      COLONOSCOPY      done 2010 due 2015     DILATION AND CURETTAGE OF UTERUS      ESOPHAGOGASTRODUODENOSCOPY      inflammation aonly    HEMICOLECTOMY Right 1977    HYSTERECTOMY      JOINT REPLACEMENT      L knee    LUNG SEGMENTECTOMY Right 1/19/2024    Procedure: Robotic right lower lobe wedge, lymph node dissection;  Surgeon: Chris Cheng MD;  Location: [facility];  Service: Thoracic    LUNG SURGERY      MAMMO NEEDLE LOCALIZATION RIGHT (ALL INC) Right 05/22/2020    MAMMO NEEDLE LOCALIZATION RIGHT (ALL INC) EACH ADD Right 05/22/2020    MAMMO STEREOTACTIC BREAST BIOPSY RIGHT (ALL INC) Right 10/24/2019    PARATHYROID GLAND SURGERY      exploration     [facility] INCL FLUOR GDNCE DX W/CELL WASHG SPX N/A 01/17/2020    Procedure: FLEXIBLE BRONCHOSCOPY;  Surgeon: Chris Cheng MD;  Location: [facility];  Service: Thoracic    MA [facility] INCL FLUOR GDNCE DX W/CELL WASHG SPX N/A 1/19/2024    Procedure: BRONCHOSCOPY FLEXIBLE;  Surgeon: Chris Cheng MD;  Location: [facility];  Service: " Thoracic    IN THORACOSCOPY W/LOBECTOMY SINGLE LOBE Right 2020    Procedure: ROBOTIC ASSISTED COMPLETE LUNG LOBECTOMY;  Surgeon: Chris Cheng MD;  Location: [facility];  Service: Thoracic    IN THORACOSCOPY W/THERA WEDGE RESEXN INITIAL UNILAT Right 2020    Procedure: ROBOTIC ASSISTED UPPER-LOBE WEDGE RESECTION, MEDIASTINAL LYMPH NODE DISECTION;  Surgeon: Chris Cheng MD;  Location: [facility];  Service: Thoracic    REPLACEMENT TOTAL KNEE      THYROID SURGERY      TOTAL ABDOMINAL HYSTERECTOMY  1983    TOTAL THYROIDECTOMY      TRIGGER FINGER RELEASE Left 2022    US GUIDED BREAST BIOPSY RIGHT COMPLETE Right 04/10/2017    US GUIDED THYROID BIOPSY  2020     Family History   Problem Relation Age of Onset    Esophageal cancer Mother         80s    Hypertension Mother     Stroke Family         TIA    Breast cancer Paternal Aunt 95    No Known Problems Father     COPD Sister     COPD Sister     No Known Problems Maternal Grandmother     No Known Problems Maternal Grandfather     No Known Problems Paternal Grandmother     No Known Problems Paternal Grandfather     No Known Problems Maternal Aunt     No Known Problems Maternal Aunt     No Known Problems Maternal Aunt     No Known Problems Maternal Aunt     No Known Problems Maternal Aunt      Social History     Socioeconomic History    Marital status: Single     Spouse name: Not on file    Number of children: Not on file    Years of education: Not on file    Highest education level: Not on file   Occupational History    Not on file   Tobacco Use    Smoking status: Former     Current packs/day: 0.00     Average packs/day: 1.5 packs/day for 20.0 years (30.0 ttl pk-yrs)     Types: Cigarettes     Start date:      Quit date:      Years since quittin.2     Passive exposure: Past    Smokeless tobacco: Never   Vaping Use    Vaping status: Never Used   Substance and Sexual Activity    Alcohol use: Yes     Alcohol/week: 5.0 standard  drinks of alcohol     Types: 5 Cans of beer per week     Comment: occasional beer with food    Drug use: No    Sexual activity: Not on file     Comment: hysterectomy   Other Topics Concern    Not on file   Social History Narrative    Caffeine use- 1-3, 8oz coffees per day    Denied history of housing without smoke detectors    Always uses a seatbelt     Social Determinants of Health     Financial Resource Strain: Low Risk  (9/26/2023)    Overall Financial Resource Strain (CARDIA)     Difficulty of Paying Living Expenses: Not hard at all   Food Insecurity: No Food Insecurity (1/20/2024)    Hunger Vital Sign     Worried About Running Out of Food in the Last Year: Never true     Ran Out of Food in the Last Year: Never true   Transportation Needs: No Transportation Needs (1/20/2024)    PRAPARE - Transportation     Lack of Transportation (Medical): No     Lack of Transportation (Non-Medical): No   Physical Activity: Not on file   Stress: Not on file   Social Connections: Not on file   Intimate Partner Violence: Not on file   Housing Stability: Low Risk  (1/20/2024)    Housing Stability Vital Sign     Unable to Pay for Housing in the Last Year: No     Number of Places Lived in the Last Year: 1     Unstable Housing in the Last Year: No       Current Outpatient Medications:     amoxicillin-clavulanate (AUGMENTIN) 875-125 mg per tablet, , Disp: , Rfl:     apixaban (ELIQUIS) 5 mg, Take 5 mg by mouth 2 (two) times a day To stop 2 days prior to surgery per md, Disp: , Rfl:     atorvastatin (LIPITOR) 80 mg tablet, TAKE 1 TABLET BY MOUTH EVERY DAY, Disp: 90 tablet, Rfl: 1    ciclopirox (LOPROX) 0.77 % SUSP, , Disp: , Rfl:     ciclopirox (PENLAC) 8 % solution, PLEASE SEE ATTACHED FOR DETAILED DIRECTIONS, Disp: , Rfl:     docusate sodium (COLACE) 100 mg capsule, Take 1 capsule (100 mg total) by mouth 2 (two) times a day as needed for constipation, Disp: 20 capsule, Rfl: 0    EPINEPHrine (EpiPen 2-Pa) 0.3 mg/0.3 mL SOAJ, Inject 0.3  mL (0.3 mg total) into a muscle once as needed for anaphylaxis for up to 1 dose, Disp: 0.6 mL, Rfl: 0    EPINEPHrine (EPIPEN) 0.3 mg/0.3 mL SOAJ, INJECT 0.3 ML (0.3 MG TOTAL) INTO A MUSCLE ONCE FOR 1 DOSE, Disp: 2 each, Rfl: 1    gabapentin (NEURONTIN) 100 mg capsule, Take 1 capsule (100 mg total) by mouth 3 (three) times a day, Disp: 63 capsule, Rfl: 0    levothyroxine 100 mcg tablet, TAKE 1 TABLET BY MOUTH EVERY DAY, Disp: 90 tablet, Rfl: 1    Multiple Vitamins-Calcium (ESSENTIAL ONE DAILY MULTIVIT PO), Take by mouth daily , Disp: , Rfl:     omeprazole (PriLOSEC) 20 mg delayed release capsule, TAKE 1 CAPSULE (20 MG TOTAL) BY MOUTH AS NEEDED (GERD), Disp: 90 capsule, Rfl: 0    diltiazem (CARDIZEM CD) 120 mg 24 hr capsule, Take 120 mg by mouth every evening , Disp: , Rfl:     EPINEPHrine (EPIPEN) 0.3 mg/0.3 mL SOAJ, Inject 0.3 mL (0.3 mg total) into a muscle once for 1 dose, Disp: 0.6 mL, Rfl: 0    polyethylene glycol (MiraLax) 17 GM/SCOOP powder, Take 238 g by mouth once for 1 dose Take 238 g my mouth. Use as directed, Disp: 238 g, Rfl: 0  Allergies   Allergen Reactions    Bee Venom      Tongue swelling    Flurbiprofen Hives    Ibuprofen Shortness Of Breath    Levaquin [Levofloxacin In D5w] Other (See Comments)     Dark spots under eyes and wrists    Tequin [Gatifloxacin] Rash     Vitals:    03/14/24 0851   BP: 132/76   Pulse: 61   Resp: 17   Temp: (!) 97.2 °F (36.2 °C)   SpO2: 98%       Physical Exam  Constitutional:       Appearance: She is well-developed.   HENT:      Head: Normocephalic and atraumatic.   Eyes:      Pupils: Pupils are equal, round, and reactive to light.   Cardiovascular:      Rate and Rhythm: Normal rate.      Heart sounds: No murmur heard.  Pulmonary:      Effort: No respiratory distress.      Breath sounds: No wheezing or rales.   Abdominal:      General: There is no distension.      Palpations: Abdomen is soft.      Tenderness: There is no abdominal tenderness. There is no rebound.    Musculoskeletal:         General: No tenderness.      Cervical back: Neck supple.   Lymphadenopathy:      Cervical: No cervical adenopathy.   Skin:     General: Skin is warm.      Findings: No rash.   Neurological:      Mental Status: She is alert and oriented to person, place, and time.      Deep Tendon Reflexes: Reflexes normal.   Psychiatric:         Thought Content: Thought content normal.           Labs:  CBC, Coags, BMP, Mg, Phos     Imaging  XR chest pa & lateral    Result Date: 3/11/2024  Narrative: CHEST INDICATION:   Malignant neoplasm of lower lobe, right bronchus or lung. COMPARISON:  None. EXAM PERFORMED/VIEWS:  XR CHEST PA & LATERAL FINDINGS: Cardiomediastinal silhouette appears unremarkable. Compared to prior study of 2/5/2024, there is decreased size of the small right pleural effusion. There is scarring in the right base laterally persisting. Appears to be predominantly lower lobe. Minimal atelectasis in the lingula. No airspace infiltrates. Osseous structures appear within normal limits for patient age.     Impression: Very small residual right pleural effusion decreased from prior. Persistent scarring in the base of the right lower lobe, little changed in appearance from prior. No airspace infiltrates or effusions. Mild lingular atelectasis. Electronically signed: 03/11/2024 05:57 PM Franck Jonas MD    I reviewed the above laboratory and imaging data.    Discussion/Summary: In summary, this is an 83-year-old female with a history of recently resected stage Ib adenocarcinoma of the right lung.  She had had prior lung cancer 4 years earlier.  She quit smoking in 1967, smoked for less than 10 years, only a few cigarettes per day.  We reviewed the role for consideration of adjuvant medical therapy to decrease likelihood of recurrence/increase chance of cure.  We reviewed that recurrent cancer could be treated although with likely be suppressed, not curable.  Chemotherapy would provide minimal  benefit, probably 3-4% with expected toxicities.  Immunotherapy could be considered although has been approved for stage II-3A.  There certainly are features of her case making this appealing, however, PD-L1 and TMB status.  Alternatively, EGFR mutation argues against utility of adjuvant immunotherapy and EGFR mutant patients were excluded from adjuvant immunotherapy trials.  Further, consideration for adjuvant targeted therapy is reasonable to consider.  There is no direct information in the adjuvant setting on utility of Tagrisso in patients with her EGFR variant.  However, there is information in patients with this variant who have advanced disease indicating similar benefits to EGFR blockade to those in patients with more classic EGFR mutations (Clin Cancer Res 2023 Jun 1;29(11):2315-8623).   We discussed the above at some length.  The patient asked questions indicating good understanding of these concepts.  Given all considerations outlined above, she is inclined to pursue surveillance without active therapy and I think that that is entirely reasonable.  As such, she will continue to follow under thoracic, PCP, surgical oncology.  Follow-up in our office as needed.  Patient voiced understanding and agreement.

## 2024-03-25 ENCOUNTER — APPOINTMENT (OUTPATIENT)
Dept: LAB | Facility: CLINIC | Age: 84
End: 2024-03-25
Payer: COMMERCIAL

## 2024-03-25 DIAGNOSIS — N18.31 STAGE 3A CHRONIC KIDNEY DISEASE (HCC): ICD-10-CM

## 2024-03-25 DIAGNOSIS — E78.2 MIXED HYPERLIPIDEMIA: ICD-10-CM

## 2024-03-25 DIAGNOSIS — I10 BENIGN ESSENTIAL HYPERTENSION: ICD-10-CM

## 2024-03-25 DIAGNOSIS — E89.0 POSTOPERATIVE HYPOTHYROIDISM: ICD-10-CM

## 2024-03-25 LAB
ALBUMIN SERPL BCP-MCNC: 3.7 G/DL (ref 3.5–5)
ALP SERPL-CCNC: 83 U/L (ref 34–104)
ALT SERPL W P-5'-P-CCNC: 19 U/L (ref 7–52)
ANION GAP SERPL CALCULATED.3IONS-SCNC: 7 MMOL/L (ref 4–13)
AST SERPL W P-5'-P-CCNC: 25 U/L (ref 13–39)
BILIRUB SERPL-MCNC: 0.44 MG/DL (ref 0.2–1)
BUN SERPL-MCNC: 19 MG/DL (ref 5–25)
CALCIUM SERPL-MCNC: 9.3 MG/DL (ref 8.4–10.2)
CHLORIDE SERPL-SCNC: 104 MMOL/L (ref 96–108)
CHOLEST SERPL-MCNC: 187 MG/DL
CO2 SERPL-SCNC: 30 MMOL/L (ref 21–32)
CREAT SERPL-MCNC: 0.97 MG/DL (ref 0.6–1.3)
GFR SERPL CREATININE-BSD FRML MDRD: 53 ML/MIN/1.73SQ M
GLUCOSE P FAST SERPL-MCNC: 92 MG/DL (ref 65–99)
HDLC SERPL-MCNC: 89 MG/DL
LDLC SERPL CALC-MCNC: 86 MG/DL (ref 0–100)
NONHDLC SERPL-MCNC: 98 MG/DL
POTASSIUM SERPL-SCNC: 4.2 MMOL/L (ref 3.5–5.3)
PROT SERPL-MCNC: 7 G/DL (ref 6.4–8.4)
SODIUM SERPL-SCNC: 141 MMOL/L (ref 135–147)
TRIGL SERPL-MCNC: 58 MG/DL
TSH SERPL DL<=0.05 MIU/L-ACNC: 2.5 UIU/ML (ref 0.45–4.5)

## 2024-03-25 PROCEDURE — 80053 COMPREHEN METABOLIC PANEL: CPT

## 2024-03-25 PROCEDURE — 36415 COLL VENOUS BLD VENIPUNCTURE: CPT

## 2024-03-25 PROCEDURE — 80061 LIPID PANEL: CPT

## 2024-03-25 PROCEDURE — 84443 ASSAY THYROID STIM HORMONE: CPT

## 2024-04-03 ENCOUNTER — HOSPITAL ENCOUNTER (OUTPATIENT)
Dept: MRI IMAGING | Facility: HOSPITAL | Age: 84
Discharge: HOME | End: 2024-04-03
Payer: COMMERCIAL

## 2024-04-03 DIAGNOSIS — C34.11 ADENOCARCINOMA OF UPPER LOBE OF RIGHT LUNG (HCC): ICD-10-CM

## 2024-04-03 PROCEDURE — A9585 GADOBUTROL INJECTION: HCPCS | Performed by: FAMILY MEDICINE

## 2024-04-03 PROCEDURE — 74183 MRI ABD W/O CNTR FLWD CNTR: CPT

## 2024-04-03 RX ORDER — GADOBUTROL 604.72 MG/ML
7 INJECTION INTRAVENOUS
Status: COMPLETED | OUTPATIENT
Start: 2024-04-03 | End: 2024-04-03

## 2024-04-03 RX ADMIN — GADOBUTROL 7 ML: 604.72 INJECTION INTRAVENOUS at 17:04

## 2024-04-08 PROBLEM — I12.9 HYPERTENSIVE KIDNEY DISEASE: Status: ACTIVE | Noted: 2024-04-08

## 2024-04-09 ENCOUNTER — HOSPITAL ENCOUNTER (OUTPATIENT)
Dept: MAMMOGRAPHY | Facility: CLINIC | Age: 84
Discharge: HOME | End: 2024-04-09
Payer: COMMERCIAL

## 2024-04-09 DIAGNOSIS — R92.8 ABNORMAL FINDINGS ON DIAGNOSTIC IMAGING OF BREAST: ICD-10-CM

## 2024-04-09 PROCEDURE — 77066 DX MAMMO INCL CAD BI: CPT

## 2024-04-09 PROCEDURE — G0279 TOMOSYNTHESIS, MAMMO: HCPCS

## 2024-05-16 ENCOUNTER — OFFICE VISIT (OUTPATIENT)
Dept: SURGICAL ONCOLOGY | Facility: CLINIC | Age: 84
End: 2024-05-16
Payer: COMMERCIAL

## 2024-05-16 VITALS
HEIGHT: 65 IN | TEMPERATURE: 97.3 F | DIASTOLIC BLOOD PRESSURE: 60 MMHG | SYSTOLIC BLOOD PRESSURE: 122 MMHG | WEIGHT: 153 LBS | BODY MASS INDEX: 25.49 KG/M2 | OXYGEN SATURATION: 96 % | HEART RATE: 59 BPM

## 2024-05-16 DIAGNOSIS — Z08 ENCOUNTER FOR FOLLOW-UP SURVEILLANCE OF BREAST CANCER: Primary | ICD-10-CM

## 2024-05-16 DIAGNOSIS — Z85.3 ENCOUNTER FOR FOLLOW-UP SURVEILLANCE OF BREAST CANCER: Primary | ICD-10-CM

## 2024-05-16 DIAGNOSIS — R92.30 DENSE BREAST TISSUE: ICD-10-CM

## 2024-05-16 DIAGNOSIS — Z86.000 PERSONAL HISTORY OF IN-SITU NEOPLASM OF BREAST: ICD-10-CM

## 2024-05-16 PROCEDURE — 99213 OFFICE O/P EST LOW 20 MIN: CPT

## 2024-05-16 NOTE — PROGRESS NOTES
Surgical Oncology Follow Up       [address]  [facility] [place]  [address] [place] [address]    [facility]  1940  [id number]  [address]  [facility]  [address] [place] [address]    Chief Complaint   Patient presents with   • Follow-up       Assessment/Plan:  1. Encounter for follow-up surveillance of breast cancer  - 6 mo f/u    2. 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    3. 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 She underwent a right breast lumpectomy and whole breast radiation therapy in 2020. She is currently on observation. She had a bilateral diagnostic mammogram on 4/9/2024 which was BI-RADS 2 category 3 density. There were no concerns on her cbe. I will see the patient back in 6 months or sooner should the need arise. She was instructed to call with any questions or concerns prior to this time. All questions were answered today.     History of Present Illness:     Oncology History   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  Breast, right, lumpectomy:  -  Ductal carcinoma in situ, low nuclear grade .     B.  Breast, right new inferior margin, excision:  -  Ductal carcinoma in situ, low nuclear grade involving intraductal papilloma.     C.  Breast, right new lateral margin, excision:  -  Ductal carcinoma in situ, low nuclear grade.     D.  Breast, right new posterior margin, excision:  -  Benign breast parenchyma, negative for atypia or malignancy.    %, MA 60-70%, HER2 1+ negative     6/9/2020 -  Cancer Staged    Staging form: Breast, AJCC 8th Edition  - Clinical: cT0, cN0, cM0 - Signed by Hortencia Pennington MD on 6/9/2020  Laterality:  Right       6/9/2020 -  Cancer Staged    Staging form: Breast, AJCC 8th Edition  - Pathologic: Stage 0 (pTis (DCIS), pN0, cM0, ER+, CT+, HER2-) - Signed by Hortencia Pennington MD on 6/16/2020  Laterality: Right  Method of lymph node assessment: Clinical  Nuclear grade: G1       7/30/2020 - 8/28/2020 Radiation    Plan ID Energy Fractions Dose per Fraction (cGy) Dose Correction (cGy) Total Dose Delivered (cGy) Elapsed Days   R Breast 6X 16 / 16 266 0 4,256 21   R Brst Boost 6X 5 / 5 200 0 1,000 4      Primary adenocarcinoma of upper lobe of right lung (HCC) (Resolved)   12/2/2019 Initial Diagnosis    Primary adenocarcinoma of upper lobe of right lung (HCC)     1/17/2020 Surgery    Lung, right upper lobe, wedge resection:  -  Invasive moderately differentiated adenocarcinoma.  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 She is currently on observation. She had a bilateral diagnostic mammogram on 4/9/2024 which was BI-RADS 2 category 3 density. Patient denies changes on her breast exam. She denies persistent headaches, bone pain, back pain, shortness of breath, or abdominal pain.    Review of Systems:  Review of Systems   Constitutional:  Negative for activity change, appetite change, fatigue and unexpected weight change.   Respiratory:  Negative for cough and shortness of breath.    Cardiovascular:  Negative for chest pain.   Gastrointestinal:  Negative for abdominal pain, diarrhea, nausea and vomiting.   Endocrine: Negative for heat intolerance.   Musculoskeletal:  Negative for arthralgias, back pain and myalgias.   Skin:  Negative for rash.   Neurological:  Negative for weakness and headaches.   Hematological:  Negative for adenopathy.       Patient Active Problem List   Diagnosis   • Persistent atrial fibrillation (HCC)   • Benign essential hypertension   • Long term (current) use of anticoagulants   • History of colon cancer   • Overweight   • Osteoarthritis   • Postoperative hypothyroidism   • Pulmonary nodule   • Mixed hyperlipidemia   • Encounter for geriatric assessment   • Osteopenia of multiple sites   • 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   • Dense breast tissue   • Hashimoto's thyroiditis   • 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)   • Thyroid nodule   • Encounter for follow-up surveillance of breast cancer   • Stage 3a chronic kidney disease (HCC)   • Refusal of blood transfusions as patient is Hoahaoism   • Gastroesophageal reflux disease   • Adenocarcinoma of lower lobe of right lung (HCC)   • Hypertensive kidney disease     Past Medical History:   Diagnosis Date   • Anesthesia     \"cheap date\"   • Arthritis    • Atrial fibrillation (HCC)    • Benign polyp of " "large intestine    • Cancer of appendix (HCC) 1977   • Colon cancer (HCC) 1977    36 yo   • Colon polyp    • Full dentures     full upper/ partial lower   • GERD (gastroesophageal reflux disease)    • Glaucoma suspect    • History of neck problems     \"C3-C7 and had epidural injections years ago\"   • Hyperlipidemia    • Hypertension    • Lung cancer (HCC)     jan 2020- had surgery   • 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HCC)    • Prediabetes    • Primary adenocarcinoma of upper lobe of right lung (HCC) 12/02/2019   • Pulmonary nodules 02/19/2015   • Refusal of blood transfusions as patient is Taoism    • Tic disorder, transient, recurrent     not recent   • Walks frequently      Past Surgical History:   Procedure Laterality Date   • APPENDECTOMY     • ARTHROSCOPY KNEE     • BREAST BIOPSY Right 04/10/2017    US guided - intraductal papillomas   • BREAST BIOPSY Right 10/24/2019    ADH- dcis   • BREAST LUMPECTOMY W/ NEEDLE LOCALIZATION Right 05/22/2020    Procedure: EXCISIONAL BIOPSY;  1000 NEEDLE LOC;  Surgeon: Hortencia Pennington MD;  [facility];  Service: Surgical Oncology   • CARDIOVASCULAR STRESS TEST     • CARPAL TUNNEL RELEASE Left 07/01/2022   • CATARACT EXTRACTION Bilateral    • COLON SURGERY     • COLONOSCOPY      done 2010 due 2015    • DILATION AND CURETTAGE OF UTERUS     • ESOPHAGOGASTRODUODENOSCOPY      inflammation aonly   • HEMICOLECTOMY Right 1977   • HYSTERECTOMY     • JOINT REPLACEMENT      L knee   • LUNG SEGMENTECTOMY Right 1/19/2024    Procedure: Robotic right lower lobe wedge, lymph node dissection;  Surgeon: Chris Cheng MD;  [facility];  Service: Thoracic   • LUNG SURGERY     • MAMMO NEEDLE LOCALIZATION RIGHT (ALL INC) Right 05/22/2020   • MAMMO NEEDLE LOCALIZATION RIGHT (ALL INC) EACH ADD Right 05/22/2020   • MAMMO STEREOTACTIC BREAST BIOPSY RIGHT (ALL INC) Right 10/24/2019   • PARATHYROID GLAND SURGERY      exploration    • WI BRNCHSC INCL FLUOR GDNCE DX W/CELL WASHG " SPX N/A 01/17/2020    Procedure: FLEXIBLE BRONCHOSCOPY;  Surgeon: Chris Cheng MD;  [facility];  Service: Thoracic   • VA NCC INCL FLUOR GDNCE DX W/CELL WASHG SPX N/A 1/19/2024    Procedure: BRONCHOSCOPY FLEXIBLE;  Surgeon: Chris Cheng MD;  [facility];  Service: Thoracic   • VA THORACOSCOPY W/LOBECTOMY SINGLE LOBE Right 01/17/2020    Procedure: ROBOTIC ASSISTED COMPLETE LUNG LOBECTOMY;  Surgeon: Chris Cheng MD;  [facility];  Service: Thoracic   • VA THORACOSCOPY W/THERA WEDGE RESEXN INITIAL UNILAT Right 01/17/2020    Procedure: ROBOTIC ASSISTED UPPER-LOBE WEDGE RESECTION, MEDIASTINAL LYMPH NODE DISECTION;  Surgeon: Chris Cheng MD;  [facility];  Service: Thoracic   • REPLACEMENT TOTAL KNEE     • THYROID SURGERY     • TOTAL ABDOMINAL HYSTERECTOMY  1983   • TOTAL THYROIDECTOMY     • TRIGGER FINGER RELEASE Left 07/01/2022   • US GUIDED BREAST BIOPSY RIGHT COMPLETE Right 04/10/2017   • US GUIDED THYROID BIOPSY  02/14/2020     Family History   Problem Relation Age of Onset   • Esophageal cancer Mother         80s   • Hypertension Mother    • Stroke Family         TIA   • Breast cancer Paternal Aunt 95   • No Known Problems Father    • COPD Sister    • COPD Sister    • No Known Problems Maternal Grandmother    • No Known Problems Maternal Grandfather    • No Known Problems Paternal Grandmother    • No Known Problems Paternal Grandfather    • No Known Problems Maternal Aunt    • No Known Problems Maternal Aunt    • No Known Problems Maternal Aunt    • No Known Problems Maternal Aunt    • No Known Problems Maternal Aunt      Social History     Socioeconomic History   • Marital status: Single     Spouse name: Not on file   • Number of children: Not on file   • Years of education: Not on file   • Highest education level: Not on file   Occupational History   • Not on file   Tobacco Use   • Smoking status: Former     Current packs/day: 0.00     Average  packs/day: 1.5 packs/day for 20.0 years (30.0 ttl pk-yrs)     Types: Cigarettes     Start date:      Quit date: 1967     Years since quittin.4     Passive exposure: Past   • Smokeless tobacco: Never   Vaping Use   • Vaping status: Never Used   Substance and Sexual Activity   • Alcohol use: Yes     Alcohol/week: 5.0 standard drinks of alcohol     Types: 5 Cans of beer per week     Comment: occasional beer with food   • Drug use: No   • Sexual activity: Not on file     Comment: hysterectomy   Other Topics Concern   • Not on file   Social History Narrative    Caffeine use- 1-3, 8oz coffees per day    Denied history of housing without smoke detectors    Always uses a seatbelt     Social Determinants of Health     Financial Resource Strain: Low Risk  (2023)    Overall Financial Resource Strain (CARDIA)    • Difficulty of Paying Living Expenses: Not hard at all   Food Insecurity: No Food Insecurity (2024)    Hunger Vital Sign    • Worried About Running Out of Food in the Last Year: Never true    • Ran Out of Food in the Last Year: Never true   Transportation Needs: No Transportation Needs (2024)    PRAPARE - Transportation    • Lack of Transportation (Medical): No    • Lack of Transportation (Non-Medical): No   Physical Activity: Not on file   Stress: Not on file   Social Connections: Not on file   Intimate Partner Violence: Not on file   Housing Stability: Low Risk  (2024)    Housing Stability Vital Sign    • Unable to Pay for Housing in the Last Year: No    • Number of Places Lived in the Last Year: 1    • Unstable Housing in the Last Year: No       Current Outpatient Medications:   •  apixaban (ELIQUIS) 5 mg, Take 5 mg by mouth 2 (two) times a day To stop 2 days prior to surgery per md, Disp: , Rfl:   •  atorvastatin (LIPITOR) 80 mg tablet, TAKE 1 TABLET BY MOUTH EVERY DAY, Disp: 90 tablet, Rfl: 1  •  ciclopirox (LOPROX) 0.77 % SUSP, , Disp: , Rfl:   •  ciclopirox (PENLAC) 8 % solution,  PLEASE SEE ATTACHED FOR DETAILED DIRECTIONS, Disp: , Rfl:   •  docusate sodium (COLACE) 100 mg capsule, Take 1 capsule (100 mg total) by mouth 2 (two) times a day as needed for constipation, Disp: 20 capsule, Rfl: 0  •  EPINEPHrine (EpiPen 2-Pa) 0.3 mg/0.3 mL SOAJ, Inject 0.3 mL (0.3 mg total) into a muscle once as needed for anaphylaxis for up to 1 dose, Disp: 0.6 mL, Rfl: 0  •  gabapentin (NEURONTIN) 100 mg capsule, Take 1 capsule (100 mg total) by mouth 3 (three) times a day, Disp: 63 capsule, Rfl: 0  •  levothyroxine 100 mcg tablet, TAKE 1 TABLET BY MOUTH EVERY DAY, Disp: 90 tablet, Rfl: 1  •  Multiple Vitamins-Calcium (ESSENTIAL ONE DAILY MULTIVIT PO), Take by mouth daily , Disp: , Rfl:   •  omeprazole (PriLOSEC) 20 mg delayed release capsule, TAKE 1 CAPSULE (20 MG TOTAL) BY MOUTH AS NEEDED (GERD), Disp: 90 capsule, Rfl: 0  •  amoxicillin-clavulanate (AUGMENTIN) 875-125 mg per tablet, , Disp: , Rfl:   •  diltiazem (CARDIZEM CD) 120 mg 24 hr capsule, Take 120 mg by mouth every evening , Disp: , Rfl:   •  EPINEPHrine (EPIPEN) 0.3 mg/0.3 mL SOAJ, Inject 0.3 mL (0.3 mg total) into a muscle once for 1 dose, Disp: 0.6 mL, Rfl: 0  •  EPINEPHrine (EPIPEN) 0.3 mg/0.3 mL SOAJ, INJECT 0.3 ML (0.3 MG TOTAL) INTO A MUSCLE ONCE FOR 1 DOSE (Patient not taking: Reported on 5/16/2024), Disp: 2 each, Rfl: 1  •  polyethylene glycol (MiraLax) 17 GM/SCOOP powder, Take 238 g by mouth once for 1 dose Take 238 g my mouth. Use as directed, Disp: 238 g, Rfl: 0  Allergies   Allergen Reactions   • Bee Venom      Tongue swelling   • Flurbiprofen Hives   • Ibuprofen Shortness Of Breath   • Levaquin [Levofloxacin In D5w] Other (See Comments)     Dark spots under eyes and wrists   • Tequin [Gatifloxacin] Rash     Vitals:    05/16/24 0905   BP: 122/60   Pulse: 59   Temp: (!) 97.3 °F (36.3 °C)   SpO2: 96%       Physical Exam  Constitutional:       General: She is not in acute distress.     Appearance: Normal appearance.   Cardiovascular:       Rate and Rhythm: Normal rate and regular rhythm.      Pulses: Normal pulses.      Heart sounds: Normal heart sounds.   Pulmonary:      Effort: Pulmonary effort is normal.      Breath sounds: Normal breath sounds.   Chest:      Chest wall: No mass.   Breasts:     Right: No swelling, bleeding, inverted nipple, mass, nipple discharge, skin change or tenderness.      Left: No swelling, bleeding, inverted nipple, mass, nipple discharge, skin change or tenderness.   Abdominal:      General: Abdomen is flat.      Palpations: Abdomen is soft.   Lymphadenopathy:      Upper Body:      Right upper body: No supraclavicular, axillary or pectoral adenopathy.      Left upper body: No supraclavicular, axillary or pectoral adenopathy.   Skin:     General: Skin is warm.   Neurological:      General: No focal deficit present.      Mental Status: She is alert and oriented to person, place, and time.   Psychiatric:         Mood and Affect: Mood normal.         Behavior: Behavior normal.           Results:    Imaging  No results found.    I reviewed the above imaging data.      Advance Care Planning/Advance Directives:  Discussed disease status, cancer treatment plans and/or cancer treatment goals with the patient.

## 2024-05-31 DIAGNOSIS — E78.2 MIXED HYPERLIPIDEMIA: ICD-10-CM

## 2024-05-31 RX ORDER — ATORVASTATIN CALCIUM 80 MG/1
TABLET, FILM COATED ORAL
Qty: 90 TABLET | Refills: 1 | Status: SHIPPED | OUTPATIENT
Start: 2024-05-31

## 2024-06-27 ENCOUNTER — TELEPHONE (OUTPATIENT)
Dept: GASTROENTEROLOGY | Facility: CLINIC | Age: 84
End: 2024-06-27

## 2024-06-27 NOTE — TELEPHONE ENCOUNTER
Called and left a message to confirm procedure and to remind the patient to hold the Eliquis for 2 days prior to the procedure as well as make sure she has her prep instructions

## 2024-07-09 ENCOUNTER — TELEPHONE (OUTPATIENT)
Dept: GASTROENTEROLOGY | Facility: CLINIC | Age: 84
End: 2024-07-09

## 2024-07-10 ENCOUNTER — ANESTHESIA (OUTPATIENT)
Dept: GASTROENTEROLOGY | Facility: HOSPITAL | Age: 84
End: 2024-07-10

## 2024-07-10 ENCOUNTER — ANESTHESIA EVENT (OUTPATIENT)
Dept: GASTROENTEROLOGY | Facility: HOSPITAL | Age: 84
End: 2024-07-10

## 2024-07-10 ENCOUNTER — HOSPITAL ENCOUNTER (OUTPATIENT)
Dept: GASTROENTEROLOGY | Facility: HOSPITAL | Age: 84
Setting detail: OUTPATIENT SURGERY
Discharge: HOME | End: 2024-07-10
Payer: COMMERCIAL

## 2024-07-10 VITALS
TEMPERATURE: 96.5 F | DIASTOLIC BLOOD PRESSURE: 77 MMHG | HEIGHT: 64 IN | OXYGEN SATURATION: 97 % | WEIGHT: 153 LBS | BODY MASS INDEX: 26.12 KG/M2 | SYSTOLIC BLOOD PRESSURE: 153 MMHG | RESPIRATION RATE: 18 BRPM | HEART RATE: 74 BPM

## 2024-07-10 DIAGNOSIS — K63.5 POLYP OF TRANSVERSE COLON, UNSPECIFIED TYPE: ICD-10-CM

## 2024-07-10 PROCEDURE — 45385 COLONOSCOPY W/LESION REMOVAL: CPT | Performed by: INTERNAL MEDICINE

## 2024-07-10 PROCEDURE — 88305 TISSUE EXAM BY PATHOLOGIST: CPT | Performed by: SPECIALIST

## 2024-07-10 RX ORDER — LIDOCAINE HYDROCHLORIDE 10 MG/ML
INJECTION, SOLUTION EPIDURAL; INFILTRATION; INTRACAUDAL; PERINEURAL AS NEEDED
Status: DISCONTINUED | OUTPATIENT
Start: 2024-07-10 | End: 2024-07-10

## 2024-07-10 RX ORDER — SODIUM CHLORIDE 9 MG/ML
INJECTION, SOLUTION INTRAVENOUS CONTINUOUS PRN
Status: DISCONTINUED | OUTPATIENT
Start: 2024-07-10 | End: 2024-07-10

## 2024-07-10 RX ORDER — PROPOFOL 10 MG/ML
INJECTION, EMULSION INTRAVENOUS AS NEEDED
Status: DISCONTINUED | OUTPATIENT
Start: 2024-07-10 | End: 2024-07-10

## 2024-07-10 RX ADMIN — PROPOFOL 20 MG: 10 INJECTION, EMULSION INTRAVENOUS at 14:26

## 2024-07-10 RX ADMIN — PROPOFOL 30 MG: 10 INJECTION, EMULSION INTRAVENOUS at 14:24

## 2024-07-10 RX ADMIN — PROPOFOL 30 MG: 10 INJECTION, EMULSION INTRAVENOUS at 14:30

## 2024-07-10 RX ADMIN — PROPOFOL 30 MG: 10 INJECTION, EMULSION INTRAVENOUS at 14:28

## 2024-07-10 RX ADMIN — PROPOFOL 30 MG: 10 INJECTION, EMULSION INTRAVENOUS at 14:39

## 2024-07-10 RX ADMIN — LIDOCAINE HYDROCHLORIDE 40 MG: 10 INJECTION, SOLUTION EPIDURAL; INFILTRATION; INTRACAUDAL; PERINEURAL at 14:22

## 2024-07-10 RX ADMIN — SODIUM CHLORIDE: 9 INJECTION, SOLUTION INTRAVENOUS at 14:18

## 2024-07-10 RX ADMIN — PROPOFOL 50 MG: 10 INJECTION, EMULSION INTRAVENOUS at 14:22

## 2024-07-10 RX ADMIN — PROPOFOL 40 MG: 10 INJECTION, EMULSION INTRAVENOUS at 14:33

## 2024-07-10 NOTE — ANESTHESIA POSTPROCEDURE EVALUATION
Post-Op Assessment Note    CV Status:  Stable  Pain Score: 0    Pain management: adequate       Mental Status:  Sleepy   Hydration Status:  Stable   PONV Controlled:  None   Airway Patency:  Patent     Post Op Vitals Reviewed: Yes    No anethesia notable event occurred.    Staff: CRNA, Anesthesiologist             BP  122/73 (91)   Temp      Pulse  69   Resp  18   SpO2   97% on RA

## 2024-07-10 NOTE — ANESTHESIA PREPROCEDURE EVALUATION
Procedure:  COLONOSCOPY    Relevant Problems   CARDIO   (+) Benign essential hypertension   (+) Mixed hyperlipidemia   (+) Persistent atrial fibrillation (HCC)      ENDO   (+) Postoperative hypothyroidism      GI/HEPATIC   (+) Gastroesophageal reflux disease      /RENAL   (+) Hypertensive kidney disease   (+) Stage 3a chronic kidney disease (HCC)      MUSCULOSKELETAL   (+) Osteoarthritis      Oncology   (+) History of colon cancer      Orthopedic/Musculoskeletal   (+) Osteopenia of multiple sites      Respiratory/Allergy   (+) Pulmonary nodule      Other   (+) Long term (current) use of anticoagulants   (+) Overweight      Lab Results   Component Value Date     08/11/2015    SODIUM 141 03/25/2024    K 4.2 03/25/2024     03/25/2024    CO2 30 03/25/2024    ANIONGAP 5 08/11/2015    AGAP 7 03/25/2024    BUN 19 03/25/2024    CREATININE 0.97 03/25/2024    GLUC 133 01/20/2024    GLUF 92 03/25/2024    CALCIUM 9.3 03/25/2024    AST 25 03/25/2024    ALT 19 03/25/2024    ALKPHOS 83 03/25/2024    PROT 7.2 08/11/2015    TP 7.0 03/25/2024    BILITOT 0.55 08/11/2015    TBILI 0.44 03/25/2024    EGFR 53 03/25/2024     Lab Results   Component Value Date    WBC 7.75 01/20/2024    HGB 11.0 (L) 01/20/2024    HCT 34.0 (L) 01/20/2024    MCV 98 01/20/2024     01/20/2024         Physical Exam    Airway    Mallampati score: II  TM Distance: >3 FB  Neck ROM: full     Dental       Cardiovascular      Pulmonary      Other Findings  post-pubertal.      Anesthesia Plan  ASA Score- 2     Anesthesia Type- IV sedation with anesthesia with ASA Monitors.         Additional Monitors:     Airway Plan:            Plan Factors-Exercise tolerance (METS): >4 METS.    Chart reviewed. EKG reviewed. Imaging results reviewed. Existing labs reviewed. Patient summary reviewed.                  Induction- intravenous.    Postoperative Plan-         Informed Consent- Anesthetic plan and risks discussed with patient.  I personally reviewed this  patient with the CRNA. Discussed and agreed on the Anesthesia Plan with the CRNA..

## 2024-07-10 NOTE — H&P
"History and Physical -  [facility]  Destinee Anders 83 y.o. female MRN: [id number]    HPI: Destinee Anders is a 83 y.o. year old female who presents for colonoscopy.    Hx of colon CA s/p R mackenzie 1977. CT colonography in 12/08/22 showed an 8x6mm polyp in the prox transverse.    REVIEW OF SYSTEMS: Per the HPI, and otherwise unremarkable.    HISTORICAL INFO  Past Medical History:   Diagnosis Date    Anesthesia     \"cheap date\"    Arthritis     Atrial fibrillation (HCC)     Benign polyp of large intestine     Cancer of appendix (HCC) 1977    Colon cancer (HCC) 1977    36 yo    Colon polyp     Full dentures     full upper/ partial lower    GERD (gastroesophageal reflux disease)     Glaucoma suspect     History of neck problems     \"C3-C7 and had epidural injections years ago\"    Hyperlipidemia     Hypertension     Lung cancer (HCC)     jan 2020- had surgery    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HCC)     Prediabetes     Primary adenocarcinoma of upper lobe of right lung (HCC) 12/02/2019    Pulmonary nodules 02/19/2015    Refusal of blood transfusions as patient is Anabaptist     Tic disorder, transient, recurrent     not recent    Walks frequently      Past Surgical History:   Procedure Laterality Date    APPENDECTOMY      ARTHROSCOPY KNEE      BREAST BIOPSY Right 04/10/2017    US guided - intraductal papillomas    BREAST BIOPSY Right 10/24/2019    ADH- dcis    BREAST LUMPECTOMY W/ NEEDLE LOCALIZATION Right 05/22/2020    Procedure: EXCISIONAL BIOPSY;  1000 NEEDLE LOC;  Surgeon: Hortencia Pennington MD;  [facility];  Service: Surgical Oncology    CARDIOVASCULAR STRESS TEST      CARPAL TUNNEL RELEASE Left 07/01/2022    CATARACT EXTRACTION Bilateral     COLON SURGERY      COLONOSCOPY      done 2010 due 2015     DILATION AND CURETTAGE OF UTERUS      ESOPHAGOGASTRODUODENOSCOPY      inflammation aonly    HEMICOLECTOMY Right 1977    HYSTERECTOMY      JOINT REPLACEMENT      L knee    LUNG SEGMENTECTOMY Right " 2024    Procedure: Robotic right lower lobe wedge, lymph node dissection;  Surgeon: Chris Cheng MD;  [facility];  Service: Thoracic    LUNG SURGERY      MAMMO NEEDLE LOCALIZATION RIGHT (ALL INC) Right 2020    MAMMO NEEDLE LOCALIZATION RIGHT (ALL INC) EACH ADD Right 2020    MAMMO STEREOTACTIC BREAST BIOPSY RIGHT (ALL INC) Right 10/24/2019    PARATHYROID GLAND SURGERY      exploration     [facility] INCL FLUOR GDNCE DX W/CELL WASHG SPX N/A 2020    Procedure: FLEXIBLE BRONCHOSCOPY;  Surgeon: Chris Cheng MD;  [facility];  Service: Thoracic    [facility] W/CELL WASHG SPX N/A 2024    Procedure: BRONCHOSCOPY FLEXIBLE;  Surgeon: Chris Cheng MD;  [facility];  Service: Thoracic    OK THORACOSCOPY W/LOBECTOMY SINGLE LOBE Right 2020    Procedure: ROBOTIC ASSISTED COMPLETE LUNG LOBECTOMY;  Surgeon: Chris Cheng MD;  [facility];  Service: Thoracic    OK THORACOSCOPY W/THERA WEDGE RESEXN INITIAL UNILAT Right 2020    Procedure: ROBOTIC ASSISTED UPPER-LOBE WEDGE RESECTION, MEDIASTINAL LYMPH NODE DISECTION;  Surgeon: Chris Cheng MD;  [facility];  Service: Thoracic    REPLACEMENT TOTAL KNEE      THYROID SURGERY      TOTAL ABDOMINAL HYSTERECTOMY  1983    TOTAL THYROIDECTOMY      TRIGGER FINGER RELEASE Left 2022    US GUIDED BREAST BIOPSY RIGHT COMPLETE Right 04/10/2017    US GUIDED THYROID BIOPSY  2020     Social History     Tobacco Use    Smoking status: Former     Current packs/day: 0.00     Average packs/day: 1.5 packs/day for 20.0 years (30.0 ttl pk-yrs)     Types: Cigarettes     Start date:      Quit date:      Years since quittin.5     Passive exposure: Past    Smokeless tobacco: Never   Vaping Use    Vaping status: Never Used   Substance Use Topics    Alcohol use: Yes     Alcohol/week: 5.0 standard drinks of alcohol     Types: 5 Cans of beer per week     Comment:  "occasional beer with food    Drug use: No       Current Outpatient Medications:     atorvastatin (LIPITOR) 80 mg tablet    ciclopirox (LOPROX) 0.77 % SUSP    ciclopirox (PENLAC) 8 % solution    diltiazem (CARDIZEM CD) 120 mg 24 hr capsule    gabapentin (NEURONTIN) 100 mg capsule    levothyroxine 100 mcg tablet    Multiple Vitamins-Calcium (ESSENTIAL ONE DAILY MULTIVIT PO)    omeprazole (PriLOSEC) 20 mg delayed release capsule    amoxicillin-clavulanate (AUGMENTIN) 875-125 mg per tablet    apixaban (ELIQUIS) 5 mg    docusate sodium (COLACE) 100 mg capsule    EPINEPHrine (EpiPen 2-Pa) 0.3 mg/0.3 mL SOAJ    EPINEPHrine (EPIPEN) 0.3 mg/0.3 mL SOAJ    EPINEPHrine (EPIPEN) 0.3 mg/0.3 mL SOAJ    polyethylene glycol (MiraLax) 17 GM/SCOOP powder    Allergies   Allergen Reactions    Bee Venom      Tongue swelling    Flurbiprofen Hives    Ibuprofen Shortness Of Breath    Levaquin [Levofloxacin In D5w] Other (See Comments)     Dark spots under eyes and wrists    Tequin [Gatifloxacin] Rash       OBJECTIVE  /84   Pulse 67   Temp (!) 96 °F (35.6 °C) (Tympanic)   Resp 18   Ht 5' 4\" (1.626 m)   Wt 69.4 kg (153 lb)   SpO2 96%   BMI 26.26 kg/m²     PHYSICAL EXAM  Gen: NAD  Head: NCAT  CV: RRR  CHEST: CTAB  ABD: soft, NT/ND  EXT: no edema    ASSESSMENT/PLAN:   This is a 83 y.o. year old female here for colonoscopy, and she is stable and optimized for her procedure.        "

## 2024-07-15 ENCOUNTER — TELEPHONE (OUTPATIENT)
Age: 84
End: 2024-07-15

## 2024-07-15 PROCEDURE — 88305 TISSUE EXAM BY PATHOLOGIST: CPT | Performed by: SPECIALIST

## 2024-07-15 NOTE — TELEPHONE ENCOUNTER
Called patient to let her know a request was sent to the provider for recommendations.No answer.I could not leave a message.

## 2024-07-15 NOTE — TELEPHONE ENCOUNTER
I spoke with the patient about this.    He is not SOB. Would any of his meds mess with his TSH? He will watch sat fats and carbs.   Results relayed to pt and all questions answered.     Pt requested to  her colonoscopy report in office. She asked if staff can prepare it for her. She will be at the office at 1130am.

## 2024-07-15 NOTE — TELEPHONE ENCOUNTER
Patients GI provider:  Dr. Buchanan    Number to return call: [phone]    Reason for call: Pt calling to speak to someone in regards to her colon results as she saw them come through via her [employer]. She also requested to have a copy sent to her.  Emailed the Med release form to [email or web address]     Scheduled procedure/appointment date if applicable: Apt/procedure N/A

## 2024-07-15 NOTE — TELEPHONE ENCOUNTER
Pt calling to FU on getting these results, she's been having trouble connecting with us today. Transferred to nurse Hannah to assist

## 2024-07-16 ENCOUNTER — HOSPITAL ENCOUNTER (OUTPATIENT)
Dept: RADIOLOGY | Facility: HOSPITAL | Age: 84
Discharge: HOME | End: 2024-07-16
Payer: COMMERCIAL

## 2024-07-16 ENCOUNTER — APPOINTMENT (OUTPATIENT)
Dept: RADIOLOGY | Facility: HOSPITAL | Age: 84
End: 2024-07-16
Payer: COMMERCIAL

## 2024-07-16 DIAGNOSIS — C34.11 ADENOCARCINOMA OF UPPER LOBE OF RIGHT LUNG (HCC): ICD-10-CM

## 2024-07-16 DIAGNOSIS — C34.31 ADENOCARCINOMA OF LOWER LOBE OF RIGHT LUNG (HCC): ICD-10-CM

## 2024-07-16 PROCEDURE — 71250 CT THORAX DX C-: CPT

## 2024-07-16 NOTE — TELEPHONE ENCOUNTER
Colonoscopy report prepared at the [place] location at the . [employer] message sent to the patient as well for notification and office address.

## 2024-07-18 ENCOUNTER — TELEPHONE (OUTPATIENT)
Dept: CARDIAC SURGERY | Facility: CLINIC | Age: 84
End: 2024-07-18

## 2024-07-18 NOTE — TELEPHONE ENCOUNTER
"Patient would like a provider to review or explain to her the most recent CT Chest results from 7/16/24. Did tell patient that study showed \"no signs of recurrent, residual or metastatic malignancy in the chest\". Patient questioning \"right breast postsurgical changes and known, decreased, now subcentimeter collection\". Wants to know what the \"collection\" is. \"Is it blood or fluid? What can be done about it?\" Patient also requesting someone mail her a copy of the results as she can not get into [place].    "

## 2024-07-30 ENCOUNTER — TELEPHONE (OUTPATIENT)
Dept: GYNECOLOGY | Facility: CLINIC | Age: 84
End: 2024-07-30

## 2024-07-30 NOTE — PROGRESS NOTES
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 Recent CT scan of the chest on 7/16/2024 notes no new or suspicious nodules. No evidence of lung cancer recurrence or metastasis. Patient will return in 6 months with a new chest CT scan for continued lung cancer surveillance and return for an office visit after CT scan is completed. Patient will continue with chest CT scan every 6 months for a total of 2 years post operatively and then with have annual surveillance in accordance with NCCN guidelines.        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)  Staging form: Lung, AJCC 8th Edition  - Pathologic stage from 1/17/2020: Stage IB (pT2a, pN0, cM0) - Signed by Gudelia Giraldo PA-C on 12/11/2023  Histopathologic type: Adenocarcinoma, NOS  Histologic grade (G): G2  Histologic grading system: 4 grade system    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  Breast, right, lumpectomy:  -  Ductal carcinoma in situ, low nuclear grade .     B.  Breast, right new inferior margin, excision:  -  Ductal carcinoma in situ, low nuclear grade involving intraductal papilloma.     C.  Breast, right new lateral margin, excision:  -  Ductal carcinoma in situ, low nuclear grade.     D.  Breast, right new posterior margin, excision:  -  Benign breast parenchyma, negative for atypia or malignancy.    %, IN 60-70%, HER2 1+ negative     6/9/2020 -  Cancer Staged    Staging form: Breast, AJCC 8th Edition  - Clinical: cT0, cN0, cM0 - Signed by Hortencia Pennington MD on 6/9/2020  Laterality: Right       6/9/2020 -  Cancer Staged    Staging form: Breast, AJCC 8th Edition  - Pathologic: Stage 0 (pTis (DCIS), pN0, cM0, ER+, IN+, HER2-) - Signed by Hortencia Pennington MD on 6/16/2020  Laterality: Right  Method of lymph node assessment: Clinical  Nuclear grade: G1       7/30/2020 - 8/28/2020 Radiation    Plan ID Energy Fractions Dose per Fraction (cGy) Dose Correction (cGy) Total Dose Delivered (cGy) Elapsed Days   R Breast 6X 16 / 16 266 0 4,256 21   R Brst Boost 6X 5 / 5 200 0 1,000 4        Primary adenocarcinoma of upper lobe of right lung (HCC) (Resolved)   12/2/2019 Initial Diagnosis    Primary adenocarcinoma of upper lobe of right lung (HCC)     1/17/2020 Surgery    Lung, right upper lobe, wedge resection:  -  Invasive moderately differentiated adenocarcinoma.  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)   1/17/2020 Surgery    Right upper lobectomy, MLND     1/17/2020 -  Cancer Staged    Staging form: Lung, AJCC 8th Edition  - Pathologic stage from 1/17/2020: Stage IB (pT2a, pN0, cM0) - Signed by Gudelia Giraldo PA-C on 12/11/2023  Histopathologic  type: Adenocarcinoma, NOS  Histologic grade (G): G2  Histologic grading system: 4 grade system       7/29/2020 Initial Diagnosis    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)     Adenocarcinoma of lower lobe of right lung (HCC)   1/19/2024 -  Cancer Staged    Staging form: Lung, AJCC 8th Edition  - Pathologic stage from 1/19/2024: Stage IB (pT2a, pN0, cM0) - Signed by Katelyn Avalos PA-C on 2/7/2024  Histologic grade (G): G2  Histologic grading system: 4 grade system  Residual tumor (R): R0 - None  Laterality: Right  Lymph-vascular invasion (LVI): LVI not present (absent)/not identified       1/19/2024 Surgery    Robotic assisted right lower lobe wedge resection, lymph node dissection, flexible bronchoscopy (Dr. 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 female.    HPI    Destinee Anders is a 84 y.o. female who presents today for lung cancer surveillance. Patient is approximately 6 months out from right lower lobe wedge resection for a stage IB adenocarcinoma and 4.5 years out from right upper lobectomy performed in the setting of Stage IB adenocarcinoma. At time of last office visit on 3/13/24, patient was transitioned back to surveillance screening.  She had some biliary ductal dilation on her PET CT scan preoperatively so an MRI of the liver was ordered and she was scheduled to see medical oncologist Dr. Najera to review Caris testing and determine if any other treatment is warranted.      In the interim, there is no evidence of mass on the MRI of liver and patient electing to not have any further treatment with medical oncology after discussing options with Dr. Najera.     CT scan of the chest completed on 7/16/24 was personally reviewed by myself and in PACS demonstrating no new or suspicious nodules.     On discussion today, patient reports breathing does not affect her from performing  "her daily activities.  She does have some right anterior chest wall tenderness that she describes feels firm at times.  Patient reports that she recently had a colonoscopy earlier this month and had 3 cancerous polyps removed. Denies cough, hemoptysis, fevers, chills, chest pain, new headaches, extremity numbness, acute onset blurred vision, new onset bone or joint pain, unexplained weight loss. Denies recent lung infections. Former smoker, quit 1967.     The following portions of the patient's history were reviewed and updated as appropriate: allergies, current medications, past family history, past medical history, past social history, past surgical history and problem list.    Review of Systems   Constitutional:  Negative for chills and fever.   Respiratory:  Negative for cough and shortness of breath.    Cardiovascular:  Negative for chest pain.   Musculoskeletal:  Negative for arthralgias (no new arthralgias).   Neurological:  Negative for headaches.         Objective:   Physical Exam  Constitutional:       General: She is not in acute distress.  HENT:      Head: Normocephalic and atraumatic.      Nose: Nose normal.   Eyes:      Extraocular Movements: Extraocular movements intact.   Cardiovascular:      Rate and Rhythm: Normal rate and regular rhythm.   Pulmonary:      Effort: Pulmonary effort is normal.      Breath sounds: Normal breath sounds.   Abdominal:      Palpations: Abdomen is soft.      Tenderness: There is no abdominal tenderness.   Musculoskeletal:      Right lower leg: No edema.      Left lower leg: No edema.   Skin:     General: Skin is warm and dry.     /77 (BP Location: Left arm, Patient Position: Sitting, Cuff Size: Standard)   Pulse (!) 52   Temp 97.7 °F (36.5 °C) (Temporal)   Resp 14   Ht 5' 4.5\" (1.638 m)   Wt 70.8 kg (156 lb)   SpO2 96%   BMI 26.36 kg/m²     XR chest pa & lateral    Result Date: 3/11/2024  Impression Very small residual right pleural effusion decreased from " prior. Persistent scarring in the base of the right lower lobe, little changed in appearance from prior. No airspace infiltrates or effusions. Mild lingular atelectasis. Electronically signed: 03/11/2024 05:57 PM Franck Jonas MD    XR chest pa & lateral    Result Date: 2/6/2024  Impression Status post right lower lobe wedge resection with scarring noted. No pneumothorax. Workstation performed: [id number]     XR chest pa & lateral    Result Date: 1/20/2024  Impression Bibasilar atelectasis. No pneumothorax visualized. Workstation performed: [id number]      CT chest wo contrast    Result Date: 7/18/2024  Narrative CT CHEST WITHOUT IV CONTRAST INDICATION: C34.11: Malignant neoplasm of upper lobe, right bronchus or lung C34.31: Malignant neoplasm of lower lobe, right bronchus or lung. COMPARISON: PET/CT 12/28/2023. CT chest 12/7/2023. Numerous other more distant studies. TECHNIQUE: CT examination of the chest was performed without intravenous contrast. Multiplanar 2D reformatted images were created from the source data. This examination, like all CT scans performed in the [facility], was performed utilizing techniques to minimize radiation dose exposure, including the use of iterative reconstruction and automated exposure control. Radiation dose length product (DLP) for this visit: 229.09 mGy-cm FINDINGS: LUNGS: Status post right upper lobectomy and more recent robot-assisted right lower lobe wedge resection for stage Ib adenocarcinoma. No residual/recurrent suspicious pulmonary nodules. PLEURA: Unremarkable. HEART/GREAT VESSELS: Coronary artery calcifications. No pericardial effusion. No thoracic aortic aneurysm. MEDIASTINUM AND SELENA: Small hiatal hernia. CHEST WALL AND LOWER NECK: Right thyroidectomy. Right breast postsurgical changes and known, decreased, now subcentimeter collection. VISUALIZED STRUCTURES IN THE UPPER ABDOMEN: No new findings. OSSEOUS STRUCTURES: Spinal degenerative changes are  noted. No acute fracture or destructive osseous lesion.     Impression No signs of recurrent, residual or metastatic malignancy in the chest. Workstation performed: [id number]     CT chest wo contrast    Result Date: 12/9/2023  Narrative CT CHEST WITHOUT IV CONTRAST INDICATION:   Z85.118: Personal history of other malignant neoplasm of bronchus and lung. COMPARISON: Chest CT  12/2/2022. TECHNIQUE: CT examination of the chest was performed without intravenous contrast. Multiplanar 2D reformatted images were created from the source data. This examination, like all CT scans performed in the [facility], was performed utilizing techniques to minimize radiation dose exposure, including the use of iterative reconstruction and automated exposure control. Radiation dose length product (DLP) for this visit:  212.34 mGy-cm FINDINGS: LUNGS: Increased size of now 9 mm solid pulmonary nodule in the right lower lobe (series 3, image 166), previously 3 mm. Right lower lobe subpleural nodule is unchanged (series 3, image 159). Stable postoperative changes status post right upper lobectomy. PLEURA: No pleural effusion. No pneumothorax. HEART/GREAT VESSELS: Normal heart size. Trace coronary artery calcifications. No thoracic aortic aneurysm. MEDIASTINUM AND SELENA:  Unremarkable. CHEST WALL AND LOWER NECK: Right thyroidectomy. Postoperative changes in the right breast including unchanged 1.2 cm fluid collection previously evaluated by ultrasound. VISUALIZED STRUCTURES IN THE UPPER ABDOMEN:  Unremarkable. OSSEOUS STRUCTURES:  No acute fracture or destructive osseous lesion.     Impression Enlarging solid pulmonary nodule in the right lower lobe, now 9 mm. Finding is suspicious for neoplasm, recommend further evaluation with PET/CT and/or tissue sampling. The study was marked in EPIC for significant notification. Workstation performed: [id number]     No CT Chest,Abdomen,Pelvis results available for this patient.   NM PET  CT skull base to mid thigh    Result Date: 12/28/2023  Narrative PET/CT SCAN INDICATION: History of lung cancer. Restaging exam. Enlarging right lower lobe nodule. Additional history of colon cancer status post resection in 1997. Additional history of right breast cancer resected in 2020. R91.1: Solitary pulmonary nodule Z85.118: Personal history of other malignant neoplasm of bronchus and lung MODIFIER: PS COMPARISON: PET/CT 12/20/2019, CT chest exam 12/7/2023 and additional priors CELL TYPE: Right upper lobe adenocarcinoma TECHNIQUE:   8.6 mCi F-18-FDG administered IV. Multiplanar attenuation corrected and non attenuation corrected PET images are available for interpretation, and contiguous, low dose, axial CT sections were obtained from the skull base through the femurs. Intravenous contrast material was not utilized. This examination, like all CT scans performed in the [facility], was performed utilizing techniques to minimize radiation dose exposure, including the use of iterative reconstruction and automated exposure control. Fasting serum glucose: 93 mg/dl FINDINGS: VISUALIZED BRAIN: No acute abnormalities are seen. HEAD/NECK: Diffuse radiotracer uptake in the remaining left lobe of the thyroid SUV max of 15, slightly increased from prior, previously 10.6. Question related to thyroiditis. No FDG-avid lymph nodes. CT images: Right lobe of the thyroid appears absent. CHEST: There does appear to be discrete mild FDG uptake in the 9 mm right lower lobe nodule SUV max of 2.1. This is higher than background lung activity, SUV max 0.8. No suspicious focal uptake in the mediastinum or perihilar regions. Mild asymmetric uptake at the right breast, SUV max of 2.8. This is new from prior PET/CT however CT appearance is stable with suggestion of underlying 1.3 cm loculated collection image 86 series 3. Decreasing collection noted here on serial exams. Activity is probably inflammatory. Mild FDG uptake in  the upper esophagus may be physiologic or inflammatory. CT images: Status post right upper lobectomy. Suggestion of a small fat-containing [place] hernia on the right. ABDOMEN: No FDG avid soft tissue lesions are seen. Now suggestion of segmental biliary ductal dilatation in the left lobe of the liver laterally, new from prior PET/CT. See for example images 121 to 126 series 3. No associated abnormal FDG uptake. CT images: Surgical staple line along the proximal colon. Colonic diverticulosis. PELVIS: No FDG avid soft tissue lesions are seen. CT images: Unremarkable. OSSEOUS STRUCTURES: No FDG avid lesions are seen. Scattered mild foci of uptake in the spine corresponding to degenerative changes on the CT. CT images: Multilevel degenerative changes of the spine.     Impression 1. Mild but discrete FDG uptake in the enlarging 9 mm right lower lobe nodule. Malignancy should be excluded. 2. No findings for hypermetabolic metastasis. 3. Now suggestion of segmental biliary ductal dilatation in the left lobe of the liver laterally, new from prior PET/CT. Recommend further evaluation with dedicated MRI of the liver to assess for any possible underlying hepatobiliary lesion. The study was marked in EPIC for significant notification. Workstation performed: [id number]      No Barium Swallow results available for this patient.

## 2024-07-30 NOTE — TELEPHONE ENCOUNTER
Called pt to cancel her appt with Dr. HOBSON since he is sick today.  Pt requested a call back to reschedule this afternoon when we know his return.

## 2024-07-30 NOTE — TELEPHONE ENCOUNTER
Called pt to reschedule her yearly but there was no answer. I was unable to leave a VM. Her VM box is full

## 2024-07-31 ENCOUNTER — OFFICE VISIT (OUTPATIENT)
Dept: CARDIAC SURGERY | Facility: CLINIC | Age: 84
End: 2024-07-31
Payer: COMMERCIAL

## 2024-07-31 VITALS
RESPIRATION RATE: 14 BRPM | TEMPERATURE: 97.7 F | BODY MASS INDEX: 25.99 KG/M2 | HEART RATE: 52 BPM | SYSTOLIC BLOOD PRESSURE: 125 MMHG | HEIGHT: 65 IN | WEIGHT: 156 LBS | OXYGEN SATURATION: 96 % | DIASTOLIC BLOOD PRESSURE: 77 MMHG

## 2024-07-31 DIAGNOSIS — C34.31 ADENOCARCINOMA OF LOWER LOBE OF RIGHT LUNG (HCC): Primary | ICD-10-CM

## 2024-07-31 PROCEDURE — 99213 OFFICE O/P EST LOW 20 MIN: CPT | Performed by: THORACIC SURGERY (CARDIOTHORACIC VASCULAR SURGERY)

## 2024-08-02 DIAGNOSIS — E78.2 MIXED HYPERLIPIDEMIA: ICD-10-CM

## 2024-08-02 RX ORDER — ATORVASTATIN CALCIUM 80 MG/1
80 TABLET, FILM COATED ORAL DAILY
Qty: 90 TABLET | Refills: 1 | Status: SHIPPED | OUTPATIENT
Start: 2024-08-02

## 2024-08-02 NOTE — ASSESSMENT & PLAN NOTE
Destinee Anders is approximately  6 months out from right lower lobe wedge resection for a stage IB adenocarcinoma and 4.5 years out from right upper lobectomy performed in the setting of Stage IB adenocarcinoma. Recent CT scan of the chest on 7/16/2024 notes no new or suspicious nodules. No evidence of lung cancer recurrence or metastasis. Patient will return in 6 months with a new chest CT scan for continued lung cancer surveillance and return for an office visit after CT scan is completed. Patient will continue with chest CT scan every 6 months for a total of 2 years post operatively and then with have annual surveillance in accordance with NCCN guidelines.

## 2024-08-08 ENCOUNTER — ANNUAL EXAM (OUTPATIENT)
Dept: GYNECOLOGY | Facility: CLINIC | Age: 84
End: 2024-08-08
Payer: COMMERCIAL

## 2024-08-08 ENCOUNTER — TELEPHONE (OUTPATIENT)
Dept: CARDIAC SURGERY | Facility: CLINIC | Age: 84
End: 2024-08-08

## 2024-08-08 VITALS
HEART RATE: 70 BPM | HEIGHT: 65 IN | DIASTOLIC BLOOD PRESSURE: 77 MMHG | BODY MASS INDEX: 25.49 KG/M2 | SYSTOLIC BLOOD PRESSURE: 125 MMHG | WEIGHT: 153 LBS

## 2024-08-08 DIAGNOSIS — Z01.419 ENCOUNTER FOR GYNECOLOGICAL EXAMINATION WITHOUT ABNORMAL FINDING: ICD-10-CM

## 2024-08-08 DIAGNOSIS — Z86.000 PERSONAL HISTORY OF IN-SITU NEOPLASM OF BREAST: Primary | ICD-10-CM

## 2024-08-08 PROCEDURE — G0101 CA SCREEN;PELVIC/BREAST EXAM: HCPCS | Performed by: OBSTETRICS & GYNECOLOGY

## 2024-08-08 NOTE — TELEPHONE ENCOUNTER
Called pt back in follow-up after our discussion at her last appointment regarding her recent colonoscopy.  I reached out to her gastroenterologist Dr. Buchanan who had reviewed with Ms. Anders that she removed precancerous polyps.  The general recommendation would be to have a repeat colonoscopy in 3 years but for anyone over the age of 75 will have a risks/benefits discussion at that time.  Reiterated these items to patient and she was appreciative of call.

## 2024-08-08 NOTE — PROGRESS NOTES
"Ambulatory Visit  Name: Destinee Anders      : 1940      MRN: [id number]  Encounter Provider: Aaron Gupta DO  Encounter Date: 2024   Encounter department: [facility]    Assessment & Plan   1. 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 Encounter for gynecological examination without abnormal finding      History of Present Illness     Destinee Anders is a 84 y.o. female who presents for an examination.  The patient does have a history of DCIS of the right breast diagnosed in  status post lumpectomy and radiation.  Patient also has a history of right lung cancer upper lobe diagnosed in .  She had right lower lobe lung cancer diagnosed in .  Status post robotic resection for stage Ib.  Patient is status post total abdominal hysterectomy  secondary to fibroids.  Patient has a history of colon cancer diagnosed in .    Colonoscopy 2024.  Polyp identified.  Revealed tubular adenoma and sessile serrated adenoma  DEXA scan 2022    Review of Systems   Constitutional: Negative.    HENT:  Negative for sore throat and trouble swallowing.    Gastrointestinal: Negative.    Genitourinary: Negative.        Objective     /77   Pulse 70   Ht 5' 4.5\" (1.638 m)   Wt 69.4 kg (153 lb)   BMI 25.86 kg/m²     Physical Exam  Vitals reviewed.   Cardiovascular:      Rate and Rhythm: Normal rate and regular rhythm.      Pulses: Normal pulses.      Heart sounds: Normal heart sounds. No murmur heard.  Pulmonary:      Effort: Pulmonary effort is normal. No respiratory distress.      Breath sounds: Normal breath sounds.   Chest:   Breasts:     Right: No swelling, bleeding, inverted nipple, mass, nipple discharge, skin change or tenderness.      Left: No swelling, bleeding, inverted nipple, mass, nipple discharge, skin change or tenderness.   Abdominal:      General: There is no distension.      Palpations: Abdomen is soft. There is no mass.      " Tenderness: There is no abdominal tenderness. There is no guarding or rebound.      Hernia: No hernia is present. There is no hernia in the left inguinal area or right inguinal area.   Genitourinary:     General: Normal vulva.      Labia:         Right: No rash, tenderness or lesion.         Left: No rash, tenderness or lesion.       Comments: Uterus and cervix surgically absent.  No palpable adnexal masses or tenderness.  Vagina with atrophic changes.  [place]'s and [place]'s glands normal.  Urethral orifice normal.  No cystocele or rectocele.  Musculoskeletal:      Cervical back: Normal range of motion and neck supple.   Lymphadenopathy:      Upper Body:      Right upper body: No supraclavicular, axillary or pectoral adenopathy.      Left upper body: No supraclavicular, axillary or pectoral adenopathy.      Lower Body: No right inguinal adenopathy. No left inguinal adenopathy.   Neurological:      Mental Status: She is alert.       Administrative Statements

## 2024-08-28 DIAGNOSIS — E89.0 POSTOPERATIVE HYPOTHYROIDISM: ICD-10-CM

## 2024-08-29 RX ORDER — LEVOTHYROXINE SODIUM 100 UG/1
TABLET ORAL
Qty: 100 TABLET | Refills: 1 | Status: SHIPPED | OUTPATIENT
Start: 2024-08-29

## 2024-09-26 ENCOUNTER — OFFICE VISIT (OUTPATIENT)
Dept: FAMILY MEDICINE CLINIC | Facility: CLINIC | Age: 84
End: 2024-09-26
Payer: COMMERCIAL

## 2024-09-26 VITALS
BODY MASS INDEX: 25.16 KG/M2 | OXYGEN SATURATION: 98 % | SYSTOLIC BLOOD PRESSURE: 138 MMHG | HEIGHT: 65 IN | WEIGHT: 151 LBS | HEART RATE: 60 BPM | DIASTOLIC BLOOD PRESSURE: 86 MMHG | TEMPERATURE: 97.1 F

## 2024-09-26 DIAGNOSIS — Z79.01 LONG TERM (CURRENT) USE OF ANTICOAGULANTS: ICD-10-CM

## 2024-09-26 DIAGNOSIS — E04.1 THYROID NODULE: ICD-10-CM

## 2024-09-26 DIAGNOSIS — Z00.00 MEDICARE ANNUAL WELLNESS VISIT, SUBSEQUENT: Primary | ICD-10-CM

## 2024-09-26 DIAGNOSIS — N18.31 STAGE 3A CHRONIC KIDNEY DISEASE (HCC): ICD-10-CM

## 2024-09-26 DIAGNOSIS — M85.89 OSTEOPENIA OF MULTIPLE SITES: ICD-10-CM

## 2024-09-26 DIAGNOSIS — C34.31 ADENOCARCINOMA OF LOWER LOBE OF RIGHT LUNG (HCC): ICD-10-CM

## 2024-09-26 DIAGNOSIS — I10 BENIGN ESSENTIAL HYPERTENSION: ICD-10-CM

## 2024-09-26 DIAGNOSIS — E66.3 OVERWEIGHT: ICD-10-CM

## 2024-09-26 DIAGNOSIS — R73.03 PREDIABETES: ICD-10-CM

## 2024-09-26 DIAGNOSIS — E78.2 MIXED HYPERLIPIDEMIA: ICD-10-CM

## 2024-09-26 DIAGNOSIS — E89.0 POSTOPERATIVE HYPOTHYROIDISM: ICD-10-CM

## 2024-09-26 DIAGNOSIS — Z78.0 ASYMPTOMATIC POSTMENOPAUSAL STATUS: ICD-10-CM

## 2024-09-26 DIAGNOSIS — I48.19 PERSISTENT ATRIAL FIBRILLATION (HCC): ICD-10-CM

## 2024-09-26 DIAGNOSIS — K21.9 GASTROESOPHAGEAL REFLUX DISEASE, UNSPECIFIED WHETHER ESOPHAGITIS PRESENT: ICD-10-CM

## 2024-09-26 PROCEDURE — G0439 PPPS, SUBSEQ VISIT: HCPCS | Performed by: NURSE PRACTITIONER

## 2024-09-26 PROCEDURE — 99214 OFFICE O/P EST MOD 30 MIN: CPT | Performed by: NURSE PRACTITIONER

## 2024-09-26 NOTE — ASSESSMENT & PLAN NOTE
Recent imaging in last year:  Stable heterogeneous left thyroid consistent with chronic autoimmune thyroiditis.  No nodules identified as above.

## 2024-09-26 NOTE — ASSESSMENT & PLAN NOTE
Lab Results   Component Value Date    EGFR 53 03/25/2024    EGFR 50 01/20/2024    EGFR 51 01/19/2024    CREATININE 0.97 03/25/2024    CREATININE 1.02 01/20/2024    CREATININE 1.00 01/19/2024   Stable.

## 2024-09-26 NOTE — ASSESSMENT & PLAN NOTE
Asymptomatic/stable.  Patient follows with cardiology for management.  Currently on Eliquis and diltiazem for management.

## 2024-09-26 NOTE — ASSESSMENT & PLAN NOTE
Blood pressure stable on current regimen of diltiazem    Orders:    Comprehensive metabolic panel; Future

## 2024-09-26 NOTE — PATIENT INSTRUCTIONS
Complete labs- these are fasting.   Complete bone density testing.   Recheck 6 months for follow up with Dr. Chavez.     Medicare Preventive Visit Patient Instructions  Thank you for completing your Welcome to Medicare Visit or Medicare Annual Wellness Visit today. Your next wellness visit will be due in one year (9/27/2025).  The screening/preventive services that you may require over the next 5-10 years are detailed below. Some tests may not apply to you based off risk factors and/or age. Screening tests ordered at today's visit but not completed yet may show as past due. Also, please note that scanned in results may not display below.  Preventive Screenings:  Service Recommendations Previous Testing/Comments   Colorectal Cancer Screening  * Colonoscopy    * Fecal Occult Blood Test (FOBT)/Fecal Immunochemical Test (FIT)  * Fecal DNA/Cologuard Test  * Flexible Sigmoidoscopy Age: 45-75 years old   Colonoscopy: every 10 years (may be performed more frequently if at higher risk)  OR  FOBT/FIT: every 1 year  OR  Cologuard: every 3 years  OR  Sigmoidoscopy: every 5 years  Screening may be recommended earlier than age 45 if at higher risk for colorectal cancer. Also, an individualized decision between you and your healthcare provider will decide whether screening between the ages of 76-85 would be appropriate. Colonoscopy: 07/10/2024  FOBT/FIT: Not on file  Cologuard: Not on file  Sigmoidoscopy: Not on file    Screening Not Indicated     Breast Cancer Screening Age: 40+ years old  Frequency: every 1-2 years  Not required if history of left and right mastectomy Mammogram: 04/09/2024    Screening Current   Cervical Cancer Screening Between the ages of 21-29, pap smear recommended once every 3 years.   Between the ages of 30-65, can perform pap smear with HPV co-testing every 5 years.   Recommendations may differ for women with a history of total hysterectomy, cervical cancer, or abnormal pap smears in past. Pap Smear:  08/08/2024    Screening Not Indicated   Hepatitis C Screening Once for adults born between 1945 and 1965  More frequently in patients at high risk for Hepatitis C Hep C Antibody: Not on file        Diabetes Screening 1-2 times per year if you're at risk for diabetes or have pre-diabetes Fasting glucose: 92 mg/dL (3/25/2024)  A1C: 5.9 % (6/21/2022)  Screening Current   Cholesterol Screening Once every 5 years if you don't have a lipid disorder. May order more often based on risk factors. Lipid panel: 03/25/2024    Screening Not Indicated  History Lipid Disorder     Other Preventive Screenings Covered by Medicare:  Abdominal Aortic Aneurysm (AAA) Screening: covered once if your at risk. You're considered to be at risk if you have a family history of AAA.  Lung Cancer Screening: covers low dose CT scan once per year if you meet all of the following conditions: (1) Age 55-77; (2) No signs or symptoms of lung cancer; (3) Current smoker or have quit smoking within the last 15 years; (4) You have a tobacco smoking history of at least 20 pack years (packs per day multiplied by number of years you smoked); (5) You get a written order from a healthcare provider.  Glaucoma Screening: covered annually if you're considered high risk: (1) You have diabetes OR (2) Family history of glaucoma OR (3)  aged 50 and older OR (4)  American aged 65 and older  Osteoporosis Screening: covered every 2 years if you meet one of the following conditions: (1) You're estrogen deficient and at risk for osteoporosis based off medical history and other findings; (2) Have a vertebral abnormality; (3) On glucocorticoid therapy for more than 3 months; (4) Have primary hyperparathyroidism; (5) On osteoporosis medications and need to assess response to drug therapy.   Last bone density test (DXA Scan): 09/16/2022.  HIV Screening: covered annually if you're between the age of 15-65. Also covered annually if you are younger than 15  and older than 65 with risk factors for HIV infection. For pregnant patients, it is covered up to 3 times per pregnancy.    Immunizations:  Immunization Recommendations   Influenza Vaccine Annual influenza vaccination during flu season is recommended for all persons aged >= 6 months who do not have contraindications   Pneumococcal Vaccine   * Pneumococcal conjugate vaccine = PCV13 (Prevnar 13), PCV15 (Vaxneuvance), PCV20 (Prevnar 20)  * Pneumococcal polysaccharide vaccine = PPSV23 (Pneumovax) Adults 19-65 yo with certain risk factors or if 65+ yo  If never received any pneumonia vaccine: recommend Prevnar 20 (PCV20)  Give PCV20 if previously received 1 dose of PCV13 or PPSV23   Hepatitis B Vaccine 3 dose series if at intermediate or high risk (ex: diabetes, end stage renal disease, liver disease)   Respiratory syncytial virus (RSV) Vaccine - COVERED BY MEDICARE PART D  * RSVPreF3 (Arexvy) CDC recommends that adults 60 years of age and older may receive a single dose of RSV vaccine using shared clinical decision-making (SCDM)   Tetanus (Td) Vaccine - COST NOT COVERED BY MEDICARE PART B Following completion of primary series, a booster dose should be given every 10 years to maintain immunity against tetanus. Td may also be given as tetanus wound prophylaxis.   Tdap Vaccine - COST NOT COVERED BY MEDICARE PART B Recommended at least once for all adults. For pregnant patients, recommended with each pregnancy.   Shingles Vaccine (Shingrix) - COST NOT COVERED BY MEDICARE PART B  2 shot series recommended in those 19 years and older who have or will have weakened immune systems or those 50 years and older     Health Maintenance Due:  There are no preventive care reminders to display for this patient.  Immunizations Due:      Topic Date Due    Influenza Vaccine (1) 09/01/2024     Advance Directives   What are advance directives?  Advance directives are legal documents that state your wishes and plans for medical care. These  plans are made ahead of time in case you lose your ability to make decisions for yourself. Advance directives can apply to any medical decision, such as the treatments you want, and if you want to donate organs.   What are the types of advance directives?  There are many types of advance directives, and each state has rules about how to use them. You may choose a combination of any of the following:  Living will:  This is a written record of the treatment you want. You can also choose which treatments you do not want, which to limit, and which to stop at a certain time. This includes surgery, medicine, IV fluid, and tube feedings.   Durable power of  for healthcare (DPAHC):  This is a written record that states who you want to make healthcare choices for you when you are unable to make them for yourself. This person, called a proxy, is usually a family member or a friend. You may choose more than 1 proxy.  Do not resuscitate (DNR) order:  A DNR order is used in case your heart stops beating or you stop breathing. It is a request not to have certain forms of treatment, such as CPR. A DNR order may be included in other types of advance directives.  Medical directive:  This covers the care that you want if you are in a coma, near death, or unable to make decisions for yourself. You can list the treatments you want for each condition. Treatment may include pain medicine, surgery, blood transfusions, dialysis, IV or tube feedings, and a ventilator (breathing machine).  Values history:  This document has questions about your views, beliefs, and how you feel and think about life. This information can help others choose the care that you would choose.  Why are advance directives important?  An advance directive helps you control your care. Although spoken wishes may be used, it is better to have your wishes written down. Spoken wishes can be misunderstood, or not followed. Treatments may be given even if you do not  want them. An advance directive may make it easier for your family to make difficult choices about your care.   Weight Management   Why it is important to manage your weight:  Being overweight increases your risk of health conditions such as heart disease, high blood pressure, type 2 diabetes, and certain types of cancer. It can also increase your risk for osteoarthritis, sleep apnea, and other respiratory problems. Aim for a slow, steady weight loss. Even a small amount of weight loss can lower your risk of health problems.  How to lose weight safely:  A safe and healthy way to lose weight is to eat fewer calories and get regular exercise. You can lose up about 1 pound a week by decreasing the number of calories you eat by 500 calories each day.   Healthy meal plan for weight management:  A healthy meal plan includes a variety of foods, contains fewer calories, and helps you stay healthy. A healthy meal plan includes the following:  Eat whole-grain foods more often.  A healthy meal plan should contain fiber. Fiber is the part of grains, fruits, and vegetables that is not broken down by your body. Whole-grain foods are healthy and provide extra fiber in your diet. Some examples of whole-grain foods are whole-wheat breads and pastas, oatmeal, brown rice, and bulgur.  Eat a variety of vegetables every day.  Include dark, leafy greens such as spinach, kale, judy greens, and mustard greens. Eat yellow and orange vegetables such as carrots, sweet potatoes, and winter squash.   Eat a variety of fruits every day.  Choose fresh or canned fruit (canned in its own juice or light syrup) instead of juice. Fruit juice has very little or no fiber.  Eat low-fat dairy foods.  Drink fat-free (skim) milk or 1% milk. Eat fat-free yogurt and low-fat cottage cheese. Try low-fat cheeses such as mozzarella and other reduced-fat cheeses.  Choose meat and other protein foods that are low in fat.  Choose beans or other legumes such as  split peas or lentils. Choose fish, skinless poultry (chicken or turkey), or lean cuts of red meat (beef or pork). Before you cook meat or poultry, cut off any visible fat.   Use less fat and oil.  Try baking foods instead of frying them. Add less fat, such as margarine, sour cream, regular salad dressing and mayonnaise to foods. Eat fewer high-fat foods. Some examples of high-fat foods include french fries, doughnuts, ice cream, and cakes.  Eat fewer sweets.  Limit foods and drinks that are high in sugar. This includes candy, cookies, regular soda, and sweetened drinks.  Exercise:  Exercise at least 30 minutes per day on most days of the week. Some examples of exercise include walking, biking, dancing, and swimming. You can also fit in more physical activity by taking the stairs instead of the elevator or parking farther away from stores. Ask your healthcare provider about the best exercise plan for you.      © Copyright [employer] 2018 Information is for End User's use only and may not be sold, redistributed or otherwise used for commercial purposes. All illustrations and images included in CareNotes® are the copyrighted property of [employer].D[employer]M., Inc. or [employer]

## 2024-09-26 NOTE — ASSESSMENT & PLAN NOTE
Lab Results   Component Value Date    LDLCALC 86 03/25/2024     Stable on statin.  Continue Lipitor 80 mg daily.    Orders:    Lipid panel; Future

## 2024-09-26 NOTE — PROGRESS NOTES
Ambulatory Visit  Name: Destinee Anders      : 1940      MRN: [id number]  Encounter Provider: NEYDA Vizcarra  Encounter Date: 2024   Encounter department: [facility]IMARY [facility]    Assessment & Plan  Medicare annual wellness visit, subsequent         Overweight  Wt Readings from Last 3 Encounters:   24 68.5 kg (151 lb)   24 69.4 kg (153 lb)   24 70.8 kg (156 lb)     Encouraged healthy diet/exercise. Weight has been trending down.         Mixed hyperlipidemia  Lab Results   Component Value Date    LDLCALC 86 2024     Stable on statin.  Continue Lipitor 80 mg daily.    Orders:    Lipid panel; Future    Long term (current) use of anticoagulants  Stable on Eliquis for diagnosis of atrial fibrillation         Benign essential hypertension  Blood pressure stable on current regimen of diltiazem    Orders:    Comprehensive metabolic panel; Future    Adenocarcinoma of lower lobe of right lung (HCC)  Follows with oncology for management with imaging every 6 months.  Stable.         Persistent atrial fibrillation (HCC)  Asymptomatic/stable.  Patient follows with cardiology for management.  Currently on Eliquis and diltiazem for management.         Asymptomatic postmenopausal status    Orders:    DXA bone density spine hip and pelvis; Future    Gastroesophageal reflux disease, unspecified whether esophagitis present  Stable on PPI         Osteopenia of multiple sites  Dexa due- ordered today          Thyroid nodule  Recent imaging in last year:  Stable heterogeneous left thyroid consistent with chronic autoimmune thyroiditis.  No nodules identified as above.          Prediabetes    Orders:    Hemoglobin A1C; Future    Postoperative hypothyroidism    Orders:    TSH, 3rd generation; Future    T4, free; Future    Stage 3a chronic kidney disease (HCC)  Lab Results   Component Value Date    EGFR 53 2024    EGFR 50 2024    EGFR 51 2024    CREATININE 0.97  03/25/2024    CREATININE 1.02 01/20/2024    CREATININE 1.00 01/19/2024   Stable.             Preventive health issues were discussed with patient, and age appropriate screening tests were ordered as noted in patient's After Visit Summary. Personalized health advice and appropriate referrals for health education or preventive services given if needed, as noted in patient's After Visit Summary.    History of Present Illness     Patient presents to the office today for Medicare wellness exam.  Lustral medication and thyroid medication managed by primary care. Labs last done March 2024. Overall doing well/ no complaints.          Patient Care Team:  Janet Chavez DO as PCP - General  Júnior Gutierrez MD (Inactive) as PCP - OBGYN (Gynecology)  Janet Chavez DO as PCP - [facility]  Mendoza Brumfield MD as Surgeon (Surgical Oncology)  Chris Cheng MD as Surgeon (Thoracic Surgery)  Hortencia Pennington MD (General Surgery)  Lani Arana MD (Radiation Oncology)  Adam Dong MD as Surgeon (Thoracic Surgery)  NEYDA Oconnor as Nurse Practitioner (Nurse Practitioner)    Review of Systems   Constitutional:  Negative for chills and fever.   Eyes:  Negative for discharge.   Respiratory:  Negative for shortness of breath.    Cardiovascular:  Negative for chest pain.   Gastrointestinal:  Negative for constipation and diarrhea.   Genitourinary:  Negative for difficulty urinating.   Musculoskeletal:  Negative for joint swelling.   Skin:  Negative for rash.   Neurological:  Negative for headaches.   Hematological:  Negative for adenopathy.   Psychiatric/Behavioral:  The patient is not nervous/anxious.      Medical History Reviewed by provider this encounter:  Tobacco  Allergies  Meds  Problems  Med Hx  Surg Hx  Fam Hx       Annual Wellness Visit Questionnaire   Alberta is here for her Subsequent Wellness visit. Last Medicare Wellness visit information reviewed, patient  interviewed and updates made to the record today.      Health Risk Assessment:   Patient rates overall health as good. Patient is satisfied with their life. Eyesight was rated as same. Patient feels that their emotional and mental health rating is same. Patients states they are never, rarely angry. Patient states they are never, rarely unusually tired/fatigued. Pain experienced in the last 7 days has been some. Patient's pain rating has been 5/10. Patient states that she has experienced no weight loss or gain in last 6 months.     Depression Screening:   PHQ-2 Score: 0      Fall Risk Screening:   In the past year, patient has experienced: no history of falling in past year      Urinary Incontinence Screening:   Patient has not leaked urine accidently in the last six months.     Home Safety:  Patient does not have trouble with stairs inside or outside of their home. Patient has working smoke alarms and has working carbon monoxide detector. Home safety hazards include: none.     Nutrition:   Current diet is Regular.     Medications:   Patient is currently taking over-the-counter supplements. OTC medications include: see medication list. Patient is able to manage medications.     Activities of Daily Living (ADLs)/Instrumental Activities of Daily Living (IADLs):   Walk and transfer into and out of bed and chair?: Yes  Dress and groom yourself?: Yes    Bathe or shower yourself?: Yes    Feed yourself? Yes  Do your laundry/housekeeping?: Yes  Manage your money, pay your bills and track your expenses?: Yes  Make your own meals?: Yes    Do your own shopping?: Yes    Previous Hospitalizations:   Any hospitalizations or ED visits within the last 12 months?: No      Advance Care Planning:   Living will: Yes    Durable POA for healthcare: No    Advanced directive: Yes    Advanced directive counseling given: Yes    ACP document given: Yes      PREVENTIVE SCREENINGS      Cardiovascular Screening:    General: Screening Not  Indicated, History Lipid Disorder and Screening Current      Diabetes Screening:     General: Screening Current      Colorectal Cancer Screening:     General: Screening Not Indicated      Breast Cancer Screening:     General: Screening Current      Cervical Cancer Screening:    General: Screening Not Indicated      Osteoporosis Screening:    General: Screening Current    Due for: Bone Density CT Appendicular      Abdominal Aortic Aneurysm (AAA) Screening:        General: Screening Not Indicated      Lung Cancer Screening:     General: Screening Not Indicated and History Lung Cancer      Preventive Screening Comments: No family history of AAA- MRI abdomen 4/2024 negative for aneurysm     Screening, Brief Intervention, and Referral to Treatment (SBIRT)    Screening  Typical number of drinks in a day: 0  Typical number of drinks in a week: 0  Interpretation: Low risk drinking behavior.    Single Item Drug Screening:  How often have you used an illegal drug (including marijuana) or a prescription medication for non-medical reasons in the past year? never    Single Item Drug Screen Score: 0  Interpretation: Negative screen for possible drug use disorder    Social Determinants of Health     Financial Resource Strain: Low Risk  (9/26/2023)    Overall Financial Resource Strain (CARDIA)     Difficulty of Paying Living Expenses: Not hard at all   Food Insecurity: No Food Insecurity (9/26/2024)    Hunger Vital Sign     Worried About Running Out of Food in the Last Year: Never true     Ran Out of Food in the Last Year: Never true   Transportation Needs: No Transportation Needs (9/26/2024)    PRAPARE - Transportation     Lack of Transportation (Medical): No     Lack of Transportation (Non-Medical): No   Housing Stability: Low Risk  (9/26/2024)    Housing Stability Vital Sign     Unable to Pay for Housing in the Last Year: No     Number of Times Moved in the Last Year: 1     Homeless in the Last Year: No   Utilities: Not At Risk  "(9/26/2024)    [facility] Utilities     Threatened with loss of utilities: No     No results found.    Objective     /86 (BP Location: Left arm, Patient Position: Sitting, Cuff Size: Adult)   Pulse 60   Temp (!) 97.1 °F (36.2 °C) (Temporal)   Ht 5' 4.5\" (1.638 m)   Wt 68.5 kg (151 lb)   SpO2 98%   BMI 25.52 kg/m²     Physical Exam  Vitals and nursing note reviewed.   Constitutional:       General: She is not in acute distress.     Appearance: Normal appearance. She is not ill-appearing, toxic-appearing or diaphoretic.   HENT:      Head: Normocephalic and atraumatic.      Right Ear: Tympanic membrane, ear canal and external ear normal. There is no impacted cerumen.      Left Ear: Tympanic membrane, ear canal and external ear normal. There is no impacted cerumen.      Nose: Nose normal.      Mouth/Throat:      Mouth: Mucous membranes are moist.      Pharynx: Oropharynx is clear. No oropharyngeal exudate or posterior oropharyngeal erythema.   Eyes:      General: Lids are normal. No scleral icterus.        Right eye: No discharge.         Left eye: No discharge.      Extraocular Movements: Extraocular movements intact.      Conjunctiva/sclera: Conjunctivae normal.      Pupils: Pupils are equal, round, and reactive to light.   Cardiovascular:      Rate and Rhythm: Normal rate and regular rhythm. No extrasystoles are present.     Heart sounds: S1 normal and S2 normal. No murmur heard.  Pulmonary:      Effort: Pulmonary effort is normal. No respiratory distress.      Breath sounds: Normal breath sounds. No stridor or decreased air movement. No wheezing or rhonchi.   Abdominal:      General: Bowel sounds are normal.      Palpations: Abdomen is soft.      Tenderness: There is no abdominal tenderness.   Musculoskeletal:         General: Normal range of motion.      Cervical back: Normal range of motion and neck supple.   Skin:     General: Skin is warm and dry.   Neurological:      General: No focal deficit present.      " Mental Status: She is alert and oriented to person, place, and time. Mental status is at baseline.      Cranial Nerves: No cranial nerve deficit.      Sensory: No sensory deficit.      Motor: No weakness.      Coordination: Coordination normal.      Gait: Gait normal.   Psychiatric:         Attention and Perception: Attention and perception normal.         Mood and Affect: Mood and affect normal.         Speech: Speech normal.         Behavior: Behavior is cooperative.         Cognition and Memory: Cognition normal.         Judgment: Judgment normal.

## 2024-09-26 NOTE — ASSESSMENT & PLAN NOTE
Wt Readings from Last 3 Encounters:   09/26/24 68.5 kg (151 lb)   08/08/24 69.4 kg (153 lb)   07/31/24 70.8 kg (156 lb)     Encouraged healthy diet/exercise. Weight has been trending down.

## 2024-11-21 ENCOUNTER — OFFICE VISIT (OUTPATIENT)
Dept: SURGICAL ONCOLOGY | Facility: CLINIC | Age: 84
End: 2024-11-21
Payer: COMMERCIAL

## 2024-11-21 VITALS
SYSTOLIC BLOOD PRESSURE: 132 MMHG | DIASTOLIC BLOOD PRESSURE: 80 MMHG | BODY MASS INDEX: 25.52 KG/M2 | HEIGHT: 65 IN | OXYGEN SATURATION: 98 % | HEART RATE: 56 BPM | TEMPERATURE: 97.3 F

## 2024-11-21 DIAGNOSIS — Z85.038 HISTORY OF COLON CANCER: ICD-10-CM

## 2024-11-21 DIAGNOSIS — Z08 ENCOUNTER FOR FOLLOW-UP SURVEILLANCE OF BREAST CANCER: Primary | ICD-10-CM

## 2024-11-21 DIAGNOSIS — Z85.3 ENCOUNTER FOR FOLLOW-UP SURVEILLANCE OF BREAST CANCER: Primary | ICD-10-CM

## 2024-11-21 DIAGNOSIS — Z86.000 PERSONAL HISTORY OF IN-SITU NEOPLASM OF BREAST: ICD-10-CM

## 2024-11-21 DIAGNOSIS — Z85.118 HISTORY OF LUNG CANCER: ICD-10-CM

## 2024-11-21 DIAGNOSIS — Z85.3 PERSONAL HISTORY OF BREAST CANCER: ICD-10-CM

## 2024-11-21 PROCEDURE — 99213 OFFICE O/P EST LOW 20 MIN: CPT

## 2024-11-21 RX ORDER — AMMONIUM LACTATE 12 G/100G
CREAM TOPICAL
COMMUNITY
Start: 2024-11-05

## 2024-11-21 RX ORDER — CICLOPIROX 80 MG/ML
SOLUTION TOPICAL
COMMUNITY
Start: 2024-10-17

## 2024-11-21 NOTE — PROGRESS NOTES
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  Staging form: Breast, AJCC 8th Edition  - Clinical: cT0, cN0, cM0 - Signed by Hortencia Pennington MD on 2020  - Pathologic: Stage 0 (pTis (DCIS), pN0, cM0, ER+, [place]+, HER2-) - Signed by Hortencia Pennington MD on 2020  :  Assessment & Plan  Encounter for follow-up surveillance of breast cancer  - 1 year f/u       Personal history of breast cancer  Orders:    Ambulatory Referral to Oncology Genetics; Future    History of colon cancer  Orders:    Ambulatory Referral to Oncology Genetics; Future    History of lung cancer  Orders:    Ambulatory Referral to Oncology Genetics; Future    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 Anders is an 84-year-old female presenting today for 6-month follow-up for ductal carcinoma in situ (DCIS), diagnosed in 2019. She underwent a right breast lumpectomy and WBRT in . She is currently on observation. She had a bilateral diagnostic mammogram on 2024 which was BI-RADS 2 category 3 density. Her next mammogram is scheduled for 04/10/2025. Of note, Ms. Anders does have an extensive personal hx of cancer outside of her breast cancer. 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 She underwent  resection at this time, no adjuvant therapy. Records are unavailable for review. In 1990, she underwent partial thyroidectomy d/t malignancy. Upon preoperative workup for her breast cancer, she was found to have lung cancer. She is s/p upper lobectomy, no adjuvant therapy. In January 2024, she underwent a right lower lobe wedge resection due to adenocarcinoma. No adjuvant therapy, she continues on surveillance. She mentions nerve related pain at the inframammary fold of the right breast most likely r/t hx of breast RT and surgical scar x2 for lung ca. Today she denies breast changes, persistent cough, SOB, headaches, as well as any new or persistent back, bone, or abdominal pain. There were no concerns on her cbe. We are going to refer her to genetics for testing secondary to her personal history of various cancers.I will see the patient back in 6 months or sooner should the need arise. She was instructed to call with any questions or concerns prior to this time. All questions were answered today.       No follow-ups on file.    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  Breast, right, lumpectomy:  -  Ductal carcinoma in situ, low nuclear grade .     B.  Breast, right new inferior margin, excision:  -  Ductal carcinoma in situ, low nuclear grade involving intraductal papilloma.     C.  Breast, right new lateral margin, excision:  -  Ductal carcinoma in situ, low nuclear grade.     D.  Breast, right new posterior margin, excision:  -  Benign breast parenchyma, negative for atypia or malignancy.    %, [place] 60-70%, HER2 1+ negative     6/9/2020 -  Cancer Staged    Staging form: Breast, AJCC 8th  Edition  - Clinical: cT0, cN0, cM0 - Signed by Hortencia Pennington MD on 6/9/2020  Laterality: Right       6/9/2020 -  Cancer Staged    Staging form: Breast, AJCC 8th Edition  - Pathologic: Stage 0 (pTis (DCIS), pN0, cM0, ER+, AR+, HER2-) - Signed by Hortencia Pennington MD on 6/16/2020  Laterality: Right  Method of lymph node assessment: Clinical  Nuclear grade: G1       7/30/2020 - 8/28/2020 Radiation    Plan ID Energy Fractions Dose per Fraction (cGy) Dose Correction (cGy) Total Dose Delivered (cGy) Elapsed Days   R Breast 6X 16 / 16 266 0 4,256 21   R Brst Boost 6X 5 / 5 200 0 1,000 4        Primary adenocarcinoma of upper lobe of right lung (HCC) (Resolved)   12/2/2019 Initial Diagnosis    Primary adenocarcinoma of upper lobe of right lung (HCC)     1/17/2020 Surgery    Lung, right upper lobe, wedge resection:  -  Invasive moderately differentiated adenocarcinoma.  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   Respiratory:  Negative for cough and shortness of breath.    Cardiovascular:  Negative for chest pain.   Gastrointestinal:  Negative for abdominal pain, diarrhea, nausea and vomiting.   Endocrine: Negative for heat intolerance.   Musculoskeletal:  Negative for arthralgias, back pain and myalgias.   Skin: Negative.  Negative for rash.   Neurological: Negative.  Negative for weakness and headaches.   Hematological:  Negative for adenopathy.   Psychiatric/Behavioral:  Negative for confusion.     A complete review of systems is negative other than that noted above in the HPI.         Current Outpatient Medications   Medication Sig Dispense Refill    ammonium lactate (LAC-HYDRIN) 12 % cream APPLY THIN LAYER TO DARKENED, DRY AREAS ON BODY TWICE DAILY.      apixaban (ELIQUIS) 5 mg Take 5 mg by mouth 2 (two) times a day To stop 2 days prior to surgery per md      atorvastatin (LIPITOR) 80 mg tablet Take 1 tablet (80 mg total) by mouth daily 90 tablet 1    ciclopirox (LOPROX) 0.77 % SUSP       ciclopirox (PENLAC) 8 % solution PLEASE SEE ATTACHED FOR DETAILED DIRECTIONS      docusate sodium (COLACE) 100 mg capsule Take 1 capsule (100 mg total) by mouth 2 (two) times a day as needed for constipation 20 capsule 0    EPINEPHrine (EpiPen 2-Pa) 0.3 mg/0.3 mL SOAJ Inject 0.3 mL (0.3 mg total) into a muscle once as needed for anaphylaxis for up to 1 dose 0.6 mL 0    EPINEPHrine (EPIPEN) 0.3 mg/0.3 mL SOAJ INJECT 0.3 ML (0.3 MG TOTAL) INTO A MUSCLE ONCE FOR 1 DOSE 2 each 1    gabapentin (NEURONTIN) 100 mg capsule Take 1 capsule (100 mg total) by mouth 3 (three) times a day 63 capsule 0    levothyroxine 100 mcg tablet TAKE 1 TABLET BY MOUTH EVERY  tablet 1    Multiple Vitamins-Calcium (ESSENTIAL ONE DAILY MULTIVIT PO) Take by  "mouth daily       omeprazole (PriLOSEC) 20 mg delayed release capsule TAKE 1 CAPSULE (20 MG TOTAL) BY MOUTH AS NEEDED (GERD) 90 capsule 0    diltiazem (CARDIZEM CD) 120 mg 24 hr capsule Take 120 mg by mouth every evening       EPINEPHrine (EPIPEN) 0.3 mg/0.3 mL SOAJ Inject 0.3 mL (0.3 mg total) into a muscle once for 1 dose (Patient not taking: Reported on 7/31/2024) 0.6 mL 0    polyethylene glycol (MiraLax) 17 GM/SCOOP powder Take 238 g by mouth once for 1 dose Take 238 g my mouth. Use as directed (Patient not taking: Reported on 7/31/2024) 238 g 0     No current facility-administered medications for this visit.     Objective   /80 (BP Location: Left arm, Patient Position: Sitting, Cuff Size: Standard)   Pulse 56   Temp (!) 97.3 °F (36.3 °C) (Temporal)   Ht 5' 4.5\" (1.638 m)   SpO2 98%   BMI 25.52 kg/m²     Vitals:    11/21/24 0807   BP: 132/80   Pulse: 56   Temp: (!) 97.3 °F (36.3 °C)   SpO2: 98%     ECOG    Physical Exam  Vitals and nursing note reviewed.   Constitutional:       General: She is not in acute distress.     Appearance: Normal appearance. She is normal weight.   Eyes:      General: No scleral icterus.     Conjunctiva/sclera: Conjunctivae normal.   Cardiovascular:      Rate and Rhythm: Normal rate and regular rhythm.      Pulses: Normal pulses.      Heart sounds: Normal heart sounds.   Pulmonary:      Effort: Pulmonary effort is normal.      Breath sounds: Normal breath sounds.   Chest:      Chest wall: No mass.   Breasts:     Right: No swelling, bleeding, inverted nipple, mass, nipple discharge, skin change (surgical scar) or tenderness.      Left: No swelling, bleeding, inverted nipple, mass, nipple discharge, skin change or tenderness.       Abdominal:      General: Abdomen is flat.      Palpations: Abdomen is soft.   Lymphadenopathy:      Upper Body:      Right upper body: No supraclavicular, axillary or pectoral adenopathy.      Left upper body: No supraclavicular, axillary or pectoral " adenopathy.   Skin:     General: Skin is warm and dry.   Neurological:      General: No focal deficit present.      Mental Status: She is alert and oriented to person, place, and time. Mental status is at baseline.      Cranial Nerves: No cranial nerve deficit.      Sensory: No sensory deficit.      Motor: No weakness.      Coordination: Coordination normal.      Gait: Gait normal.      Deep Tendon Reflexes: Reflexes normal.   Psychiatric:         Mood and Affect: Mood normal.         Behavior: Behavior normal.         Thought Content: Thought content normal.         Judgment: Judgment normal.        Constitutional: General appearance: The Patient is well-developed and well-nourished who appears the stated age in no acute distress. Patient is pleasant and talkative.  HEENT: Normocephalic. Sclerae are anicteric. Mucous membranes are moist. Neck is supple without adenopathy. No JVD.  Chest: The lungs are clear to auscultation.  Cardiac: Heart is regular rate.  Abdomen: Abdomen is soft, non-tender, non-distended and without masses.  Extremities: There is no clubbing or cyanosis. There is no edema. Symmetric.  Neuro: Grossly nonfocal. Gait is normal.  Lymphatic: No evidence of cervical adenopathy bilaterally.  No evidence of axillary adenopathy bilaterally. No evidence of inguinal adenopathy bilaterally.  Skin: Warm, anicteric.  Psych: Patient is pleasant and talkative    Labs: I have reviewed pertinent labs.       Pathology: I have reviewed pathology reports described above.

## 2025-01-16 ENCOUNTER — HOSPITAL ENCOUNTER (OUTPATIENT)
Dept: RADIOLOGY | Facility: HOSPITAL | Age: 85
Discharge: HOME | End: 2025-01-16
Payer: COMMERCIAL

## 2025-01-16 DIAGNOSIS — C34.31 ADENOCARCINOMA OF LOWER LOBE OF RIGHT LUNG (HCC): ICD-10-CM

## 2025-01-16 PROCEDURE — 71250 CT THORAX DX C-: CPT

## 2025-01-22 ENCOUNTER — OFFICE VISIT (OUTPATIENT)
Dept: CARDIAC SURGERY | Facility: CLINIC | Age: 85
End: 2025-01-22
Payer: COMMERCIAL

## 2025-01-22 VITALS
OXYGEN SATURATION: 98 % | HEIGHT: 65 IN | BODY MASS INDEX: 26.66 KG/M2 | HEART RATE: 67 BPM | SYSTOLIC BLOOD PRESSURE: 140 MMHG | TEMPERATURE: 97.7 F | WEIGHT: 160 LBS | DIASTOLIC BLOOD PRESSURE: 71 MMHG | RESPIRATION RATE: 14 BRPM

## 2025-01-22 DIAGNOSIS — C34.31 ADENOCARCINOMA OF LOWER LOBE OF RIGHT LUNG (HCC): ICD-10-CM

## 2025-01-22 DIAGNOSIS — C34.11 ADENOCARCINOMA OF UPPER LOBE OF RIGHT LUNG (HCC): Primary | ICD-10-CM

## 2025-01-22 PROCEDURE — 99213 OFFICE O/P EST LOW 20 MIN: CPT | Performed by: THORACIC SURGERY (CARDIOTHORACIC VASCULAR SURGERY)

## 2025-01-22 RX ORDER — EZETIMIBE 10 MG/1
10 TABLET ORAL DAILY
COMMUNITY
Start: 2024-11-26 | End: 2025-11-26

## 2025-01-22 NOTE — ASSESSMENT & PLAN NOTE
84-year-old female with stage Ib right lower lobe adenocarcinoma and stage Ib right upper carcinoma status post resection in 1/19/2024 and 1/17/2020 respectively with no evidence of recurrence or metastatic disease.    I had a nice visit with Alberta this morning.  She is 5 years out from her original surgery and 1 year out from her surgery for a second primary.  She is doing well and no residual side effects from her surgery.  No pain or shortness of breath.  No fever chills or unintentional weight loss.  She has no residual discomfort at her incisions and is not taking any medication for pain.  Minor numbness but this is getting better with time.  No other complaints.    I reviewed her imaging.  This shows no evidence of recurrence or metastatic disease.  No mediastinal adenopathy.  Given her more recently diagnosed cancer I would just recommend following with a repeat CT scan in 6 months.  I will order this now and see her back in our office after.  I have asked her to call us sooner with any worsening respiratory issues unexplained fevers recurrent infections or other concerns.  She voiced understanding and appreciation.    Chris Cheng      Orders:    CT chest wo contrast; Future

## 2025-01-22 NOTE — PROGRESS NOTES
Name: Destinee Anders      : 1940      MRN: [id number]  Encounter Provider: Chris Cheng MD  Encounter Date: 2025   Encounter department: 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    I had a nice visit with Alberta this morning.  She is 5 years out from her original surgery and 1 year out from her surgery for a second primary.  She is doing well and no residual side effects from her surgery.  No pain or shortness of breath.  No fever chills or unintentional weight loss.  She has no residual discomfort at her incisions and is not taking any medication for pain.  Minor numbness but this is getting better with time.  No other complaints.    I reviewed her imaging.  This shows no evidence of recurrence or metastatic disease.  No mediastinal adenopathy.  Given her more recently diagnosed cancer I would just recommend following with a repeat CT scan in 6 months.  I will order this now and see her back in our office after.  I have asked her to call us sooner with any worsening respiratory issues unexplained fevers recurrent infections or other concerns.  She voiced understanding and appreciation.    Chris Cheng      Orders:    CT chest wo contrast; Future        Thoracic History   Diagnosis: 1. Right upper lobe stage Ib adenocarcinoma  2. Right lower lobe stage Ib adenocarcinoma  Procedures/Surgeries: 1.  2020 robotic right upper lobectomy  2.  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  Breast, right, lumpectomy:  -  Ductal carcinoma in situ, low nuclear grade .     B.  Breast, right new inferior margin, excision:  -  Ductal carcinoma in situ, low nuclear grade involving intraductal papilloma.     C.  Breast, right new lateral margin, excision:  -  Ductal carcinoma in situ, low nuclear grade.     D.  Breast, right new posterior margin, excision:  -  Benign breast parenchyma, negative for atypia or malignancy.    %, [place] 60-70%, HER2 1+ negative     6/9/2020 -  Cancer Staged    Staging form: Breast, AJCC 8th Edition  - Clinical: cT0, cN0, cM0 - Signed by Hortencia Pennington MD on 6/9/2020  Laterality: Right       6/9/2020 -  Cancer Staged    Staging form: Breast, AJCC 8th Edition  - Pathologic: Stage 0 (pTis (DCIS), pN0, cM0, ER+, [place]+, HER2-) - Signed by Hortencia Pennington MD on 6/16/2020  Laterality: Right  Method of lymph node assessment: Clinical  Nuclear grade: G1       7/30/2020 - 8/28/2020 Radiation    Plan ID Energy Fractions Dose per Fraction (cGy) Dose Correction (cGy) Total Dose Delivered (cGy) Elapsed Days   R Breast 6X 16 / 16 266 0 4,256 21   R Brst Boost 6X 5 / 5 200 0 1,000 4        Primary adenocarcinoma of upper lobe of right lung (HCC) (Resolved)   12/2/2019 Initial Diagnosis    Primary adenocarcinoma of upper lobe of right lung (HCC)     1/17/2020 Surgery    Lung, right upper lobe, wedge resection:  -  Invasive moderately differentiated adenocarcinoma.  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 Cheng)     1/19/2024 Initial Diagnosis    Adenocarcinoma of lower lobe of right lung (HCC)          History of Present Illness   HPI  Destinee Anders is a 84 y.o. female who comes back to our office for her surveillance follow-up.  She underwent a therapeutic right lower lobe wedge resection for a second stage Ib adenocarcinoma in January of last year.  She has some minor numbness but otherwise no residual complaints from this.  She is not taking any medication.  No major limiting shortness of breath.  No fever chills or unintentional weight loss.  She is undergoing routine colonoscopies for polyps which have been precancerous.  No other suspicious findings    Review of Systems   Constitutional:  Negative for activity change, appetite change, chills, diaphoresis, fatigue, fever and unexpected weight change.   HENT: Negative.     Eyes: Negative.    Respiratory:  Negative for apnea, cough, chest tightness, shortness of breath, wheezing and stridor.    Cardiovascular:  Negative for chest pain, palpitations and leg swelling.   Gastrointestinal:  Negative for abdominal distention, abdominal pain, blood in stool, constipation, diarrhea, nausea and vomiting.   Endocrine: Negative.    Genitourinary: Negative.    Musculoskeletal: Negative.    Skin:  "Negative.    Allergic/Immunologic: Negative.    Neurological: Negative.    Hematological: Negative.    Psychiatric/Behavioral: Negative.             Objective   /71 (BP Location: Right arm, Patient Position: Sitting, Cuff Size: Standard)   Pulse 67   Temp 97.7 °F (36.5 °C) (Temporal)   Resp 14   Ht 5' 4.5\" (1.638 m)   Wt 72.6 kg (160 lb)   SpO2 98%   BMI 27.04 kg/m²     Pain Screening:  Pain Score:   2  ECOG    Physical Exam  Vitals and nursing note reviewed.   Constitutional:       General: She is not in acute distress.     Appearance: Normal appearance. She is well-developed. She is not diaphoretic.      Comments: Appears comfortable in no acute distress.   HENT:      Head: Normocephalic and atraumatic.      Mouth/Throat:      Pharynx: No oropharyngeal exudate.   Eyes:      General: No scleral icterus.     Conjunctiva/sclera: Conjunctivae normal.      Pupils: Pupils are equal, round, and reactive to light.   Neck:      Thyroid: No thyromegaly.      Vascular: No JVD.      Trachea: No tracheal deviation.   Cardiovascular:      Rate and Rhythm: Normal rate and regular rhythm.      Heart sounds: Normal heart sounds. No murmur heard.     No friction rub. No gallop.   Pulmonary:      Effort: Pulmonary effort is normal. No respiratory distress.      Breath sounds: Normal breath sounds. No wheezing or rales.      Comments: Normal work of breathing on room air  Chest:      Chest wall: No tenderness.   Abdominal:      General: Bowel sounds are normal. There is no distension.      Palpations: Abdomen is soft. There is no mass.      Tenderness: There is no abdominal tenderness. There is no guarding or rebound.   Musculoskeletal:         General: No tenderness or deformity. Normal range of motion.      Cervical back: Normal range of motion and neck supple.   Skin:     General: Skin is warm and dry.      Coloration: Skin is not pale.      Findings: No erythema or rash.   Neurological:      Mental Status: She is " alert and oriented to person, place, and time.   Psychiatric:         Behavior: Behavior normal.         Thought Content: Thought content normal.         Judgment: Judgment normal.     I personally reviewed her CT imaging in PACS from 1/this to previous imaging in PACS.  She has normal postoperative changes at the right apex and right lower lobe.  No mediastinal adenopathy.  No effusion.  No other suspicious findings.    Labs:       Pathology: I have reviewed pathology reports described above.

## 2025-02-27 DIAGNOSIS — E89.0 POSTOPERATIVE HYPOTHYROIDISM: ICD-10-CM

## 2025-02-28 RX ORDER — LEVOTHYROXINE SODIUM 100 UG/1
100 TABLET ORAL DAILY
Qty: 90 TABLET | Refills: 0 | Status: SHIPPED | OUTPATIENT
Start: 2025-02-28

## 2025-03-08 ENCOUNTER — TELEPHONE (OUTPATIENT)
Dept: OTHER | Facility: OTHER | Age: 85
End: 2025-03-08

## 2025-03-08 NOTE — TELEPHONE ENCOUNTER
Diagnosis of Adenocarcinoma of upper lobe of right lung with s/p Robotic right lower lobe wedge, lymph node dissection on 1/19/2024. Patient calling due to worsening edema/lump under Right breast causing tenderness and discomfort since 3/7 HS. Patient concerned about fluid buildup and complications. last OV 1/22/25.     On call provider contacted and informed of patient's status.

## 2025-03-10 ENCOUNTER — TELEPHONE (OUTPATIENT)
Dept: OTHER | Facility: OTHER | Age: 85
End: 2025-03-10

## 2025-03-10 ENCOUNTER — OFFICE VISIT (OUTPATIENT)
Dept: CARDIAC SURGERY | Facility: CLINIC | Age: 85
End: 2025-03-10
Payer: COMMERCIAL

## 2025-03-10 ENCOUNTER — TELEPHONE (OUTPATIENT)
Dept: CARDIAC SURGERY | Facility: CLINIC | Age: 85
End: 2025-03-10

## 2025-03-10 VITALS
DIASTOLIC BLOOD PRESSURE: 82 MMHG | HEART RATE: 61 BPM | BODY MASS INDEX: 27.04 KG/M2 | SYSTOLIC BLOOD PRESSURE: 141 MMHG | HEIGHT: 65 IN | OXYGEN SATURATION: 96 % | RESPIRATION RATE: 15 BRPM | TEMPERATURE: 98.1 F

## 2025-03-10 DIAGNOSIS — C34.11 ADENOCARCINOMA OF UPPER LOBE OF RIGHT LUNG (HCC): ICD-10-CM

## 2025-03-10 DIAGNOSIS — C34.31 ADENOCARCINOMA OF LOWER LOBE OF RIGHT LUNG (HCC): Primary | ICD-10-CM

## 2025-03-10 DIAGNOSIS — R91.8 RIGHT LOWER LOBE LUNG MASS: ICD-10-CM

## 2025-03-10 PROCEDURE — 99213 OFFICE O/P EST LOW 20 MIN: CPT

## 2025-03-10 RX ORDER — ONDANSETRON 4 MG/1
4 TABLET, FILM COATED ORAL EVERY 12 HOURS PRN
COMMUNITY
Start: 2025-02-21

## 2025-03-10 RX ORDER — HYDROCODONE BITARTRATE AND ACETAMINOPHEN 5; 325 MG/1; MG/1
TABLET ORAL
COMMUNITY
Start: 2025-02-21

## 2025-03-10 RX ORDER — GABAPENTIN 100 MG/1
100 CAPSULE ORAL 3 TIMES DAILY
Qty: 63 CAPSULE | Refills: 0 | Status: SHIPPED | OUTPATIENT
Start: 2025-03-10 | End: 2025-03-31

## 2025-03-10 NOTE — PROGRESS NOTES
Name: Destinee Anders      : 1940      MRN: [id number]  Encounter Provider: Thoracic Oncology [facility]  Encounter Date: 3/10/2025   Encounter department: 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 Anders has a history of 2 separate stage IB lung cancers with treatment consisting of a right upper lobectomy and right lower lobe therapeutic wedge resection.  Today she presents due to swelling on her right anterior chest wall near previous access incision related to her right sided thoracic surgeries.  On exam, it appears that she does have some slight asymmetry without a definitive bulge that may be related to intercostal nerve interruption during her lobectomy and wedge resection.  The sensation that she described seems most consistent with exacerbated nerve pain.  I have ordered her a course of gabapentin 100 mg 3 times daily for 3 weeks.  I will call her in 1 week to assess progress.  As the symptoms had worsened for patient she urinated 3 times in quick succession overnight 2 mornings ago.  These urination episodes seem separate from the described nerve pain patient seems to be experiencing.  Patient does not display other signs of fluid overload nor symptoms associated with a UTI.  I have asked patient to monitor herself for any UTI symptoms and call her PCP if this does occur and urinary symptoms persist.  Please send patient symptoms I do not feel at this time that we need to move up her CT scan of the chest to be sooner.  Patient has a scheduled mammogram in 1 month given her history of right-sided breast cancer.  I also do not feel that we need to move that scan at this time but will follow-up on the results of the mammogram.  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  There is an area of benign scar in the right lower lobe and a most recent CT scan of the chest on 1/16/2025.           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)  Staging form: Lung, AJCC 8th Edition  - Pathologic stage from 1/19/2024: Stage IB (pT2a, pN0, cM0) - Signed by Katelyn Avalos PA-C on 2/7/2024  Histologic grade (G): G2  Histologic grading system: 4 grade system  Residual tumor (R): R0 - None  Laterality: Right  Lymph-vascular invasion (LVI): LVI not present (absent)/not identified    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)  Staging form: Lung, AJCC 8th Edition  - Pathologic stage from 1/17/2020: Stage IB (pT2a, pN0, cM0) - Signed by Gudelia Giraldo PA-C on 12/11/2023  Histopathologic type: Adenocarcinoma, NOS  Histologic grade (G): G2  Histologic grading system: 4 grade system    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  Staging form: Breast, AJCC 8th Edition  - Clinical: cT0, cN0, cM0 - Signed by Hortencia Pennington MD on 6/9/2020  Laterality: Right  - Pathologic: Stage 0 (pTis (DCIS), pN0, cM0, ER+, MO+, HER2-) - Signed by Hortencia Pennington MD on 6/16/2020  Method of lymph node assessment: Clinical  Nuclear grade: G1  Laterality: Right    Oncology History   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  Breast, right, lumpectomy:  -  Ductal carcinoma in situ, low nuclear grade .     B.  Breast, right new inferior margin, excision:  -  Ductal carcinoma in situ, low nuclear grade involving intraductal papilloma.     C.  " Breast, right new lateral margin, excision:  -  Ductal carcinoma in situ, low nuclear grade.     D.  Breast, right new posterior margin, excision:  -  Benign breast parenchyma, negative for atypia or malignancy.    %, AK 60-70%, HER2 1+ negative     6/9/2020 -  Cancer Staged    Staging form: Breast, AJCC 8th Edition  - Clinical: cT0, cN0, cM0 - Signed by Hortencia Pennington MD on 6/9/2020  Laterality: Right       6/9/2020 -  Cancer Staged    Staging form: Breast, AJCC 8th Edition  - Pathologic: Stage 0 (pTis (DCIS), pN0, cM0, ER+, AK+, HER2-) - Signed by Hortencia Pennington MD on 6/16/2020  Laterality: Right  Method of lymph node assessment: Clinical  Nuclear grade: G1       7/30/2020 - 8/28/2020 Radiation    Plan ID Energy Fractions Dose per Fraction (cGy) Dose Correction (cGy) Total Dose Delivered (cGy) Elapsed Days   R Breast 6X 16 / 16 266 0 4,256 21   R Brst Boost 6X 5 / 5 200 0 1,000 4        Primary adenocarcinoma of upper lobe of right lung (HCC) (Resolved)   12/2/2019 Initial Diagnosis    Primary adenocarcinoma of upper lobe of right lung (HCC)     1/17/2020 Surgery    Lung, right upper lobe, wedge resection:  -  Invasive moderately differentiated adenocarcinoma.  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 female with past medical history significant for right lower lobe wedge resection in January 2024 for stage Ib adenocarcinoma, right upper lobectomy in January 2020 for stage Ib adenocarcinoma, and right breast lumpectomy in May 2020 followed by subsequent radiation who presents today for evaluation of swelling under her right breast in area of previous access incision. At time of last office visit on 1/22/2025, patient's CT scan of the chest was reviewed which did not demonstrate any evidence of disease recurrence or metastatic disease.  She was recommended for repeat CT scan chest in 6 months.    On discussion today, patient reports that that early Saturday 3/8 morning around 3:30 AM she woke up to urinate, then urinated 20 minutes later, and an additional 20 minutes later urinated and had a bowel movement.  She found this to be very unusual.  Reports that she normally wakes up maybe once a night to urinate.  Denies any dysuria, fevers, chills.  Did state that her urine may be somewhat more concentrated but states she has not had any change in her eating or drinking habits.  Also over the weekend she noticed some swelling and discomfort just below her right breast.  She would not describe this area as being in pain but rather she feels a stretching sensation and irritation over this area.  Patient reports she would not quite describe this as numbness but almost the sensation of something being brushed over her skin. In review of patient's last thoracic surgery office note  "in January 2025, she did note she had some minor numbness.  Denies any change in her breathing.  Denies feeling bloated.  She is currently on Tylenol as she had recently had 2 trigger finger releases performed on 2/21/2025 to her left index and middle fingers.  Patient denies any additional areas of swelling aside from her fingers in setting of recent surgery.  No ankle swelling. Last saw cardiology on 11/26/2024 and she was deemed fairly stable from a cardiovascular standpoint.  She was recommended to be started on Zetia 10 mg once daily.    Review of Systems   Constitutional:  Negative for chills and fever.   Respiratory:  Negative for shortness of breath.         Swelling below right breast/anterior chest wall.  Scribes this as a stretching sensation with discomfort but denies this being numbness.   Cardiovascular:  Negative for chest pain.   Gastrointestinal:  Negative for abdominal pain.   Genitourinary:  Negative for dysuria.        Nocturia overnight Friday into Saturday with 3 episodes of urination and short interval.   Musculoskeletal:  Positive for joint swelling (in left hand in setting of recent trigger finger release).      Medical History Reviewed by provider this encounter:  Tobacco  Allergies  Meds  Problems  Med Hx  Surg Hx  Fam Hx     .  Past Medical History   Past Medical History:   Diagnosis Date    Anesthesia     \"cheap date\"    Arthritis     Atrial fibrillation (HCC)     Benign polyp of large intestine     Cancer of appendix (HCC) 1977    Colon cancer (HCC) 1977    36 yo    Colon polyp     Full dentures     full upper/ partial lower    GERD (gastroesophageal reflux disease)     Glaucoma suspect     History of neck problems     \"C3-C7 and had epidural injections years ago\"    Hyperlipidemia     Hypertension     Lung cancer (HCC)     jan 2020- had surgery    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HCC)     Prediabetes     Primary adenocarcinoma of upper lobe of right lung (HCC) 12/02/2019    Pulmonary nodules " 02/19/2015    Refusal of blood transfusions as patient is [employer]     Tic disorder, transient, recurrent     not recent    Walks frequently      Past Surgical History:   Procedure Laterality Date    APPENDECTOMY      ARTHROSCOPY KNEE      BREAST BIOPSY Right 04/10/2017    US guided - intraductal papillomas    BREAST BIOPSY Right 10/24/2019    ADH- dcis    BREAST LUMPECTOMY W/ NEEDLE LOCALIZATION Right 05/22/2020    Procedure: EXCISIONAL BIOPSY;  1000 NEEDLE LOC;  Surgeon: Hortencia Pennington MD;  Location: [facility];  Service: Surgical Oncology    CARDIOVASCULAR STRESS TEST      CARPAL TUNNEL RELEASE Left 07/01/2022    CATARACT EXTRACTION Bilateral     COLON SURGERY      COLONOSCOPY      done 2010 due 2015     DILATION AND CURETTAGE OF UTERUS      ESOPHAGOGASTRODUODENOSCOPY      inflammation aonly    HEMICOLECTOMY Right 1977    HYSTERECTOMY      JOINT REPLACEMENT      L knee    LUNG SEGMENTECTOMY Right 1/19/2024    Procedure: Robotic right lower lobe wedge, lymph node dissection;  Surgeon: Chris Cheng MD;  Location: [facility];  Service: Thoracic    LUNG SURGERY      MAMMO NEEDLE LOCALIZATION RIGHT (ALL INC) Right 05/22/2020    MAMMO NEEDLE LOCALIZATION RIGHT (ALL INC) EACH ADD Right 05/22/2020    MAMMO STEREOTACTIC BREAST BIOPSY RIGHT (ALL INC) Right 10/24/2019    PARATHYROID GLAND SURGERY      exploration     [facility] INCL FLUOR GDNCE DX W/CELL WASHG SPX N/A 01/17/2020    Procedure: FLEXIBLE BRONCHOSCOPY;  Surgeon: Chris Cheng MD;  Location: [facility];  Service: Thoracic    [facility] INCL FLUOR GDNCE DX W/CELL WASHG SPX N/A 1/19/2024    Procedure: BRONCHOSCOPY FLEXIBLE;  Surgeon: Chris Cheng MD;  Location: [facility];  Service: Thoracic    [facility] THORACOSCOPY W/LOBECTOMY SINGLE LOBE Right 01/17/2020    Procedure: ROBOTIC ASSISTED COMPLETE LUNG LOBECTOMY;  Surgeon: Chris Cheng MD;  Location: [facility];  Service: Thoracic    MT THORACOSCOPY W/THERA WEDGE RESEXN INITIAL  UNILAT Right 01/17/2020    Procedure: ROBOTIC ASSISTED UPPER-LOBE WEDGE RESECTION, MEDIASTINAL LYMPH NODE DISECTION;  Surgeon: Chris Cheng MD;  Location: [facility];  Service: Thoracic    REPLACEMENT TOTAL KNEE      THYROID SURGERY      TOTAL ABDOMINAL HYSTERECTOMY  1983    TOTAL THYROIDECTOMY      TRIGGER FINGER RELEASE Left 07/01/2022    US GUIDED BREAST BIOPSY RIGHT COMPLETE Right 04/10/2017    US GUIDED THYROID BIOPSY  02/14/2020     Family History   Problem Relation Age of Onset    Esophageal cancer Mother         80s    Hypertension Mother     Stroke Family         TIA    Breast cancer Paternal Aunt 95    No Known Problems Father     COPD Sister     COPD Sister     No Known Problems Maternal Grandmother     No Known Problems Maternal Grandfather     No Known Problems Paternal Grandmother     No Known Problems Paternal Grandfather     No Known Problems Maternal Aunt     No Known Problems Maternal Aunt     No Known Problems Maternal Aunt     No Known Problems Maternal Aunt     No Known Problems Maternal Aunt       reports that she quit smoking about 58 years ago. Her smoking use included cigarettes. She started smoking about 78 years ago. She has a 30 pack-year smoking history. She has been exposed to tobacco smoke. She has never used smokeless tobacco. She reports current alcohol use of about 5.0 standard drinks of alcohol per week. She reports that she does not use drugs.  Current Outpatient Medications   Medication Instructions    ammonium lactate (LAC-HYDRIN) 12 % cream APPLY THIN LAYER TO DARKENED, DRY AREAS ON BODY TWICE DAILY.    apixaban (ELIQUIS) 5 mg, 2 times daily    atorvastatin (LIPITOR) 80 mg, Oral, Daily    ciclopirox (LOPROX) 0.77 % SUSP     ciclopirox (PENLAC) 8 % solution PLEASE SEE ATTACHED FOR DETAILED DIRECTIONS    diltiazem (CARDIZEM CD) 120 mg, Every evening    docusate sodium (COLACE) 100 mg, Oral, 2 times daily PRN    EPINEPHrine (EPIPEN 2-YOVANNY) 0.3 mg, Intramuscular, Once as  needed    EPINEPHrine (EPIPEN) 0.3 mg/0.3 mL SOAJ INJECT 0.3 ML (0.3 MG TOTAL) INTO A MUSCLE ONCE FOR 1 DOSE    EPINEPHrine (EPIPEN) 0.3 mg, Intramuscular, Once    ezetimibe (ZETIA) 10 mg, Daily    gabapentin (NEURONTIN) 100 mg, Oral, 3 times daily    gabapentin (NEURONTIN) 100 mg, Oral, 3 times daily    HYDROcodone-acetaminophen (NORCO) 5-325 mg per tablet PLEASE SEE ATTACHED FOR DETAILED DIRECTIONS    levothyroxine 100 mcg, Oral, Daily    Multiple Vitamins-Calcium (ESSENTIAL ONE DAILY MULTIVIT PO) Daily    omeprazole (PRILOSEC) 20 mg, Oral, As needed    ondansetron (ZOFRAN) 4 mg, Every 12 hours PRN    polyethylene glycol (MIRALAX) 238 g, Oral, Once, Take 238 g my mouth. Use as directed     Allergies   Allergen Reactions    Bee Venom      Tongue swelling    Flurbiprofen Hives    Ibuprofen Shortness Of Breath    Levaquin [Levofloxacin In D5w] Other (See Comments)     Dark spots under eyes and wrists    Tequin [Gatifloxacin] Rash      Current Outpatient Medications on File Prior to Visit   Medication Sig Dispense Refill    ammonium lactate (LAC-HYDRIN) 12 % cream APPLY THIN LAYER TO DARKENED, DRY AREAS ON BODY TWICE DAILY.      apixaban (ELIQUIS) 5 mg Take 5 mg by mouth 2 (two) times a day To stop 2 days prior to surgery per md      atorvastatin (LIPITOR) 80 mg tablet Take 1 tablet (80 mg total) by mouth daily 90 tablet 1    ciclopirox (LOPROX) 0.77 % SUSP       ciclopirox (PENLAC) 8 % solution PLEASE SEE ATTACHED FOR DETAILED DIRECTIONS      diltiazem (CARDIZEM CD) 120 mg 24 hr capsule Take 120 mg by mouth every evening       docusate sodium (COLACE) 100 mg capsule Take 1 capsule (100 mg total) by mouth 2 (two) times a day as needed for constipation 20 capsule 0    EPINEPHrine (EpiPen 2-Pa) 0.3 mg/0.3 mL SOAJ Inject 0.3 mL (0.3 mg total) into a muscle once as needed for anaphylaxis for up to 1 dose 0.6 mL 0    EPINEPHrine (EPIPEN) 0.3 mg/0.3 mL SOAJ INJECT 0.3 ML (0.3 MG TOTAL) INTO A MUSCLE ONCE FOR 1 DOSE 2 each  1    ezetimibe (ZETIA) 10 mg tablet Take 10 mg by mouth daily      gabapentin (NEURONTIN) 100 mg capsule Take 1 capsule (100 mg total) by mouth 3 (three) times a day (Patient taking differently: Take 100 mg by mouth daily As needed) 63 capsule 0    HYDROcodone-acetaminophen (NORCO) 5-325 mg per tablet PLEASE SEE ATTACHED FOR DETAILED DIRECTIONS      levothyroxine 100 mcg tablet TAKE 1 TABLET BY MOUTH EVERY DAY 90 tablet 0    Multiple Vitamins-Calcium (ESSENTIAL ONE DAILY MULTIVIT PO) Take by mouth daily       omeprazole (PriLOSEC) 20 mg delayed release capsule TAKE 1 CAPSULE (20 MG TOTAL) BY MOUTH AS NEEDED (GERD) 90 capsule 0    ondansetron (ZOFRAN) 4 mg tablet Take 4 mg by mouth every 12 (twelve) hours as needed      EPINEPHrine (EPIPEN) 0.3 mg/0.3 mL SOAJ Inject 0.3 mL (0.3 mg total) into a muscle once for 1 dose (Patient not taking: Reported on 2024) 0.6 mL 0    polyethylene glycol (MiraLax) 17 GM/SCOOP powder Take 238 g by mouth once for 1 dose Take 238 g my mouth. Use as directed (Patient not taking: Reported on 2024) 238 g 0    [DISCONTINUED] Hydrocortisone Acetate 1 % CREA Apply topically (Patient not taking: No sig reported)      [DISCONTINUED] mupirocin (BACTROBAN) 2 % ointment APPLY TO SORE AREA ON BOTTOM TWICE DAILY UNTIL HEALED (Patient not taking: No sig reported)       No current facility-administered medications on file prior to visit.      Social History     Tobacco Use    Smoking status: Former     Current packs/day: 0.00     Average packs/day: 1.5 packs/day for 20.0 years (30.0 ttl pk-yrs)     Types: Cigarettes     Start date:      Quit date:      Years since quittin.2     Passive exposure: Past    Smokeless tobacco: Never   Vaping Use    Vaping status: Never Used   Substance and Sexual Activity    Alcohol use: Yes     Alcohol/week: 5.0 standard drinks of alcohol     Types: 5 Cans of beer per week     Comment: occasional beer with food    Drug use: No    Sexual activity: Not  on file     Comment: hysterectomy        Objective   There were no vitals taken for this visit.    Pain Screening:     ECOG    Physical Exam  Constitutional:       General: She is not in acute distress.  HENT:      Head: Normocephalic and atraumatic.      Nose: Nose normal.   Eyes:      Extraocular Movements: Extraocular movements intact.   Cardiovascular:      Rate and Rhythm: Normal rate and regular rhythm.   Pulmonary:      Effort: Pulmonary effort is normal.      Breath sounds: Normal breath sounds.      Comments: Right anterior chest wall/upper abdomen does appear slightly raised in comparison to left.  There is no definitive bulge or 1 central area.  Overall patient is nontender to palpation of the abdomen.  There were a few focal areas that upon palpation induced a pain/sensation to spread towards the center of her anterior chest.  No overlying skin changes.  Chest:   Breasts:     Right: No swelling, bleeding, inverted nipple, mass, skin change or tenderness.      Comments: Right breast evaluated, no tenderness to palpation, no palpable masses, lower aspect of dependent breast has uniform increased density as it hangs dependently with gravity.  Abdominal:      Palpations: Abdomen is soft.      Tenderness: There is no abdominal tenderness.   Musculoskeletal:      Right lower leg: No edema.      Left lower leg: No edema.      Comments: Two small surgical scars on left palm - one approximately 1 inch scar on the palm extending towards the base of the middle finger and one approximately 1 inch scar on the palm extending towards the base of the index finger   Skin:     General: Skin is warm and dry.

## 2025-03-10 NOTE — TELEPHONE ENCOUNTER
Called patient to get her scheduled with one of our Thoracic PA's this week. If you could please call our office to schedule this appointment at [phone].    Thank you

## 2025-03-10 NOTE — TELEPHONE ENCOUNTER
Attempted to contact patient to check in from over the  weekend. Left message on voicemail, to call office back with any questions or concerns.

## 2025-03-10 NOTE — ASSESSMENT & PLAN NOTE
Ms. Anders has a history of 2 separate stage IB lung cancers with treatment consisting of a right upper lobectomy and right lower lobe therapeutic wedge resection.  Today she presents due to swelling on her right anterior chest wall near previous access incision related to her right sided thoracic surgeries.  On exam, it appears that she does have some slight asymmetry without a definitive bulge that may be related to intercostal nerve interruption during her lobectomy and wedge resection.  The sensation that she described seems most consistent with exacerbated nerve pain.  I have ordered her a course of gabapentin 100 mg 3 times daily for 3 weeks.  I will call her in 1 week to assess progress.  As the symptoms had worsened for patient she urinated 3 times in quick succession overnight 2 mornings ago.  These urination episodes seem separate from the described nerve pain patient seems to be experiencing.  Patient does not display other signs of fluid overload nor symptoms associated with a UTI.  I have asked patient to monitor herself for any UTI symptoms and call her PCP if this does occur and urinary symptoms persist.  Please send patient symptoms I do not feel at this time that we need to move up her CT scan of the chest to be sooner.  Patient has a scheduled mammogram in 1 month given her history of right-sided breast cancer.  I also do not feel that we need to move that scan at this time but will follow-up on the results of the mammogram.  Orders:    gabapentin (Neurontin) 100 mg capsule; Take 1 capsule (100 mg total) by mouth 3 (three) times a day for 21 days

## 2025-03-10 NOTE — ASSESSMENT & PLAN NOTE
"Please see assessment and plan as listed under \"adenocarcinoma of the lower lobe of right lung.\"  Orders:    gabapentin (Neurontin) 100 mg capsule; Take 1 capsule (100 mg total) by mouth 3 (three) times a day for 21 days    "

## 2025-03-12 ENCOUNTER — TELEPHONE (OUTPATIENT)
Age: 85
End: 2025-03-12

## 2025-03-12 NOTE — TELEPHONE ENCOUNTER
Pt called and asked if Dr. Chavez can put a bone density order for the areas of her ankles and knees mostly her lt side and she asked if she can put that in today and let her know, she is going to try to get it before her upcoming appt. Please advise [phone] thank you.

## 2025-03-17 ENCOUNTER — TELEPHONE (OUTPATIENT)
Dept: CARDIAC SURGERY | Facility: CLINIC | Age: 85
End: 2025-03-17

## 2025-03-17 NOTE — TELEPHONE ENCOUNTER
Called patient to follow-up on her progress following office appointment last week on 3/10/25.  Patient reports she is feeling better overall using a Lidoderm patch under her right breast.  She is taking gabapentin 100 mg 3 times daily.  We discussed that she can take this for the 3 weeks it was prescribed for and then discontinue.  She may have had a flare of nerve pain.  Told patient to call the office after the course of gabapentin runs out if she feels an increase in pain.  Discussed that there may be some degree of continued nerve pain after surgery.  Patient reports no further urinary symptoms.  Still feels a little anterior chest swelling that has somewhat improved. Patient appreciative of call.

## 2025-03-26 ENCOUNTER — OFFICE VISIT (OUTPATIENT)
Dept: FAMILY MEDICINE CLINIC | Facility: CLINIC | Age: 85
End: 2025-03-26
Payer: COMMERCIAL

## 2025-03-26 VITALS
WEIGHT: 162 LBS | SYSTOLIC BLOOD PRESSURE: 118 MMHG | BODY MASS INDEX: 26.99 KG/M2 | HEIGHT: 65 IN | DIASTOLIC BLOOD PRESSURE: 70 MMHG | HEART RATE: 62 BPM | TEMPERATURE: 97.6 F | OXYGEN SATURATION: 95 %

## 2025-03-26 DIAGNOSIS — E06.3 HASHIMOTO'S THYROIDITIS: ICD-10-CM

## 2025-03-26 DIAGNOSIS — E78.2 MIXED HYPERLIPIDEMIA: ICD-10-CM

## 2025-03-26 DIAGNOSIS — Z86.000 PERSONAL HISTORY OF IN-SITU NEOPLASM OF BREAST: ICD-10-CM

## 2025-03-26 DIAGNOSIS — I48.19 PERSISTENT ATRIAL FIBRILLATION (HCC): Primary | ICD-10-CM

## 2025-03-26 DIAGNOSIS — I12.9 HYPERTENSIVE KIDNEY DISEASE: ICD-10-CM

## 2025-03-26 DIAGNOSIS — M85.89 OSTEOPENIA OF MULTIPLE SITES: ICD-10-CM

## 2025-03-26 DIAGNOSIS — E89.0 POSTOPERATIVE HYPOTHYROIDISM: ICD-10-CM

## 2025-03-26 DIAGNOSIS — N18.31 STAGE 3A CHRONIC KIDNEY DISEASE (HCC): ICD-10-CM

## 2025-03-26 DIAGNOSIS — I10 BENIGN ESSENTIAL HYPERTENSION: ICD-10-CM

## 2025-03-26 PROCEDURE — 99214 OFFICE O/P EST MOD 30 MIN: CPT | Performed by: FAMILY MEDICINE

## 2025-03-26 NOTE — ASSESSMENT & PLAN NOTE
Lab Results   Component Value Date    EGFR 53 03/25/2024    EGFR 50 01/20/2024    EGFR 51 01/19/2024    CREATININE 0.97 03/25/2024    CREATININE 1.02 01/20/2024    CREATININE 1.00 01/19/2024   Enal function is stable continue with blood pressure control with cardizem avoid NSAID's stay hydrated follow up in 6 months check labs

## 2025-03-26 NOTE — PATIENT INSTRUCTIONS
"Patient Education     DASH diet   The Basics   Written by the doctors and editors at [facility]   What is the DASH diet? -- DASH stands for \"dietary approaches to stop hypertension.\" It is an eating plan that can help lower blood pressure. It can also help prevent high blood pressure, which doctors call \"hypertension.\" You don't need special foods or recipes to follow the DASH diet. It is more about eating certain types of foods in certain amounts.  The DASH diet has lots of fruits and vegetables, whole grains, lean meats, healthy fats, and low-fat or fat-free dairy products (figure 1). It is low in saturated fats, trans fats, cholesterol, added sugars, and sodium (salt).  The standard DASH diet limits sodium to no more than 2300 mg a day. Your doctor or nurse can talk to you about what your specific goals should be.  Why do I need the DASH diet? -- The DASH diet can help you:   Lower your blood pressure and cholesterol   Lower your risk for cancer, heart disease, heart attack, and stroke. It might also lower your risk for heart failure, kidney stones, and diabetes.   Lose weight or keep a healthy weight  What can I eat and drink on the DASH diet? -- Below are some guidelines and examples for your daily and weekly nutrition goals. These are based on a 2000-calorie-per-day eating plan.  Daily goals:   Grains - Try to eat 6 to 8 servings of whole-grain, high-fiber foods each day. Examples of a serving include 1 slice of bread, 1 ounce (30 grams) of dry cereal, or 1/2 cup (120 grams) of cooked cereal, pasta, or brown rice.   Fruits - Try to eat 4 to 5 servings of fruit each day. Examples of a serving include 1 medium fruit or 1/2 cup (75 grams) of fresh, frozen, or canned fruit. Try to eat different kinds and colors. Frozen or canned fruit should not have added sugar. Look for frozen or canned fruits with 100 percent fruit juice or water.   Vegetables - Try to eat 4 to 5 servings of vegetables each day. Examples of a " "serving include 1 cup (40 grams) of leafy greens or 1/2 cup (75 grams) of fresh or cooked vegetables. Try to pick many kinds and colors. If you buy canned vegetables, look for \"low sodium\" or \"salt free.\" Buy plain, frozen vegetables to avoid added fat and sodium.   Dairy - Try to eat 2 to 3 servings of fat-free or low-fat milk products each day. Examples of a serving include 1 cup (240 mL) of milk or yogurt or 1.5 ounces (45 grams) of cheese.   Lean meats, poultry, and seafood - Try to eat 6 or fewer servings of lean meat, poultry, and seafood each day. Examples of a serving include 1 egg or 1 ounce (30 grams) of cooked meat, poultry, or fish. Try to choose more low-fat or lean meats like chicken, fish, or turkey. Eat less red meat.   Fats and oils - Try to eat 2 to 3 servings of fats and oils each day. Examples of a serving include 1 teaspoon (5 mL) of soft margarine or vegetable oil, or 1 tablespoon (18 grams) of mayonnaise. Eat healthy fats like those found in fish, nuts, and avocados. Try using olive oil or vegetable oils such as canola oil. You can also try corn, safflower, sunflower, or soybean oils. Use low-sodium and low-fat salad dressing and mayonnaise.  Weekly goals:   Nuts, seeds, and legumes (dry beans and peas) - Try to eat 4 to 5 servings each week. Examples of a serving include 1/3 cup (45 grams) of nuts, 2 tablespoons (50 grams) of nut butter or seeds, or 1/2 cup (75 grams) of cooked legumes. Try almonds and walnuts, sunflower seeds, peanut or other nut butters, soybeans, lentils, kidney beans, and split peas.   Sweets - Try to eat fewer than 5 servings each week. Examples of a serving include 1 tablespoon (14 grams) of sugar or jelly, or 1/2 cup (120 grams) of gelatin. Choose low-fat and trans fat-free desserts. These include fruit-flavored gelatin, sorbet, jellybeans, justyn crackers, animal crackers, low-fat fig bars, and eduard snaps. Eat fruit to satisfy the desire for sweets.  To add flavor, " use pepper, herbs, spices, vinegar, or lemon or lime juices. Choose low-sodium or salt-free products whenever you can. This is especially important for foods like broths, soups, or soy sauce.  What foods and drinks should I avoid on the DASH diet?    Grains to avoid - Salted breads, rolls, crackers, quick breads, self-rising flours, biscuit mixes, regular breadcrumbs, instant hot cereals, commercially prepared rice, pasta, stuffing mixes.   Fruits and vegetables to avoid - Store-bought prepared potatoes and vegetable mixes, regular canned vegetables and juices, vegetables frozen with sauce, pickled vegetables, processed fruits with salt or sodium.   Dairy products to avoid - Whole milk, malted milk, chocolate milk, buttermilk, full-fat cheese, ice cream.   Meats to avoid - Smoked, cured, salted, or canned fish such as sardines or anchovies. High-fat cuts of meat like beef, lamb, pork, rubio and sausage, and chicken with the skin on it.   Fats and oils to avoid - Eat fewer solid fats like butter, lard, and hard stick margarine. Eat less saturated fat, trans fat, and total fat.   Condiments and snacks to avoid - Salted and canned peas, beans, and olives. Salted snack foods, fried foods, soda, other sweetened drinks.   Sweets to avoid - High-fat baked goods such as muffins, donuts, pastries, and commercial baked goods. Candy bars.   Alcohol - If you choose to drink alcohol, limit the amount. Most doctors recommend limiting alcohol to no more than 1 drink a day (for females) or 2 drinks a day (for males).  What else do I need to know?    Get regular physical activity to make this diet help you even more. Even gentle forms of activity, like walking, are good for your health.   Try baking or broiling instead of frying foods.   Write down the foods that you eat. This will help you track what you have eaten each week.   When you go to the grocery store, have a list or a meal plan. Don't shop when you are hungry, since this  might lead you to buy more unhealthy foods.   Read food labels with care (figure 2). They show you how much is in a serving. The amount is given as a percentage of the total amount that you need each day. Reading labels helps you make healthy food choices.  All topics are updated as new evidence becomes available and our peer review process is complete.  This topic retrieved from [employer] ([employer]) on: Feb 26, 2024.  Topic 964421 Version 1.0  Release: 32.2.4 - C32.56  © 2024 UpToDate, Inc. and/or its affiliates. All rights reserved.  figure 1: DASH diet     Graphic 009153 Version 1.0  figure 2: Food label     Graphic 837104 Version 1.0  Consumer Information Use and Disclaimer   Disclaimer: This generalized information is a limited summary of diagnosis, treatment, and/or medication information. It is not meant to be comprehensive and should be used as a tool to help the user understand and/or assess potential diagnostic and treatment options. It does NOT include all information about conditions, treatments, medications, side effects, or risks that may apply to a specific patient. It is not intended to be medical advice or a substitute for the medical advice, diagnosis, or treatment of a health care provider based on the health care provider's examination and assessment of a patient's specific and unique circumstances. Patients must speak with a health care provider for complete information about their health, medical questions, and treatment options, including any risks or benefits regarding use of medications. This information does not endorse any treatments or medications as safe, effective, or approved for treating a specific patient. UpToDate, Inc. and its affiliates disclaim any warranty or liability relating to this information or the use thereof.The use of this information is governed by the Terms of Use, available at https://www.wolterscrossvertiseuwer.com/en/know/clinical-effectiveness-terms. 2024© UpToDate, Inc. and its  affiliates and/or licensors. All rights reserved.  Copyright   © 2024 [employer] and/or its affiliates. All rights reserved.

## 2025-03-26 NOTE — PROGRESS NOTES
Name: Destinee Anders      : 1940      MRN: [id number]  Encounter Provider: Janet Chavez DO  Encounter Date: 3/26/2025   Encounter department: [facility] PRIMARY [facility]ARE  :  Assessment & Plan  Persistent atrial fibrillation (HCC)  Patient is in sinus continue cardizem and also the eliquis for stroke prevention Followup with cardiology       Stage 3a chronic kidney disease (HCC)  Lab Results   Component Value Date    EGFR 53 2024    EGFR 50 2024    EGFR 51 2024    CREATININE 0.97 2024    CREATININE 1.02 2024    CREATININE 1.00 2024   Enal function is stable continue with blood pressure control with cardizem avoid NSAID's stay hydrated follow up in 6 months check labs          Postoperative hypothyroidism  Check labs continue levothyroxine       Osteopenia of multiple sites  Check dexz contiue calcium and followup in 6 months        Mixed hyperlipidemia  Check labs LDL goal is < 70 continue with the lipitor and zetia follow up in 6 months        Hypertensive kidney disease  Continue to monitor BP and GFR continue cardizem check labs follow up in 6 months       Hashimoto's thyroiditis         Benign essential hypertension  Controlled on cardizem followup in 6 months        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  Continue mammogram and followup with GYN              Chief Complaint   Patient presents with    Follow-up     Hypercholesterolemia       History of Present Illness   Patient is here for followup hypertension with stage 3 renal disease hyperlipdiemia persistant atrial fibrillation 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   HENT:  Negative for congestion, sinus pain and trouble swallowing.    Eyes:  Negative for discharge and visual disturbance.   Respiratory:  Negative for cough, chest tightness, shortness of breath and wheezing.    Cardiovascular:  Negative for chest pain, palpitations and leg swelling.   Gastrointestinal:  Negative for abdominal pain, blood in stool, constipation, diarrhea, nausea and vomiting.   Genitourinary:  Negative for difficulty urinating, dysuria, frequency and hematuria.   Musculoskeletal:  Positive for arthralgias. Negative for gait problem and joint swelling.   Skin:  Negative for rash and wound.   Allergic/Immunologic: Negative for environmental allergies and food allergies.   Neurological:  Negative for dizziness, syncope, weakness, numbness and headaches.   Hematological:  Negative for adenopathy. Does not bruise/bleed easily.   Psychiatric/Behavioral:  Negative for confusion, decreased concentration and sleep disturbance. The patient is not nervous/anxious.        Objective   /70 (BP Location: Right arm, Patient Position: Sitting, Cuff Size: Standard)   Pulse 62   Temp 97.6 °F (36.4 °C) (Temporal)   Ht 5' 4.5\" (1.638 m)   Wt 73.5 kg (162 lb)   SpO2 95%   BMI 27.38 kg/m²      Physical Exam  Vitals and nursing note reviewed.   Constitutional:       Appearance: Normal appearance. She is well-developed.   HENT:      Head: Normocephalic and atraumatic.      Right Ear: Hearing, tympanic membrane and external ear normal.      Left Ear: Hearing, tympanic membrane and external ear normal.   Eyes:      Extraocular Movements: Extraocular movements intact.      Conjunctiva/sclera: Conjunctivae normal.      Pupils: Pupils are equal, round, and reactive to light.   Neck:      Thyroid: No thyromegaly.   Cardiovascular:      Rate and Rhythm: Normal rate and regular rhythm.      Heart sounds: Normal heart sounds.   Pulmonary:      Effort: Pulmonary effort is normal.    "   Breath sounds: Normal breath sounds. No wheezing or rales.   Abdominal:      General: Bowel sounds are normal. There is no distension.      Palpations: Abdomen is soft.      Tenderness: There is no abdominal tenderness.   Musculoskeletal:         General: No tenderness.      Cervical back: Neck supple.   Lymphadenopathy:      Cervical: No cervical adenopathy.   Skin:     General: Skin is warm and dry.      Findings: No rash.   Neurological:      General: No focal deficit present.      Mental Status: She is alert and oriented to person, place, and time.      Cranial Nerves: No cranial nerve deficit.      Coordination: Coordination normal.   Psychiatric:         Mood and Affect: Mood normal.         Behavior: Behavior normal.         Thought Content: Thought content normal.         Judgment: Judgment normal.

## 2025-03-26 NOTE — ASSESSMENT & PLAN NOTE
Patient is in sinus continue cardizem and also the eliquis for stroke prevention Followup with cardiology

## 2025-03-26 NOTE — ASSESSMENT & PLAN NOTE
Continue to monitor BP and GFR continue [facility] check labs follow up in 6 months

## 2025-03-27 ENCOUNTER — APPOINTMENT (OUTPATIENT)
Dept: LAB | Facility: CLINIC | Age: 85
End: 2025-03-27
Payer: COMMERCIAL

## 2025-03-27 ENCOUNTER — RESULTS FOLLOW-UP (OUTPATIENT)
Dept: FAMILY MEDICINE CLINIC | Facility: CLINIC | Age: 85
End: 2025-03-27

## 2025-03-27 DIAGNOSIS — R73.03 PREDIABETES: ICD-10-CM

## 2025-03-27 DIAGNOSIS — I10 BENIGN ESSENTIAL HYPERTENSION: ICD-10-CM

## 2025-03-27 DIAGNOSIS — E89.0 POSTOPERATIVE HYPOTHYROIDISM: ICD-10-CM

## 2025-03-27 DIAGNOSIS — E78.2 MIXED HYPERLIPIDEMIA: ICD-10-CM

## 2025-03-27 LAB
ALBUMIN SERPL BCG-MCNC: 3.9 G/DL (ref 3.5–5)
ALP SERPL-CCNC: 88 U/L (ref 34–104)
ALT SERPL W P-5'-P-CCNC: 28 U/L (ref 7–52)
ANION GAP SERPL CALCULATED.3IONS-SCNC: 7 MMOL/L (ref 4–13)
AST SERPL W P-5'-P-CCNC: 33 U/L (ref 13–39)
BILIRUB SERPL-MCNC: 0.65 MG/DL (ref 0.2–1)
BUN SERPL-MCNC: 20 MG/DL (ref 5–25)
CALCIUM SERPL-MCNC: 9.5 MG/DL (ref 8.4–10.2)
CHLORIDE SERPL-SCNC: 104 MMOL/L (ref 96–108)
CHOLEST SERPL-MCNC: 160 MG/DL (ref ?–200)
CO2 SERPL-SCNC: 28 MMOL/L (ref 21–32)
CREAT SERPL-MCNC: 1.21 MG/DL (ref 0.6–1.3)
EST. AVERAGE GLUCOSE BLD GHB EST-MCNC: 128 MG/DL
GFR SERPL CREATININE-BSD FRML MDRD: 40 ML/MIN/1.73SQ M
GLUCOSE P FAST SERPL-MCNC: 100 MG/DL (ref 65–99)
HBA1C MFR BLD: 6.1 %
HDLC SERPL-MCNC: 77 MG/DL
LDLC SERPL CALC-MCNC: 72 MG/DL (ref 0–100)
NONHDLC SERPL-MCNC: 83 MG/DL
POTASSIUM SERPL-SCNC: 4 MMOL/L (ref 3.5–5.3)
PROT SERPL-MCNC: 7.7 G/DL (ref 6.4–8.4)
SODIUM SERPL-SCNC: 139 MMOL/L (ref 135–147)
T4 FREE SERPL-MCNC: 1.35 NG/DL (ref 0.61–1.12)
TRIGL SERPL-MCNC: 57 MG/DL (ref ?–150)
TSH SERPL DL<=0.05 MIU/L-ACNC: 2.85 UIU/ML (ref 0.45–4.5)

## 2025-03-27 PROCEDURE — 83036 HEMOGLOBIN GLYCOSYLATED A1C: CPT

## 2025-03-27 PROCEDURE — 84443 ASSAY THYROID STIM HORMONE: CPT

## 2025-03-27 PROCEDURE — 80061 LIPID PANEL: CPT

## 2025-03-27 PROCEDURE — 36415 COLL VENOUS BLD VENIPUNCTURE: CPT

## 2025-03-27 PROCEDURE — 84439 ASSAY OF FREE THYROXINE: CPT

## 2025-03-27 PROCEDURE — 80053 COMPREHEN METABOLIC PANEL: CPT

## 2025-03-28 NOTE — TELEPHONE ENCOUNTER
Pt returned a missed call from the office, pt was made aware of the message from Dr. Chavez. Pt understood. No comments or concerns at this time.

## 2025-03-28 NOTE — TELEPHONE ENCOUNTER
----- Message from Janet Chavez DO sent at 3/28/2025 11:59 AM EDT -----  Call patient labs are stable continue current care and followup in 6 months

## 2025-03-28 NOTE — TELEPHONE ENCOUNTER
Left detailed message for pt w/ results and recommendations    Detail Level: Detailed Quality 431: Preventive Care And Screening: Unhealthy Alcohol Use - Screening: Patient not identified as an unhealthy alcohol user when screened for unhealthy alcohol use using a systematic screening method Quality 110: Preventive Care And Screening: Influenza Immunization: Influenza immunization was not ordered or administered, reason not given Quality 226: Preventive Care And Screening: Tobacco Use: Screening And Cessation Intervention: Patient screened for tobacco use and is an ex/non-smoker

## 2025-04-03 ENCOUNTER — TELEPHONE (OUTPATIENT)
Age: 85
End: 2025-04-03

## 2025-04-03 NOTE — TELEPHONE ENCOUNTER
Pt calling last seen by Gu[facility]    PT just wanted to make sure this was the correct number to call to set up an appt.  Pt will call back at a later time to schedule an appt.

## 2025-04-03 NOTE — TELEPHONE ENCOUNTER
Pt under care of:  Guillermo   Office Location: [facility]     Insurance   Current Insurance?No    Insurance E-verified? Yes       History  Last Seen (include Follow Up recommendations of last visit- see Office Visit - Instructions): 9/1/23    Pt calling due to: apt for recurring urinary symptoms     Active Symptoms?  Explain:    Urinary frequency     Appointment Details   Date:  4/3/25    Time:  8:20 am     Location:  [facility]     Provider:  Ruthy     Does the appointment need further review? No       Pt can be reached at: [phone]

## 2025-04-04 ENCOUNTER — OFFICE VISIT (OUTPATIENT)
Dept: UROLOGY | Facility: AMBULATORY SURGERY CENTER | Age: 85
End: 2025-04-04

## 2025-04-04 VITALS
HEART RATE: 64 BPM | WEIGHT: 160 LBS | HEIGHT: 65 IN | OXYGEN SATURATION: 94 % | BODY MASS INDEX: 26.66 KG/M2 | DIASTOLIC BLOOD PRESSURE: 60 MMHG | SYSTOLIC BLOOD PRESSURE: 122 MMHG

## 2025-04-04 DIAGNOSIS — R35.0 URINARY FREQUENCY: ICD-10-CM

## 2025-04-04 DIAGNOSIS — R31.29 MICROSCOPIC HEMATURIA: Primary | ICD-10-CM

## 2025-04-04 LAB
BACTERIA UR QL AUTO: ABNORMAL /HPF
BILIRUB UR QL STRIP: NEGATIVE
CLARITY UR: CLEAR
COLOR UR: ABNORMAL
GLUCOSE UR STRIP-MCNC: NEGATIVE MG/DL
HGB UR QL STRIP.AUTO: NEGATIVE
KETONES UR STRIP-MCNC: NEGATIVE MG/DL
LEUKOCYTE ESTERASE UR QL STRIP: NEGATIVE
NITRITE UR QL STRIP: NEGATIVE
NON-SQ EPI CELLS URNS QL MICRO: ABNORMAL /HPF
PH UR STRIP.AUTO: 5.5 [PH]
PROT UR STRIP-MCNC: ABNORMAL MG/DL
RBC #/AREA URNS AUTO: ABNORMAL /HPF
SL AMB  POCT GLUCOSE, UA: NORMAL
SL AMB LEUKOCYTE ESTERASE,UA: NORMAL
SL AMB POCT BILIRUBIN,UA: NORMAL
SL AMB POCT BLOOD,UA: NORMAL
SL AMB POCT CLARITY,UA: CLEAR
SL AMB POCT COLOR,UA: YELLOW
SL AMB POCT KETONES,UA: NORMAL
SL AMB POCT NITRITE,UA: NORMAL
SL AMB POCT PH,UA: 5
SL AMB POCT SPECIFIC GRAVITY,UA: 1.01
SL AMB POCT URINE PROTEIN: NORMAL
SL AMB POCT UROBILINOGEN: NORMAL
SP GR UR STRIP.AUTO: 1.01 (ref 1–1.03)
UROBILINOGEN UR STRIP-ACNC: <2 MG/DL
WBC #/AREA URNS AUTO: ABNORMAL /HPF

## 2025-04-04 PROCEDURE — 87086 URINE CULTURE/COLONY COUNT: CPT

## 2025-04-04 PROCEDURE — 81001 URINALYSIS AUTO W/SCOPE: CPT

## 2025-04-04 NOTE — PROGRESS NOTES
Name: Destinee Anders      : 1940      MRN: [id number]  Encounter Provider: NEYDA Benjamin  Encounter Date: 2025   Encounter department: [facility]OLOG[facility] [place]  :  Assessment & Plan  Microscopic hematuria  Pt was concerned for microscopic hematuria due to being told she had blood on UA years ago and had one episode of urinary frequency. At last office visit in  UA with positive blood, however on micro returned with only 1-2 RBCs.  UA today positive for blood, will send out for microanalysis. We discussed significant findings for microscopic hematuria is >3 RBC's.   Will notify pt with results and plan for further workup if required at that time.   She would like to follow up on a yearly basis.        Urinary frequency  Infrequent, and not bothersome.   UA collected in the office today sent for micro and culture.  I will call patient with results.  Patient understands she can call the office for any lower urinary tract symptoms and we will order urine studies to rule out infection.  Follow-up as needed.             History of Present Illness   Destinee Anders is a 84 y.o. female who presents for follow up. She was last seen in the office with concern for microscopic hematuria in . At that time patient with positive blood on urine dip, however on urine micro sent out revealed no abnormalities, RBC 1-2.   Today in the office pt states she had one day a few weeks ago where she experienced urinary frequency. She urinated every 20 minutes for a few hours and then her urination went back to normal. She states that she was concerned because she was told she had blood in her urine years ago and she has an extensive history of cancer. History of breast, lung, thyroid, and colon cancer. She is up to date on her surveillance imaging. She had an MRI abdomen w/wo contrast on 24 which demonstrated no hydronephrosis, no suspicious renal mass.  Currently she denies any lower urinary tract  "symptoms including frequency, urgency, dysuria, or feelings of incomplete bladder emptying.  She states that she has never seen visible blood in her urine.  Otherwise feels well.     Review of Systems   Constitutional:  Negative for chills and fever.   Genitourinary:  Negative for dysuria, frequency, hematuria, pelvic pain and urgency.          Objective   There were no vitals taken for this visit.    Physical Exam  Constitutional:       General: She is not in acute distress.     Appearance: She is not toxic-appearing.   HENT:      Head: Normocephalic and atraumatic.   Eyes:      Conjunctiva/sclera: Conjunctivae normal.   Cardiovascular:      Rate and Rhythm: Normal rate.   Pulmonary:      Effort: Pulmonary effort is normal.   Abdominal:      Palpations: Abdomen is soft.   Skin:     General: Skin is warm and dry.   Neurological:      Mental Status: She is alert and oriented to person, place, and time.   Psychiatric:         Mood and Affect: Mood normal.         Thought Content: Thought content normal.           Results   No results found for: \"PSA\"  Lab Results   Component Value Date    GLUCOSE 113 07/06/2016    CALCIUM 9.5 03/27/2025     08/11/2015    K 4.0 03/27/2025    CO2 28 03/27/2025     03/27/2025    BUN 20 03/27/2025    CREATININE 1.21 03/27/2025     Lab Results   Component Value Date    WBC 7.75 01/20/2024    HGB 11.0 (L) 01/20/2024    HCT 34.0 (L) 01/20/2024    MCV 98 01/20/2024     01/20/2024       Office Urine Dip  No results found for this or any previous visit (from the past hour).      "

## 2025-04-05 LAB — BACTERIA UR CULT: NORMAL

## 2025-04-07 ENCOUNTER — RESULTS FOLLOW-UP (OUTPATIENT)
Dept: UROLOGY | Facility: MEDICAL CENTER | Age: 85
End: 2025-04-07

## 2025-04-08 ENCOUNTER — DOCUMENTATION (OUTPATIENT)
Dept: GENETICS | Facility: CLINIC | Age: 85
End: 2025-04-08

## 2025-04-10 ENCOUNTER — HOSPITAL ENCOUNTER (OUTPATIENT)
Dept: MAMMOGRAPHY | Facility: CLINIC | Age: 85
Discharge: HOME | End: 2025-04-10
Payer: COMMERCIAL

## 2025-04-10 VITALS — WEIGHT: 160 LBS | HEIGHT: 65 IN | BODY MASS INDEX: 26.66 KG/M2

## 2025-04-10 DIAGNOSIS — Z85.3 PERSONAL HISTORY OF BREAST CANCER: ICD-10-CM

## 2025-04-10 PROCEDURE — G0279 TOMOSYNTHESIS, MAMMO: HCPCS

## 2025-04-10 PROCEDURE — 77066 DX MAMMO INCL CAD BI: CPT

## 2025-04-17 ENCOUNTER — OFFICE VISIT (OUTPATIENT)
Dept: GENETICS | Facility: CLINIC | Age: 85
End: 2025-04-17

## 2025-04-17 DIAGNOSIS — Z85.3 PERSONAL HISTORY OF BREAST CANCER: ICD-10-CM

## 2025-04-17 DIAGNOSIS — Z85.118 HISTORY OF LUNG CANCER: ICD-10-CM

## 2025-04-17 DIAGNOSIS — Z85.850 HISTORY OF THYROID CANCER: ICD-10-CM

## 2025-04-17 DIAGNOSIS — Z85.038 HISTORY OF COLON CANCER: Primary | ICD-10-CM

## 2025-04-17 DIAGNOSIS — Z80.3 FAMILY HISTORY OF BREAST CANCER: ICD-10-CM

## 2025-04-17 PROCEDURE — PBNCHG PB NO CHARGE PLACEHOLDER: Performed by: GENETIC COUNSELOR, MS

## 2025-04-17 NOTE — PROGRESS NOTES
Pre-Test Genetic Counseling Consult Note    Patient Name: Destinee Anders   /Age: 1940/84 y.o.  Referring Provider: Hortencia Pennington MD, FACS    Date of Service: 2025  Genetic Counselor: Moon Monroe MS, [facility] [place]  Interpretation Services: None  Location: Telephone consult   Length of Visit: 60 Minutes    Alberta was referred to the [facility] Cancer Risk and Genetic Assessment Program due to her personal history of colon, thyroid, breast and lung cancer and family history of breast and esophageal cancer . she presents today to discuss the possibility of a hereditary cancer syndrome, options for genetic testing, and implications for her and her family.      Cancer History and Treatment:     Prior Genetic Testing History:  Alberta reports that she had genetic testing in  with [facility] and was negative.  We did not have a copy of this test result available for review however Alberta read this information from her notes.      Personal History:       (age 78) Ductal carcinoma in situ (DCIS), diagnosed in 2019. She underwent a right breast lumpectomy and WBRT in . She is currently on observation.        2 separate stage IB lung cancers with treatment consisting of a right upper lobectomy In 2020 and right lower lobe therapeutic wedge resection in 2024 due to adenocarcinoma. No adjuvant therapy, she continues on surveillance.      (age 50) she underwent partial thyroidectomy d/t malignancy.      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 She underwent resection at this time, no adjuvant therapy. Records are unavailable for review.     Oncology History   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   2019 Initial Diagnosis    Ductal carcinoma in situ (DCIS) of right breast     2020 Surgery    A.  Breast, right, lumpectomy:  -  Ductal carcinoma in situ, low nuclear grade .     B.  Breast, right new inferior margin, excision:  -  Ductal carcinoma in situ, low  nuclear grade involving intraductal papilloma.     C.  Breast, right new lateral margin, excision:  -  Ductal carcinoma in situ, low nuclear grade.     D.  Breast, right new posterior margin, excision:  -  Benign breast parenchyma, negative for atypia or malignancy.    %, NM 60-70%, HER2 1+ negative     6/9/2020 -  Cancer Staged    Staging form: Breast, AJCC 8th Edition  - Clinical: cT0, cN0, cM0 - Signed by Hortencia Pennington MD on 6/9/2020  Laterality: Right       6/9/2020 -  Cancer Staged    Staging form: Breast, AJCC 8th Edition  - Pathologic: Stage 0 (pTis (DCIS), pN0, cM0, ER+, NM+, HER2-) - Signed by Hortencia Pennington MD on 6/16/2020  Laterality: Right  Method of lymph node assessment: Clinical  Nuclear grade: G1       7/30/2020 - 8/28/2020 Radiation    Plan ID Energy Fractions Dose per Fraction (cGy) Dose Correction (cGy) Total Dose Delivered (cGy) Elapsed Days   R Breast 6X 16 / 16 266 0 4,256 21   R Brst Boost 6X 5 / 5 200 0 1,000 4        Primary adenocarcinoma of upper lobe of right lung (HCC) (Resolved)   12/2/2019 Initial Diagnosis    Primary adenocarcinoma of upper lobe of right lung (HCC)     1/17/2020 Surgery    Lung, right upper lobe, wedge resection:  -  Invasive moderately differentiated adenocarcinoma.  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)   1/17/2020 Surgery    Right upper lobectomy, MLND     1/17/2020 -  Cancer Staged    Staging form: Lung, AJCC 8th Edition  - Pathologic stage from 1/17/2020: Stage IB (pT2a, pN0, cM0) - Signed by Gudelia Giraldo PA-C on 12/11/2023  Histopathologic type: Adenocarcinoma, NOS  Histologic grade (G): G2  Histologic grading system: 4 grade system       7/29/2020 Initial Diagnosis    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)     Adenocarcinoma of lower lobe of right lung (HCC)   1/19/2024 -  Cancer Staged    Staging form: Lung, AJCC 8th Edition  - Pathologic stage from 1/19/2024: Stage IB (pT2a, pN0, cM0) - Signed by Katelyn Avalos  CARLO SA on 2/7/2024  Histologic grade (G): G2  Histologic grading system: 4 grade system  Residual tumor (R): R0 - None  Laterality: Right  Lymph-vascular invasion (LVI): LVI not present (absent)/not identified       1/19/2024 Surgery    Robotic assisted right lower lobe wedge resection, lymph node dissection, flexible bronchoscopy (Dr. Cheng)     1/19/2024 Initial Diagnosis    Adenocarcinoma of lower lobe of right lung (HCC)       Caris Result from Lung (1/19/24)      Screening Hx:     Breast: Follows with Dr. Pennington   Breast Imaging: She had a bilateral diagnostic mammogram on 4/9/2024; next mammogram is scheduled for 04/10/2025.  Breast Density: Heterogeneously dense    Colon:  Colonoscopy (most recent 7/10/24)  Final Diagnosis   A. Polyp, Colorectal, transverse x3:  - Fragments of tubular adenoma and sessile serrated adenoma.      Gynecologic:  Ovaries and Uterus intact     Medical and Surgical History  Pertinent surgical history:   Past Surgical History:   Procedure Laterality Date    APPENDECTOMY      ARTHROSCOPY KNEE      BREAST BIOPSY Right 04/10/2017    US guided - intraductal papillomas    BREAST BIOPSY Right 10/24/2019    ADH- dcis    BREAST LUMPECTOMY W/ NEEDLE LOCALIZATION Right 05/22/2020    Procedure: EXCISIONAL BIOPSY;  1000 NEEDLE LOC;  Surgeon: Hortencia Pennington MD;  Location: [facility];  Service: Surgical Oncology    CARDIOVASCULAR STRESS TEST      CARPAL TUNNEL RELEASE Left 07/01/2022    CATARACT EXTRACTION Bilateral     COLON SURGERY      COLONOSCOPY      done 2010 due 2015     DILATION AND CURETTAGE OF UTERUS      ESOPHAGOGASTRODUODENOSCOPY      inflammation aonly    HEMICOLECTOMY Right 1977    HYSTERECTOMY      JOINT REPLACEMENT      L knee    LUNG SEGMENTECTOMY Right 1/19/2024    Procedure: Robotic right lower lobe wedge, lymph node dissection;  Surgeon: Chris Cheng MD;  [facility]: [facility];  Service: Thoracic    LUNG SURGERY      MAMMO NEEDLE LOCALIZATION RIGHT (ALL INC)  "Right 05/22/2020    MAMMO NEEDLE LOCALIZATION RIGHT (ALL INC) EACH ADD Right 05/22/2020    MAMMO STEREOTACTIC BREAST BIOPSY RIGHT (ALL INC) Right 10/24/2019    PARATHYROID GLAND SURGERY      exploration     [facility] INCL FLUOR GDNCE DX W/CELL WASHG SPX N/A 01/17/2020    Procedure: FLEXIBLE BRONCHOSCOPY;  Surgeon: Chris Cheng MD;  [facility]: [facility];  Service: Thoracic    [facility] INCL FLUOR GDNCE DX W/CELL WASHG SPX N/A 1/19/2024    Procedure: BRONCHOSCOPY FLEXIBLE;  Surgeon: Chris Cheng MD;  [facility]: [facility];  Service: Thoracic    MI THORACOSCOPY W/LOBECTOMY SINGLE LOBE Right 01/17/2020    Procedure: ROBOTIC ASSISTED COMPLETE LUNG LOBECTOMY;  Surgeon: Chris Cheng MD;  [facility];  Service: Thoracic    MI THORACOSCOPY W/THERA WEDGE RESEXN INITIAL UNILAT Right 01/17/2020    Procedure: ROBOTIC ASSISTED UPPER-LOBE WEDGE RESECTION, MEDIASTINAL LYMPH NODE DISECTION;  Surgeon: Chris Cheng MD;  [facility]: [facility];  Service: Thoracic    REPLACEMENT TOTAL KNEE      THYROID SURGERY      TOTAL ABDOMINAL HYSTERECTOMY  1983    TOTAL THYROIDECTOMY      TRIGGER FINGER RELEASE Left 07/01/2022    US GUIDED BREAST BIOPSY RIGHT COMPLETE Right 04/10/2017    US GUIDED THYROID BIOPSY  02/14/2020      Pertinent medical history:  Past Medical History:   Diagnosis Date    Anesthesia     \"cheap date\"    Arthritis     Atrial fibrillation (HCC)     Benign polyp of large intestine     Cancer of appendix (HCC) 1977    Colon cancer (HCC) 1977    38 yo    Colon polyp     Full dentures     full upper/ partial lower    GERD (gastroesophageal reflux disease)     Glaucoma suspect     History of neck problems     \"C3-C7 and had epidural injections years ago\"    Hyperlipidemia     Hypertension     Lung cancer (HCC)     jan 2020- had surgery    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HCC)     Prediabetes     Primary adenocarcinoma of upper lobe of right lung (HCC) 12/02/2019    Pulmonary nodules 02/19/2015    Refusal of " "blood transfusions as patient is Pentecostal     Tic disorder, transient, recurrent     not recent    Walks frequently        Other History:  Height:   Ht Readings from Last 1 Encounters:   04/10/25 5' 4.5\" (1.638 m)     Weight:   Wt Readings from Last 1 Encounters:   04/10/25 72.6 kg (160 lb)     Relevant Family History   Patient reports no Ashkenazi Congregation ancestry.         Please refer to the scanned pedigree in the Media Tab for a complete family history     *All history is reported as provided by the patient; records are not available for review, except where indicated.     Assessment:  We discussed sporadic, familial and hereditary cancer.  We also discussed the many factors that influence our risk for cancer such as age, environmental exposures, lifestyle choices and family history.      We reviewed the indications suggestive of a hereditary predisposition to cancer.  Although Alberta had prior genetic testing in 2009 which was negative, this test was limited to the 5 Yancey syndromes genes and no RNA analysis was performed.  Given Alberta's personal history of colon cancer in her 30's and the fact that she has developed 4 primary cancers over her lifetime, we recommend update genetic testing with RNA analysis.      Genetic testing is indicated for Alberta based on the following criteria: Meets NCCN V4.2024 Testing Criteria for the Evaluation of Yancey syndrome: Personal history of colon cancer at age 37    Meets the American Society of Breast Surgeons Consensus Guidelines on Genetic Testing for Hereditary Breast Cancer which states that genetic testing should be made available to all patients with a personal history of breast cancer (Matthew ER, Parveen MINA, Herman M, et al.  Consensus Guidelines on Genetic Testing for Hereditary Breast Cancer from the American Society of Breast Surgeons. Vaishnavi Surg Oncol 2019; 26:1314-8696).      The risks, benefits, and limitations of genetic testing were reviewed with " the patient, as well as genetic discrimination laws, and possible test results (positive, negative, variants of uncertain significance) and their clinical implications. If positive for a mutation, options for managing cancer risk including increased surveillance, chemoprevention, and in some cases prophylactic surgery were discussed. Alberta was informed that if a hereditary cancer syndrome was identified in her, first degree relatives (parents, siblings, and children) have a chance of also inheriting the condition. Genetic testing would allow for predictive genetic testing in other relatives, who may also be at risk depending on their degree of relation.     Billing:  Most individuals pay <$100 for hereditary cancer genetic testing. If insurance covers the cost of the testing, individuals may still pay out of pocket secondary to co-pays, co-insurance, or deductibles. If the cost of the testing exceeds $100, the lab will reach out to the patient via phone or e-mail. The patient will then have the option to proceed with the testing, cancel the testing, or elect the self-pay option.  Our expectation is that this test will be < $50.00 Alber verbalized understanding.     Plan: Patient decided to proceed with testing and provided consent.    Summary:     Sample Collection:  An order was placed and the patient was instructed to go to a [facility] lab for a blood collection    Genetic Testing Performed: CancerNext-Expanded + RNA (76 genes): AIP, ALK, APC, EMMA, AXIN2, BAP1, BARD1, BMPR1A, BRCA1, BRCA2, BRIP1, CDC73, CDH1, CDK4, CDKN1B, CDKN2A, CEBPA, CHEK2, CTNNA1, DDX41, DICER1, EGFR, EPCAM, ETV6, FH, FLCN, GATA2, GREM1, HOXB13, KIT, LZTR1, MAX, MBD4, MEN1, MET, MITF, MLH1, MSH2, MSH3, MSH6, MUTYH, NF1, NF2, NTHL1, PALB2, PDGFRA, PHOX2B, PMS2, POLD1, POLE, POT1, OMXFD3C, PTCH1, PTEN, RAD51C, RAD51D, RB1, RET, RUNX1, SDHA, SDHAF2, SDHB, SDHC, SDHD, SMAD4, SMARCA4, SMARCB1, SMARCE1, STK11, SUFU, CJTO737, TP53, TSC1, TSC2,  VHL, WT1     Result Call Information:  In the event that we need to reach Alberta via telephone:  I confirmed the patient's mobile number on file as the best number to call with results  I confirmed with the patient that we can leave a voicemail on the provided numbers    Results take approximately 2-3 weeks to complete once test is started.    Alberta will be notified via [employer] once results are available.      Additional recommendations for surveillance/medical management will be made pending genetic test results.

## 2025-04-18 ENCOUNTER — TELEPHONE (OUTPATIENT)
Dept: GENETICS | Facility: CLINIC | Age: 85
End: 2025-04-18

## 2025-04-18 ENCOUNTER — APPOINTMENT (OUTPATIENT)
Dept: LAB | Facility: CLINIC | Age: 85
End: 2025-04-18
Attending: INTERNAL MEDICINE
Payer: COMMERCIAL

## 2025-04-18 DIAGNOSIS — Z80.3 FAMILY HISTORY OF BREAST CANCER: ICD-10-CM

## 2025-04-18 DIAGNOSIS — Z85.3 PERSONAL HISTORY OF BREAST CANCER: ICD-10-CM

## 2025-04-18 DIAGNOSIS — Z85.118 HISTORY OF LUNG CANCER: ICD-10-CM

## 2025-04-18 DIAGNOSIS — Z85.038 HISTORY OF COLON CANCER: ICD-10-CM

## 2025-04-18 DIAGNOSIS — Z85.850 HISTORY OF THYROID CANCER: ICD-10-CM

## 2025-04-18 PROCEDURE — 36415 COLL VENOUS BLD VENIPUNCTURE: CPT

## 2025-04-18 NOTE — TELEPHONE ENCOUNTER
I called and left a message for the patient returning their call. Patient attempted to have sample collected, but the order was not ready. I have confirmed that an order has been placed and the patient can proceed to the lab for a blood draw.

## 2025-04-21 ENCOUNTER — DOCUMENTATION (OUTPATIENT)
Dept: GENETICS | Facility: CLINIC | Age: 85
End: 2025-04-21

## 2025-04-22 ENCOUNTER — TELEPHONE (OUTPATIENT)
Age: 85
End: 2025-04-22

## 2025-04-22 NOTE — TELEPHONE ENCOUNTER
Patient calling in requesting to speak with one of the Genetic Counselors, she had received an e-document to sign and she is confused regarding the nature of the document and is requesting to see if someone would be able to give her a call to assist her as she has additional questions.    Thank you.

## 2025-05-05 ENCOUNTER — HOSPITAL ENCOUNTER (OUTPATIENT)
Dept: BONE DENSITY | Facility: MEDICAL CENTER | Age: 85
Discharge: HOME | End: 2025-05-05
Attending: NURSE PRACTITIONER
Payer: COMMERCIAL

## 2025-05-05 DIAGNOSIS — Z78.0 ASYMPTOMATIC POSTMENOPAUSAL STATUS: ICD-10-CM

## 2025-05-05 PROCEDURE — 77080 DXA BONE DENSITY AXIAL: CPT

## 2025-05-12 ENCOUNTER — TELEPHONE (OUTPATIENT)
Age: 85
End: 2025-05-12

## 2025-05-12 NOTE — TELEPHONE ENCOUNTER
Patient was returning call.  She is requesting a printed copy of her bone density test with results.  I let patient know she can access from [employer], however she is saying she prefers a printed copy.    Please print and mail to home address.

## 2025-05-21 DIAGNOSIS — E89.0 POSTOPERATIVE HYPOTHYROIDISM: ICD-10-CM

## 2025-05-21 DIAGNOSIS — E78.2 MIXED HYPERLIPIDEMIA: ICD-10-CM

## 2025-05-21 RX ORDER — ATORVASTATIN CALCIUM 80 MG/1
80 TABLET, FILM COATED ORAL DAILY
Qty: 90 TABLET | Refills: 1 | Status: SHIPPED | OUTPATIENT
Start: 2025-05-21

## 2025-05-22 RX ORDER — LEVOTHYROXINE SODIUM 100 UG/1
100 TABLET ORAL DAILY
Qty: 90 TABLET | Refills: 0 | Status: SHIPPED | OUTPATIENT
Start: 2025-05-22

## 2025-05-27 LAB
GENE DIS ANL INTERP-IMP: NORMAL
INTERPRETATION: NORMAL

## 2025-06-09 ENCOUNTER — RESULTS FOLLOW-UP (OUTPATIENT)
Dept: GENETICS | Facility: CLINIC | Age: 85
End: 2025-06-09

## 2025-06-12 ENCOUNTER — DOCUMENTATION (OUTPATIENT)
Dept: GENETICS | Facility: CLINIC | Age: 85
End: 2025-06-12

## 2025-06-16 ENCOUNTER — TELEPHONE (OUTPATIENT)
Age: 85
End: 2025-06-16

## 2025-06-16 DIAGNOSIS — T63.441A ALLERGIC REACTION TO BEE STING: ICD-10-CM

## 2025-06-16 DIAGNOSIS — E89.0 POSTOPERATIVE HYPOTHYROIDISM: ICD-10-CM

## 2025-06-16 RX ORDER — LEVOTHYROXINE SODIUM 100 UG/1
100 TABLET ORAL DAILY
Qty: 90 TABLET | Refills: 1 | Status: SHIPPED | OUTPATIENT
Start: 2025-06-16

## 2025-06-16 RX ORDER — EPINEPHRINE 0.3 MG/.3ML
INJECTION SUBCUTANEOUS
Qty: 2 EACH | Refills: 1 | Status: SHIPPED | OUTPATIENT
Start: 2025-06-16

## 2025-06-16 NOTE — TELEPHONE ENCOUNTER
Patient called stating she's been experiencing tenderness/pain on her right side below her lung and is asking if Dr Chavez would place an order for an US , with her past history of cancer and surgeries she would feel more comfortable having it checked out . Please advise and return patient call .

## 2025-06-20 ENCOUNTER — HOSPITAL ENCOUNTER (OUTPATIENT)
Dept: ULTRASOUND IMAGING | Facility: HOSPITAL | Age: 85
Discharge: HOME | End: 2025-06-20
Payer: COMMERCIAL

## 2025-06-20 DIAGNOSIS — R35.0 URINARY FREQUENCY: ICD-10-CM

## 2025-06-20 PROCEDURE — 76770 US EXAM ABDO BACK WALL COMP: CPT

## 2025-06-27 ENCOUNTER — RESULTS FOLLOW-UP (OUTPATIENT)
Dept: UROLOGY | Facility: AMBULATORY SURGERY CENTER | Age: 85
End: 2025-06-27

## 2025-06-27 NOTE — TELEPHONE ENCOUNTER
Called and spoke to patient and went over her results . Patient very grateful and would like results mailed stating her MyChart malfunctions heavily.       ----- Message from NEYDA Grace sent at 6/27/2025  2:05 PM EDT -----  Please call Alberta and let her know that I reviewed her most recent kidney and bladder ultrasound.  Please let her know that overall everything looks very good.  There is nothing on the imaging that   would be contributing to her pain or discomfort.  ----- Message -----  From: Johnathon, Radiology Results In  Sent: 6/27/2025   1:17 PM EDT  To: NEYDA Grace

## 2025-06-30 ENCOUNTER — HOSPITAL ENCOUNTER (OUTPATIENT)
Dept: RADIOLOGY | Facility: HOSPITAL | Age: 85
Discharge: HOME | End: 2025-06-30
Attending: THORACIC SURGERY (CARDIOTHORACIC VASCULAR SURGERY)
Payer: COMMERCIAL

## 2025-06-30 DIAGNOSIS — C34.11 ADENOCARCINOMA OF UPPER LOBE OF RIGHT LUNG (HCC): ICD-10-CM

## 2025-06-30 DIAGNOSIS — C34.31 ADENOCARCINOMA OF LOWER LOBE OF RIGHT LUNG (HCC): ICD-10-CM

## 2025-06-30 PROCEDURE — 71250 CT THORAX DX C-: CPT

## 2025-07-09 ENCOUNTER — OFFICE VISIT (OUTPATIENT)
Dept: CARDIAC SURGERY | Facility: CLINIC | Age: 85
End: 2025-07-09
Payer: COMMERCIAL

## 2025-07-09 VITALS
OXYGEN SATURATION: 97 % | DIASTOLIC BLOOD PRESSURE: 79 MMHG | RESPIRATION RATE: 14 BRPM | HEART RATE: 58 BPM | BODY MASS INDEX: 27.04 KG/M2 | TEMPERATURE: 97 F | HEIGHT: 65 IN | SYSTOLIC BLOOD PRESSURE: 139 MMHG

## 2025-07-09 DIAGNOSIS — C34.31 ADENOCARCINOMA OF LOWER LOBE OF RIGHT LUNG (HCC): ICD-10-CM

## 2025-07-09 DIAGNOSIS — C34.11 ADENOCARCINOMA OF UPPER LOBE OF RIGHT LUNG (HCC): Primary | ICD-10-CM

## 2025-07-09 PROCEDURE — 99213 OFFICE O/P EST LOW 20 MIN: CPT | Performed by: THORACIC SURGERY (CARDIOTHORACIC VASCULAR SURGERY)

## 2025-07-09 NOTE — ASSESSMENT & PLAN NOTE
84-year-old female with 2 previous stage I lung cancers status post right upper lobectomy in 2020 and right lower lobe therapeutic wedge resection 1/19/2024 doing well with no evidence of recurrent disease.    Alberta 5 years out from her first cancer and 1.5 years out from her second cancer.  Mild numbness at her incisions but does not take anything for this.  Her most recent scan shows no evidence of recurrence.  No other concerning findings.  At this point I just recommend repeating a noncontrast CT in 6 months.  We will order this now and see her back afterwards.  If that scan looks good then I would switch to once yearly imaging.  She understands that we should do these noncontrast CT scans out at least until she is 5 years out from her January 2024 surgery.  At that point we will discuss stopping.  She voiced understanding and appreciation for all the above.  She will call sooner with any major changes.    Chrsi Morenochester      Orders:    CT chest wo contrast; Future

## 2025-07-09 NOTE — PROGRESS NOTES
Name: Destinee Anders      : 1940      MRN: [id number]  Encounter Provider: Chris Cheng MD  Encounter Date: 2025   Encounter department: [facility]  :  Assessment & Plan  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)  84-year-old female with 2 previous stage I lung cancers status post right upper lobectomy in  and right lower lobe therapeutic wedge resection 2024 doing well with no evidence of recurrent disease.    Alberta 5 years out from her first cancer and 1.5 years out from her second cancer.  Mild numbness at her incisions but does not take anything for this.  Her most recent scan shows no evidence of recurrence.  No other concerning findings.  At this point I just recommend repeating a noncontrast CT in 6 months.  We will order this now and see her back afterwards.  If that scan looks good then I would switch to once yearly imaging.  She understands that we should do these noncontrast CT scans out at least until she is 5 years out from her 2024 surgery.  At that point we will discuss stopping.  She voiced understanding and appreciation for all the above.  She will call sooner with any major changes.    Chris Cheng      Orders:    CT chest wo contrast; Future    Adenocarcinoma of lower lobe of right lung (HCC)             History of Present Illness   HPI  Destinee Anders is a 84 y.o. female who presents office 1.5 years out from her right lower lobe wedge resection for second primary lung cancer.  She has no major pain but does have some numbness at her incision sites.  This bothers her some but she is not taking any medication for this.  She denies any shortness of breath.  She is physically active.  She lives independently.  No fever chills or unintentional weight loss.  No recent lung infections.  No other complaints        Review of Systems   Constitutional:  Negative for activity change, appetite change, chills, diaphoresis,  "fatigue, fever and unexpected weight change.   HENT: Negative.     Eyes: Negative.    Respiratory:  Negative for apnea, cough, chest tightness, shortness of breath, wheezing and stridor.    Cardiovascular:  Negative for chest pain, palpitations and leg swelling.   Gastrointestinal:  Negative for abdominal distention, abdominal pain, blood in stool, constipation, diarrhea, nausea and vomiting.   Endocrine: Negative.    Genitourinary: Negative.    Musculoskeletal: Negative.    Skin: Negative.    Allergic/Immunologic: Negative.    Neurological:  Positive for numbness.   Hematological: Negative.    Psychiatric/Behavioral: Negative.            Objective   /79 (BP Location: Left arm, Patient Position: Sitting, Cuff Size: Standard)   Pulse 58   Temp (!) 97 °F (36.1 °C) (Temporal)   Resp 14   Ht 5' 4.5\" (1.638 m)   SpO2 97%   BMI 27.04 kg/m²      Physical Exam  Vitals and nursing note reviewed.   Constitutional:       General: She is not in acute distress.     Appearance: She is well-developed. She is not diaphoretic.   HENT:      Head: Normocephalic and atraumatic.      Mouth/Throat:      Pharynx: No oropharyngeal exudate.     Eyes:      General: No scleral icterus.     Conjunctiva/sclera: Conjunctivae normal.      Pupils: Pupils are equal, round, and reactive to light.     Neck:      Thyroid: No thyromegaly.      Vascular: No JVD.      Trachea: No tracheal deviation.     Cardiovascular:      Rate and Rhythm: Normal rate and regular rhythm.      Heart sounds: Normal heart sounds. No murmur heard.     No friction rub. No gallop.   Pulmonary:      Effort: Pulmonary effort is normal. No respiratory distress.      Breath sounds: Normal breath sounds. No wheezing or rales.   Chest:      Chest wall: No tenderness.   Abdominal:      General: Bowel sounds are normal. There is no distension.      Palpations: Abdomen is soft. There is no mass.      Tenderness: There is no abdominal tenderness. There is no guarding or " rebound.     Musculoskeletal:         General: No tenderness or deformity. Normal range of motion.      Cervical back: Normal range of motion and neck supple.     Skin:     General: Skin is warm and dry.      Coloration: Skin is not pale.      Findings: No erythema or rash.     Neurological:      Mental Status: She is alert and oriented to person, place, and time.     Psychiatric:         Behavior: Behavior normal.         Thought Content: Thought content normal.         Judgment: Judgment normal.     I personally reviewed her CT imaging in PACS from 6/30/2025.  I compared this to previous imaging.  She has no mediastinal adenopathy.  No concerning pulmonary masses.  No evidence of recurrent or metastatic disease.

## 2025-07-10 ENCOUNTER — TELEPHONE (OUTPATIENT)
Dept: CARDIAC SURGERY | Facility: CLINIC | Age: 85
End: 2025-07-10

## 2025-07-10 NOTE — TELEPHONE ENCOUNTER
I returned call to patient and spoke with her. I provided her our medical records department phone number, [phone], to call and request her CT scan completed on 6/30/25 to be sent to her. She voiced great appreciation of this information and returning her call.

## 2025-07-10 NOTE — TELEPHONE ENCOUNTER
"Patient called and LVM transcribed as, \"franky fowler this is mac jones 7/22 [facility] i was in to see doctor Ponce we were having so much fun i didn't remember nobody mentioned the word that i usually get a copy of my cat scan i hate to put you through this problem honey but would you mind sending it to me [facility] at 12:45 [facility] [id number] so i can put it in a nice thing you stapled together for my all my details of the visit for the year OK lit thank you so much uh uh you can call me back at [phone] and just say that you put it in the mail OK that way it will take a lot of pressure off me today thank you for being so kind and have a nice day bye bye\"    "

## 2025-08-04 ENCOUNTER — TELEPHONE (OUTPATIENT)
Dept: GENETICS | Facility: CLINIC | Age: 85
End: 2025-08-04

## 2025-08-04 ENCOUNTER — DOCUMENTATION (OUTPATIENT)
Dept: GENETICS | Facility: CLINIC | Age: 85
End: 2025-08-04

## 2025-08-04 ENCOUNTER — TELEPHONE (OUTPATIENT)
Age: 85
End: 2025-08-04

## 2025-08-18 ENCOUNTER — TELEPHONE (OUTPATIENT)
Age: 85
End: 2025-08-18

## 2025-08-19 ENCOUNTER — ANNUAL EXAM (OUTPATIENT)
Dept: GYNECOLOGY | Facility: CLINIC | Age: 85
End: 2025-08-19
Payer: COMMERCIAL

## 2025-08-19 VITALS
SYSTOLIC BLOOD PRESSURE: 124 MMHG | HEART RATE: 67 BPM | DIASTOLIC BLOOD PRESSURE: 70 MMHG | WEIGHT: 158 LBS | HEIGHT: 65 IN | BODY MASS INDEX: 26.33 KG/M2

## 2025-08-19 DIAGNOSIS — Z01.419 ENCOUNTER FOR GYNECOLOGICAL EXAMINATION WITHOUT ABNORMAL FINDING: ICD-10-CM

## 2025-08-19 DIAGNOSIS — Z86.000 PERSONAL HISTORY OF IN-SITU NEOPLASM OF BREAST: Primary | ICD-10-CM

## 2025-08-19 DIAGNOSIS — N95.2 ATROPHIC VAGINITIS: ICD-10-CM

## 2025-08-19 PROCEDURE — G0101 CA SCREEN;PELVIC/BREAST EXAM: HCPCS | Performed by: OBSTETRICS & GYNECOLOGY

## (undated) DEVICE — TUBING SUCTION 5MM X 12 FT

## (undated) DEVICE — 3000CC GUARDIAN II: Brand: GUARDIAN

## (undated) DEVICE — STAPLER 45 RELOAD BLUE: Brand: ENDOWRIST

## (undated) DEVICE — STAPLER 45 RELOAD WHITE: Brand: ENDOWRIST

## (undated) DEVICE — SCD SEQUENTIAL COMPRESSION COMFORT SLEEVE MEDIUM KNEE LENGTH: Brand: KENDALL SCD

## (undated) DEVICE — CANISTER FOR THORACIC SUCTION SYSTEM: Brand: THOPAZ DISPOSABLE CANISTER 0.8L

## (undated) DEVICE — FIRST STEP BEDSIDE KIT - STAND-UP POUCH, ENDOSCOPIC CLEANING PAD - 1 POUCH: Brand: FIRST STEP BEDSIDE KIT - STAND-UP POUCH, ENDOSCOPIC CLEANING PAD

## (undated) DEVICE — SUT MONOCRYL 4-0 PS-2 27 IN Y426H

## (undated) DEVICE — SUT PROLENE 0 CT-1 30 IN 8424H

## (undated) DEVICE — CHLORAPREP HI-LITE 26ML ORANGE

## (undated) DEVICE — DRAIN SPONGES,6 PLY: Brand: EXCILON

## (undated) DEVICE — UTILITY MARKER,BLACK WITH LABELS: Brand: DEVON

## (undated) DEVICE — VESSEL LOOPS X-RAY DETECTABLE: Brand: DEROYAL

## (undated) DEVICE — SINGLE TUBING WITH LARGE CONNECTOR FOR THORACIC SUCTION SYSTEM PUMP: Brand: THOPAZ TUBING SINGLE

## (undated) DEVICE — ADHESIVE SKIN HIGH VISCOSITY EXOFIN 1ML

## (undated) DEVICE — SURGICEL 4 X 8

## (undated) DEVICE — IRRIG ENDO FLO TUBING

## (undated) DEVICE — SUT MONOCRYL 3-0 SH 27 IN Y416H

## (undated) DEVICE — CADIERE FORCEPS: Brand: ENDOWRIST

## (undated) DEVICE — SUT SILK 2-0 SH 30 IN K833H

## (undated) DEVICE — SKIN MARKER DUAL TIP WITH RULER CAP, FLEXIBLE RULER AND LABELS: Brand: DEVON

## (undated) DEVICE — CURVED TIP STAPLER 45: Brand: ENDOWRIST

## (undated) DEVICE — GLOVE PI ULTRA TOUCH SZ.6.5

## (undated) DEVICE — BRA SURGICAL SZ LGE (36-39)

## (undated) DEVICE — SUT VICRYL 3-0 SH 27 IN J416H

## (undated) DEVICE — MARYLAND BIPOLAR FORCEPS: Brand: ENDOWRIST

## (undated) DEVICE — ELECTRODE BLADE MOD E-Z CLEAN 2.5IN 6.4CM -0012M

## (undated) DEVICE — SEAL

## (undated) DEVICE — VISUALIZATION SYSTEM: Brand: CLEARIFY

## (undated) DEVICE — SINGLE USE BIOPSY VALVE MAJ-210: Brand: SINGLE USE BIOPSY VALVE (STERILE)

## (undated) DEVICE — HEAVY DUTY TABLE COVER: Brand: CONVERTORS

## (undated) DEVICE — SURGICEL 4 X 8IN

## (undated) DEVICE — INTENDED FOR TISSUE SEPARATION, AND OTHER PROCEDURES THAT REQUIRE A SHARP SURGICAL BLADE TO PUNCTURE OR CUT.: Brand: BARD-PARKER SAFETY BLADES SIZE 15, STERILE

## (undated) DEVICE — GLOVE SRG BIOGEL ECLIPSE 7.5

## (undated) DEVICE — GAUZE SPONGES,16 PLY: Brand: CURITY

## (undated) DEVICE — REDUCER: Brand: ENDOWRIST

## (undated) DEVICE — COLUMN DRAPE

## (undated) DEVICE — NEEDLE HYPO 22G X 1-1/2 IN

## (undated) DEVICE — CURVED-TIP STAPLER 30: Brand: ENDOWRIST

## (undated) DEVICE — DISPOSABLE GRASPER CARTRIDGE: Brand: DIRECT DRIVE REPOSABLE GRASPERS

## (undated) DEVICE — SUT VICRYL 2-0 SH 27 IN UNDYED J417H

## (undated) DEVICE — SPECIMEN CONTAINER STERILE PEEL PACK

## (undated) DEVICE — NEEDLE SPINAL 20G X 3.5 LF

## (undated) DEVICE — STAPLER 45 RELOAD: Brand: ENDOWRIST

## (undated) DEVICE — GLOVE SRG BIOGEL 6

## (undated) DEVICE — Device: Brand: TISSUE RETRIEVAL SYSTEM

## (undated) DEVICE — GLOVE INDICATOR PI UNDERGLOVE SZ 8 BLUE

## (undated) DEVICE — ELECTRO LUBE IS A SINGLE PATIENT USE DEVICE THAT IS INTENDED TO BE USED ON ELECTROSURGICAL ELECTRODES TO REDUCE STICKING.: Brand: KEY SURGICAL ELECTRO LUBE

## (undated) DEVICE — CLAMP TOWEL TUBING DISPOSABLE

## (undated) DEVICE — SUREFORM 45: Brand: SUREFORM

## (undated) DEVICE — MEDI-VAC YANKAUER SUCTION HANDLE W/BULBOUS AND CONTROL VENT: Brand: CARDINAL HEALTH

## (undated) DEVICE — 28 FR STRAIGHT – SOFT PVC CATHETER: Brand: PVC THORACIC CATHETERS

## (undated) DEVICE — BETHLEHEM UNIVERSAL MINOR GEN: Brand: CARDINAL HEALTH

## (undated) DEVICE — STAPLER 45: Brand: ENDOWRIST

## (undated) DEVICE — PLUMEPEN PRO 10FT

## (undated) DEVICE — GAUZE ROLL KITTNER

## (undated) DEVICE — SYRINGE 10ML LL

## (undated) DEVICE — TROCARS: Brand: KII® OPTICAL ACCESS SYSTEM

## (undated) DEVICE — TRAY FOLEY 16FR URIMETER SILICONE SURESTEP

## (undated) DEVICE — 2000CC GUARDIAN II: Brand: GUARDIAN

## (undated) DEVICE — ARM DRAPE

## (undated) DEVICE — CANNULA SEAL

## (undated) DEVICE — DRAPE SURGIKIT SADDLE BAG LAP

## (undated) DEVICE — STANDARD SURGICAL GOWN, L: Brand: CONVERTORS

## (undated) DEVICE — SUPER SPONGES,MEDIUM: Brand: KERLIX

## (undated) DEVICE — GLOVE PI ULTRA TOUCH SZ.7.0

## (undated) DEVICE — TIP-UP FENESTRATED GRASPER: Brand: ENDOWRIST

## (undated) DEVICE — NEEDLE SPINAL 22G X 3.5IN  QUINCKE

## (undated) DEVICE — SYRINGE 10ML SLIP TIP LF

## (undated) DEVICE — ASTOUND STANDARD SURGICAL GOWN, XL: Brand: CONVERTORS

## (undated) DEVICE — DRAPE SHEET X-LG

## (undated) DEVICE — GLOVE INDICATOR PI UNDERGLOVE SZ 7 BLUE

## (undated) DEVICE — STAPLER SHEATH: Brand: ENDOWRIST

## (undated) DEVICE — KIT, BETHLEHEM THORACIC ROBOT: Brand: CARDINAL HEALTH

## (undated) DEVICE — SUREFORM 45 RELOAD BLUE: Brand: SUREFORM

## (undated) DEVICE — STERILE THORACIC PACK: Brand: CARDINAL HEALTH

## (undated) DEVICE — INSUFLATION TUBING INSUFLOW (LEXION)

## (undated) DEVICE — STERILE EMESIS BASIN                 070: Brand: CARDINAL HEALTH

## (undated) DEVICE — SINGLE USE SUCTION VALVE MAJ-209: Brand: SINGLE USE SUCTION VALVE (STERILE)

## (undated) DEVICE — SUT VICRYL 0 CT-1 18 IN J740D

## (undated) DEVICE — NEEDLE 25G X 1 1/2

## (undated) DEVICE — STAPLER 30 RELOAD WHITE: Brand: ENDOWRIST

## (undated) DEVICE — LUBRICANT INST ELECTROLUBE ANTISTK WO PAD

## (undated) DEVICE — SYRINGE 20ML LL